# Patient Record
Sex: FEMALE | Race: WHITE | NOT HISPANIC OR LATINO | Employment: OTHER | URBAN - METROPOLITAN AREA
[De-identification: names, ages, dates, MRNs, and addresses within clinical notes are randomized per-mention and may not be internally consistent; named-entity substitution may affect disease eponyms.]

---

## 2017-01-11 ENCOUNTER — ALLIED HEALTH/NURSE VISIT (OUTPATIENT)
Dept: CARDIOLOGY | Facility: CLINIC | Age: 72
End: 2017-01-11
Payer: MEDICARE

## 2017-01-11 DIAGNOSIS — R55 SYNCOPE, UNSPECIFIED SYNCOPE TYPE: ICD-10-CM

## 2017-01-11 DIAGNOSIS — Z45.09 ENCOUNTER FOR LOOP RECORDER CHECK: Primary | ICD-10-CM

## 2017-01-11 PROCEDURE — 93298 REM INTERROG DEV EVAL SCRMS: CPT | Performed by: INTERNAL MEDICINE

## 2017-01-11 PROCEDURE — 93299 C ICM/ILR REMOTE TECH SERV: CPT | Performed by: INTERNAL MEDICINE

## 2017-01-11 NOTE — PROGRESS NOTES
Medtronic Reveal Linq Loop Recorder   Symptom: 0 Tachy: 0 Pause: 0 Enrique: 0  AT: 0 AF: 0 % of time in AT/AF: 0  Heart rate: excellent variability Battery: OK  Careplan: remote in 3 months, scheduled.   Stephen    Called patient with results and plan.

## 2017-01-27 ENCOUNTER — OFFICE VISIT (OUTPATIENT)
Dept: FAMILY MEDICINE | Facility: CLINIC | Age: 72
End: 2017-01-27
Payer: MEDICARE

## 2017-01-27 VITALS
SYSTOLIC BLOOD PRESSURE: 138 MMHG | RESPIRATION RATE: 16 BRPM | BODY MASS INDEX: 30.77 KG/M2 | HEART RATE: 84 BPM | DIASTOLIC BLOOD PRESSURE: 84 MMHG | HEIGHT: 68 IN | OXYGEN SATURATION: 98 % | WEIGHT: 203 LBS | TEMPERATURE: 98.4 F

## 2017-01-27 DIAGNOSIS — J01.90 ACUTE SINUSITIS WITH SYMPTOMS > 10 DAYS: Primary | ICD-10-CM

## 2017-01-27 DIAGNOSIS — I10 HYPERTENSION GOAL BP (BLOOD PRESSURE) < 140/90: ICD-10-CM

## 2017-01-27 DIAGNOSIS — R00.0 TACHYCARDIA: ICD-10-CM

## 2017-01-27 PROCEDURE — 99214 OFFICE O/P EST MOD 30 MIN: CPT | Performed by: FAMILY MEDICINE

## 2017-01-27 RX ORDER — AMOXICILLIN 500 MG/1
1000 CAPSULE ORAL 2 TIMES DAILY
Qty: 40 CAPSULE | Refills: 0 | Status: SHIPPED | OUTPATIENT
Start: 2017-01-27 | End: 2017-08-18

## 2017-01-27 NOTE — MR AVS SNAPSHOT
After Visit Summary   1/27/2017    Teri Beltran    MRN: [id number]           Patient Information     Date Of Birth          1945        Visit Information        Provider Department      1/27/2017 2:10 PM Laura Tipton MD [facility]        Today's Diagnoses     Acute sinusitis with symptoms > 10 days    -  1     Hypertension goal BP (blood pressure) < 140/90         Tachycardia           Care Instructions    Guaifenisen may be helpful for the sinus symptoms. This is now available over the counter in the dose of 600 mg each (Mucinex and Humibid are a couple brand names). This helps break up mucus, and can help with drainage.  You can take 1-2 tabs twice per day.    Keep up with fluids (water).  Humidity can be helpful, such as showers, warm tea.    Sometimes adding a steroid nasal spray like Flonase or Nasacort can help with congestion and with allergies.    Continue to monitor your blood pressure. If it stays high after you feel better, we may want to see you here again.              Follow-ups after your visit        Your next 10 appointments already scheduled     Apr 13, 2017  4:30 PM   Remote ICD Check with ROCHA DCR2   [facility] [place] ([facility])    [address] [place] [id number] [phone]           This appointment is for a remote check of your debrillator.  This is not an appointment at the office.              Who to contact     If you have questions or need follow up information about today's clinic visit or your schedule please contact [facility] directly at [phone].  Normal or non-critical lab and imaging results will be communicated to you by MyChart, letter or phone within 4 business days after the clinic has received the results. If you do not hear from us within 7 days, please contact the clinic through MyChart or phone. If you have a critical or abnormal lab result, we  "will notify you by phone as soon as possible.  Submit refill requests through Friendshippr or call your pharmacy and they will forward the refill request to us. Please allow 3 business days for your refill to be completed.          Additional Information About Your Visit        Goombalhart Information     Friendshippr lets you send messages to your doctor, view your test results, renew your prescriptions, schedule appointments and more. To sign up, go to [email or web address] . Click on \"Log in\" on the left side of the screen, which will take you to the Welcome page. Then click on \"Sign up Now\" on the right side of the page.     You will be asked to enter the access code listed below, as well as some personal information. Please follow the directions to create your username and password.     Your access code is: [email or web address]  Expires: 2017  3:08 PM     Your access code will  in 90 days. If you need help or a new code, please call your [place] clinic or [phone].        Care EveryWhere ID     This is your Care EveryWhere ID. This could be used by other organizations to access your [place] medical records  [id number]        Your Vitals Were     Pulse Temperature Respirations Height BMI (Body Mass Index) Pulse Oximetry    84 98.4  F (36.9  C) (Oral) 16 5' 8\" (1.727 m) 30.87 kg/m2 98%       Blood Pressure from Last 3 Encounters:   17 138/84   16 143/78   16 170/90    Weight from Last 3 Encounters:   17 203 lb (92.08 kg)   16 204 lb (92.534 kg)   16 203 lb (92.08 kg)              Today, you had the following     No orders found for display         Today's Medication Changes          These changes are accurate as of: 17  3:08 PM.  If you have any questions, ask your nurse or doctor.               Start taking these medicines.        Dose/Directions    amoxicillin 500 MG capsule   Commonly known as:  AMOXIL   Used for:  Acute sinusitis with symptoms > 10 days   Started by:  " Laura Tipton MD        Dose:  1000 mg   Take 2 capsules (1,000 mg) by mouth 2 times daily   Quantity:  40 capsule   Refills:  0         These medicines have changed or have updated prescriptions.        Dose/Directions    metoprolol 25 MG 24 hr tablet   Commonly known as:  TOPROL-XL   This may have changed:  how much to take   Used for:  Hypertension goal BP (blood pressure) < 140/90, Tachycardia        Dose:  37.5 mg   Take 1.5 tablets (37.5 mg) by mouth daily   Quantity:  135 tablet   Refills:  3            Where to get your medicines      These medications were sent to [id number] [place] [address] AT [facility] [address] & [facility]  [address] [facility] [address]     Phone:  [phone]    - amoxicillin 500 MG capsule             Primary Care Provider Office Phone # Fax #    Laura Tipton [phone]       [facility] [address]  [facility] [address]        Thank you!     Thank you for choosing [facility]  for your care. Our goal is always to provide you with excellent care. Hearing back from our patients is one way we can continue to improve our services. Please take a few minutes to complete the written survey that you may receive in the mail after your visit with us. Thank you!             Your Updated Medication List - Protect others around you: Learn how to safely use, store and throw away your medicines at www.disposemymeds.org.          This list is accurate as of: 1/27/17  3:08 PM.  Always use your most recent med list.                   Brand Name Dispense Instructions for use    albuterol 108 (90 BASE) MCG/ACT Inhaler    PROAIR HFA/PROVENTIL HFA/VENTOLIN HFA    1 Inhaler    Inhale 2 puffs into the lungs every 6 hours as needed for shortness of breath / dyspnea or wheezing       alendronate 70 MG tablet    FOSAMAX    12 tablet    Take 1 tablet (70 mg) by mouth every 7 days Take with over 8 ounces  water and stay upright until getting something on your stomach; wait at least 30 minutes to eat.       amitriptyline 100 MG tablet    ELAVIL    90 tablet    Take 1 tablet (100 mg) by mouth At Bedtime       amLODIPine 2.5 MG tablet    NORVASC    90 tablet    Take 1 tablet (2.5 mg) by mouth daily       amoxicillin 500 MG capsule    AMOXIL    40 capsule    Take 2 capsules (1,000 mg) by mouth 2 times daily       cholecalciferol 1000 UNIT tablet    vitamin D    100 tablet    Take 1 tablet (1,000 Units) by mouth daily       CLARITIN 10 MG tablet   Generic drug:  loratadine      Take 1 tablet (10 mg) by mouth daily       Co Q10 200 MG Caps     100 capsule    Take 200 mg by mouth daily       COSOPT PF 22.3-6.8 MG/ML Soln   Generic drug:  Dorzolamide HCl-Timolol Mal PF          levothyroxine 88 MCG tablet    SYNTHROID/LEVOTHROID    90 tablet    Take 1 tablet (88 mcg) by mouth daily       lisinopril-hydrochlorothiazide 20-12.5 MG per tablet    PRINZIDE/ZESTORETIC    90 tablet    Take 1 tablet by mouth daily       metoprolol 25 MG 24 hr tablet    TOPROL-XL    135 tablet    Take 1.5 tablets (37.5 mg) by mouth daily       pravastatin 40 MG tablet    PRAVACHOL    90 tablet    Take 1 tablet (40 mg) by mouth daily       ZIOPTAN 0.0015 % Soln   Generic drug:  Tafluprost

## 2017-01-27 NOTE — PATIENT INSTRUCTIONS
Guaifenisen may be helpful for the sinus symptoms. This is now available over the counter in the dose of 600 mg each (Mucinex and Humibid are a couple brand names). This helps break up mucus, and can help with drainage.  You can take 1-2 tabs twice per day.    Keep up with fluids (water).  Humidity can be helpful, such as showers, warm tea.    Sometimes adding a steroid nasal spray like Flonase or Nasacort can help with congestion and with allergies.    Continue to monitor your blood pressure. If it stays high after you feel better, we may want to see you here again.

## 2017-01-27 NOTE — NURSING NOTE
"Chief Complaint   Patient presents with     Sinus Problem       Initial /84 mmHg  Pulse 84  Temp(Src) 98.4  F (36.9  C) (Oral)  Resp 16  Ht 5' 8\" (1.727 m)  Wt 203 lb (92.08 kg)  BMI 30.87 kg/m2  SpO2 98% Estimated body mass index is 30.87 kg/(m^2) as calculated from the following:    Height as of this encounter: 5' 8\" (1.727 m).    Weight as of this encounter: 203 lb (92.08 kg).  BP completed using cuff size: andre Quintero CMA      "

## 2017-01-27 NOTE — PROGRESS NOTES
SUBJECTIVE:                                                    Teri Beltran is a 71 year old female who presents to clinic today for the following health issues:      Acute Illness   Acute illness concerns: sinus  Onset: x 2 weeks     Fever: no     Chills/Sweats: YES    Headache (location?): YES    Sinus Pressure:YES- tender, post-nasal drainage and teeth pain    Conjunctivitis:  no    Ear Pain: YES: bilateral- plugged    Rhinorrhea: no     Congestion: YES    Sore Throat: no      Cough: YES-productive of yellow green    Wheeze: no     Decreased Appetite: YES    Nausea: no     Vomiting: no     Diarrhea:  no     Dysuria/Freq.: no     Fatigue/Achiness: no     Sick/Strep Exposure: no      Therapies Tried and outcome: Claritin - not effective        Patient Active Problem List   Diagnosis     Hypothyroidism     Tachycardia     Glaucoma     Prediabetes     Hyperlipidemia LDL goal <130     Palpitations     BMI 30.0-30.9,adult     Health Care Home     Advanced directives, counseling/discussion     Hypertension goal BP (blood pressure) < 140/90     Adjustment disorder with depressed mood     Osteoporosis     Mild dilation of ascending aorta - borderline     RBBB     First degree AV block     Venous insufficiency     Varicose veins       Current Outpatient Prescriptions   Medication Sig Dispense Refill     amLODIPine (NORVASC) 2.5 MG tablet Take 1 tablet (2.5 mg) by mouth daily 90 tablet 3     alendronate (FOSAMAX) 70 MG tablet Take 1 tablet (70 mg) by mouth every 7 days Take with over 8 ounces water and stay upright until getting something on your stomach; wait at least 30 minutes to eat. 12 tablet 3     pravastatin (PRAVACHOL) 40 MG tablet Take 1 tablet (40 mg) by mouth daily 90 tablet 3     amitriptyline (ELAVIL) 100 MG tablet Take 1 tablet (100 mg) by mouth At Bedtime 90 tablet 1     levothyroxine (SYNTHROID, LEVOTHROID) 88 MCG tablet Take 1 tablet (88 mcg) by mouth daily 90 tablet 2      "lisinopril-hydrochlorothiazide (PRINZIDE,ZESTORETIC) 20-12.5 MG per tablet Take 1 tablet by mouth daily 90 tablet 3     metoprolol (TOPROL-XL) 25 MG 24 hr tablet Take 1.5 tablets (37.5 mg) by mouth daily (Patient taking differently: Take 25 mg by mouth daily ) 135 tablet 3     ZIOPTAN 0.0015 % SOLN        albuterol (PROAIR HFA, PROVENTIL HFA, VENTOLIN HFA) 108 (90 BASE) MCG/ACT inhaler Inhale 2 puffs into the lungs every 6 hours as needed for shortness of breath / dyspnea or wheezing 1 Inhaler 0     Coenzyme Q10 (CO Q10) 200 MG CAPS Take 200 mg by mouth daily 100 capsule prn     cholecalciferol (VITAMIN D) 1000 UNIT tablet Take 1 tablet (1,000 Units) by mouth daily 100 tablet 3     COSOPT PF 22.3-6.8 MG/ML SOLN   3     loratadine (CLARITIN) 10 MG tablet Take 1 tablet (10 mg) by mouth daily             ROS:  CONSTITUTIONAL:NEGATIVE for fever, chills, change in weight  ENT/MOUTH: see below  RESP:see below  CV: NEGATIVE for chest pain, palpitations or peripheral edema  PSYCHIATRIC: NEGATIVE for changes in mood or affect    Notes her bp seems a little higher due to illness.    Sick for about 2 weeks.  Frontal headaches.  Teeth hurting last week.  PND.   No fevers.   Occasional rhinorrhea.    Cough in am. Might be PND.    Waxing and waning.    Has taken some claritin.    Has a LINQ in; no problems since this has been put in.      OBJECTIVE:                                                    /84 mmHg  Pulse 84  Temp(Src) 98.4  F (36.9  C) (Oral)  Resp 16  Ht 5' 8\" (1.727 m)  Wt 203 lb (92.08 kg)  BMI 30.87 kg/m2  SpO2 98%  Body mass index is 30.87 kg/(m^2).     She got 140/84 on her cuff.    General appearance: alert and has no apparent distress  Skin color is pink  Hydration status appears adequate with normal skin turgor and moist mucous membranes.  Sinuses are tender to palpation, especially in the maxillary area bilaterally; some in the frontal area..   Left TM is normal: no effusions, no erythema, and " normal landmarks.  Right TM is normal: no effusions, no erythema, and normal landmarks.  Nasal mucosa is discharge mucoid.  Oropharyngeal exam is normal: no lesions, erythema, adenopathy or exudate.  Neck is supple without adenopathy.  RESP: Normal - CTA without rales, rhonchi, or wheezing.    Her readings 139-148/80-88 in the last several days.    Reviewed some of her Cardiology notes.         ASSESSMENT/PLAN:                                                      Acute sinusitis with symptoms > 10 days  Also discussed some options to assist with drainage.   - amoxicillin (AMOXIL) 500 MG capsule; Take 2 capsules (1,000 mg) by mouth 2 times daily    Hypertension goal BP (blood pressure) < 140/90  Borderline here. Looks like her cuff is close to ours.  She will monitor bp and be seen if elevated once she feels better.     Tachycardia  Reviewed. She has a LINQ implanted for further monitoring.     Follow up prn or as previously directed.     Patient Instructions   Guaifenisen may be helpful for the sinus symptoms. This is now available over the counter in the dose of 600 mg each (Mucinex and Humibid are a couple brand names). This helps break up mucus, and can help with drainage.  You can take 1-2 tabs twice per day.    Keep up with fluids (water).  Humidity can be helpful, such as showers, warm tea.    Sometimes adding a steroid nasal spray like Flonase or Nasacort can help with congestion and with allergies.    Continue to monitor your blood pressure. If it stays high after you feel better, we may want to see you here again.              Laura Tipton MD, MD  [facility]

## 2017-03-27 ENCOUNTER — TRANSFERRED RECORDS (OUTPATIENT)
Dept: HEALTH INFORMATION MANAGEMENT | Facility: CLINIC | Age: 72
End: 2017-03-27

## 2017-03-31 DIAGNOSIS — G47.00 INSOMNIA: Primary | ICD-10-CM

## 2017-03-31 RX ORDER — AMITRIPTYLINE HYDROCHLORIDE 100 MG/1
100 TABLET ORAL AT BEDTIME
Qty: 90 TABLET | Refills: 1 | Status: SHIPPED | OUTPATIENT
Start: 2017-03-31 | End: 2017-09-23

## 2017-03-31 NOTE — TELEPHONE ENCOUNTER
amitriptyline (ELAVIL) 100 MG     Last Written Prescription Date: 9/23/16  Last Fill Quantity: 90, # refills: 1  Last Office Visit with [facility], [facility] or [facility] prescribing provider: 1/27/17        BP Readings from Last 3 Encounters:   01/27/17 138/84   09/26/16 143/78   09/02/16 170/90     Last PHQ-9 score on record= No flowsheet data found.

## 2017-03-31 NOTE — TELEPHONE ENCOUNTER
Takes for insomnia.  Prescription approved per [facility].  Magaly Perdomo, RN  Triage Nurse

## 2017-04-09 DIAGNOSIS — E03.9 HYPOTHYROIDISM: ICD-10-CM

## 2017-04-10 NOTE — TELEPHONE ENCOUNTER
levothyroxine (SYNTHROID, LEVOTHROID) 88 MCG     Last Written Prescription Date: 7/15/16  Last Quantity: 90, # refills: 2  Last Office Visit with [facility], P or [facility] prescribing provider: 1/27/17        TSH   Date Value Ref Range Status   03/01/2016 2.35 0.40 - 4.00 mU/L Final

## 2017-04-11 ENCOUNTER — TRANSFERRED RECORDS (OUTPATIENT)
Dept: HEALTH INFORMATION MANAGEMENT | Facility: CLINIC | Age: 72
End: 2017-04-11

## 2017-04-11 RX ORDER — LEVOTHYROXINE SODIUM 88 UG/1
TABLET ORAL
Qty: 30 TABLET | Refills: 0 | Status: SHIPPED | OUTPATIENT
Start: 2017-04-11 | End: 2017-04-13

## 2017-04-11 NOTE — TELEPHONE ENCOUNTER
Medication is being filled for 1 time refill only due to:  Patient needs labs TSH, T4..   Labs are ordered, please call patient to schedule lab visit.   Magaly Perdomo RN  Triage Nurse

## 2017-04-12 DIAGNOSIS — E03.9 HYPOTHYROIDISM: ICD-10-CM

## 2017-04-12 PROCEDURE — 84443 ASSAY THYROID STIM HORMONE: CPT | Performed by: FAMILY MEDICINE

## 2017-04-12 PROCEDURE — 84439 ASSAY OF FREE THYROXINE: CPT | Performed by: FAMILY MEDICINE

## 2017-04-12 PROCEDURE — 36415 COLL VENOUS BLD VENIPUNCTURE: CPT | Performed by: FAMILY MEDICINE

## 2017-04-13 ENCOUNTER — ALLIED HEALTH/NURSE VISIT (OUTPATIENT)
Dept: CARDIOLOGY | Facility: CLINIC | Age: 72
End: 2017-04-13
Payer: MEDICARE

## 2017-04-13 DIAGNOSIS — E03.9 HYPOTHYROIDISM, UNSPECIFIED TYPE: ICD-10-CM

## 2017-04-13 DIAGNOSIS — Z45.09 ENCOUNTER FOR LOOP RECORDER CHECK: Primary | ICD-10-CM

## 2017-04-13 LAB
T4 FREE SERPL-MCNC: 1.23 NG/DL (ref 0.76–1.46)
TSH SERPL DL<=0.05 MIU/L-ACNC: 2.38 MU/L (ref 0.4–4)

## 2017-04-13 PROCEDURE — 93297 REM INTERROG DEV EVAL ICPMS: CPT | Performed by: INTERNAL MEDICINE

## 2017-04-13 PROCEDURE — 93299 C ICM/ILR REMOTE TECH SERV: CPT | Performed by: INTERNAL MEDICINE

## 2017-04-13 RX ORDER — LEVOTHYROXINE SODIUM 88 UG/1
88 TABLET ORAL DAILY
Qty: 90 TABLET | Refills: 3 | Status: SHIPPED | OUTPATIENT
Start: 2017-04-13 | End: 2018-05-17

## 2017-04-13 NOTE — LETTER
Teri Beltran    [address] [facility] [address]    April 14, 2017      Dear Teri Beltran,     Your transmission sent on 04/13/2017 has been reviewed. No events were detected.  Your next scheduled date for you to send a transmission is on 07/20/17.   Please call Luis at [phone] to change your appointment if needed. You may call and leave a message for a device RN if you have any questions at [phone] and they will return your call. Device clinic hours: Monday - Friday  8:00am-4:00pm   Sincerely,   Device Nurse

## 2017-04-13 NOTE — MR AVS SNAPSHOT
"              After Visit Summary   4/13/2017    Teri Beltran    MRN: [id number]           Patient Information     Date Of Birth          1945        Visit Information        Provider Department      4/13/2017 4:30 PM ROCHA DCR2 [facility]        Today's Diagnoses     Encounter for loop recorder check    -  1       Follow-ups after your visit        Your next 10 appointments already scheduled     Jul 20, 2017  4:30 PM CDT   Remote ICD Check with ROCHA DCR2   [facility] ([facility])    [address] [place], [place] [id number]  [facility] [id number] [phone]           This appointment is for a remote check of your debrillator.  This is not an appointment at the office.              Who to contact     If you have questions or need follow up information about today's clinic visit or your schedule please contact [facility] directly at [phone].  Normal or non-critical lab and imaging results will be communicated to you by Motor2hart, letter or phone within 4 business days after the clinic has received the results. If you do not hear from us within 7 days, please contact the clinic through Motor2hart or phone. If you have a critical or abnormal lab result, we will notify you by phone as soon as possible.  Submit refill requests through Pwinty or call your pharmacy and they will forward the refill request to us. Please allow 3 business days for your refill to be completed.          Additional Information About Your Visit        Motor2harKwelia Information     Pwinty lets you send messages to your doctor, view your test results, renew your prescriptions, schedule appointments and more. To sign up, go to [email or web address] . Click on \"Log in\" on the left side of the screen, which will take you to the Welcome page. Then click on \"Sign up Now\" on the right side of the page.     You will be asked " to enter the access code listed below, as well as some personal information. Please follow the directions to create your username and password.     Your access code is: [email or web address]  Expires: 2017  4:08 PM     Your access code will  in 90 days. If you need help or a new code, please call your [place] clinic or [phone].        Care EveryWhere ID     This is your Care EveryWhere ID. This could be used by other organizations to access your [place] medical records  [id number]         Blood Pressure from Last 3 Encounters:   17 138/84   16 143/78   16 170/90    Weight from Last 3 Encounters:   17 92.1 kg (203 lb)   16 92.5 kg (204 lb)   16 92.1 kg (203 lb)              We Performed the Following     C ICM DEVICE INTERROGAT REMOTE (57822)     ILR REMOTE TECH SERV (81863)          Today's Medication Changes          These changes are accurate as of: 17 11:59 PM.  If you have any questions, ask your nurse or doctor.               These medicines have changed or have updated prescriptions.        Dose/Directions    levothyroxine 88 MCG tablet   Commonly known as:  SYNTHROID/LEVOTHROID   This may have changed:  See the new instructions.   Used for:  Hypothyroidism, unspecified type   Changed by:  Laura Tipton MD        Dose:  88 mcg   Take 1 tablet (88 mcg) by mouth daily   Quantity:  90 tablet   Refills:  3       metoprolol 25 MG 24 hr tablet   Commonly known as:  TOPROL-XL   This may have changed:  how much to take   Used for:  Hypertension goal BP (blood pressure) < 140/90, Tachycardia        Dose:  37.5 mg   Take 1.5 tablets (37.5 mg) by mouth daily   Quantity:  135 tablet   Refills:  3            Where to get your medicines      These medications were sent to [facility]  [place] - [address] AT [facility] [address] & [facility]  [address] [place] [address] [facility] [address]     Phone:  [phone]     levothyroxine 88 MCG tablet                 Primary Care Provider Office Phone # Fax #    Laura Tipton [phone]       [facility] [address]  [facility] [address]        Thank you!     Thank you for choosing [facility] [place]  for your care. Our goal is always to provide you with excellent care. Hearing back from our patients is one way we can continue to improve our services. Please take a few minutes to complete the written survey that you may receive in the mail after your visit with us. Thank you!             Your Updated Medication List - Protect others around you: Learn how to safely use, store and throw away your medicines at www.disposemymeds.org.          This list is accurate as of: 4/13/17 11:59 PM.  Always use your most recent med list.                   Brand Name Dispense Instructions for use    albuterol 108 (90 BASE) MCG/ACT Inhaler    PROAIR HFA/PROVENTIL HFA/VENTOLIN HFA    1 Inhaler    Inhale 2 puffs into the lungs every 6 hours as needed for shortness of breath / dyspnea or wheezing       alendronate 70 MG tablet    FOSAMAX    12 tablet    Take 1 tablet (70 mg) by mouth every 7 days Take with over 8 ounces water and stay upright until getting something on your stomach; wait at least 30 minutes to eat.       amitriptyline 100 MG tablet    ELAVIL    90 tablet    Take 1 tablet (100 mg) by mouth At Bedtime       amLODIPine 2.5 MG tablet    NORVASC    90 tablet    Take 1 tablet (2.5 mg) by mouth daily       amoxicillin 500 MG capsule    AMOXIL    40 capsule    Take 2 capsules (1,000 mg) by mouth 2 times daily       cholecalciferol 1000 UNIT tablet    vitamin D    100 tablet    Take 1 tablet (1,000 Units) by mouth daily       CLARITIN 10 MG tablet   Generic drug:  loratadine      Take 1 tablet (10 mg) by mouth daily       Co Q10 200 MG Caps     100 capsule    Take 200 mg by mouth daily       COSOPT PF 22.3-6.8 MG/ML Soln   Generic drug:  Dorzolamide  HCl-Timolol Mal PF          levothyroxine 88 MCG tablet    SYNTHROID/LEVOTHROID    90 tablet    Take 1 tablet (88 mcg) by mouth daily       lisinopril-hydrochlorothiazide 20-12.5 MG per tablet    PRINZIDE/ZESTORETIC    90 tablet    Take 1 tablet by mouth daily       metoprolol 25 MG 24 hr tablet    TOPROL-XL    135 tablet    Take 1.5 tablets (37.5 mg) by mouth daily       pravastatin 40 MG tablet    PRAVACHOL    90 tablet    Take 1 tablet (40 mg) by mouth daily       ZIOPTAN 0.0015 % Soln   Generic drug:  Tafluprost

## 2017-04-14 NOTE — PROGRESS NOTES
[employer] Reveal Linq Loop Recorder   Symptom: none Tachy: none Pause: none Enrique: none  AT: none AF: none % of time in AT/AF: N/A  Heart rate: Stable with adequate rates Battery: OK  Careplan: Results letter mailed to patient. Eunice

## 2017-05-12 ENCOUNTER — TELEPHONE (OUTPATIENT)
Dept: CARDIOLOGY | Facility: CLINIC | Age: 72
End: 2017-05-12

## 2017-05-12 NOTE — TELEPHONE ENCOUNTER
Patient called to report an episode of shakiness and palpitations of 110 bpm. Patient states that she feels fine now. Remote transmission did not reveal any abnormal arrhythmias. Patient instructed to follow-up with PMD to review glucose level. Patient verbalized understanding. Eunice

## 2017-05-18 DIAGNOSIS — I10 HYPERTENSION GOAL BP (BLOOD PRESSURE) < 140/90: ICD-10-CM

## 2017-05-19 NOTE — TELEPHONE ENCOUNTER
lisinopril-hydrochlorothiazide (PRINZIDE,ZESTORETIC) 20-12.5 MG      Last Written Prescription Date: 5/17/16  Last Fill Quantity: 90, # refills: 3  Last Office Visit with G, P or [facility] prescribing provider: 1/27/17       Potassium   Date Value Ref Range Status   09/26/2016 3.6 3.4 - 5.3 mmol/L Final     Creatinine   Date Value Ref Range Status   09/26/2016 0.67 0.52 - 1.04 mg/dL Final     BP Readings from Last 3 Encounters:   01/27/17 138/84   09/26/16 143/78   09/02/16 170/90

## 2017-05-22 RX ORDER — LISINOPRIL AND HYDROCHLOROTHIAZIDE 12.5; 2 MG/1; MG/1
TABLET ORAL
Qty: 90 TABLET | Refills: 1 | Status: SHIPPED | OUTPATIENT
Start: 2017-05-22 | End: 2017-11-18

## 2017-05-22 NOTE — TELEPHONE ENCOUNTER
Prescription approved per [facility] Refill Protocol.  Magaly Perdomo, RN  Triage Nurse

## 2017-06-22 DIAGNOSIS — R00.0 TACHYCARDIA: ICD-10-CM

## 2017-06-22 DIAGNOSIS — I10 HYPERTENSION GOAL BP (BLOOD PRESSURE) < 140/90: ICD-10-CM

## 2017-06-22 RX ORDER — METOPROLOL SUCCINATE 25 MG/1
37.5 TABLET, EXTENDED RELEASE ORAL DAILY
Qty: 135 TABLET | Refills: 0 | Status: SHIPPED | OUTPATIENT
Start: 2017-06-22 | End: 2017-10-04

## 2017-07-07 ENCOUNTER — TRANSFERRED RECORDS (OUTPATIENT)
Dept: HEALTH INFORMATION MANAGEMENT | Facility: CLINIC | Age: 72
End: 2017-07-07

## 2017-07-20 ENCOUNTER — ALLIED HEALTH/NURSE VISIT (OUTPATIENT)
Dept: CARDIOLOGY | Facility: CLINIC | Age: 72
End: 2017-07-20
Payer: MEDICARE

## 2017-07-20 DIAGNOSIS — Z45.09 ENCOUNTER FOR LOOP RECORDER CHECK: Primary | ICD-10-CM

## 2017-07-20 PROCEDURE — 93299 C ICM/ILR REMOTE TECH SERV: CPT | Performed by: INTERNAL MEDICINE

## 2017-07-20 PROCEDURE — 93297 REM INTERROG DEV EVAL ICPMS: CPT | Performed by: INTERNAL MEDICINE

## 2017-07-20 NOTE — LETTER
Teri Beltran    [address] [facility] [address]    July 20, 2017      Dear Teri Beltran,     Your transmission sent on 07/20/17 has been reviewed. It was determined the results are normal. No events were detected.  Your next scheduled date for you to send a transmission is on 11/02/2017.   Please call Luis at [phone] to change your appointment if needed. You may call and leave a message for a device RN if you have any questions at [phone] and they will return your call. Device clinic hours: Monday - Friday  8:00am-4:00pm   Sincerely,   Device Nurse

## 2017-07-20 NOTE — PROGRESS NOTES
[employer] Reveal Linq Loop Recorder   Symptom: 0 Tachy: 0 Pause: 0 Enrique: 0  AT: 0 AF: 0 % of time in AT/AF: 0  Heart rate: Stable with adequate rates Battery: OK  Careplan: Results letter with next remote transmission date mailed to patient. Eunice    I have reviewed and interpreted the device interrogation. The device is functioning within normal device parameters. I agree with the current findings, assessment and plan.

## 2017-07-20 NOTE — MR AVS SNAPSHOT
After Visit Summary   7/20/2017    Teri Beltran    MRN: [id number]           Patient Information     Date Of Birth          1945        Visit Information        Provider Department      7/20/2017 4:30 PM ROCHA DCR2 [facility]        Today's Diagnoses     Encounter for loop recorder check    -  1       Follow-ups after your visit        Your next 10 appointments already scheduled     Jul 20, 2017  4:30 PM CDT   Remote ICD Check with ROCHA DCR2   [facility] ([facility])    [address]  [facility] [address] [phone]           This appointment is for a remote check of your debrillator.  This is not an appointment at the office.            Nov 02, 2017  4:30 PM CDT   Remote ICD Check with ROCHA DCR2   [facility] ([facility])    [address]  [facility] [id number] [phone]           This appointment is for a remote check of your debrillator.  This is not an appointment at the office.              Who to contact     If you have questions or need follow up information about today's clinic visit or your schedule please contact [facility] directly at [phone].  Normal or non-critical lab and imaging results will be communicated to you by MyChart, letter or phone within 4 business days after the clinic has received the results. If you do not hear from us within 7 days, please contact the clinic through BAC ON TRAChart or phone. If you have a critical or abnormal lab result, we will notify you by phone as soon as possible.  Submit refill requests through [employer] or call your pharmacy and they will forward the refill request to us. Please allow 3 business days for your refill to be completed.          Additional Information About Your Visit        [employer]AC ON TRA[employer] Information     [employer] lets  "you send messages to your doctor, view your test results, renew your prescriptions, schedule appointments and more. To sign up, go to [email or web address] . Click on \"Log in\" on the left side of the screen, which will take you to the Welcome page. Then click on \"Sign up Now\" on the right side of the page.     You will be asked to enter the access code listed below, as well as some personal information. Please follow the directions to create your username and password.     Your access code is: [email or web address]  Expires: 10/18/2017  3:21 PM     Your access code will  in 90 days. If you need help or a new code, please call your [place] clinic or [phone].        Care EveryWhere ID     This is your Care EveryWhere ID. This could be used by other organizations to access your [place] medical records  [id number]         Blood Pressure from Last 3 Encounters:   17 138/84   16 143/78   16 170/90    Weight from Last 3 Encounters:   17 92.1 kg (203 lb)   16 92.5 kg (204 lb)   16 92.1 kg (203 lb)              We Performed the Following     C ICM DEVICE INTERROGAT REMOTE (18875)     ILR REMOTE TECH SERV (74551)        Primary Care Provider Office Phone # Fax #    Laura Tipton [phone]       [facility] [address] [facility] [address]        Equal Access to Services     [facility] : Hadii aad ku hadasho Soomaali, waaxda luqadaha, qaybta kaalmada adeegyada, waxay geena verma. So [facility] [phone].    ATENCIÓN: Si habla español, tiene a tracy disposición servicios gratuitos de asistencia lingüística. Llame al [phone].    We comply with applicable federal civil rights laws and [place] laws. We do not discriminate on the basis of race, color, national origin, age, disability sex, sexual orientation or gender identity.            Thank you!     Thank you for choosing [facility]  for your care. Our goal is always to provide you with excellent care. Hearing back from our patients is one way we can continue to improve our services. Please take a few minutes to complete the written survey that you may receive in the mail after your visit with us. Thank you!             Your Updated Medication List - Protect others around you: Learn how to safely use, store and throw away your medicines at www.disposemymeds.org.          This list is accurate as of: 7/20/17  3:21 PM.  Always use your most recent med list.                   Brand Name Dispense Instructions for use Diagnosis    albuterol 108 (90 BASE) MCG/ACT Inhaler    PROAIR HFA/PROVENTIL HFA/VENTOLIN HFA    1 Inhaler    Inhale 2 puffs into the lungs every 6 hours as needed for shortness of breath / dyspnea or wheezing    Seasonal allergies, SOB (shortness of breath)       alendronate 70 MG tablet    FOSAMAX    12 tablet    Take 1 tablet (70 mg) by mouth every 7 days Take with over 8 ounces water and stay upright until getting something on your stomach; wait at least 30 minutes to eat.    Osteoporosis       amitriptyline 100 MG tablet    ELAVIL    90 tablet    Take 1 tablet (100 mg) by mouth At Bedtime    Insomnia       amLODIPine 2.5 MG tablet    NORVASC    90 tablet    Take 1 tablet (2.5 mg) by mouth daily    Essential hypertension with goal blood pressure less than 140/90       amoxicillin 500 MG capsule    AMOXIL    40 capsule    Take 2 capsules (1,000 mg) by mouth 2 times daily    Acute sinusitis with symptoms > 10 days       cholecalciferol 1000 UNIT tablet    vitamin D    100 tablet    Take 1 tablet (1,000 Units) by mouth daily        CLARITIN 10 MG tablet   Generic drug:  loratadine      Take 1 tablet (10 mg) by mouth daily    Seasonal allergic rhinitis       Co Q10 200 MG Caps     100 capsule    Take 200 mg by mouth daily        COSOPT PF 22.3-6.8 MG/ML Soln   Generic drug:  Dorzolamide HCl-Timolol Mal PF            levothyroxine 88 MCG tablet    SYNTHROID/LEVOTHROID    90 tablet    Take 1 tablet (88 mcg) by mouth daily    Hypothyroidism, unspecified type       lisinopril-hydrochlorothiazide 20-12.5 MG per tablet    PRINZIDE/ZESTORETIC    90 tablet    TAKE ONE TABLET BY MOUTH ONCE DAILY    Hypertension goal BP (blood pressure) < 140/90       metoprolol 25 MG 24 hr tablet    TOPROL-XL    135 tablet    Take 1.5 tablets (37.5 mg) by mouth daily    Hypertension goal BP (blood pressure) < 140/90, Tachycardia       pravastatin 40 MG tablet    PRAVACHOL    90 tablet    Take 1 tablet (40 mg) by mouth daily    Hyperlipidemia LDL goal <130       ZIOPTAN 0.0015 % Soln   Generic drug:  Tafluprost

## 2017-08-18 ENCOUNTER — OFFICE VISIT (OUTPATIENT)
Dept: FAMILY MEDICINE | Facility: CLINIC | Age: 72
End: 2017-08-18
Payer: MEDICARE

## 2017-08-18 VITALS
OXYGEN SATURATION: 98 % | TEMPERATURE: 98.2 F | HEART RATE: 90 BPM | RESPIRATION RATE: 16 BRPM | BODY MASS INDEX: 30.83 KG/M2 | WEIGHT: 203.4 LBS | HEIGHT: 68 IN | SYSTOLIC BLOOD PRESSURE: 132 MMHG | DIASTOLIC BLOOD PRESSURE: 84 MMHG

## 2017-08-18 DIAGNOSIS — E78.5 HYPERLIPIDEMIA LDL GOAL <130: ICD-10-CM

## 2017-08-18 DIAGNOSIS — R53.83 FATIGUE, UNSPECIFIED TYPE: ICD-10-CM

## 2017-08-18 DIAGNOSIS — R73.03 PREDIABETES: ICD-10-CM

## 2017-08-18 DIAGNOSIS — I10 HYPERTENSION GOAL BP (BLOOD PRESSURE) < 140/90: Primary | ICD-10-CM

## 2017-08-18 DIAGNOSIS — E03.9 HYPOTHYROIDISM, UNSPECIFIED TYPE: ICD-10-CM

## 2017-08-18 DIAGNOSIS — Z78.9 ALCOHOL USE: ICD-10-CM

## 2017-08-18 DIAGNOSIS — I77.810 MILD DILATION OF ASCENDING AORTA (H): ICD-10-CM

## 2017-08-18 DIAGNOSIS — Z12.31 ENCOUNTER FOR SCREENING MAMMOGRAM FOR BREAST CANCER: ICD-10-CM

## 2017-08-18 PROCEDURE — 99214 OFFICE O/P EST MOD 30 MIN: CPT | Performed by: FAMILY MEDICINE

## 2017-08-18 NOTE — PATIENT INSTRUCTIONS
I would recommend not having more than one shot of keeley or other alcohol per day.    Schedule a lab only appointment in the next 1-2 months for fasting lab.  Fasting means nothing to eat or drink for 10 hours. However, you do want to take your medications. We DO encourage you to drink water.    Schedule a nurse only appointment for a blood pressure check.  Bring your cuff in and check at the same time.

## 2017-08-18 NOTE — PROGRESS NOTES
SUBJECTIVE:   Teri Beltran is a 71 year old female who presents to clinic today for the following health issues:      Hypertension Follow-up      Outpatient blood pressures are being checked at home.  Results are /42-92.- in the am notices low numbers.    Low Salt Diet: no added salt        Amount of exercise or physical activity: [employer]- 2 times per week    Problems taking medications regularly: No    Medication side effects: none  Diet: regular (no restrictions)      Here to discuss a chip in the left breast that records the heart.  Has a mammogram coming up.    Problem list and histories reviewed & adjusted, as indicated.  Additional history:     See under ROS    Patient Active Problem List   Diagnosis     Hypothyroidism     Tachycardia     Glaucoma     Prediabetes     Hyperlipidemia LDL goal <130     Palpitations     BMI 30.0-30.9,adult     Health Care Home     Advanced directives, counseling/discussion     Hypertension goal BP (blood pressure) < 140/90     Adjustment disorder with depressed mood     Osteoporosis     Mild dilation of ascending aorta - borderline     RBBB     First degree AV block     Venous insufficiency     Varicose veins       Current Outpatient Prescriptions   Medication Sig Dispense Refill     metoprolol (TOPROL-XL) 25 MG 24 hr tablet Take 1.5 tablets (37.5 mg) by mouth daily 135 tablet 0     lisinopril-hydrochlorothiazide (PRINZIDE/ZESTORETIC) 20-12.5 MG per tablet TAKE ONE TABLET BY MOUTH ONCE DAILY 90 tablet 1     levothyroxine (SYNTHROID/LEVOTHROID) 88 MCG tablet Take 1 tablet (88 mcg) by mouth daily 90 tablet 3     amitriptyline (ELAVIL) 100 MG tablet Take 1 tablet (100 mg) by mouth At Bedtime 90 tablet 1     amoxicillin (AMOXIL) 500 MG capsule Take 2 capsules (1,000 mg) by mouth 2 times daily 40 capsule 0     amLODIPine (NORVASC) 2.5 MG tablet Take 1 tablet (2.5 mg) by mouth daily 90 tablet 3     alendronate (FOSAMAX) 70 MG tablet Take 1 tablet (70 mg) by mouth every  "7 days Take with over 8 ounces water and stay upright until getting something on your stomach; wait at least 30 minutes to eat. 12 tablet 3     albuterol (PROAIR HFA, PROVENTIL HFA, VENTOLIN HFA) 108 (90 BASE) MCG/ACT inhaler Inhale 2 puffs into the lungs every 6 hours as needed for shortness of breath / dyspnea or wheezing 1 Inhaler 0     Coenzyme Q10 (CO Q10) 200 MG CAPS Take 200 mg by mouth daily 100 capsule prn     cholecalciferol (VITAMIN D) 1000 UNIT tablet Take 1 tablet (1,000 Units) by mouth daily 100 tablet 3     COSOPT PF 22.3-6.8 MG/ML SOLN   3     loratadine (CLARITIN) 10 MG tablet Take 1 tablet (10 mg) by mouth daily       pravastatin (PRAVACHOL) 40 MG tablet Take 1 tablet (40 mg) by mouth daily 90 tablet 3     ZIOPTAN 0.0015 % SOLN            Reviewed and updated as needed this visit by clinical staff     Reviewed and updated as needed this visit by Provider         ROS:  CONSTITUTIONAL:NEGATIVE for fever, chills, change in weight  RESP: will occasionally get short of breath. Not new. Rests and things ok. She is not concerned.  CV: NEGATIVE for chest pain, palpitations. She had some swelling this am; put on teds.  NEURO: NEGATIVE for weakness, dizziness or paresthesias  PSYCHIATRIC: NEGATIVE for changes in mood or affect    Has gotten as low as 94/69. Home readings.  Feels exhausted with above.  Not dizzy or lightheaded.     Otherwise feeling fine.    She notes her blood pressures at home are lowish (/50-80's.)  HR 80's-111.    She does say she has Lillian and water every evening;  2.    Has been getting to the gym; tries for twice per week.  Does bike.    OBJECTIVE:     /84 (BP Location: Right arm, Cuff Size: Adult Regular)  Pulse 90  Temp 98.2  F (36.8  C) (Oral)  Resp 16  Ht 5' 8\" (1.727 m)  Wt 203 lb 6.4 oz (92.3 kg)  SpO2 98%  BMI 30.93 kg/m2  Body mass index is 30.93 kg/(m^2).  GENERAL APPEARANCE: alert and no distress  RESP: lungs clear to auscultation - no rales, rhonchi or " wheezes  CV: regular rates and rhythm  MS: No edema detected at this time; she does have some thin compression stockings on.  PSYCH: mentation appears normal and affect normal/bright      GFR Estimate   Date Value Ref Range Status   09/26/2016 87 >60 mL/min/1.7m2 Final     Comment:     Non  GFR Calc   08/30/2016 63 >60 mL/min/1.7m2 Final     Comment:     Non  GFR Calc   03/01/2016 >90  Non  GFR Calc   >60 mL/min/1.7m2 Final     TSH   Date Value Ref Range Status   04/12/2017 2.38 0.40 - 4.00 mU/L Final   ]  Recent Labs   Lab Test  09/30/16   1051  08/11/15   1224  08/20/14   0958   CHOL  176  176  201*   HDL  65  79  88   LDL  84  70  79   TRIG  136  133  169*   CHOLHDLRATIO   --   2.2  2.3     Lab Results   Component Value Date    A1C 6.1 11/14/2014    A1C 6.1 07/03/2013     Lab Results   Component Value Date    MICROL <5 08/11/2015     No results found for: MICROALBUMIN    Reviewed her bp recordings; see under ROS    Reviewed ECHO from 2015.    ASSESSMENT/PLAN:     Hypertension goal BP (blood pressure) < 140/90  Looks good today.  She reports low blood pressures at home, but has not brought her cuff. She denies any dizziness or lightheadedness, which I would often expect as symptoms of low blood pressure. Recommend to bring her cuff in and check it against a reliable cuff.   At this time, did not make any changes.  - Comprehensive metabolic panel; Future  - **Albumin Random Urine Quant FUTURE anytime; Future    Hyperlipidemia LDL goal <130  Will return when fasting; it has not quite been a year since she last had this checked.   - Comprehensive metabolic panel; Future  - **Lipid panel reflex to direct LDL FUTURE anytime; Future    Prediabetes  Encourage lifestyle.  - Comprehensive metabolic panel; Future  - **A1C FUTURE anytime; Future    Mild dilation of ascending aorta - borderline  Seen on previous ECHO.  She should be following up with Cardiology in the near  future.  Did mention this to her; I wonder if they would want to do a follow up ECHO for this.     Fatigue, unspecified type  Uncertain etiology. Possibly related to lowish bp or medication, but she denies lightheadedness.   - **TSH with free T4 reflex FUTURE anytime; Future  - CBC with platelets; Future    Alcohol use  Discussed recommendation to keep it to 1 per day or less. Also discussed amount per that one (one shot).   - Comprehensive metabolic panel; Future    Hypothyroidism, unspecified type  Clinically euthyroid.     Encounter for screening mammogram for breast cancer    - MA Screening Digital Bilateral; Future        Patient Instructions       I would recommend not having more than one shot of keeley or other alcohol per day.    Schedule a lab only appointment in the next 1-2 months for fasting lab.  Fasting means nothing to eat or drink for 10 hours. However, you do want to take your medications. We DO encourage you to drink water.    Schedule a nurse only appointment for a blood pressure check.  Bring your cuff in and check at the same time.            Reminded to schedule cardiology appointment and given phone number as noted on last refill for metoprolol.    Laura Tipton MD, MD  [facility]

## 2017-08-18 NOTE — MR AVS SNAPSHOT
After Visit Summary   8/18/2017    Teri Beltran    MRN: [id number]           Patient Information     Date Of Birth          1945        Visit Information        Provider Department      8/18/2017 3:10 PM Laura Tipton MD [facility]        Today's Diagnoses     Hyperlipidemia LDL goal <130    -  1    Hypertension goal BP (blood pressure) < 140/90        Encounter for screening mammogram for breast cancer        Prediabetes        Fatigue, unspecified type        Hypothyroidism, unspecified type          Care Instructions        I would recommend not having more than one shot of keeley or other alcohol per day.    Schedule a lab only appointment in the next 1-2 months for fasting lab.  Fasting means nothing to eat or drink for 10 hours. However, you do want to take your medications. We DO encourage you to drink water.    Schedule a nurse only appointment for a blood pressure check.  Bring your cuff in and check at the same time.                Follow-ups after your visit        Your next 10 appointments already scheduled     Nov 02, 2017  4:30 PM CDT   Remote ICD Check with ROCHA DCR2   [facility] [place] ([facility])    [address] [place] [address] [phone]           This appointment is for a remote check of your debrillator.  This is not an appointment at the office.              Future tests that were ordered for you today     Open Future Orders        Priority Expected Expires Ordered    MA Screening Digital Bilateral Routine  8/18/2018 8/18/2017    Comprehensive metabolic panel Routine  8/18/2018 8/18/2017    **Lipid panel reflex to direct LDL FUTURE anytime Routine 8/18/2017 8/18/2018 8/18/2017    **A1C FUTURE anytime Routine 8/18/2017 8/18/2018 8/18/2017    **Albumin Random Urine Quant FUTURE anytime Routine 8/18/2017 8/18/2018 8/18/2017    **TSH with free T4 reflex FUTURE anytime Routine 8/18/2017  "2018    CBC with platelets Routine  2018            Who to contact     If you have questions or need follow up information about today's clinic visit or your schedule please contact [facility] directly at [phone].  Normal or non-critical lab and imaging results will be communicated to you by MyChart, letter or phone within 4 business days after the clinic has received the results. If you do not hear from us within 7 days, please contact the clinic through MyChart or phone. If you have a critical or abnormal lab result, we will notify you by phone as soon as possible.  Submit refill requests through [employer] or call your pharmacy and they will forward the refill request to us. Please allow 3 business days for your refill to be completed.          Additional Information About Your Visit        MyChart Information     [employer] lets you send messages to your doctor, view your test results, renew your prescriptions, schedule appointments and more. To sign up, go to [email or web address] . Click on \"Log in\" on the left side of the screen, which will take you to the Welcome page. Then click on \"Sign up Now\" on the right side of the page.     You will be asked to enter the access code listed below, as well as some personal information. Please follow the directions to create your username and password.     Your access code is: [email or web address]  Expires: 10/18/2017  3:21 PM     Your access code will  in 90 days. If you need help or a new code, please call your [place] clinic or [phone].        Care EveryWhere ID     This is your Care EveryWhere ID. This could be used by other organizations to access your [place] medical records  [id number]        Your Vitals Were     Pulse Temperature Respirations Height Pulse Oximetry BMI (Body Mass Index)    90 98.2  F (36.8  C) (Oral) 16 5' 8\" (1.727 m) 98% 30.93 kg/m2       Blood Pressure from Last 3 Encounters:   17 " 132/84   01/27/17 138/84   09/26/16 143/78    Weight from Last 3 Encounters:   08/18/17 203 lb 6.4 oz (92.3 kg)   01/27/17 203 lb (92.1 kg)   09/26/16 204 lb (92.5 kg)               Primary Care Provider Office Phone # Fax #    Laura Tipton [phone]       [address] [facility] [address]        Equal Access to Services     [facility] : Hadii aad ku hadasho Soomaali, waaxda luqadaha, qaybta kaalmada adeegyada, waxay idiin hayaan adeeg kharash labenita . So [facility] [phone].    ATENCIÓN: Si habla español, tiene a tracy disposición servicios gratuitos de asistencia lingüística. LlSelect Medical [facility] [phone].    We comply with applicable federal civil rights laws and [place] laws. We do not discriminate on the basis of race, color, national origin, age, disability sex, sexual orientation or gender identity.            Thank you!     Thank you for choosing [facility]  for your care. Our goal is always to provide you with excellent care. Hearing back from our patients is one way we can continue to improve our services. Please take a few minutes to complete the written survey that you may receive in the mail after your visit with us. Thank you!             Your Updated Medication List - Protect others around you: Learn how to safely use, store and throw away your medicines at www.disposemymeds.org.          This list is accurate as of: 8/18/17  3:57 PM.  Always use your most recent med list.                   Brand Name Dispense Instructions for use Diagnosis    albuterol 108 (90 BASE) MCG/ACT Inhaler    PROAIR HFA/PROVENTIL HFA/VENTOLIN HFA    1 Inhaler    Inhale 2 puffs into the lungs every 6 hours as needed for shortness of breath / dyspnea or wheezing    Seasonal allergies, SOB (shortness of breath)       alendronate 70 MG tablet    FOSAMAX    12 tablet    Take 1 tablet (70 mg) by mouth every 7 days Take with over 8 ounces water and stay upright until getting something on your  stomach; wait at least 30 minutes to eat.    Osteoporosis       amitriptyline 100 MG tablet    ELAVIL    90 tablet    Take 1 tablet (100 mg) by mouth At Bedtime    Insomnia       amLODIPine 2.5 MG tablet    NORVASC    90 tablet    Take 1 tablet (2.5 mg) by mouth daily    Essential hypertension with goal blood pressure less than 140/90       cholecalciferol 1000 UNIT tablet    vitamin D    100 tablet    Take 1 tablet (1,000 Units) by mouth daily        CLARITIN 10 MG tablet   Generic drug:  loratadine      Take 1 tablet (10 mg) by mouth daily    Seasonal allergic rhinitis       Co Q10 200 MG Caps     100 capsule    Take 200 mg by mouth daily        COSOPT PF 22.3-6.8 MG/ML Soln   Generic drug:  Dorzolamide HCl-Timolol Mal PF           levothyroxine 88 MCG tablet    SYNTHROID/LEVOTHROID    90 tablet    Take 1 tablet (88 mcg) by mouth daily    Hypothyroidism, unspecified type       lisinopril-hydrochlorothiazide 20-12.5 MG per tablet    PRINZIDE/ZESTORETIC    90 tablet    TAKE ONE TABLET BY MOUTH ONCE DAILY    Hypertension goal BP (blood pressure) < 140/90       metoprolol 25 MG 24 hr tablet    TOPROL-XL    135 tablet    Take 1.5 tablets (37.5 mg) by mouth daily    Hypertension goal BP (blood pressure) < 140/90, Tachycardia       pravastatin 40 MG tablet    PRAVACHOL    90 tablet    Take 1 tablet (40 mg) by mouth daily    Hyperlipidemia LDL goal <130       ZIOPTAN 0.0015 % Soln   Generic drug:  Tafluprost

## 2017-08-18 NOTE — NURSING NOTE
"Chief Complaint   Patient presents with     Hypertension     blood pressure readings       Initial /84 (BP Location: Right arm, Cuff Size: Adult Regular)  Pulse 90  Temp 98.2  F (36.8  C) (Oral)  Resp 16  Ht 5' 8\" (1.727 m)  Wt 203 lb 6.4 oz (92.3 kg)  SpO2 98%  BMI 30.93 kg/m2 Estimated body mass index is 30.93 kg/(m^2) as calculated from the following:    Height as of this encounter: 5' 8\" (1.727 m).    Weight as of this encounter: 203 lb 6.4 oz (92.3 kg).  Medication Reconciliation: complete   Allie Quintero, DALJIT      "

## 2017-08-21 ENCOUNTER — TELEPHONE (OUTPATIENT)
Dept: FAMILY MEDICINE | Facility: CLINIC | Age: 72
End: 2017-08-21

## 2017-08-21 NOTE — TELEPHONE ENCOUNTER
Pt calls.      She is to see [facility] in [place].  They want an order from her saying she wants the pt to see them.      Will forward to Dr. Tipton.  Please send them an order.

## 2017-08-22 NOTE — TELEPHONE ENCOUNTER
She is already established with the [facility] in [place].    I did not even know that there was another group there...   Is she wanting a switch? If so, why?   Or did she really mean to be going to the folks she has already been seeing?    At her visit, I had seen this on her last refill and recommended to make an appointment (refill of 6/22/17, metoprolol):    metoprolol (TOPROL-XL) 25 MG 24 hr tablet 135 tablet 0 6/22/2017  No   Sig: Take 1.5 tablets (37.5 mg) by mouth daily   Class: E-Prescribe   Notes to Pharmacy: Please call [phone] to schedule your annual office visit. Additional refills can be requested at that appointment. Thank you.   Route: Oral

## 2017-08-22 NOTE — TELEPHONE ENCOUNTER
Called patient to ask about this order that is needed.  She was told to ask for a note from Dr. Tipton that you wanted an ultrasound of her heart, no mention of it in chart per cardiology.  Had an appointment there today, but cancelled it so you could order this.  She is not switching providers, this is the same cardiology office she has been seeing.  An echocardiogram?    Please advise. And patient would like to know when this is done so she can reschedule with them.    Magaly Perdomo, RN  Triage Nurse

## 2017-08-22 NOTE — TELEPHONE ENCOUNTER
She is to schedule an appointment with the provider there.    I believe Dr. Gibson.    They recommended it with her last refill.     If they would like an ECHO, they can order it...   I am thinking they might want one to take another look at the dilation of the ascending aorta; noted on ECHO 9/15.  (I did mention that they may want to do that, but what I told her to do was to schedule an appointment with them as per the refill request... )

## 2017-08-22 NOTE — TELEPHONE ENCOUNTER
Called patient to let her know she needs to schedule with Dr. Gibson.  They will order echo if needed.   Left message with phone number for her to call them.  Magaly Perdomo, RN  Triage Nurse

## 2017-09-14 ENCOUNTER — OFFICE VISIT (OUTPATIENT)
Dept: CARDIOLOGY | Facility: CLINIC | Age: 72
End: 2017-09-14
Payer: MEDICARE

## 2017-09-14 VITALS
SYSTOLIC BLOOD PRESSURE: 148 MMHG | HEIGHT: 68 IN | DIASTOLIC BLOOD PRESSURE: 88 MMHG | HEART RATE: 86 BPM | WEIGHT: 207 LBS | BODY MASS INDEX: 31.37 KG/M2

## 2017-09-14 DIAGNOSIS — R55 SYNCOPE, UNSPECIFIED SYNCOPE TYPE: Primary | ICD-10-CM

## 2017-09-14 DIAGNOSIS — I10 ESSENTIAL HYPERTENSION WITH GOAL BLOOD PRESSURE LESS THAN 140/90: ICD-10-CM

## 2017-09-14 PROCEDURE — 99213 OFFICE O/P EST LOW 20 MIN: CPT | Performed by: INTERNAL MEDICINE

## 2017-09-14 NOTE — PROGRESS NOTES
HPI and Plan:   See dictation    Orders Placed This Encounter   Procedures     Follow-Up with Cardiac Advanced Practice Provider       No orders of the defined types were placed in this encounter.      There are no discontinued medications.      Encounter Diagnosis   Name Primary?     Syncope, unspecified syncope type Yes       CURRENT MEDICATIONS:  Current Outpatient Prescriptions   Medication Sig Dispense Refill     metoprolol (TOPROL-XL) 25 MG 24 hr tablet Take 1.5 tablets (37.5 mg) by mouth daily 135 tablet 0     lisinopril-hydrochlorothiazide (PRINZIDE/ZESTORETIC) 20-12.5 MG per tablet TAKE ONE TABLET BY MOUTH ONCE DAILY 90 tablet 1     levothyroxine (SYNTHROID/LEVOTHROID) 88 MCG tablet Take 1 tablet (88 mcg) by mouth daily 90 tablet 3     amitriptyline (ELAVIL) 100 MG tablet Take 1 tablet (100 mg) by mouth At Bedtime 90 tablet 1     amLODIPine (NORVASC) 2.5 MG tablet Take 1 tablet (2.5 mg) by mouth daily 90 tablet 3     alendronate (FOSAMAX) 70 MG tablet Take 1 tablet (70 mg) by mouth every 7 days Take with over 8 ounces water and stay upright until getting something on your stomach; wait at least 30 minutes to eat. 12 tablet 3     pravastatin (PRAVACHOL) 40 MG tablet Take 1 tablet (40 mg) by mouth daily 90 tablet 3     ZIOPTAN 0.0015 % SOLN        Coenzyme Q10 (CO Q10) 200 MG CAPS Take 200 mg by mouth daily 100 capsule prn     cholecalciferol (VITAMIN D) 1000 UNIT tablet Take 1 tablet (1,000 Units) by mouth daily 100 tablet 3     COSOPT PF 22.3-6.8 MG/ML SOLN   3     loratadine (CLARITIN) 10 MG tablet Take 1 tablet (10 mg) by mouth daily       albuterol (PROAIR HFA, PROVENTIL HFA, VENTOLIN HFA) 108 (90 BASE) MCG/ACT inhaler Inhale 2 puffs into the lungs every 6 hours as needed for shortness of breath / dyspnea or wheezing 1 Inhaler 0       ALLERGIES   No Known Allergies    PAST MEDICAL HISTORY:  Past Medical History:   Diagnosis Date     First degree AV block 8/11/2015     Glaucoma      Heart block       Hyperlipidemia LDL goal <130 6/14/2013     Hypertension goal BP (blood pressure) < 140/90 12/3/2013     Hypothyroidism      Mild dilation of ascending aorta - borderline 8/11/2015     Palpitations      RBBB 8/11/2015     Tachycardia      Varicose veins      Venous insufficiency        PAST SURGICAL HISTORY:  Past Surgical History:   Procedure Laterality Date     BREAST SURGERY      right breast ductal surgery due to milk production     COLONOSCOPY N/A 10/24/2014    no further screening. Procedure: COLONOSCOPY;  Surgeon: Pedro Rudd MD;  Location: [facility]     HYSTERECTOMY, PAP NO LONGER INDICATED  2009    total hysterectomy and ovaries. benign     SOFT TISSUE SURGERY      ganglion removed from left wrist and ring finger     SURGICAL HISTORY OF -   age 7    tonsillectomy     SURGICAL HISTORY OF -   1/15    left eye cataract     SURGICAL HISTORY OF -   Fall 2016    LINQ placed.       FAMILY HISTORY:  Family History   Problem Relation Age of Onset     CANCER Mother      ovarian     Breast Cancer Mother      HEART DISEASE Father      rare; not sure what it was     DIABETES Father      old age     CEREBROVASCULAR DISEASE Brother 68       SOCIAL HISTORY:  Social History     Social History     Marital status:      Spouse name: N/A     Number of children: N/A     Years of education: N/A     Social History Main Topics     Smoking status: Former Smoker     Smokeless tobacco: Never Used      Comment: quit in the 70's     Alcohol use 0.0 oz/week     0 Standard drinks or equivalent per week      Comment: 1 glass of wine at night     Drug use: No     Sexual activity: Yes     Partners: Male     Other Topics Concern     Caffeine Concern No     4-5 cups of coffee daily     Special Diet No     no added salt     Exercise No     3 days per week - exercise class      Social History Narrative       Review of Systems:  Skin:  Negative       Eyes:  Negative      ENT:  Negative for      Respiratory:  Negative        Cardiovascular:    fatigue;Positive for;palpitations    Gastroenterology: Negative      Genitourinary:  Negative      Musculoskeletal:  Positive for joint pain;arthritis of both knees  Neurologic:  Negative for      Psychiatric:  Negative for      Heme/Lymph/Imm:  Positive for hay fever    Endocrine:  Positive for thyroid disorder borderline diabetic    [id number]      CC  Laura Tipton MD  [id number] [place] [address]

## 2017-09-14 NOTE — LETTER
9/14/2017    Laura Tipton MD  [address] [place] [address]    RE: Teri Beltran       Dear Colleague,    I had the pleasure of seeing Teri Beltran in the [facility].    It was my pleasure seeing Ms. Teri Beltran in followup of syncope.  She is a pleasant 71-year-old female with hypertension, lower extremity edema and previously documented asymptomatic 2:1 AV block.  Following a syncopal event during exercise last year, she underwent an electrophysiology study.  Results were inconclusive.  A Medtronic implantable loop recorder was placed.      Over the past year, Ms. Beltran's loop recorder has shown no clinically significant arrhythmias.  The patient has not had recurrent syncope.  She has white coat hypertension, but assures me that at home her BP has been normal.  In fact, she brought a log of approximately 100 blood pressure measurements.  On a few occasions, her systolic blood pressure was as low as the 90s (during such dips she feels tired), but in general it is excellent ranging between 110 and 120 mmHg, and rarely above 145.      PHYSICAL EXAMINATION:   VITAL SIGNS:  Blood pressure initially 161/96.  After 25 minutes, it was 148/88, pulse 86 and regular, weight 94 kg.  Height 172 cm.   GENERAL:  She is a pleasant woman in no apparent distress.   NECK:  Supple without carotid bruits, 2+ carotid pulses.   LUNGS:  Clear.   CARDIOVASCULAR:  Regular rhythm.  No gallop, murmur or rub.   EXTREMITIES:  No edema.     Outpatient Encounter Prescriptions as of 9/14/2017   Medication Sig Dispense Refill     [DISCONTINUED] metoprolol (TOPROL-XL) 25 MG 24 hr tablet Take 1.5 tablets (37.5 mg) by mouth daily 135 tablet 0     lisinopril-hydrochlorothiazide (PRINZIDE/ZESTORETIC) 20-12.5 MG per tablet TAKE ONE TABLET BY MOUTH ONCE DAILY 90 tablet 1     levothyroxine (SYNTHROID/LEVOTHROID) 88 MCG tablet Take 1 tablet (88 mcg) by mouth daily 90 tablet 3     [DISCONTINUED]  amitriptyline (ELAVIL) 100 MG tablet Take 1 tablet (100 mg) by mouth At Bedtime 90 tablet 1     amLODIPine (NORVASC) 2.5 MG tablet Take 1 tablet (2.5 mg) by mouth daily 90 tablet 3     [DISCONTINUED] alendronate (FOSAMAX) 70 MG tablet Take 1 tablet (70 mg) by mouth every 7 days Take with over 8 ounces water and stay upright until getting something on your stomach; wait at least 30 minutes to eat. 12 tablet 3     [DISCONTINUED] pravastatin (PRAVACHOL) 40 MG tablet Take 1 tablet (40 mg) by mouth daily 90 tablet 3     ZIOPTAN 0.0015 % SOLN        Coenzyme Q10 (CO Q10) 200 MG CAPS Take 200 mg by mouth daily 100 capsule prn     cholecalciferol (VITAMIN D) 1000 UNIT tablet Take 1 tablet (1,000 Units) by mouth daily 100 tablet 3     COSOPT PF 22.3-6.8 MG/ML SOLN   3     loratadine (CLARITIN) 10 MG tablet Take 1 tablet (10 mg) by mouth daily       albuterol (PROAIR HFA, PROVENTIL HFA, VENTOLIN HFA) 108 (90 BASE) MCG/ACT inhaler Inhale 2 puffs into the lungs every 6 hours as needed for shortness of breath / dyspnea or wheezing 1 Inhaler 0     No facility-administered encounter medications on file as of 9/14/2017.       IMPRESSION:   1.  Syncope during exercise in 2016.  There has been no clear explanation for this event.  Loop recorder tracings so far have been unremarkable.   2.  Hypertension, under good control.  She has occasional low BP readings, but overall control is excellent and I would not change her antihypertensives at this time.      RECOMMENDATIONS:    A.  Continue current medications.   B.  Follow-up in the Device Clinic.     C.  Visit with EP TRISH in 1 year.     Again, thank you for allowing me to participate in the care of your patient.      Sincerely,    Silke Villareal MD     [facility]

## 2017-09-14 NOTE — MR AVS SNAPSHOT
After Visit Summary   9/14/2017    Teri Beltran    MRN: [id number]           Patient Information     Date Of Birth          1945        Visit Information        Provider Department      9/14/2017 11:15 AM Silke Villareal MD [facility] [place]        Today's Diagnoses     Syncope, unspecified syncope type    -  1    Essential hypertension with goal blood pressure less than 140/90           Follow-ups after your visit        Additional Services     Follow-Up with Cardiac Advanced Practice Provider                 Your next 10 appointments already scheduled     Nov 02, 2017  4:30 PM CDT   Remote ICD Check with ROCHA DCR2   [facility] ([facility])    [address] [place], [place] [address]  [facility] [address] [phone]           This appointment is for a remote check of your debrillator.  This is not an appointment at the office.              Future tests that were ordered for you today     Open Future Orders        Priority Expected Expires Ordered    Follow-Up with Cardiac Advanced Practice Provider Routine 9/14/2018 9/15/2018 9/14/2017            Who to contact     If you have questions or need follow up information about today's clinic visit or your schedule please contact [facility] directly at [phone].  Normal or non-critical lab and imaging results will be communicated to you by MyChart, letter or phone within 4 business days after the clinic has received the results. If you do not hear from us within 7 days, please contact the clinic through MyChart or phone. If you have a critical or abnormal lab result, we will notify you by phone as soon as possible.  Submit refill requests through [employer] or call your pharmacy and they will forward the refill request to us. Please allow 3 business days for your refill to be completed.          Additional  "Information About Your Visit        MyChart Information     [employer] lets you send messages to your doctor, view your test results, renew your prescriptions, schedule appointments and more. To sign up, go to [email or web address] . Click on \"Log in\" on the left side of the screen, which will take you to the Welcome page. Then click on \"Sign up Now\" on the right side of the page.     You will be asked to enter the access code listed below, as well as some personal information. Please follow the directions to create your username and password.     Your access code is: [email or web address]  Expires: 10/18/2017  3:21 PM     Your access code will  in 90 days. If you need help or a new code, please call your [facility] clinic or [phone].        Care EveryWhere ID     This is your Care EveryWhere ID. This could be used by other organizations to access your [facility] medical records  [id number]        Your Vitals Were     Pulse Height BMI (Body Mass Index)             86 1.727 m (5' 8\") 31.47 kg/m2          Blood Pressure from Last 3 Encounters:   17 148/88   17 132/84   17 138/84    Weight from Last 3 Encounters:   17 93.9 kg (207 lb)   17 92.3 kg (203 lb 6.4 oz)   17 92.1 kg (203 lb)               Primary Care Provider Office Phone # Fax #    Laura Tipton [phone] [address] [facility] [address]        Equal Access to Services     [facility]: Hadii sera ku hadasho Soomaali, waaxda luqadaha, qaybta kaalmada goldy hunt. So [facility] [phone].    ATENCIÓN: Si habla español, tiene a tracy disposición servicios gratuitos de asistencia lingüística. Llame al [phone].    We comply with applicable federal civil rights laws and [place] laws. We do not discriminate on the basis of race, color, national origin, age, disability sex, sexual orientation or gender identity.            Thank you!     Thank you for " choosing [facility] [place]  for your care. Our goal is always to provide you with excellent care. Hearing back from our patients is one way we can continue to improve our services. Please take a few minutes to complete the written survey that you may receive in the mail after your visit with us. Thank you!             Your Updated Medication List - Protect others around you: Learn how to safely use, store and throw away your medicines at www.disposemymeds.org.          This list is accurate as of: 9/14/17 11:39 AM.  Always use your most recent med list.                   Brand Name Dispense Instructions for use Diagnosis    albuterol 108 (90 BASE) MCG/ACT Inhaler    PROAIR HFA/PROVENTIL HFA/VENTOLIN HFA    1 Inhaler    Inhale 2 puffs into the lungs every 6 hours as needed for shortness of breath / dyspnea or wheezing    Seasonal allergies, SOB (shortness of breath)       alendronate 70 MG tablet    FOSAMAX    12 tablet    Take 1 tablet (70 mg) by mouth every 7 days Take with over 8 ounces water and stay upright until getting something on your stomach; wait at least 30 minutes to eat.    Osteoporosis       amitriptyline 100 MG tablet    ELAVIL    90 tablet    Take 1 tablet (100 mg) by mouth At Bedtime    Insomnia       amLODIPine 2.5 MG tablet    NORVASC    90 tablet    Take 1 tablet (2.5 mg) by mouth daily    Essential hypertension with goal blood pressure less than 140/90       cholecalciferol 1000 UNIT tablet    vitamin D    100 tablet    Take 1 tablet (1,000 Units) by mouth daily        CLARITIN 10 MG tablet   Generic drug:  loratadine      Take 1 tablet (10 mg) by mouth daily    Seasonal allergic rhinitis       Co Q10 200 MG Caps     100 capsule    Take 200 mg by mouth daily        COSOPT PF 22.3-6.8 MG/ML Soln   Generic drug:  Dorzolamide HCl-Timolol Mal PF           levothyroxine 88 MCG tablet    SYNTHROID/LEVOTHROID    90 tablet    Take 1 tablet (88 mcg) by mouth daily     Hypothyroidism, unspecified type       lisinopril-hydrochlorothiazide 20-12.5 MG per tablet    PRINZIDE/ZESTORETIC    90 tablet    TAKE ONE TABLET BY MOUTH ONCE DAILY    Hypertension goal BP (blood pressure) < 140/90       metoprolol 25 MG 24 hr tablet    TOPROL-XL    135 tablet    Take 1.5 tablets (37.5 mg) by mouth daily    Hypertension goal BP (blood pressure) < 140/90, Tachycardia       pravastatin 40 MG tablet    PRAVACHOL    90 tablet    Take 1 tablet (40 mg) by mouth daily    Hyperlipidemia LDL goal <130       ZIOPTAN 0.0015 % Soln   Generic drug:  Tafluprost

## 2017-09-14 NOTE — PROGRESS NOTES
HISTORY OF PRESENT ILLNESS:    It was my pleasure seeing Ms. Teri Beltran in followup of syncope.  She is a pleasant 71-year-old female with hypertension, lower extremity edema and previously documented asymptomatic 2:1 AV block.  Following a syncopal event during exercise last year, she underwent an electrophysiology study.  Results were inconclusive.  A Medtronic implantable loop recorder was placed.      Over the past year, Ms. Beltran's loop recorder has shown no clinically significant arrhythmias.  The patient has not had recurrent syncope.  She has white coat hypertension, but assures me that at home her BP has been normal.  In fact, she brought a log of approximately 100 blood pressure measurements.  On a few occasions, her systolic blood pressure was as low as the 90s (during such dips she feels tired), but in general it is excellent ranging between 110 and 120 mmHg, and rarely above 145.      PHYSICAL EXAMINATION:   VITAL SIGNS:  Blood pressure initially 161/96.  After 25 minutes, it was 148/88, pulse 86 and regular, weight 94 kg.  Height 172 cm.   GENERAL:  She is a pleasant woman in no apparent distress.   NECK:  Supple without carotid bruits, 2+ carotid pulses.   LUNGS:  Clear.   CARDIOVASCULAR:  Regular rhythm.  No gallop, murmur or rub.   EXTREMITIES:  No edema.      IMPRESSION:   1.  Syncope during exercise in 2016.  There has been no clear explanation for this event.  Loop recorder tracings so far have been unremarkable.   2.  Hypertension, under good control.  She has occasional low BP readings, but overall control is excellent and I would not change her antihypertensives at this time.      RECOMMENDATIONS:    A.  Continue current medications.   B.  Follow-up in the Device Clinic.     C.  Visit with EP TRISH in 1 year.        ARTIS SIDDIQUI MD, [facility]         cc:   Laura Tipton MD   [facility]    [address] [place] [address]          D: 09/14/2017 11:38   T:  2017 14:36   MT: GEORGE      Name:     JAVON MARTÍNEZ   MRN:      [id number]        Account:      [id number]   :      1945           Service Date: 2017      Document: [id number]

## 2017-09-23 DIAGNOSIS — G47.00 INSOMNIA: ICD-10-CM

## 2017-09-24 NOTE — TELEPHONE ENCOUNTER
amitriptyline (ELAVIL) 100 MG tablet     Last Written Prescription Date: 3/31/2017  Last Fill Quantity: 90, # refills: 1  Last Office Visit with [facility], [facility] or [facility] prescribing provider: 8/18/2017        BP Readings from Last 3 Encounters:   09/14/17 148/88   08/18/17 132/84   01/27/17 138/84     Last PHQ-9 score on record= No flowsheet data found.

## 2017-09-25 RX ORDER — AMITRIPTYLINE HYDROCHLORIDE 100 MG/1
TABLET ORAL
Qty: 90 TABLET | Refills: 3 | Status: SHIPPED | OUTPATIENT
Start: 2017-09-25 | End: 2018-09-27

## 2017-09-25 NOTE — TELEPHONE ENCOUNTER
Takes for insomnia - Prescription approved per [facility] Refill Protocol.

## 2017-09-26 ENCOUNTER — HOSPITAL ENCOUNTER (OUTPATIENT)
Dept: MAMMOGRAPHY | Facility: CLINIC | Age: 72
Discharge: HOME | End: 2017-09-26
Attending: FAMILY MEDICINE | Admitting: FAMILY MEDICINE
Payer: MEDICARE

## 2017-09-26 DIAGNOSIS — Z12.31 ENCOUNTER FOR SCREENING MAMMOGRAM FOR BREAST CANCER: ICD-10-CM

## 2017-09-26 PROCEDURE — G0202 SCR MAMMO BI INCL CAD: HCPCS

## 2017-09-28 DIAGNOSIS — I10 HYPERTENSION GOAL BP (BLOOD PRESSURE) < 140/90: ICD-10-CM

## 2017-09-28 DIAGNOSIS — E78.5 HYPERLIPIDEMIA LDL GOAL <130: ICD-10-CM

## 2017-09-28 DIAGNOSIS — R53.83 FATIGUE, UNSPECIFIED TYPE: ICD-10-CM

## 2017-09-28 DIAGNOSIS — R73.03 PREDIABETES: ICD-10-CM

## 2017-09-28 DIAGNOSIS — Z78.9 ALCOHOL USE: ICD-10-CM

## 2017-09-28 LAB
ERYTHROCYTE [DISTWIDTH] IN BLOOD BY AUTOMATED COUNT: 13.7 % (ref 10–15)
HBA1C MFR BLD: 5.9 % (ref 4.3–6)
HCT VFR BLD AUTO: 41.6 % (ref 35–47)
HGB BLD-MCNC: 13.7 G/DL (ref 11.7–15.7)
MCH RBC QN AUTO: 30.5 PG (ref 26.5–33)
MCHC RBC AUTO-ENTMCNC: 32.9 G/DL (ref 31.5–36.5)
MCV RBC AUTO: 93 FL (ref 78–100)
PLATELET # BLD AUTO: 336 10E9/L (ref 150–450)
RBC # BLD AUTO: 4.49 10E12/L (ref 3.8–5.2)
WBC # BLD AUTO: 4.6 10E9/L (ref 4–11)

## 2017-09-28 PROCEDURE — 84443 ASSAY THYROID STIM HORMONE: CPT | Performed by: FAMILY MEDICINE

## 2017-09-28 PROCEDURE — 82043 UR ALBUMIN QUANTITATIVE: CPT | Performed by: FAMILY MEDICINE

## 2017-09-28 PROCEDURE — 85027 COMPLETE CBC AUTOMATED: CPT | Performed by: FAMILY MEDICINE

## 2017-09-28 PROCEDURE — 80061 LIPID PANEL: CPT | Performed by: FAMILY MEDICINE

## 2017-09-28 PROCEDURE — 80053 COMPREHEN METABOLIC PANEL: CPT | Performed by: FAMILY MEDICINE

## 2017-09-28 PROCEDURE — 36415 COLL VENOUS BLD VENIPUNCTURE: CPT | Performed by: FAMILY MEDICINE

## 2017-09-28 PROCEDURE — 83036 HEMOGLOBIN GLYCOSYLATED A1C: CPT | Performed by: FAMILY MEDICINE

## 2017-09-29 LAB
ALBUMIN SERPL-MCNC: 3.8 G/DL (ref 3.4–5)
ALP SERPL-CCNC: 102 U/L (ref 40–150)
ALT SERPL W P-5'-P-CCNC: 30 U/L (ref 0–50)
ANION GAP SERPL CALCULATED.3IONS-SCNC: 9 MMOL/L (ref 3–14)
AST SERPL W P-5'-P-CCNC: 25 U/L (ref 0–45)
BILIRUB SERPL-MCNC: 0.4 MG/DL (ref 0.2–1.3)
BUN SERPL-MCNC: 17 MG/DL (ref 7–30)
CALCIUM SERPL-MCNC: 9.5 MG/DL (ref 8.5–10.1)
CHLORIDE SERPL-SCNC: 103 MMOL/L (ref 94–109)
CHOLEST SERPL-MCNC: 183 MG/DL
CO2 SERPL-SCNC: 26 MMOL/L (ref 20–32)
CREAT SERPL-MCNC: 0.69 MG/DL (ref 0.52–1.04)
CREAT UR-MCNC: 144 MG/DL
GFR SERPL CREATININE-BSD FRML MDRD: 84 ML/MIN/1.7M2
GLUCOSE SERPL-MCNC: 122 MG/DL (ref 70–99)
HDLC SERPL-MCNC: 83 MG/DL
LDLC SERPL CALC-MCNC: 76 MG/DL
MICROALBUMIN UR-MCNC: 23 MG/L
MICROALBUMIN/CREAT UR: 15.76 MG/G CR (ref 0–25)
NONHDLC SERPL-MCNC: 100 MG/DL
POTASSIUM SERPL-SCNC: 4.4 MMOL/L (ref 3.4–5.3)
PROT SERPL-MCNC: 7.8 G/DL (ref 6.8–8.8)
SODIUM SERPL-SCNC: 138 MMOL/L (ref 133–144)
TRIGL SERPL-MCNC: 122 MG/DL
TSH SERPL DL<=0.005 MIU/L-ACNC: 3.14 MU/L (ref 0.4–4)

## 2017-10-04 DIAGNOSIS — R00.0 TACHYCARDIA: ICD-10-CM

## 2017-10-04 DIAGNOSIS — I10 HYPERTENSION GOAL BP (BLOOD PRESSURE) < 140/90: ICD-10-CM

## 2017-10-04 RX ORDER — METOPROLOL SUCCINATE 25 MG/1
37.5 TABLET, EXTENDED RELEASE ORAL DAILY
Qty: 135 TABLET | Refills: 3 | Status: SHIPPED | OUTPATIENT
Start: 2017-10-04 | End: 2018-12-19

## 2017-10-05 ENCOUNTER — OFFICE VISIT (OUTPATIENT)
Dept: URGENT CARE | Facility: URGENT CARE | Age: 72
End: 2017-10-05
Payer: MEDICARE

## 2017-10-05 ENCOUNTER — RADIANT APPOINTMENT (OUTPATIENT)
Dept: GENERAL RADIOLOGY | Facility: CLINIC | Age: 72
End: 2017-10-05
Attending: FAMILY MEDICINE
Payer: MEDICARE

## 2017-10-05 VITALS
OXYGEN SATURATION: 97 % | BODY MASS INDEX: 31.02 KG/M2 | TEMPERATURE: 98.5 F | SYSTOLIC BLOOD PRESSURE: 144 MMHG | HEART RATE: 91 BPM | DIASTOLIC BLOOD PRESSURE: 84 MMHG | WEIGHT: 204 LBS

## 2017-10-05 DIAGNOSIS — K21.9 GASTROESOPHAGEAL REFLUX DISEASE WITHOUT ESOPHAGITIS: Primary | ICD-10-CM

## 2017-10-05 DIAGNOSIS — R10.13 ABDOMINAL PAIN, EPIGASTRIC: ICD-10-CM

## 2017-10-05 LAB
ERYTHROCYTE [DISTWIDTH] IN BLOOD BY AUTOMATED COUNT: 13.6 % (ref 10–15)
HCT VFR BLD AUTO: 45 % (ref 35–47)
HGB BLD-MCNC: 14.9 G/DL (ref 11.7–15.7)
MCH RBC QN AUTO: 30.9 PG (ref 26.5–33)
MCHC RBC AUTO-ENTMCNC: 33.1 G/DL (ref 31.5–36.5)
MCV RBC AUTO: 93 FL (ref 78–100)
PLATELET # BLD AUTO: 342 10E9/L (ref 150–450)
RBC # BLD AUTO: 4.82 10E12/L (ref 3.8–5.2)
WBC # BLD AUTO: 9.9 10E9/L (ref 4–11)

## 2017-10-05 PROCEDURE — 74020 XR ABDOMEN 2 VW: CPT

## 2017-10-05 PROCEDURE — 85027 COMPLETE CBC AUTOMATED: CPT | Performed by: FAMILY MEDICINE

## 2017-10-05 PROCEDURE — 36415 COLL VENOUS BLD VENIPUNCTURE: CPT | Performed by: FAMILY MEDICINE

## 2017-10-05 PROCEDURE — 99214 OFFICE O/P EST MOD 30 MIN: CPT | Performed by: FAMILY MEDICINE

## 2017-10-05 NOTE — NURSING NOTE
"Chief Complaint   Patient presents with     Urgent Care     Abdominal Pain     mid abdomen, 3 days of pain, gagging, feels cold       Initial BP (!) 180/102 (BP Location: Right arm)  Pulse 91  Temp 98.5  F (36.9  C) (Tympanic)  Wt 204 lb (92.5 kg)  SpO2 97%  BMI 31.02 kg/m2 Estimated body mass index is 31.02 kg/(m^2) as calculated from the following:    Height as of 9/14/17: 5' 8\" (1.727 m).    Weight as of this encounter: 204 lb (92.5 kg).  Medication Reconciliation: complete     Crhistelle Hernández CMA.............................October 5, 2017 6:12 PM       "

## 2017-10-05 NOTE — MR AVS SNAPSHOT
After Visit Summary   10/5/2017    Teri Beltran    MRN: [id number]           Patient Information     Date Of Birth          1945        Visit Information        Provider Department      10/5/2017 5:55 PM Luan Richardson MD [facility]        Today's Diagnoses     Gastroesophageal reflux disease without esophagitis    -  1    Abdominal pain, epigastric          Care Instructions    Okay for omeprazole 20 mg daily for at least 2 weeks.  Avoid foods that makes symptoms worse.      GERD (Adult)    The esophagus is a tube that carries food from the mouth to the stomach. A valve at the lower end of the esophagus prevents stomach acid from flowing upward. When this valve doesn't work properly, stomach contents may repeatedly flow back up (reflux) into the esophagus. This is called gastroesophageal reflux disease (GERD). GERD can irritate the esophagus. It can cause problems with swallowing or breathing. In severe cases, GERD can cause recurrent pneumonia or other serious problems.  Symptoms of reflux include burning, pressure or sharp pain in the upper abdomen or mid to lower chest. The pain can spread to the neck, back, or shoulder. There may be belching, an acid taste in the back of the throat, chronic cough, or sore throat or hoarseness. GERD symptoms often occur during the day after a big meal. They can also occur at night when lying down.   Home care  Lifestyle changes can help reduce symptoms. If needed, medicines may be prescribed. Symptoms often improve with treatment, but if treatment is stopped, the symptoms often return after a few months. So most persons with GERD will need to continue treatment.  Lifestyle changes    Limit or avoid fatty, fried, and spicy foods, as well as coffee, chocolate, mint, and foods with high acid content such as tomatoes and citrus fruit and juices (orange, grapefruit, lemon).    Don t eat large meals, especially at night. Frequent, smaller meals  "are best. Do not lie down right after eating. And don t eat anything 3 hours before going to bed.    Avoid drinking alcohol and smoking. As much as possible, stay away from second hand smoke.    If you are overweight, losing weight will reduce symptoms.     Avoid wearing tight clothing around your stomach area.    If your symptoms occur during sleep, use a foam wedge to elevate your upper body (not just your head.) Or, place 4\" blocks under the head of your bed.  Medicines  If needed, medicines can help relieve the symptoms of GERD and prevent damage to the esophagus. Discuss a medicine plan with your healthcare provider. This may include one or more of the following medicines:    Antacids to help neutralize the normal acids in your stomach.    Acid blockers (H2 blockers) to decrease acid production.    Acid inhibitors (PPIs) to decrease acid production in a different way than the blockers. They may work better, but can take a little longer to take effect.  Take an antacid 30-60 minutes after eating and at bedtime, but not at the same time as an acid blocker.  Try not to take medicines such as ibuprofen and aspirin. If you are taking aspirin for your heart or other medical reasons, talk to your healthcare provider about stopping it.  Follow-up care  Follow up with your healthcare provider or as advised by our staff.  When to seek medical advice  Call your healthcare provider if any of the following occur:    Stomach pain gets worse or moves to the lower right abdomen (appendix area)    Chest pain appears or gets worse, or spreads to the back, neck, shoulder, or arm    Frequent vomiting (can t keep down liquids)    Blood in the stool or vomit (red or black in color)    Feeling weak or dizzy    Fever of 100.4 F (38 C) or higher, or as directed by your healthcare provider  Date Last Reviewed: 6/23/2015 2000-2017 [employer]. [address] [place] [address], [place] [address]. All rights reserved. This " "information is not intended as a substitute for professional medical care. Always follow your healthcare professional's instructions.                Follow-ups after your visit        Your next 10 appointments already scheduled     2017  4:30 PM CDT   Remote ICD Check with ROCHA DCR2   [facility] [place] ([facility])    [address] [place] [address]  [place] [address] [phone]           This appointment is for a remote check of your debrillator.  This is not an appointment at the office.              Who to contact     If you have questions or need follow up information about today's clinic visit or your schedule please contact [facility] directly at [phone].  Normal or non-critical lab and imaging results will be communicated to you by [employer], letter or phone within 4 business days after the clinic has received the results. If you do not hear from us within 7 days, please contact the clinic through [employer] or phone. If you have a critical or abnormal lab result, we will notify you by phone as soon as possible.  Submit refill requests through [employer] or call your pharmacy and they will forward the refill request to us. Please allow 3 business days for your refill to be completed.          Additional Information About Your Visit        Sisas[employer]art Information     [employer] lets you send messages to your doctor, view your test results, renew your prescriptions, schedule appointments and more. To sign up, go to [email or web address] . Click on \"Log in\" on the left side of the screen, which will take you to the Welcome page. Then click on \"Sign up Now\" on the right side of the page.     You will be asked to enter the access code listed below, as well as some personal information. Please follow the directions to create your username and password.     Your access code is: [email or web address]  Expires: 10/18/2017  3:21 PM     Your access code will  " in 90 days. If you need help or a new code, please call your [place] clinic or [phone].        Care EveryWhere ID     This is your Care EveryWhere ID. This could be used by other organizations to access your [place] medical records  [id number]        Your Vitals Were     Pulse Temperature Pulse Oximetry BMI (Body Mass Index)          91 98.5  F (36.9  C) (Tympanic) 97% 31.02 kg/m2         Blood Pressure from Last 3 Encounters:   10/05/17 144/84   09/14/17 148/88   08/18/17 132/84    Weight from Last 3 Encounters:   10/05/17 204 lb (92.5 kg)   09/14/17 207 lb (93.9 kg)   08/18/17 203 lb 6.4 oz (92.3 kg)              We Performed the Following     CBC with platelets          Today's Medication Changes          These changes are accurate as of: 10/5/17  8:16 PM.  If you have any questions, ask your nurse or doctor.               Start taking these medicines.        Dose/Directions    lidocaine (viscous) 15 mL, alum & mag hydroxide-simethicone 15 mL GI Cocktail   Used for:  Abdominal pain, epigastric   Started by:  Luan Richardson MD        Dose:  30 mL   Take 30 mLs by mouth once for 1 dose   Quantity:  30 mL   Refills:  0       omeprazole 20 MG CR capsule   Commonly known as:  priLOSEC   Used for:  Gastroesophageal reflux disease without esophagitis   Started by:  Luan Richardson MD        Dose:  20 mg   Take 1 capsule (20 mg) by mouth daily   Quantity:  30 capsule   Refills:  1            Where to get your medicines      These medications were sent to [facility] [address] [place], [place] - [address] [facility] [place] [address] & [facility]  [address] [facility] [address]     Phone:  [phone]     omeprazole 20 MG CR capsule         Some of these will need a paper prescription and others can be bought over the counter.  Ask your nurse if you have questions.     You don't need a prescription for these medications     lidocaine (viscous) 15 mL, alum & mag hydroxide-simethicone 15 mL GI  Fay                Primary Care Provider Office Phone # Fax #    Laura Tipton [phone]       [address] [address] [facility] [address]        Equal Access to Services     FRANKLYNGERMANIA SOSA : Hadii sera ku chio Sosemajali, waaxda luqadaha, qaybta kaalmada adeclariyada, goldy mattmalissa luci. So [facility] [phone].    ATENCIÓN: Si habla español, tiene a tracy disposición servicios gratuitos de asistencia lingüística. Llame al [phone].    We comply with applicable federal civil rights laws and [place] laws. We do not discriminate on the basis of race, color, national origin, age, disability, sex, sexual orientation, or gender identity.            Thank you!     Thank you for choosing [facility]  for your care. Our goal is always to provide you with excellent care. Hearing back from our patients is one way we can continue to improve our services. Please take a few minutes to complete the written survey that you may receive in the mail after your visit with us. Thank you!             Your Updated Medication List - Protect others around you: Learn how to safely use, store and throw away your medicines at www.disposemymeds.org.          This list is accurate as of: 10/5/17  8:16 PM.  Always use your most recent med list.                   Brand Name Dispense Instructions for use Diagnosis    albuterol 108 (90 BASE) MCG/ACT Inhaler    PROAIR HFA/PROVENTIL HFA/VENTOLIN HFA    1 Inhaler    Inhale 2 puffs into the lungs every 6 hours as needed for shortness of breath / dyspnea or wheezing    Seasonal allergies, SOB (shortness of breath)       alendronate 70 MG tablet    FOSAMAX    12 tablet    Take 1 tablet (70 mg) by mouth every 7 days Take with over 8 ounces water and stay upright until getting something on your stomach; wait at least 30 minutes to eat.    Osteoporosis       amitriptyline 100 MG tablet    ELAVIL    90 tablet    TAKE 1 TABLET(100 MG) BY MOUTH AT BEDTIME     Insomnia       amLODIPine 2.5 MG tablet    NORVASC    90 tablet    Take 1 tablet (2.5 mg) by mouth daily    Essential hypertension with goal blood pressure less than 140/90       cholecalciferol 1000 UNIT tablet    vitamin D    100 tablet    Take 1 tablet (1,000 Units) by mouth daily        CLARITIN 10 MG tablet   Generic drug:  loratadine      Take 1 tablet (10 mg) by mouth daily    Seasonal allergic rhinitis       Co Q10 200 MG Caps     100 capsule    Take 200 mg by mouth daily        COSOPT PF 22.3-6.8 MG/ML Soln   Generic drug:  Dorzolamide HCl-Timolol Mal PF           levothyroxine 88 MCG tablet    SYNTHROID/LEVOTHROID    90 tablet    Take 1 tablet (88 mcg) by mouth daily    Hypothyroidism, unspecified type       lidocaine (viscous) 15 mL, alum & mag hydroxide-simethicone 15 mL GI Cocktail     30 mL    Take 30 mLs by mouth once for 1 dose    Abdominal pain, epigastric       lisinopril-hydrochlorothiazide 20-12.5 MG per tablet    PRINZIDE/ZESTORETIC    90 tablet    TAKE ONE TABLET BY MOUTH ONCE DAILY    Hypertension goal BP (blood pressure) < 140/90       metoprolol 25 MG 24 hr tablet    TOPROL-XL    135 tablet    Take 1.5 tablets (37.5 mg) by mouth daily    Hypertension goal BP (blood pressure) < 140/90, Tachycardia       omeprazole 20 MG CR capsule    priLOSEC    30 capsule    Take 1 capsule (20 mg) by mouth daily    Gastroesophageal reflux disease without esophagitis       pravastatin 40 MG tablet    PRAVACHOL    90 tablet    Take 1 tablet (40 mg) by mouth daily    Hyperlipidemia LDL goal <130       ZIOPTAN 0.0015 % Soln   Generic drug:  Tafluprost

## 2017-10-06 NOTE — PATIENT INSTRUCTIONS
Okay for omeprazole 20 mg daily for at least 2 weeks.  Avoid foods that makes symptoms worse.      GERD (Adult)    The esophagus is a tube that carries food from the mouth to the stomach. A valve at the lower end of the esophagus prevents stomach acid from flowing upward. When this valve doesn't work properly, stomach contents may repeatedly flow back up (reflux) into the esophagus. This is called gastroesophageal reflux disease (GERD). GERD can irritate the esophagus. It can cause problems with swallowing or breathing. In severe cases, GERD can cause recurrent pneumonia or other serious problems.  Symptoms of reflux include burning, pressure or sharp pain in the upper abdomen or mid to lower chest. The pain can spread to the neck, back, or shoulder. There may be belching, an acid taste in the back of the throat, chronic cough, or sore throat or hoarseness. GERD symptoms often occur during the day after a big meal. They can also occur at night when lying down.   Home care  Lifestyle changes can help reduce symptoms. If needed, medicines may be prescribed. Symptoms often improve with treatment, but if treatment is stopped, the symptoms often return after a few months. So most persons with GERD will need to continue treatment.  Lifestyle changes    Limit or avoid fatty, fried, and spicy foods, as well as coffee, chocolate, mint, and foods with high acid content such as tomatoes and citrus fruit and juices (orange, grapefruit, lemon).    Don t eat large meals, especially at night. Frequent, smaller meals are best. Do not lie down right after eating. And don t eat anything 3 hours before going to bed.    Avoid drinking alcohol and smoking. As much as possible, stay away from second hand smoke.    If you are overweight, losing weight will reduce symptoms.     Avoid wearing tight clothing around your stomach area.    If your symptoms occur during sleep, use a foam wedge to elevate your upper body (not just your head.) Or,  "place 4\" blocks under the head of your bed.  Medicines  If needed, medicines can help relieve the symptoms of GERD and prevent damage to the esophagus. Discuss a medicine plan with your healthcare provider. This may include one or more of the following medicines:    Antacids to help neutralize the normal acids in your stomach.    Acid blockers (H2 blockers) to decrease acid production.    Acid inhibitors (PPIs) to decrease acid production in a different way than the blockers. They may work better, but can take a little longer to take effect.  Take an antacid 30-60 minutes after eating and at bedtime, but not at the same time as an acid blocker.  Try not to take medicines such as ibuprofen and aspirin. If you are taking aspirin for your heart or other medical reasons, talk to your healthcare provider about stopping it.  Follow-up care  Follow up with your healthcare provider or as advised by our staff.  When to seek medical advice  Call your healthcare provider if any of the following occur:    Stomach pain gets worse or moves to the lower right abdomen (appendix area)    Chest pain appears or gets worse, or spreads to the back, neck, shoulder, or arm    Frequent vomiting (can t keep down liquids)    Blood in the stool or vomit (red or black in color)    Feeling weak or dizzy    Fever of 100.4 F (38 C) or higher, or as directed by your healthcare provider  Date Last Reviewed: 6/23/2015 2000-2017 [employer]. [address] [place] [address], [place] [address]. All rights reserved. This information is not intended as a substitute for professional medical care. Always follow your healthcare professional's instructions.        "

## 2017-10-06 NOTE — NURSING NOTE
Viscous lidocaine given in   Lot #[id number]  Exp date 05/20    Sharonda Lanta given in   Lot # [id number]  Exp date 01/19

## 2017-10-06 NOTE — PROGRESS NOTES
"SUBJECTIVE  HPI:  Teri Beltran is a 71 year old female who presents with the CC of abdominal pain.    Pain is located in the epigastric area, with radiation to None.  The pain is characterized as aching and intermittent.  Symptoms present for the past 3 days.  Denies vomiting but endorsed nausea.  Last BM yesterday, normal per patient.  Denies any constipation.  Denies any dysuria or frequency.  Had cardiac history with AV block but denies any palpitations or SOB.  Has not noted which foods worsen symptoms, has had heartburn in the past.  Still has gallbladder and appendix.      Elevated BP initially at 180/102, patient states that has \"white coat syndrome\", has BP machine which she checks at home and usually gets 's.    Past Medical History:   Diagnosis Date     First degree AV block 8/11/2015     Glaucoma      Heart block      Hyperlipidemia LDL goal <130 6/14/2013     Hypertension goal BP (blood pressure) < 140/90 12/3/2013     Hypothyroidism      Mild dilation of ascending aorta - borderline 8/11/2015     Palpitations      RBBB 8/11/2015     Tachycardia      Varicose veins      Venous insufficiency      Current Outpatient Prescriptions   Medication Sig Dispense Refill     omeprazole (PRILOSEC) 20 MG CR capsule Take 1 capsule (20 mg) by mouth daily 30 capsule 1     metoprolol (TOPROL-XL) 25 MG 24 hr tablet Take 1.5 tablets (37.5 mg) by mouth daily 135 tablet 3     amitriptyline (ELAVIL) 100 MG tablet TAKE 1 TABLET(100 MG) BY MOUTH AT BEDTIME 90 tablet 3     lisinopril-hydrochlorothiazide (PRINZIDE/ZESTORETIC) 20-12.5 MG per tablet TAKE ONE TABLET BY MOUTH ONCE DAILY 90 tablet 1     levothyroxine (SYNTHROID/LEVOTHROID) 88 MCG tablet Take 1 tablet (88 mcg) by mouth daily 90 tablet 3     amLODIPine (NORVASC) 2.5 MG tablet Take 1 tablet (2.5 mg) by mouth daily 90 tablet 3     pravastatin (PRAVACHOL) 40 MG tablet Take 1 tablet (40 mg) by mouth daily 90 tablet 3     ZIOPTAN 0.0015 % SOLN        albuterol " (PROAIR HFA, PROVENTIL HFA, VENTOLIN HFA) 108 (90 BASE) MCG/ACT inhaler Inhale 2 puffs into the lungs every 6 hours as needed for shortness of breath / dyspnea or wheezing 1 Inhaler 0     Coenzyme Q10 (CO Q10) 200 MG CAPS Take 200 mg by mouth daily 100 capsule prn     cholecalciferol (VITAMIN D) 1000 UNIT tablet Take 1 tablet (1,000 Units) by mouth daily 100 tablet 3     COSOPT PF 22.3-6.8 MG/ML SOLN   3     loratadine (CLARITIN) 10 MG tablet Take 1 tablet (10 mg) by mouth daily       alendronate (FOSAMAX) 70 MG tablet SEE NOTES 12 tablet 0     Social History   Substance Use Topics     Smoking status: Former Smoker     Smokeless tobacco: Never Used      Comment: quit in the 70's     Alcohol use 0.0 oz/week     0 Standard drinks or equivalent per week      Comment: 1 glass of wine at night       ROS:  CONSTITUTIONAL:NEGATIVE for fever, chills, change in weight  INTEGUMENTARY/SKIN: NEGATIVE for worrisome rashes, moles or lesions  ENT/MOUTH: NEGATIVE for ear, mouth and throat problems  RESP:NEGATIVE for significant cough or SOB  CV: NEGATIVE for chest pain, palpitations or peripheral edema and POSITIVE for HX HTN  GI: POSITIVE for abdominal pain and nausea  : negative for, dysuria and frequency   MUSCULOSKELETAL: NEGATIVE for significant arthralgias or myalgia  NEURO: NEGATIVE for weakness, dizziness or paresthesias  ENDOCRINE: NEGATIVE for temperature intolerance, skin/hair changes  HEME/ALLERGY/IMMUNE: NEGATIVE for bleeding problems    OBJECTIVE:  /84  Pulse 91  Temp 98.5  F (36.9  C) (Tympanic)  Wt 204 lb (92.5 kg)  SpO2 97%  BMI 31.02 kg/m2  GENERAL APPEARANCE: healthy, alert and no distress  EYES: EOMI,  PERRL, conjunctiva clear  HENT: ear canals and TM's normal.  Nose and mouth without ulcers, erythema or lesions  NECK: supple, nontender, no lymphadenopathy  RESP: lungs clear to auscultation - no rales, rhonchi or wheezes  CV: regular rates and rhythm, normal S1 S2, no murmur noted  ABDOMEN:  soft,  mild epigastric tenderness, no rebound, no guarding, no HSM or masses and bowel sounds normal  Extremities: no peripheral edema or tenderness, peripheral pulses normal  NEURO: Normal strength and tone, sensory exam grossly normal,  normal speech and mentation  SKIN: no suspicious lesions or rashes  PSYCH: mentation appears normal and affect normal/bright    Results for orders placed or performed in visit on 10/05/17   CBC with platelets   Result Value Ref Range    WBC 9.9 4.0 - 11.0 10e9/L    RBC Count 4.82 3.8 - 5.2 10e12/L    Hemoglobin 14.9 11.7 - 15.7 g/dL    Hematocrit 45.0 35.0 - 47.0 %    MCV 93 78 - 100 fl    MCH 30.9 26.5 - 33.0 pg    MCHC 33.1 31.5 - 36.5 g/dL    RDW 13.6 10.0 - 15.0 %    Platelet Count 342 150 - 450 10e9/L       Xray - abdomen - scattered bowel gas, moderate stools, no air-fluid level, no free air    ASSESSMENT/PLAN:  (K21.9) Gastroesophageal reflux disease without esophagitis  (primary encounter diagnosis)  Plan: omeprazole (PRILOSEC) 20 MG CR capsule            (R10.13) Abdominal pain, epigastric  Comment: GERD  Plan: lidocaine (viscous) 15 mL, alum & mag         hydroxide-simethicone 15 mL GI Cocktail, XR         Abdomen 2 Views, CBC with platelets            Reassurance given, reviewed that abdominal pain is most likely due to GERD.  GI cocktail given with some improvement of symptoms.  RX omeprazole given to take for 2 weeks.  Reviewed foods to avoid that may worsen symptoms.  To ER if develops acute worsening of abdominal symptoms.    Repeat BP improved, encourage to take medications for BP daily.    Follow up with primary provider if no improvement of symptoms.    Luan Richardson MD

## 2017-10-07 DIAGNOSIS — M81.0 OSTEOPOROSIS: ICD-10-CM

## 2017-10-09 RX ORDER — ALENDRONATE SODIUM 70 MG/1
TABLET ORAL
Qty: 12 TABLET | Refills: 0 | Status: SHIPPED | OUTPATIENT
Start: 2017-10-09 | End: 2018-01-02

## 2017-10-09 NOTE — TELEPHONE ENCOUNTER
Medication is being filled for 1 time refill only due to:  Patient needs to be seen because due for every two years Dexascan requirement. Has 10/13/17 with PCP

## 2017-10-09 NOTE — TELEPHONE ENCOUNTER
alendronate (FOSAMAX) 70 MG    Last Written Prescription Date: 10/27/16  Last Fill Quantity: 12, # refills: 3  Last Office Visit with [facility], [facility] or [facility] prescribing provider: 8/18/17  Next 5 appointments (look out 90 days)     Oct 13, 2017  2:50 PM CDT   Office Visit with Laura Tipton MD   [facility] ([facility]    [address] [place] [id number] [phone]                   DEXA Scan:  Last order of DX HIP/PELVIS/SPINE was found on 7/13/2015 from [facility] Appointment on 7/13/2015     No order of DX HIP/PELVIS/SPINE W LAT FRACTION ANALYSIS is found.       Creatinine   Date Value Ref Range Status   09/28/2017 0.69 0.52 - 1.04 mg/dL Final

## 2017-10-13 ENCOUNTER — OFFICE VISIT (OUTPATIENT)
Dept: FAMILY MEDICINE | Facility: CLINIC | Age: 72
End: 2017-10-13
Payer: MEDICARE

## 2017-10-13 VITALS
DIASTOLIC BLOOD PRESSURE: 78 MMHG | HEIGHT: 68 IN | OXYGEN SATURATION: 98 % | WEIGHT: 202.9 LBS | TEMPERATURE: 98.1 F | HEART RATE: 102 BPM | RESPIRATION RATE: 16 BRPM | BODY MASS INDEX: 30.75 KG/M2 | SYSTOLIC BLOOD PRESSURE: 132 MMHG

## 2017-10-13 DIAGNOSIS — R19.4 DECREASED STOOLING: ICD-10-CM

## 2017-10-13 DIAGNOSIS — R10.13 MIDEPIGASTRIC PAIN: Primary | ICD-10-CM

## 2017-10-13 DIAGNOSIS — Z23 NEED FOR PROPHYLACTIC VACCINATION AND INOCULATION AGAINST INFLUENZA: ICD-10-CM

## 2017-10-13 PROCEDURE — G0008 ADMIN INFLUENZA VIRUS VAC: HCPCS | Performed by: FAMILY MEDICINE

## 2017-10-13 PROCEDURE — 90662 IIV NO PRSV INCREASED AG IM: CPT | Performed by: FAMILY MEDICINE

## 2017-10-13 PROCEDURE — 99214 OFFICE O/P EST MOD 30 MIN: CPT | Mod: 25 | Performed by: FAMILY MEDICINE

## 2017-10-13 NOTE — NURSING NOTE
"Chief Complaint   Patient presents with     ER F/U       Initial /78 (BP Location: Right arm, Cuff Size: Adult Regular)  Pulse 102  Temp 98.1  F (36.7  C) (Oral)  Resp 16  Ht 5' 8\" (1.727 m)  Wt 202 lb 14.4 oz (92 kg)  SpO2 98%  BMI 30.85 kg/m2 Estimated body mass index is 30.85 kg/(m^2) as calculated from the following:    Height as of this encounter: 5' 8\" (1.727 m).    Weight as of this encounter: 202 lb 14.4 oz (92 kg).  Medication Reconciliation: complete   Allie Quintero, DALJIT    "

## 2017-10-13 NOTE — MR AVS SNAPSHOT
After Visit Summary   10/13/2017    Teri Beltran    MRN: [id number]           Patient Information     Date Of Birth          1945        Visit Information        Provider Department      10/13/2017 2:50 PM Laura Tipton MD [facility]        Today's Diagnoses     Midepigastric pain    -  1    Need for prophylactic vaccination and inoculation against influenza          Care Instructions    If you are doing well after a month, you can try to wean down on the omeprazole.  I would go to every other day for 2 weeks, then every third day for a couple weeks, then stop.    If the H.pylori is present, we will treat you for that (so the above plan may change).              Follow-ups after your visit        Your next 10 appointments already scheduled     Nov 02, 2017  4:30 PM CDT   Remote ICD Check with ROCHA DCR2   [facility] [place] ([facility])    [address] [place] [id number] [phone]           This appointment is for a remote check of your debrillator.  This is not an appointment at the office.              Future tests that were ordered for you today     Open Future Orders        Priority Expected Expires Ordered    H Pylori antigen, stool Routine  11/12/2017 10/13/2017            Who to contact     If you have questions or need follow up information about today's clinic visit or your schedule please contact [facility] directly at [phone].  Normal or non-critical lab and imaging results will be communicated to you by MyChart, letter or phone within 4 business days after the clinic has received the results. If you do not hear from us within 7 days, please contact the clinic through MyChart or phone. If you have a critical or abnormal lab result, we will notify you by phone as soon as possible.  Submit refill requests through [employer] or call your pharmacy and they will forward the refill  "request to us. Please allow 3 business days for your refill to be completed.          Additional Information About Your Visit        MyChart Information     [employer] lets you send messages to your doctor, view your test results, renew your prescriptions, schedule appointments and more. To sign up, go to [email or web address] . Click on \"Log in\" on the left side of the screen, which will take you to the Welcome page. Then click on \"Sign up Now\" on the right side of the page.     You will be asked to enter the access code listed below, as well as some personal information. Please follow the directions to create your username and password.     Your access code is: [email or web address]  Expires: 10/18/2017  3:21 PM     Your access code will  in 90 days. If you need help or a new code, please call your [place] clinic or [phone].        Care EveryWhere ID     This is your Care EveryWhere ID. This could be used by other organizations to access your [place] medical records  [id number]        Your Vitals Were     Pulse Temperature Respirations Height Pulse Oximetry BMI (Body Mass Index)    102 98.1  F (36.7  C) (Oral) 16 5' 8\" (1.727 m) 98% 30.85 kg/m2       Blood Pressure from Last 3 Encounters:   10/13/17 132/78   10/05/17 144/84   17 148/88    Weight from Last 3 Encounters:   10/13/17 202 lb 14.4 oz (92 kg)   10/05/17 204 lb (92.5 kg)   17 207 lb (93.9 kg)              We Performed the Following     ADMIN INFLUENZA (For MEDICARE Patients ONLY) []     FLU VACCINE, INCREASED ANTIGEN, PRESV FREE, AGE 65+ [59261]        Primary Care Provider Office Phone # Fax #    Laura Tipton [phone] [address] [place] [address]        Equal Access to Services     MARCIAL SWAN : Douglas Garcia, waaxda luqadaha, qaybta kaaltawanna hunt, goldy verma. So [facility] [phone].    ATENCIÓN: Si habla español, tiene a tracy disposición servicios " natalie de asistencia lingüística. Anton murphy [phone].    We comply with applicable federal civil rights laws and [place] laws. We do not discriminate on the basis of race, color, national origin, age, disability, sex, sexual orientation, or gender identity.            Thank you!     Thank you for choosing [facility]  for your care. Our goal is always to provide you with excellent care. Hearing back from our patients is one way we can continue to improve our services. Please take a few minutes to complete the written survey that you may receive in the mail after your visit with us. Thank you!             Your Updated Medication List - Protect others around you: Learn how to safely use, store and throw away your medicines at www.disposemymeds.org.          This list is accurate as of: 10/13/17  4:15 PM.  Always use your most recent med list.                   Brand Name Dispense Instructions for use Diagnosis    albuterol 108 (90 BASE) MCG/ACT Inhaler    PROAIR HFA/PROVENTIL HFA/VENTOLIN HFA    1 Inhaler    Inhale 2 puffs into the lungs every 6 hours as needed for shortness of breath / dyspnea or wheezing    Seasonal allergies, SOB (shortness of breath)       alendronate 70 MG tablet    FOSAMAX    12 tablet    SEE NOTES    Osteoporosis       amitriptyline 100 MG tablet    ELAVIL    90 tablet    TAKE 1 TABLET(100 MG) BY MOUTH AT BEDTIME    Insomnia       amLODIPine 2.5 MG tablet    NORVASC    90 tablet    Take 1 tablet (2.5 mg) by mouth daily    Essential hypertension with goal blood pressure less than 140/90       cholecalciferol 1000 UNIT tablet    vitamin D    100 tablet    Take 1 tablet (1,000 Units) by mouth daily        CLARITIN 10 MG tablet   Generic drug:  loratadine      Take 1 tablet (10 mg) by mouth daily    Seasonal allergic rhinitis       Co Q10 200 MG Caps     100 capsule    Take 200 mg by mouth daily        COSOPT PF 22.3-6.8 MG/ML Soln   Generic drug:  Dorzolamide HCl-Timolol Mal  PF           levothyroxine 88 MCG tablet    SYNTHROID/LEVOTHROID    90 tablet    Take 1 tablet (88 mcg) by mouth daily    Hypothyroidism, unspecified type       lisinopril-hydrochlorothiazide 20-12.5 MG per tablet    PRINZIDE/ZESTORETIC    90 tablet    TAKE ONE TABLET BY MOUTH ONCE DAILY    Hypertension goal BP (blood pressure) < 140/90       metoprolol 25 MG 24 hr tablet    TOPROL-XL    135 tablet    Take 1.5 tablets (37.5 mg) by mouth daily    Hypertension goal BP (blood pressure) < 140/90, Tachycardia       omeprazole 20 MG CR capsule    priLOSEC    30 capsule    Take 1 capsule (20 mg) by mouth daily    Gastroesophageal reflux disease without esophagitis       pravastatin 40 MG tablet    PRAVACHOL    90 tablet    Take 1 tablet (40 mg) by mouth daily    Hyperlipidemia LDL goal <130       ZIOPTAN 0.0015 % Soln   Generic drug:  Tafluprost

## 2017-10-13 NOTE — PROGRESS NOTES
SUBJECTIVE:   Teri Beltran is a 71 year old female who presents to clinic today for the following health issues:      ED/UC Followup:    Facility:  [facility]  Date of visit: 10/05/2017  Reason for visit: abdominal pain, nausea  Current Status: still having stomach but has improved             Problem list and histories reviewed & adjusted, as indicated.  Additional history:     See under ROS    Patient Active Problem List   Diagnosis     Hypothyroidism     Tachycardia     Glaucoma     Prediabetes     Hyperlipidemia LDL goal <130     Palpitations     BMI 30.0-30.9,adult     Health Care Home     Advanced directives, counseling/discussion     Hypertension goal BP (blood pressure) < 140/90     Adjustment disorder with depressed mood     Osteoporosis     Mild dilation of ascending aorta - borderline     RBBB     First degree AV block     Venous insufficiency     Varicose veins       Current Outpatient Prescriptions   Medication Sig Dispense Refill     alendronate (FOSAMAX) 70 MG tablet SEE NOTES 12 tablet 0     omeprazole (PRILOSEC) 20 MG CR capsule Take 1 capsule (20 mg) by mouth daily 30 capsule 1     metoprolol (TOPROL-XL) 25 MG 24 hr tablet Take 1.5 tablets (37.5 mg) by mouth daily 135 tablet 3     amitriptyline (ELAVIL) 100 MG tablet TAKE 1 TABLET(100 MG) BY MOUTH AT BEDTIME 90 tablet 3     lisinopril-hydrochlorothiazide (PRINZIDE/ZESTORETIC) 20-12.5 MG per tablet TAKE ONE TABLET BY MOUTH ONCE DAILY 90 tablet 1     levothyroxine (SYNTHROID/LEVOTHROID) 88 MCG tablet Take 1 tablet (88 mcg) by mouth daily 90 tablet 3     amLODIPine (NORVASC) 2.5 MG tablet Take 1 tablet (2.5 mg) by mouth daily 90 tablet 3     pravastatin (PRAVACHOL) 40 MG tablet Take 1 tablet (40 mg) by mouth daily 90 tablet 3     ZIOPTAN 0.0015 % SOLN        albuterol (PROAIR HFA, PROVENTIL HFA, VENTOLIN HFA) 108 (90 BASE) MCG/ACT inhaler Inhale 2 puffs into the lungs every 6 hours as needed for shortness of breath / dyspnea or  "wheezing 1 Inhaler 0     Coenzyme Q10 (CO Q10) 200 MG CAPS Take 200 mg by mouth daily 100 capsule prn     cholecalciferol (VITAMIN D) 1000 UNIT tablet Take 1 tablet (1,000 Units) by mouth daily 100 tablet 3     COSOPT PF 22.3-6.8 MG/ML SOLN   3     loratadine (CLARITIN) 10 MG tablet Take 1 tablet (10 mg) by mouth daily         Reviewed and updated as needed this visit by clinical staff       Reviewed and updated as needed this visit by Provider         ROS:  CONSTITUTIONAL:NEGATIVE for fever, chills, change in weight  RESP:NEGATIVE for significant cough or SOB  CV: NEGATIVE for chest pain, palpitations or peripheral edema  GI: see below  : no urinary symptoms.  PSYCHIATRIC: NEGATIVE for changes in mood or affect    Was at  in [place]. On Tuesday.  Severe stomach ache. midepigastric area.  They thought it was acid.    Started 10/3.   Noted when first got up in the am.  Ate breakfast and was gagging on it.   Severe that day.   Never did throw up.   Some of the symptoms were coming and going.  Went to the  on 10/5 when symptoms severe.    Still has some now, but not as bad.  Now able to eat.   No more nausea.  Feeling things are resolving. Notes she has no pain today.    She was given an anti acid pill.  Usually when she wakes up in am.   Takes an AA (?gaviscon) as well.     Usually goes to the br qod.  Does not feel uncomfortable. She is not feeling constipated. Notes the stool is soft.  She was told to take something to get her going daily.  They had done an x ray.      Was treated for H pylori in the past.   ([facility])      OBJECTIVE:     /78 (BP Location: Right arm, Cuff Size: Adult Regular)  Pulse 102  Temp 98.1  F (36.7  C) (Oral)  Resp 16  Ht 5' 8\" (1.727 m)  Wt 202 lb 14.4 oz (92 kg)  SpO2 98%  BMI 30.85 kg/m2  Body mass index is 30.85 kg/(m^2).  GENERAL APPEARANCE: alert and no distress  RESP: lungs clear to auscultation - no rales, rhonchi or wheezes  CV: regular rates and rhythm  ABDOMEN: soft, " nontender, without hepatosplenomegaly or masses and bowel sounds normal  MS: no edema  PSYCH: mentation appears normal and affect normal/bright    Reviewed the UC note.    Component      Latest Ref Rng & Units 9/28/2017 10/5/2017   WBC      4.0 - 11.0 10e9/L 4.6 9.9   RBC Count      3.8 - 5.2 10e12/L 4.49 4.82   Hemoglobin      11.7 - 15.7 g/dL 13.7 14.9   Hematocrit      35.0 - 47.0 % 41.6 45.0   MCV      78 - 100 fl 93 93   MCH      26.5 - 33.0 pg 30.5 30.9   MCHC      31.5 - 36.5 g/dL 32.9 33.1   RDW      10.0 - 15.0 % 13.7 13.6   Platelet Count      150 - 450 10e9/L 336 342       IMPRESSION:  Bowel gas pattern within normal limits. Moderate amount  of fecal material throughout the colon. Lumbar scoliosis convex right  with prominent multilevel degenerative changes.         ASSESSMENT/PLAN:       Midepigastric pain  She seems to be improving; may be related to PPI. She notes she has not had this kind of symptoms in years. We did discuss some of the long term effects with PPI. If doing well, anticipate weaning. See patient instructions.  At this time, will test for H pylori. Anticipate treatment if positive.   - H Pylori antigen, stool; Future    Decreased stooling  She is not uncomfortable and does not describe hard stools.  Discussed some fluid and fiber. She notes fruit helps her if feeling a little hard.   At this time, not pushing hard to take medication as she notes no discomfort and soft stools.     Need for prophylactic vaccination and inoculation against influenza    - FLU VACCINE, INCREASED ANTIGEN, PRESV FREE, AGE 65+ [40610]  - ADMIN INFLUENZA (For MEDICARE Patients ONLY) []        Patient Instructions   If you are doing well after a month, you can try to wean down on the omeprazole.  I would go to every other day for 2 weeks, then every third day for a couple weeks, then stop.    If the H.pylori is present, we will treat you for that (so the above plan may change).          Laura Tipton MD,  MD  [facility]      Injectable Influenza Immunization Documentation    1.  Is the person to be vaccinated sick today?   No    2. Does the person to be vaccinated have an allergy to a component   of the vaccine?   No    3. Has the person to be vaccinated ever had a serious reaction   to influenza vaccine in the past?   No    4. Has the person to be vaccinated ever had Guillain-Barré syndrome?   No    Form completed by Allie Quintero CMA'         Injectable Influenza Immunization Documentation    1.  Is the person to be vaccinated sick today?   No    2. Does the person to be vaccinated have an allergy to a component   of the vaccine?   No    3. Has the person to be vaccinated ever had a serious reaction   to influenza vaccine in the past?   No    4. Has the person to be vaccinated ever had Guillain-Barré syndrome?   No    Form completed by Allie Quintero CMA

## 2017-10-13 NOTE — PATIENT INSTRUCTIONS
If you are doing well after a month, you can try to wean down on the omeprazole.  I would go to every other day for 2 weeks, then every third day for a couple weeks, then stop.    If the H.pylori is present, we will treat you for that (so the above plan may change).

## 2017-10-17 PROCEDURE — 87338 HPYLORI STOOL AG IA: CPT | Performed by: FAMILY MEDICINE

## 2017-10-18 DIAGNOSIS — R10.13 MIDEPIGASTRIC PAIN: ICD-10-CM

## 2017-10-19 LAB
H PYLORI AG STL QL IA: NORMAL
SPECIMEN SOURCE: NORMAL

## 2017-10-21 DIAGNOSIS — E78.5 HYPERLIPIDEMIA LDL GOAL <130: ICD-10-CM

## 2017-10-24 RX ORDER — PRAVASTATIN SODIUM 40 MG
TABLET ORAL
Qty: 90 TABLET | Refills: 1 | Status: SHIPPED | OUTPATIENT
Start: 2017-10-24 | End: 2018-04-21

## 2017-10-24 NOTE — TELEPHONE ENCOUNTER
Prescription approved per [facility] Refill Protocol.    Nancy GALLAGHER RN, BSN, PHN  [place] [facility] RN

## 2017-11-02 ENCOUNTER — ALLIED HEALTH/NURSE VISIT (OUTPATIENT)
Dept: CARDIOLOGY | Facility: CLINIC | Age: 72
End: 2017-11-02
Payer: MEDICARE

## 2017-11-02 DIAGNOSIS — Z45.09 ENCOUNTER FOR LOOP RECORDER CHECK: Primary | ICD-10-CM

## 2017-11-02 PROCEDURE — 93298 REM INTERROG DEV EVAL SCRMS: CPT | Performed by: INTERNAL MEDICINE

## 2017-11-02 PROCEDURE — 93299 C ICM DEVICE INTERROGAT REMOTE: CPT | Performed by: INTERNAL MEDICINE

## 2017-11-02 NOTE — MR AVS SNAPSHOT
After Visit Summary   11/2/2017    Teri Beltran    MRN: [id number]           Patient Information     Date Of Birth          1945        Visit Information        Provider Department      11/2/2017 4:30 PM ROCHA DCR2 [facility]        Today's Diagnoses     Encounter for loop recorder check    -  1       Follow-ups after your visit        Your next 10 appointments already scheduled     Nov 02, 2017  4:30 PM CDT   Remote ICD Check with ROCHA DCR2   [facility] ([facility])    [address]  [facility] [id number] [phone]           This appointment is for a remote check of your debrillator.  This is not an appointment at the office.            Feb 22, 2018  4:30 PM CST   Remote ICD Check with ROCHA DCR2   [facility] ([facility])    [address]  [place] [id number] [phone]           This appointment is for a remote check of your debrillator.  This is not an appointment at the office.              Who to contact     If you have questions or need follow up information about today's clinic visit or your schedule please contact [facility] directly at [phone].  Normal or non-critical lab and imaging results will be communicated to you by [employer], letter or phone within 4 business days after the clinic has received the results. If you do not hear from us within 7 days, please contact the clinic through [employer] or phone. If you have a critical or abnormal lab result, we will notify you by phone as soon as possible.  Submit refill requests through [employer] or call your pharmacy and they will forward the refill request to us. Please allow 3 business days for your refill to be completed.          Additional Information About Your Visit        [employer] Information     [employer] lets you send  "messages to your doctor, view your test results, renew your prescriptions, schedule appointments and more. To sign up, go to [email or web address] . Click on \"Log in\" on the left side of the screen, which will take you to the Welcome page. Then click on \"Sign up Now\" on the right side of the page.     You will be asked to enter the access code listed below, as well as some personal information. Please follow the directions to create your username and password.     Your access code is: [email or web address]  Expires: 2018  3:15 PM     Your access code will  in 90 days. If you need help or a new code, please call your [place] clinic or [phone].        Care EveryWhere ID     This is your Care EveryWhere ID. This could be used by other organizations to access your [place] medical records  [id number]         Blood Pressure from Last 3 Encounters:   10/13/17 132/78   10/05/17 144/84   17 148/88    Weight from Last 3 Encounters:   10/13/17 92 kg (202 lb 14.4 oz)   10/05/17 92.5 kg (204 lb)   17 93.9 kg (207 lb)              We Performed the Following     C ICM DEVICE INTERROGAT REMOTE (29021)     C INTERROGATION DEVICE CHELA PERAZA Remote (87932)        Primary Care Provider Office Phone # Fax #    Laura Tipton [phone] [address] [facility] [address]        Equal Access to Services     [facility] AH: Hadii aad ku hadasho Soomaali, waaxda luqadaha, qaybta kaalmada adeegyada, waxay geena verma. So [facility] [phone].    ATENCIÓN: Si habla español, tiene a tracy disposición servicios gratuitos de asistencia lingüística. Llame al [phone].    We comply with applicable federal civil rights laws and [place] laws. We do not discriminate on the basis of race, color, national origin, age, disability, sex, sexual orientation, or gender identity.            Thank you!     Thank you for choosing [facility]  for " your care. Our goal is always to provide you with excellent care. Hearing back from our patients is one way we can continue to improve our services. Please take a few minutes to complete the written survey that you may receive in the mail after your visit with us. Thank you!             Your Updated Medication List - Protect others around you: Learn how to safely use, store and throw away your medicines at www.disposemymeds.org.          This list is accurate as of: 11/2/17  3:15 PM.  Always use your most recent med list.                   Brand Name Dispense Instructions for use Diagnosis    albuterol 108 (90 BASE) MCG/ACT Inhaler    PROAIR HFA/PROVENTIL HFA/VENTOLIN HFA    1 Inhaler    Inhale 2 puffs into the lungs every 6 hours as needed for shortness of breath / dyspnea or wheezing    Seasonal allergies, SOB (shortness of breath)       alendronate 70 MG tablet    FOSAMAX    12 tablet    SEE NOTES    Osteoporosis       amitriptyline 100 MG tablet    ELAVIL    90 tablet    TAKE 1 TABLET(100 MG) BY MOUTH AT BEDTIME    Insomnia       amLODIPine 2.5 MG tablet    NORVASC    90 tablet    Take 1 tablet (2.5 mg) by mouth daily    Essential hypertension with goal blood pressure less than 140/90       cholecalciferol 1000 UNIT tablet    vitamin D3    100 tablet    Take 1 tablet (1,000 Units) by mouth daily        CLARITIN 10 MG tablet   Generic drug:  loratadine      Take 1 tablet (10 mg) by mouth daily    Seasonal allergic rhinitis       Co Q10 200 MG Caps     100 capsule    Take 200 mg by mouth daily        COSOPT PF 22.3-6.8 MG/ML Soln   Generic drug:  Dorzolamide HCl-Timolol Mal PF           levothyroxine 88 MCG tablet    SYNTHROID/LEVOTHROID    90 tablet    Take 1 tablet (88 mcg) by mouth daily    Hypothyroidism, unspecified type       lisinopril-hydrochlorothiazide 20-12.5 MG per tablet    PRINZIDE/ZESTORETIC    90 tablet    TAKE ONE TABLET BY MOUTH ONCE DAILY    Hypertension goal BP (blood pressure) < 140/90        metoprolol 25 MG 24 hr tablet    TOPROL-XL    135 tablet    Take 1.5 tablets (37.5 mg) by mouth daily    Hypertension goal BP (blood pressure) < 140/90, Tachycardia       omeprazole 20 MG CR capsule    priLOSEC    30 capsule    Take 1 capsule (20 mg) by mouth daily    Gastroesophageal reflux disease without esophagitis       pravastatin 40 MG tablet    PRAVACHOL    90 tablet    TAKE 1 TABLET(40 MG) BY MOUTH DAILY    Hyperlipidemia LDL goal <130       ZIOPTAN 0.0015 % Soln   Generic drug:  Tafluprost

## 2017-11-02 NOTE — PROGRESS NOTES
[employer] Reveal Linq Loop Recorder   Symptom: 0 Tachy: 0 Pause: 0 Enrique: 0  AT: 0 AF: 0 % of time in AT/AF: 0  Heart rate: NSR  Battery: OK  Careplan: Writer attempted to call pt with results, but no answer. VM left with results and informed of next [facility] scheduled in Feb. AILYN Montalvo RN.

## 2017-11-18 DIAGNOSIS — I10 HYPERTENSION GOAL BP (BLOOD PRESSURE) < 140/90: ICD-10-CM

## 2017-11-21 RX ORDER — LISINOPRIL AND HYDROCHLOROTHIAZIDE 12.5; 2 MG/1; MG/1
TABLET ORAL
Qty: 90 TABLET | Refills: 1 | Status: SHIPPED | OUTPATIENT
Start: 2017-11-21 | End: 2018-05-18

## 2017-12-10 DIAGNOSIS — K21.9 GASTROESOPHAGEAL REFLUX DISEASE WITHOUT ESOPHAGITIS: ICD-10-CM

## 2017-12-12 NOTE — TELEPHONE ENCOUNTER
I sent in #30.   I do recommend a visit.  Can be phone or office; she is not on [facility] for e visit.    Please call her.

## 2017-12-12 NOTE — TELEPHONE ENCOUNTER
Omeprazole    Routing refill request to provider for review/approval because:  Per 10/13/2017 OV note     Midepigastric pain  She seems to be improving; may be related to PPI. She notes she has not had this kind of symptoms in years. We did discuss some of the long term effects with PPI. If doing well, anticipate weaning. See patient instructions.  At this time, will test for H pylori. Anticipate treatment if positive.   - H Pylori antigen, stool; Future      Please advise if you would like patient to continue taking.     Nancy GALLAGHER RN, BSN, PHN  [place] [facility] RN

## 2017-12-13 NOTE — TELEPHONE ENCOUNTER
Patient is not planning to pick this medication up, she said she wants to chat with Dr. Tipton before she starts taking it again. She will call back to schedule an appointment after the Holiday's.    -Yanet Jovel

## 2018-01-02 DIAGNOSIS — M81.0 OSTEOPOROSIS: ICD-10-CM

## 2018-01-02 NOTE — TELEPHONE ENCOUNTER
Requested Prescriptions   Pending Prescriptions Disp Refills     alendronate (FOSAMAX) 70 MG tablet [Pharmacy Med Name: ALENDRONATE 70MG TABLETS]  Last Written Prescription Date:  10/9/17  Last Fill Quantity: 12,  # refills: 0   Last Office Visit with G, P or [facility] prescribing provider:  10/13/2017     Future Office Visit:      12 tablet 0     Sig: SEE NOTES    Bisphosphonates Failed    1/2/2018  3:16 AM       Failed - Dexa on file within past 2 years    Please review last Dexa result.          Passed - Recent or future visit with authorizing provider's specialty    Patient had office visit in the last year or has a visit in the next 30 days with authorizing provider.  See chart review.              Passed - Patient is age 18 or older       Passed - Normal Serum Creatinine on file within past 12 months    Recent Labs   Lab Test  09/28/17   0938   CR  0.69

## 2018-01-04 RX ORDER — ALENDRONATE SODIUM 70 MG/1
70 TABLET ORAL
Qty: 12 TABLET | Refills: 0 | Status: SHIPPED | OUTPATIENT
Start: 2018-01-04 | End: 2018-01-04

## 2018-01-04 RX ORDER — ALENDRONATE SODIUM 70 MG/1
70 TABLET ORAL
Qty: 12 TABLET | Refills: 0 | Status: SHIPPED | OUTPATIENT
Start: 2018-01-04 | End: 2018-05-09

## 2018-01-04 NOTE — TELEPHONE ENCOUNTER
Had to resend this as first prescription did not go through today.  Magaly Perdmoo, RN  Triage Nurse

## 2018-01-04 NOTE — TELEPHONE ENCOUNTER
Medication is being filled for 1 time refill only due to:  pt is due for DEXA per protocol.   This is ordered, please call patient to schedule this.   Magaly Perdomo, CHRIS  Triage Nurse

## 2018-01-16 DIAGNOSIS — I10 ESSENTIAL HYPERTENSION WITH GOAL BLOOD PRESSURE LESS THAN 140/90: ICD-10-CM

## 2018-01-16 RX ORDER — AMLODIPINE BESYLATE 2.5 MG/1
2.5 TABLET ORAL DAILY
Qty: 90 TABLET | Refills: 2 | Status: SHIPPED | OUTPATIENT
Start: 2018-01-16 | End: 2018-10-12

## 2018-01-23 ENCOUNTER — OFFICE VISIT (OUTPATIENT)
Dept: FAMILY MEDICINE | Facility: CLINIC | Age: 73
End: 2018-01-23
Payer: MEDICARE

## 2018-01-23 VITALS
OXYGEN SATURATION: 98 % | HEIGHT: 68 IN | HEART RATE: 94 BPM | DIASTOLIC BLOOD PRESSURE: 80 MMHG | BODY MASS INDEX: 30.46 KG/M2 | RESPIRATION RATE: 16 BRPM | SYSTOLIC BLOOD PRESSURE: 138 MMHG | TEMPERATURE: 98.1 F | WEIGHT: 201 LBS

## 2018-01-23 DIAGNOSIS — R09.81 CONGESTION OF PARANASAL SINUS: ICD-10-CM

## 2018-01-23 DIAGNOSIS — R10.13 MIDEPIGASTRIC PAIN: Primary | ICD-10-CM

## 2018-01-23 DIAGNOSIS — J01.90 ACUTE SINUSITIS WITH SYMPTOMS > 10 DAYS: ICD-10-CM

## 2018-01-23 PROCEDURE — 99214 OFFICE O/P EST MOD 30 MIN: CPT | Performed by: FAMILY MEDICINE

## 2018-01-23 RX ORDER — AMOXICILLIN 500 MG/1
1000 CAPSULE ORAL 2 TIMES DAILY
Qty: 40 CAPSULE | Refills: 0 | Status: SHIPPED | OUTPATIENT
Start: 2018-01-23 | End: 2018-05-08

## 2018-01-23 RX ORDER — FLUTICASONE PROPIONATE 50 MCG
1-2 SPRAY, SUSPENSION (ML) NASAL DAILY
Qty: 1 BOTTLE | Refills: 3 | Status: SHIPPED | OUTPATIENT
Start: 2018-01-23 | End: 2020-01-13

## 2018-01-23 NOTE — MR AVS SNAPSHOT
After Visit Summary   1/23/2018    Teri Beltran    MRN: [id number]           Patient Information     Date Of Birth          1945        Visit Information        Provider Department      1/23/2018 3:10 PM Laura Tipton MD [facility] [place]        Today's Diagnoses     Midepigastric pain    -  1    Congestion of paranasal sinus          Care Instructions    Let's try the ranitidine twice daily. (this is similar to tagamet).    If you are doing real well, you can decrease to once, at night.    If things are not working well, let me know.    -------------------------------------    I think it is important to get your sinuses open and drained.  Fluids and humidity will often help.    Flonase can work for allergies and other things that irritate the area.    Sometimes Mucinex will break up the mucus so it drains better.    If you don't get better with the above or take a turn for the worse, you can fill the antibiotic.    If you get better, you can rip up the prescription.          Follow-ups after your visit        Your next 10 appointments already scheduled     Feb 01, 2018  2:30 PM CST   DX HIP/PELVIS/SPINE with EADX1   [facility] [place] ([facility])    [address]  [facility] [address] [phone]           Please do not take any of the following 24 hours prior to the day of your exam: vitamins, calcium tablets, antacids.  If possible, please wear clothes without metal (snaps, zippers). A sweatsuit works well.            Feb 22, 2018  4:30 PM CST   Remote ICD Check with ROCHA DCR2   [facility]   [place] ([facility])    [address] [place] [address] [address]  [facility] [address] [phone]           This appointment is for a remote check of your debrillator.  This is not an appointment at the office.              Who to contact     If you have questions or need follow up information about today's clinic  "visit or your schedule please contact [facility] directly at [phone].  Normal or non-critical lab and imaging results will be communicated to you by MyChart, letter or phone within 4 business days after the clinic has received the results. If you do not hear from us within 7 days, please contact the clinic through MoSynchart or phone. If you have a critical or abnormal lab result, we will notify you by phone as soon as possible.  Submit refill requests through MobiliBuy or call your pharmacy and they will forward the refill request to us. Please allow 3 business days for your refill to be completed.          Additional Information About Your Visit        MyChart Information     M[employer]ili[employer] lets you send messages to your doctor, view your test results, renew your prescriptions, schedule appointments and more. To sign up, go to [email or web address] . Click on \"Log in\" on the left side of the screen, which will take you to the Welcome page. Then click on \"Sign up Now\" on the right side of the page.     You will be asked to enter the access code listed below, as well as some personal information. Please follow the directions to create your username and password.     Your access code is: [email or web address]  Expires: 2018  2:15 PM     Your access code will  in 90 days. If you need help or a new code, please call your [place] clinic or [phone].        Care EveryWhere ID     This is your Care EveryWhere ID. This could be used by other organizations to access your [place] medical records  [id number]        Your Vitals Were     Pulse Temperature Respirations Height Pulse Oximetry BMI (Body Mass Index)    94 98.1  F (36.7  C) (Oral) 16 5' 8\" (1.727 m) 98% 30.56 kg/m2       Blood Pressure from Last 3 Encounters:   18 138/80   10/13/17 132/78   10/05/17 144/84    Weight from Last 3 Encounters:   18 201 lb (91.2 kg)   10/13/17 202 lb 14.4 oz (92 kg)   10/05/17 204 lb (92.5 kg)            "   Today, you had the following     No orders found for display         Today's Medication Changes          These changes are accurate as of: 1/23/18  3:52 PM.  If you have any questions, ask your nurse or doctor.               Start taking these medicines.        Dose/Directions    amoxicillin 500 MG capsule   Commonly known as:  AMOXIL   Used for:  Congestion of paranasal sinus   Started by:  Laura Tipton MD        Dose:  1000 mg   Take 2 capsules (1,000 mg) by mouth 2 times daily   Quantity:  40 capsule   Refills:  0       fluticasone 50 MCG/ACT spray   Commonly known as:  FLONASE   Used for:  Congestion of paranasal sinus   Started by:  Laura Tipton MD        Dose:  1-2 spray   Spray 1-2 sprays into both nostrils daily   Quantity:  1 Bottle   Refills:  3       ranitidine 150 MG tablet   Commonly known as:  ZANTAC   Used for:  Midepigastric pain   Started by:  Laura Tipton MD        Dose:  150 mg   Take 1 tablet (150 mg) by mouth 2 times daily   Quantity:  60 tablet   Refills:  3         Stop taking these medicines if you haven't already. Please contact your care team if you have questions.     omeprazole 20 MG CR capsule   Commonly known as:  priLOSEC   Stopped by:  Laura Tipton MD                Where to get your medicines      These medications were sent to [facility] [address] [address] [facility] AT [place] [address] & [facility]  [address] [place] [address]     Phone:  [phone]     fluticasone 50 MCG/ACT spray    ranitidine 150 MG tablet         Some of these will need a paper prescription and others can be bought over the counter.  Ask your nurse if you have questions.     Bring a paper prescription for each of these medications     amoxicillin 500 MG capsule                Primary Care Provider Office Phone # Fax #    Laura Tipton [phone] [address]  [facility] [address]        Equal Access to Services     MARCIAL SWAN AH:  Hadii aad ku hadnikao Sosemajali, waaxda luqadaha, qaybta kaaltawanna hunt, goldy winstonin hayaan dulceclari verma. So [facility] [phone].    ATENCIÓN: Si hazel pressley, tiene a tracy disposición servicios gratuitos de asistencia lingüística. Llame al [phone].    We comply with applicable federal civil rights laws and [place] laws. We do not discriminate on the basis of race, color, national origin, age, disability, sex, sexual orientation, or gender identity.            Thank you!     Thank you for choosing [facility]  for your care. Our goal is always to provide you with excellent care. Hearing back from our patients is one way we can continue to improve our services. Please take a few minutes to complete the written survey that you may receive in the mail after your visit with us. Thank you!             Your Updated Medication List - Protect others around you: Learn how to safely use, store and throw away your medicines at www.disposemymeds.org.          This list is accurate as of: 1/23/18  3:52 PM.  Always use your most recent med list.                   Brand Name Dispense Instructions for use Diagnosis    albuterol 108 (90 BASE) MCG/ACT Inhaler    PROAIR HFA/PROVENTIL HFA/VENTOLIN HFA    1 Inhaler    Inhale 2 puffs into the lungs every 6 hours as needed for shortness of breath / dyspnea or wheezing    Seasonal allergies, SOB (shortness of breath)       alendronate 70 MG tablet    FOSAMAX    12 tablet    Take 1 tablet (70 mg) by mouth every 7 days Due for DEXA scan. Please call to schedule.    Osteoporosis       amitriptyline 100 MG tablet    ELAVIL    90 tablet    TAKE 1 TABLET(100 MG) BY MOUTH AT BEDTIME    Insomnia       amLODIPine 2.5 MG tablet    NORVASC    90 tablet    Take 1 tablet (2.5 mg) by mouth daily    Essential hypertension with goal blood pressure less than 140/90       amoxicillin 500 MG capsule    AMOXIL    40 capsule    Take 2 capsules (1,000 mg) by mouth 2 times daily     Congestion of paranasal sinus       cholecalciferol 1000 UNIT tablet    vitamin D3    100 tablet    Take 1 tablet (1,000 Units) by mouth daily        CLARITIN 10 MG tablet   Generic drug:  loratadine      Take 1 tablet (10 mg) by mouth daily    Seasonal allergic rhinitis       Co Q10 200 MG Caps     100 capsule    Take 200 mg by mouth daily        COSOPT PF 22.3-6.8 MG/ML Soln   Generic drug:  Dorzolamide HCl-Timolol Mal PF           fluticasone 50 MCG/ACT spray    FLONASE    1 Bottle    Spray 1-2 sprays into both nostrils daily    Congestion of paranasal sinus       levothyroxine 88 MCG tablet    SYNTHROID/LEVOTHROID    90 tablet    Take 1 tablet (88 mcg) by mouth daily    Hypothyroidism, unspecified type       lisinopril-hydrochlorothiazide 20-12.5 MG per tablet    PRINZIDE/ZESTORETIC    90 tablet    TAKE ONE TABLET BY MOUTH ONCE DAILY    Hypertension goal BP (blood pressure) < 140/90       metoprolol succinate 25 MG 24 hr tablet    TOPROL-XL    135 tablet    Take 1.5 tablets (37.5 mg) by mouth daily    Hypertension goal BP (blood pressure) < 140/90, Tachycardia       pravastatin 40 MG tablet    PRAVACHOL    90 tablet    TAKE 1 TABLET(40 MG) BY MOUTH DAILY    Hyperlipidemia LDL goal <130       ranitidine 150 MG tablet    ZANTAC    60 tablet    Take 1 tablet (150 mg) by mouth 2 times daily    Midepigastric pain       ZIOPTAN 0.0015 % Soln   Generic drug:  Tafluprost

## 2018-01-23 NOTE — PATIENT INSTRUCTIONS
Let's try the ranitidine twice daily. (this is similar to tagamet).    If you are doing real well, you can decrease to once, at night.    If things are not working well, let me know.    -------------------------------------    I think it is important to get your sinuses open and drained.  Fluids and humidity will often help.    Flonase can work for allergies and other things that irritate the area.    Sometimes Mucinex will break up the mucus so it drains better.    If you don't get better with the above or take a turn for the worse, you can fill the antibiotic.    If you get better, you can rip up the prescription.

## 2018-01-23 NOTE — NURSING NOTE
"Chief Complaint   Patient presents with     Abdominal Pain       Initial /80 (BP Location: Right arm, Cuff Size: Adult Large)  Pulse 94  Temp 98.1  F (36.7  C) (Oral)  Resp 16  Ht 5' 8\" (1.727 m)  Wt 201 lb (91.2 kg)  SpO2 98%  BMI 30.56 kg/m2 Estimated body mass index is 30.56 kg/(m^2) as calculated from the following:    Height as of this encounter: 5' 8\" (1.727 m).    Weight as of this encounter: 201 lb (91.2 kg).  Medication Reconciliation: complete   Allie Quintero, DALJIT    "

## 2018-01-23 NOTE — PROGRESS NOTES
SUBJECTIVE:   Teri Beltran is a 72 year old female who presents to clinic today for the following health issues:      Gastrointestinal symptoms      Duration: off and on    Description:           REFLUX SYMPTOMS - regurgitation of food, acid taste in mouth and belching      Intensity:  moderate    Accompanying signs and symptoms:  none and bloating    History  Previous {similar problem: YES  Previous evaluation:  none    Aggravating factors: none    Alleviating factors: nothing     Other Therapies tried: None            Problem list and histories reviewed & adjusted, as indicated.  Additional history:     See under ROS     Patient Active Problem List   Diagnosis     Hypothyroidism     Tachycardia     Glaucoma     Prediabetes     Hyperlipidemia LDL goal <130     Palpitations     BMI 30.0-30.9,adult     Health Care Home     Advanced directives, counseling/discussion     Hypertension goal BP (blood pressure) < 140/90     Adjustment disorder with depressed mood     Osteoporosis     Mild dilation of ascending aorta - borderline     RBBB     First degree AV block     Venous insufficiency     Varicose veins       Current Outpatient Prescriptions   Medication Sig Dispense Refill     amLODIPine (NORVASC) 2.5 MG tablet Take 1 tablet (2.5 mg) by mouth daily 90 tablet 2     alendronate (FOSAMAX) 70 MG tablet Take 1 tablet (70 mg) by mouth every 7 days Due for DEXA scan. Please call to schedule. 12 tablet 0     omeprazole (PRILOSEC) 20 MG CR capsule TAKE 1 CAPSULE(20 MG) BY MOUTH DAILY 30 capsule 0     lisinopril-hydrochlorothiazide (PRINZIDE/ZESTORETIC) 20-12.5 MG per tablet TAKE ONE TABLET BY MOUTH ONCE DAILY 90 tablet 1     pravastatin (PRAVACHOL) 40 MG tablet TAKE 1 TABLET(40 MG) BY MOUTH DAILY 90 tablet 1     metoprolol (TOPROL-XL) 25 MG 24 hr tablet Take 1.5 tablets (37.5 mg) by mouth daily 135 tablet 3     amitriptyline (ELAVIL) 100 MG tablet TAKE 1 TABLET(100 MG) BY MOUTH AT BEDTIME 90 tablet 3     levothyroxine  (SYNTHROID/LEVOTHROID) 88 MCG tablet Take 1 tablet (88 mcg) by mouth daily 90 tablet 3     ZIOPTAN 0.0015 % SOLN        albuterol (PROAIR HFA, PROVENTIL HFA, VENTOLIN HFA) 108 (90 BASE) MCG/ACT inhaler Inhale 2 puffs into the lungs every 6 hours as needed for shortness of breath / dyspnea or wheezing 1 Inhaler 0     Coenzyme Q10 (CO Q10) 200 MG CAPS Take 200 mg by mouth daily 100 capsule prn     cholecalciferol (VITAMIN D) 1000 UNIT tablet Take 1 tablet (1,000 Units) by mouth daily 100 tablet 3     COSOPT PF 22.3-6.8 MG/ML SOLN   3     loratadine (CLARITIN) 10 MG tablet Take 1 tablet (10 mg) by mouth daily           Reviewed and updated as needed this visit by clinical staff     Reviewed and updated as needed this visit by Provider         ROS:  C: NEGATIVE for fever, chills, change in weight  ENT/MOUTH: see below  R: NEGATIVE for significant cough or SOB  CV: NEGATIVE for chest pain, palpitations or peripheral edema  GI: see below  PSYCHIATRIC: NEGATIVE for changes in mood or affect    Stomach still acting up.  Not all the time.   Cannot tell what the trigger is.    Will get a stomach ache for a couple hours; then goes away.    Has not noted a relationship to food; other than one time with a pizza. Did ok recently with a pizza.  No relationship to position, medications, time of day, alendronate.    Will sometimes belch up some acid. Occasionally food if there is something in stomach.    Is currently off the Prilosec as we had instructed.  Did feel OK when on it.  Notes for years, was on tagamet. Notes had a very touchy stomach when young.  Has HH    Fighting off a sinus infection.  Symptoms come and go. She notes it has been for about 3-4 weeks now.  Will get headache. Frontal and left maxillary area.   PND.  Using some allergy medication. Believes she might be allergic to the rugs.     OBJECTIVE:     /80 (BP Location: Right arm, Cuff Size: Adult Large)  Pulse 94  Temp 98.1  F (36.7  C) (Oral)  Resp 16   "Ht 5' 8\" (1.727 m)  Wt 201 lb (91.2 kg)  SpO2 98%  BMI 30.56 kg/m2  Body mass index is 30.56 kg/(m^2).  GENERAL APPEARANCE: alert and no distress  HENT: ear canals and TM's normal and nose and mouth without ulcers or lesions  There is some crusty drainage at the end of both nares.  NECK: no adenopathy  RESP: lungs clear to auscultation - no rales, rhonchi or wheezes  CV: regular rates and rhythm  Abd: BS present. Soft. Nontender. No mass or HSM  MS: no edema.   PSYCH: mentation appears normal and affect normal/bright    TSH   Date Value Ref Range Status   09/28/2017 3.14 0.40 - 4.00 mU/L Final   ]    Recent Labs   Lab Test  09/28/17   0938  09/30/16   1051  08/11/15   1224  08/20/14   0958   CHOL  183  176  176  201*   HDL  83  65  79  88   LDL  76  84  70  79   TRIG  122  136  133  169*   CHOLHDLRATIO   --    --   2.2  2.3       GFR Estimate   Date Value Ref Range Status   09/28/2017 84 >60 mL/min/1.7m2 Final     Comment:     Non  GFR Calc   09/26/2016 87 >60 mL/min/1.7m2 Final     Comment:     Non  GFR Calc   08/30/2016 63 >60 mL/min/1.7m2 Final     Comment:     Non  GFR Calc               ASSESSMENT/PLAN:     Midepigastric pain  Will try H2 blocker. Not as strong as PPI, but felt to be safer .  Can consider PPI if not working.   Consider GI evaluation as well if not working.   - ranitidine (ZANTAC) 150 MG tablet; Take 1 tablet (150 mg) by mouth 2 times daily    Congestion of paranasal sinus  She notes some prolonged, but intermittent symptoms. At this time, will try to encourage her getting it drained from there.   Antibiotic written; see patient instructions. If not needing, can rip up.  - amoxicillin (AMOXIL) 500 MG capsule; Take 2 capsules (1,000 mg) by mouth 2 times daily  - fluticasone (FLONASE) 50 MCG/ACT spray; Spray 1-2 sprays into both nostrils daily    Acute sinusitis with symptoms > 10 days  As above.   - amoxicillin (AMOXIL) 500 MG capsule; Take 2 " capsules (1,000 mg) by mouth 2 times daily  - fluticasone (FLONASE) 50 MCG/ACT spray; Spray 1-2 sprays into both nostrils daily      To let us know how things are going over the next month or so.  See Patient Instructions    Laura Tipton MD, MD  [facility]

## 2018-02-01 ENCOUNTER — RADIANT APPOINTMENT (OUTPATIENT)
Dept: BONE DENSITY | Facility: CLINIC | Age: 73
End: 2018-02-01
Attending: FAMILY MEDICINE
Payer: MEDICARE

## 2018-02-01 DIAGNOSIS — M81.0 OSTEOPOROSIS: ICD-10-CM

## 2018-02-01 DIAGNOSIS — Z78.0 MENOPAUSE: ICD-10-CM

## 2018-02-01 PROCEDURE — 77085 DXA BONE DENSITY AXL VRT FX: CPT | Performed by: FAMILY MEDICINE

## 2018-02-01 PROCEDURE — 77081 DXA BONE DENSITY APPENDICULR: CPT | Mod: 59 | Performed by: FAMILY MEDICINE

## 2018-02-22 ENCOUNTER — ALLIED HEALTH/NURSE VISIT (OUTPATIENT)
Dept: CARDIOLOGY | Facility: CLINIC | Age: 73
End: 2018-02-22
Payer: MEDICARE

## 2018-02-22 DIAGNOSIS — R55 SYNCOPE, UNSPECIFIED SYNCOPE TYPE: ICD-10-CM

## 2018-02-22 DIAGNOSIS — Z45.09 ENCOUNTER FOR LOOP RECORDER CHECK: Primary | ICD-10-CM

## 2018-02-22 PROCEDURE — 93298 REM INTERROG DEV EVAL SCRMS: CPT | Performed by: INTERNAL MEDICINE

## 2018-02-22 NOTE — PROGRESS NOTES
Medtronic Reveal Linq Loop Recorder   Symptom: 0 Tachy: 0 Pause: 0 Enrique: 0  AT: 0 AF: 0  Time in AT/AF: 0 %  Heart rate: excellent variability Battery: ok  Careplan: remote on 6/12. Called patient with results and plan, didn't answer. Letter sent SP

## 2018-02-22 NOTE — MR AVS SNAPSHOT
After Visit Summary   2/22/2018    Teri Beltran    MRN: [id number]           Patient Information     Date Of Birth          1945        Visit Information        Provider Department      2/22/2018 4:30 PM ROCHA DCR2 [facility]        Today's Diagnoses     Encounter for loop recorder check    -  1    Syncope, unspecified syncope type           Follow-ups after your visit        Your next 10 appointments already scheduled     Feb 22, 2018  4:30 PM CST   Remote ICD Check with ROCHA DCR2   [facility] ([facility])    [address]  [facility] [address] [phone]           This appointment is for a remote check of your debrillator.  This is not an appointment at the office.            Jun 12, 2018  4:30 PM CDT   Remote ICD Check with ROCHA DCR2   [facility] ([facility])    [address]  [facility] [id number] [phone]           This appointment is for a remote check of your debrillator.  This is not an appointment at the office.              Who to contact     If you have questions or need follow up information about today's clinic visit or your schedule please contact [facility] directly at [phone].  Normal or non-critical lab and imaging results will be communicated to you by MyChart, letter or phone within 4 business days after the clinic has received the results. If you do not hear from us within 7 days, please contact the clinic through Aeternus[employer] or phone. If you have a critical or abnormal lab result, we will notify you by phone as soon as possible.  Submit refill requests through SeeWhy or call your pharmacy and they will forward the refill request to us. Please allow 3 business days for your refill to be completed.          Additional Information About Your Visit        [facility]  "Information     Temnos lets you send messages to your doctor, view your test results, renew your prescriptions, schedule appointments and more. To sign up, go to [email or web address] . Click on \"Log in\" on the left side of the screen, which will take you to the Welcome page. Then click on \"Sign up Now\" on the right side of the page.     You will be asked to enter the access code listed below, as well as some personal information. Please follow the directions to create your username and password.     Your access code is: [email or web address]  Expires: 2018  2:33 PM     Your access code will  in 90 days. If you need help or a new code, please call your [place] clinic or [phone].        Care EveryWhere ID     This is your Care EveryWhere ID. This could be used by other organizations to access your [place] medical records  [id number]         Blood Pressure from Last 3 Encounters:   18 138/80   10/13/17 132/78   10/05/17 144/84    Weight from Last 3 Encounters:   18 91.2 kg (201 lb)   10/13/17 92 kg (202 lb 14.4 oz)   10/05/17 92.5 kg (204 lb)              We Performed the Following     C INTERROGATION DEVICE CHELA PERAZA ([id number])        Primary Care Provider Office Phone # Fax #    Laura iTpton [phone] [address] [facility] [address]        Equal Access to Services     [facility]: Hadii aad ku hadasho Soomaali, waaxda luqadaha, qaybta kaalmada adeegyada, waxay geena mix . So [facility] [phone].    ATENCIÓN: Si habla español, tiene a tracy disposición servicios gratuitos de asistencia lingüística. Llame al [phone].    We comply with applicable federal civil rights laws and [place] laws. We do not discriminate on the basis of race, color, national origin, age, disability, sex, sexual orientation, or gender identity.            Thank you!     Thank you for choosing [facility]  for your " care. Our goal is always to provide you with excellent care. Hearing back from our patients is one way we can continue to improve our services. Please take a few minutes to complete the written survey that you may receive in the mail after your visit with us. Thank you!             Your Updated Medication List - Protect others around you: Learn how to safely use, store and throw away your medicines at www.disposemymeds.org.          This list is accurate as of 2/22/18  2:33 PM.  Always use your most recent med list.                   Brand Name Dispense Instructions for use Diagnosis    albuterol 108 (90 BASE) MCG/ACT Inhaler    PROAIR HFA/PROVENTIL HFA/VENTOLIN HFA    1 Inhaler    Inhale 2 puffs into the lungs every 6 hours as needed for shortness of breath / dyspnea or wheezing    Seasonal allergies, SOB (shortness of breath)       alendronate 70 MG tablet    FOSAMAX    12 tablet    Take 1 tablet (70 mg) by mouth every 7 days Due for DEXA scan. Please call to schedule.    Osteoporosis       amitriptyline 100 MG tablet    ELAVIL    90 tablet    TAKE 1 TABLET(100 MG) BY MOUTH AT BEDTIME    Insomnia       amLODIPine 2.5 MG tablet    NORVASC    90 tablet    Take 1 tablet (2.5 mg) by mouth daily    Essential hypertension with goal blood pressure less than 140/90       amoxicillin 500 MG capsule    AMOXIL    40 capsule    Take 2 capsules (1,000 mg) by mouth 2 times daily    Congestion of paranasal sinus, Acute sinusitis with symptoms > 10 days       cholecalciferol 1000 UNIT tablet    vitamin D3    100 tablet    Take 1 tablet (1,000 Units) by mouth daily        CLARITIN 10 MG tablet   Generic drug:  loratadine      Take 1 tablet (10 mg) by mouth daily    Seasonal allergic rhinitis       Co Q10 200 MG Caps     100 capsule    Take 200 mg by mouth daily        COSOPT PF 22.3-6.8 MG/ML Soln   Generic drug:  Dorzolamide HCl-Timolol Mal PF           fluticasone 50 MCG/ACT spray    FLONASE    1 Bottle    Spray 1-2 sprays into  both nostrils daily    Congestion of paranasal sinus, Acute sinusitis with symptoms > 10 days       levothyroxine 88 MCG tablet    SYNTHROID/LEVOTHROID    90 tablet    Take 1 tablet (88 mcg) by mouth daily    Hypothyroidism, unspecified type       lisinopril-hydrochlorothiazide 20-12.5 MG per tablet    PRINZIDE/ZESTORETIC    90 tablet    TAKE ONE TABLET BY MOUTH ONCE DAILY    Hypertension goal BP (blood pressure) < 140/90       metoprolol succinate 25 MG 24 hr tablet    TOPROL-XL    135 tablet    Take 1.5 tablets (37.5 mg) by mouth daily    Hypertension goal BP (blood pressure) < 140/90, Tachycardia       pravastatin 40 MG tablet    PRAVACHOL    90 tablet    TAKE 1 TABLET(40 MG) BY MOUTH DAILY    Hyperlipidemia LDL goal <130       ranitidine 150 MG tablet    ZANTAC    60 tablet    Take 1 tablet (150 mg) by mouth 2 times daily    Midepigastric pain       ZIOPTAN 0.0015 % Soln   Generic drug:  Tafluprost

## 2018-02-22 NOTE — LETTER
Teri Beltran    [address] [facility] [address]    February 22, 2018      Dear Teri Beltran,     Your transmission sent on 2/22/2018 has been reviewed. It was determined the results are normal. No events were detected.   __X___ Your next scheduled date for you to send a transmission is on 6/12/2018.   Please call Luis at [phone] to change your appointment if needed. You may call and leave a message for a device RN if you have any questions at [phone] and they will return your call. Device clinic hours: Monday - Friday  8:00am-4:00pm   Sincerely,   SARA

## 2018-03-12 ENCOUNTER — TRANSFERRED RECORDS (OUTPATIENT)
Dept: HEALTH INFORMATION MANAGEMENT | Facility: CLINIC | Age: 73
End: 2018-03-12

## 2018-03-27 ENCOUNTER — TRANSFERRED RECORDS (OUTPATIENT)
Dept: HEALTH INFORMATION MANAGEMENT | Facility: CLINIC | Age: 73
End: 2018-03-27

## 2018-03-30 LAB — EJECTION FRACTION: 60

## 2018-04-16 ENCOUNTER — TRANSFERRED RECORDS (OUTPATIENT)
Dept: HEALTH INFORMATION MANAGEMENT | Facility: CLINIC | Age: 73
End: 2018-04-16

## 2018-04-21 DIAGNOSIS — E78.5 HYPERLIPIDEMIA LDL GOAL <130: ICD-10-CM

## 2018-04-24 RX ORDER — PRAVASTATIN SODIUM 40 MG
TABLET ORAL
Qty: 90 TABLET | Refills: 0 | Status: SHIPPED | OUTPATIENT
Start: 2018-04-24 | End: 2018-07-25

## 2018-04-24 NOTE — TELEPHONE ENCOUNTER
Prescription approved per [facility] Refill Protocol.    Nancy GALLAGHER RN, BSN, PHN  [place] Flex RN

## 2018-05-08 ENCOUNTER — OFFICE VISIT (OUTPATIENT)
Dept: FAMILY MEDICINE | Facility: CLINIC | Age: 73
End: 2018-05-08
Payer: MEDICARE

## 2018-05-08 ENCOUNTER — TELEPHONE (OUTPATIENT)
Dept: FAMILY MEDICINE | Facility: CLINIC | Age: 73
End: 2018-05-08

## 2018-05-08 VITALS
OXYGEN SATURATION: 97 % | BODY MASS INDEX: 32.93 KG/M2 | HEART RATE: 90 BPM | DIASTOLIC BLOOD PRESSURE: 88 MMHG | TEMPERATURE: 97.9 F | HEIGHT: 67 IN | RESPIRATION RATE: 18 BRPM | WEIGHT: 209.8 LBS | SYSTOLIC BLOOD PRESSURE: 142 MMHG

## 2018-05-08 DIAGNOSIS — M81.0 OSTEOPOROSIS, UNSPECIFIED OSTEOPOROSIS TYPE, UNSPECIFIED PATHOLOGICAL FRACTURE PRESENCE: ICD-10-CM

## 2018-05-08 DIAGNOSIS — R73.03 PREDIABETES: ICD-10-CM

## 2018-05-08 DIAGNOSIS — I87.2 VENOUS INSUFFICIENCY: ICD-10-CM

## 2018-05-08 DIAGNOSIS — M79.601 PAIN OF RIGHT UPPER EXTREMITY: ICD-10-CM

## 2018-05-08 DIAGNOSIS — E03.9 HYPOTHYROIDISM, UNSPECIFIED TYPE: ICD-10-CM

## 2018-05-08 DIAGNOSIS — I10 HYPERTENSION GOAL BP (BLOOD PRESSURE) < 140/90: ICD-10-CM

## 2018-05-08 DIAGNOSIS — E78.5 HYPERLIPIDEMIA LDL GOAL <130: Primary | ICD-10-CM

## 2018-05-08 DIAGNOSIS — I77.810 MILD DILATION OF ASCENDING AORTA (H): ICD-10-CM

## 2018-05-08 PROCEDURE — 99214 OFFICE O/P EST MOD 30 MIN: CPT | Performed by: FAMILY MEDICINE

## 2018-05-08 NOTE — MR AVS SNAPSHOT
After Visit Summary   5/8/2018    Teri Beltran    MRN: [id number]           Patient Information     Date Of Birth          1945        Visit Information        Provider Department      5/8/2018 1:30 PM Laura Tipton MD [facility]        Today's Diagnoses     Hyperlipidemia LDL goal <130    -  1      Care Instructions        I would recommend checking your bp here in the pharmacy or nurse only in the clinic every couple weeks.   With the pharmacy, you could just stop in. For nurse only visits, you would want to make an appointment.                Follow-ups after your visit        Follow-up notes from your care team     Return in about 6 months (around 11/8/2018) for Medication recheck; fasting lab prior..      Your next 10 appointments already scheduled     Jun 12, 2018  4:30 PM CDT   Remote ICD Check with ROCHA DCR2   [facility] ([facility])    [address] [place] [id number] [phone] OPT 2           This appointment is for a remote check of your debrillator.  This is not an appointment at the office.              Who to contact     If you have questions or need follow up information about today's clinic visit or your schedule please contact [facility] directly at [phone].  Normal or non-critical lab and imaging results will be communicated to you by MyChart, letter or phone within 4 business days after the clinic has received the results. If you do not hear from us within 7 days, please contact the clinic through MyChart or phone. If you have a critical or abnormal lab result, we will notify you by phone as soon as possible.  Submit refill requests through [employer] or call your pharmacy and they will forward the refill request to us. Please allow 3 business days for your refill to be completed.          Additional Information About Your Visit        MyChart Information     [employer]  "lets you send messages to your doctor, view your test results, renew your prescriptions, schedule appointments and more. To sign up, go to [email or web address] . Click on \"Log in\" on the left side of the screen, which will take you to the Welcome page. Then click on \"Sign up Now\" on the right side of the page.     You will be asked to enter the access code listed below, as well as some personal information. Please follow the directions to create your username and password.     Your access code is: [email or web address]  Expires: 2018  3:33 PM     Your access code will  in 90 days. If you need help or a new code, please call your [place] clinic or [phone].        Care EveryWhere ID     This is your Care EveryWhere ID. This could be used by other organizations to access your [place] medical records  [id number]        Your Vitals Were     Pulse Temperature Respirations Height Pulse Oximetry BMI (Body Mass Index)    90 97.9  F (36.6  C) (Oral) 18 5' 7\" (1.702 m) 97% 32.86 kg/m2       Blood Pressure from Last 3 Encounters:   18 142/88   18 138/80   10/13/17 132/78    Weight from Last 3 Encounters:   18 209 lb 12.8 oz (95.2 kg)   18 201 lb (91.2 kg)   10/13/17 202 lb 14.4 oz (92 kg)              Today, you had the following     No orders found for display       Primary Care Provider Office Phone # Fax #    Laura Tipton [phone] [address] [facility] [address]        Equal Access to Services     [facility]: Hadii sera Garcia, waniloda lugiuseppeadaha, qavincenzota kaalmada marilee, goldy verma. So [facility] [phone].    ATENCIÓN: Si habla español, tiene a tracy disposición servicios gratuitos de asistencia lingüística. Llame al [phone].    We comply with applicable federal civil rights laws and [place] laws. We do not discriminate on the basis of race, color, national origin, age, disability, sex, sexual orientation, " or gender identity.            Thank you!     Thank you for choosing [facility]  [facility] your [facility]. Our goal is always to provide you with excellent care. Hearing back from our patients is one way we can continue to improve our services. Please take a few minutes to complete the written survey that you may receive in the mail after your visit with us. Thank you!             Your Updated Medication List - Protect others around you: Learn how to safely use, store and throw away your medicines at www.disposemymeds.org.          This list is accurate as of 5/8/18  2:28 PM.  Always use your most recent med list.                   Brand Name Dispense Instructions for use Diagnosis    albuterol 108 (90 Base) MCG/ACT Inhaler    PROAIR HFA/PROVENTIL HFA/VENTOLIN HFA    1 Inhaler    Inhale 2 puffs into the lungs every 6 hours as needed for shortness of breath / dyspnea or wheezing    Seasonal allergies, SOB (shortness of breath)       alendronate 70 MG tablet    FOSAMAX    12 tablet    Take 1 tablet (70 mg) by mouth every 7 days Due for DEXA scan. Please call to schedule.    Osteoporosis       amitriptyline 100 MG tablet    ELAVIL    90 tablet    TAKE 1 TABLET(100 MG) BY MOUTH AT BEDTIME    Insomnia       amLODIPine 2.5 MG tablet    NORVASC    90 tablet    Take 1 tablet (2.5 mg) by mouth daily    Essential hypertension with goal blood pressure less than 140/90       cholecalciferol 1000 UNIT tablet    vitamin D3    100 tablet    Take 1 tablet (1,000 Units) by mouth daily        CLARITIN 10 MG tablet   Generic drug:  loratadine      Take 1 tablet (10 mg) by mouth daily    Seasonal allergic rhinitis       Co Q10 200 MG Caps     100 capsule    Take 200 mg by mouth daily        COSOPT PF 22.3-6.8 MG/ML opthalmic solution   Generic drug:  dorzolamide-timolol PF           fluticasone 50 MCG/ACT spray    FLONASE    1 Bottle    Spray 1-2 sprays into both nostrils daily    Congestion of paranasal sinus, Acute sinusitis  with symptoms > 10 days       levothyroxine 88 MCG tablet    SYNTHROID/LEVOTHROID    90 tablet    Take 1 tablet (88 mcg) by mouth daily    Hypothyroidism, unspecified type       lisinopril-hydrochlorothiazide 20-12.5 MG per tablet    PRINZIDE/ZESTORETIC    90 tablet    TAKE ONE TABLET BY MOUTH ONCE DAILY    Hypertension goal BP (blood pressure) < 140/90       metoprolol succinate 25 MG 24 hr tablet    TOPROL-XL    135 tablet    Take 1.5 tablets (37.5 mg) by mouth daily    Hypertension goal BP (blood pressure) < 140/90, Tachycardia       pravastatin 40 MG tablet    PRAVACHOL    90 tablet    TAKE 1 TABLET(40 MG) BY MOUTH DAILY    Hyperlipidemia LDL goal <130       ranitidine 150 MG tablet    ZANTAC    60 tablet    Take 1 tablet (150 mg) by mouth 2 times daily    Midepigastric pain       ZIOPTAN 0.0015 % Soln   Generic drug:  Tafluprost

## 2018-05-08 NOTE — PATIENT INSTRUCTIONS
I would recommend checking your bp here in the pharmacy or nurse only in the clinic every couple weeks.   With the pharmacy, you could just stop in. For nurse only visits, you would want to make an appointment.

## 2018-05-08 NOTE — TELEPHONE ENCOUNTER
She reports having had an ECHO through Dr. Garcia with [facility].    I have a copy of one visit with him; it was prior to the ECHO.    Please see about getting the ECHO and a copy of his second note.. Where he discussed recommendations.    Thanks!!

## 2018-05-08 NOTE — PROGRESS NOTES
SUBJECTIVE:   Teri Beltran is a 72 year old female who presents to clinic today for the following health issues:      Hyperlipidemia Follow-Up      Rate your low fat/cholesterol diet?: fair    Taking statin?  Yes, possible muscle aches from statin    Other lipid medications/supplements?:  Multivitamins      Amount of exercise or physical activity: None    Problems taking medications regularly: No    Medication side effects: muscle aches    Diet: regular (no restrictions)      Medication Followup of pravastatin    Taking Medication as prescribed: yes    Side Effects:  Possible Muscle Aches due to Statin    Medication Helping Symptoms:  yes           Problem list and histories reviewed & adjusted, as indicated.  Additional history:     See under ROS    Patient Active Problem List   Diagnosis     Hypothyroidism     Tachycardia     Glaucoma     Prediabetes     Hyperlipidemia LDL goal <130     Palpitations     BMI 30.0-30.9,adult     Health Care Home     Advanced directives, counseling/discussion     Hypertension goal BP (blood pressure) < 140/90     Adjustment disorder with depressed mood     Osteoporosis     Mild dilation of ascending aorta - borderline     RBBB     First degree AV block     Venous insufficiency     Varicose veins       Current Outpatient Prescriptions   Medication Sig Dispense Refill     albuterol (PROAIR HFA, PROVENTIL HFA, VENTOLIN HFA) 108 (90 BASE) MCG/ACT inhaler Inhale 2 puffs into the lungs every 6 hours as needed for shortness of breath / dyspnea or wheezing 1 Inhaler 0     alendronate (FOSAMAX) 70 MG tablet Take 1 tablet (70 mg) by mouth every 7 days Due for DEXA scan. Please call to schedule. 12 tablet 0     amitriptyline (ELAVIL) 100 MG tablet TAKE 1 TABLET(100 MG) BY MOUTH AT BEDTIME 90 tablet 3     amLODIPine (NORVASC) 2.5 MG tablet Take 1 tablet (2.5 mg) by mouth daily 90 tablet 2     cholecalciferol (VITAMIN D) 1000 UNIT tablet Take 1 tablet (1,000 Units) by mouth daily 100  tablet 3     Coenzyme Q10 (CO Q10) 200 MG CAPS Take 200 mg by mouth daily 100 capsule prn     COSOPT PF 22.3-6.8 MG/ML SOLN   3     fluticasone (FLONASE) 50 MCG/ACT spray Spray 1-2 sprays into both nostrils daily 1 Bottle 3     levothyroxine (SYNTHROID/LEVOTHROID) 88 MCG tablet Take 1 tablet (88 mcg) by mouth daily 90 tablet 3     lisinopril-hydrochlorothiazide (PRINZIDE/ZESTORETIC) 20-12.5 MG per tablet TAKE ONE TABLET BY MOUTH ONCE DAILY 90 tablet 1     loratadine (CLARITIN) 10 MG tablet Take 1 tablet (10 mg) by mouth daily       metoprolol (TOPROL-XL) 25 MG 24 hr tablet Take 1.5 tablets (37.5 mg) by mouth daily 135 tablet 3     pravastatin (PRAVACHOL) 40 MG tablet TAKE 1 TABLET(40 MG) BY MOUTH DAILY 90 tablet 0     ranitidine (ZANTAC) 150 MG tablet Take 1 tablet (150 mg) by mouth 2 times daily 60 tablet 3     ZIOPTAN 0.0015 % SOLN          Reviewed and updated as needed this visit by clinical staff  Tobacco  Allergies  Meds  Med Hx  Surg Hx  Fam Hx  Soc Hx      Reviewed and updated as needed this visit by Provider         ROS:  CONSTITUTIONAL:NEGATIVE for fever, chills, change in weight  RESP:NEGATIVE for significant cough or SOB  CV: NEGATIVE for chest pain, palpitations  MUSCULOSKELETAL: some swelling.  PSYCHIATRIC: NEGATIVE for changes in mood or affect    Was having some muscle pain right arm.   Occasionally legs will ache.  Mild. Does go to the gym.  Pain is intermittent. Not present now.    Did have ECHO done.  Notes does not need another for a couple years.   There was no change; by her report.  She notes this was through Dr. Garcia; when she followed up on her venous disease.     Weight increase. Some swelling in left ankle.  Notes vein doctor noted shoveling was not good for her veins.    Notes has white coat HTN.  Notes at Dr. ANGLE's office, was at 120.    Notes decreased to 1 from 1.5 of the toprol; due to being bothered. Was light headed; willing to try again at 1.5.    OBJECTIVE:     /88  "(BP Location: Right arm, Patient Position: Chair, Cuff Size: Adult Regular)  Pulse 90  Temp 97.9  F (36.6  C) (Oral)  Resp 18  Ht 5' 7\" (1.702 m)  Wt 209 lb 12.8 oz (95.2 kg)  SpO2 97%  BMI 32.86 kg/m2  Body mass index is 32.86 kg/(m^2).     /88 upon repeat.    GENERAL APPEARANCE: alert and no distress  RESP: lungs clear to auscultation - no rales, rhonchi or wheezes  CV: regular rates and rhythm  MS: trace ankle edema on left; is wearing compression stockings.  PSYCH: mentation appears normal and affect normal/bright    Recent Labs   Lab Test  09/28/17   0938  09/30/16   1051  08/11/15   1224  08/20/14   0958   CHOL  183  176  176  201*   HDL  83  65  79  88   LDL  76  84  70  79   TRIG  122  136  133  169*   CHOLHDLRATIO   --    --   2.2  2.3       Lab Results   Component Value Date    A1C 5.9 09/28/2017    A1C 6.1 11/14/2014    A1C 6.1 07/03/2013       TSH   Date Value Ref Range Status   09/28/2017 3.14 0.40 - 4.00 mU/L Final   ]    Reviewed her DXA.    From recent Vascular note:          ASSESSMENT/PLAN:         Hyperlipidemia LDL goal <130  We did discuss.   Doubt the pain is related to this. She is OK continuing statin.  - Lipid panel reflex to direct LDL Fasting; Future  - Comprehensive metabolic panel; Future    Hypertension goal BP (blood pressure) < 140/90  This is still elevated. She reports white coat hypertension.  Discussed monitoring at the pharmacy or other.  She is willing to increase metoprolol again as above. Recommend to recheck her bp.  - Comprehensive metabolic panel; Future  - Albumin Random Urine Quantitative with Creat Ratio; Future    Mild dilation of ascending aorta - borderline (H)  She reports things have not changed. She reports recommended to recheck in a couple years.   Will request records.     Osteoporosis, unspecified osteoporosis type, unspecified pathological fracture presence  Recent DXA. Reviewed with her. Very equivocal possible fracture.   Will continue " alendronate; her worksheet had not had this on there.    Venous insufficiency  Stable. She does see specialist.    Prediabetes    - Comprehensive metabolic panel; Future  - **A1C FUTURE anytime; Future    Hypothyroidism, unspecified type  Clinically euthyroid.  - **TSH with free T4 reflex FUTURE anytime; Future    Pain of right upper extremity          Laura Tipton MD, MD  [facility]

## 2018-05-09 ENCOUNTER — TRANSFERRED RECORDS (OUTPATIENT)
Dept: HEALTH INFORMATION MANAGEMENT | Facility: CLINIC | Age: 73
End: 2018-05-09

## 2018-05-09 DIAGNOSIS — M81.0 OSTEOPOROSIS: ICD-10-CM

## 2018-05-09 NOTE — TELEPHONE ENCOUNTER
Spoke to [facility]. ECHO and any further notes to be faxed soon.  Feli ARRIOLA M.A.

## 2018-05-09 NOTE — TELEPHONE ENCOUNTER
"Requested Prescriptions   Pending Prescriptions Disp Refills     alendronate (FOSAMAX) 70 MG tablet [Pharmacy Med Name: ALENDRONATE 70MG TABLETS]    Last Written Prescription Date:  1/4/2018  Last Fill Quantity: 12,  # refills: 0   Last office visit: 5/8/2018 with prescribing provider:  Laura Tipton     Future Office Visit:     12 tablet 0     Sig: TAKE 1 TABLET(70 MG) BY MOUTH EVERY 7 DAYS    Bisphosphonates Passed    5/9/2018 12:55 PM       Passed - Recent (12 mo) or future (30 days) visit within the authorizing provider's specialty    Patient had office visit in the last 12 months or has a visit in the next 30 days with authorizing provider or within the authorizing provider's specialty.  See \"Patient Info\" tab in inbasket, or \"Choose Columns\" in Meds & Orders section of the refill encounter.           Passed - Dexa on file within past 2 years    Please review last Dexa result.          Passed - Patient is age 18 or older       Passed - Normal serum creatinine on file within past 12 months    Recent Labs   Lab Test  09/28/17   0938   CR  0.69                 "

## 2018-05-10 RX ORDER — ALENDRONATE SODIUM 70 MG/1
TABLET ORAL
Qty: 12 TABLET | Refills: 0 | Status: SHIPPED | OUTPATIENT
Start: 2018-05-10 | End: 2018-08-05

## 2018-05-10 NOTE — TELEPHONE ENCOUNTER
Prescription approved per [facility] Refill Protocol.    Vandana Tran RN -- [facility]

## 2018-05-11 ENCOUNTER — TELEPHONE (OUTPATIENT)
Dept: FAMILY MEDICINE | Facility: CLINIC | Age: 73
End: 2018-05-11

## 2018-05-11 NOTE — TELEPHONE ENCOUNTER
Returned call and discussed.    He will be doing her cardiac care.     Does recommend ECHO in 2 years; primarily to follow the hypertensive changes.

## 2018-05-11 NOTE — TELEPHONE ENCOUNTER
Please call the dr regarding the patient the best time to call him is around 12-1pm today but is free if you call ask to have him pulled from a room between 9am and 1pm

## 2018-05-17 DIAGNOSIS — E03.9 HYPOTHYROIDISM, UNSPECIFIED TYPE: ICD-10-CM

## 2018-05-17 NOTE — TELEPHONE ENCOUNTER
"Requested Prescriptions   Pending Prescriptions Disp Refills     levothyroxine (SYNTHROID/LEVOTHROID) 88 MCG tablet [Pharmacy Med Name: LEVOTHYROXINE 0.088MG (88MCG) TAB]  Last Written Prescription Date:  4/13/17  Last Fill Quantity: 90,  # refills: 3   Last office visit: 5/8/2018 with prescribing provider:  5/8/2018     Future Office Visit:     90 tablet 0     Sig: TAKE 1 TABLET(88 MCG) BY MOUTH DAILY    Thyroid Protocol Passed    5/17/2018 10:38 AM       Passed - Patient is 12 years or older       Passed - Recent (12 mo) or future (30 days) visit within the authorizing provider's specialty    Patient had office visit in the last 12 months or has a visit in the next 30 days with authorizing provider or within the authorizing provider's specialty.  See \"Patient Info\" tab in inbasket, or \"Choose Columns\" in Meds & Orders section of the refill encounter.           Passed - Normal TSH on file in past 12 months    Recent Labs   Lab Test  09/28/17   0938   TSH  3.14             Passed - No active pregnancy on record    If patient is pregnant or has had a positive pregnancy test, please check TSH.         Passed - No positive pregnancy test in past 12 months    If patient is pregnant or has had a positive pregnancy test, please check TSH.          "

## 2018-05-18 DIAGNOSIS — I10 HYPERTENSION GOAL BP (BLOOD PRESSURE) < 140/90: ICD-10-CM

## 2018-05-20 NOTE — TELEPHONE ENCOUNTER
"Requested Prescriptions   Pending Prescriptions Disp Refills     lisinopril-hydrochlorothiazide (PRINZIDE/ZESTORETIC) 20-12.5 MG per tablet [Pharmacy Med Name: LISINOPRIL-HCTZ 20/12.5MG TABLETS]  Last Written Prescription Date:  11/21/2017  Last Fill Quantity: 90 tablet,  # refills: 1   Last office visit: 5/8/2018 with prescribing provider:  Laura Tipton   Future Office Visit:     90 tablet 0     Sig: TAKE ONE TABLET BY MOUTH ONCE DAILY    Diuretics (Including Combos) Protocol Failed    5/18/2018  5:14 PM       Failed - Blood pressure under 140/90 in past 12 months    BP Readings from Last 3 Encounters:   05/08/18 142/88   01/23/18 138/80   10/13/17 132/78          Passed - Recent (12 mo) or future (30 days) visit within the authorizing provider's specialty    Patient had office visit in the last 12 months or has a visit in the next 30 days with authorizing provider or within the authorizing provider's specialty.  See \"Patient Info\" tab in inbasket, or \"Choose Columns\" in Meds & Orders section of the refill encounter.           Passed - Patient is age 18 or older       Passed - No active pregancy on record       Passed - Normal serum creatinine on file in past 12 months    Recent Labs   Lab Test  09/28/17   0938   CR  0.69          Passed - Normal serum potassium on file in past 12 months    Recent Labs   Lab Test  09/28/17   0938   POTASSIUM  4.4          Passed - Normal serum sodium on file in past 12 months    Recent Labs   Lab Test  09/28/17   0938   NA  138          Passed - No positive pregnancy test in past 12 months          "

## 2018-05-21 RX ORDER — LISINOPRIL AND HYDROCHLOROTHIAZIDE 12.5; 2 MG/1; MG/1
TABLET ORAL
Qty: 90 TABLET | Refills: 0 | Status: SHIPPED | OUTPATIENT
Start: 2018-05-21 | End: 2018-08-16

## 2018-05-21 RX ORDER — LEVOTHYROXINE SODIUM 88 UG/1
TABLET ORAL
Qty: 90 TABLET | Refills: 0 | Status: SHIPPED | OUTPATIENT
Start: 2018-05-21 | End: 2018-08-16

## 2018-05-21 NOTE — TELEPHONE ENCOUNTER
She has increased her metoprolol.  Should recheck bp in the next few weeks.   Did a 90 day refill....

## 2018-05-21 NOTE — TELEPHONE ENCOUNTER
Routing refill request to provider for review/approval because:  BP Readings from Last 3 Encounters:   05/08/18 142/88   01/23/18 138/80   10/13/17 132/78

## 2018-06-12 ENCOUNTER — ALLIED HEALTH/NURSE VISIT (OUTPATIENT)
Dept: CARDIOLOGY | Facility: CLINIC | Age: 73
End: 2018-06-12
Payer: MEDICARE

## 2018-06-12 DIAGNOSIS — Z45.09 ENCOUNTER FOR LOOP RECORDER CHECK: Primary | ICD-10-CM

## 2018-06-12 DIAGNOSIS — R55 SYNCOPE, UNSPECIFIED SYNCOPE TYPE: ICD-10-CM

## 2018-06-12 PROCEDURE — 93297 REM INTERROG DEV EVAL ICPMS: CPT | Performed by: INTERNAL MEDICINE

## 2018-06-12 PROCEDURE — 93299 C ICM/ILR REMOTE TECH SERV: CPT | Performed by: INTERNAL MEDICINE

## 2018-06-12 NOTE — PROGRESS NOTES
Medtronic Reveal Linq Loop Recorder   Symptom: 0 Tachy: 0 Pause: 0 Enrique: 0  AT: 0 AF: 0  Time in AT/AF: 0 %  Heart rate: stable with good variability. Baseline rate around  BPM Presenting strip VS at 94 BPM Battery: OK  Careplan: Next remote in 3 months. CHRIS Sanchez  .

## 2018-06-12 NOTE — LETTER
Teri Beltran    [address] [facility] [address]    June 12, 2018      Dear Teri Beltran,     Your transmission sent on 6- has been reviewed. It was determined the results are normal. No events were detected.   _____ Your next scheduled date for you to send a transmission is on 10-.   _____ You are due for an in office appointment, please call [phone] to arrange.   Please call Luis at [phone] to change your appointment if needed. You may call and leave a message for a device RN if you have any questions at [phone] and they will return your call. Device clinic hours: Monday - Friday  8:00am-4:00pm   Sincerely,   Latonya Rojas RN

## 2018-06-12 NOTE — MR AVS SNAPSHOT
After Visit Summary   6/12/2018    Teri Beltran    MRN: [id number]           Patient Information     Date Of Birth          1945        Visit Information        Provider Department      6/12/2018 4:30 PM ROCHA DCR2 [facility]        Today's Diagnoses     Encounter for loop recorder check    -  1    Syncope, unspecified syncope type           Follow-ups after your visit        Your next 10 appointments already scheduled     Jun 12, 2018  4:30 PM CDT   Remote ICD Check with ROCHA DCR2   [facility] ([facility])    [id number]  [facility] [id number] [phone] OPT 2           This appointment is for a remote check of your debrillator.  This is not an appointment at the office.            Oct 08, 2018  4:30 PM CDT   Remote ICD Check with ROCHA DCR2   [facility] ([facility])    [id number]  [facility] [address] [phone] OPT 2           This appointment is for a remote check of your debrillator.  This is not an appointment at the office.              Who to contact     If you have questions or need follow up information about today's clinic visit or your schedule please contact [facility] directly at [phone].  Normal or non-critical lab and imaging results will be communicated to you by MyChart, letter or phone within 4 business days after the clinic has received the results. If you do not hear from us within 7 days, please contact the clinic through MyChart or phone. If you have a critical or abnormal lab result, we will notify you by phone as soon as possible.  Submit refill requests through [employer] or call your pharmacy and they will forward the refill request to us. Please allow 3 business days for your refill to be completed.          Additional Information About Your Visit       "  MyChart Information     [employer] lets you send messages to your doctor, view your test results, renew your prescriptions, schedule appointments and more. To sign up, go to [email or web address] . Click on \"Log in\" on the left side of the screen, which will take you to the Welcome page. Then click on \"Sign up Now\" on the right side of the page.     You will be asked to enter the access code listed below, as well as some personal information. Please follow the directions to create your username and password.     Your access code is: [email or web address]  Expires: 9/10/2018  3:29 PM     Your access code will  in 90 days. If you need help or a new code, please call your [facility] clinic or [phone].        Care EveryWhere ID     This is your Care EveryWhere ID. This could be used by other organizations to access your [facility] medical records  [id number]         Blood Pressure from Last 3 Encounters:   18 142/88   18 138/80   10/13/17 132/78    Weight from Last 3 Encounters:   18 95.2 kg (209 lb 12.8 oz)   18 91.2 kg (201 lb)   10/13/17 92 kg (202 lb 14.4 oz)              We Performed the Following     C ICM DEVICE INTERROGAT REMOTE (84214)     ILR REMOTE TECH SERV (18128)        Primary Care Provider Office Phone # Fax #    Laura Tipton [phone]       [address] [place] [address]        Equal Access to Services     [facility]: Hadii aad ku hadasho Soomaali, waaxda luqadaha, qaybta kaalmada marilee, goldy mix . So [facility] [phone].    ATENCIÓN: Si habla español, tiene a tracy disposición servicios gratuitos de asistencia lingüística. Llame al [phone].    We comply with applicable federal civil rights laws and [place] laws. We do not discriminate on the basis of race, color, national origin, age, disability, sex, sexual orientation, or gender identity.            Thank you!     Thank you for choosing [facility]" [facility]  for your care. Our goal is always to provide you with excellent care. Hearing back from our patients is one way we can continue to improve our services. Please take a few minutes to complete the written survey that you may receive in the mail after your visit with us. Thank you!             Your Updated Medication List - Protect others around you: Learn how to safely use, store and throw away your medicines at www.disposemymeds.org.          This list is accurate as of 6/12/18  3:29 PM.  Always use your most recent med list.                   Brand Name Dispense Instructions for use Diagnosis    albuterol 108 (90 Base) MCG/ACT Inhaler    PROAIR HFA/PROVENTIL HFA/VENTOLIN HFA    1 Inhaler    Inhale 2 puffs into the lungs every 6 hours as needed for shortness of breath / dyspnea or wheezing    Seasonal allergies, SOB (shortness of breath)       alendronate 70 MG tablet    FOSAMAX    12 tablet    TAKE 1 TABLET(70 MG) BY MOUTH EVERY 7 DAYS    Osteoporosis       amitriptyline 100 MG tablet    ELAVIL    90 tablet    TAKE 1 TABLET(100 MG) BY MOUTH AT BEDTIME    Insomnia       amLODIPine 2.5 MG tablet    NORVASC    90 tablet    Take 1 tablet (2.5 mg) by mouth daily    Essential hypertension with goal blood pressure less than 140/90       cholecalciferol 1000 UNIT tablet    vitamin D3    100 tablet    Take 1 tablet (1,000 Units) by mouth daily        CLARITIN 10 MG tablet   Generic drug:  loratadine      Take 1 tablet (10 mg) by mouth daily    Seasonal allergic rhinitis       Co Q10 200 MG Caps     100 capsule    Take 200 mg by mouth daily        COSOPT PF 22.3-6.8 MG/ML opthalmic solution   Generic drug:  dorzolamide-timolol PF           fluticasone 50 MCG/ACT spray    FLONASE    1 Bottle    Spray 1-2 sprays into both nostrils daily    Congestion of paranasal sinus, Acute sinusitis with symptoms > 10 days       levothyroxine 88 MCG tablet    SYNTHROID/LEVOTHROID    90 tablet    TAKE 1  TABLET(88 MCG) BY MOUTH DAILY    Hypothyroidism, unspecified type       lisinopril-hydrochlorothiazide 20-12.5 MG per tablet    PRINZIDE/ZESTORETIC    90 tablet    TAKE ONE TABLET BY MOUTH ONCE DAILY    Hypertension goal BP (blood pressure) < 140/90       metoprolol succinate 25 MG 24 hr tablet    TOPROL-XL    135 tablet    Take 1.5 tablets (37.5 mg) by mouth daily    Hypertension goal BP (blood pressure) < 140/90, Tachycardia       pravastatin 40 MG tablet    PRAVACHOL    90 tablet    TAKE 1 TABLET(40 MG) BY MOUTH DAILY    Hyperlipidemia LDL goal <130       ranitidine 150 MG tablet    ZANTAC    60 tablet    Take 1 tablet (150 mg) by mouth 2 times daily    Midepigastric pain       ZIOPTAN 0.0015 % Soln   Generic drug:  Tafluprost

## 2018-07-25 DIAGNOSIS — E78.5 HYPERLIPIDEMIA LDL GOAL <130: ICD-10-CM

## 2018-07-27 RX ORDER — PRAVASTATIN SODIUM 40 MG
TABLET ORAL
Qty: 90 TABLET | Refills: 0 | Status: SHIPPED | OUTPATIENT
Start: 2018-07-27 | End: 2018-10-12

## 2018-07-27 NOTE — TELEPHONE ENCOUNTER
"Requested Prescriptions   Pending Prescriptions Disp Refills     pravastatin (PRAVACHOL) 40 MG tablet [Pharmacy Med Name: PRAVASTATIN 40MG TABLETS] 90 tablet 0    Last Written Prescription Date:  4/24/18  Last Fill Quantity: 90,  # refills: 0   Last Office Visit: 5/8/2018 Brandy Tipton   Future Office Visit:      Sig: TAKE 1 TABLET(40 MG) BY MOUTH DAILY    Statins Protocol Passed    7/25/2018  3:16 AM       Passed - LDL on file in past 12 months    Recent Labs   Lab Test  09/28/17   0938   LDL  76            Passed - No abnormal creatine kinase in past 12 months    No lab results found.            Passed - Recent (12 mo) or future (30 days) visit within the authorizing provider's specialty    Patient had office visit in the last 12 months or has a visit in the next 30 days with authorizing provider or within the authorizing provider's specialty.  See \"Patient Info\" tab in inbasket, or \"Choose Columns\" in Meds & Orders section of the refill encounter.           Passed - Patient is age 18 or older       Passed - No active pregnancy on record       Passed - No positive pregnancy test in past 12 months          "

## 2018-07-27 NOTE — TELEPHONE ENCOUNTER
Prescription approved per [facility] Refill Protocol.  Nya Duarte RN

## 2018-08-05 DIAGNOSIS — M81.0 OSTEOPOROSIS, UNSPECIFIED OSTEOPOROSIS TYPE, UNSPECIFIED PATHOLOGICAL FRACTURE PRESENCE: Primary | ICD-10-CM

## 2018-08-05 NOTE — TELEPHONE ENCOUNTER
"Requested Prescriptions   Pending Prescriptions Disp Refills     alendronate (FOSAMAX) 70 MG tablet [Pharmacy Med Name: ALENDRONATE 70MG TABLETS]  Last Written Prescription Date:  05/10/2018  Last Fill Quantity: 12 tablet,  # refills: 0   Last office visit: 5/8/2018 with prescribing provider:  Laura Tipton MD    Future Office Visit:     12 tablet 0     Sig: TAKE 1 TABLET(70 MG) BY MOUTH EVERY 7 DAYS    Bisphosphonates Passed    8/5/2018  2:33 PM       Passed - Recent (12 mo) or future (30 days) visit within the authorizing provider's specialty    Patient had office visit in the last 12 months or has a visit in the next 30 days with authorizing provider or within the authorizing provider's specialty.  See \"Patient Info\" tab in inbasket, or \"Choose Columns\" in Meds & Orders section of the refill encounter.           Passed - Dexa on file within past 2 years    Please review last Dexa result.          Passed - Patient is age 18 or older       Passed - Normal serum creatinine on file within past 12 months    Recent Labs   Lab Test  09/28/17   0938   CR  0.69               "

## 2018-08-08 NOTE — TELEPHONE ENCOUNTER
Routing refill request to provider for review/approval because:  Last dexa notes indicate adequate Nigel and Vitamin D.  Please advise if ok to continue to prescribe.  Crystal Tirado RN - Triage  [facility]      Last DX results: 2/01/2018  IMPRESSION  Osteopenia (low bone mass)  Degenerative changes of the spine  Recommendations include ensuring adequate daily Calcium and Vitamin D intake  Follow up scan can be considered in three years.

## 2018-08-09 RX ORDER — ALENDRONATE SODIUM 70 MG/1
TABLET ORAL
Qty: 12 TABLET | Refills: 3 | Status: SHIPPED | OUTPATIENT
Start: 2018-08-09 | End: 2019-08-02

## 2018-08-15 ENCOUNTER — ALLIED HEALTH/NURSE VISIT (OUTPATIENT)
Dept: FAMILY MEDICINE | Facility: CLINIC | Age: 73
End: 2018-08-15
Payer: MEDICARE

## 2018-08-15 VITALS — SYSTOLIC BLOOD PRESSURE: 124 MMHG | DIASTOLIC BLOOD PRESSURE: 78 MMHG

## 2018-08-15 DIAGNOSIS — Z01.30 BP CHECK: Primary | ICD-10-CM

## 2018-08-15 PROCEDURE — 99207 ZZC NO CHARGE NURSE ONLY: CPT | Performed by: FAMILY MEDICINE

## 2018-08-15 NOTE — MR AVS SNAPSHOT
After Visit Summary   8/15/2018    Teri Beltran    MRN: [id number]           Patient Information     Date Of Birth          1945        Visit Information        Provider Department      8/15/2018 9:33 AM Laura Tipton MD [facility] [place]        Today's Diagnoses     BP check    -  1       Follow-ups after your visit        Your next 10 appointments already scheduled     Sep 14, 2018  1:10 PM CDT   [facility] EP RETURN with Domenica Dave PA-C   [facility] ([facility])    [address]  [facility] [address]   [phone] OPT 2            Oct 08, 2018  4:30 PM CDT   Remote ICD Check with ROCHA DCR2   [facility] ([facility])    [address]  [facility] [address] [phone] OPT 2           This appointment is for a remote check of your debrillator.  This is not an appointment at the office.              Who to contact     If you have questions or need follow up information about today's clinic visit or your schedule please contact [facility] directly at [phone].  Normal or non-critical lab and imaging results will be communicated to you by MyChart, letter or phone within 4 business days after the clinic has received the results. If you do not hear from us within 7 days, please contact the clinic through MyChart or phone. If you have a critical or abnormal lab result, we will notify you by phone as soon as possible.  Submit refill requests through [employer] or call your pharmacy and they will forward the refill request to us. Please allow 3 business days for your refill to be completed.          Additional Information About Your Visit        MyChart Information     [employer] lets you send messages to your doctor, view your test results, renew your prescriptions, schedule appointments and more. To sign up, go to  [email or web address] . Click on \"Log in\" on the left side of the screen, which will take you to the Welcome page. Then click on \"Sign up Now\" on the right side of the page.     You will be asked to enter the access code listed below, as well as some personal information. Please follow the directions to create your username and password.     Your access code is: [email or web address]  Expires: 9/10/2018  3:29 PM     Your access code will  in 90 days. If you need help or a new code, please call your [facility] clinic or [phone].        Care EveryWhere ID     This is your Care EveryWhere ID. This could be used by other organizations to access your [facility] medical records  [id number]         Blood Pressure from Last 3 Encounters:   18 124/78   18 142/88   18 138/80    Weight from Last 3 Encounters:   18 209 lb 12.8 oz (95.2 kg)   18 201 lb (91.2 kg)   10/13/17 202 lb 14.4 oz (92 kg)              Today, you had the following     No orders found for display       Primary Care Provider Office Phone # Fax #    Laura Tipton [phone] [address] [facility] [address]        Equal Access to Services     [facility] : Hadii sera ku hadasho Soomaali, waaxda luqadaha, qaybta kaalmada darenda, goldy mix . So [facility] [phone].    ATENCIÓN: Si habla español, tiene a tracy disposición servicios gratuitos de asistencia lingüística. Anton al [phone].    We comply with applicable federal civil rights laws and [place] laws. We do not discriminate on the basis of race, color, national origin, age, disability, sex, sexual orientation, or gender identity.            Thank you!     Thank you for choosing [facility]  for your care. Our goal is always to provide you with excellent care. Hearing back from our patients is one way we can continue to improve our services. Please take a few minutes to complete the written survey " that you may receive in the mail after your visit with us. Thank you!             Your Updated Medication List - Protect others around you: Learn how to safely use, store and throw away your medicines at www.disposemymeds.org.          This list is accurate as of 8/15/18  9:35 AM.  Always use your most recent med list.                   Brand Name Dispense Instructions for use Diagnosis    albuterol 108 (90 Base) MCG/ACT inhaler    PROAIR HFA/PROVENTIL HFA/VENTOLIN HFA    1 Inhaler    Inhale 2 puffs into the lungs every 6 hours as needed for shortness of breath / dyspnea or wheezing    Seasonal allergies, SOB (shortness of breath)       alendronate 70 MG tablet    FOSAMAX    12 tablet    TAKE 1 TABLET(70 MG) BY MOUTH EVERY 7 DAYS    Osteoporosis, unspecified osteoporosis type, unspecified pathological fracture presence       amitriptyline 100 MG tablet    ELAVIL    90 tablet    TAKE 1 TABLET(100 MG) BY MOUTH AT BEDTIME    Insomnia       amLODIPine 2.5 MG tablet    NORVASC    90 tablet    Take 1 tablet (2.5 mg) by mouth daily    Essential hypertension with goal blood pressure less than 140/90       cholecalciferol 1000 UNIT tablet    vitamin D3    100 tablet    Take 1 tablet (1,000 Units) by mouth daily        CLARITIN 10 MG tablet   Generic drug:  loratadine      Take 1 tablet (10 mg) by mouth daily    Seasonal allergic rhinitis       Co Q10 200 MG Caps     100 capsule    Take 200 mg by mouth daily        COSOPT PF 22.3-6.8 MG/ML opthalmic solution   Generic drug:  dorzolamide-timolol PF           fluticasone 50 MCG/ACT spray    FLONASE    1 Bottle    Spray 1-2 sprays into both nostrils daily    Congestion of paranasal sinus, Acute sinusitis with symptoms > 10 days       levothyroxine 88 MCG tablet    SYNTHROID/LEVOTHROID    90 tablet    TAKE 1 TABLET(88 MCG) BY MOUTH DAILY    Hypothyroidism, unspecified type       lisinopril-hydrochlorothiazide 20-12.5 MG per tablet    PRINZIDE/ZESTORETIC    90 tablet    TAKE ONE  TABLET BY MOUTH ONCE DAILY    Hypertension goal BP (blood pressure) < 140/90       metoprolol succinate 25 MG 24 hr tablet    TOPROL-XL    135 tablet    Take 1.5 tablets (37.5 mg) by mouth daily    Hypertension goal BP (blood pressure) < 140/90, Tachycardia       pravastatin 40 MG tablet    PRAVACHOL    90 tablet    TAKE 1 TABLET(40 MG) BY MOUTH DAILY    Hyperlipidemia LDL goal <130       ranitidine 150 MG tablet    ZANTAC    60 tablet    TAKE 1 TABLET(150 MG) BY MOUTH TWICE DAILY    Midepigastric pain       ZIOPTAN 0.0015 % Soln   Generic drug:  Tafluprost

## 2018-08-15 NOTE — PROGRESS NOTES
Teri Beltran is enrolled/participating in the retail pharmacy Blood Pressure Goals Achievement Program (BPGAP).  Teri Beltran was evaluated at [facility] on August 15, 2018 at which time her blood pressure was:    BP Readings from Last 3 Encounters:   08/14/18 124/78   05/08/18 142/88   01/23/18 138/80     Reviewed lifestyle modifications for blood pressure control and reduction: including making healthy food choices, managing weight, getting regular exercise, smoking cessation, reducing alcohol consumption, monitoring blood pressure regularly.     Teri Beltran is not experiencing symptoms.    Follow-Up: BP is at goal of < 140/90mmHg (per provider blood pressure goal).  Recommended follow-up at pharmacy in 6 months.     Recommendation to Provider: none    Teri Beltran was evaluated for enrollment into the PGEN study today.    Patient eligible for enrollment:  No  Patient interested in enrollment:  No    Completed by: Kavin Duenas    Thank You   Sapna Montiel  [facility]

## 2018-08-16 DIAGNOSIS — E03.9 HYPOTHYROIDISM, UNSPECIFIED TYPE: ICD-10-CM

## 2018-08-16 DIAGNOSIS — I10 HYPERTENSION GOAL BP (BLOOD PRESSURE) < 140/90: ICD-10-CM

## 2018-08-16 NOTE — TELEPHONE ENCOUNTER
"Requested Prescriptions   Pending Prescriptions Disp Refills     levothyroxine (SYNTHROID/LEVOTHROID) 88 MCG tablet [Pharmacy Med Name: LEVOTHYROXINE 0.088MG (88MCG) TAB]  Last Written Prescription Date:  05/21/2018  Last Fill Quantity: 90,  # refills: 0   Last office visit: 5/8/2018 with prescribing provider:  LE   Future Office Visit:     90 tablet 0     Sig: TAKE 1 TABLET(88 MCG) BY MOUTH DAILY    Thyroid Protocol Passed    8/16/2018  5:13 PM       Passed - Patient is 12 years or older       Passed - Recent (12 mo) or future (30 days) visit within the authorizing provider's specialty    Patient had office visit in the last 12 months or has a visit in the next 30 days with authorizing provider or within the authorizing provider's specialty.  See \"Patient Info\" tab in inbasket, or \"Choose Columns\" in Meds & Orders section of the refill encounter.           Passed - Normal TSH on file in past 12 months    Recent Labs   Lab Test  09/28/17   0938   TSH  3.14             Passed - No active pregnancy on record    If patient is pregnant or has had a positive pregnancy test, please check TSH.         Passed - No positive pregnancy test in past 12 months    If patient is pregnant or has had a positive pregnancy test, please check TSH.          lisinopril-hydrochlorothiazide (PRINZIDE/ZESTORETIC) 20-12.5 MG per tablet [Pharmacy Med Name: LISINOPRIL-HCTZ 20/12.5MG TABLETS]  Last Written Prescription Date:  05/21/2018  Last Fill Quantity: 90,  # refills: 0   Last office visit: 5/8/2018 with prescribing provider:  LE   Future Office Visit:     90 tablet 0     Sig: TAKE ONE TABLET BY MOUTH ONCE DAILY    Diuretics (Including Combos) Protocol Passed    8/16/2018  5:13 PM       Passed - Blood pressure under 140/90 in past 12 months    BP Readings from Last 3 Encounters:   08/14/18 124/78   05/08/18 142/88   01/23/18 138/80                Passed - Recent (12 mo) or future (30 days) visit within the authorizing provider's " "specialty    Patient had office visit in the last 12 months or has a visit in the next 30 days with authorizing provider or within the authorizing provider's specialty.  See \"Patient Info\" tab in inbasket, or \"Choose Columns\" in Meds & Orders section of the refill encounter.           Passed - Patient is age 18 or older       Passed - No active pregancy on record       Passed - Normal serum creatinine on file in past 12 months    Recent Labs   Lab Test  09/28/17   0938   CR  0.69             Passed - Normal serum potassium on file in past 12 months    Recent Labs   Lab Test  09/28/17   0938   POTASSIUM  4.4                   Passed - Normal serum sodium on file in past 12 months    Recent Labs   Lab Test  09/28/17   0938   NA  138             Passed - No positive pregnancy test in past 12 months          "

## 2018-08-20 RX ORDER — LISINOPRIL AND HYDROCHLOROTHIAZIDE 12.5; 2 MG/1; MG/1
TABLET ORAL
Qty: 30 TABLET | Refills: 0 | Status: SHIPPED | OUTPATIENT
Start: 2018-08-20 | End: 2018-09-17

## 2018-08-20 RX ORDER — LEVOTHYROXINE SODIUM 88 UG/1
TABLET ORAL
Qty: 30 TABLET | Refills: 0 | Status: SHIPPED | OUTPATIENT
Start: 2018-08-20 | End: 2018-09-19

## 2018-08-27 ENCOUNTER — ALLIED HEALTH/NURSE VISIT (OUTPATIENT)
Dept: FAMILY MEDICINE | Facility: CLINIC | Age: 73
End: 2018-08-27
Payer: MEDICARE

## 2018-08-27 VITALS — SYSTOLIC BLOOD PRESSURE: 132 MMHG | DIASTOLIC BLOOD PRESSURE: 76 MMHG

## 2018-08-27 DIAGNOSIS — Z01.30 BP CHECK: Primary | ICD-10-CM

## 2018-08-27 PROCEDURE — 99207 ZZC NO CHARGE NURSE ONLY: CPT | Performed by: FAMILY MEDICINE

## 2018-08-27 NOTE — PROGRESS NOTES
Teri Beltran is enrolled/participating in the retail pharmacy Blood Pressure Goals Achievement Program (BPGAP).  Teri Beltran was evaluated at [facility] on August 27, 2018 at which time her blood pressure was:    BP Readings from Last 3 Encounters:   08/27/18 132/76   08/14/18 124/78   05/08/18 142/88     Reviewed lifestyle modifications for blood pressure control and reduction: including making healthy food choices, managing weight, getting regular exercise, smoking cessation, reducing alcohol consumption, monitoring blood pressure regularly.     Teri Beltran is not experiencing symptoms.    Follow-Up: BP is at goal of < 140/90mmHg (patient 18+ years of age with or without diabetes).  Recommended follow-up at pharmacy in 6 months.     Recommendation to Provider: none    Teri Beltran was evaluated for enrollment into the PGEN study today.    Patient eligible for enrollment:  No  Patient interested in enrollment:  No    Completed by: Kavin Duenas    Thank You   Kavin Duenas RPh [facility]

## 2018-08-27 NOTE — MR AVS SNAPSHOT
After Visit Summary   8/27/2018    Teri Beltran    MRN: [id number]           Patient Information     Date Of Birth          1945        Visit Information        Provider Department      8/27/2018 2:47 PM Laura Tipton MD [facility]        Today's Diagnoses     BP check    -  1       Follow-ups after your visit        Your next 10 appointments already scheduled     Sep 14, 2018  1:10 PM CDT   [facility] EP RETURN with Domenica Dave PA-C   [facility] ([facility])    [address]  [place] [address] [phone] OPT 2            Oct 08, 2018  4:30 PM CDT   Remote ICD Check with ROCHA DCR2   [facility] ([facility])    [address]  [place] [address] [phone] OPT 2           This appointment is for a remote check of your debrillator.  This is not an appointment at the office.              Who to contact     If you have questions or need follow up information about today's clinic visit or your schedule please contact [facility] directly at [phone].  Normal or non-critical lab and imaging results will be communicated to you by MyChart, letter or phone within 4 business days after the clinic has received the results. If you do not hear from us within 7 days, please contact the clinic through MyChart or phone. If you have a critical or abnormal lab result, we will notify you by phone as soon as possible.  Submit refill requests through Loogla or call your pharmacy and they will forward the refill request to us. Please allow 3 business days for your refill to be completed.          Additional Information About Your Visit        MyChart Information     Loogla lets you send messages to your doctor, view your test results, renew your prescriptions, schedule appointments and more. To sign up, go to  [email or web address] . Click on \"Log in\" on the left side of the screen, which will take you to the Welcome page. Then click on \"Sign up Now\" on the right side of the page.     You will be asked to enter the access code listed below, as well as some personal information. Please follow the directions to create your username and password.     Your access code is: [email or web address]  Expires: 9/10/2018  3:29 PM     Your access code will  in 90 days. If you need help or a new code, please call your [place] clinic or [phone].        Care EveryWhere ID     This is your Care EveryWhere ID. This could be used by other organizations to access your [place] medical records  [id number]         Blood Pressure from Last 3 Encounters:   18 132/76   18 124/78   18 142/88    Weight from Last 3 Encounters:   18 209 lb 12.8 oz (95.2 kg)   18 201 lb (91.2 kg)   10/13/17 202 lb 14.4 oz (92 kg)              Today, you had the following     No orders found for display       Primary Care Provider Office Phone # Fax #    Laura Tipton [phone] [address] [facility] [address]        Equal Access to Services     [facility] : Hadii sera ku hadasho Soomaali, waaxda luqadaha, qaybta kaalmada alessandroyada, goldy mix . So [facility] [phone].    ATENCIÓN: Si habla español, tiene a tracy disposición servicios gratuitos de asistencia lingüística. Anton al [phone].    We comply with applicable federal civil rights laws and [place] laws. We do not discriminate on the basis of race, color, national origin, age, disability, sex, sexual orientation, or gender identity.            Thank you!     Thank you for choosing [facility]  for your care. Our goal is always to provide you with excellent care. Hearing back from our patients is one way we can continue to improve our services. Please take a few minutes to complete the written survey " that you may receive in the mail after your visit with us. Thank you!             Your Updated Medication List - Protect others around you: Learn how to safely use, store and throw away your medicines at www.disposemymeds.org.          This list is accurate as of 8/27/18  2:50 PM.  Always use your most recent med list.                   Brand Name Dispense Instructions for use Diagnosis    albuterol 108 (90 Base) MCG/ACT inhaler    PROAIR HFA/PROVENTIL HFA/VENTOLIN HFA    1 Inhaler    Inhale 2 puffs into the lungs every 6 hours as needed for shortness of breath / dyspnea or wheezing    Seasonal allergies, SOB (shortness of breath)       alendronate 70 MG tablet    FOSAMAX    12 tablet    TAKE 1 TABLET(70 MG) BY MOUTH EVERY 7 DAYS    Osteoporosis, unspecified osteoporosis type, unspecified pathological fracture presence       amitriptyline 100 MG tablet    ELAVIL    90 tablet    TAKE 1 TABLET(100 MG) BY MOUTH AT BEDTIME    Insomnia       amLODIPine 2.5 MG tablet    NORVASC    90 tablet    Take 1 tablet (2.5 mg) by mouth daily    Essential hypertension with goal blood pressure less than 140/90       cholecalciferol 1000 UNIT tablet    vitamin D3    100 tablet    Take 1 tablet (1,000 Units) by mouth daily        CLARITIN 10 MG tablet   Generic drug:  loratadine      Take 1 tablet (10 mg) by mouth daily    Seasonal allergic rhinitis       Co Q10 200 MG Caps     100 capsule    Take 200 mg by mouth daily        COSOPT PF 22.3-6.8 MG/ML opthalmic solution   Generic drug:  dorzolamide-timolol PF           fluticasone 50 MCG/ACT spray    FLONASE    1 Bottle    Spray 1-2 sprays into both nostrils daily    Congestion of paranasal sinus, Acute sinusitis with symptoms > 10 days       levothyroxine 88 MCG tablet    SYNTHROID/LEVOTHROID    30 tablet    TAKE 1 TABLET(88 MCG) BY MOUTH DAILY    Hypothyroidism, unspecified type       lisinopril-hydrochlorothiazide 20-12.5 MG per tablet    PRINZIDE/ZESTORETIC    30 tablet    TAKE ONE  TABLET BY MOUTH ONCE DAILY    Hypertension goal BP (blood pressure) < 140/90       metoprolol succinate 25 MG 24 hr tablet    TOPROL-XL    135 tablet    Take 1.5 tablets (37.5 mg) by mouth daily    Hypertension goal BP (blood pressure) < 140/90, Tachycardia       pravastatin 40 MG tablet    PRAVACHOL    90 tablet    TAKE 1 TABLET(40 MG) BY MOUTH DAILY    Hyperlipidemia LDL goal <130       ranitidine 150 MG tablet    ZANTAC    60 tablet    TAKE 1 TABLET(150 MG) BY MOUTH TWICE DAILY    Midepigastric pain       ZIOPTAN 0.0015 % Soln   Generic drug:  Tafluprost

## 2018-09-06 ENCOUNTER — TRANSFERRED RECORDS (OUTPATIENT)
Dept: HEALTH INFORMATION MANAGEMENT | Facility: CLINIC | Age: 73
End: 2018-09-06

## 2018-09-07 ENCOUNTER — ALLIED HEALTH/NURSE VISIT (OUTPATIENT)
Dept: FAMILY MEDICINE | Facility: CLINIC | Age: 73
End: 2018-09-07
Payer: MEDICARE

## 2018-09-07 VITALS — SYSTOLIC BLOOD PRESSURE: 134 MMHG | DIASTOLIC BLOOD PRESSURE: 84 MMHG

## 2018-09-07 DIAGNOSIS — Z01.30 BP CHECK: Primary | ICD-10-CM

## 2018-09-07 PROCEDURE — 99207 ZZC NO CHARGE NURSE ONLY: CPT | Performed by: FAMILY MEDICINE

## 2018-09-07 NOTE — PROGRESS NOTES
Teri Beltran is enrolled/participating in the retail pharmacy Blood Pressure Goals Achievement Program (BPGAP).  Teri Beltran was evaluated at [facility] on September 7, 2018 at which time her blood pressure was:    BP Readings from Last 3 Encounters:   09/06/18 134/84   08/27/18 132/76   08/14/18 124/78     Reviewed lifestyle modifications for blood pressure control and reduction: including making healthy food choices, managing weight, getting regular exercise, smoking cessation, reducing alcohol consumption, monitoring blood pressure regularly.     Teri Beltran is not experiencing symptoms.    Follow-Up: BP is at goal of < 140/90mmHg (per provider blood pressure goal).  Recommended follow-up at pharmacy in 6 months.     Recommendation to Provider: none    Teri Beltran was evaluated for enrollment into the PGEN study today.    Patient eligible for enrollment:  No  Patient interested in enrollment:  No    Completed by: Kavin Duenas    Thank You   Sapna Montiel  [facility]

## 2018-09-07 NOTE — MR AVS SNAPSHOT
After Visit Summary   9/7/2018    Teri Beltran    MRN: [id number]           Patient Information     Date Of Birth          1945        Visit Information        Provider Department      9/7/2018 9:03 AM Laura Tipton MD [facility]        Today's Diagnoses     BP check    -  1       Follow-ups after your visit        Your next 10 appointments already scheduled     Sep 14, 2018  1:10 PM CDT   [facility] EP RETURN with Domenica Dave PA-C   [facility] ([facility])    [address]  [place] [address]   [phone] OPT 2            Oct 08, 2018  4:30 PM CDT   Remote ICD Check with ROCHA DCR2   [facility] ([facility])    [address]  [place] [address] [phone] OPT 2           This appointment is for a remote check of your debrillator.  This is not an appointment at the office.              Who to contact     If you have questions or need follow up information about today's clinic visit or your schedule please contact [facility] directly at [phone].  Normal or non-critical lab and imaging results will be communicated to you by MyChart, letter or phone within 4 business days after the clinic has received the results. If you do not hear from us within 7 days, please contact the clinic through [facility] or phone. If you have a critical or abnormal lab result, we will notify you by phone as soon as possible.  Submit refill requests through Wetpaint or call your pharmacy and they will forward the refill request to us. Please allow 3 business days for your refill to be completed.          Additional Information About Your Visit        MyChart Information     Wetpaint lets you send messages to your doctor, view your test results, renew your prescriptions, schedule appointments and more. To sign up, go to [email or web address]  ". Click on \"Log in\" on the left side of the screen, which will take you to the Welcome page. Then click on \"Sign up Now\" on the right side of the page.     You will be asked to enter the access code listed below, as well as some personal information. Please follow the directions to create your username and password.     Your access code is: [email or web address]  Expires: 9/10/2018  3:29 PM     Your access code will  in 90 days. If you need help or a new code, please call your [place] clinic or [phone].        Care EveryWhere ID     This is your Care EveryWhere ID. This could be used by other organizations to access your [place] medical records  [id number]         Blood Pressure from Last 3 Encounters:   18 134/84   18 132/76   18 124/78    Weight from Last 3 Encounters:   18 209 lb 12.8 oz (95.2 kg)   18 201 lb (91.2 kg)   10/13/17 202 lb 14.4 oz (92 kg)              Today, you had the following     No orders found for display       Primary Care Provider Office Phone # Fax #    Laura Tipton [phone] [address] [address] [address]        Equal Access to Services     [facility] : Hadii aad ku hadasho Soomaali, waaxda luqadaha, qaybta kaalmada adeegyada, goldy verma. So [facility] [phone].    ATENCIÓN: Si habla español, tiene a tracy disposición servicios gratuitos de asistencia lingüística. Llame al [phone].    We comply with applicable federal civil rights laws and [place] laws. We do not discriminate on the basis of race, color, national origin, age, disability, sex, sexual orientation, or gender identity.            Thank you!     Thank you for choosing [facility]  for your care. Our goal is always to provide you with excellent care. Hearing back from our patients is one way we can continue to improve our services. Please take a few minutes to complete the written survey that you may receive in " the mail after your visit with us. Thank you!             Your Updated Medication List - Protect others around you: Learn how to safely use, store and throw away your medicines at www.disposemymeds.org.          This list is accurate as of 9/7/18  9:12 AM.  Always use your most recent med list.                   Brand Name Dispense Instructions for use Diagnosis    albuterol 108 (90 Base) MCG/ACT inhaler    PROAIR HFA/PROVENTIL HFA/VENTOLIN HFA    1 Inhaler    Inhale 2 puffs into the lungs every 6 hours as needed for shortness of breath / dyspnea or wheezing    Seasonal allergies, SOB (shortness of breath)       alendronate 70 MG tablet    FOSAMAX    12 tablet    TAKE 1 TABLET(70 MG) BY MOUTH EVERY 7 DAYS    Osteoporosis, unspecified osteoporosis type, unspecified pathological fracture presence       amitriptyline 100 MG tablet    ELAVIL    90 tablet    TAKE 1 TABLET(100 MG) BY MOUTH AT BEDTIME    Insomnia       amLODIPine 2.5 MG tablet    NORVASC    90 tablet    Take 1 tablet (2.5 mg) by mouth daily    Essential hypertension with goal blood pressure less than 140/90       cholecalciferol 1000 UNIT tablet    vitamin D3    100 tablet    Take 1 tablet (1,000 Units) by mouth daily        CLARITIN 10 MG tablet   Generic drug:  loratadine      Take 1 tablet (10 mg) by mouth daily    Seasonal allergic rhinitis       Co Q10 200 MG Caps     100 capsule    Take 200 mg by mouth daily        COSOPT PF 22.3-6.8 MG/ML opthalmic solution   Generic drug:  dorzolamide-timolol PF           fluticasone 50 MCG/ACT spray    FLONASE    1 Bottle    Spray 1-2 sprays into both nostrils daily    Congestion of paranasal sinus, Acute sinusitis with symptoms > 10 days       levothyroxine 88 MCG tablet    SYNTHROID/LEVOTHROID    30 tablet    TAKE 1 TABLET(88 MCG) BY MOUTH DAILY    Hypothyroidism, unspecified type       lisinopril-hydrochlorothiazide 20-12.5 MG per tablet    PRINZIDE/ZESTORETIC    30 tablet    TAKE ONE TABLET BY MOUTH ONCE DAILY     Hypertension goal BP (blood pressure) < 140/90       metoprolol succinate 25 MG 24 hr tablet    TOPROL-XL    135 tablet    Take 1.5 tablets (37.5 mg) by mouth daily    Hypertension goal BP (blood pressure) < 140/90, Tachycardia       pravastatin 40 MG tablet    PRAVACHOL    90 tablet    TAKE 1 TABLET(40 MG) BY MOUTH DAILY    Hyperlipidemia LDL goal <130       ranitidine 150 MG tablet    ZANTAC    60 tablet    TAKE 1 TABLET(150 MG) BY MOUTH TWICE DAILY    Midepigastric pain       ZIOPTAN 0.0015 % Soln   Generic drug:  Tafluprost

## 2018-09-14 ENCOUNTER — OFFICE VISIT (OUTPATIENT)
Dept: CARDIOLOGY | Facility: CLINIC | Age: 73
End: 2018-09-14
Attending: INTERNAL MEDICINE
Payer: MEDICARE

## 2018-09-14 VITALS
HEART RATE: 92 BPM | DIASTOLIC BLOOD PRESSURE: 78 MMHG | BODY MASS INDEX: 31.96 KG/M2 | WEIGHT: 203.6 LBS | SYSTOLIC BLOOD PRESSURE: 132 MMHG | HEIGHT: 67 IN

## 2018-09-14 DIAGNOSIS — I77.810 MILD DILATION OF ASCENDING AORTA (H): ICD-10-CM

## 2018-09-14 DIAGNOSIS — I45.10 RBBB: ICD-10-CM

## 2018-09-14 DIAGNOSIS — I10 HYPERTENSION GOAL BP (BLOOD PRESSURE) < 140/90: ICD-10-CM

## 2018-09-14 DIAGNOSIS — R00.0 TACHYCARDIA: Primary | ICD-10-CM

## 2018-09-14 DIAGNOSIS — R55 SYNCOPE, UNSPECIFIED SYNCOPE TYPE: ICD-10-CM

## 2018-09-14 DIAGNOSIS — R00.2 PALPITATIONS: ICD-10-CM

## 2018-09-14 DIAGNOSIS — E78.5 HYPERLIPIDEMIA LDL GOAL <130: ICD-10-CM

## 2018-09-14 PROCEDURE — 93000 ELECTROCARDIOGRAM COMPLETE: CPT | Performed by: PHYSICIAN ASSISTANT

## 2018-09-14 PROCEDURE — 99214 OFFICE O/P EST MOD 30 MIN: CPT | Mod: 25 | Performed by: PHYSICIAN ASSISTANT

## 2018-09-14 NOTE — PROGRESS NOTES
HPI and Plan:   See dictation #[id number]    Orders Placed This Encounter   Procedures     Follow-Up with Electrophysiologist     EKG 12-lead complete w/read - Clinics (performed today)     EKG 12-lead complete w/read - Clinics (to be scheduled)       No orders of the defined types were placed in this encounter.      There are no discontinued medications.      Encounter Diagnoses   Name Primary?     Syncope, unspecified syncope type      Tachycardia Yes     Hyperlipidemia LDL goal <130      Palpitations      Hypertension goal BP (blood pressure) < 140/90      RBBB      Mild dilation of ascending aorta - borderline (H)        CURRENT MEDICATIONS:  Current Outpatient Prescriptions   Medication Sig Dispense Refill     albuterol (PROAIR HFA, PROVENTIL HFA, VENTOLIN HFA) 108 (90 BASE) MCG/ACT inhaler Inhale 2 puffs into the lungs every 6 hours as needed for shortness of breath / dyspnea or wheezing 1 Inhaler 0     alendronate (FOSAMAX) 70 MG tablet TAKE 1 TABLET(70 MG) BY MOUTH EVERY 7 DAYS 12 tablet 3     amitriptyline (ELAVIL) 100 MG tablet TAKE 1 TABLET(100 MG) BY MOUTH AT BEDTIME 90 tablet 3     amLODIPine (NORVASC) 2.5 MG tablet Take 1 tablet (2.5 mg) by mouth daily 90 tablet 2     cholecalciferol (VITAMIN D) 1000 UNIT tablet Take 1 tablet (1,000 Units) by mouth daily 100 tablet 3     Coenzyme Q10 (CO Q10) 200 MG CAPS Take 200 mg by mouth daily 100 capsule prn     COSOPT PF 22.3-6.8 MG/ML SOLN   3     fluticasone (FLONASE) 50 MCG/ACT spray Spray 1-2 sprays into both nostrils daily 1 Bottle 3     levothyroxine (SYNTHROID/LEVOTHROID) 88 MCG tablet TAKE 1 TABLET(88 MCG) BY MOUTH DAILY 30 tablet 0     lisinopril-hydrochlorothiazide (PRINZIDE/ZESTORETIC) 20-12.5 MG per tablet TAKE ONE TABLET BY MOUTH ONCE DAILY 30 tablet 0     loratadine (CLARITIN) 10 MG tablet Take 1 tablet (10 mg) by mouth daily       metoprolol (TOPROL-XL) 25 MG 24 hr tablet Take 1.5 tablets (37.5 mg) by mouth daily 135 tablet 3     pravastatin  (PRAVACHOL) 40 MG tablet TAKE 1 TABLET(40 MG) BY MOUTH DAILY 90 tablet 0     ranitidine (ZANTAC) 150 MG tablet TAKE 1 TABLET(150 MG) BY MOUTH TWICE DAILY 60 tablet 11     ZIOPTAN 0.0015 % SOLN          ALLERGIES   No Known Allergies    PAST MEDICAL HISTORY:  Past Medical History:   Diagnosis Date     First degree AV block 8/11/2015     Glaucoma      Heart block      Hyperlipidemia LDL goal <130 6/14/2013     Hypertension goal BP (blood pressure) < 140/90 12/3/2013     Hypothyroidism      Mild dilation of ascending aorta - borderline 8/11/2015     Palpitations      RBBB 8/11/2015     Tachycardia      Varicose veins      Venous insufficiency        PAST SURGICAL HISTORY:  Past Surgical History:   Procedure Laterality Date     BREAST SURGERY      right breast ductal surgery due to milk production     COLONOSCOPY N/A 10/24/2014    no further screening. Procedure: COLONOSCOPY;  Surgeon: Pedro Rudd MD;  Location: [facility]     HYSTERECTOMY, PAP NO LONGER INDICATED  2009    total hysterectomy and ovaries. benign     SOFT TISSUE SURGERY      ganglion removed from left wrist and ring finger     SURGICAL HISTORY OF -   age 7    tonsillectomy     SURGICAL HISTORY OF -   1/15    left eye cataract     SURGICAL HISTORY OF -   Fall 2016    LINQ placed.       FAMILY HISTORY:  Family History   Problem Relation Age of Onset     Cancer Mother      ovarian     Breast Cancer Mother      HEART DISEASE Father      rare; not sure what it was     Diabetes Father      old age     Cerebrovascular Disease Brother 68       SOCIAL HISTORY:  Social History     Social History     Marital status:      Spouse name: N/A     Number of children: N/A     Years of education: N/A     Social History Main Topics     Smoking status: Former Smoker     Packs/day: 0.50     Years: 18.00     Types: Cigarettes     Quit date: 1980     Smokeless tobacco: Never Used     Alcohol use 0.0 oz/week     0 Standard drinks or equivalent per week      Comment: 1  "glass of wine at night     Drug use: No     Sexual activity: Yes     Partners: Male     Other Topics Concern     Caffeine Concern No     4-5 cups of coffee daily     Special Diet No     no added salt     Exercise No     3 days per week - exercise class      Social History Narrative       Review of Systems:  Skin:  Negative       Eyes:  Negative      ENT:  Negative      Respiratory:  Negative for dyspnea on exertion;shortness of breath;cough     Cardiovascular:  Negative for;chest pain;fatigue;lightheadedness;dizziness Positive for;palpitations;edema;syncope or near-syncope See HPI  Gastroenterology: Negative      Genitourinary:  Negative      Musculoskeletal:  Positive for arthritis;joint pain    Neurologic:  Negative      Psychiatric:  Negative      Heme/Lymph/Imm:  Positive for allergies seasonal  Endocrine:  Positive for thyroid disorder      Physical Exam:  Vitals: /78  Pulse 92  Ht 1.702 m (5' 7\")  Wt 92.4 kg (203 lb 9.6 oz)  BMI 31.89 kg/m2    Constitutional:  cooperative, alert and oriented, well developed, well nourished, in no acute distress        Skin:  warm and dry to the touch venous stasis changes        Head:  normocephalic, no masses or lesions        Eyes:  pupils equal and round;conjunctivae and lids unremarkable;sclera white        Lymph:      ENT:  no pallor or cyanosis, dentition good        Neck:  JVP normal        Respiratory:  normal symmetry;no tenderness to palpation;normal respiratory excursion;no intercostal retraction;no use of accessory muscles    scant left basal fine rales    Cardiac: regular rhythm;normal S1 and S2;no S3 or S4                  pulses below the femoral arteries are diminished                                      GI:  abdomen soft;BS normoactive        Extremities and Muscular Skeletal:  no deformities, clubbing, cyanosis, erythema observed stasis pigmentation;erythema bilateral LE edema;1+   LLE edema;trace;pitting CALF TENDERNESS, VARICOSE VEINS OVER BOTH " CALVES, MEDIAL ASPECT; EDEMA; HYPERPIGMENTATION; ERYTHEMA    Neurological:  no gross motor deficits        Psych:  Alert and Oriented x 3

## 2018-09-14 NOTE — PATIENT INSTRUCTIONS
"1. Discussed the \"spell\" you had in June 2018 at the grocery store. As we discussed, will plan to check the device as scheduled 10/8 and make sure no arrhythmias    2. Continue to monitor for repaid heart rates, lightheaded spells, chest discomfort, etc - our nurses are Aletha Case and Bill: [phone]    3. Call if you need to be seen earlier than your next appt with Dr. Villareal 9/2019. [phone]  "

## 2018-09-14 NOTE — LETTER
9/14/2018    [place]  [address] [place] [address]    RE: Teri Beltran       Dear Colleague,    I had the pleasure of seeing Teri Beltran in the [facility].    HPI and Plan:   See dictation #[id number]    Orders Placed This Encounter   Procedures     Follow-Up with Electrophysiologist     EKG 12-lead complete w/read - Clinics (performed today)     EKG 12-lead complete w/read - Clinics (to be scheduled)       No orders of the defined types were placed in this encounter.      There are no discontinued medications.      Encounter Diagnoses   Name Primary?     Syncope, unspecified syncope type      Tachycardia Yes     Hyperlipidemia LDL goal <130      Palpitations      Hypertension goal BP (blood pressure) < 140/90      RBBB      Mild dilation of ascending aorta - borderline (H)        CURRENT MEDICATIONS:  Current Outpatient Prescriptions   Medication Sig Dispense Refill     albuterol (PROAIR HFA, PROVENTIL HFA, VENTOLIN HFA) 108 (90 BASE) MCG/ACT inhaler Inhale 2 puffs into the lungs every 6 hours as needed for shortness of breath / dyspnea or wheezing 1 Inhaler 0     alendronate (FOSAMAX) 70 MG tablet TAKE 1 TABLET(70 MG) BY MOUTH EVERY 7 DAYS 12 tablet 3     amitriptyline (ELAVIL) 100 MG tablet TAKE 1 TABLET(100 MG) BY MOUTH AT BEDTIME 90 tablet 3     amLODIPine (NORVASC) 2.5 MG tablet Take 1 tablet (2.5 mg) by mouth daily 90 tablet 2     cholecalciferol (VITAMIN D) 1000 UNIT tablet Take 1 tablet (1,000 Units) by mouth daily 100 tablet 3     Coenzyme Q10 (CO Q10) 200 MG CAPS Take 200 mg by mouth daily 100 capsule prn     COSOPT PF 22.3-6.8 MG/ML SOLN   3     fluticasone (FLONASE) 50 MCG/ACT spray Spray 1-2 sprays into both nostrils daily 1 Bottle 3     levothyroxine (SYNTHROID/LEVOTHROID) 88 MCG tablet TAKE 1 TABLET(88 MCG) BY MOUTH DAILY 30 tablet 0     lisinopril-hydrochlorothiazide (PRINZIDE/ZESTORETIC) 20-12.5 MG per tablet TAKE ONE TABLET BY MOUTH  ONCE DAILY 30 tablet 0     loratadine (CLARITIN) 10 MG tablet Take 1 tablet (10 mg) by mouth daily       metoprolol (TOPROL-XL) 25 MG 24 hr tablet Take 1.5 tablets (37.5 mg) by mouth daily 135 tablet 3     pravastatin (PRAVACHOL) 40 MG tablet TAKE 1 TABLET(40 MG) BY MOUTH DAILY 90 tablet 0     ranitidine (ZANTAC) 150 MG tablet TAKE 1 TABLET(150 MG) BY MOUTH TWICE DAILY 60 tablet 11     ZIOPTAN 0.0015 % SOLN          ALLERGIES   No Known Allergies    PAST MEDICAL HISTORY:  Past Medical History:   Diagnosis Date     First degree AV block 8/11/2015     Glaucoma      Heart block      Hyperlipidemia LDL goal <130 6/14/2013     Hypertension goal BP (blood pressure) < 140/90 12/3/2013     Hypothyroidism      Mild dilation of ascending aorta - borderline 8/11/2015     Palpitations      RBBB 8/11/2015     Tachycardia      Varicose veins      Venous insufficiency        PAST SURGICAL HISTORY:  Past Surgical History:   Procedure Laterality Date     BREAST SURGERY      right breast ductal surgery due to milk production     COLONOSCOPY N/A 10/24/2014    no further screening. Procedure: COLONOSCOPY;  Surgeon: Pedro Rudd MD;  Location: [facility]     HYSTERECTOMY, PAP NO LONGER INDICATED  2009    total hysterectomy and ovaries. benign     SOFT TISSUE SURGERY      ganglion removed from left wrist and ring finger     SURGICAL HISTORY OF -   age 7    tonsillectomy     SURGICAL HISTORY OF -   1/15    left eye cataract     SURGICAL HISTORY OF -   Fall 2016    LINQ placed.       FAMILY HISTORY:  Family History   Problem Relation Age of Onset     Cancer Mother      ovarian     Breast Cancer Mother      HEART DISEASE Father      rare; not sure what it was     Diabetes Father      old age     Cerebrovascular Disease Brother 68       SOCIAL HISTORY:  Social History     Social History     Marital status:      Spouse name: N/A     Number of children: N/A     Years of education: N/A     Social History Main Topics     Smoking status:  "Former Smoker     Packs/day: 0.50     Years: 18.00     Types: Cigarettes     Quit date: 1980     Smokeless tobacco: Never Used     Alcohol use 0.0 oz/week     0 Standard drinks or equivalent per week      Comment: 1 glass of wine at night     Drug use: No     Sexual activity: Yes     Partners: Male     Other Topics Concern     Caffeine Concern No     4-5 cups of coffee daily     Special Diet No     no added salt     Exercise No     3 days per week - exercise class      Social History Narrative       Review of Systems:  Skin:  Negative       Eyes:  Negative      ENT:  Negative      Respiratory:  Negative for dyspnea on exertion;shortness of breath;cough     Cardiovascular:  Negative for;chest pain;fatigue;lightheadedness;dizziness Positive for;palpitations;edema;syncope or near-syncope See HPI  Gastroenterology: Negative      Genitourinary:  Negative      Musculoskeletal:  Positive for arthritis;joint pain    Neurologic:  Negative      Psychiatric:  Negative      Heme/Lymph/Imm:  Positive for allergies seasonal  Endocrine:  Positive for thyroid disorder      Physical Exam:  Vitals: /78  Pulse 92  Ht 1.702 m (5' 7\")  Wt 92.4 kg (203 lb 9.6 oz)  BMI 31.89 kg/m2    Constitutional:  cooperative, alert and oriented, well developed, well nourished, in no acute distress        Skin:  warm and dry to the touch venous stasis changes        Head:  normocephalic, no masses or lesions        Eyes:  pupils equal and round;conjunctivae and lids unremarkable;sclera white        Lymph:      ENT:  no pallor or cyanosis, dentition good        Neck:  JVP normal        Respiratory:  normal symmetry;no tenderness to palpation;normal respiratory excursion;no intercostal retraction;no use of accessory muscles    scant left basal fine rales    Cardiac: regular rhythm;normal S1 and S2;no S3 or S4                  pulses below the femoral arteries are diminished                                      GI:  abdomen soft;BS normoactive   "      Extremities and Muscular Skeletal:  no deformities, clubbing, cyanosis, erythema observed stasis pigmentation;erythema bilateral LE edema;1+   LLE edema;trace;pitting CALF TENDERNESS, VARICOSE VEINS OVER BOTH CALVES, MEDIAL ASPECT; EDEMA; HYPERPIGMENTATION; ERYTHEMA    Neurological:  no gross motor deficits        Psych:  Alert and Oriented x 3          Thank you for allowing me to participate in the care of your patient.      Sincerely,     Domenica Dave PA-C     [facility]    cc:   Silke Villareal MD  [address]  [place] [address]

## 2018-09-14 NOTE — PROGRESS NOTES
"Service Date: 09/14/2018      HISTORY OF PRESENT ILLNESS:  I had the pleasure of seeing Teri today when she came for annual followup.  She is a 72-year-old woman who sees Dr. Villareal for:     1.  Hypertension (especially white coat hypertension)  2. History of asymptomatic 2:1 heart block.   3.  Syncope in 2016.  EP study at that time was inconclusive and she underwent placement of a loop recorder at that time.   4.  Lower extremity edema with chronic venous disease for which she has undergone bilateral great saphenous vein radiofrequency ablation by Dr. Garcia.      She saw Dr. Villareal 1 year ago at which time no changes were made.  She has subsequently seen Dr. Garcia at [facility], and sees him every 6 months at [facility].  He did an echocardiogram 03/2018 showing an EF of 60% with mild left ventricular hypertrophy and mild dilatation of the left atrium.       She states that overall she has done well since she saw Dr. Villareal, but towards the end of June 2018 she did have an episode where she felt really unsteady for a few minutes while grocery shopping.  She did not fall.  She states that she started to feel better, but felt \"shaky all day.\"  She thinks this was similar to what she presented with in 2016 but did not fall or faint.  She has not had a device interrogation since that time.      She denies chest pain, pressure or tightness.  She continues to note palpitations.  She denies orthopnea, PND, or other concerning symptoms.  She is tolerating her medications without problems.      EKG today which I overread confirms sinus rhythm with a first-degree AV block at 224 milliseconds.        ASSESSMENT AND PLAN:     1.  History of syncope.  This was in 2016 and resulted in an EP study with a loop recorder.  Loop recorder last checked 06/12/2018 (prior to her spell) showed no significant arrhythmias.  She has not had another check since then. I offered to get her one today and she states that she " would just like to wait until 10/08 as this has not happened again.  I asked the device nurses to pay attention to any abnormal rhythms that occurred around the end of  and let me know if they had occurred.      She is interested in getting her loop recorder taken out when it has reached JACQUELYN as she does feel that it is somewhat uncomfortable.  We will have her continue to be in touch with the device nurses.      2.  Hypertension.  Blood pressure is actually pretty good today.  She states when she is getting it checked by the pharmacist it is typically in the 120-130s.      She will see Dr. Villareal in 1 year, but contact us in the interim can we be of any assistance.         BETSY FERNANDEZ PA-C             D: 2018   T: 2018   MT: GEORGE      Name:     JAVON MARTÍNEZ   MRN:      [id number]        Account:      [id number]   :      1945           Service Date: 2018      Document: [id number]

## 2018-09-14 NOTE — MR AVS SNAPSHOT
"              After Visit Summary   9/14/2018    Teri Beltran    MRN: [id number]           Patient Information     Date Of Birth          1945        Visit Information        Provider Department      9/14/2018 1:10 PM Domenica Dave PA-C [facility]        Today's Diagnoses     Tachycardia    -  1    Syncope, unspecified syncope type        Hyperlipidemia LDL goal <130        Palpitations        Hypertension goal BP (blood pressure) < 140/90        RBBB        Mild dilation of ascending aorta - borderline (H)          Care Instructions    1. Discussed the \"spell\" you had in June 2018 at the grocery store. As we discussed, will plan to check the device as scheduled 10/8 and make sure no arrhythmias    2. Continue to monitor for repaid heart rates, lightheaded spells, chest discomfort, etc - our nurses are Aletha Case and Bill: [phone]    3. Call if you need to be seen earlier than your next appt with Dr. Villareal 9/2019. [phone]          Follow-ups after your visit        Additional Services     Follow-Up with Electrophysiologist                 Your next 10 appointments already scheduled     Oct 08, 2018  4:30 PM CDT   Remote ICD Check with ROCHA DCR2   [facility] ([facility])    [address] [place] [id number] [phone] OPT 2           This appointment is for a remote check of your debrillator.  This is not an appointment at the office.              Future tests that were ordered for you today     Open Future Orders        Priority Expected Expires Ordered    EKG 12-lead complete w/read - Clinics (to be scheduled) Routine 9/14/2019 9/15/2019 9/14/2018    Follow-Up with Electrophysiologist Routine 9/14/2019 9/15/2019 9/14/2018            Who to contact     If you have questions or need follow up information about today's clinic visit or your schedule please contact [facility]" "[facility]   [facility] directly at [phone].  Normal or non-critical lab and imaging results will be communicated to you by MyChart, letter or phone within 4 business days after the clinic has received the results. If you do not hear from us within 7 days, please contact the clinic through MyChart or phone. If you have a critical or abnormal lab result, we will notify you by phone as soon as possible.  Submit refill requests through ecobeehart or call your pharmacy and they will forward the refill request to us. Please allow 3 business days for your refill to be completed.          Additional Information About Your Visit        Care EveryWhere ID     This is your Care EveryWhere ID. This could be used by other organizations to access your [place] medical records  [id number]        Your Vitals Were     Pulse Height BMI (Body Mass Index)             92 1.702 m (5' 7\") 31.89 kg/m2          Blood Pressure from Last 3 Encounters:   09/14/18 132/78   09/06/18 134/84   08/27/18 132/76    Weight from Last 3 Encounters:   09/14/18 92.4 kg (203 lb 9.6 oz)   05/08/18 95.2 kg (209 lb 12.8 oz)   01/23/18 91.2 kg (201 lb)              We Performed the Following     EKG 12-lead complete w/read - Clinics (performed today)     Follow-Up with Cardiac Advanced Practice Provider        Primary Care Provider Office Phone # Fax #    Laura Tipton [phone] [address] [facility] [address]        Equal Access to Services     GERMANIA SWAN AH: Hadii aad ku hadasho Soomaali, waaxda luqadaha, qaybta kaalmada adeegyada, waxay geena haycarmen verma. So [facility] [phone].    ATENCIÓN: Si habla wendie, tiene a tracy disposición servicios gratuitos de asistencia lingüística. Llame al [phone].    We comply with applicable federal civil rights laws and [place] laws. We do not discriminate on the basis of race, color, national origin, age, disability, sex, sexual orientation, or gender " identity.            Thank you!     Thank you for choosing [facility]  for your care. Our goal is always to provide you with excellent care. Hearing back from our patients is one way we can continue to improve our services. Please take a few minutes to complete the written survey that you may receive in the mail after your visit with us. Thank you!             Your Updated Medication List - Protect others around you: Learn how to safely use, store and throw away your medicines at www.disposemymeds.org.          This list is accurate as of 9/14/18  1:38 PM.  Always use your most recent med list.                   Brand Name Dispense Instructions for use Diagnosis    albuterol 108 (90 Base) MCG/ACT inhaler    PROAIR HFA/PROVENTIL HFA/VENTOLIN HFA    1 Inhaler    Inhale 2 puffs into the lungs every 6 hours as needed for shortness of breath / dyspnea or wheezing    Seasonal allergies, SOB (shortness of breath)       alendronate 70 MG tablet    FOSAMAX    12 tablet    TAKE 1 TABLET(70 MG) BY MOUTH EVERY 7 DAYS    Osteoporosis, unspecified osteoporosis type, unspecified pathological fracture presence       amitriptyline 100 MG tablet    ELAVIL    90 tablet    TAKE 1 TABLET(100 MG) BY MOUTH AT BEDTIME    Insomnia       amLODIPine 2.5 MG tablet    NORVASC    90 tablet    Take 1 tablet (2.5 mg) by mouth daily    Essential hypertension with goal blood pressure less than 140/90       cholecalciferol 1000 UNIT tablet    vitamin D3    100 tablet    Take 1 tablet (1,000 Units) by mouth daily        CLARITIN 10 MG tablet   Generic drug:  loratadine      Take 1 tablet (10 mg) by mouth daily    Seasonal allergic rhinitis       Co Q10 200 MG Caps     100 capsule    Take 200 mg by mouth daily        COSOPT PF 22.3-6.8 MG/ML opthalmic solution   Generic drug:  dorzolamide-timolol PF           fluticasone 50 MCG/ACT spray    FLONASE    1 Bottle    Spray 1-2 sprays into both nostrils daily     Congestion of paranasal sinus, Acute sinusitis with symptoms > 10 days       levothyroxine 88 MCG tablet    SYNTHROID/LEVOTHROID    30 tablet    TAKE 1 TABLET(88 MCG) BY MOUTH DAILY    Hypothyroidism, unspecified type       lisinopril-hydrochlorothiazide 20-12.5 MG per tablet    PRINZIDE/ZESTORETIC    30 tablet    TAKE ONE TABLET BY MOUTH ONCE DAILY    Hypertension goal BP (blood pressure) < 140/90       metoprolol succinate 25 MG 24 hr tablet    TOPROL-XL    135 tablet    Take 1.5 tablets (37.5 mg) by mouth daily    Hypertension goal BP (blood pressure) < 140/90, Tachycardia       pravastatin 40 MG tablet    PRAVACHOL    90 tablet    TAKE 1 TABLET(40 MG) BY MOUTH DAILY    Hyperlipidemia LDL goal <130       ranitidine 150 MG tablet    ZANTAC    60 tablet    TAKE 1 TABLET(150 MG) BY MOUTH TWICE DAILY    Midepigastric pain       ZIOPTAN 0.0015 % Soln   Generic drug:  Tafluprost

## 2018-09-14 NOTE — LETTER
"9/14/2018      [place]  [address] [address] [facility] [address]      RE: Teri Beltran       Dear Colleague,    I had the pleasure of seeing Teri Beltran in the [facility].    Service Date: 09/14/2018      HISTORY OF PRESENT ILLNESS:  I had the pleasure of seeing Teri today when she came for annual followup.  She is a 72-year-old woman who sees Dr. Villareal for:     1.  Hypertension (especially white coat hypertension)  2. History of asymptomatic 2:1 heart block.   3.  Syncope in 2016.  EP study at that time was inconclusive and she underwent placement of a loop recorder at that time.   4.  Lower extremity edema with chronic venous disease for which she has undergone bilateral great saphenous vein radiofrequency ablation by Dr. Garcia.      She saw Dr. Villareal 1 year ago at which time no changes were made.  She has subsequently seen Dr. Garcia at [facility], and sees him every 6 months at [facility].  He did an echocardiogram 03/2018 showing an EF of 60% with mild left ventricular hypertrophy and mild dilatation of the left atrium.       She states that overall she has done well since she saw Dr. Villareal, but towards the end of June 2018 she did have an episode where she felt really unsteady for a few minutes while grocery shopping.  She did not fall.  She states that she started to feel better, but felt \"shaky all day.\"  She thinks this was similar to what she presented with in 2016 but did not fall or faint.  She has not had a device interrogation since that time.      She denies chest pain, pressure or tightness.  She continues to note palpitations.  She denies orthopnea, PND, or other concerning symptoms.  She is tolerating her medications without problems.      EKG today which I overread confirms sinus rhythm with a first-degree AV block at 224 milliseconds.        ASSESSMENT AND PLAN:     1.  History of syncope.  This was in 2016 and resulted in " an EP study with a loop recorder.  Loop recorder last checked 2018 (prior to her spell) showed no significant arrhythmias.  She has not had another check since then. I offered to get her one today and she states that she would just like to wait until 10/08 as this has not happened again.  I asked the device nurses to pay attention to any abnormal rhythms that occurred around the end of  and let me know if they had occurred.      She is interested in getting her loop recorder taken out when it has reached JACQUELYN as she does feel that it is somewhat uncomfortable.  We will have her continue to be in touch with the device nurses.      2.  Hypertension.  Blood pressure is actually pretty good today.  She states when she is getting it checked by the pharmacist it is typically in the 120-130s.      She will see Dr. Villareal in 1 year, but contact us in the interim can we be of any assistance.         BETSY FERNANDEZ PA-C             D: 2018   T: 2018   MT: GEORGE      Name:     JAVON MARTÍNEZ   MRN:      [id number]        Account:      [id number]   :      1945           Service Date: 2018      Document: [id number]           Outpatient Encounter Prescriptions as of 2018   Medication Sig Dispense Refill     albuterol (PROAIR HFA, PROVENTIL HFA, VENTOLIN HFA) 108 (90 BASE) MCG/ACT inhaler Inhale 2 puffs into the lungs every 6 hours as needed for shortness of breath / dyspnea or wheezing 1 Inhaler 0     alendronate (FOSAMAX) 70 MG tablet TAKE 1 TABLET(70 MG) BY MOUTH EVERY 7 DAYS 12 tablet 3     amitriptyline (ELAVIL) 100 MG tablet TAKE 1 TABLET(100 MG) BY MOUTH AT BEDTIME 90 tablet 3     amLODIPine (NORVASC) 2.5 MG tablet Take 1 tablet (2.5 mg) by mouth daily 90 tablet 2     cholecalciferol (VITAMIN D) 1000 UNIT tablet Take 1 tablet (1,000 Units) by mouth daily 100 tablet 3     Coenzyme Q10 (CO Q10) 200 MG CAPS Take 200 mg by mouth daily 100 capsule prn     COSOPT PF 22.3-6.8 MG/ML  SOLN   3     fluticasone (FLONASE) 50 MCG/ACT spray Spray 1-2 sprays into both nostrils daily 1 Bottle 3     levothyroxine (SYNTHROID/LEVOTHROID) 88 MCG tablet TAKE 1 TABLET(88 MCG) BY MOUTH DAILY 30 tablet 0     loratadine (CLARITIN) 10 MG tablet Take 1 tablet (10 mg) by mouth daily       metoprolol (TOPROL-XL) 25 MG 24 hr tablet Take 1.5 tablets (37.5 mg) by mouth daily 135 tablet 3     pravastatin (PRAVACHOL) 40 MG tablet TAKE 1 TABLET(40 MG) BY MOUTH DAILY 90 tablet 0     ranitidine (ZANTAC) 150 MG tablet TAKE 1 TABLET(150 MG) BY MOUTH TWICE DAILY 60 tablet 11     ZIOPTAN 0.0015 % SOLN        [DISCONTINUED] lisinopril-hydrochlorothiazide (PRINZIDE/ZESTORETIC) 20-12.5 MG per tablet TAKE ONE TABLET BY MOUTH ONCE DAILY 30 tablet 0     No facility-administered encounter medications on file as of 9/14/2018.        Again, thank you for allowing me to participate in the care of your patient.      Sincerely,    Domenica Dave PA-C     [facility]

## 2018-09-17 DIAGNOSIS — I10 HYPERTENSION GOAL BP (BLOOD PRESSURE) < 140/90: ICD-10-CM

## 2018-09-17 NOTE — TELEPHONE ENCOUNTER
"Requested Prescriptions   Pending Prescriptions Disp Refills     lisinopril-hydrochlorothiazide (PRINZIDE/ZESTORETIC) 20-12.5 MG per tablet [Pharmacy Med Name: LISINOPRIL-HCTZ 20/12.5MG TABLETS]  Last Written Prescription Date:  8/20/18  Last Fill Quantity: 30,  # refills: 0   Last office visit: 5/8/2018 with prescribing provider:  Laura Tipton MD    Future Office Visit:     90 tablet 0     Sig: TAKE ONE TABLET BY MOUTH DAILY    Diuretics (Including Combos) Protocol Passed    9/17/2018  1:48 PM       Passed - Blood pressure under 140/90 in past 12 months    BP Readings from Last 3 Encounters:   09/14/18 132/78   09/06/18 134/84   08/27/18 132/76                Passed - Recent (12 mo) or future (30 days) visit within the authorizing provider's specialty    Patient had office visit in the last 12 months or has a visit in the next 30 days with authorizing provider or within the authorizing provider's specialty.  See \"Patient Info\" tab in inbasket, or \"Choose Columns\" in Meds & Orders section of the refill encounter.           Passed - Patient is age 18 or older       Passed - No active pregancy on record       Passed - Normal serum creatinine on file in past 12 months    Recent Labs   Lab Test  09/28/17   0938   CR  0.69             Passed - Normal serum potassium on file in past 12 months    Recent Labs   Lab Test  09/28/17   0938   POTASSIUM  4.4                   Passed - Normal serum sodium on file in past 12 months    Recent Labs   Lab Test  09/28/17   0938   NA  138             Passed - No positive pregnancy test in past 12 months          "

## 2018-09-18 RX ORDER — LISINOPRIL AND HYDROCHLOROTHIAZIDE 12.5; 2 MG/1; MG/1
TABLET ORAL
Qty: 90 TABLET | Refills: 0 | Status: SHIPPED | OUTPATIENT
Start: 2018-09-18 | End: 2018-10-12

## 2018-09-18 NOTE — TELEPHONE ENCOUNTER
Medication is being filled for 1 time refill only due to:  Patient needs labs due this month.   Call to schedule lab only appointment-future labs previously placed    Nya Duarte RN

## 2018-09-19 DIAGNOSIS — E03.9 HYPOTHYROIDISM, UNSPECIFIED TYPE: ICD-10-CM

## 2018-09-20 DIAGNOSIS — E03.9 HYPOTHYROIDISM, UNSPECIFIED TYPE: ICD-10-CM

## 2018-09-20 RX ORDER — LEVOTHYROXINE SODIUM 88 UG/1
TABLET ORAL
Qty: 30 TABLET | Refills: 0 | Status: SHIPPED | OUTPATIENT
Start: 2018-09-20 | End: 2018-10-12

## 2018-09-20 RX ORDER — LEVOTHYROXINE SODIUM 88 UG/1
TABLET ORAL
Qty: 90 TABLET | Refills: 0 | OUTPATIENT
Start: 2018-09-20

## 2018-09-20 NOTE — TELEPHONE ENCOUNTER
Medication is being filled for 1 time refill only due to:  Lab work due prior to next refill.  ([facility])  Future orders already in chart.    Rhea Aguirre RN

## 2018-09-20 NOTE — TELEPHONE ENCOUNTER
Already addressed, due for labs, see other refill encounter.  Maggie Polk, CHRIS  Message handled by Nurse Triage.

## 2018-09-21 ENCOUNTER — ALLIED HEALTH/NURSE VISIT (OUTPATIENT)
Dept: FAMILY MEDICINE | Facility: CLINIC | Age: 73
End: 2018-09-21
Payer: MEDICARE

## 2018-09-21 VITALS — DIASTOLIC BLOOD PRESSURE: 76 MMHG | SYSTOLIC BLOOD PRESSURE: 128 MMHG

## 2018-09-21 DIAGNOSIS — Z01.30 BP CHECK: Primary | ICD-10-CM

## 2018-09-21 PROCEDURE — 99207 ZZC NO CHARGE NURSE ONLY: CPT | Performed by: FAMILY MEDICINE

## 2018-09-21 NOTE — MR AVS SNAPSHOT
After Visit Summary   9/21/2018    Teri Beltran    MRN: [id number]           Patient Information     Date Of Birth          1945        Visit Information        Provider Department      9/21/2018 4:55 PM [facility]ipton MD [facility]        Today's Diagnoses     BP check    -  1       Follow-ups after your visit        Your next 10 appointments already scheduled     Sep 27, 2018  1:15 PM CDT   MA SCREENING BILATERAL W/ ROYCE with RHBCMA2   [facility] ([facility])    [address] [facility], [address]  [facility] [address]   [phone]           How do I prepare for my exam? (Food and drink instructions) No Food and Drink Restrictions.  How do I prepare for my exam? (Other instructions) Do not use any powder, lotion or deodorant under your arms or on your breast. If you do, we will ask you to remove it before your exam.  What should I wear: Wear comfortable, two-piece clothing.  How long does the exam take: Most scans will take 15 minutes.  What should I bring: Bring any previous mammograms from other facilities or have them mailed to the breast center.  Do I need a :  No  is needed.  What do I need to tell my doctor: If you have any allergies, tell your care team.  What should I do after the exam: No restrictions, You may resume normal activities.  What is this test: This test is an x-ray of the breast to look for breast disease. The breast is pressed between two plates to flatten and spread the tissue. An X-ray is taken of the breast from different angles.  Who should I call with questions: If you have any questions, please call the Imaging Department where you will have your exam. Directions, parking instructions, and other information is available on our website, [email or web address].  Other information about my exam Three-dimensional (3D) mammograms are available at [place] locations in [place], [place]  "[facility], [place], and [place].  Health locations include [place] and the [facility] in [place].  Benefits of 3D mammograms include: * Improved rate of cancer detection * Decreases your chance of having to go back for more tests, which means fewer: * \"False-positive\" results (This means that there is an abnormal area but it isn't cancer.) * Invasive testing procedures, such as a biopsy or surgery * Can provide clearer images of the breast if you have dense breast tissue.  *3D mammography is an optional exam that anyone can have with a 2D mammogram. It doesn't replace or take the place of a 2D mammogram. 2D mammograms remain an effective screening test for all women.  Not all insurance companies cover the cost of a 3D mammogram. Check with your insurance.            Sep 28, 2018 10:10 AM CDT   LAB with  [facility]   [facility]    [address] [facility] [address] [phone]           Please do not eat 10-12 hours before your appointment if you are coming in fasting for labs on lipids, cholesterol, or glucose (sugar). This does not apply to pregnant women. Water, hot tea and black coffee (with nothing added) are okay. Do not drink other fluids, diet soda or chew gum.            Oct 08, 2018  4:30 PM CDT   Remote ICD Check with ROCHA DCR2   [facility] ([facility])    [address] [place] [address] [address]  [facility] [id number] [phone] OPT 2           This appointment is for a remote check of your debrillator.  This is not an appointment at the office.              Future tests that were ordered for you today     Open Future Orders        Priority Expected Expires Ordered    MA Screen Bilateral w/Shemar Routine  9/20/2019 9/20/2018            Who to contact     If you have questions or need follow up information about today's clinic visit or your schedule please contact [facility] directly at [phone].  Normal or non-critical lab and imaging results will be communicated to you by MyChart, letter or phone within 4 business days after the clinic has received the results. If you do not hear from us within 7 days, please contact the clinic through MyChart or phone. If you have a critical or abnormal lab result, we will notify you by phone as soon as possible.  Submit refill requests through [employer] or call your pharmacy and they will forward the refill request to us. Please allow 3 business days for your refill to be completed.          Additional Information About Your Visit        Care EveryWhere ID     This is your Care EveryWhere ID. This could be used by other organizations to access your [facility] medical records  [id number]         Blood Pressure from Last 3 Encounters:   09/21/18 128/76   09/14/18 132/78   09/06/18 134/84    Weight from Last 3 Encounters:   09/14/18 203 lb 9.6 oz (92.4 kg)   05/08/18 209 lb 12.8 oz (95.2 kg)   01/23/18 201 lb (91.2 kg)              Today, you had the following     No orders found for display       Primary Care Provider Office Phone # Fax #    Laura Tipton [phone] [address] [facility] [address]        Equal Access to Services     [facility] SOSA : Hadii aad ku hadasho Soomaali, waaxda luqadaha, qaybta kaalmada adeegyada, waxay idiin hayaan alessandro mix . So [facility] [phone].    ATENCIÓN: Si habla español, tiene a tracy disposición servicios gratuitos de asistencia lingüística. Llame al [phone].    We comply with applicable federal civil rights laws and [place] laws. We do not discriminate on the basis of race, color, national origin, age, disability, sex, sexual orientation, or gender identity.            Thank you!     Thank you for choosing [facility]  for your care. Our goal is always to provide you with excellent care. Hearing back from our patients is one way we can continue  to improve our services. Please take a few minutes to complete the written survey that you may receive in the mail after your visit with us. Thank you!             Your Updated Medication List - Protect others around you: Learn how to safely use, store and throw away your medicines at www.disposemymeds.org.          This list is accurate as of 9/21/18  5:00 PM.  Always use your most recent med list.                   Brand Name Dispense Instructions for use Diagnosis    albuterol 108 (90 Base) MCG/ACT inhaler    PROAIR HFA/PROVENTIL HFA/VENTOLIN HFA    1 Inhaler    Inhale 2 puffs into the lungs every 6 hours as needed for shortness of breath / dyspnea or wheezing    Seasonal allergies, SOB (shortness of breath)       alendronate 70 MG tablet    FOSAMAX    12 tablet    TAKE 1 TABLET(70 MG) BY MOUTH EVERY 7 DAYS    Osteoporosis, unspecified osteoporosis type, unspecified pathological fracture presence       amitriptyline 100 MG tablet    ELAVIL    90 tablet    TAKE 1 TABLET(100 MG) BY MOUTH AT BEDTIME    Insomnia       amLODIPine 2.5 MG tablet    NORVASC    90 tablet    Take 1 tablet (2.5 mg) by mouth daily    Essential hypertension with goal blood pressure less than 140/90       cholecalciferol 1000 UNIT tablet    vitamin D3    100 tablet    Take 1 tablet (1,000 Units) by mouth daily        CLARITIN 10 MG tablet   Generic drug:  loratadine      Take 1 tablet (10 mg) by mouth daily    Seasonal allergic rhinitis       Co Q10 200 MG Caps     100 capsule    Take 200 mg by mouth daily        COSOPT PF 22.3-6.8 MG/ML opthalmic solution   Generic drug:  dorzolamide-timolol PF           fluticasone 50 MCG/ACT spray    FLONASE    1 Bottle    Spray 1-2 sprays into both nostrils daily    Congestion of paranasal sinus, Acute sinusitis with symptoms > 10 days       levothyroxine 88 MCG tablet    SYNTHROID/LEVOTHROID    30 tablet    TAKE ONE TABLET BY MOUTH DAILY    Hypothyroidism, unspecified type        lisinopril-hydrochlorothiazide 20-12.5 MG per tablet    PRINZIDE/ZESTORETIC    90 tablet    TAKE ONE TABLET BY MOUTH DAILY    Hypertension goal BP (blood pressure) < 140/90       metoprolol succinate 25 MG 24 hr tablet    TOPROL-XL    135 tablet    Take 1.5 tablets (37.5 mg) by mouth daily    Hypertension goal BP (blood pressure) < 140/90, Tachycardia       pravastatin 40 MG tablet    PRAVACHOL    90 tablet    TAKE 1 TABLET(40 MG) BY MOUTH DAILY    Hyperlipidemia LDL goal <130       ranitidine 150 MG tablet    ZANTAC    60 tablet    TAKE 1 TABLET(150 MG) BY MOUTH TWICE DAILY    Midepigastric pain       ZIOPTAN 0.0015 % Soln   Generic drug:  Tafluprost

## 2018-09-21 NOTE — PROGRESS NOTES
Teri Beltran is enrolled/participating in the retail pharmacy Blood Pressure Goals Achievement Program (BPGAP).  Teri Beltran was evaluated at [facility] on September 21, 2018 at which time her blood pressure was:    BP Readings from Last 3 Encounters:   09/21/18 128/76   09/14/18 132/78   09/06/18 134/84     Reviewed lifestyle modifications for blood pressure control and reduction: including making healthy food choices, managing weight, getting regular exercise, smoking cessation, reducing alcohol consumption, monitoring blood pressure regularly.     Teri Beltran is not experiencing symptoms.    Follow-Up: BP is at goal of < 140/90mmHg (patient 18+ years of age with or without diabetes).  Recommended follow-up at pharmacy in 6 months.     Recommendation to Provider: None    Teri Beltran was evaluated for enrollment into the PGEN study today.    PLEASE INITIATE ENROLLMENT DISCUSSION WITH HTN PTS  1) Between 30-80 years old                                                                                                               2) BMI between 19-50                                                                                                        3) BP ?140/90 AND ?170/110 patients aged 30-59         BP ?150/90 AND ?170/110 non-diabetic patients aged 60-80       BP ?140/90 AND ?170/110 diabetic patients aged 60-80  4) Additional requirements for uncontrolled HTN patients:        Pt on only 1 class of medication  5) EXCLUDE patient if confirmation of:                  ? Cardiac disease                  ? Chronic Kidney Disease                  ? Pregnancy/Breastfeeding                  ? Secondary Hypertension/Pre-eclampsia                                 ? Vascular disease    Patient eligible for enrollment:  No  Patient interested in enrollment:  No    This note completed by: Kavin Duenas    Thank You   Sapna Montiel  [facility]

## 2018-09-27 ENCOUNTER — HOSPITAL ENCOUNTER (OUTPATIENT)
Dept: MAMMOGRAPHY | Facility: CLINIC | Age: 73
Discharge: HOME | End: 2018-09-27
Attending: FAMILY MEDICINE | Admitting: FAMILY MEDICINE
Payer: MEDICARE

## 2018-09-27 DIAGNOSIS — Z12.31 VISIT FOR SCREENING MAMMOGRAM: ICD-10-CM

## 2018-09-27 PROCEDURE — 77067 SCR MAMMO BI INCL CAD: CPT

## 2018-09-28 DIAGNOSIS — E03.9 HYPOTHYROIDISM, UNSPECIFIED TYPE: ICD-10-CM

## 2018-09-28 DIAGNOSIS — I10 HYPERTENSION GOAL BP (BLOOD PRESSURE) < 140/90: ICD-10-CM

## 2018-09-28 DIAGNOSIS — R73.03 PREDIABETES: ICD-10-CM

## 2018-09-28 DIAGNOSIS — E78.5 HYPERLIPIDEMIA LDL GOAL <130: ICD-10-CM

## 2018-09-28 LAB — HBA1C MFR BLD: 5.8 % (ref 0–5.6)

## 2018-09-28 PROCEDURE — 84443 ASSAY THYROID STIM HORMONE: CPT | Performed by: FAMILY MEDICINE

## 2018-09-28 PROCEDURE — 80061 LIPID PANEL: CPT | Performed by: FAMILY MEDICINE

## 2018-09-28 PROCEDURE — 83036 HEMOGLOBIN GLYCOSYLATED A1C: CPT | Performed by: FAMILY MEDICINE

## 2018-09-28 PROCEDURE — 82043 UR ALBUMIN QUANTITATIVE: CPT | Performed by: FAMILY MEDICINE

## 2018-09-28 PROCEDURE — 80053 COMPREHEN METABOLIC PANEL: CPT | Performed by: FAMILY MEDICINE

## 2018-09-28 PROCEDURE — 36415 COLL VENOUS BLD VENIPUNCTURE: CPT | Performed by: FAMILY MEDICINE

## 2018-09-29 LAB
ALBUMIN SERPL-MCNC: 4 G/DL (ref 3.4–5)
ALP SERPL-CCNC: 94 U/L (ref 40–150)
ALT SERPL W P-5'-P-CCNC: 29 U/L (ref 0–50)
ANION GAP SERPL CALCULATED.3IONS-SCNC: 7 MMOL/L (ref 3–14)
AST SERPL W P-5'-P-CCNC: 17 U/L (ref 0–45)
BILIRUB SERPL-MCNC: 0.6 MG/DL (ref 0.2–1.3)
BUN SERPL-MCNC: 24 MG/DL (ref 7–30)
CALCIUM SERPL-MCNC: 9.4 MG/DL (ref 8.5–10.1)
CHLORIDE SERPL-SCNC: 102 MMOL/L (ref 94–109)
CHOLEST SERPL-MCNC: 194 MG/DL
CO2 SERPL-SCNC: 30 MMOL/L (ref 20–32)
CREAT SERPL-MCNC: 0.73 MG/DL (ref 0.52–1.04)
CREAT UR-MCNC: 121 MG/DL
GFR SERPL CREATININE-BSD FRML MDRD: 78 ML/MIN/1.7M2
GLUCOSE SERPL-MCNC: 101 MG/DL (ref 70–99)
HDLC SERPL-MCNC: 99 MG/DL
LDLC SERPL CALC-MCNC: 77 MG/DL
MICROALBUMIN UR-MCNC: 14 MG/L
MICROALBUMIN/CREAT UR: 11.4 MG/G CR (ref 0–25)
NONHDLC SERPL-MCNC: 95 MG/DL
POTASSIUM SERPL-SCNC: 4.8 MMOL/L (ref 3.4–5.3)
PROT SERPL-MCNC: 7.9 G/DL (ref 6.8–8.8)
SODIUM SERPL-SCNC: 139 MMOL/L (ref 133–144)
TRIGL SERPL-MCNC: 91 MG/DL
TSH SERPL DL<=0.005 MIU/L-ACNC: 2.49 MU/L (ref 0.4–4)

## 2018-10-01 DIAGNOSIS — R10.13 MIDEPIGASTRIC PAIN: ICD-10-CM

## 2018-10-01 NOTE — TELEPHONE ENCOUNTER
"Requested Prescriptions   Pending Prescriptions Disp Refills     ranitidine (ZANTAC) 150 MG tablet [Pharmacy Med Name: RANITIDINE 150MG TABLETS]  Last Written Prescription Date:  7/5/18  Last Fill Quantity: 60,  # refills: 11   Last office visit: 5/8/2018 with prescribing provider:  Laura Tipton MD    Future Office Visit:     180 tablet 11     Sig: TAKE 1 TABLET(150 MG) BY MOUTH TWICE DAILY    H2 Blockers Protocol Passed    10/1/2018  3:17 AM       Passed - Patient is age 12 or older       Passed - Recent (12 mo) or future (30 days) visit within the authorizing provider's specialty    Patient had office visit in the last 12 months or has a visit in the next 30 days with authorizing provider or within the authorizing provider's specialty.  See \"Patient Info\" tab in inbasket, or \"Choose Columns\" in Meds & Orders section of the refill encounter.            "

## 2018-10-08 ENCOUNTER — ALLIED HEALTH/NURSE VISIT (OUTPATIENT)
Dept: CARDIOLOGY | Facility: CLINIC | Age: 73
End: 2018-10-08
Payer: MEDICARE

## 2018-10-08 DIAGNOSIS — R55 SYNCOPE, UNSPECIFIED SYNCOPE TYPE: Primary | ICD-10-CM

## 2018-10-08 DIAGNOSIS — Z45.09 ENCOUNTER FOR LOOP RECORDER CHECK: ICD-10-CM

## 2018-10-08 PROCEDURE — 93299 C ICM/ILR REMOTE TECH SERV: CPT | Performed by: INTERNAL MEDICINE

## 2018-10-08 PROCEDURE — 93297 REM INTERROG DEV EVAL ICPMS: CPT | Performed by: INTERNAL MEDICINE

## 2018-10-08 NOTE — PROGRESS NOTES
Medtronic Reveal Linq Loop Recorder   Symptom: 0 Tachy: 0 Pause: 0 Enrique: 0  AT: 0 AF: 0  Time in AT/AF: 0 %  Heart rate: Stable with good variability Battery: OK  Careplan: Next remote in 3 months or sooner with any syncope, CHRIS aSnchez

## 2018-10-08 NOTE — MR AVS SNAPSHOT
After Visit Summary   10/8/2018    Teri Beltran    MRN: [id number]           Patient Information     Date Of Birth          1945        Visit Information        Provider Department      10/8/2018 4:30 PM ROCHA DCR2 [facility]        Today's Diagnoses     Syncope, unspecified syncope type    -  1    Encounter for loop recorder check           Follow-ups after your visit        Your next 10 appointments already scheduled     Oct 08, 2018  4:30 PM CDT   Remote ICD Check with ROCHA DCR2   [facility] ([facility])    [address] [place], [place] [id number]  [facility] [id number] [phone] OPT 2           This appointment is for a remote check of your debrillator.  This is not an appointment at the office.            Oct 12, 2018  9:10 AM CDT   Office Visit with Laura Tipton MD   [facility]    [address] [facility] [address] [phone]           Bring a current list of meds and any records pertaining to this visit. For Physicals, please bring immunization records and any forms needing to be filled out. Please arrive 10 minutes early to complete paperwork.            Jan 31, 2019  4:30 PM CST   Remote ICD Check with ROCHA DCR2   [facility] ([facility])    [address] [place], [place] [address]  [facility] [id number] [phone] OPT 2           This appointment is for a remote check of your debrillator.  This is not an appointment at the office.              Who to contact     If you have questions or need follow up information about today's clinic visit or your schedule please contact [facility] directly at [phone].  Normal or non-critical lab and imaging results will be communicated to you by MyChart, letter or phone within 4 business days after the clinic has received the  results. If you do not hear from us within 7 days, please contact the clinic through [employer] or phone. If you have a critical or abnormal lab result, we will notify you by phone as soon as possible.  Submit refill requests through [employer] or call your pharmacy and they will forward the refill request to us. Please allow 3 business days for your refill to be completed.          Additional Information About Your Visit        Care EveryWhere ID     This is your Care EveryWhere ID. This could be used by other organizations to access your [facility] medical records  [id number]         Blood Pressure from Last 3 Encounters:   09/21/18 128/76   09/14/18 132/78   09/06/18 134/84    Weight from Last 3 Encounters:   09/14/18 92.4 kg (203 lb 9.6 oz)   05/08/18 95.2 kg (209 lb 12.8 oz)   01/23/18 91.2 kg (201 lb)              We Performed the Following     C ICM DEVICE INTERROGAT REMOTE (06550)     ILR REMOTE TECH SERV (43175)        Primary Care Provider Office Phone # Fax #    Laura Tipton [phone] [address] [facility] [address]        Equal Access to Services     [facility] : Hadii aad ku hadasho Soomaali, waaxda luqadaha, qaybta kaalmada adeegyada, waxay winstonin haynohemin alessandro verma. So [facility] [phone].    ATENCIÓN: Si habla español, tiene a tracy disposición servicios gratuitos de asistencia lingüística. [facility] [phone].    We comply with applicable federal civil rights laws and [place] laws. We do not discriminate on the basis of race, color, national origin, age, disability, sex, sexual orientation, or gender identity.            Thank you!     Thank you for choosing [facility]  for your care. Our goal is always to provide you with excellent care. Hearing back from our patients is one way we can continue to improve our services. Please take a few minutes to complete the written survey that you may receive in the mail after your  visit with us. Thank you!             Your Updated Medication List - Protect others around you: Learn how to safely use, store and throw away your medicines at www.disposemymeds.org.          This list is accurate as of 10/8/18 12:55 PM.  Always use your most recent med list.                   Brand Name Dispense Instructions for use Diagnosis    albuterol 108 (90 Base) MCG/ACT inhaler    PROAIR HFA/PROVENTIL HFA/VENTOLIN HFA    1 Inhaler    Inhale 2 puffs into the lungs every 6 hours as needed for shortness of breath / dyspnea or wheezing    Seasonal allergies, SOB (shortness of breath)       alendronate 70 MG tablet    FOSAMAX    12 tablet    TAKE 1 TABLET(70 MG) BY MOUTH EVERY 7 DAYS    Osteoporosis, unspecified osteoporosis type, unspecified pathological fracture presence       amitriptyline 100 MG tablet    ELAVIL    90 tablet    TAKE 1 TABLET(100 MG) BY MOUTH AT BEDTIME    Insomnia       amLODIPine 2.5 MG tablet    NORVASC    90 tablet    Take 1 tablet (2.5 mg) by mouth daily    Essential hypertension with goal blood pressure less than 140/90       cholecalciferol 1000 UNIT tablet    vitamin D3    100 tablet    Take 1 tablet (1,000 Units) by mouth daily        CLARITIN 10 MG tablet   Generic drug:  loratadine      Take 1 tablet (10 mg) by mouth daily    Seasonal allergic rhinitis       Co Q10 200 MG Caps     100 capsule    Take 200 mg by mouth daily        COSOPT PF 22.3-6.8 MG/ML opthalmic solution   Generic drug:  dorzolamide-timolol PF           fluticasone 50 MCG/ACT spray    FLONASE    1 Bottle    Spray 1-2 sprays into both nostrils daily    Congestion of paranasal sinus, Acute sinusitis with symptoms > 10 days       levothyroxine 88 MCG tablet    SYNTHROID/LEVOTHROID    30 tablet    TAKE ONE TABLET BY MOUTH DAILY    Hypothyroidism, unspecified type       lisinopril-hydrochlorothiazide 20-12.5 MG per tablet    PRINZIDE/ZESTORETIC    90 tablet    TAKE ONE TABLET BY MOUTH DAILY    Hypertension goal BP  (blood pressure) < 140/90       metoprolol succinate 25 MG 24 hr tablet    TOPROL-XL    135 tablet    Take 1.5 tablets (37.5 mg) by mouth daily    Hypertension goal BP (blood pressure) < 140/90, Tachycardia       pravastatin 40 MG tablet    PRAVACHOL    90 tablet    TAKE 1 TABLET(40 MG) BY MOUTH DAILY    Hyperlipidemia LDL goal <130       ranitidine 150 MG tablet    ZANTAC    180 tablet    TAKE 1 TABLET(150 MG) BY MOUTH TWICE DAILY    Midepigastric pain       ZIOPTAN 0.0015 % Soln   Generic drug:  Tafluprost

## 2018-10-08 NOTE — LETTER
Teri Beltran    [address] [facility] [address]    October 8, 2018      Dear Teri Beltran,     Your transmission sent on 10-8-2018 has been reviewed. It was determined the results are normal. No events were detected.   _____ Your next scheduled date for you to send a transmission is on 01-.   _____ You are due for an in office appointment, please call [phone] to arrange.   Please call Luis at [phone] to change your appointment if needed. You may call and leave a message for a device RN if you have any questions at [phone] and they will return your call. Device clinic hours: Monday - Friday  8:00am-4:00pm   Sincerely,   Latonya Rojas RN

## 2018-10-12 ENCOUNTER — OFFICE VISIT (OUTPATIENT)
Dept: FAMILY MEDICINE | Facility: CLINIC | Age: 73
End: 2018-10-12
Payer: MEDICARE

## 2018-10-12 VITALS
TEMPERATURE: 98.4 F | OXYGEN SATURATION: 95 % | SYSTOLIC BLOOD PRESSURE: 138 MMHG | HEIGHT: 67 IN | WEIGHT: 202 LBS | HEART RATE: 76 BPM | BODY MASS INDEX: 31.71 KG/M2 | DIASTOLIC BLOOD PRESSURE: 88 MMHG | RESPIRATION RATE: 16 BRPM

## 2018-10-12 DIAGNOSIS — Z23 NEED FOR PROPHYLACTIC VACCINATION AND INOCULATION AGAINST INFLUENZA: ICD-10-CM

## 2018-10-12 DIAGNOSIS — R73.03 PREDIABETES: ICD-10-CM

## 2018-10-12 DIAGNOSIS — I10 ESSENTIAL HYPERTENSION WITH GOAL BLOOD PRESSURE LESS THAN 140/90: Primary | ICD-10-CM

## 2018-10-12 DIAGNOSIS — E03.9 HYPOTHYROIDISM, UNSPECIFIED TYPE: ICD-10-CM

## 2018-10-12 DIAGNOSIS — E78.5 HYPERLIPIDEMIA LDL GOAL <130: ICD-10-CM

## 2018-10-12 PROCEDURE — G0008 ADMIN INFLUENZA VIRUS VAC: HCPCS | Performed by: FAMILY MEDICINE

## 2018-10-12 PROCEDURE — 99214 OFFICE O/P EST MOD 30 MIN: CPT | Mod: 25 | Performed by: FAMILY MEDICINE

## 2018-10-12 PROCEDURE — 90662 IIV NO PRSV INCREASED AG IM: CPT | Performed by: FAMILY MEDICINE

## 2018-10-12 RX ORDER — AMLODIPINE BESYLATE 2.5 MG/1
2.5 TABLET ORAL DAILY
Qty: 90 TABLET | Refills: 2 | Status: SHIPPED | OUTPATIENT
Start: 2018-10-12 | End: 2020-01-23

## 2018-10-12 RX ORDER — LEVOTHYROXINE SODIUM 88 UG/1
88 TABLET ORAL DAILY
Qty: 90 TABLET | Refills: 3 | Status: SHIPPED | OUTPATIENT
Start: 2018-10-12 | End: 2019-11-03

## 2018-10-12 RX ORDER — LISINOPRIL AND HYDROCHLOROTHIAZIDE 12.5; 2 MG/1; MG/1
1 TABLET ORAL DAILY
Qty: 90 TABLET | Refills: 1 | Status: SHIPPED | OUTPATIENT
Start: 2018-10-12 | End: 2019-05-14 | Stop reason: DRUGHIGH

## 2018-10-12 RX ORDER — PRAVASTATIN SODIUM 40 MG
TABLET ORAL
Qty: 90 TABLET | Refills: 3 | Status: SHIPPED | OUTPATIENT
Start: 2018-10-12 | End: 2019-11-11

## 2018-10-12 NOTE — PROGRESS NOTES
SUBJECTIVE:   Teri Beltran is a 72 year old female who presents to clinic today for the following health issues:      Hyperlipidemia Follow-Up      Rate your low fat/cholesterol diet?: good    Taking statin?  Yes, no muscle aches from statin    Other lipid medications/supplements?:  none    Hypertension Follow-up      Outpatient blood pressures are being checked at pharmacy.  Results are see flowsheets    Low Salt Diet: no added salt    Hypothyroidism Follow-up      Since last visit, patient describes the following symptoms: Weight stable, no hair loss, no skin changes, no constipation, no loose stools      Amount of exercise or physical activity: 2 times per week    Problems taking medications regularly: No    Medication side effects: none    Diet: regular (no restrictions)            Problem list and histories reviewed & adjusted, as indicated.  Additional history:     See under ROS     Patient Active Problem List   Diagnosis     Hypothyroidism     Tachycardia     Glaucoma     Prediabetes     Hyperlipidemia LDL goal <130     Palpitations     BMI 30.0-30.9,adult     Health Care Home     Advanced directives, counseling/discussion     Hypertension goal BP (blood pressure) < 140/90     Adjustment disorder with depressed mood     Osteoporosis     Mild dilation of ascending aorta - borderline (H)     RBBB     First degree AV block     Venous insufficiency     Varicose veins       Current Outpatient Prescriptions   Medication Sig Dispense Refill     albuterol (PROAIR HFA, PROVENTIL HFA, VENTOLIN HFA) 108 (90 BASE) MCG/ACT inhaler Inhale 2 puffs into the lungs every 6 hours as needed for shortness of breath / dyspnea or wheezing 1 Inhaler 0     alendronate (FOSAMAX) 70 MG tablet TAKE 1 TABLET(70 MG) BY MOUTH EVERY 7 DAYS 12 tablet 3     amitriptyline (ELAVIL) 100 MG tablet TAKE 1 TABLET(100 MG) BY MOUTH AT BEDTIME 90 tablet 0     amLODIPine (NORVASC) 2.5 MG tablet Take 1 tablet (2.5 mg) by mouth daily 90 tablet 2  "    cholecalciferol (VITAMIN D) 1000 UNIT tablet Take 1 tablet (1,000 Units) by mouth daily 100 tablet 3     Coenzyme Q10 (CO Q10) 200 MG CAPS Take 200 mg by mouth daily 100 capsule prn     COSOPT PF 22.3-6.8 MG/ML SOLN   3     fluticasone (FLONASE) 50 MCG/ACT spray Spray 1-2 sprays into both nostrils daily 1 Bottle 3     levothyroxine (SYNTHROID/LEVOTHROID) 88 MCG tablet TAKE ONE TABLET BY MOUTH DAILY 30 tablet 0     lisinopril-hydrochlorothiazide (PRINZIDE/ZESTORETIC) 20-12.5 MG per tablet TAKE ONE TABLET BY MOUTH DAILY 90 tablet 0     loratadine (CLARITIN) 10 MG tablet Take 1 tablet (10 mg) by mouth daily       metoprolol (TOPROL-XL) 25 MG 24 hr tablet Take 1.5 tablets (37.5 mg) by mouth daily 135 tablet 3     pravastatin (PRAVACHOL) 40 MG tablet TAKE 1 TABLET(40 MG) BY MOUTH DAILY 90 tablet 0     ranitidine (ZANTAC) 150 MG tablet TAKE 1 TABLET(150 MG) BY MOUTH TWICE DAILY 180 tablet 2     ZIOPTAN 0.0015 % SOLN            Reviewed and updated as needed this visit by clinical staff       Reviewed and updated as needed this visit by Provider         ROS:  CONSTITUTIONAL:has lost some weight.  RESP:mild cough; believes from bp pill. Not bothersome. More in the am.   CV: NEGATIVE for chest pain, palpitations or peripheral edema x some palpitations; not new.  PSYCHIATRIC: NEGATIVE for changes in mood or affect    OBJECTIVE:     /86 (BP Location: Right arm, Cuff Size: Adult Regular)  Pulse 76  Temp 98.4  F (36.9  C) (Oral)  Resp 16  Ht 5' 7\" (1.702 m)  Wt 202 lb (91.6 kg)  SpO2 95%  BMI 31.64 kg/m2  Body mass index is 31.64 kg/(m^2).     /88 upon repeat.    GENERAL APPEARANCE: alert and no distress  NECK: no adenopathy and thyroid normal to palpation  RESP: lungs clear to auscultation - no rales, rhonchi or wheezes  CV: regular rates and rhythm  MS: trace edema.   PSYCH: mentation appears normal and affect normal/bright    Recent Labs   Lab Test  09/28/18   0951  09/28/17   0938   08/11/15   1224  " 08/20/14   0958   CHOL  194  183   < >  176  201*   HDL  99  83   < >  79  88   LDL  77  76   < >  70  79   TRIG  91  122   < >  133  169*   CHOLHDLRATIO   --    --    --   2.2  2.3    < > = values in this interval not displayed.       GFR Estimate   Date Value Ref Range Status   09/28/2018 78 >60 mL/min/1.7m2 Final     Comment:     Non  GFR Calc   09/28/2017 84 >60 mL/min/1.7m2 Final     Comment:     Non  GFR Calc   09/26/2016 87 >60 mL/min/1.7m2 Final     Comment:     Non  GFR Calc     TSH   Date Value Ref Range Status   09/28/2018 2.49 0.40 - 4.00 mU/L Final     Lab Results   Component Value Date    A1C 5.8 09/28/2018    A1C 5.9 09/28/2017    A1C 6.1 11/14/2014    A1C 6.1 07/03/2013     Normal microalbumin.      ASSESSMENT/PLAN:     Essential hypertension with goal blood pressure less than 140/90  Borderline controlled. Continue at current dose; continue to monitor.  - amLODIPine (NORVASC) 2.5 MG tablet; Take 1 tablet (2.5 mg) by mouth daily  - lisinopril-hydrochlorothiazide (PRINZIDE/ZESTORETIC) 20-12.5 MG per tablet; Take 1 tablet by mouth daily    Hyperlipidemia LDL goal <130  Refilling.   - pravastatin (PRAVACHOL) 40 MG tablet; TAKE 1 TABLET(40 MG) BY MOUTH DAILY    Prediabetes  Reviewed HgbA1C. Encourage lifestyle.    Hypothyroidism, unspecified type  Euthyroid. Continue current dosing.   - levothyroxine (SYNTHROID/LEVOTHROID) 88 MCG tablet; Take 1 tablet (88 mcg) by mouth daily    Need for prophylactic vaccination and inoculation against influenza    - FLU VACCINE, INCREASED ANTIGEN, PRESV FREE, AGE 65+ [74003]  - ADMIN INFLUENZA (For MEDICARE Patients ONLY) []    Discussed shingrix.        Laura Tipton MD, MD  [facility]

## 2018-10-12 NOTE — MR AVS SNAPSHOT
After Visit Summary   10/12/2018    Teri Beltran    MRN: [id number]           Patient Information     Date Of Birth          1945        Visit Information        Provider Department      10/12/2018 9:10 AM Laura Tipton MD [facility]        Today's Diagnoses     Hyperlipidemia LDL goal <130    -  1    Prediabetes        Hypothyroidism, unspecified type        Hypertension goal BP (blood pressure) < 140/90        Essential hypertension with goal blood pressure less than 140/90           Follow-ups after your visit        Follow-up notes from your care team     Return in about 6 months (around 4/12/2019) for Medication recheck.      Your next 10 appointments already scheduled     Jan 31, 2019  4:30 PM CST   Remote ICD Check with ROCHA DCR2   [facility] ([facility])    [address]  [facility] [id number] [phone] OPT 2           This appointment is for a remote check of your debrillator.  This is not an appointment at the office.            Apr 12, 2019  1:30 PM CDT   Office Visit with Laura Tipton MD   [facility] ([facility])    [address] [address] [phone]           Bring a current list of meds and any records pertaining to this visit. For Physicals, please bring immunization records and any forms needing to be filled out. Please arrive 10 minutes early to complete paperwork.              Who to contact     If you have questions or need follow up information about today's clinic visit or your schedule please contact [facility] directly at [phone].  Normal or non-critical lab and imaging results will be communicated to you by MyChart, letter or phone within 4 business days after the clinic has received the results. If you do not hear from us within 7 days, please contact the clinic through MyChart or phone. If you have a  "critical or abnormal lab result, we will notify you by phone as soon as possible.  Submit refill requests through [employer] or call your pharmacy and they will forward the refill request to us. Please allow 3 business days for your refill to be completed.          Additional Information About Your Visit        Care EveryWhere ID     This is your Care EveryWhere ID. This could be used by other organizations to access your [place] medical records  [id number]        Your Vitals Were     Pulse Temperature Respirations Height Pulse Oximetry BMI (Body Mass Index)    76 98.4  F (36.9  C) (Oral) 16 5' 7\" (1.702 m) 95% 31.64 kg/m2       Blood Pressure from Last 3 Encounters:   10/12/18 138/88   09/21/18 128/76   09/14/18 132/78    Weight from Last 3 Encounters:   10/12/18 202 lb (91.6 kg)   09/14/18 203 lb 9.6 oz (92.4 kg)   05/08/18 209 lb 12.8 oz (95.2 kg)              Today, you had the following     No orders found for display         Today's Medication Changes          These changes are accurate as of 10/12/18 10:04 AM.  If you have any questions, ask your nurse or doctor.               These medicines have changed or have updated prescriptions.        Dose/Directions    levothyroxine 88 MCG tablet   Commonly known as:  SYNTHROID/LEVOTHROID   This may have changed:  See the new instructions.   Used for:  Hypothyroidism, unspecified type   Changed by:  Laura Tipton MD        Dose:  88 mcg   Take 1 tablet (88 mcg) by mouth daily   Quantity:  90 tablet   Refills:  3       lisinopril-hydrochlorothiazide 20-12.5 MG per tablet   Commonly known as:  PRINZIDE/ZESTORETIC   This may have changed:  See the new instructions.   Used for:  Hypertension goal BP (blood pressure) < 140/90   Changed by:  Laura Tipton MD        Dose:  1 tablet   Take 1 tablet by mouth daily   Quantity:  90 tablet   Refills:  1            Where to get your medicines      [facility] [address] [place] [address] [address] AT [place] [address] & [facility]  [address], [facility] [address]     Phone:  [phone]     amLODIPine 2.5 MG tablet    levothyroxine 88 MCG tablet    lisinopril-hydrochlorothiazide 20-12.5 MG per tablet    pravastatin 40 MG tablet                Primary Care Provider Office Phone # Fax #    Laura Tipton [phone] [address]  [facility] [address]        Equal Access to Services     GERMANIA SWAN : Hadii aad ku hadasho Soomaali, waaxda luqadaha, qaybta kaalmada adeegyada, waxay idiin hayaan adeeg kharash labenita . So [facility] [phone].    ATENCIÓN: Si habla español, tiene a tracy disposición servicios gratuitos de asistencia lingüística. [facility] [phone].    We comply with applicable federal civil rights laws and [place] laws. We do not discriminate on the basis of race, color, national origin, age, disability, sex, sexual orientation, or gender identity.            Thank you!     Thank you for choosing [facility]  for your care. Our goal is always to provide you with excellent care. Hearing back from our patients is one way we can continue to improve our services. Please take a few minutes to complete the written survey that you may receive in the mail after your visit with us. Thank you!             Your Updated Medication List - Protect others around you: Learn how to safely use, store and throw away your medicines at www.disposemymeds.org.          This list is accurate as of 10/12/18 10:04 AM.  Always use your most recent med list.                   Brand Name Dispense Instructions for use Diagnosis    albuterol 108 (90 Base) MCG/ACT inhaler    PROAIR HFA/PROVENTIL HFA/VENTOLIN HFA    1 Inhaler    Inhale 2 puffs into the lungs every 6 hours as needed for shortness of breath / dyspnea or wheezing    Seasonal allergies, SOB (shortness of breath)       alendronate 70 MG tablet    FOSAMAX    12 tablet    TAKE 1 TABLET(70 MG) BY MOUTH  EVERY 7 DAYS    Osteoporosis, unspecified osteoporosis type, unspecified pathological fracture presence       amitriptyline 100 MG tablet    ELAVIL    90 tablet    TAKE 1 TABLET(100 MG) BY MOUTH AT BEDTIME    Insomnia       amLODIPine 2.5 MG tablet    NORVASC    90 tablet    Take 1 tablet (2.5 mg) by mouth daily    Essential hypertension with goal blood pressure less than 140/90       cholecalciferol 1000 UNIT tablet    vitamin D3    100 tablet    Take 1 tablet (1,000 Units) by mouth daily        CLARITIN 10 MG tablet   Generic drug:  loratadine      Take 1 tablet (10 mg) by mouth daily    Seasonal allergic rhinitis       Co Q10 200 MG Caps     100 capsule    Take 200 mg by mouth daily        COSOPT PF 22.3-6.8 MG/ML opthalmic solution   Generic drug:  dorzolamide-timolol PF           fluticasone 50 MCG/ACT spray    FLONASE    1 Bottle    Spray 1-2 sprays into both nostrils daily    Congestion of paranasal sinus, Acute sinusitis with symptoms > 10 days       levothyroxine 88 MCG tablet    SYNTHROID/LEVOTHROID    90 tablet    Take 1 tablet (88 mcg) by mouth daily    Hypothyroidism, unspecified type       lisinopril-hydrochlorothiazide 20-12.5 MG per tablet    PRINZIDE/ZESTORETIC    90 tablet    Take 1 tablet by mouth daily    Hypertension goal BP (blood pressure) < 140/90       metoprolol succinate 25 MG 24 hr tablet    TOPROL-XL    135 tablet    Take 1.5 tablets (37.5 mg) by mouth daily    Hypertension goal BP (blood pressure) < 140/90, Tachycardia       pravastatin 40 MG tablet    PRAVACHOL    90 tablet    TAKE 1 TABLET(40 MG) BY MOUTH DAILY    Hyperlipidemia LDL goal <130       ranitidine 150 MG tablet    ZANTAC    180 tablet    TAKE 1 TABLET(150 MG) BY MOUTH TWICE DAILY    Midepigastric pain       ZIOPTAN 0.0015 % Soln   Generic drug:  Tafluprost

## 2018-10-12 NOTE — PROGRESS NOTES

## 2018-10-30 DIAGNOSIS — E78.5 HYPERLIPIDEMIA LDL GOAL <130: ICD-10-CM

## 2018-10-30 RX ORDER — PRAVASTATIN SODIUM 40 MG
TABLET ORAL
Qty: 90 TABLET | Refills: 0 | OUTPATIENT
Start: 2018-10-30

## 2018-10-30 NOTE — TELEPHONE ENCOUNTER
"Requested Prescriptions   Pending Prescriptions Disp Refills     pravastatin (PRAVACHOL) 40 MG tablet [Pharmacy Med Name: PRAVASTATIN 40MG TABLETS]  Last Written Prescription Date:  10/12/18  Last Fill Quantity: 90,  # refills: 3   Last office visit: 10/12/2018 with prescribing provider:  Laura Tipton MD    Future Office Visit:     90 tablet 0     Sig: TAKE 1 TABLET(40 MG) BY MOUTH DAILY    Statins Protocol Passed    10/30/2018  3:16 AM       Passed - LDL on file in past 12 months    Recent Labs   Lab Test  09/28/18   0951   LDL  77            Passed - No abnormal creatine kinase in past 12 months    No lab results found.            Passed - Recent (12 mo) or future (30 days) visit within the authorizing provider's specialty    Patient had office visit in the last 12 months or has a visit in the next 30 days with authorizing provider or within the authorizing provider's specialty.  See \"Patient Info\" tab in inbasket, or \"Choose Columns\" in Meds & Orders section of the refill encounter.             Passed - Patient is age 18 or older       Passed - No active pregnancy on record       Passed - No positive pregnancy test in past 12 months          "

## 2018-10-30 NOTE — TELEPHONE ENCOUNTER
Patient has refills remaining with pharmacy.  Crystal Tirado RN - Triage  [facility]

## 2018-10-31 DIAGNOSIS — E03.9 HYPOTHYROIDISM, UNSPECIFIED TYPE: ICD-10-CM

## 2018-10-31 RX ORDER — LEVOTHYROXINE SODIUM 88 UG/1
TABLET ORAL
Qty: 30 TABLET | Refills: 0
Start: 2018-10-31

## 2018-12-01 DIAGNOSIS — R55 SYNCOPE: Primary | ICD-10-CM

## 2018-12-19 DIAGNOSIS — I10 HYPERTENSION GOAL BP (BLOOD PRESSURE) < 140/90: ICD-10-CM

## 2018-12-19 DIAGNOSIS — R00.0 TACHYCARDIA: ICD-10-CM

## 2018-12-19 RX ORDER — METOPROLOL SUCCINATE 25 MG/1
37.5 TABLET, EXTENDED RELEASE ORAL DAILY
Qty: 135 TABLET | Refills: 2 | Status: SHIPPED | OUTPATIENT
Start: 2018-12-19 | End: 2019-09-17

## 2018-12-19 NOTE — TELEPHONE ENCOUNTER
Not due for a refill.     lisinopril-hydrochlorothiazide (PRINZIDE/ZESTORETIC) 20-12.5 MG per tablet was filled on 10/12/2018, qty 90 with 1 refills.

## 2018-12-20 RX ORDER — LISINOPRIL AND HYDROCHLOROTHIAZIDE 12.5; 2 MG/1; MG/1
TABLET ORAL
Qty: 90 TABLET | Refills: 0 | OUTPATIENT
Start: 2018-12-20

## 2018-12-20 NOTE — TELEPHONE ENCOUNTER
Patient has refills remaining with requesting pharmacy.  Crystal ROBERTSON RN - Triage  [facility]

## 2018-12-26 DIAGNOSIS — G47.00 INSOMNIA: ICD-10-CM

## 2018-12-27 RX ORDER — AMITRIPTYLINE HYDROCHLORIDE 100 MG/1
TABLET ORAL
Qty: 90 TABLET | Refills: 0 | Status: SHIPPED | OUTPATIENT
Start: 2018-12-27 | End: 2019-03-28

## 2018-12-27 NOTE — TELEPHONE ENCOUNTER
"Requested Prescriptions   Pending Prescriptions Disp Refills     amitriptyline (ELAVIL) 100 MG tablet [Pharmacy Med Name: AMITRIPTYLINE 100MG (HUNDRED MG)TAB] 90 tablet 0    Last Written Prescription Date:  9/28/2018  Last Fill Quantity: 90,  # refills: 0   Last office visit: 10/12/2018 with prescribing provider:  [place]   Future Office Visit:     Sig: TAKE 1 TABLET(100 MG) BY MOUTH AT BEDTIME    Tricyclic Agents ( Annual appt and no PHQ9) Passed - 12/26/2018  5:26 PM       Passed - Blood Pressure under 140/90 in past 12 mos    BP Readings from Last 3 Encounters:   10/12/18 138/88   09/21/18 128/76   09/14/18 132/78                Passed - Recent (12 mo) or future (30 days) visit within authorizing provider's specialty    Patient had office visit in the last 12 months or has a visit in the next 30 days with authorizing provider or within the authorizing provider's specialty.  See \"Patient Info\" tab in inbasket, or \"Choose Columns\" in Meds & Orders section of the refill encounter.             Passed - Patient is age 18 or older       Passed - Patient is not pregnant       Passed - No positive pregnancy test on record in past 12 mos        Prescription approved per [facility], [facility] or [facility] refill protocol.  Crystal ROBERTSON RN - [facility]        "

## 2019-01-09 ENCOUNTER — TRANSFERRED RECORDS (OUTPATIENT)
Dept: HEALTH INFORMATION MANAGEMENT | Facility: CLINIC | Age: 74
End: 2019-01-09

## 2019-01-31 ENCOUNTER — ANCILLARY PROCEDURE (OUTPATIENT)
Dept: CARDIOLOGY | Facility: CLINIC | Age: 74
End: 2019-01-31
Attending: INTERNAL MEDICINE
Payer: MEDICARE

## 2019-01-31 DIAGNOSIS — R55 SYNCOPE: ICD-10-CM

## 2019-01-31 DIAGNOSIS — Z45.09 ENCOUNTER FOR ELECTRONIC ANALYSIS OF REVEAL EVENT RECORDER: Primary | ICD-10-CM

## 2019-01-31 PROCEDURE — 93299 CARDIAC DEVICE CHECK - REMOTE: CPT | Performed by: INTERNAL MEDICINE

## 2019-01-31 PROCEDURE — 93297 REM INTERROG DEV EVAL ICPMS: CPT | Performed by: INTERNAL MEDICINE

## 2019-02-05 LAB
MDC_IDC_MSMT_BATTERY_STATUS: NORMAL
MDC_IDC_PG_IMPLANT_DTM: NORMAL
MDC_IDC_PG_MFG: NORMAL
MDC_IDC_PG_MODEL: NORMAL
MDC_IDC_PG_SERIAL: NORMAL
MDC_IDC_PG_TYPE: NORMAL
MDC_IDC_SESS_CLINIC_NAME: NORMAL
MDC_IDC_SESS_DTM: NORMAL
MDC_IDC_SESS_TYPE: NORMAL
MDC_IDC_SET_ZONE_DETECTION_INTERVAL: 2000 MS
MDC_IDC_SET_ZONE_DETECTION_INTERVAL: 3000 MS
MDC_IDC_SET_ZONE_DETECTION_INTERVAL: 380 MS
MDC_IDC_SET_ZONE_TYPE: NORMAL
MDC_IDC_STAT_AT_BURDEN_PERCENT: 0 %
MDC_IDC_STAT_AT_DTM_END: NORMAL
MDC_IDC_STAT_AT_DTM_START: NORMAL
MDC_IDC_STAT_EPISODE_RECENT_COUNT: 0
MDC_IDC_STAT_EPISODE_RECENT_COUNT_DTM_END: NORMAL
MDC_IDC_STAT_EPISODE_RECENT_COUNT_DTM_START: NORMAL
MDC_IDC_STAT_EPISODE_TOTAL_COUNT: 0
MDC_IDC_STAT_EPISODE_TOTAL_COUNT_DTM_END: NORMAL
MDC_IDC_STAT_EPISODE_TOTAL_COUNT_DTM_START: NORMAL
MDC_IDC_STAT_EPISODE_TYPE: NORMAL

## 2019-03-28 DIAGNOSIS — G47.00 INSOMNIA: ICD-10-CM

## 2019-03-28 NOTE — TELEPHONE ENCOUNTER
"Requested Prescriptions   Pending Prescriptions Disp Refills     amitriptyline (ELAVIL) 100 MG tablet [Pharmacy Med Name: AMITRIPTYLINE 100MG (HUNDRED MG)TAB]  Last Written Prescription Date:  12/27/18  Last Fill Quantity: 90 tablet,  # refills: 0   Last office visit: 10/12/2018 with prescribing provider:  10/12/18   Future Office Visit:   Next 5 appointments (look out 90 days)    Apr 12, 2019  1:30 PM CDT  Office Visit with Laura Tipton MD  [address] [address] [phone]        90 tablet 0     Sig: TAKE 1 TABLET(100 MG) BY MOUTH AT BEDTIME    Tricyclic Agents ( Annual appt and no PHQ9) Passed - 3/28/2019  1:21 PM       Passed - Blood Pressure under 140/90 in past 12 mos    BP Readings from Last 3 Encounters:   10/12/18 138/88   09/21/18 128/76   09/14/18 132/78          Passed - Recent (12 mo) or future (30 days) visit within authorizing provider's specialty    Patient had office visit in the last 12 months or has a visit in the next 30 days with authorizing provider or within the authorizing provider's specialty.  See \"Patient Info\" tab in inbasket, or \"Choose Columns\" in Meds & Orders section of the refill encounter.           Passed - Medication is active on med list       Passed - Patient is age 18 or older       Passed - Patient is not pregnant       Passed - No positive pregnancy test on record in past 12 mos          "

## 2019-03-29 RX ORDER — AMITRIPTYLINE HYDROCHLORIDE 100 MG/1
TABLET ORAL
Qty: 90 TABLET | Refills: 1 | Status: SHIPPED | OUTPATIENT
Start: 2019-03-29 | End: 2019-10-24

## 2019-05-14 ENCOUNTER — OFFICE VISIT (OUTPATIENT)
Dept: FAMILY MEDICINE | Facility: CLINIC | Age: 74
End: 2019-05-14
Payer: MEDICARE

## 2019-05-14 VITALS
WEIGHT: 203.5 LBS | RESPIRATION RATE: 18 BRPM | HEART RATE: 92 BPM | OXYGEN SATURATION: 94 % | TEMPERATURE: 98.3 F | BODY MASS INDEX: 31.94 KG/M2 | DIASTOLIC BLOOD PRESSURE: 86 MMHG | SYSTOLIC BLOOD PRESSURE: 142 MMHG | HEIGHT: 67 IN

## 2019-05-14 DIAGNOSIS — G89.29 CHRONIC PAIN OF BOTH KNEES: ICD-10-CM

## 2019-05-14 DIAGNOSIS — M79.605 BILATERAL LEG PAIN: ICD-10-CM

## 2019-05-14 DIAGNOSIS — I10 HYPERTENSION GOAL BP (BLOOD PRESSURE) < 140/90: Primary | ICD-10-CM

## 2019-05-14 DIAGNOSIS — M25.561 CHRONIC PAIN OF BOTH KNEES: ICD-10-CM

## 2019-05-14 DIAGNOSIS — M79.604 BILATERAL LEG PAIN: ICD-10-CM

## 2019-05-14 DIAGNOSIS — M25.562 CHRONIC PAIN OF BOTH KNEES: ICD-10-CM

## 2019-05-14 DIAGNOSIS — I87.2 VENOUS INSUFFICIENCY: ICD-10-CM

## 2019-05-14 PROCEDURE — 99214 OFFICE O/P EST MOD 30 MIN: CPT | Performed by: FAMILY MEDICINE

## 2019-05-14 RX ORDER — LISINOPRIL AND HYDROCHLOROTHIAZIDE 20; 25 MG/1; MG/1
1 TABLET ORAL DAILY
Qty: 90 TABLET | Refills: 0 | Status: SHIPPED | OUTPATIENT
Start: 2019-05-14 | End: 2019-08-11

## 2019-05-14 ASSESSMENT — MIFFLIN-ST. JEOR: SCORE: 1455.7

## 2019-05-14 NOTE — PROGRESS NOTES
"  SUBJECTIVE:   Teri Beltran is a 73 year old female who presents to clinic today for the following health issues:    Hypertension Follow-up      Outpatient blood pressures are not being checked.    Low Salt Diet: no added salt      Amount of exercise or physical activity: None     Problems taking medications regularly: No    Medication side effects: none    Diet: regular (no restrictions)-No Salt     Additional Concerns: Two Months ago patient was shoveling her decking and noticed both of her legs became stiff. Experienced excruciating pain in both legs the next day. States she currently was seen by a \"back doctor\" and was told to do PT exercises/stretches at home. These she states helped. Instructed if pain comes back, to get an MRI done. Also notes when seeing said provider, they noticed she has a curvature in her spine. Pain seems resolved, worth noting due to patient concern.         Additional history:     Reviewed  and updated as needed this visit by clinical staff         Reviewed and updated as needed this visit by Provider         See under ROS     Patient Active Problem List   Diagnosis     Hypothyroidism     Tachycardia     Glaucoma     Prediabetes     Hyperlipidemia LDL goal <130     Palpitations     BMI 30.0-30.9,adult     Health Care Home     Advanced directives, counseling/discussion     Hypertension goal BP (blood pressure) < 140/90     Adjustment disorder with depressed mood     Osteoporosis     Mild dilation of ascending aorta - borderline (H)     RBBB     First degree AV block     Venous insufficiency     Varicose veins       Current Outpatient Medications   Medication Sig Dispense Refill     albuterol (PROAIR HFA, PROVENTIL HFA, VENTOLIN HFA) 108 (90 BASE) MCG/ACT inhaler Inhale 2 puffs into the lungs every 6 hours as needed for shortness of breath / dyspnea or wheezing 1 Inhaler 0     alendronate (FOSAMAX) 70 MG tablet TAKE 1 TABLET(70 MG) BY MOUTH EVERY 7 DAYS 12 tablet 3     " amitriptyline (ELAVIL) 100 MG tablet TAKE 1 TABLET(100 MG) BY MOUTH AT BEDTIME 90 tablet 1     amLODIPine (NORVASC) 2.5 MG tablet Take 1 tablet (2.5 mg) by mouth daily 90 tablet 2     cholecalciferol (VITAMIN D) 1000 UNIT tablet Take 1 tablet (1,000 Units) by mouth daily 100 tablet 3     Coenzyme Q10 (CO Q10) 200 MG CAPS Take 200 mg by mouth daily 100 capsule prn     COSOPT PF 22.3-6.8 MG/ML SOLN   3     fluticasone (FLONASE) 50 MCG/ACT spray Spray 1-2 sprays into both nostrils daily 1 Bottle 3     levothyroxine (SYNTHROID/LEVOTHROID) 88 MCG tablet Take 1 tablet (88 mcg) by mouth daily 90 tablet 3     lisinopril-hydrochlorothiazide (PRINZIDE/ZESTORETIC) 20-12.5 MG per tablet Take 1 tablet by mouth daily 90 tablet 1     loratadine (CLARITIN) 10 MG tablet Take 1 tablet (10 mg) by mouth daily       metoprolol succinate ER (TOPROL-XL) 25 MG 24 hr tablet Take 1.5 tablets (37.5 mg) by mouth daily 135 tablet 2     pravastatin (PRAVACHOL) 40 MG tablet TAKE 1 TABLET(40 MG) BY MOUTH DAILY 90 tablet 3     ranitidine (ZANTAC) 150 MG tablet TAKE 1 TABLET(150 MG) BY MOUTH TWICE DAILY 180 tablet 2     ZIOPTAN 0.0015 % SOLN            ROS:  CONSTITUTIONAL:NEGATIVE for fever, chills, change in weight  RESP:NEGATIVE for significant cough or short of breath x mild cough. Not annoying. Occasionally short of breath.   CV: NEGATIVE for chest pain, palpitations or peripheral edema  MUSCULOSKELETAL: see below  PSYCHIATRIC: NEGATIVE for changes in mood or affect    Notes was shoveling.  Muscles in back of leg tightened and were sore.  Continued to shovel.  Next am was in severe pain; legs.  Went to [facility] and saw the back doctor's PA.   Did some physical therapy. Was back a week ago last Friday. Much better.     Will have a total knee in July. July 23    Usually no swelling when wearing teds.     Requests [employer] parking. Notes for her knee when bothering and pain. She cannot walk without stopping if things are in a flare.   Bilateral knee  "OA  Also with curvature of spine.    OBJECTIVE:     /86 (BP Location: Right arm, Patient Position: Chair, Cuff Size: Adult Regular)   Pulse 92   Temp 98.3  F (36.8  C) (Oral)   Resp 18   Ht 1.702 m (5' 7\")   Wt 92.3 kg (203 lb 8 oz)   SpO2 94%   BMI 31.87 kg/m    Body mass index is 31.87 kg/m .  GENERAL APPEARANCE: alert and no distress  RESP: lungs clear to auscultation - no rales, rhonchi or wheezes  CV: regular rates and rhythm  MS: wearing compression stockings.  PSYCH: mentation appears normal and affect normal/bright    Recent Labs   Lab Test 09/28/18  0951 09/28/17  0938  08/11/15  1224 08/20/14  0958   CHOL 194 183   < > 176 201*   HDL 99 83   < > 79 88   LDL 77 76   < > 70 79   TRIG 91 122   < > 133 169*   CHOLHDLRATIO  --   --   --  2.2 2.3    < > = values in this interval not displayed.             ASSESSMENT/PLAN:     1. Hypertension goal BP (blood pressure) < 140/90  Not at optimal control. Increasing the hydrochlorothiazide component.   - lisinopril-hydrochlorothiazide (PRINZIDE/ZESTORETIC) 20-25 MG tablet; Take 1 tablet by mouth daily  Dispense: 90 tablet; Refill: 0    2. Chronic pain of both knees  Did complete a disability parking sticker form for her. Copy being scanned in.  Anticipates surgery soon.    3. Bilateral leg pain  Improving. Sounds like may be from back.    4. Venous insufficiency  Some edema; but does well with stockings.  Does see vein specialists.    Laura Tipton MD, MD  [facility]        "

## 2019-05-17 ENCOUNTER — ANCILLARY PROCEDURE (OUTPATIENT)
Dept: CARDIOLOGY | Facility: CLINIC | Age: 74
End: 2019-05-17
Attending: INTERNAL MEDICINE
Payer: MEDICARE

## 2019-05-17 DIAGNOSIS — Z45.09 ENCOUNTER FOR LOOP RECORDER CHECK: Primary | ICD-10-CM

## 2019-05-17 DIAGNOSIS — Z45.09 ENCOUNTER FOR ELECTRONIC ANALYSIS OF REVEAL EVENT RECORDER: ICD-10-CM

## 2019-05-17 PROCEDURE — 93299 CARDIAC DEVICE CHECK - REMOTE: CPT | Performed by: INTERNAL MEDICINE

## 2019-05-17 PROCEDURE — 93298 REM INTERROG DEV EVAL SCRMS: CPT | Performed by: INTERNAL MEDICINE

## 2019-06-20 DIAGNOSIS — I10 ESSENTIAL HYPERTENSION WITH GOAL BLOOD PRESSURE LESS THAN 140/90: ICD-10-CM

## 2019-06-20 NOTE — TELEPHONE ENCOUNTER
"Requested Prescriptions   Pending Prescriptions Disp Refills     lisinopril-hydrochlorothiazide (PRINZIDE/ZESTORETIC) 20-12.5 MG tablet [Pharmacy Med Name: LISINOPRIL-HCTZ 20/12.5MG TABLETS]  Last Written Prescription Date:  5/14/19  Last Fill Quantity: 90,  # refills: 0   Last office visit: 5/14/2019 with prescribing provider:  Laura Tipton MD   Future Office Visit:   Next 5 appointments (look out 90 days)    Jul 16, 2019  1:30 PM CDT  [facility] [address] [facility] [address] [phone]          90 tablet 0     Sig: TAKE 1 TABLET BY MOUTH DAILY       Diuretics (Including Combos) Protocol Failed - 6/20/2019 10:12 AM        Failed - Blood pressure under 140/90 in past 12 months     BP Readings from Last 3 Encounters:   05/14/19 142/86   10/12/18 138/88   09/21/18 128/76                 Passed - Recent (12 mo) or future (30 days) visit within the authorizing provider's specialty     Patient had office visit in the last 12 months or has a visit in the next 30 days with authorizing provider or within the authorizing provider's specialty.  See \"Patient Info\" tab in inbasket, or \"Choose Columns\" in Meds & Orders section of the refill encounter.              Passed - Medication is active on med list        Passed - Patient is age 18 or older        Passed - No active pregancy on record        Passed - Normal serum creatinine on file in past 12 months     Recent Labs   Lab Test 09/28/18  0951   CR 0.73              Passed - Normal serum potassium on file in past 12 months     Recent Labs   Lab Test 09/28/18  0951   POTASSIUM 4.8                    Passed - Normal serum sodium on file in past 12 months     Recent Labs   Lab Test 09/28/18  0951                 Passed - No positive pregnancy test in past 12 months          "

## 2019-06-21 RX ORDER — LISINOPRIL AND HYDROCHLOROTHIAZIDE 12.5; 2 MG/1; MG/1
1 TABLET ORAL DAILY
Qty: 90 TABLET | Refills: 0 | OUTPATIENT
Start: 2019-06-21

## 2019-06-27 ENCOUNTER — HOSPITAL ENCOUNTER (OUTPATIENT)
Dept: LAB | Facility: CLINIC | Age: 74
Discharge: HOME | End: 2019-06-27
Attending: ORTHOPAEDIC SURGERY | Admitting: ORTHOPAEDIC SURGERY
Payer: MEDICARE

## 2019-06-27 DIAGNOSIS — Z01.812 PRE-OPERATIVE LABORATORY EXAMINATION: ICD-10-CM

## 2019-06-27 LAB
MRSA DNA SPEC QL NAA+PROBE: NEGATIVE
SPECIMEN SOURCE: NORMAL

## 2019-06-27 PROCEDURE — 87640 STAPH A DNA AMP PROBE: CPT | Performed by: ORTHOPAEDIC SURGERY

## 2019-06-27 PROCEDURE — 87641 MR-STAPH DNA AMP PROBE: CPT | Performed by: ORTHOPAEDIC SURGERY

## 2019-06-27 PROCEDURE — 40000829 ZZHCL STATISTIC STAPH AUREUS SUSCEPT SCREEN PCR: Performed by: ORTHOPAEDIC SURGERY

## 2019-06-27 PROCEDURE — 40000830 ZZHCL STATISTIC STAPH AUREUS METH RESIST SCREEN PCR: Performed by: ORTHOPAEDIC SURGERY

## 2019-06-28 DIAGNOSIS — R10.13 MIDEPIGASTRIC PAIN: ICD-10-CM

## 2019-06-28 NOTE — TELEPHONE ENCOUNTER
"Requested Prescriptions   Pending Prescriptions Disp Refills     ranitidine (ZANTAC) 150 MG tablet [Pharmacy Med Name: RANITIDINE 150MG TABLETS]  Last Written Prescription Date:  10/3/18  Last Fill Quantity: 180,  # refills: 2   Last office visit: 5/14/2019 with prescribing provider:  Laura Tipton MD    Future Office Visit:   Next 5 appointments (look out 90 days)    Jul 16, 2019  1:30 PM CDT  Pre-Op physical with Laura Tipton MD  [address] [address] [phone]          180 tablet 0     Sig: TAKE 1 TABLET(150 MG) BY MOUTH TWICE DAILY       H2 Blockers Protocol Passed - 6/28/2019  3:16 AM        Passed - Patient is age 12 or older        Passed - Recent (12 mo) or future (30 days) visit within the authorizing provider's specialty     Patient had office visit in the last 12 months or has a visit in the next 30 days with authorizing provider or within the authorizing provider's specialty.  See \"Patient Info\" tab in inbasket, or \"Choose Columns\" in Meds & Orders section of the refill encounter.              Passed - Medication is active on med list          "

## 2019-06-28 NOTE — TELEPHONE ENCOUNTER
Prescription approved per [facility] Protocol.    Liza Alavrado, RN, BSN

## 2019-07-08 RX ORDER — ASPIRIN 81 MG/1
81 TABLET ORAL DAILY
Status: ON HOLD | COMMUNITY
End: 2019-07-24

## 2019-07-08 RX ORDER — LATANOPROST 50 UG/ML
1 SOLUTION/ DROPS OPHTHALMIC AT BEDTIME
COMMUNITY

## 2019-07-16 ENCOUNTER — OFFICE VISIT (OUTPATIENT)
Dept: FAMILY MEDICINE | Facility: CLINIC | Age: 74
End: 2019-07-16
Payer: MEDICARE

## 2019-07-16 VITALS
WEIGHT: 200.1 LBS | HEART RATE: 95 BPM | RESPIRATION RATE: 16 BRPM | HEIGHT: 67 IN | DIASTOLIC BLOOD PRESSURE: 72 MMHG | OXYGEN SATURATION: 97 % | SYSTOLIC BLOOD PRESSURE: 125 MMHG | BODY MASS INDEX: 31.4 KG/M2 | TEMPERATURE: 98.2 F

## 2019-07-16 DIAGNOSIS — R26.2 INABILITY TO AMBULATE DUE TO KNEE: ICD-10-CM

## 2019-07-16 DIAGNOSIS — Z01.818 PREOP GENERAL PHYSICAL EXAM: Primary | ICD-10-CM

## 2019-07-16 LAB
ERYTHROCYTE [DISTWIDTH] IN BLOOD BY AUTOMATED COUNT: 14 % (ref 10–15)
HCT VFR BLD AUTO: 40.1 % (ref 35–47)
HGB BLD-MCNC: 13.1 G/DL (ref 11.7–15.7)
MCH RBC QN AUTO: 30.8 PG (ref 26.5–33)
MCHC RBC AUTO-ENTMCNC: 32.7 G/DL (ref 31.5–36.5)
MCV RBC AUTO: 94 FL (ref 78–100)
PLATELET # BLD AUTO: 331 10E9/L (ref 150–450)
RBC # BLD AUTO: 4.25 10E12/L (ref 3.8–5.2)
WBC # BLD AUTO: 7.8 10E9/L (ref 4–11)

## 2019-07-16 PROCEDURE — 93000 ELECTROCARDIOGRAM COMPLETE: CPT | Performed by: FAMILY MEDICINE

## 2019-07-16 PROCEDURE — 80048 BASIC METABOLIC PNL TOTAL CA: CPT | Performed by: FAMILY MEDICINE

## 2019-07-16 PROCEDURE — 99214 OFFICE O/P EST MOD 30 MIN: CPT | Performed by: FAMILY MEDICINE

## 2019-07-16 PROCEDURE — 36415 COLL VENOUS BLD VENIPUNCTURE: CPT | Performed by: FAMILY MEDICINE

## 2019-07-16 PROCEDURE — 85027 COMPLETE CBC AUTOMATED: CPT | Performed by: FAMILY MEDICINE

## 2019-07-16 ASSESSMENT — MIFFLIN-ST. JEOR: SCORE: 1440.28

## 2019-07-16 NOTE — LETTER
[facility]  [address] [facility] [address] [phone]          July 17, 2019    Teri Beltran                                                                                                                     [address] [facility] [address]            Dear Teri,    Enclosed is a copy of your recent results.    They have flagged your glucose as being a little high. Fasting sugars (glucose) of 100 or above are considered abnormal. We are already following your glucose.    GFR stands for glomerular filtration rate (this is in regards to the kidney). They are now showing an estimate of this, based on the actual creatinine, age, gender, and race. This is commonly lower as one gets older. Your level of hydration can have an impact also.  If this is low, we should be aggressive with managing high blood pressure or high cholesterol.    I hope you are having a nice summer!    Sincerely,         Laura Tipton MD

## 2019-07-16 NOTE — LETTER
[facility]  [address] [facility] [address] [phone]          July 17, 2019    Teri Beltran                                                                                                                     [address] [facility] [address]            Dear Teri,    Enclosed is a copy of your recent results.    They have flagged your glucose as being a little high. Fasting sugars (glucose) of 100 or above are considered abnormal. We are already following your glucose.    GFR stands for glomerular filtration rate (this is in regards to the kidney). They are now showing an estimate of this, based on the actual creatinine, age, gender, and race. This is commonly lower as one gets older. Your level of hydration can have an impact also.  If this is low, we should be aggressive with managing high blood pressure or high cholesterol.    I hope you are having a nice summer!    Sincerely,         Laura Tipton MD

## 2019-07-16 NOTE — PROGRESS NOTES
[facility]  [address] [facility] [address] [phone]  Dept: [phone]    PRE-OP EVALUATION:  Today's date: 2019    Teri Beltran (: 1945) presents for pre-operative evaluation assessment as requested by Dr. Muse .  She requires evaluation and anesthesia risk assessment prior to undergoing surgery/procedure for treatment of.Right total knee arthroplasty     Proposed Surgery/ Procedure: Right total knee arthroplasty   Date of Surgery/ Procedure: 2019  Time of Surgery/ Procedure: 12:30  Hospital/Surgical Facility: [facility]    Primary Physician: Laura Tipton  Type of Anesthesia Anticipated: Choice    Patient has a Health Care Directive or Living Will:  NO    1. NO - Do you have a history of heart attack, stroke, stent, bypass or surgery on an artery in the head, neck, heart or legs?  2. NO - Do you ever have any pain or discomfort in your chest?  3. NO - Do you have a history of  Heart Failure?  4. NO - Are you troubled by shortness of breath when: walking on the level, up a slight hill or at night?  5. NO - Do you currently have a cold, bronchitis or other respiratory infection?  6. NO - Do you have a cough, shortness of breath or wheezing?  7. NO - Do you sometimes get pains in the calves of your legs when you walk?  8. NO - Do you or anyone in your family have previous history of blood clots?  9. NO - Do you or does anyone in your family have a serious bleeding problem such as prolonged bleeding following surgeries or cuts?  10. NO - Have you ever had problems with anemia or been told to take iron pills?  11. NO - Have you had any abnormal blood loss such as black, tarry or bloody stools, or abnormal vaginal bleeding?  12. NO - Have you ever had a blood transfusion?  13. NO - Have you or any of your relatives ever had problems with anesthesia?  14. NO - Do you have sleep apnea, excessive snoring or daytime drowsiness?  15. NO - Do you have  any prosthetic heart valves?  16. NO - Do you have prosthetic joints?  17. NO - Is there any chance that you may be pregnant?      HPI:     HPI related to upcoming procedure: difficulty walking due to right leg being bent. Not having too much pain.      See problem list for active medical problems.  Problems all longstanding and stable, except as noted/documented.  See ROS for pertinent symptoms related to these conditions.      MEDICAL HISTORY:     Patient Active Problem List    Diagnosis Date Noted     Health Care Home 08/01/2013     Priority: High       Date Noted Care Team Member TO DO/Alerts to be completed   8/1/2013  Laura Tipton  Discuss use of amitripyline (she notes she uses for sleep)                 Venous insufficiency      Priority: Medium     Varicose veins      Priority: Medium     Osteoporosis 08/11/2015     Priority: Medium     Mild dilation of ascending aorta - borderline (H) 08/11/2015     Priority: Medium     Good in 2018. Recommend ECHO in 2 years to follow hypertensive changes       RBBB 08/11/2015     Priority: Medium     First degree AV block 08/11/2015     Priority: Medium     Adjustment disorder with depressed mood 08/05/2014     Priority: Medium     Hypertension goal BP (blood pressure) < 140/90 12/03/2013     Priority: Medium     Advanced directives, counseling/discussion 10/02/2013     Priority: Medium     Patient given info and will call if she is interested       BMI 30.0-30.9,adult 07/30/2013     Priority: Medium     Palpitations 07/03/2013     Priority: Medium     Prediabetes 06/14/2013     Priority: Medium     Hyperlipidemia LDL goal <130 06/14/2013     Priority: Medium     Hypothyroidism      Priority: Medium     Tachycardia      Priority: Medium     Glaucoma      Priority: Medium     Problem list name updated by automated process. Provider to review and confirm  [employer] Update utility        Past Medical History:   Diagnosis Date     Complication of anesthesia     confusion after  anesthesia (after hysterectomy)     First degree AV block 8/11/2015     Gastroesophageal reflux disease      Glaucoma      Heart block      Hyperlipidemia LDL goal <130 6/14/2013     Hypertension goal BP (blood pressure) < 140/90 12/3/2013     Hypothyroidism      Mild dilation of ascending aorta - borderline 8/11/2015     Palpitations      RBBB 8/11/2015     Tachycardia      Varicose veins      Venous insufficiency      Past Surgical History:   Procedure Laterality Date     BREAST SURGERY      right breast ductal surgery due to milk production     COLONOSCOPY N/A 10/24/2014    no further screening. Procedure: COLONOSCOPY;  Surgeon: Pedro Rudd MD;  Location: [facility]     HYSTERECTOMY, PAP NO LONGER INDICATED  2009    total hysterectomy and ovaries. benign     SOFT TISSUE SURGERY      ganglion removed from left wrist and ring finger     SURGICAL HISTORY OF -   age 7    tonsillectomy     SURGICAL HISTORY OF -   1/15    left eye cataract     SURGICAL HISTORY OF -   Fall 2016    LINQ placed.     Current Outpatient Medications   Medication Sig Dispense Refill     albuterol (PROAIR HFA, PROVENTIL HFA, VENTOLIN HFA) 108 (90 BASE) MCG/ACT inhaler Inhale 2 puffs into the lungs every 6 hours as needed for shortness of breath / dyspnea or wheezing 1 Inhaler 0     alendronate (FOSAMAX) 70 MG tablet TAKE 1 TABLET(70 MG) BY MOUTH EVERY 7 DAYS 12 tablet 3     amitriptyline (ELAVIL) 100 MG tablet TAKE 1 TABLET(100 MG) BY MOUTH AT BEDTIME 90 tablet 1     amLODIPine (NORVASC) 2.5 MG tablet Take 1 tablet (2.5 mg) by mouth daily 90 tablet 2     aspirin 81 MG EC tablet Take 81 mg by mouth daily       COSOPT PF 22.3-6.8 MG/ML SOLN   3     fluticasone (FLONASE) 50 MCG/ACT spray Spray 1-2 sprays into both nostrils daily 1 Bottle 3     latanoprost (XALATAN) 0.005 % ophthalmic solution Place 1 drop into the right eye daily       levothyroxine (SYNTHROID/LEVOTHROID) 88 MCG tablet Take 1 tablet (88 mcg) by mouth daily 90 tablet 3      "lisinopril-hydrochlorothiazide (PRINZIDE/ZESTORETIC) 20-25 MG tablet Take 1 tablet by mouth daily 90 tablet 0     loratadine (CLARITIN) 10 MG tablet Take 1 tablet (10 mg) by mouth daily       metoprolol succinate ER (TOPROL-XL) 25 MG 24 hr tablet Take 1.5 tablets (37.5 mg) by mouth daily 135 tablet 2     pravastatin (PRAVACHOL) 40 MG tablet TAKE 1 TABLET(40 MG) BY MOUTH DAILY 90 tablet 3     Prenatal MV-Min-Fe Fum-FA-DHA (PRENATAL 1 PO) Take by mouth daily       ranitidine (ZANTAC) 150 MG tablet TAKE 1 TABLET(150 MG) BY MOUTH TWICE DAILY 180 tablet 0     OTC products: Prenatal vitamin; she notes recommended in a class related to the upcoming surgery.     Allergies   Allergen Reactions     Seasonal Allergies       Latex Allergy: NO    Social History     Tobacco Use     Smoking status: Former Smoker     Packs/day: 0.50     Years: 18.00     Pack years: 9.00     Types: Cigarettes     Last attempt to quit: 1980     Years since quittin.5     Smokeless tobacco: Never Used   Substance Use Topics     Alcohol use: Yes     Alcohol/week: 0.0 oz     Comment: 1 glass of wine at night     History   Drug Use No       REVIEW OF SYSTEMS:   CONSTITUTIONAL: NEGATIVE for fever, chills, change in weight  ENT/MOUTH: NEGATIVE for ear, mouth and throat problems  RESP: NEGATIVE for significant cough or SOB  CV: NEGATIVE for chest pain, palpitations or peripheral edema  GI: NEGATIVE for nausea, abdominal pain, heartburn, or change in bowel habits  : no urinary symptoms.   PSYCHIATRIC: NEGATIVE for changes in mood or affect    EXAM:   /72 (BP Location: Right arm, Patient Position: Chair, Cuff Size: Adult Regular)   Pulse 95   Temp 98.2  F (36.8  C) (Oral)   Resp 16   Ht 1.702 m (5' 7\")   Wt 90.8 kg (200 lb 1.6 oz)   SpO2 97%   BMI 31.34 kg/m    GENERAL APPEARANCE: alert and no distress  HENT: ear canals and TM's normal and nose and mouth without ulcers or lesions  RESP: lungs clear to auscultation - no rales, rhonchi or " wheezes  CV: regular rates and rhythm  ABDOMEN: soft, nontender, no HSM or masses and bowel sounds normal  MS: trace ankle edema.   NEURO: Normal strength and tone, sensory exam grossly normal, mentation intact and speech normal  PSYCH: mentation appears normal and affect normal/bright    DIAGNOSTICS:     EKG: Normal Sinus Rhythm, first degree AV block, incomplete RBBB, unchanged from previous tracings  Labs Resulted Today:   Results for orders placed or performed in visit on 07/16/19   Basic metabolic panel   Result Value Ref Range    Sodium 139 133 - 144 mmol/L    Potassium 3.7 3.4 - 5.3 mmol/L    Chloride 102 94 - 109 mmol/L    Carbon Dioxide 28 20 - 32 mmol/L    Anion Gap 9 3 - 14 mmol/L    Glucose 103 (H) 70 - 99 mg/dL    Urea Nitrogen 21 7 - 30 mg/dL    Creatinine 0.68 0.52 - 1.04 mg/dL    GFR Estimate 86 >60 mL/min/[1.73_m2]    GFR Estimate If Black >90 >60 mL/min/[1.73_m2]    Calcium 9.6 8.5 - 10.1 mg/dL   CBC with platelets   Result Value Ref Range    WBC 7.8 4.0 - 11.0 10e9/L    RBC Count 4.25 3.8 - 5.2 10e12/L    Hemoglobin 13.1 11.7 - 15.7 g/dL    Hematocrit 40.1 35.0 - 47.0 %    MCV 94 78 - 100 fl    MCH 30.8 26.5 - 33.0 pg    MCHC 32.7 31.5 - 36.5 g/dL    RDW 14.0 10.0 - 15.0 %    Platelet Count 331 150 - 450 10e9/L     Bmp pending    Recent Labs   Lab Test 09/28/18  0951 10/05/17  1946 09/28/17  0938   HGB  --  14.9 13.7   PLT  --  342 336     --  138   POTASSIUM 4.8  --  4.4   CR 0.73  --  0.69   A1C 5.8*  --  5.9        IMPRESSION:   Difficulty ambulating due to knee deformity.     The proposed surgical procedure is considered INTERMEDIATE risk.    REVISED CARDIAC RISK INDEX  The patient has the following serious cardiovascular risks for perioperative complications such as (MI, PE, VFib and 3  AV Block):  No serious cardiac risks  INTERPRETATION: 1 risks: Class II (low risk - 0.9% complication rate)    The patient has the following additional risks for perioperative complications:  No  identified additional risks    1. Preop general physical exam    - Basic metabolic panel  - CBC with platelets  - EKG 12-lead complete w/read - Clinics    2. Inability to ambulate due to knee  Anticipating surgery.   - Basic metabolic panel  - CBC with platelets  - EKG 12-lead complete w/read - Clinics      RECOMMENDATIONS:         Regarding medications:     Stop asprin as of now.  Take amlodipine on the am of surgery with sips water; you can miss the other medications that day.       Pending review of diagnostic evaluation, APPROVAL GIVEN to proceed with proposed procedure, without further diagnostic evaluation.  Labs reviewed and satisfactory.       Signed Electronically by: Laura Tipton MD, MD    Copy of this evaluation report is provided to requesting physician.    [place] Preop Guidelines    Revised Cardiac Risk Index

## 2019-07-16 NOTE — PATIENT INSTRUCTIONS
Before Your Surgery      Call your surgeon if there is any change in your health. This includes signs of a cold or flu (such as a sore throat, runny nose, cough, rash or fever).    Do not smoke, drink alcohol or take over the counter medicine (unless your surgeon or primary care doctor tells you to) for the 24 hours before and after surgery.    If you take prescribed drugs: Follow your doctor s orders about which medicines to take and which to stop until after surgery.    Eating and drinking prior to surgery: follow the instructions from your surgeon    Take a shower or bath the night before surgery. Use the soap your surgeon gave you to gently clean your skin. If you do not have soap from your surgeon, use your regular soap. Do not shave or scrub the surgery site.  Wear clean pajamas and have clean sheets on your bed.       ----------------------------------    Stop asprin as of now.  Take amlodipine on the am of surgery with sips water; you can miss the other medications that day.

## 2019-07-17 LAB
ANION GAP SERPL CALCULATED.3IONS-SCNC: 9 MMOL/L (ref 3–14)
BUN SERPL-MCNC: 21 MG/DL (ref 7–30)
CALCIUM SERPL-MCNC: 9.6 MG/DL (ref 8.5–10.1)
CHLORIDE SERPL-SCNC: 102 MMOL/L (ref 94–109)
CO2 SERPL-SCNC: 28 MMOL/L (ref 20–32)
CREAT SERPL-MCNC: 0.68 MG/DL (ref 0.52–1.04)
GFR SERPL CREATININE-BSD FRML MDRD: 86 ML/MIN/{1.73_M2}
GLUCOSE SERPL-MCNC: 103 MG/DL (ref 70–99)
POTASSIUM SERPL-SCNC: 3.7 MMOL/L (ref 3.4–5.3)
SODIUM SERPL-SCNC: 139 MMOL/L (ref 133–144)

## 2019-07-20 RX ORDER — DORZOLAMIDE HYDROCHLORIDE AND TIMOLOL MALEATE 20; 5 MG/ML; MG/ML
1 SOLUTION/ DROPS OPHTHALMIC 2 TIMES DAILY
COMMUNITY
End: 2023-04-26

## 2019-07-20 NOTE — PHARMACY-ADMISSION MEDICATION HISTORY
Medication reconciliation interview completed by pre-admitting nurse, reviewed by pharmacy. Called pt to clarify directions on cosopt and xalatin. No further clarifications needed.     Prior to Admission medications    Medication Sig Last Dose Taking? Auth Provider   albuterol (PROAIR HFA, PROVENTIL HFA, VENTOLIN HFA) 108 (90 BASE) MCG/ACT inhaler Inhale 2 puffs into the lungs every 6 hours as needed for shortness of breath / dyspnea or wheezing  Yes Laura Tipton MD   alendronate (FOSAMAX) 70 MG tablet TAKE 1 TABLET(70 MG) BY MOUTH EVERY 7 DAYS  Yes Laura Tipton MD   amitriptyline (ELAVIL) 100 MG tablet TAKE 1 TABLET(100 MG) BY MOUTH AT BEDTIME  Yes Laura Tipton MD   amLODIPine (NORVASC) 2.5 MG tablet Take 1 tablet (2.5 mg) by mouth daily  Yes Laura Tipton MD   aspirin 81 MG EC tablet Take 81 mg by mouth daily  Yes Reported, Patient   dorzolamide-timolol (COSOPT) 2-0.5 % ophthalmic solution Place 1 drop into both eyes 2 times daily  Yes Unknown, Entered By History   fluticasone (FLONASE) 50 MCG/ACT spray Spray 1-2 sprays into both nostrils daily  Yes Laura Tipton MD   latanoprost (XALATAN) 0.005 % ophthalmic solution Place 1 drop into the right eye daily  Yes Reported, Patient   levothyroxine (SYNTHROID/LEVOTHROID) 88 MCG tablet Take 1 tablet (88 mcg) by mouth daily  Yes Laura Tipton MD   lisinopril-hydrochlorothiazide (PRINZIDE/ZESTORETIC) 20-25 MG tablet Take 1 tablet by mouth daily  Yes Laura Tipton MD   loratadine (CLARITIN) 10 MG tablet Take 1 tablet (10 mg) by mouth daily  Yes Laura Tipton MD   metoprolol succinate ER (TOPROL-XL) 25 MG 24 hr tablet Take 1.5 tablets (37.5 mg) by mouth daily  Yes Silke Villareal MD   pravastatin (PRAVACHOL) 40 MG tablet TAKE 1 TABLET(40 MG) BY MOUTH DAILY  Yes Laura Tipton MD   Prenatal MV-Min-Fe Fum-FA-DHA (PRENATAL 1 PO) Take 1 tablet by mouth daily   Yes Reported, Patient   ranitidine (ZANTAC) 150 MG tablet TAKE 1 TABLET(150 MG) BY MOUTH TWICE  DAILY  Yes Laura Tipton MD

## 2019-07-23 ENCOUNTER — APPOINTMENT (OUTPATIENT)
Dept: GENERAL RADIOLOGY | Facility: CLINIC | Age: 74
DRG: 470 | End: 2019-07-23
Attending: ORTHOPAEDIC SURGERY
Payer: MEDICARE

## 2019-07-23 ENCOUNTER — ANESTHESIA (OUTPATIENT)
Dept: SURGERY | Facility: CLINIC | Age: 74
DRG: 470 | End: 2019-07-23
Payer: MEDICARE

## 2019-07-23 ENCOUNTER — HOSPITAL ENCOUNTER (INPATIENT)
Facility: CLINIC | Age: 74
LOS: 3 days | Discharge: NURSING, REHAB OR LONG-TERM CARE | DRG: 470 | End: 2019-07-26
Attending: ORTHOPAEDIC SURGERY | Admitting: ORTHOPAEDIC SURGERY
Payer: MEDICARE

## 2019-07-23 ENCOUNTER — ANESTHESIA EVENT (OUTPATIENT)
Dept: SURGERY | Facility: CLINIC | Age: 74
DRG: 470 | End: 2019-07-23
Payer: MEDICARE

## 2019-07-23 DIAGNOSIS — Z96.651 STATUS POST TOTAL RIGHT KNEE REPLACEMENT: Primary | ICD-10-CM

## 2019-07-23 PROBLEM — Z96.659 S/P TOTAL KNEE ARTHROPLASTY: Status: ACTIVE | Noted: 2019-07-23

## 2019-07-23 LAB
CREAT SERPL-MCNC: 0.6 MG/DL (ref 0.52–1.04)
GFR SERPL CREATININE-BSD FRML MDRD: 89 ML/MIN/{1.73_M2}
PLATELET # BLD AUTO: 315 10E9/L (ref 150–450)

## 2019-07-23 PROCEDURE — 25000128 H RX IP 250 OP 636: Performed by: ANESTHESIOLOGY

## 2019-07-23 PROCEDURE — 25800025 ZZH RX 258: Performed by: ORTHOPAEDIC SURGERY

## 2019-07-23 PROCEDURE — 40000986 XR KNEE PORT RT 1/2 VW: Mod: RT

## 2019-07-23 PROCEDURE — 25000132 ZZH RX MED GY IP 250 OP 250 PS 637: Mod: GY | Performed by: PHYSICIAN ASSISTANT

## 2019-07-23 PROCEDURE — 25000128 H RX IP 250 OP 636: Performed by: PHYSICIAN ASSISTANT

## 2019-07-23 PROCEDURE — 36000093 ZZH SURGERY LEVEL 4 1ST 30 MIN: Performed by: ORTHOPAEDIC SURGERY

## 2019-07-23 PROCEDURE — 85049 AUTOMATED PLATELET COUNT: CPT | Performed by: ORTHOPAEDIC SURGERY

## 2019-07-23 PROCEDURE — 36415 COLL VENOUS BLD VENIPUNCTURE: CPT | Performed by: ORTHOPAEDIC SURGERY

## 2019-07-23 PROCEDURE — 25800030 ZZH RX IP 258 OP 636: Performed by: ANESTHESIOLOGY

## 2019-07-23 PROCEDURE — 40000171 ZZH STATISTIC PRE-PROCEDURE ASSESSMENT III: Performed by: ORTHOPAEDIC SURGERY

## 2019-07-23 PROCEDURE — 25000125 ZZHC RX 250: Performed by: ANESTHESIOLOGY

## 2019-07-23 PROCEDURE — 37000008 ZZH ANESTHESIA TECHNICAL FEE, 1ST 30 MIN: Performed by: ORTHOPAEDIC SURGERY

## 2019-07-23 PROCEDURE — 37000009 ZZH ANESTHESIA TECHNICAL FEE, EACH ADDTL 15 MIN: Performed by: ORTHOPAEDIC SURGERY

## 2019-07-23 PROCEDURE — 25000128 H RX IP 250 OP 636: Performed by: ORTHOPAEDIC SURGERY

## 2019-07-23 PROCEDURE — 25000125 ZZHC RX 250: Performed by: NURSE ANESTHETIST, CERTIFIED REGISTERED

## 2019-07-23 PROCEDURE — 25000132 ZZH RX MED GY IP 250 OP 250 PS 637: Mod: GY | Performed by: ORTHOPAEDIC SURGERY

## 2019-07-23 PROCEDURE — 25800030 ZZH RX IP 258 OP 636: Performed by: ORTHOPAEDIC SURGERY

## 2019-07-23 PROCEDURE — 27810169 ZZH OR IMPLANT GENERAL: Performed by: ORTHOPAEDIC SURGERY

## 2019-07-23 PROCEDURE — 82565 ASSAY OF CREATININE: CPT | Performed by: ORTHOPAEDIC SURGERY

## 2019-07-23 PROCEDURE — 25000128 H RX IP 250 OP 636: Performed by: NURSE ANESTHETIST, CERTIFIED REGISTERED

## 2019-07-23 PROCEDURE — 0SRC0J9 REPLACEMENT OF RIGHT KNEE JOINT WITH SYNTHETIC SUBSTITUTE, CEMENTED, OPEN APPROACH: ICD-10-PCS | Performed by: ORTHOPAEDIC SURGERY

## 2019-07-23 PROCEDURE — 27210794 ZZH OR GENERAL SUPPLY STERILE: Performed by: ORTHOPAEDIC SURGERY

## 2019-07-23 PROCEDURE — 25800030 ZZH RX IP 258 OP 636: Performed by: NURSE ANESTHETIST, CERTIFIED REGISTERED

## 2019-07-23 PROCEDURE — 12000000 ZZH R&B MED SURG/OB

## 2019-07-23 PROCEDURE — 25000125 ZZHC RX 250: Performed by: PHYSICIAN ASSISTANT

## 2019-07-23 PROCEDURE — 36000063 ZZH SURGERY LEVEL 4 EA 15 ADDTL MIN: Performed by: ORTHOPAEDIC SURGERY

## 2019-07-23 PROCEDURE — C1776 JOINT DEVICE (IMPLANTABLE): HCPCS | Performed by: ORTHOPAEDIC SURGERY

## 2019-07-23 PROCEDURE — 71000012 ZZH RECOVERY PHASE 1 LEVEL 1 FIRST HR: Performed by: ORTHOPAEDIC SURGERY

## 2019-07-23 DEVICE — IMPLANTABLE DEVICE: Type: IMPLANTABLE DEVICE | Site: KNEE | Status: FUNCTIONAL

## 2019-07-23 DEVICE — IMP COMP TKA IBALANCE FEM PS CEM SZ 6 RT AR-516-6R: Type: IMPLANTABLE DEVICE | Site: KNEE | Status: FUNCTIONAL

## 2019-07-23 DEVICE — BONE CEMENT SIMPLEX W/TOBRAMYCIN 6197-9-001: Type: IMPLANTABLE DEVICE | Site: KNEE | Status: FUNCTIONAL

## 2019-07-23 DEVICE — IMP COMP TKA IBALANCE MOD TIBIAL TRAY SZ 5 AR-513-T5: Type: IMPLANTABLE DEVICE | Site: KNEE | Status: FUNCTIONAL

## 2019-07-23 RX ORDER — LIDOCAINE HCL/EPINEPHRINE/PF 2%-1:200K
VIAL (ML) INJECTION PRN
Status: DISCONTINUED | OUTPATIENT
Start: 2019-07-23 | End: 2019-07-23

## 2019-07-23 RX ORDER — BUPIVACAINE HYDROCHLORIDE 7.5 MG/ML
INJECTION, SOLUTION INTRASPINAL PRN
Status: DISCONTINUED | OUTPATIENT
Start: 2019-07-23 | End: 2019-07-23

## 2019-07-23 RX ORDER — CEFAZOLIN SODIUM 2 G/100ML
2 INJECTION, SOLUTION INTRAVENOUS EVERY 8 HOURS
Status: COMPLETED | OUTPATIENT
Start: 2019-07-23 | End: 2019-07-24

## 2019-07-23 RX ORDER — EPHEDRINE SULFATE 50 MG/ML
INJECTION, SOLUTION INTRAMUSCULAR; INTRAVENOUS; SUBCUTANEOUS PRN
Status: DISCONTINUED | OUTPATIENT
Start: 2019-07-23 | End: 2019-07-23

## 2019-07-23 RX ORDER — AMLODIPINE BESYLATE 2.5 MG/1
2.5 TABLET ORAL DAILY
Status: DISCONTINUED | OUTPATIENT
Start: 2019-07-24 | End: 2019-07-23

## 2019-07-23 RX ORDER — FLUTICASONE PROPIONATE 50 MCG
1-2 SPRAY, SUSPENSION (ML) NASAL DAILY
Status: DISCONTINUED | OUTPATIENT
Start: 2019-07-23 | End: 2019-07-26 | Stop reason: HOSPADM

## 2019-07-23 RX ORDER — METOPROLOL TARTRATE 1 MG/ML
2 INJECTION, SOLUTION INTRAVENOUS ONCE
Status: COMPLETED | OUTPATIENT
Start: 2019-07-23 | End: 2019-07-23

## 2019-07-23 RX ORDER — NALOXONE HYDROCHLORIDE 0.4 MG/ML
.1-.4 INJECTION, SOLUTION INTRAMUSCULAR; INTRAVENOUS; SUBCUTANEOUS
Status: DISCONTINUED | OUTPATIENT
Start: 2019-07-23 | End: 2019-07-26 | Stop reason: HOSPADM

## 2019-07-23 RX ORDER — HYDRALAZINE HYDROCHLORIDE 20 MG/ML
10 INJECTION INTRAMUSCULAR; INTRAVENOUS EVERY 4 HOURS PRN
Status: DISCONTINUED | OUTPATIENT
Start: 2019-07-23 | End: 2019-07-26 | Stop reason: HOSPADM

## 2019-07-23 RX ORDER — ALENDRONATE SODIUM 70 MG/1
70 TABLET ORAL
Status: DISCONTINUED | OUTPATIENT
Start: 2019-07-28 | End: 2019-07-26 | Stop reason: HOSPADM

## 2019-07-23 RX ORDER — ACETAMINOPHEN 325 MG/1
650 TABLET ORAL EVERY 4 HOURS PRN
Status: DISCONTINUED | OUTPATIENT
Start: 2019-07-26 | End: 2019-07-26 | Stop reason: HOSPADM

## 2019-07-23 RX ORDER — CELECOXIB 200 MG/1
400 CAPSULE ORAL ONCE
Status: COMPLETED | OUTPATIENT
Start: 2019-07-23 | End: 2019-07-23

## 2019-07-23 RX ORDER — HYDROMORPHONE HYDROCHLORIDE 1 MG/ML
.3-.5 INJECTION, SOLUTION INTRAMUSCULAR; INTRAVENOUS; SUBCUTANEOUS
Status: DISCONTINUED | OUTPATIENT
Start: 2019-07-23 | End: 2019-07-26 | Stop reason: HOSPADM

## 2019-07-23 RX ORDER — LEVOTHYROXINE SODIUM 88 UG/1
88 TABLET ORAL DAILY
Status: DISCONTINUED | OUTPATIENT
Start: 2019-07-24 | End: 2019-07-26 | Stop reason: HOSPADM

## 2019-07-23 RX ORDER — HYDROXYZINE HYDROCHLORIDE 25 MG/1
25 TABLET, FILM COATED ORAL 3 TIMES DAILY PRN
Status: DISCONTINUED | OUTPATIENT
Start: 2019-07-23 | End: 2019-07-26 | Stop reason: HOSPADM

## 2019-07-23 RX ORDER — LIDOCAINE 40 MG/G
CREAM TOPICAL
Status: DISCONTINUED | OUTPATIENT
Start: 2019-07-23 | End: 2019-07-23

## 2019-07-23 RX ORDER — CELECOXIB 200 MG/1
200 CAPSULE ORAL 2 TIMES DAILY
Status: DISCONTINUED | OUTPATIENT
Start: 2019-07-23 | End: 2019-07-26 | Stop reason: HOSPADM

## 2019-07-23 RX ORDER — LIDOCAINE HYDROCHLORIDE 10 MG/ML
INJECTION, SOLUTION INFILTRATION; PERINEURAL PRN
Status: DISCONTINUED | OUTPATIENT
Start: 2019-07-23 | End: 2019-07-23

## 2019-07-23 RX ORDER — DOCUSATE SODIUM 100 MG/1
100 CAPSULE, LIQUID FILLED ORAL 2 TIMES DAILY
Status: DISCONTINUED | OUTPATIENT
Start: 2019-07-23 | End: 2019-07-26 | Stop reason: HOSPADM

## 2019-07-23 RX ORDER — MULTIPLE VITAMINS W/ MINERALS TAB 9MG-400MCG
1 TAB ORAL DAILY
Status: DISCONTINUED | OUTPATIENT
Start: 2019-07-24 | End: 2019-07-26 | Stop reason: HOSPADM

## 2019-07-23 RX ORDER — DORZOLAMIDE HYDROCHLORIDE AND TIMOLOL MALEATE 20; 5 MG/ML; MG/ML
1 SOLUTION/ DROPS OPHTHALMIC 2 TIMES DAILY
Status: DISCONTINUED | OUTPATIENT
Start: 2019-07-23 | End: 2019-07-26 | Stop reason: HOSPADM

## 2019-07-23 RX ORDER — LATANOPROST 50 UG/ML
1 SOLUTION/ DROPS OPHTHALMIC DAILY
Status: DISCONTINUED | OUTPATIENT
Start: 2019-07-23 | End: 2019-07-26 | Stop reason: HOSPADM

## 2019-07-23 RX ORDER — FENTANYL CITRATE 50 UG/ML
INJECTION, SOLUTION INTRAMUSCULAR; INTRAVENOUS PRN
Status: DISCONTINUED | OUTPATIENT
Start: 2019-07-23 | End: 2019-07-23

## 2019-07-23 RX ORDER — PANTOPRAZOLE SODIUM 40 MG/1
40 TABLET, DELAYED RELEASE ORAL ONCE
Status: COMPLETED | OUTPATIENT
Start: 2019-07-23 | End: 2019-07-23

## 2019-07-23 RX ORDER — SODIUM CHLORIDE, SODIUM LACTATE, POTASSIUM CHLORIDE, CALCIUM CHLORIDE 600; 310; 30; 20 MG/100ML; MG/100ML; MG/100ML; MG/100ML
INJECTION, SOLUTION INTRAVENOUS CONTINUOUS
Status: DISCONTINUED | OUTPATIENT
Start: 2019-07-23 | End: 2019-07-23

## 2019-07-23 RX ORDER — ACETAMINOPHEN 325 MG/1
975 TABLET ORAL EVERY 8 HOURS
Status: DISCONTINUED | OUTPATIENT
Start: 2019-07-23 | End: 2019-07-26 | Stop reason: HOSPADM

## 2019-07-23 RX ORDER — PROPOFOL 10 MG/ML
INJECTION, EMULSION INTRAVENOUS PRN
Status: DISCONTINUED | OUTPATIENT
Start: 2019-07-23 | End: 2019-07-23

## 2019-07-23 RX ORDER — CEFAZOLIN SODIUM 1 G/3ML
1 INJECTION, POWDER, FOR SOLUTION INTRAMUSCULAR; INTRAVENOUS SEE ADMIN INSTRUCTIONS
Status: DISCONTINUED | OUTPATIENT
Start: 2019-07-23 | End: 2019-07-23

## 2019-07-23 RX ORDER — SODIUM CHLORIDE 9 MG/ML
INJECTION, SOLUTION INTRAVENOUS CONTINUOUS
Status: DISCONTINUED | OUTPATIENT
Start: 2019-07-23 | End: 2019-07-26 | Stop reason: HOSPADM

## 2019-07-23 RX ORDER — ZOLPIDEM TARTRATE 5 MG/1
5 TABLET ORAL
Status: DISCONTINUED | OUTPATIENT
Start: 2019-07-24 | End: 2019-07-26 | Stop reason: HOSPADM

## 2019-07-23 RX ORDER — AMLODIPINE BESYLATE 2.5 MG/1
2.5 TABLET ORAL DAILY
Status: DISCONTINUED | OUTPATIENT
Start: 2019-07-23 | End: 2019-07-26 | Stop reason: HOSPADM

## 2019-07-23 RX ORDER — LORATADINE 10 MG/1
10 TABLET ORAL DAILY
Status: DISCONTINUED | OUTPATIENT
Start: 2019-07-23 | End: 2019-07-26 | Stop reason: HOSPADM

## 2019-07-23 RX ORDER — PROPOFOL 10 MG/ML
INJECTION, EMULSION INTRAVENOUS CONTINUOUS PRN
Status: DISCONTINUED | OUTPATIENT
Start: 2019-07-23 | End: 2019-07-23

## 2019-07-23 RX ORDER — OXYCODONE HYDROCHLORIDE 5 MG/1
5-10 TABLET ORAL EVERY 4 HOURS PRN
Status: DISCONTINUED | OUTPATIENT
Start: 2019-07-23 | End: 2019-07-26 | Stop reason: HOSPADM

## 2019-07-23 RX ORDER — ROPIVACAINE HYDROCHLORIDE 7.5 MG/ML
INJECTION, SOLUTION EPIDURAL; PERINEURAL PRN
Status: DISCONTINUED | OUTPATIENT
Start: 2019-07-23 | End: 2019-07-23

## 2019-07-23 RX ORDER — PRAVASTATIN SODIUM 20 MG
40 TABLET ORAL DAILY
Status: DISCONTINUED | OUTPATIENT
Start: 2019-07-23 | End: 2019-07-26 | Stop reason: HOSPADM

## 2019-07-23 RX ORDER — LISINOPRIL AND HYDROCHLOROTHIAZIDE 20; 25 MG/1; MG/1
1 TABLET ORAL DAILY
Status: DISCONTINUED | OUTPATIENT
Start: 2019-07-24 | End: 2019-07-26 | Stop reason: HOSPADM

## 2019-07-23 RX ORDER — CEFAZOLIN SODIUM 2 G/100ML
2 INJECTION, SOLUTION INTRAVENOUS
Status: COMPLETED | OUTPATIENT
Start: 2019-07-23 | End: 2019-07-23

## 2019-07-23 RX ORDER — DEXAMETHASONE SODIUM PHOSPHATE 4 MG/ML
INJECTION, SOLUTION INTRA-ARTICULAR; INTRALESIONAL; INTRAMUSCULAR; INTRAVENOUS; SOFT TISSUE PRN
Status: DISCONTINUED | OUTPATIENT
Start: 2019-07-23 | End: 2019-07-23

## 2019-07-23 RX ORDER — ONDANSETRON 2 MG/ML
4 INJECTION INTRAMUSCULAR; INTRAVENOUS EVERY 6 HOURS
Status: DISPENSED | OUTPATIENT
Start: 2019-07-23 | End: 2019-07-24

## 2019-07-23 RX ORDER — ALBUTEROL SULFATE 90 UG/1
2 AEROSOL, METERED RESPIRATORY (INHALATION) EVERY 6 HOURS PRN
Status: DISCONTINUED | OUTPATIENT
Start: 2019-07-23 | End: 2019-07-26 | Stop reason: HOSPADM

## 2019-07-23 RX ORDER — ONDANSETRON 2 MG/ML
INJECTION INTRAMUSCULAR; INTRAVENOUS PRN
Status: DISCONTINUED | OUTPATIENT
Start: 2019-07-23 | End: 2019-07-23

## 2019-07-23 RX ORDER — ONDANSETRON 4 MG/1
4 TABLET, ORALLY DISINTEGRATING ORAL EVERY 6 HOURS PRN
Status: DISCONTINUED | OUTPATIENT
Start: 2019-07-23 | End: 2019-07-26 | Stop reason: HOSPADM

## 2019-07-23 RX ORDER — ONDANSETRON 2 MG/ML
4 INJECTION INTRAMUSCULAR; INTRAVENOUS EVERY 6 HOURS PRN
Status: DISCONTINUED | OUTPATIENT
Start: 2019-07-23 | End: 2019-07-26 | Stop reason: HOSPADM

## 2019-07-23 RX ADMIN — PHENYLEPHRINE HYDROCHLORIDE 100 MCG: 10 INJECTION INTRAVENOUS at 13:17

## 2019-07-23 RX ADMIN — ONDANSETRON HYDROCHLORIDE 4 MG: 2 INJECTION, SOLUTION INTRAMUSCULAR; INTRAVENOUS at 17:55

## 2019-07-23 RX ADMIN — PROPOFOL 30 MG: 10 INJECTION, EMULSION INTRAVENOUS at 14:06

## 2019-07-23 RX ADMIN — SODIUM CHLORIDE, POTASSIUM CHLORIDE, SODIUM LACTATE AND CALCIUM CHLORIDE: 600; 310; 30; 20 INJECTION, SOLUTION INTRAVENOUS at 14:21

## 2019-07-23 RX ADMIN — Medication 10 MG: at 13:40

## 2019-07-23 RX ADMIN — AMITRIPTYLINE HYDROCHLORIDE 100 MG: 25 TABLET, FILM COATED ORAL at 21:08

## 2019-07-23 RX ADMIN — Medication 5 MG: at 13:27

## 2019-07-23 RX ADMIN — SODIUM CHLORIDE: 9 INJECTION, SOLUTION INTRAVENOUS at 19:23

## 2019-07-23 RX ADMIN — Medication 10 MG: at 13:14

## 2019-07-23 RX ADMIN — PHENYLEPHRINE HYDROCHLORIDE 200 MCG: 10 INJECTION INTRAVENOUS at 13:00

## 2019-07-23 RX ADMIN — ONDANSETRON 4 MG: 2 INJECTION INTRAMUSCULAR; INTRAVENOUS at 14:33

## 2019-07-23 RX ADMIN — AMLODIPINE BESYLATE 2.5 MG: 2.5 TABLET ORAL at 17:59

## 2019-07-23 RX ADMIN — CEFAZOLIN SODIUM 2 G: 2 INJECTION, SOLUTION INTRAVENOUS at 12:51

## 2019-07-23 RX ADMIN — CELECOXIB 200 MG: 200 CAPSULE ORAL at 20:04

## 2019-07-23 RX ADMIN — LATANOPROST 1 DROP: 50 SOLUTION OPHTHALMIC at 20:11

## 2019-07-23 RX ADMIN — MIDAZOLAM 2 MG: 1 INJECTION INTRAMUSCULAR; INTRAVENOUS at 12:31

## 2019-07-23 RX ADMIN — SODIUM CHLORIDE 1000 ML: 9 INJECTION, SOLUTION INTRAVENOUS at 17:21

## 2019-07-23 RX ADMIN — PHENYLEPHRINE HYDROCHLORIDE 100 MCG: 10 INJECTION INTRAVENOUS at 13:50

## 2019-07-23 RX ADMIN — ACETAMINOPHEN 975 MG: 325 TABLET, FILM COATED ORAL at 17:59

## 2019-07-23 RX ADMIN — LIDOCAINE HYDROCHLORIDE,EPINEPHRINE BITARTRATE 6 ML: 20; .005 INJECTION, SOLUTION EPIDURAL; INFILTRATION; INTRACAUDAL; PERINEURAL at 12:40

## 2019-07-23 RX ADMIN — Medication 37.5 MG: at 17:58

## 2019-07-23 RX ADMIN — DORZOLAMIDE HYDROCHLORIDE AND TIMOLOL MALEATE 1 DROP: 20; 5 SOLUTION/ DROPS OPHTHALMIC at 20:10

## 2019-07-23 RX ADMIN — Medication 1 G: at 14:33

## 2019-07-23 RX ADMIN — PROPOFOL 25 MG: 10 INJECTION, EMULSION INTRAVENOUS at 12:47

## 2019-07-23 RX ADMIN — BUPIVACAINE HYDROCHLORIDE IN DEXTROSE 1.6 ML: 7.5 INJECTION, SOLUTION SUBARACHNOID at 12:32

## 2019-07-23 RX ADMIN — PRAVASTATIN SODIUM 40 MG: 20 TABLET ORAL at 20:04

## 2019-07-23 RX ADMIN — PANTOPRAZOLE SODIUM 40 MG: 40 TABLET, DELAYED RELEASE ORAL at 11:10

## 2019-07-23 RX ADMIN — OXYCODONE HYDROCHLORIDE 5 MG: 5 TABLET ORAL at 23:30

## 2019-07-23 RX ADMIN — LIDOCAINE HYDROCHLORIDE 50 MG: 10 INJECTION, SOLUTION INFILTRATION; PERINEURAL at 12:45

## 2019-07-23 RX ADMIN — SODIUM CHLORIDE, POTASSIUM CHLORIDE, SODIUM LACTATE AND CALCIUM CHLORIDE: 600; 310; 30; 20 INJECTION, SOLUTION INTRAVENOUS at 12:18

## 2019-07-23 RX ADMIN — SODIUM CHLORIDE, POTASSIUM CHLORIDE, SODIUM LACTATE AND CALCIUM CHLORIDE: 600; 310; 30; 20 INJECTION, SOLUTION INTRAVENOUS at 13:42

## 2019-07-23 RX ADMIN — PROPOFOL 125 MCG/KG/MIN: 10 INJECTION, EMULSION INTRAVENOUS at 12:47

## 2019-07-23 RX ADMIN — Medication 1 G: at 12:58

## 2019-07-23 RX ADMIN — RANITIDINE 150 MG: 150 TABLET ORAL at 20:04

## 2019-07-23 RX ADMIN — PROPOFOL 30 MG: 10 INJECTION, EMULSION INTRAVENOUS at 14:26

## 2019-07-23 RX ADMIN — FENTANYL CITRATE 50 MCG: 50 INJECTION, SOLUTION INTRAMUSCULAR; INTRAVENOUS at 13:42

## 2019-07-23 RX ADMIN — DEXAMETHASONE SODIUM PHOSPHATE 8 MG: 4 INJECTION, SOLUTION INTRA-ARTICULAR; INTRALESIONAL; INTRAMUSCULAR; INTRAVENOUS; SOFT TISSUE at 12:59

## 2019-07-23 RX ADMIN — METOPROLOL TARTRATE 2 MG: 5 INJECTION INTRAVENOUS at 16:06

## 2019-07-23 RX ADMIN — PHENYLEPHRINE HYDROCHLORIDE 200 MCG: 10 INJECTION INTRAVENOUS at 13:08

## 2019-07-23 RX ADMIN — LORATADINE 10 MG: 10 TABLET ORAL at 17:59

## 2019-07-23 RX ADMIN — DOCUSATE SODIUM 100 MG: 100 CAPSULE, LIQUID FILLED ORAL at 20:04

## 2019-07-23 RX ADMIN — CEFAZOLIN SODIUM 2 G: 2 INJECTION, SOLUTION INTRAVENOUS at 20:08

## 2019-07-23 RX ADMIN — OXYCODONE HYDROCHLORIDE 5 MG: 5 TABLET ORAL at 17:59

## 2019-07-23 RX ADMIN — PHENYLEPHRINE HYDROCHLORIDE 200 MCG: 10 INJECTION INTRAVENOUS at 13:27

## 2019-07-23 RX ADMIN — CELECOXIB 400 MG: 200 CAPSULE ORAL at 11:10

## 2019-07-23 RX ADMIN — ROPIVACAINE HYDROCHLORIDE 14 ML: 7.5 INJECTION, SOLUTION EPIDURAL; PERINEURAL at 12:40

## 2019-07-23 RX ADMIN — Medication 1 G: at 14:42

## 2019-07-23 RX ADMIN — HYDROMORPHONE HYDROCHLORIDE 0.3 MG: 1 INJECTION, SOLUTION INTRAMUSCULAR; INTRAVENOUS; SUBCUTANEOUS at 17:17

## 2019-07-23 RX ADMIN — FENTANYL CITRATE 50 MCG: 50 INJECTION, SOLUTION INTRAMUSCULAR; INTRAVENOUS at 12:50

## 2019-07-23 RX ADMIN — METOPROLOL TARTRATE 2 MG: 5 INJECTION INTRAVENOUS at 15:25

## 2019-07-23 ASSESSMENT — ACTIVITIES OF DAILY LIVING (ADL)
RETIRED_EATING: 0-->INDEPENDENT
SWALLOWING: 0-->SWALLOWS FOODS/LIQUIDS WITHOUT DIFFICULTY
DRESS: 0-->INDEPENDENT
COGNITION: 0 - NO COGNITION ISSUES REPORTED
BATHING: 0-->INDEPENDENT
TOILETING: 0-->INDEPENDENT
RETIRED_COMMUNICATION: 0-->UNDERSTANDS/COMMUNICATES WITHOUT DIFFICULTY
TRANSFERRING: 0-->INDEPENDENT
AMBULATION: 0-->INDEPENDENT
ADLS_ACUITY_SCORE: 15
FALL_HISTORY_WITHIN_LAST_SIX_MONTHS: NO

## 2019-07-23 ASSESSMENT — ENCOUNTER SYMPTOMS: DYSRHYTHMIAS: 1

## 2019-07-23 ASSESSMENT — MIFFLIN-ST. JEOR: SCORE: 1439.82

## 2019-07-23 NOTE — BRIEF OP NOTE
[facility]    Brief Operative Note    Pre-operative diagnosis: DJD  Post-operative diagnosis same  Procedure: Procedure(s):  Right total knee arthroplasty (choice anesthesia)  Surgeon: Surgeon(s) and Role:     * Dragan Muse MD - Primary     * Holly Mcdowell PA-C - Assisting  Anesthesia: Other   Estimated blood loss: Less than 10 ml  Drains: Hemovac  Specimens: * No specimens in log *  Findings:   None.  Complications: None.  Implants:    Implant Name Type Inv. Item Serial No.  Lot No. LRB No. Used   BONE CEMENT SIMPLEX W/TOBRAMYCIN 6197-9-001 Cement, Bone BONE CEMENT SIMPLEX W/TOBRAMYCIN 6197-9-001  [employer] [id number] Right 1   IMP COMP TKA IBALANCE MOD TIBIAL TRAY SZ 5 [id number] Total Joint Component/Insert IMP COMP TKA IBALANCE MOD TIBIAL TRAY SZ 5 [id number]-[id number]  [employer] [id number] Right 1   IMP COMP IBALANCE PATELLA DOME 34X9MM [id number] Total Joint Component/Insert IMP COMP IBALANCE PATELLA DOME 34X9MM AR-[id number]  [employer] [id number] Right 1   IMP COMP TKA IBALANCE FEM PS YUDI SZ 6 RT AR-[id number] Total Joint Component/Insert IMP COMP TKA IBALANCE FEM PS YUDI SZ 6 RT AR-[id number]  [employer] [id number] Right 1   IMP COMP TKA IBALANCE BEARING PS SZ 5 9MM [id number] Total Joint Component/Insert IMP COMP TKA IBALANCE BEARING PS SZ 5 9MM [id number]  [employer] [id number] Right 1

## 2019-07-23 NOTE — OP NOTE
Procedure Date: 07/23/2019      PREOPERATIVE DIAGNOSIS:  Right knee primary osteoarthritis.      POSTOPERATIVE DIAGNOSIS:  Right knee primary osteoarthritis.      PROCEDURE:  Right total knee arthroplasty using an Arthrex iBalance knee system.      SURGEON:  Man Muse MD.      ASSISTANT:  Holly Mcdowell PA-C.      INDICATIONS:  Ms. Beltran is a very pleasant 73-year-old female who has had ongoing right knee pain for some time now.  She has failed conservative management with oral analgesics, activity modifications, and injections and now wishes to proceed with operative intervention.  We had a long discussion of risks, benefits and alternatives of total knee arthroplasty.  She understands these and wished to proceed with surgery.      NARRATIVE EVENTS:  After thorough preoperative evaluation and proper identification of patient's extremity to be operated on, Ms. Beltran was taken to the operating room where she underwent spinal anesthetic as well as femoral nerve block.  She was placed supine on the operating table and her right leg was prepped and draped in the usual sterile manner.  After appropriate surgical pause to confirm the patient's extremity to be operated on, the patient received 2 grams of Ancef.  Her right leg was exsanguinated and tourniquet was raised to 300 mmHg on her right upper thigh.        We approached her right knee through a midline incision centered over the patella.  Skin and soft tissue were sharply dissected down to the patella where a median parapatellar arthrotomy was performed.  We performed a mild medial release in accordance with our preoperative templating, everted the patella, removed the infrapatellar fat pad, flexed the knee, removed the osteophytes from the distal femur, drilled and placed the intramedullary guide for our distal femoral resection.  We resected the distal femur at 5 degrees physiologic valgus to the femur, resecting 9 mm of distal femoral bone.  Once this was  done, we then sized the distal femur.  It sized to fit a size 6 Arthrex iBalance femoral component.  We pinned our 4-in-1 cutting block in 3 degrees of external rotation to the posterior condyles, in line with the epicondylar axis, perpendicular to Whitesides line.  We made our anterior, posterior, and chamfer resections then proceeded to the tibia.  Here we resected this perpendicular to the long axis of the tibia using extramedullary guides.  Once this was done, we then removed the excess soft tissues from the knee, mainly both cruciate ligaments and the remnants of both menisci.  At this point, we then made our box cut for posterior stabilized femoral component and placed our trial implants into position, a size 6 femur, a size 5 tibia, with a 9 mm PS polyethylene insert.  This gave us good stability and full range of motion and the patella tracked nicely.  We then measured the patella, which measured 22 mm in thickness prior to resection, resected this down to 13 mm, placed a 9 mm thick x 34 mm diameter round tri-peg patellar button in position.  With patella and button in position, this measured 23.5 mm and tracked quite nicely.  At this point, we marked our tibial rotation, we punched for our tibial keel, thoroughly irrigated the knee, and prepared to cement in our final components.  Using one batch of Simplex cement with tobramycin, we cemented in a size 6 femur, a size 5 tibia, and a 34 round tri-peg patellar button.  Once cement had cured, we removed the excess cement from the knee, retrialed our polyethylene inserts, found that a 9 mm PS insert gave the best stability and full range of motion.  This was then impacted into position.  Once this was done, we then thoroughly irrigated the knee with both saline and IrriSept solution.  We closed the arthrotomy with #1 and 0 Vicryl sutures, placed one drain deep to the arthrotomy, closed skin and soft tissue with absorbable sutures and staples in the skin.  The  patient was placed in a well-padded postoperative dressing and taken to the recovery room in stable condition.  She tolerated the procedure without difficulty.         FRANCO BABCOCK MD             D: 2019   T: 2019   MT: JONNY      Name:     JAVON MARTÍNEZ   MRN:      [id number]        Account:        [id number]   :      1945           Procedure Date: 2019      Document: [id number]

## 2019-07-23 NOTE — ANESTHESIA POSTPROCEDURE EVALUATION
Patient: Teri Beltran    Procedure(s):  Right total knee arthroplasty (choice anesthesia)    Diagnosis:DJD  Diagnosis Additional Information: No value filed.    Anesthesia Type:  Periph. Nerve Block for postop pain, Spinal    Note:  Anesthesia Post Evaluation    Patient location during evaluation: PACU  Patient participation: Able to fully participate in evaluation  Level of consciousness: awake  Pain management: adequate  Airway patency: patent  Cardiovascular status: acceptable  Respiratory status: acceptable  Hydration status: euvolemic  PONV: controlled     Anesthetic complications: None          Last vitals:  Vitals:    07/23/19 1545 07/23/19 1550 07/23/19 1555   BP:  (!) 142/130    Pulse:  107    Resp: 14 13 11   Temp:      SpO2: 97% 97% 97%         Electronically Signed By: Man Ross MD  July 23, 2019  3:58 PM

## 2019-07-23 NOTE — ANESTHESIA PROCEDURE NOTES
Peripheral nerve/Neuraxial procedure note : Saphenous  Pre-Procedure  Performed by Man Ross MD  Referred by [facility]  Location: pre-op      Pre-Anesthestic Checklist: patient identified, IV checked, site marked, risks and benefits discussed, informed consent, monitors and equipment checked, pre-op evaluation, at physician/surgeon's request and post-op pain management    Timeout  Correct Patient: Yes   Correct Procedure: Yes   Correct Site: Yes   Correct Laterality: Yes   Correct Position: Yes   Site Marked: Yes   .   Procedure Documentation    .    Procedure:  right  Saphenous.     Ultrasound used to identify targeted nerve, plexus, or vascular marker and placed a needle adjacent to it., Ultrasound was used to visualize the spread of the anesthetic in close proximity to the above stated nerve.   Patient Prep;mask, sterile gloves, povidone-iodine 7.5% surgical scrub.  .  Needle: insulated, short bevel Needle Gauge: 22.    Needle Length (Inches) 4  Insertion Method: Single Shot.       Assessment/Narrative  Paresthesias: No.  Injection made incrementally with aspirations every 5 mL..  The placement was negative for: blood aspirated, painful injection and site bleeding.  Bolus given via needle..   Secured via.   Complications: none. Test dose of mL at. Test dose negative for signs of intravascular, subdural or intrathecal injection.

## 2019-07-23 NOTE — ANESTHESIA PROCEDURE NOTES
Peripheral nerve/Neuraxial procedure note : intrathecal  Pre-Procedure  Performed by Man Ross MD  Referred by [facility]  Location: pre-op      Pre-Anesthestic Checklist: patient identified, IV checked, site marked, risks and benefits discussed, informed consent, monitors and equipment checked, pre-op evaluation and at physician/surgeon's request    Timeout  Correct Patient: Yes   Correct Procedure: Yes   Correct Site: Yes   Correct Laterality: Yes   Correct Position: Yes   Site Marked: Yes   .   Procedure Documentation  ASA 2  .    Procedure:    Intrathecal.   (midline approach)      Patient Prep;mask, sterile gloves, povidone-iodine 7.5% surgical scrub.  .  Needle: Wallace tip Spinal Needle (gauge): 25  Spinal/LP Needle Length (inches): 3 # of attempts: 1 and # of redirects:  Introducer used Introducer: 20 G .       Assessment/Narrative  Paresthesias: No.  .  .  0.25 mL of clear CSF fluid removed .

## 2019-07-23 NOTE — PROGRESS NOTES
MOON Ross ok with patient's HR () and BP (140-150's/100's), did received a total of 4mg of IV metoprolol, no further treatment per MDA

## 2019-07-23 NOTE — ANESTHESIA PREPROCEDURE EVALUATION
Anesthesia Pre-Procedure Evaluation    Patient: Teri Beltran   MRN: [id number] : 1945          Preoperative Diagnosis: DJD    Procedure(s):  Right total knee arthroplasty (choice anesthesia)    Past Medical History:   Diagnosis Date     Complication of anesthesia     confusion after anesthesia (after hysterectomy)     First degree AV block 2015     Gastroesophageal reflux disease      Glaucoma      Heart block      Hyperlipidemia LDL goal <130 2013     Hypertension goal BP (blood pressure) < 140/90 12/3/2013     Hypothyroidism      Mild dilation of ascending aorta - borderline 2015     Palpitations      RBBB 2015     Tachycardia      Varicose veins      Venous insufficiency      Past Surgical History:   Procedure Laterality Date     BREAST SURGERY      right breast ductal surgery due to milk production     COLONOSCOPY N/A 10/24/2014    no further screening. Procedure: COLONOSCOPY;  Surgeon: Pedro Rudd MD;  Location: [facility]     HYSTERECTOMY, PAP NO LONGER INDICATED      total hysterectomy and ovaries. benign     SOFT TISSUE SURGERY      ganglion removed from left wrist and ring finger     SURGICAL HISTORY OF -   age 7    tonsillectomy     SURGICAL HISTORY OF -   1/15    left eye cataract     SURGICAL HISTORY OF -   Fall     LINQ placed.     Anesthesia Evaluation     . Pt has had prior anesthetic. Type: General    No history of anesthetic complications (Post op confusion after hysterectomy)          ROS/MED HX    ENT/Pulmonary:      (-) asthma, sleep apnea and Other pulmonary disease   Neurologic:      (-) TIA, Other neuro hx and Dementia   Cardiovascular:     (+) Dyslipidemia, hypertension----. : . . . :. dysrhythmias 1st Deg Heart Block, Irregular Heartbeat/Palpitations, .      (-) CAD, CHF and pulmonary hypertension   METS/Exercise Tolerance:     Hematologic:        (-) anemia   Musculoskeletal:   (+) arthritis,  other musculoskeletal- Scoliosis      GI/Hepatic:      (+) GERD      (-) hepatitis   Renal/Genitourinary:         Endo:     (+) thyroid problem hypothyroidism, .      Psychiatric:        (-) psychiatric history   Infectious Disease:  - neg infectious disease ROS       Malignancy:      - no malignancy   Other:    - neg other ROS                      Physical Exam      Airway   Mallampati: II  TM distance: >3 FB  Neck ROM: full    Dental     Cardiovascular   Rhythm and rate: regular and normal  (-) no murmur    Pulmonary    breath sounds clear to auscultation    Other findings: Lab Test        07/16/19     10/05/17     09/28/17                       1419          1946          0938          WBC          7.8          9.9          4.6           HGB          13.1         14.9         13.7          MCV          94           93           93            PLT          331          342          336            Lab Test        07/16/19 09/28/18 09/28/17                       1419          0951          0938          NA           139          139          138           POTASSIUM    3.7          4.8          4.4           CHLORIDE     102          102          103           CO2          28           30           26            BUN          21           24           17            CR           0.68         0.73         0.69          ANIONGAP     9            7            9             LILIA          9.6          9.4          9.5           GLC          103*         101*         122*                Lab Results   Component Value Date    WBC 7.8 07/16/2019    HGB 13.1 07/16/2019    HCT 40.1 07/16/2019     07/16/2019    CRP 2.9 06/17/2015    CRP 2.9 06/17/2015    SED 16 06/17/2015     07/16/2019    POTASSIUM 3.7 07/16/2019    CHLORIDE 102 07/16/2019    CO2 28 07/16/2019    BUN 21 07/16/2019    CR 0.68 07/16/2019     (H) 07/16/2019    LILIA 9.6 07/16/2019    ALBUMIN 4.0 09/28/2018    PROTTOTAL 7.9 09/28/2018    ALT 29 09/28/2018    AST 17 09/28/2018    ALKPHOS 94  "09/28/2018    BILITOTAL 0.6 09/28/2018    TSH 2.49 09/28/2018    T4 1.23 04/12/2017       Preop Vitals  BP Readings from Last 3 Encounters:   07/23/19 (!) 140/93   07/16/19 125/72   05/14/19 142/86    Pulse Readings from Last 3 Encounters:   07/23/19 84   07/16/19 95   05/14/19 92      Resp Readings from Last 3 Encounters:   07/23/19 18   07/16/19 16   05/14/19 18    SpO2 Readings from Last 3 Encounters:   07/23/19 98%   07/16/19 97%   05/14/19 94%      Temp Readings from Last 1 Encounters:    Ht Readings from Last 1 Encounters:   07/23/19 1.702 m (5' 7\")      Wt Readings from Last 1 Encounters:   07/23/19 90.7 kg (200 lb)    Estimated body mass index is 31.32 kg/m  as calculated from the following:    Height as of this encounter: 1.702 m (5' 7\").    Weight as of this encounter: 90.7 kg (200 lb).       Anesthesia Plan      History & Physical Review  History and physical reviewed and following examination; no interval change.    ASA Status:  3 .    NPO Status:  > 8 hours    Plan for Periph. Nerve Block for postop pain and Spinal with Propofol induction. Maintenance will be Balanced.    PONV prophylaxis:  Ondansetron (or other 5HT-3)       Postoperative Care  Postoperative pain management:  IV analgesics, Oral pain medications and Peripheral nerve block (Single Shot).      Consents  Anesthetic plan, risks, benefits and alternatives discussed with:  Patient..                 Man Ross MD                    .  "

## 2019-07-24 ENCOUNTER — APPOINTMENT (OUTPATIENT)
Dept: PHYSICAL THERAPY | Facility: CLINIC | Age: 74
DRG: 470 | End: 2019-07-24
Attending: ORTHOPAEDIC SURGERY
Payer: MEDICARE

## 2019-07-24 LAB
GLUCOSE SERPL-MCNC: 122 MG/DL (ref 70–99)
HGB BLD-MCNC: 11 G/DL (ref 11.7–15.7)

## 2019-07-24 PROCEDURE — 25000132 ZZH RX MED GY IP 250 OP 250 PS 637: Mod: GY | Performed by: PHYSICIAN ASSISTANT

## 2019-07-24 PROCEDURE — 99207 ZZC APP CREDIT; MD BILLING SHARED VISIT: CPT | Performed by: PHYSICIAN ASSISTANT

## 2019-07-24 PROCEDURE — 97110 THERAPEUTIC EXERCISES: CPT | Mod: GP

## 2019-07-24 PROCEDURE — 36415 COLL VENOUS BLD VENIPUNCTURE: CPT | Performed by: ORTHOPAEDIC SURGERY

## 2019-07-24 PROCEDURE — 97530 THERAPEUTIC ACTIVITIES: CPT | Mod: GP

## 2019-07-24 PROCEDURE — 85018 HEMOGLOBIN: CPT | Performed by: ORTHOPAEDIC SURGERY

## 2019-07-24 PROCEDURE — 97161 PT EVAL LOW COMPLEX 20 MIN: CPT | Mod: GP

## 2019-07-24 PROCEDURE — 25000128 H RX IP 250 OP 636: Performed by: ORTHOPAEDIC SURGERY

## 2019-07-24 PROCEDURE — 12000000 ZZH R&B MED SURG/OB

## 2019-07-24 PROCEDURE — 99222 1ST HOSP IP/OBS MODERATE 55: CPT | Performed by: INTERNAL MEDICINE

## 2019-07-24 PROCEDURE — 25000132 ZZH RX MED GY IP 250 OP 250 PS 637: Mod: GY | Performed by: ORTHOPAEDIC SURGERY

## 2019-07-24 PROCEDURE — 97116 GAIT TRAINING THERAPY: CPT | Mod: GP

## 2019-07-24 PROCEDURE — 82947 ASSAY GLUCOSE BLOOD QUANT: CPT | Performed by: ORTHOPAEDIC SURGERY

## 2019-07-24 PROCEDURE — 99207 ZZC CONSULT E&M CHANGED TO INITIAL LEVEL: CPT | Performed by: INTERNAL MEDICINE

## 2019-07-24 RX ORDER — OXYCODONE AND ACETAMINOPHEN 5; 325 MG/1; MG/1
1-2 TABLET ORAL EVERY 4 HOURS PRN
Qty: 60 TABLET | Refills: 0 | Status: SHIPPED | OUTPATIENT
Start: 2019-07-24 | End: 2019-07-31 | Stop reason: DRUGHIGH

## 2019-07-24 RX ORDER — AMOXICILLIN 250 MG
1 CAPSULE ORAL 2 TIMES DAILY
Qty: 30 TABLET | Refills: 0 | Status: SHIPPED | OUTPATIENT
Start: 2019-07-24 | End: 2019-07-31 | Stop reason: DRUGHIGH

## 2019-07-24 RX ADMIN — ACETAMINOPHEN 975 MG: 325 TABLET, FILM COATED ORAL at 02:20

## 2019-07-24 RX ADMIN — OXYCODONE HYDROCHLORIDE 5 MG: 5 TABLET ORAL at 12:41

## 2019-07-24 RX ADMIN — CEFAZOLIN SODIUM 2 G: 2 INJECTION, SOLUTION INTRAVENOUS at 03:49

## 2019-07-24 RX ADMIN — AMLODIPINE BESYLATE 2.5 MG: 2.5 TABLET ORAL at 08:41

## 2019-07-24 RX ADMIN — HYDROXYZINE HYDROCHLORIDE 25 MG: 25 TABLET ORAL at 04:12

## 2019-07-24 RX ADMIN — DOCUSATE SODIUM 100 MG: 100 CAPSULE, LIQUID FILLED ORAL at 20:53

## 2019-07-24 RX ADMIN — OXYCODONE HYDROCHLORIDE 5 MG: 5 TABLET ORAL at 08:41

## 2019-07-24 RX ADMIN — OXYCODONE HYDROCHLORIDE 5 MG: 5 TABLET ORAL at 21:39

## 2019-07-24 RX ADMIN — AMITRIPTYLINE HYDROCHLORIDE 100 MG: 25 TABLET, FILM COATED ORAL at 21:39

## 2019-07-24 RX ADMIN — RANITIDINE 150 MG: 150 TABLET ORAL at 20:53

## 2019-07-24 RX ADMIN — ACETAMINOPHEN 975 MG: 325 TABLET, FILM COATED ORAL at 10:30

## 2019-07-24 RX ADMIN — PRAVASTATIN SODIUM 40 MG: 20 TABLET ORAL at 20:53

## 2019-07-24 RX ADMIN — OXYCODONE HYDROCHLORIDE 5 MG: 5 TABLET ORAL at 04:12

## 2019-07-24 RX ADMIN — CELECOXIB 200 MG: 200 CAPSULE ORAL at 20:53

## 2019-07-24 RX ADMIN — MULTIPLE VITAMINS W/ MINERALS TAB 1 TABLET: TAB at 08:41

## 2019-07-24 RX ADMIN — RANITIDINE 150 MG: 150 TABLET ORAL at 08:41

## 2019-07-24 RX ADMIN — LORATADINE 10 MG: 10 TABLET ORAL at 08:42

## 2019-07-24 RX ADMIN — DORZOLAMIDE HYDROCHLORIDE AND TIMOLOL MALEATE 1 DROP: 20; 5 SOLUTION/ DROPS OPHTHALMIC at 20:54

## 2019-07-24 RX ADMIN — CELECOXIB 200 MG: 200 CAPSULE ORAL at 08:40

## 2019-07-24 RX ADMIN — DORZOLAMIDE HYDROCHLORIDE AND TIMOLOL MALEATE 1 DROP: 20; 5 SOLUTION/ DROPS OPHTHALMIC at 08:43

## 2019-07-24 RX ADMIN — ACETAMINOPHEN 975 MG: 325 TABLET, FILM COATED ORAL at 17:43

## 2019-07-24 RX ADMIN — DOCUSATE SODIUM 100 MG: 100 CAPSULE, LIQUID FILLED ORAL at 08:40

## 2019-07-24 RX ADMIN — LATANOPROST 1 DROP: 50 SOLUTION OPHTHALMIC at 20:55

## 2019-07-24 RX ADMIN — HYDROXYZINE HYDROCHLORIDE 25 MG: 25 TABLET ORAL at 12:41

## 2019-07-24 RX ADMIN — ENOXAPARIN SODIUM 40 MG: 40 INJECTION SUBCUTANEOUS at 08:42

## 2019-07-24 RX ADMIN — OXYCODONE HYDROCHLORIDE 5 MG: 5 TABLET ORAL at 17:43

## 2019-07-24 RX ADMIN — FLUTICASONE PROPIONATE 1 SPRAY: 50 SPRAY, METERED NASAL at 08:42

## 2019-07-24 RX ADMIN — Medication 37.5 MG: at 08:40

## 2019-07-24 RX ADMIN — ONDANSETRON HYDROCHLORIDE 4 MG: 2 INJECTION, SOLUTION INTRAMUSCULAR; INTRAVENOUS at 05:06

## 2019-07-24 RX ADMIN — LEVOTHYROXINE SODIUM 88 MCG: 88 TABLET ORAL at 08:42

## 2019-07-24 ASSESSMENT — ACTIVITIES OF DAILY LIVING (ADL)
ADLS_ACUITY_SCORE: 14
ADLS_ACUITY_SCORE: 13
ADLS_ACUITY_SCORE: 12
ADLS_ACUITY_SCORE: 13
ADLS_ACUITY_SCORE: 13
ADLS_ACUITY_SCORE: 14

## 2019-07-24 NOTE — PLAN OF CARE
Vss. Afebrile. Lungs-O2 2L placed for sleep-mid 90s. Is done to 1200. Hypoactive bs/no gas, no nausea. Tolerating diet. Last bm 07/23/19. Jimena eddy'd at 0616-pt dtv from this time-information given to receiving day shift Rn. Saline locked. Drsg-cdi. Cms-wnl. Hemovac dc'd & drsg applied to site. Pain managed with Oxycodone/Tylenol/Atarax. Tolerating ice & repositioning. No other significant issues noted overnight.

## 2019-07-24 NOTE — PROGRESS NOTES
07/24/19 0900   Quick Adds   Type of Visit Initial PT Evaluation   Living Environment   Lives With alone   Living Arrangements house   Home Accessibility stairs to enter home;stairs within home   Living Environment Comment Patient reports living alone in a split level home - sets of 6 and 4 stairs (one to two rails). Pt does have a mechanical stair lift.    Self-Care   Usual Activity Tolerance good   Current Activity Tolerance fair   Equipment Currently Used at Home cane, straight  (Would like a walker - will enter DME)   Functional Level Prior   Ambulation 0-->independent   Transferring 0-->independent   Fall history within last six months no   General Information   Onset of Illness/Injury or Date of Surgery - Date 07/23/19   Referring Physician Micha JETER   Patient/Family Goals Statement Home with home PT   Pertinent History of Current Problem (include personal factors and/or comorbidities that impact the POC) 73 year old s/p R TKA.    Precautions/Limitations fall precautions   Weight-Bearing Status - RLE weight-bearing as tolerated   General Info Comments KI until SLR   Cognitive Status Examination   Orientation orientation to person, place and time   Level of Consciousness alert   Follows Commands and Answers Questions able to follow single-step instructions   Pain Assessment   Patient Currently in Pain Yes, see Vital Sign flowsheet  (2-3/10)   Integumentary/Edema   Integumentary/Edema Comments Dressing to the R LE clean, dry and intact.    Range of Motion (ROM)   ROM Comment Decreased R knee AROM secondary to pain, weakness. Other extremities WFLs.    Strength   Strength Comments IND SLR on the R LE.    Bed Mobility   Bed Mobility Comments Supine to sit with increased time and SBA.    Transfer Skills   Transfer Comments Sit to/from stand with CGA.    Gait   Gait Comments Ambulates 5' with FWW and CGA.    Balance   Balance Comments Requires bilateral UE support on FWW for safe dynamic mobility.    Sensory  "Examination   Sensory Perception Comments Denies burning, numbness and tingling.    Coordination   Coordination no deficits were identified   Modality Interventions   Planned Modality Interventions Cryotherapy   Planned Modality Interventions Comments PRN   General Therapy Interventions   Planned Therapy Interventions bed mobility training;gait training;ROM;strengthening;transfer training;progressive activity/exercise;home program guidelines   Clinical Impression   Criteria for Skilled Therapeutic Intervention yes, treatment indicated   PT Diagnosis Impaired gait   Influenced by the following impairments Pain, decreased R LE AROM/strength, impaired balance, decreased activity tolerance   Functional limitations due to impairments Decreased IND with bed mobility, transfers and ambulation   Clinical Presentation Stable/Uncomplicated   Clinical Presentation Rationale Stable medically.    Clinical Decision Making (Complexity) Low complexity   Therapy Frequency 2x/day   Predicted Duration of Therapy Intervention (days/wks) 3 days   Anticipated Discharge Disposition Home with Outpatient Therapy;Home with Home Therapy   Risk & Benefits of therapy have been explained Yes   Patient, Family & other staff in agreement with plan of care Yes   [facility] TM \"6 Clicks\"   2016, Trustees of [facility], under license to [employer].  All rights reserved.   6 Clicks Short Forms Basic Mobility Inpatient Short Form   [facility]  \"6 Clicks\" V.2 Basic Mobility Inpatient Short Form   1. Turning from your back to your side while in a flat bed without using bedrails? 4 - None   2. Moving from lying on your back to sitting on the side of a flat bed without using bedrails? 3 - A Little   3. Moving to and from a bed to a chair (including a wheelchair)? 3 - A Little   4. Standing up from a chair using your arms (e.g., wheelchair, or bedside chair)? 3 - A Little   5. To walk in hospital room? 3 - A Little   6. " Climbing 3-5 steps with a railing? 2 - A Lot   Basic Mobility Raw Score (Score out of 24.Lower scores equate to lower levels of function) 18   Total Evaluation Time   Total Evaluation Time (Minutes) 5

## 2019-07-24 NOTE — PROGRESS NOTES
"Orthopedic Surgery  7/24/2019    S: Patient voices no complaints today.     O: Blood pressure 106/60, pulse 78, temperature 97.8  F (36.6  C), temperature source Oral, resp. rate 18, height 1.702 m (5' 7\"), weight 90.7 kg (200 lb), SpO2 96 %, not currently breastfeeding.  Lab Results   Component Value Date    HGB 11.0 07/24/2019     No results found for: INR     Neurovascularly intact.  Calves are negative bilaterally, both soft and nontender.  The wound is C/D/I.  The wound looks good with minimal erythema of the surrounding skin.    A: Ms. Beltran is doing well status post Procedure(s):  Right total knee arthroplasty using an Arthrex NewLeaf Symbioticslance knee system.    P: Continue physical therapy.   Anticoagulation with lovenox, home on ASA  Pain management  Discharge planning    Dragan Muse  [phone]    "

## 2019-07-24 NOTE — CONSULTS
Hospitalist Consultation      Teri Beltran MRN# [id number]   YOB: 1945 Age: 73 year old   Date of Admission: 7/23/2019     Requesting Physician:  Dr. Muse  Reason for consult: Medical management           Assessment and Plan:   This patient is a 73 year old female with a PMH significant for hypertension, hyperlipidemia, hypothyroidism, tachycardia and reflux who is POD 1 s/p right total knee arthroplasty for treatment of degenerative joint disease    1. S/p right total knee arthroplasty: doing well, cont PT/OT and pain control as per primary    2.  Hypertension  Pressures have been rather labile as high as 163/125 and as low as 90/74.  Most recent pressure of 119/66.  At home she takes amlodipine 2.5 mg, Prinzide 20-25 mg, and metoprolol ER 37.5 mg (that she primarily takes this for tachycardia).    -Resume all home medications on parameters    3.  History of asymptomatic 2:1 heart block  History of syncope in 2016 with documented 2:1 AV block seen by Dr. Villareal of [facility] who performed an electrophysiology study which was inconclusive so a [employer] implantable loop recorder was placed which has not shown any significant arrhythmias.  Last echocardiogram in March 2018 showed EF of 60% with mild LVH and mild dilation of the left atrium. Rates controlled here.  -Continue PTA Metoprolol XL    4.  Hyperlipidemia  -Continue PTA pravastatin    5.  Hypothyroidism  -Continue PTA levothyroxine    6.  GERD  -Continue PTA Zantac      DVT Prophylaxis: on Lovenox as per primary  D/C planning: Likely to home but lives alone and has not yet been assessed by PT             History of Present Illness:   This patient is a 73 year old female who is POD 1 s/p right total knee arthroplasty.  Intra-op report reviewed and showed no intra-op complications.   I/o's reviewed, Currently net positive with good UOP since OR. Hgb stable this am at 11 was 13.1  Overnight did pretty well, no complaints, VSS.  Has  some dizziness this a.m.  Currently, today mike diet, pain controlled, no CP, SOB, no n/v.   O/w other medical problems have been stable, with no recent c/o illness.               Past Medical History:     Past Medical History:   Diagnosis Date     Complication of anesthesia     confusion after anesthesia (after hysterectomy)     First degree AV block 2015     Gastroesophageal reflux disease      Glaucoma      Heart block      Hyperlipidemia LDL goal <130 2013     Hypertension goal BP (blood pressure) < 140/90 12/3/2013     Hypothyroidism      Mild dilation of ascending aorta - borderline 2015     Palpitations      RBBB 2015     Tachycardia      Varicose veins      Venous insufficiency                Past Surgical History:     Past Surgical History:   Procedure Laterality Date     BREAST SURGERY      right breast ductal surgery due to milk production     COLONOSCOPY N/A 10/24/2014    no further screening. Procedure: COLONOSCOPY;  Surgeon: Pedro Rudd MD;  Location: [facility]     HYSTERECTOMY, PAP NO LONGER INDICATED      total hysterectomy and ovaries. benign     SOFT TISSUE SURGERY      ganglion removed from left wrist and ring finger     SURGICAL HISTORY OF -   age 7    tonsillectomy     SURGICAL HISTORY OF -   1/15    left eye cataract     SURGICAL HISTORY OF -   2016    LINQ placed.                 Social History:     Social History     Tobacco Use     Smoking status: Former Smoker     Packs/day: 0.50     Years: 18.00     Pack years: 9.00     Types: Cigarettes     Last attempt to quit: 1980     Years since quittin.5     Smokeless tobacco: Never Used   Substance Use Topics     Alcohol use: Yes     Alcohol/week: 0.0 oz     Comment: 1 glass of wine at night     Drug use: No                 Family History:     family history includes Breast Cancer in her mother; Cancer in her mother; Cerebrovascular Disease (age of onset: 68) in her brother; Diabetes in her father; Heart Disease  in her father.              Allergies:     Allergies   Allergen Reactions     Seasonal Allergies              Medications:     Prior to Admission medications    Medication Sig Last Dose Taking? Auth Provider   albuterol (PROAIR HFA, PROVENTIL HFA, VENTOLIN HFA) 108 (90 BASE) MCG/ACT inhaler Inhale 2 puffs into the lungs every 6 hours as needed for shortness of breath / dyspnea or wheezing  Yes Laura Tipton MD   alendronate (FOSAMAX) 70 MG tablet TAKE 1 TABLET(70 MG) BY MOUTH EVERY 7 DAYS  Yes Laura Tipton MD   amitriptyline (ELAVIL) 100 MG tablet TAKE 1 TABLET(100 MG) BY MOUTH AT BEDTIME  Yes Laura Tipton MD   amLODIPine (NORVASC) 2.5 MG tablet Take 1 tablet (2.5 mg) by mouth daily 7/23/2019 at Unknown time Yes Laura Tipton MD   aspirin 81 MG EC tablet Take 81 mg by mouth daily Past Month at Unknown time Yes Reported, Patient   dorzolamide-timolol (COSOPT) 2-0.5 % ophthalmic solution Place 1 drop into both eyes 2 times daily  Yes Unknown, Entered By History   fluticasone (FLONASE) 50 MCG/ACT spray Spray 1-2 sprays into both nostrils daily  Yes Laura Tipton MD   latanoprost (XALATAN) 0.005 % ophthalmic solution Place 1 drop into the right eye daily  Yes Reported, Patient   levothyroxine (SYNTHROID/LEVOTHROID) 88 MCG tablet Take 1 tablet (88 mcg) by mouth daily 7/23/2019 at Unknown time Yes Laura Tipton MD   lisinopril-hydrochlorothiazide (PRINZIDE/ZESTORETIC) 20-25 MG tablet Take 1 tablet by mouth daily 7/22/2019 at Unknown time Yes Laura Tipton MD   loratadine (CLARITIN) 10 MG tablet Take 1 tablet (10 mg) by mouth daily  Yes Laura Tipton MD   metoprolol succinate ER (TOPROL-XL) 25 MG 24 hr tablet Take 1.5 tablets (37.5 mg) by mouth daily 7/22/2019 at Unknown time Yes Silke Villareal MD   pravastatin (PRAVACHOL) 40 MG tablet TAKE 1 TABLET(40 MG) BY MOUTH DAILY  Yes Laura Tipton MD   Prenatal MV-Min-Fe Fum-FA-DHA (PRENATAL 1 PO) Take 1 tablet by mouth daily   Yes Reported, Patient  "  ranitidine (ZANTAC) 150 MG tablet TAKE 1 TABLET(150 MG) BY MOUTH TWICE DAILY 7/22/2019 at Unknown time Yes Laura Tipton MD               Review of Systems:   A comprehensive greater than 10 system review of systems was carried out.  Pertinent positives and negatives are noted above.  Otherwise negative for contributory info.            Physical Exam:   Vitals were reviewed  Blood pressure 119/66, pulse 78, temperature 95.4  F (35.2  C), temperature source Oral, resp. rate 18, height 1.702 m (5' 7\"), weight 90.7 kg (200 lb), SpO2 94 %, not currently breastfeeding.  Exam:    GENERAL:  Comfortable.  PSYCH: pleasant, oriented, No acute distress.  HEENT:  PERRLA. Normal conjunctiva, normal hearing, nasal mucosa and Oropharynx are normal.  NECK:  Supple, no neck vein distention, adenopathy or bruits, normal thyroid.  HEART:  Normal S1, S2 with no murmur, no pericardial rub, S3 or S4.  LUNGS:  Clear to auscultation, normal Respiratory effort.  ABDOMEN:  Soft, no hepatosplenomegaly, normal bowel sounds.  EXTREMITIES:  No pedal edema, +2 pulses bilateral and equal.  SKIN:  Dry to touch, No rash, wound or ulcerations.  NEUROLOGIC:  Nonfocal with normal cranial nerve and motor power and sensation.            Data:   Past 24 hours labs, studies, and imaging were reviewed.  Recent Labs   Lab 07/24/19  0629 07/23/19  1832   HGB 11.0*  --    PLT  --  315     Recent Labs   Lab 07/24/19  0629 07/23/19  1832   *  --    CR  --  0.60   GFRESTIMATED  --  89   GFRESTBLACK  --  >90       Amaris Franklin PA-C    Pt discussed with Dr. Carmona who agrees with the care as discussed above.     "

## 2019-07-24 NOTE — PLAN OF CARE
OT: Order received, chart reviewed, pt currently POD#1 R TKA. At this time pt fatigued, resting in bed following PT session, BP 95/51, pt receptive to education on role of OT and plan of care, will reschedule for tomorrow morning

## 2019-07-24 NOTE — PLAN OF CARE
"/81   Pulse 105   Temp 97  F (36.1  C) (Oral)   Resp 23   Ht 1.702 m (5' 7\")   Wt 90.7 kg (200 lb)   SpO2 93%   BMI 31.32 kg/m      Pt arrived to floor ~1715. A&O x 4. Titrated to room air, capnography in place, stable. Pain 2-5/10, prn dilaudid x 1, prn oxycodone x 1, ice and lavender for pain control. Up assist x 2 with walker and gait belt; dangled/stood/walked along bed; WBAT. Denies SOB/cp. BS hypoactive x 4, abd soft/nontender, no flatus, last Bm: 7/23 am, denies nausea. Decreased appetite, tolerating clears. Hess in place, good urine output. Right knee immobilizer in place, incision covered with ace bandage, c/d/I. BLE CMS (+). Hemovac to accordion suction, output: 10 mL of dark red blood. IVF infusing.    Plan: TBD   "

## 2019-07-24 NOTE — PLAN OF CARE
Pt A&O x4. VS stable; afebrile. PO oxycodone, vistaril, and scheduled tylenol managing pain. CMS intact. Dressing CDI-compression wrap remains on. Incision iced. Up w/ A1, using gait belt, and walker. Has voided. Tolerating regular diet. Plan is home @ discharge. Will continue to monitor.

## 2019-07-24 NOTE — CONSULTS
Care Transitions Team: Following for CC, discharge planning, and disposition.       Per chart review, BPCI care plan pt lives alone with discharge plans for Home with Support of friend, Nidia and sister in law and [facility] RN/PT.    PT's anticipated discharge disposition is Home with Outpatient Therapy;Home with Home Therapy.        Will follow in collaboration with [facility]  [phone] [facility] and  Belén Partida LPN, [facility], Cell: [phone] Fax: [phone] for discharge planning. Will Fax H/p notes and orders + face to face on discharge      Sharonda Cooper RN, BSN CTS  Care Coordinator  [phone]

## 2019-07-24 NOTE — PLAN OF CARE
PT: Orders received, evaluation completed and treatment initiated.73 year old female s/p T TKA. Pt lives alone in a split level house with multiple stairs to manage. Pt reports IND with mobility at baseline. Has a FWW. Pt reports her friend and sister in law can provide intermittent assist/support throughout the day as needed.     Discharge Planner PT   Patient plan for discharge: Per [facility] plan.   Current status: Supine to sit with SBA. Sit to/from stand with CGA. Ambulates 30' with FWW, CGA. Decreased gait speed. R LE weakness reported by pt, but no buckling noted. IND SLR - no KI recommended.   Barriers to return to prior living situation: Stairs - will address in PT.   Recommendations for discharge: Per [facility] care plan. Agree with plan for home with HHPT and progression to OP PT.  Rationale for recommendations: Pt moving well this AM. Anticipate continued improvement with IP PT while admitted. Will continue to address strength/AROM deficits to maximize IND/safety with mobility prior to discharge.       Entered by: Bautista Ng 07/24/2019 11:24 AM

## 2019-07-25 ENCOUNTER — APPOINTMENT (OUTPATIENT)
Dept: OCCUPATIONAL THERAPY | Facility: CLINIC | Age: 74
DRG: 470 | End: 2019-07-25
Attending: ORTHOPAEDIC SURGERY
Payer: MEDICARE

## 2019-07-25 ENCOUNTER — APPOINTMENT (OUTPATIENT)
Dept: PHYSICAL THERAPY | Facility: CLINIC | Age: 74
DRG: 470 | End: 2019-07-25
Attending: ORTHOPAEDIC SURGERY
Payer: MEDICARE

## 2019-07-25 LAB
CREAT SERPL-MCNC: 0.67 MG/DL (ref 0.52–1.04)
GFR SERPL CREATININE-BSD FRML MDRD: 86 ML/MIN/{1.73_M2}
GLUCOSE SERPL-MCNC: 114 MG/DL (ref 70–99)
HGB BLD-MCNC: 10.1 G/DL (ref 11.7–15.7)

## 2019-07-25 PROCEDURE — 97165 OT EVAL LOW COMPLEX 30 MIN: CPT | Mod: GO | Performed by: STUDENT IN AN ORGANIZED HEALTH CARE EDUCATION/TRAINING PROGRAM

## 2019-07-25 PROCEDURE — 25000132 ZZH RX MED GY IP 250 OP 250 PS 637: Mod: GY | Performed by: PHYSICIAN ASSISTANT

## 2019-07-25 PROCEDURE — 97530 THERAPEUTIC ACTIVITIES: CPT | Mod: GP | Performed by: PHYSICAL THERAPY ASSISTANT

## 2019-07-25 PROCEDURE — 25000132 ZZH RX MED GY IP 250 OP 250 PS 637: Mod: GY | Performed by: ORTHOPAEDIC SURGERY

## 2019-07-25 PROCEDURE — 12000000 ZZH R&B MED SURG/OB

## 2019-07-25 PROCEDURE — 25000128 H RX IP 250 OP 636: Performed by: ORTHOPAEDIC SURGERY

## 2019-07-25 PROCEDURE — 97535 SELF CARE MNGMENT TRAINING: CPT | Mod: GO | Performed by: STUDENT IN AN ORGANIZED HEALTH CARE EDUCATION/TRAINING PROGRAM

## 2019-07-25 PROCEDURE — 97116 GAIT TRAINING THERAPY: CPT | Mod: GP | Performed by: PHYSICAL THERAPY ASSISTANT

## 2019-07-25 PROCEDURE — 82947 ASSAY GLUCOSE BLOOD QUANT: CPT | Performed by: ORTHOPAEDIC SURGERY

## 2019-07-25 PROCEDURE — 85018 HEMOGLOBIN: CPT | Performed by: ORTHOPAEDIC SURGERY

## 2019-07-25 PROCEDURE — 82565 ASSAY OF CREATININE: CPT | Performed by: ORTHOPAEDIC SURGERY

## 2019-07-25 PROCEDURE — 97110 THERAPEUTIC EXERCISES: CPT | Mod: GP | Performed by: PHYSICAL THERAPY ASSISTANT

## 2019-07-25 PROCEDURE — 36415 COLL VENOUS BLD VENIPUNCTURE: CPT | Performed by: ORTHOPAEDIC SURGERY

## 2019-07-25 RX ADMIN — FLUTICASONE PROPIONATE 1 SPRAY: 50 SPRAY, METERED NASAL at 08:30

## 2019-07-25 RX ADMIN — MULTIPLE VITAMINS W/ MINERALS TAB 1 TABLET: TAB at 08:29

## 2019-07-25 RX ADMIN — DORZOLAMIDE HYDROCHLORIDE AND TIMOLOL MALEATE 1 DROP: 20; 5 SOLUTION/ DROPS OPHTHALMIC at 19:48

## 2019-07-25 RX ADMIN — ENOXAPARIN SODIUM 40 MG: 40 INJECTION SUBCUTANEOUS at 08:30

## 2019-07-25 RX ADMIN — LISINOPRIL AND HYDROCHLOROTHIAZIDE 1 TABLET: 25; 20 TABLET ORAL at 08:29

## 2019-07-25 RX ADMIN — CELECOXIB 200 MG: 200 CAPSULE ORAL at 08:29

## 2019-07-25 RX ADMIN — ACETAMINOPHEN 975 MG: 325 TABLET, FILM COATED ORAL at 05:49

## 2019-07-25 RX ADMIN — RANITIDINE 150 MG: 150 TABLET ORAL at 08:29

## 2019-07-25 RX ADMIN — DOCUSATE SODIUM 100 MG: 100 CAPSULE, LIQUID FILLED ORAL at 19:47

## 2019-07-25 RX ADMIN — CELECOXIB 200 MG: 200 CAPSULE ORAL at 19:47

## 2019-07-25 RX ADMIN — LORATADINE 10 MG: 10 TABLET ORAL at 08:29

## 2019-07-25 RX ADMIN — OXYCODONE HYDROCHLORIDE 5 MG: 5 TABLET ORAL at 05:49

## 2019-07-25 RX ADMIN — RANITIDINE 150 MG: 150 TABLET ORAL at 19:48

## 2019-07-25 RX ADMIN — DORZOLAMIDE HYDROCHLORIDE AND TIMOLOL MALEATE 1 DROP: 20; 5 SOLUTION/ DROPS OPHTHALMIC at 08:30

## 2019-07-25 RX ADMIN — Medication 37.5 MG: at 08:29

## 2019-07-25 RX ADMIN — PRAVASTATIN SODIUM 40 MG: 20 TABLET ORAL at 19:47

## 2019-07-25 RX ADMIN — ACETAMINOPHEN 975 MG: 325 TABLET, FILM COATED ORAL at 21:23

## 2019-07-25 RX ADMIN — LATANOPROST 1 DROP: 50 SOLUTION OPHTHALMIC at 21:23

## 2019-07-25 RX ADMIN — AMLODIPINE BESYLATE 2.5 MG: 2.5 TABLET ORAL at 08:29

## 2019-07-25 RX ADMIN — LEVOTHYROXINE SODIUM 88 MCG: 88 TABLET ORAL at 08:29

## 2019-07-25 RX ADMIN — ACETAMINOPHEN 975 MG: 325 TABLET, FILM COATED ORAL at 13:13

## 2019-07-25 RX ADMIN — DOCUSATE SODIUM 100 MG: 100 CAPSULE, LIQUID FILLED ORAL at 08:29

## 2019-07-25 RX ADMIN — AMITRIPTYLINE HYDROCHLORIDE 100 MG: 25 TABLET, FILM COATED ORAL at 21:23

## 2019-07-25 RX ADMIN — OXYCODONE HYDROCHLORIDE 5 MG: 5 TABLET ORAL at 17:58

## 2019-07-25 RX ADMIN — OXYCODONE HYDROCHLORIDE 5 MG: 5 TABLET ORAL at 13:12

## 2019-07-25 ASSESSMENT — ACTIVITIES OF DAILY LIVING (ADL)
ADLS_ACUITY_SCORE: 15
PREVIOUS_RESPONSIBILITIES: MEAL PREP;HOUSEKEEPING;LAUNDRY;SHOPPING;MEDICATION MANAGEMENT;FINANCES;DRIVING
ADLS_ACUITY_SCORE: 15
ADLS_ACUITY_SCORE: 14
ADLS_ACUITY_SCORE: 14
ADLS_ACUITY_SCORE: 15
ADLS_ACUITY_SCORE: 14

## 2019-07-25 NOTE — PROGRESS NOTES
"Orthopedic Surgery  7/25/2019  POD#: 2    S: Patient voices no complaints today. Pain managed well. Denies calf pain, dizziness, SOB.    O: Blood pressure 139/74, pulse 78, temperature 98.4  F (36.9  C), temperature source Oral, resp. rate 16, height 1.702 m (5' 7\"), weight 90.7 kg (200 lb), SpO2 92 %, not currently breastfeeding.  Lab Results   Component Value Date    HGB 10.1 07/25/2019     No results found for: INR     I/O last 3 completed shifts:  In: 1105 [P.O.:1105]  Out: 975 [Urine:975]    Neurovascularly intact.  Calves are negative bilaterally, both soft and nontender.  The wound is C/D/I.  The wound looks good with minimal erythema of the surrounding skin.    A: Ms. Beltran is doing well status post Procedure(s):  Right total knee arthroplasty using an Arthrex iBalance knee system.    P:   1. Mobilize and continue physical therapy.   2. Anticoagulation - discharge on ASA  3. Pain management - controlled  4. Anticipate discharge to home tomorrow with home PT.      Holly Mcdowell PA-C  O: [phone]  "

## 2019-07-25 NOTE — PLAN OF CARE
OT: Evaluation completed, treatment initiated POD#2 R TKA    At baseline pt lives alone in a split level home (stair lift); pt has a variety of intermittent support at time of discharge (friend, sister-in-law and dtr) who will assist with IADLs (driving, groceries, laundry), no plan for somebody to stay with pt during day/night    Discharge Planner OT   Patient plan for discharge: home with home care PT   Current status: Pt receptive to compensatory techniques for lower body dressing tasks, able to complete tasks of don/doffing socks and pants, SBA with reacher following verbal instructions for technique and increased time; pt attempted several times to stand from comfort height toilet without grab bar support, unable to complete with Max A, eventually pt completed sit>stand with FWW, grab bar, Mod A; adjusted set-up to commode for B UE support around toilet, pt able to complete transfer with FWW, arm rests, CGA; pt completed 2 standing g/h tasks at sink, FWW, SBA; pt receptive to recommendations of home set-up to improve ease of managing ADL/IADLs; pt noted with imbalance when making turns in bathroom, FWW, Min A  Barriers to return to prior living situation: toilet set-up, will be alone most of the time, activity tolerance  Recommendations for discharge: per [facility] plan: home with home care PT and RN services. Pt would benefit from toilet safety frame vs commode as pt does not have UE support at home toilet and currently needs that level of support for safety  Rationale for recommendations: pt would benefit from continued OT services to further practice toilet transfer and safety with tasks       Entered by: Yanet Roy 07/25/2019 9:20 AM

## 2019-07-25 NOTE — PLAN OF CARE
"Discharge Planner PT   Patient plan for discharge: home with home PT  Current status: Mod A to stand gait with RW to 65 feet and bed mobility with S to EOB and Min A to supine   Barriers to return to prior living situation: Stairs - will address in PT.   Recommendations for discharge: Per [facility] care plan. Agree with plan for home with HHPT and progression to OP PT.   Rationale for recommendations: anticipate may progress to levels needed for return  home in next 1-2 sessions       Entered by: Arelis West 07/25/2019 10:26 AM     PT- PM   After conversation with Pt ,camilley will not be around \" as much as planned\" currently needs help with basic tranfers and anticipate will need assist for sit-stand from her usual chair this PM was Max A in three attempts to stand from chair.. Pt would like to consider TCU and will speak to PT about changing POC to TCU to best met her needs for safety upon discharge.     Changed to TCU after collaboration with the PT.  "

## 2019-07-25 NOTE — PLAN OF CARE
Vital signs, low grade fever at hs.  Lungs clear to auscultation, encouraged inspirometer use.  Bowel sounds hypoactive, voiding.  CMS intact, dressing dry.HGB 11.0.  Pain controlled with oxycodone, ice pack application.  Ambulated with walker and sba of 1.  Dressing intact, dry.  Post op plan of care reviewed with pt.

## 2019-07-25 NOTE — PLAN OF CARE
Pt A&O x4. VS stable; afebrile. PO oxycodone and scheduled tylenol managing pain. CMS intact. Ace wrap removed-dressing scant amount of dried drainage. Incision iced. Up w/ A1, using gait belt, and walker. Voiding in good amts. Tolerating regular diet. Plan is home vs. TCU. Will continue to monitor.

## 2019-07-25 NOTE — PROGRESS NOTES
Chart reviewed.  Patient progressing as expected per OT and PT; plan on discharge to home with family assist.  We will follow-up tomorrow for disposition planning.

## 2019-07-25 NOTE — PLAN OF CARE
A&OX4, VSS: stable. Sleeping comfortably throughout the night. CMS: intact. Drsg: CDI. Pain managed with scheduled Tylenol and PRN oxycodone.  Up with AxNaif, janusz and walker.Voiding adequately. Passing flatus. Will continue to provide cares.

## 2019-07-25 NOTE — PROGRESS NOTES
07/25/19 0834   Quick Adds   Type of Visit Initial Occupational Therapy Evaluation   Living Environment   Lives With alone   Living Arrangements house   Home Accessibility stairs to enter home   Transportation Anticipated family or friend will provide   Living Environment Comment Pt reports limited support from family and friends   Home Main Entrance   Stairs Comment, Main Entrance Has stair lift   Self-Care   Usual Activity Tolerance good   Current Activity Tolerance moderate   Equipment Currently Used at Home none   Activity/Exercise/Self-Care Comment pt has stair lift, handicap toilet, walk-in tub and reachers from    Functional Level   Ambulation 0-->independent   Transferring 0-->independent   Toileting 0-->independent  (elevated toilet)   Bathing 1-->assistive equipment  (walk-in tub/shower)   Dressing 0-->independent   Eating 0-->independent   Communication 0-->understands/communicates without difficulty   Swallowing 0-->swallows foods/liquids without difficulty   Cognition 0 - no cognition issues reported   Prior Functional Level Comment independent at baseline, pt has walk-in tub, only uses for shower   General Information   Onset of Illness/Injury or Date of Surgery - Date 07/23/19   Referring Physician Micha JETER   Patient/Family Goals Statement return home at discharge   Additional Occupational Profile Info/Pertinent History of Current Problem POD#2 R TKA   Precautions/Limitations fall precautions   Weight-Bearing Status - RLE weight-bearing as tolerated   Cognitive Status Examination   Orientation orientation to person, place and time   Level of Consciousness alert   Pain Assessment   Patient Currently in Pain Yes, see Vital Sign flowsheet  (stiff and sore with activity, comfortable at rest)   Mobility   Bed Mobility Comments SBA   Transfer Skills   Transfer Comments FWW, CGA   Transfer Skill: Bed to Chair/Chair to Bed   Level of [place]: Bed to Chair contact guard   Physical  Assist/Nonphysical Assist: Bed to Chair verbal cues   Weight-Bearing Restrictions weight-bearing as tolerated   Assistive Device - Transfer Skill Bed to Chair Chair to Bed Rehab Eval rolling walker   Transfer Skill: Sit to Stand   Level of [place]: Sit/Stand contact guard   Physical Assist/Nonphysical Assist: Sit/Stand verbal cues   Transfer Skill: Sit to Stand weight-bearing as tolerated   Assistive Device for Transfer: Sit/Stand rolling walker   Transfer Skill: Toilet Transfer   Level of [place]: Toilet maximum assist (25% patients effort)   Physical Assist/Nonphysical Assist: Toilet verbal cues   Weight-Bearing Restrictions: Toilet weight-bearing as tolerated   Assistive Device rolling walker   Balance   Balance Comments impaired balance post-op   Lower Body Dressing   Level of [place]: Dress Lower Body stand-by assist   Physical Assist/Nonphysical Assist: Dress Lower Body verbal cues   Toileting   Level of [place]: Toilet maximum assist (25% patients effort)   Grooming   Level of [place]: Grooming stand-by assist   Physical Assist/Nonphysical Assist: Grooming verbal cues   Instrumental Activities of Daily Living (IADL)   Previous Responsibilities meal prep;housekeeping;laundry;shopping;medication management;finances;driving  (hired  support at discharge)   IADL Comments pt independent at baseline, will have support from variety of sources (friend, sister-in-law, dtr)   Activities of Daily Living Analysis   Impairments Contributing to Impaired Activities of Daily Living balance impaired;pain;strength decreased   General Therapy Interventions   Planned Therapy Interventions ADL retraining;IADL retraining;transfer training   Clinical Impression   Criteria for Skilled Therapeutic Interventions Met yes, treatment indicated   OT Diagnosis impaired ability to manage ADL/IADLs   Influenced by the following impairments post-op pain, balance, strength, fatigue   Assessment of  "Occupational Performance 3-5 Performance Deficits   Identified Performance Deficits dressing, bathing, toileting, g/h, household tasks   Clinical Decision Making (Complexity) Low complexity   Therapy Frequency Daily   Predicted Duration of Therapy Intervention (days/wks) 2 days   Anticipated Discharge Disposition Home with Assist   Risks and Benefits of Treatment have been explained. Yes   Patient, Family & other staff in agreement with plan of care Yes   Clinical Impression Comments demonstrates ability to benefit from further OT services   [facility] TM \"6 Clicks\"   2016, Trustees of [facility], under license to [employer].  All rights reserved.   6 Clicks Short Forms Daily Activity Inpatient Short Form   [facility] AM-PAC  \"6 Clicks\" Daily Activity Inpatient Short Form   1. Putting on and taking off regular lower body clothing? 3 - A Little   2. Bathing (including washing, rinsing, drying)? 3 - A Little   3. Toileting, which includes using toilet, bedpan or urinal? 2 - A Lot   4. Putting on and taking off regular upper body clothing? 4 - None   5. Taking care of personal grooming such as brushing teeth? 3 - A Little   6. Eating meals? 4 - None   Daily Activity Raw Score (Score out of 24.Lower scores equate to lower levels of function) 19   Total Evaluation Time   Total Evaluation Time (Minutes) 8     "

## 2019-07-25 NOTE — PROGRESS NOTES
SWS     D: Discharge planning.. Per PT recommendation has been updated based on pt's progress to TCU on discharge. CODIE has contacted [facility] , [facility] ( [phone]) regarding update, will assist with further planning per recommendations from [facility].      Addendum:      D: Per request of [facility] it has armando requested that SW place referrals to [facility] and to [facility] for pt's transfer to TCU on discharge, semi-private room. Referrals made. [facility] has also requested that SW arrange transport to rehab facility for pt per request.       A/P: Will await assessments and determination of bed availability, continue planning accordingly in anticipation of discharge tomorrow.       Addendum:        D: Discharge planning continuing.. [facility] has assessed and will be able to accept pt tomorrow (semi-private room),  [facility] has no beds available.         I: Met with pt and discussed above, she has asked that planning continue for her transfer to [facility] and has affirmed need for medical transport. Discussed with pt that Medicare does not cover the w/c transport, discussed private pay cost of $75/base and $5/mi which she acknowledges and accepts. [facility] arranged for 7/26 @ 1400. Discussed with pt the determining factors for rehab facility length of stay.          [facility] , [facility] updated.       A/P: Anticipate no problem with arrangements made for transfer tomorrow, SW available until discharge should adjustments be needed in plan as noted.

## 2019-07-26 ENCOUNTER — APPOINTMENT (OUTPATIENT)
Dept: PHYSICAL THERAPY | Facility: CLINIC | Age: 74
DRG: 470 | End: 2019-07-26
Attending: ORTHOPAEDIC SURGERY
Payer: MEDICARE

## 2019-07-26 VITALS
OXYGEN SATURATION: 98 % | DIASTOLIC BLOOD PRESSURE: 61 MMHG | HEIGHT: 67 IN | WEIGHT: 200 LBS | RESPIRATION RATE: 16 BRPM | BODY MASS INDEX: 31.39 KG/M2 | SYSTOLIC BLOOD PRESSURE: 106 MMHG | TEMPERATURE: 95.6 F | HEART RATE: 78 BPM

## 2019-07-26 LAB — PLATELET # BLD AUTO: 278 10E9/L (ref 150–450)

## 2019-07-26 PROCEDURE — 97116 GAIT TRAINING THERAPY: CPT | Mod: GP | Performed by: PHYSICAL THERAPY ASSISTANT

## 2019-07-26 PROCEDURE — 36415 COLL VENOUS BLD VENIPUNCTURE: CPT | Performed by: ORTHOPAEDIC SURGERY

## 2019-07-26 PROCEDURE — 99232 SBSQ HOSP IP/OBS MODERATE 35: CPT | Performed by: INTERNAL MEDICINE

## 2019-07-26 PROCEDURE — 85049 AUTOMATED PLATELET COUNT: CPT | Performed by: ORTHOPAEDIC SURGERY

## 2019-07-26 PROCEDURE — 97530 THERAPEUTIC ACTIVITIES: CPT | Mod: GP | Performed by: PHYSICAL THERAPY ASSISTANT

## 2019-07-26 PROCEDURE — 25000128 H RX IP 250 OP 636: Performed by: ORTHOPAEDIC SURGERY

## 2019-07-26 PROCEDURE — 25000132 ZZH RX MED GY IP 250 OP 250 PS 637: Mod: GY | Performed by: PHYSICIAN ASSISTANT

## 2019-07-26 PROCEDURE — 25000132 ZZH RX MED GY IP 250 OP 250 PS 637: Mod: GY | Performed by: ORTHOPAEDIC SURGERY

## 2019-07-26 RX ADMIN — RANITIDINE 150 MG: 150 TABLET ORAL at 08:58

## 2019-07-26 RX ADMIN — OXYCODONE HYDROCHLORIDE 5 MG: 5 TABLET ORAL at 08:58

## 2019-07-26 RX ADMIN — DOCUSATE SODIUM 100 MG: 100 CAPSULE, LIQUID FILLED ORAL at 08:58

## 2019-07-26 RX ADMIN — CELECOXIB 200 MG: 200 CAPSULE ORAL at 08:57

## 2019-07-26 RX ADMIN — Medication 37.5 MG: at 09:00

## 2019-07-26 RX ADMIN — ENOXAPARIN SODIUM 40 MG: 40 INJECTION SUBCUTANEOUS at 08:58

## 2019-07-26 RX ADMIN — LEVOTHYROXINE SODIUM 88 MCG: 88 TABLET ORAL at 08:58

## 2019-07-26 RX ADMIN — OXYCODONE HYDROCHLORIDE 5 MG: 5 TABLET ORAL at 13:05

## 2019-07-26 RX ADMIN — MULTIPLE VITAMINS W/ MINERALS TAB 1 TABLET: TAB at 08:58

## 2019-07-26 RX ADMIN — LORATADINE 10 MG: 10 TABLET ORAL at 08:58

## 2019-07-26 RX ADMIN — ACETAMINOPHEN 975 MG: 325 TABLET, FILM COATED ORAL at 05:43

## 2019-07-26 RX ADMIN — DORZOLAMIDE HYDROCHLORIDE AND TIMOLOL MALEATE 1 DROP: 20; 5 SOLUTION/ DROPS OPHTHALMIC at 09:05

## 2019-07-26 RX ADMIN — FLUTICASONE PROPIONATE 2 SPRAY: 50 SPRAY, METERED NASAL at 09:04

## 2019-07-26 ASSESSMENT — ACTIVITIES OF DAILY LIVING (ADL)
ADLS_ACUITY_SCORE: 15

## 2019-07-26 NOTE — DISCHARGE INSTRUCTIONS
Return to clinic in 10-14 days.  Call [phone] to schedule or if you experience any problems before your scheduled appointment.    See general discharge instruction sheet for knees  1. Do exercises at home as instructed by therapist twice a day. Continue outpatient therapy as ordered by your doctor.  2. Ice knee after exercises and therapy.  3. Examine dressing daily for problems.  4. May shower, can get dressing wet, no soaking (no bathtubs, pools or hot tubs)  5. Notify your dentist or physician of your implant so you can get antibiotics before any dental work or before any invasive procedure (ie: colonoscopy)    Aquacel dressing:  DO NOT CHANGE DRESSING DAILY.  Leave this dressing on until follow-up appointment with Orthopedic Surgeon.  Dressing is waterproof, can shower with it on, pat dry when done.  No soaking such as in tub baths, pools or hot tubs    While on narcotic pain medication, to prevent constipation:  1. Drink plenty of water to keep well hydrated   2. May take over the counter Colace or Senna (follow instructions on label)      Call your physician if: ([phone])   1. Persistent fever greater than 100 degrees with body chills or excessive sweating.  2. Increased/persistent redness, localized warmth, tenderness, drainage or swelling at incision site. Greater than 50% drainage on dressing.   3. Persistent pain not controlled with oral pain medications, ice and rest.   4. No bowel movement in 3 days (may use Milk of Magnesia or other over the counter remedy).  5. Chest pain, shortness of breath, and/or calf pain with excessive swelling.  6. Generalized feeling of illness.  7. Any other questions or concerns related to your surgery/recovery.    Thank you for allowing [facility] to participate in your cares!!

## 2019-07-26 NOTE — PROGRESS NOTES
Your information has been submitted on July 26th, 2019 at 08:12:19 AM CDT. The confirmation number is [id number]

## 2019-07-26 NOTE — PROGRESS NOTES
"Orthopedic Surgery  7/26/2019    S: Patient voices no complaints today.     O: Blood pressure 129/66, pulse 78, temperature 97.2  F (36.2  C), temperature source Oral, resp. rate 16, height 1.702 m (5' 7\"), weight 90.7 kg (200 lb), SpO2 97 %, not currently breastfeeding.  Lab Results   Component Value Date    HGB 10.1 07/25/2019     No results found for: INR     Neurovascularly intact.  Calves are negative bilaterally, both soft and nontender.  The wound is C/D/I.  The wound looks good with minimal erythema of the surrounding skin.    A: Ms. Beltran is doing well status post Procedure(s):  Right total knee arthroplasty using an Arthrex via680lance knee system.    P: Continue physical therapy.   Anticoagulation discharge on ASA  Pain management  Discharge planning, to TCU    Dragan Muse  [phone]    "

## 2019-07-26 NOTE — PLAN OF CARE
A&OX4, VSS: stable. Sleeping comfortably throughout the night. CMS: intact. Drsg: CDI. Pain managed with scheduled Tylenol. Up with AxNaif, jnausz and walker.Voiding adequately. Passing flatus. Plans to discharge to [place] today.

## 2019-07-26 NOTE — PLAN OF CARE
Discharge Planner PT   Patient plan for discharge: TCU at 200 pm  Current status: ROM 5-85 gait with RW to 100 feet with 2 self corrected knee buckling that occurred with change in her  gait speed, KI not used as able to Ind SLR sit-stand with Max A from chair less assist from higher surfaces required.  Barriers to return to prior living situation: unable to stand without Max A of PT staff from chair family unable to support this level of assist and not available 24/7  Recommendations for discharge: TCU per plan established by the PT.    Rationale for recommendations: goals are partially met at this time recommend to PT for discharge to TCU per MD and [facility]CO POC.       Entered by: Arelis West 07/26/2019 11:56 AM

## 2019-07-26 NOTE — PLAN OF CARE
OT: Pt with planned discharge to TCU.     Occupational Therapy Discharge Summary    Reason for therapy discharge:    Discharged to transitional care facility.    Progress towards therapy goal(s). See goals on Care Plan in [facility] electronic health record for goal details.  Goals not met.  Barriers to achieving goals:   discharge from facility.    Therapy recommendation(s):    Continued therapy is recommended.  Rationale/Recommendations:  OT eval and treat as indicated at TCU.

## 2019-07-26 NOTE — PROGRESS NOTES
Reviewed discharge instructions and medications with patient. Questions answered. [facility], discharge instructions, and belongings at this time.

## 2019-07-26 NOTE — PLAN OF CARE
Pt A&Ox4. Up with A1,walker, GB. Aquacel dressing CDI. CMS intact. Pain managed with tylenol, oxycodone. Voiding adequately. Chante active, +flatus. Tolerating regular diet. VSS, RA. [facility]

## 2019-07-28 VITALS
OXYGEN SATURATION: 96 % | SYSTOLIC BLOOD PRESSURE: 104 MMHG | HEART RATE: 88 BPM | BODY MASS INDEX: 31.68 KG/M2 | DIASTOLIC BLOOD PRESSURE: 58 MMHG | WEIGHT: 202.3 LBS | TEMPERATURE: 97.8 F | RESPIRATION RATE: 18 BRPM

## 2019-07-29 ENCOUNTER — NURSING HOME VISIT (OUTPATIENT)
Dept: GERIATRICS | Facility: CLINIC | Age: 74
End: 2019-07-29
Payer: MEDICARE

## 2019-07-29 DIAGNOSIS — Z96.651 AFTERCARE FOLLOWING RIGHT KNEE JOINT REPLACEMENT SURGERY: ICD-10-CM

## 2019-07-29 DIAGNOSIS — I10 HYPERTENSION GOAL BP (BLOOD PRESSURE) < 140/90: ICD-10-CM

## 2019-07-29 DIAGNOSIS — D62 ANEMIA DUE TO BLOOD LOSS, ACUTE: ICD-10-CM

## 2019-07-29 DIAGNOSIS — K59.03 DRUG-INDUCED CONSTIPATION: ICD-10-CM

## 2019-07-29 DIAGNOSIS — M17.11 PRIMARY OSTEOARTHRITIS OF RIGHT KNEE: Primary | ICD-10-CM

## 2019-07-29 DIAGNOSIS — Z47.1 AFTERCARE FOLLOWING RIGHT KNEE JOINT REPLACEMENT SURGERY: ICD-10-CM

## 2019-07-29 DIAGNOSIS — I87.2 VENOUS INSUFFICIENCY: ICD-10-CM

## 2019-07-29 PROCEDURE — 99310 SBSQ NF CARE HIGH MDM 45: CPT | Performed by: NURSE PRACTITIONER

## 2019-07-29 NOTE — PROGRESS NOTES
[facility]  PRIMARY CARE PROVIDER AND CLINIC:  [place], [address] [address] / [place] [address]  Chief Complaint   Patient presents with     Hospital F/U     [place] Medical Record Number:  [id number]  Place of Service where encounter took place:  [facility] ([facility]) [[id number]]    Teri Beltran  is a 73 year old  (1945), PMH of HTN, HLD, RBBB, hypothyroidism and venous insufficiency who presents for right TKA  admitted to the above facility from  [facility]. Hospital stay 7/23/19 through 7/26/19..  Admitted to this facility for  rehab, medical management and nursing care.    HPI:    HPI information obtained from: facility chart records, facility staff, patient report and [facility] chart review.   Brief Summary of Hospital Course:   DJD s/p right TKA Dr. Muse on 7/23/19  Anemia: Hgb at discharge was 10.1  Updates on Status Since Skilled nursing Admission: On exam today patient states pain in right knee is rated 3/10, pain controlled, denies fever, chills, cough, congestion, SOB, N/v/D states she has not had a BM since surgery, denies CP, palpitations, states she slept well last night no other concerns.     CODE STATUS/ADVANCE DIRECTIVES DISCUSSION:   CPR/Full code   Patient's living condition: lives alone  ALLERGIES: Seasonal allergies  PAST MEDICAL HISTORY:  has a past medical history of Complication of anesthesia, First degree AV block (8/11/2015), Gastroesophageal reflux disease, Glaucoma, Heart block, Hyperlipidemia LDL goal <130 (6/14/2013), Hypertension goal BP (blood pressure) < 140/90 (12/3/2013), Hypothyroidism, Mild dilation of ascending aorta - borderline (8/11/2015), Palpitations, RBBB (8/11/2015), Tachycardia, Varicose veins, and Venous insufficiency. She also has no past medical history of History of blood transfusion, Malignant hyperthermia, or Sleep apnea.  PAST SURGICAL HISTORY:   has a past surgical history that includes  hysterectomy, pap no longer indicated (2009); Breast surgery; Soft tissue surgery; surgical history of -  (age 7); Colonoscopy (N/A, 10/24/2014); surgical history of -  (1/15); surgical history of -  (Fall 2016); and Arthroplasty knee (Right, 7/23/2019).  FAMILY HISTORY: family history includes Breast Cancer in her mother; Cancer in her mother; Cerebrovascular Disease (age of onset: 68) in her brother; Diabetes in her father; Heart Disease in her father.  SOCIAL HISTORY:   reports that she quit smoking about 39 years ago. Her smoking use included cigarettes. She has a 9.00 pack-year smoking history. She has never used smokeless tobacco. She reports that she drinks alcohol. She reports that she does not use drugs.    Post Discharge Medication Reconciliation Status: discharge medications reconciled, continue medications without change    Current Outpatient Medications   Medication Sig Dispense Refill     albuterol (PROAIR HFA, PROVENTIL HFA, VENTOLIN HFA) 108 (90 BASE) MCG/ACT inhaler Inhale 2 puffs into the lungs every 6 hours as needed for shortness of breath / dyspnea or wheezing 1 Inhaler 0     alendronate (FOSAMAX) 70 MG tablet TAKE 1 TABLET(70 MG) BY MOUTH EVERY 7 DAYS 12 tablet 3     amitriptyline (ELAVIL) 100 MG tablet TAKE 1 TABLET(100 MG) BY MOUTH AT BEDTIME 90 tablet 1     amLODIPine (NORVASC) 2.5 MG tablet Take 1 tablet (2.5 mg) by mouth daily 90 tablet 2     aspirin (ASA) 325 MG EC tablet Take one Aspirin tab twice daily for 5 weeks. 70 tablet 0     dorzolamide-timolol (COSOPT) 2-0.5 % ophthalmic solution Place 1 drop into both eyes 2 times daily       fluticasone (FLONASE) 50 MCG/ACT spray Spray 1-2 sprays into both nostrils daily 1 Bottle 3     latanoprost (XALATAN) 0.005 % ophthalmic solution Place 1 drop into the right eye daily       levothyroxine (SYNTHROID/LEVOTHROID) 88 MCG tablet Take 1 tablet (88 mcg) by mouth daily 90 tablet 3     lisinopril-hydrochlorothiazide (PRINZIDE/ZESTORETIC) 20-25 MG  tablet Take 1 tablet by mouth daily 90 tablet 0     loratadine (CLARITIN) 10 MG tablet Take 1 tablet (10 mg) by mouth daily       metoprolol succinate ER (TOPROL-XL) 25 MG 24 hr tablet Take 1.5 tablets (37.5 mg) by mouth daily 135 tablet 2     oxyCODONE-acetaminophen (PERCOCET) 5-325 MG tablet Take 1-2 tablets by mouth every 4 hours as needed for severe pain 60 tablet 0     pravastatin (PRAVACHOL) 40 MG tablet TAKE 1 TABLET(40 MG) BY MOUTH DAILY 90 tablet 3     Prenatal MV-Min-Fe Fum-FA-DHA (PRENATAL 1 PO) Take 1 tablet by mouth daily        ranitidine (ZANTAC) 150 MG tablet TAKE 1 TABLET(150 MG) BY MOUTH TWICE DAILY 180 tablet 0     senna-docusate (SENOKOT-S/PERICOLACE) 8.6-50 MG tablet Take 1 tablet by mouth 2 times daily 30 tablet 0       ROS:  10 point ROS of systems including Constitutional, Eyes, Respiratory, Cardiovascular, Gastroenterology, Genitourinary, Integumentary, Musculoskeletal, Psychiatric were all negative except for pertinent positives noted in my HPI.    Vitals:  /58   Pulse 88   Temp 97.8  F (36.6  C)   Resp 18   Wt 91.8 kg (202 lb 4.8 oz)   SpO2 96%   BMI 31.68 kg/m    Exam:  GENERAL APPEARANCE:  Alert, in no distress, morbidly obese  ENT:  Mouth and posterior oropharynx normal, moist mucous membranes, [place]  EYES:  EOM, conjunctivae, lids, pupils and irises normal, PERRL  RESP:  respiratory effort and palpation of chest normal, lungs clear to auscultation , no respiratory distress  CV:  Palpation and auscultation of heart done , regular rate and rhythm, no murmur, rub, or gallop, peripheral edema 1+ in LE bilaterally R>L  ABDOMEN:  normal bowel sounds, soft, nontender, no hepatosplenomegaly or other masses  M/S:   Examination of:   right upper extremity, left upper extremity and left lower extremity  Inspection, ROM, stability and muscle strength normal and decreased ROM and strength RLE  SKIN:  Inspection of skin and subcutaneous tissue baseline, did not visualize right knee  incision  NEURO:   Cranial nerves 2-12 are normal tested and grossly at patient's baseline, speech WNL  PSYCH:  affect and mood normal    Lab/Diagnostic data:  Recent labs in EPIC reviewed by me today.     ASSESSMENT/PLAN:  Primary osteoarthritis of right knee  Aftercare following right knee joint replacement surgery  Acute/ongoing: PT and OT for strengthening, DC percocet, oxycodone 5mg q 6 hours prn, schedule tylenol to 1000mg TID and q hs prn   ASA 325mg BID for 5 weeks  F/u with ortho as directed    Hypertension goal BP (blood pressure) < 140/90  Ongoing: vitals daily and prn, BMP follow, continue norvasc 2.5mg QD, toprol xl 37.5mg QD, lisinopril/HCTZ 20/25mg QD,     Venous insufficiency  Ongoing: wears joleen hose at home, continue to monitor    Anemia due to blood loss, acute  Acute/ongoing: Hgb weekly, follow    Drug-induced constipation  Acute/ongoing: increase senna s to 2 PO BID and add miralax 17gm QD prn       Orders written by provider at facility  BMP and Hgb on wed  Tylenol 1000mg TID scheduled and q hs prn  dc percocet  Oxycodone 5mg q 6 hours prn  Senna s 2 PO BID scheduled  miralax 17gm QD prn    Total time spent with patient visit at the skilled nursing facility was 45 min including patient visit and review of past records. Greater than 50% of total time spent with counseling and coordinating care due to coordinating care with nurse on admission orders, coordinating care with follow up labs and appointments, the reason for their hospitalization and what the treatment is, the plan of SNF stay and projected length of stay, options of code status and their current code status order, current medication reconciled from the hospital, recent past lab and imaging results and subsequent treatment plan and current pain control plan and controlled substances ordered and taper plan of care.  Electronically signed by:  Tonya Lynn Haase, APRN CNP

## 2019-07-29 NOTE — LETTER
7/29/2019        RE: Teri Beltran  [address]  [facility] [address]        [facility]  PRIMARY CARE PROVIDER AND CLINIC:  [place], [address] [address] / [facility] [address]  Chief Complaint   Patient presents with     Hospital F/U     [place] Medical Record Number:  [id number]  Place of Service where encounter took place:  [facility] ([facility]) [[id number]]    Teri Beltran  is a 73 year old  (1945), PMH of HTN, HLD, RBBB, hypothyroidism and venous insufficiency who presents for right TKA  admitted to the above facility from  [facility]. Hospital stay 7/23/19 through 7/26/19..  Admitted to this facility for  rehab, medical management and nursing care.    HPI:    HPI information obtained from: facility chart records, facility staff, patient report and [facility] chart review.   Brief Summary of Hospital Course:   DJD s/p right TKA Dr. Muse on 7/23/19  Anemia: Hgb at discharge was 10.1  Updates on Status Since Skilled nursing Admission: On exam today patient states pain in right knee is rated 3/10, pain controlled, denies fever, chills, cough, congestion, SOB, N/v/D states she has not had a BM since surgery, denies CP, palpitations, states she slept well last night no other concerns.     CODE STATUS/ADVANCE DIRECTIVES DISCUSSION:   CPR/Full code   Patient's living condition: lives alone  ALLERGIES: Seasonal allergies  PAST MEDICAL HISTORY:  has a past medical history of Complication of anesthesia, First degree AV block (8/11/2015), Gastroesophageal reflux disease, Glaucoma, Heart block, Hyperlipidemia LDL goal <130 (6/14/2013), Hypertension goal BP (blood pressure) < 140/90 (12/3/2013), Hypothyroidism, Mild dilation of ascending aorta - borderline (8/11/2015), Palpitations, RBBB (8/11/2015), Tachycardia, Varicose veins, and Venous insufficiency. She also has no past medical history of History of blood transfusion, Malignant hyperthermia,  or Sleep apnea.  PAST SURGICAL HISTORY:   has a past surgical history that includes hysterectomy, pap no longer indicated (2009); Breast surgery; Soft tissue surgery; surgical history of -  (age 7); Colonoscopy (N/A, 10/24/2014); surgical history of -  (1/15); surgical history of -  (Fall 2016); and Arthroplasty knee (Right, 7/23/2019).  FAMILY HISTORY: family history includes Breast Cancer in her mother; Cancer in her mother; Cerebrovascular Disease (age of onset: 68) in her brother; Diabetes in her father; Heart Disease in her father.  SOCIAL HISTORY:   reports that she quit smoking about 39 years ago. Her smoking use included cigarettes. She has a 9.00 pack-year smoking history. She has never used smokeless tobacco. She reports that she drinks alcohol. She reports that she does not use drugs.    Post Discharge Medication Reconciliation Status: discharge medications reconciled, continue medications without change    Current Outpatient Medications   Medication Sig Dispense Refill     albuterol (PROAIR HFA, PROVENTIL HFA, VENTOLIN HFA) 108 (90 BASE) MCG/ACT inhaler Inhale 2 puffs into the lungs every 6 hours as needed for shortness of breath / dyspnea or wheezing 1 Inhaler 0     alendronate (FOSAMAX) 70 MG tablet TAKE 1 TABLET(70 MG) BY MOUTH EVERY 7 DAYS 12 tablet 3     amitriptyline (ELAVIL) 100 MG tablet TAKE 1 TABLET(100 MG) BY MOUTH AT BEDTIME 90 tablet 1     amLODIPine (NORVASC) 2.5 MG tablet Take 1 tablet (2.5 mg) by mouth daily 90 tablet 2     aspirin (ASA) 325 MG EC tablet Take one Aspirin tab twice daily for 5 weeks. 70 tablet 0     dorzolamide-timolol (COSOPT) 2-0.5 % ophthalmic solution Place 1 drop into both eyes 2 times daily       fluticasone (FLONASE) 50 MCG/ACT spray Spray 1-2 sprays into both nostrils daily 1 Bottle 3     latanoprost (XALATAN) 0.005 % ophthalmic solution Place 1 drop into the right eye daily       levothyroxine (SYNTHROID/LEVOTHROID) 88 MCG tablet Take 1 tablet (88 mcg) by mouth  daily 90 tablet 3     lisinopril-hydrochlorothiazide (PRINZIDE/ZESTORETIC) 20-25 MG tablet Take 1 tablet by mouth daily 90 tablet 0     loratadine (CLARITIN) 10 MG tablet Take 1 tablet (10 mg) by mouth daily       metoprolol succinate ER (TOPROL-XL) 25 MG 24 hr tablet Take 1.5 tablets (37.5 mg) by mouth daily 135 tablet 2     oxyCODONE-acetaminophen (PERCOCET) 5-325 MG tablet Take 1-2 tablets by mouth every 4 hours as needed for severe pain 60 tablet 0     pravastatin (PRAVACHOL) 40 MG tablet TAKE 1 TABLET(40 MG) BY MOUTH DAILY 90 tablet 3     Prenatal MV-Min-Fe Fum-FA-DHA (PRENATAL 1 PO) Take 1 tablet by mouth daily        ranitidine (ZANTAC) 150 MG tablet TAKE 1 TABLET(150 MG) BY MOUTH TWICE DAILY 180 tablet 0     senna-docusate (SENOKOT-S/PERICOLACE) 8.6-50 MG tablet Take 1 tablet by mouth 2 times daily 30 tablet 0       ROS:  10 point ROS of systems including Constitutional, Eyes, Respiratory, Cardiovascular, Gastroenterology, Genitourinary, Integumentary, Musculoskeletal, Psychiatric were all negative except for pertinent positives noted in my HPI.    Vitals:  /58   Pulse 88   Temp 97.8  F (36.6  C)   Resp 18   Wt 91.8 kg (202 lb 4.8 oz)   SpO2 96%   BMI 31.68 kg/m     Exam:  GENERAL APPEARANCE:  Alert, in no distress, morbidly obese  ENT:  Mouth and posterior oropharynx normal, moist mucous membranes, [place]  EYES:  EOM, conjunctivae, lids, pupils and irises normal, PERRL  RESP:  respiratory effort and palpation of chest normal, lungs clear to auscultation , no respiratory distress  CV:  Palpation and auscultation of heart done , regular rate and rhythm, no murmur, rub, or gallop, peripheral edema 1+ in LE bilaterally R>L  ABDOMEN:  normal bowel sounds, soft, nontender, no hepatosplenomegaly or other masses  M/S:   Examination of:   right upper extremity, left upper extremity and left lower extremity  Inspection, ROM, stability and muscle strength normal and decreased ROM and strength RLE  SKIN:   Inspection of skin and subcutaneous tissue baseline, did not visualize right knee incision  NEURO:   Cranial nerves 2-12 are normal tested and grossly at patient's baseline, speech WNL  PSYCH:  affect and mood normal    Lab/Diagnostic data:  Recent labs in EPIC reviewed by me today.     ASSESSMENT/PLAN:  Primary osteoarthritis of right knee  Aftercare following right knee joint replacement surgery  Acute/ongoing: PT and OT for strengthening, DC percocet, oxycodone 5mg q 6 hours prn, schedule tylenol to 1000mg TID and q hs prn   ASA 325mg BID for 5 weeks  F/u with ortho as directed    Hypertension goal BP (blood pressure) < 140/90  Ongoing: vitals daily and prn, BMP follow, continue norvasc 2.5mg QD, toprol xl 37.5mg QD, lisinopril/HCTZ 20/25mg QD,     Venous insufficiency  Ongoing: wears joleen hose at home, continue to monitor    Anemia due to blood loss, acute  Acute/ongoing: Hgb weekly, follow    Drug-induced constipation  Acute/ongoing: increase senna s to 2 PO BID and add miralax 17gm QD prn       Orders written by provider at facility  BMP and Hgb on wed  Tylenol 1000mg TID scheduled and q hs prn  dc percocet  Oxycodone 5mg q 6 hours prn  Senna s 2 PO BID scheduled  miralax 17gm QD prn    Total time spent with patient visit at the skilled nursing facility was 45 min including patient visit and review of past records. Greater than 50% of total time spent with counseling and coordinating care due to coordinating care with nurse on admission orders, coordinating care with follow up labs and appointments, the reason for their hospitalization and what the treatment is, the plan of SNF stay and projected length of stay, options of code status and their current code status order, current medication reconciled from the hospital, recent past lab and imaging results and subsequent treatment plan and current pain control plan and controlled substances ordered and taper plan of care.  Electronically signed by:  Kelsie Ayoub  Haase, APRN CNP                         Sincerely,        Tonya Lynn Haase, APRN CNP

## 2019-07-29 NOTE — DISCHARGE SUMMARY
"Discharge Summary    Teri Beltran MRN# [id number]   YOB: 1945 Age: 73 year old     Date of Admission: 7/23/2019    Date of Discharge: 7/26/2019    Reason for Admission: Teri Beltran is an 73 year old female who was admitted to the hospital following surgery with Dr. Dragan Muse.    Preoperative Diagnosis: DJD    Postoperative Diagnosis: DJD    Procedure Completed: right total knee arthroplasty     Hospital Course:  Ms. Beltran was admitted and underwent the above procedure. The patient tolerated the procedure well. There were no complications. Following surgery she was admitted to the floor.  During her stay she did not require any blood transfusions.  Her last hemoglobin was noted to be   Lab Results   Component Value Date    HGB 10.1 07/25/2019   .  Her pain was controlled with oral pain medication.  Through her progression in physical therapy, it was determined that she would best be served with a stay at a transitional care unit.  Social work arranged for this.    Discharge Physical Exam:  /61 (BP Location: Left arm)   Pulse 78   Temp 95.6  F (35.3  C) (Oral)   Resp 16   Ht 1.702 m (5' 7\")   Wt 90.7 kg (200 lb)   SpO2 98%   BMI 31.32 kg/m    Neurovascularly intact, distal pulses present bilaterally.  Calves are negative bilaterally, both soft and nontender.  The wound looks good with minimal erythema of the surrounding skin.    Assessment: Ms. Beltran is stable and doing well status post right total knee arthroplasty on POD #3.    Plan: We will discharge her to a transitional care unit on analgesics and deep venous thrombosis prophylaxis.  She will follow-up with Holly Mcdowell PA-C or Dr. Dragan Muse approximately 2 weeks from surgery.  She may call [phone] to schedule an appointment.    Meds:   Devin, Kirit EPPERSON   Home Medication Instructions LANDON:[id number]    Printed on:07/29/19 0707   Medication Information                      albuterol (PROAIR HFA, PROVENTIL " HFA, VENTOLIN HFA) 108 (90 BASE) MCG/ACT inhaler  Inhale 2 puffs into the lungs every 6 hours as needed for shortness of breath / dyspnea or wheezing             alendronate (FOSAMAX) 70 MG tablet  TAKE 1 TABLET(70 MG) BY MOUTH EVERY 7 DAYS             amitriptyline (ELAVIL) 100 MG tablet  TAKE 1 TABLET(100 MG) BY MOUTH AT BEDTIME             amLODIPine (NORVASC) 2.5 MG tablet  Take 1 tablet (2.5 mg) by mouth daily             aspirin (ASA) 325 MG EC tablet  Take one Aspirin tab twice daily for 5 weeks.             dorzolamide-timolol (COSOPT) 2-0.5 % ophthalmic solution  Place 1 drop into both eyes 2 times daily             fluticasone (FLONASE) 50 MCG/ACT spray  Spray 1-2 sprays into both nostrils daily             latanoprost (XALATAN) 0.005 % ophthalmic solution  Place 1 drop into the right eye daily             levothyroxine (SYNTHROID/LEVOTHROID) 88 MCG tablet  Take 1 tablet (88 mcg) by mouth daily             lisinopril-hydrochlorothiazide (PRINZIDE/ZESTORETIC) 20-25 MG tablet  Take 1 tablet by mouth daily             loratadine (CLARITIN) 10 MG tablet  Take 1 tablet (10 mg) by mouth daily             metoprolol succinate ER (TOPROL-XL) 25 MG 24 hr tablet  Take 1.5 tablets (37.5 mg) by mouth daily             oxyCODONE-acetaminophen (PERCOCET) 5-325 MG tablet  Take 1-2 tablets by mouth every 4 hours as needed for severe pain             pravastatin (PRAVACHOL) 40 MG tablet  TAKE 1 TABLET(40 MG) BY MOUTH DAILY             Prenatal MV-Min-Fe Fum-FA-DHA (PRENATAL 1 PO)  Take 1 tablet by mouth daily              ranitidine (ZANTAC) 150 MG tablet  TAKE 1 TABLET(150 MG) BY MOUTH TWICE DAILY             senna-docusate (SENOKOT-S/PERICOLACE) 8.6-50 MG tablet  Take 1 tablet by mouth 2 times daily                     Holly Mcdowell PA-C  O: [phone]

## 2019-07-30 VITALS
TEMPERATURE: 97.4 F | SYSTOLIC BLOOD PRESSURE: 136 MMHG | HEART RATE: 94 BPM | RESPIRATION RATE: 18 BRPM | BODY MASS INDEX: 32.23 KG/M2 | WEIGHT: 205.8 LBS | DIASTOLIC BLOOD PRESSURE: 83 MMHG | OXYGEN SATURATION: 99 %

## 2019-07-30 RX ORDER — POLYETHYLENE GLYCOL 3350 17 G/17G
1 POWDER, FOR SOLUTION ORAL DAILY PRN
COMMUNITY
End: 2019-11-21

## 2019-07-30 RX ORDER — OXYCODONE HYDROCHLORIDE 5 MG/1
5 TABLET ORAL EVERY 6 HOURS PRN
COMMUNITY
End: 2019-10-24

## 2019-07-30 RX ORDER — ACETAMINOPHEN 500 MG
TABLET ORAL
COMMUNITY
End: 2019-11-21

## 2019-07-30 RX ORDER — SENNOSIDES 8.6 MG
2 TABLET ORAL 2 TIMES DAILY
COMMUNITY
End: 2019-11-21

## 2019-07-31 ENCOUNTER — DISCHARGE SUMMARY NURSING HOME (OUTPATIENT)
Dept: GERIATRICS | Facility: CLINIC | Age: 74
End: 2019-07-31
Payer: MEDICARE

## 2019-07-31 DIAGNOSIS — I10 HYPERTENSION GOAL BP (BLOOD PRESSURE) < 140/90: ICD-10-CM

## 2019-07-31 DIAGNOSIS — Z47.1 AFTERCARE FOLLOWING RIGHT KNEE JOINT REPLACEMENT SURGERY: ICD-10-CM

## 2019-07-31 DIAGNOSIS — Z96.651 AFTERCARE FOLLOWING RIGHT KNEE JOINT REPLACEMENT SURGERY: ICD-10-CM

## 2019-07-31 DIAGNOSIS — M17.11 PRIMARY OSTEOARTHRITIS OF RIGHT KNEE: Primary | ICD-10-CM

## 2019-07-31 DIAGNOSIS — K59.03 DRUG-INDUCED CONSTIPATION: ICD-10-CM

## 2019-07-31 DIAGNOSIS — I87.2 VENOUS INSUFFICIENCY: ICD-10-CM

## 2019-07-31 DIAGNOSIS — D62 ANEMIA DUE TO BLOOD LOSS, ACUTE: ICD-10-CM

## 2019-07-31 PROCEDURE — 99316 NF DSCHRG MGMT 30 MIN+: CPT | Performed by: NURSE PRACTITIONER

## 2019-07-31 RX ORDER — OXYCODONE HYDROCHLORIDE 5 MG/1
5 TABLET ORAL EVERY 6 HOURS PRN
Qty: 15 TABLET | Refills: 0 | Status: SHIPPED | OUTPATIENT
Start: 2019-07-31 | End: 2019-10-24

## 2019-07-31 NOTE — LETTER
7/31/2019        RE: Teri Beltran  [address]  [place] [address]        [facility] DISCHARGE SUMMARY  PATIENT'S NAME: Teri Beltran  YOB: 1945  MEDICAL RECORD NUMBER:  [id number]  Place of Service where encounter took place:  [facility] [[id number]]    PRIMARY CARE PROVIDER AND CLINIC RESPONSIBLE AFTER TRANSFER:   [place], [address] [facility] [address]    [facility] Provider     Transferring providers: Tonya Lynn Haase, RUBY PANDEY, Dr. Donaldo MD  Recent Hospitalization/ED:  [facility] stay 7/23/19 to 7/26/19.  Date of SNF Admission: July / 26 / 2019  Date of SNF (anticipated) Discharge: July / 31 / 2019  Discharged to: previous independent home  Cognitive Scores: 15/15 BIMS, 6/28 SBT  Physical Function: Ambulating > 150  ft with RW indep  DME: Walker    CODE STATUS/ADVANCE DIRECTIVES DISCUSSION:  Full Code     ALLERGIES: Seasonal allergies    DISCHARGE DIAGNOSIS/NURSING FACILITY COURSE:   Patient progressed quickly to walking > 150 feet using a RW indep, indep with ADL's will DC home with OP PT.     Past Medical History:  has a past medical history of Complication of anesthesia, First degree AV block (8/11/2015), Gastroesophageal reflux disease, Glaucoma, Heart block, Hyperlipidemia LDL goal <130 (6/14/2013), Hypertension goal BP (blood pressure) < 140/90 (12/3/2013), Hypothyroidism, Mild dilation of ascending aorta - borderline (8/11/2015), Palpitations, RBBB (8/11/2015), Tachycardia, Varicose veins, and Venous insufficiency. She also has no past medical history of History of blood transfusion, Malignant hyperthermia, or Sleep apnea.    Discharge Medications:  Current Outpatient Medications   Medication Sig Dispense Refill     acetaminophen (TYLENOL) 500 MG tablet Give 2 tablet by mouth three times a day for pain AND Give 2 tablet by mouth as needed for pain Q HS       albuterol (PROAIR HFA, PROVENTIL HFA,  VENTOLIN HFA) 108 (90 BASE) MCG/ACT inhaler Inhale 2 puffs into the lungs every 6 hours as needed for shortness of breath / dyspnea or wheezing 1 Inhaler 0     alendronate (FOSAMAX) 70 MG tablet TAKE 1 TABLET(70 MG) BY MOUTH EVERY 7 DAYS 12 tablet 3     amitriptyline (ELAVIL) 100 MG tablet TAKE 1 TABLET(100 MG) BY MOUTH AT BEDTIME 90 tablet 1     amLODIPine (NORVASC) 2.5 MG tablet Take 1 tablet (2.5 mg) by mouth daily 90 tablet 2     aspirin (ASA) 325 MG EC tablet Take one Aspirin tab twice daily for 5 weeks. 70 tablet 0     dorzolamide-timolol (COSOPT) 2-0.5 % ophthalmic solution Place 1 drop into both eyes 2 times daily       fluticasone (FLONASE) 50 MCG/ACT spray Spray 1-2 sprays into both nostrils daily 1 Bottle 3     latanoprost (XALATAN) 0.005 % ophthalmic solution Place 1 drop into the right eye daily       levothyroxine (SYNTHROID/LEVOTHROID) 88 MCG tablet Take 1 tablet (88 mcg) by mouth daily 90 tablet 3     lisinopril-hydrochlorothiazide (PRINZIDE/ZESTORETIC) 20-25 MG tablet Take 1 tablet by mouth daily 90 tablet 0     loratadine (CLARITIN) 10 MG tablet Take 1 tablet (10 mg) by mouth daily       metoprolol succinate ER (TOPROL-XL) 25 MG 24 hr tablet Take 1.5 tablets (37.5 mg) by mouth daily 135 tablet 2     oxyCODONE (ROXICODONE) 5 MG tablet Take 5 mg by mouth every 6 hours as needed for severe pain       polyethylene glycol (MIRALAX/GLYCOLAX) packet Take 1 packet by mouth daily as needed for constipation       pravastatin (PRAVACHOL) 40 MG tablet TAKE 1 TABLET(40 MG) BY MOUTH DAILY 90 tablet 3     ranitidine (ZANTAC) 150 MG tablet TAKE 1 TABLET(150 MG) BY MOUTH TWICE DAILY 180 tablet 0     sennosides (SENOKOT) 8.6 MG tablet Take 2 tablets by mouth 2 times daily       oxyCODONE-acetaminophen (PERCOCET) 5-325 MG tablet Take 1-2 tablets by mouth every 4 hours as needed for severe pain (Patient not taking: Reported on 7/30/2019) 60 tablet 0     Prenatal MV-Min-Fe Fum-FA-DHA (PRENATAL 1 PO) Take 1 tablet by  mouth daily        senna-docusate (SENOKOT-S/PERICOLACE) 8.6-50 MG tablet Take 1 tablet by mouth 2 times daily (Patient not taking: Reported on 7/30/2019) 30 tablet 0       Medication Changes/Rationale:     There were no medication changes made during TCU stay.     Controlled medications sent with patient:   Medication oxycodone 5mg q 6 hours prn electronically prescribed to [facility] pharmacy     ROS:   10 point ROS of systems including Constitutional, Eyes, Respiratory, Cardiovascular, Gastroenterology, Genitourinary, Integumentary, Musculoskeletal, Psychiatric were all negative except for pertinent positives noted in my HPI.    Physical Exam:   Vitals: /83   Pulse 94   Temp 97.4  F (36.3  C)   Resp 18   Wt 93.4 kg (205 lb 12.8 oz)   SpO2 99%   BMI 32.23 kg/m     BMI= Body mass index is 32.23 kg/m .  Exam:  GENERAL APPEARANCE:  Alert, in no distress, morbidly obese  ENT:  Mouth and posterior oropharynx normal, moist mucous membranes, [place]  EYES:  EOM, conjunctivae, lids, pupils and irises normal, PERRL  RESP:  respiratory effort and palpation of chest normal, lungs clear to auscultation , no respiratory distress  CV:  Palpation and auscultation of heart done , regular rate and rhythm, no murmur, rub, or gallop, peripheral edema  trace + in LE bilaterally R>L  ABDOMEN:  normal bowel sounds, soft, nontender, no hepatosplenomegaly or other masses  M/S:   Examination of:   right upper extremity, left upper extremity and left lower extremity  Inspection, ROM, stability and muscle strength normal and decreased ROM and strength RLE  SKIN:  Inspection of skin and subcutaneous tissue baseline, did not visualize right knee incision  NEURO:   Cranial nerves 2-12 are normal tested and grossly at patient's baseline, speech WNL  PSYCH:  affect and mood normal    SNF labs: Recent labs in EPIC reviewed by me today.     ASSESSMENT/PLAN:  Primary osteoarthritis of right knee  Aftercare following right knee  joint replacement surgery  Acute/ongoing: DC home with OP PT, continue tylenol 1000mg q 6 hours prn   Oxycodone 5mg q 6 hours prn  ASA 325mg BID for 5 weeks  F/u with ortho as directed     Hypertension goal BP (blood pressure) < 140/90  Ongoing: continue norvasc 2.5mg QD, toprol xl 37.5mg QD, lisinopril/HCTZ 20/25mg QD,      Venous insufficiency  Ongoing: wears joleen hose at home, continue to monitor     Anemia due to blood loss, acute  Acute/ongoing: Hgb stable 10.1     Drug-induced constipation  Acute/ongoing: continue senna s  2 PO BID and  miralax 17gm QD prn    DISCHARGE PLAN:    Follow up labs: No labs orders/due    Medical Follow Up:      Follow up with primary care provider in 1-2 weeks    MTM referral needed and placed by this provider: No    Current [place] scheduled appointments:  Next 5 appointments (look out 90 days)    Aug 06, 2019  1:50 PM CDT  Office Visit with Richar Sheikh MD  [facility] ([address] [address] [phone]           Discharge Services: Out Patient:  physical therapy    Discharge Instructions Verbalized to Patient at Discharge:     None      TOTAL DISCHARGE TIME:   Greater than 30 minutes  Electronically signed by:  Tonya Lynn Haase, APRN CNP                         Sincerely,        Tonya Lynn Haase, APRN CNP

## 2019-07-31 NOTE — PROGRESS NOTES
[facility] DISCHARGE SUMMARY  PATIENT'S NAME: Teri Beltran  YOB: 1945  MEDICAL RECORD NUMBER:  [id number]  Place of Service where encounter took place:  [facility] [[id number]]    PRIMARY CARE PROVIDER AND CLINIC RESPONSIBLE AFTER TRANSFER:   [place], [address] / [place] [address]    [facility] Provider     Transferring providers: Tonya Lynn Haase, APRN CNP, Dr. Donaldo MD  Recent Hospitalization/ED:  [facility] stay 7/23/19 to 7/26/19.  Date of SNF Admission: July / 26 / 2019  Date of SNF (anticipated) Discharge: July / 31 / 2019  Discharged to: previous independent home  Cognitive Scores: 15/15 BIMS, 6/28 SBT  Physical Function: Ambulating > 150  ft with RW indep  DME: Walker    CODE STATUS/ADVANCE DIRECTIVES DISCUSSION:  Full Code     ALLERGIES: Seasonal allergies    DISCHARGE DIAGNOSIS/NURSING FACILITY COURSE:   Patient progressed quickly to walking > 150 feet using a RW indep, indep with ADL's will DC home with OP PT.     Past Medical History:  has a past medical history of Complication of anesthesia, First degree AV block (8/11/2015), Gastroesophageal reflux disease, Glaucoma, Heart block, Hyperlipidemia LDL goal <130 (6/14/2013), Hypertension goal BP (blood pressure) < 140/90 (12/3/2013), Hypothyroidism, Mild dilation of ascending aorta - borderline (8/11/2015), Palpitations, RBBB (8/11/2015), Tachycardia, Varicose veins, and Venous insufficiency. She also has no past medical history of History of blood transfusion, Malignant hyperthermia, or Sleep apnea.    Discharge Medications:  Current Outpatient Medications   Medication Sig Dispense Refill     acetaminophen (TYLENOL) 500 MG tablet Give 2 tablet by mouth three times a day for pain AND Give 2 tablet by mouth as needed for pain Q HS       albuterol (PROAIR HFA, PROVENTIL HFA, VENTOLIN HFA) 108 (90 BASE) MCG/ACT inhaler Inhale 2 puffs into the lungs every 6 hours as  needed for shortness of breath / dyspnea or wheezing 1 Inhaler 0     alendronate (FOSAMAX) 70 MG tablet TAKE 1 TABLET(70 MG) BY MOUTH EVERY 7 DAYS 12 tablet 3     amitriptyline (ELAVIL) 100 MG tablet TAKE 1 TABLET(100 MG) BY MOUTH AT BEDTIME 90 tablet 1     amLODIPine (NORVASC) 2.5 MG tablet Take 1 tablet (2.5 mg) by mouth daily 90 tablet 2     aspirin (ASA) 325 MG EC tablet Take one Aspirin tab twice daily for 5 weeks. 70 tablet 0     dorzolamide-timolol (COSOPT) 2-0.5 % ophthalmic solution Place 1 drop into both eyes 2 times daily       fluticasone (FLONASE) 50 MCG/ACT spray Spray 1-2 sprays into both nostrils daily 1 Bottle 3     latanoprost (XALATAN) 0.005 % ophthalmic solution Place 1 drop into the right eye daily       levothyroxine (SYNTHROID/LEVOTHROID) 88 MCG tablet Take 1 tablet (88 mcg) by mouth daily 90 tablet 3     lisinopril-hydrochlorothiazide (PRINZIDE/ZESTORETIC) 20-25 MG tablet Take 1 tablet by mouth daily 90 tablet 0     loratadine (CLARITIN) 10 MG tablet Take 1 tablet (10 mg) by mouth daily       metoprolol succinate ER (TOPROL-XL) 25 MG 24 hr tablet Take 1.5 tablets (37.5 mg) by mouth daily 135 tablet 2     oxyCODONE (ROXICODONE) 5 MG tablet Take 5 mg by mouth every 6 hours as needed for severe pain       polyethylene glycol (MIRALAX/GLYCOLAX) packet Take 1 packet by mouth daily as needed for constipation       pravastatin (PRAVACHOL) 40 MG tablet TAKE 1 TABLET(40 MG) BY MOUTH DAILY 90 tablet 3     ranitidine (ZANTAC) 150 MG tablet TAKE 1 TABLET(150 MG) BY MOUTH TWICE DAILY 180 tablet 0     sennosides (SENOKOT) 8.6 MG tablet Take 2 tablets by mouth 2 times daily       oxyCODONE-acetaminophen (PERCOCET) 5-325 MG tablet Take 1-2 tablets by mouth every 4 hours as needed for severe pain (Patient not taking: Reported on 7/30/2019) 60 tablet 0     Prenatal MV-Min-Fe Fum-FA-DHA (PRENATAL 1 PO) Take 1 tablet by mouth daily        senna-docusate (SENOKOT-S/PERICOLACE) 8.6-50 MG tablet Take 1 tablet by  mouth 2 times daily (Patient not taking: Reported on 7/30/2019) 30 tablet 0       Medication Changes/Rationale:     There were no medication changes made during TCU stay.     Controlled medications sent with patient:   Medication oxycodone 5mg q 6 hours prn electronically prescribed to [facility] pharmacy     ROS:   10 point ROS of systems including Constitutional, Eyes, Respiratory, Cardiovascular, Gastroenterology, Genitourinary, Integumentary, Musculoskeletal, Psychiatric were all negative except for pertinent positives noted in my HPI.    Physical Exam:   Vitals: /83   Pulse 94   Temp 97.4  F (36.3  C)   Resp 18   Wt 93.4 kg (205 lb 12.8 oz)   SpO2 99%   BMI 32.23 kg/m    BMI= Body mass index is 32.23 kg/m .  Exam:  GENERAL APPEARANCE:  Alert, in no distress, morbidly obese  ENT:  Mouth and posterior oropharynx normal, moist mucous membranes, [place]  EYES:  EOM, conjunctivae, lids, pupils and irises normal, PERRL  RESP:  respiratory effort and palpation of chest normal, lungs clear to auscultation , no respiratory distress  CV:  Palpation and auscultation of heart done , regular rate and rhythm, no murmur, rub, or gallop, peripheral edema trace + in LE bilaterally R>L  ABDOMEN:  normal bowel sounds, soft, nontender, no hepatosplenomegaly or other masses  M/S:   Examination of:   right upper extremity, left upper extremity and left lower extremity  Inspection, ROM, stability and muscle strength normal and decreased ROM and strength RLE  SKIN:  Inspection of skin and subcutaneous tissue baseline, did not visualize right knee incision  NEURO:   Cranial nerves 2-12 are normal tested and grossly at patient's baseline, speech WNL  PSYCH:  affect and mood normal    SNF labs: Recent labs in EPIC reviewed by me today.     ASSESSMENT/PLAN:  Primary osteoarthritis of right knee  Aftercare following right knee joint replacement surgery  Acute/ongoing: DC home with OP PT, continue tylenol 1000mg q 6 hours  prn   Oxycodone 5mg q 6 hours prn  ASA 325mg BID for 5 weeks  F/u with ortho as directed     Hypertension goal BP (blood pressure) < 140/90  Ongoing: continue norvasc 2.5mg QD, toprol xl 37.5mg QD, lisinopril/HCTZ 20/25mg QD,      Venous insufficiency  Ongoing: wears joleen hose at home, continue to monitor     Anemia due to blood loss, acute  Acute/ongoing: Hgb stable 10.1     Drug-induced constipation  Acute/ongoing: continue senna s  2 PO BID and  miralax 17gm QD prn    DISCHARGE PLAN:    Follow up labs: No labs orders/due    Medical Follow Up:      Follow up with primary care provider in 1-2 weeks    MTM referral needed and placed by this provider: No    Current [place] scheduled appointments:  Next 5 appointments (look out 90 days)    Aug 06, 2019  1:50 PM CDT  Office Visit with [facility] MD  [facility] [address] [place] [address] [phone]           Discharge Services: Out Patient:  physical therapy    Discharge Instructions Verbalized to Patient at Discharge:     None      TOTAL DISCHARGE TIME:   Greater than 30 minutes  Electronically signed by:  Tonya Lynn Haase, APRN CNP

## 2019-08-01 ENCOUNTER — TELEPHONE (OUTPATIENT)
Dept: FAMILY MEDICINE | Facility: CLINIC | Age: 74
End: 2019-08-01

## 2019-08-01 NOTE — TELEPHONE ENCOUNTER
Please contact patient for In-patient follow up.  [phone] (home) none (work)    Visit date: 7/31/2019  Diagnosis listed:Primary Osteoarthritis of Right Knee  Number of visits in past 12 months:0/1

## 2019-08-01 NOTE — TELEPHONE ENCOUNTER
ED / Discharge Outreach Protocol    Patient Contact    Attempt # 1    Was call answered?  No.  Unable to leave message. When dialing her only phone number on record, there was just silence - no ring tone and no message of phone disconnect or dialing number in error. Tried multiple times and on different phones.  Will attempt to try again later.    Crystal ROTHMAN Triage RN

## 2019-08-02 DIAGNOSIS — M81.0 OSTEOPOROSIS, UNSPECIFIED OSTEOPOROSIS TYPE, UNSPECIFIED PATHOLOGICAL FRACTURE PRESENCE: ICD-10-CM

## 2019-08-03 ENCOUNTER — TRANSFERRED RECORDS (OUTPATIENT)
Dept: HEALTH INFORMATION MANAGEMENT | Facility: CLINIC | Age: 74
End: 2019-08-03

## 2019-08-03 NOTE — TELEPHONE ENCOUNTER
"Requested Prescriptions   Pending Prescriptions Disp Refills     alendronate (FOSAMAX) 70 MG tablet [Pharmacy Med Name: ALENDRONATE 70MG TABLETS] 12 tablet 0     Sig: TAKE 1 TABLET(70 MG) BY MOUTH EVERY 7 DAYS  Last Written Prescription Date:  8/9/18  Last Fill Quantity: 12 tab,  # refills: 3   Last office visit: 7/16/2019 with prescribing provider:  Dan[place]   Future Office Visit:   Next 5 appointments (look out 90 days)    Aug 06, 2019  1:50 PM CDT  Office Visit with [facility]ar [facility]ikh MD  [facility] [address] [facility] [address] [phone]                Bisphosphonates Passed - 8/2/2019  5:07 PM        Passed - Recent (12 mo) or future (30 days) visit within the authorizing provider's specialty     Patient had office visit in the last 12 months or has a visit in the next 30 days with authorizing provider or within the authorizing provider's specialty.  See \"Patient Info\" tab in inbasket, or \"Choose Columns\" in Meds & Orders section of the refill encounter.              Passed - Dexa on file within past 2 years     Please review last Dexa result.           Passed - Medication is active on med list        Passed - Patient is age 18 or older        Passed - Normal serum creatinine on file within past 12 months     Recent Labs   Lab Test 07/25/19  0714   CR 0.67               "

## 2019-08-05 RX ORDER — ALENDRONATE SODIUM 70 MG/1
TABLET ORAL
Qty: 12 TABLET | Refills: 0 | Status: SHIPPED | OUTPATIENT
Start: 2019-08-05 | End: 2019-11-01

## 2019-08-05 NOTE — TELEPHONE ENCOUNTER
Prescription approved per [facility] Refill Protocol.  Pt is scheduled for appointment with primary care tomorrow.  YOKO CarranzaN, RN

## 2019-08-06 NOTE — TELEPHONE ENCOUNTER
"ED / Discharge Outreach Protocol    Patient Contact    Attempt # 2    Was call answered?  Yes.  \"May I please speak with <patient name>\"  Is patient available?   Yes  Hospital/TCU/ED for chronic condition Discharge Protocol    \"Hi, my name is Lisa Urbano, a registered nurse, and I am calling from [facility].  I am calling to follow up and see how things are going for you after your recent emergency visit/hospital/TCU stay.\"    Tell me how you are doing now that you are home?\" going to rehab. Using walker.       Discharge Instructions    \"Let's review your discharge instructions.  What is/are the follow-up recommendations?  Pt. Response: PT. Suppose to see PCP, but cannot drive and will not ask the people that are taking her to therapy.     \"Has an appointment with your primary care provider been scheduled?\"   No (schedule appointment)    \"When you see the provider, I would recommend that you bring your medications with you.\"    Medications    \"Tell me what changed about your medicines when you discharged?\"    Changes to chronic meds?    0-1    \"What questions do you have about your medications?\"    None     New diagnoses of heart failure, COPD, diabetes, or MI?    No              Post Discharge Medication Reconciliation Status: discharge medications reconciled and changed, per note/orders (see AVS).    Was MTM referral placed (*Make sure to put transitions as reason for referral)?   No    Call Summary    \"What questions or concerns do you have about your recent visit and your follow-up care?\"     none    \"If you have questions or things don't continue to improve, we encourage you contact us through the main clinic number (give number).  Even if the clinic is not open, triage nurses are available 24/7 to help you.     We would like you to know that our clinic has extended hours (provide information).  We also have urgent care (provide details on closest location and hours/contact info)\"      \"Thank you " "for your time and take care!\"      Lisa HOWARD RN            "

## 2019-08-07 ENCOUNTER — TRANSFERRED RECORDS (OUTPATIENT)
Dept: HEALTH INFORMATION MANAGEMENT | Facility: CLINIC | Age: 74
End: 2019-08-07

## 2019-08-11 DIAGNOSIS — I10 HYPERTENSION GOAL BP (BLOOD PRESSURE) < 140/90: ICD-10-CM

## 2019-08-12 RX ORDER — LISINOPRIL AND HYDROCHLOROTHIAZIDE 20; 25 MG/1; MG/1
1 TABLET ORAL DAILY
Qty: 90 TABLET | Refills: 0 | Status: SHIPPED | OUTPATIENT
Start: 2019-08-12 | End: 2019-12-13

## 2019-08-12 NOTE — TELEPHONE ENCOUNTER
Prescription approved per [facility]ill [facility].  Savita Mackenzie RN

## 2019-08-12 NOTE — TELEPHONE ENCOUNTER
"Requested Prescriptions   Pending Prescriptions Disp Refills     lisinopril-hydrochlorothiazide (PRINZIDE/ZESTORETIC) 20-25 MG tablet [Pharmacy Med Name: LISINOPRIL-HCTZ 20/25MG TABLETS] 90 tablet 0     Sig: TAKE 1 TABLET BY MOUTH DAILY   Last Written Prescription Date:  5/14/19  Last Fill Quantity: 90,  # refills: 0   Last office visit: 7/16/2019 with prescribing provider:  Laura Tipton MD    Future Office Visit:        Diuretics (Including Combos) Protocol Passed - 8/11/2019  3:09 PM        Passed - Blood pressure under 140/90 in past 12 months     BP Readings from Last 3 Encounters:   07/30/19 136/83   07/28/19 104/58   07/26/19 106/61                 Passed - Recent (12 mo) or future (30 days) visit within the authorizing provider's specialty     Patient had office visit in the last 12 months or has a visit in the next 30 days with authorizing provider or within the authorizing provider's specialty.  See \"Patient Info\" tab in inbasket, or \"Choose Columns\" in Meds & Orders section of the refill encounter.              Passed - Medication is active on med list        Passed - Patient is age 18 or older        Passed - No active pregancy on record        Passed - Normal serum creatinine on file in past 12 months     Recent Labs   Lab Test 07/25/19  0714   CR 0.67              Passed - Normal serum potassium on file in past 12 months     Recent Labs   Lab Test 07/16/19  1419   POTASSIUM 3.7                    Passed - Normal serum sodium on file in past 12 months     Recent Labs   Lab Test 07/16/19  1419                 Passed - No positive pregnancy test in past 12 months          "

## 2019-09-03 ENCOUNTER — ANCILLARY PROCEDURE (OUTPATIENT)
Dept: CARDIOLOGY | Facility: CLINIC | Age: 74
End: 2019-09-03
Attending: INTERNAL MEDICINE
Payer: MEDICARE

## 2019-09-03 DIAGNOSIS — Z45.09 ENCOUNTER FOR LOOP RECORDER CHECK: ICD-10-CM

## 2019-09-03 PROCEDURE — 93298 REM INTERROG DEV EVAL SCRMS: CPT | Performed by: INTERNAL MEDICINE

## 2019-09-03 PROCEDURE — 93299 CARDIAC DEVICE CHECK - REMOTE: CPT | Performed by: INTERNAL MEDICINE

## 2019-09-04 ENCOUNTER — TELEPHONE (OUTPATIENT)
Dept: FAMILY MEDICINE | Facility: CLINIC | Age: 74
End: 2019-09-04

## 2019-09-04 DIAGNOSIS — E03.9 HYPOTHYROIDISM, UNSPECIFIED TYPE: Primary | ICD-10-CM

## 2019-09-04 DIAGNOSIS — D64.9 ANEMIA, UNSPECIFIED TYPE: ICD-10-CM

## 2019-09-04 DIAGNOSIS — I10 HYPERTENSION GOAL BP (BLOOD PRESSURE) < 140/90: ICD-10-CM

## 2019-09-04 DIAGNOSIS — Z12.31 ENCOUNTER FOR SCREENING MAMMOGRAM FOR BREAST CANCER: ICD-10-CM

## 2019-09-04 DIAGNOSIS — M81.0 OSTEOPOROSIS, UNSPECIFIED OSTEOPOROSIS TYPE, UNSPECIFIED PATHOLOGICAL FRACTURE PRESENCE: ICD-10-CM

## 2019-09-04 DIAGNOSIS — R73.03 PREDIABETES: ICD-10-CM

## 2019-09-04 DIAGNOSIS — E78.5 HYPERLIPIDEMIA LDL GOAL <130: ICD-10-CM

## 2019-09-04 NOTE — TELEPHONE ENCOUNTER
Reason for Call: Request for an order or referral:    Order or referral being requested: labs for annual wellness visit as appropriate    Screening mammogram at [facility] on/after 9/28/19    Date needed: pre-visit labs to be drawn w/in one week of annual wellness visit scheduled for 10/22/19 (to insure coverage as linked to this preventive care visit)    Has the patient been seen by the PCP for this problem? YES    Additional comments: Please call with any questions or to advise when complete    Phone number Patient can be reached at:  Home number on file [phone] (home)    Best Time:      Can we leave a detailed message on this number?  YES    Call taken on 9/4/2019 at 12:48 PM by Alissa Ferrara

## 2019-09-09 ENCOUNTER — TRANSFERRED RECORDS (OUTPATIENT)
Dept: HEALTH INFORMATION MANAGEMENT | Facility: CLINIC | Age: 74
End: 2019-09-09

## 2019-09-17 DIAGNOSIS — I10 HYPERTENSION GOAL BP (BLOOD PRESSURE) < 140/90: ICD-10-CM

## 2019-09-17 DIAGNOSIS — R00.0 TACHYCARDIA: ICD-10-CM

## 2019-09-17 RX ORDER — METOPROLOL SUCCINATE 25 MG/1
37.5 TABLET, EXTENDED RELEASE ORAL DAILY
Qty: 135 TABLET | Refills: 1 | Status: SHIPPED | OUTPATIENT
Start: 2019-09-17 | End: 2020-01-23

## 2019-09-25 DIAGNOSIS — R10.13 MIDEPIGASTRIC PAIN: ICD-10-CM

## 2019-09-25 NOTE — TELEPHONE ENCOUNTER
Prescription approved per [facility] Refill Protocol.  Barron Lujan, RN, BSN

## 2019-10-09 ENCOUNTER — HOSPITAL ENCOUNTER (OUTPATIENT)
Dept: MAMMOGRAPHY | Facility: CLINIC | Age: 74
Discharge: HOME | End: 2019-10-09
Attending: FAMILY MEDICINE | Admitting: FAMILY MEDICINE
Payer: MEDICARE

## 2019-10-09 DIAGNOSIS — Z12.31 VISIT FOR SCREENING MAMMOGRAM: ICD-10-CM

## 2019-10-09 PROCEDURE — 77063 BREAST TOMOSYNTHESIS BI: CPT

## 2019-10-21 ENCOUNTER — TRANSFERRED RECORDS (OUTPATIENT)
Dept: HEALTH INFORMATION MANAGEMENT | Facility: CLINIC | Age: 74
End: 2019-10-21

## 2019-10-22 ENCOUNTER — HOSPITAL ENCOUNTER (EMERGENCY)
Facility: CLINIC | Age: 74
Discharge: HOME | End: 2019-10-22
Attending: EMERGENCY MEDICINE | Admitting: EMERGENCY MEDICINE
Payer: MEDICARE

## 2019-10-22 ENCOUNTER — APPOINTMENT (OUTPATIENT)
Dept: GENERAL RADIOLOGY | Facility: CLINIC | Age: 74
End: 2019-10-22
Attending: EMERGENCY MEDICINE
Payer: MEDICARE

## 2019-10-22 VITALS
RESPIRATION RATE: 23 BRPM | DIASTOLIC BLOOD PRESSURE: 94 MMHG | HEART RATE: 93 BPM | BODY MASS INDEX: 28.95 KG/M2 | WEIGHT: 191 LBS | TEMPERATURE: 98.5 F | HEIGHT: 68 IN | SYSTOLIC BLOOD PRESSURE: 146 MMHG | OXYGEN SATURATION: 97 %

## 2019-10-22 DIAGNOSIS — R00.2 PALPITATIONS: ICD-10-CM

## 2019-10-22 DIAGNOSIS — R06.00 DYSPNEA, UNSPECIFIED TYPE: ICD-10-CM

## 2019-10-22 LAB
ANION GAP SERPL CALCULATED.3IONS-SCNC: 7 MMOL/L (ref 3–14)
BASOPHILS # BLD AUTO: 0.1 10E9/L (ref 0–0.2)
BASOPHILS NFR BLD AUTO: 0.8 %
BUN SERPL-MCNC: 24 MG/DL (ref 7–30)
CALCIUM SERPL-MCNC: 9.3 MG/DL (ref 8.5–10.1)
CHLORIDE SERPL-SCNC: 103 MMOL/L (ref 94–109)
CO2 SERPL-SCNC: 26 MMOL/L (ref 20–32)
CREAT SERPL-MCNC: 0.69 MG/DL (ref 0.52–1.04)
DIFFERENTIAL METHOD BLD: ABNORMAL
EOSINOPHIL # BLD AUTO: 0.1 10E9/L (ref 0–0.7)
EOSINOPHIL NFR BLD AUTO: 1 %
ERYTHROCYTE [DISTWIDTH] IN BLOOD BY AUTOMATED COUNT: 13.8 % (ref 10–15)
GFR SERPL CREATININE-BSD FRML MDRD: 85 ML/MIN/{1.73_M2}
GLUCOSE SERPL-MCNC: 120 MG/DL (ref 70–99)
HCT VFR BLD AUTO: 42 % (ref 35–47)
HGB BLD-MCNC: 13.1 G/DL (ref 11.7–15.7)
IMM GRANULOCYTES # BLD: 0 10E9/L (ref 0–0.4)
IMM GRANULOCYTES NFR BLD: 0.3 %
INTERPRETATION ECG - MUSE: NORMAL
LYMPHOCYTES # BLD AUTO: 1.8 10E9/L (ref 0.8–5.3)
LYMPHOCYTES NFR BLD AUTO: 24.6 %
MCH RBC QN AUTO: 29.5 PG (ref 26.5–33)
MCHC RBC AUTO-ENTMCNC: 31.2 G/DL (ref 31.5–36.5)
MCV RBC AUTO: 95 FL (ref 78–100)
MONOCYTES # BLD AUTO: 1 10E9/L (ref 0–1.3)
MONOCYTES NFR BLD AUTO: 13.5 %
NEUTROPHILS # BLD AUTO: 4.3 10E9/L (ref 1.6–8.3)
NEUTROPHILS NFR BLD AUTO: 59.8 %
NRBC # BLD AUTO: 0 10*3/UL
NRBC BLD AUTO-RTO: 0 /100
PLATELET # BLD AUTO: 396 10E9/L (ref 150–450)
POTASSIUM SERPL-SCNC: 3.5 MMOL/L (ref 3.4–5.3)
RBC # BLD AUTO: 4.44 10E12/L (ref 3.8–5.2)
SODIUM SERPL-SCNC: 136 MMOL/L (ref 133–144)
TROPONIN I SERPL-MCNC: <0.015 UG/L (ref 0–0.04)
WBC # BLD AUTO: 7.2 10E9/L (ref 4–11)

## 2019-10-22 PROCEDURE — 80048 BASIC METABOLIC PNL TOTAL CA: CPT | Performed by: EMERGENCY MEDICINE

## 2019-10-22 PROCEDURE — 25000132 ZZH RX MED GY IP 250 OP 250 PS 637: Mod: GY | Performed by: EMERGENCY MEDICINE

## 2019-10-22 PROCEDURE — 93005 ELECTROCARDIOGRAM TRACING: CPT

## 2019-10-22 PROCEDURE — 85025 COMPLETE CBC W/AUTO DIFF WBC: CPT | Performed by: EMERGENCY MEDICINE

## 2019-10-22 PROCEDURE — 71046 X-RAY EXAM CHEST 2 VIEWS: CPT

## 2019-10-22 PROCEDURE — 84484 ASSAY OF TROPONIN QUANT: CPT | Performed by: EMERGENCY MEDICINE

## 2019-10-22 PROCEDURE — 99285 EMERGENCY DEPT VISIT HI MDM: CPT | Mod: 25

## 2019-10-22 RX ORDER — LORAZEPAM 0.5 MG/1
0.5 TABLET ORAL ONCE
Status: COMPLETED | OUTPATIENT
Start: 2019-10-22 | End: 2019-10-22

## 2019-10-22 RX ORDER — LORAZEPAM 2 MG/ML
0.5 INJECTION INTRAMUSCULAR ONCE
Status: DISCONTINUED | OUTPATIENT
Start: 2019-10-22 | End: 2019-10-22

## 2019-10-22 RX ORDER — HYDROXYZINE HYDROCHLORIDE 10 MG/1
10 TABLET, FILM COATED ORAL EVERY 6 HOURS PRN
Qty: 20 TABLET | Refills: 0 | Status: SHIPPED | OUTPATIENT
Start: 2019-10-22 | End: 2020-03-03

## 2019-10-22 RX ADMIN — LORAZEPAM 0.5 MG: 0.5 TABLET ORAL at 22:45

## 2019-10-22 ASSESSMENT — ENCOUNTER SYMPTOMS
PALPITATIONS: 1
ABDOMINAL PAIN: 0
BACK PAIN: 0
SHORTNESS OF BREATH: 1
NAUSEA: 0
VOMITING: 0
FEVER: 0

## 2019-10-22 ASSESSMENT — MIFFLIN-ST. JEOR: SCORE: 1414.87

## 2019-10-22 NOTE — ED AVS SNAPSHOT
[facility] Department  [address]  [facility] [address]  Phone:  [phone]  Fax:  [phone]                                    Teri Beltran   MRN: [id number]    Department:  [facility] Emergency Department   Date of Visit:  10/22/2019           After Visit Summary Signature Page    I have received my discharge instructions, and my questions have been answered. I have discussed any challenges I see with this plan with the nurse or doctor.    ..........................................................................................................................................  Patient/Patient Representative Signature      ..........................................................................................................................................  Patient Representative Print Name and Relationship to Patient    ..................................................               ................................................  Date                                   Time    ..........................................................................................................................................  Reviewed by Signature/Title    ...................................................              ..............................................  Date                                               Time          22EPIC Rev 08/18

## 2019-10-23 NOTE — DISCHARGE INSTRUCTIONS
No worrisome cause for your symptoms was seen tonight  You feel somewhat better after anti-anxiety medications  Please follow up closely with your regular clinic  Return to the ED with any concerning or worsening symptoms.

## 2019-10-23 NOTE — ED NOTES
Pt ambulatory with pulse ox. Denies shortness of breath with ambulation. Oxygen Sat 94% on room air.

## 2019-10-23 NOTE — ED TRIAGE NOTES
"Pt. Arrived w/ complaints of SOB and feeling of heart fluttering for the last 2-3 days. Pt. Reports she felt that her chest was \"flipping back and forth\" when deciding to come in. A&O x 4.   "

## 2019-10-23 NOTE — ED PROVIDER NOTES
"  History     Chief Complaint:  Palpitations and Shortness of Breath    The history is provided by the patient.      Teri Beltran is a 73 year old female with a history of tachycardia s/p LINQ device placement who presents for evaluation of palpitations and shortness of breath. Over the last couple of days, the patient reports feeling increasingly short of breath while exerting herself. Tonight, she was sitting at home when she suddenly felt her heart start beating fast and irregularly. This was uncomfortable and it was not relived by walking around or changing positions. She reports it lasted until she went outside when her friend picked her up to come to the ED. Here, she reports the palpitations have subsided and she denies any shortness of breath at rest. She denies any chest pain, nausea, vomiting, or back pain associated with the event tonight. She had a small amount of chest pain yesterday, but that resolved. Years ago, when she underwent her hysterectomy, she reports having a post-op panic attack. Tonight, she reports feeling as if she was \"building up to a panic attack\" during the event. She does report a family history of a rare heart disease which her father  from, however she does not know what it is. She denies any personal history of atrial fibrillation, coronary artery disease, or cardiac stent placement.     Allergies:  No known drug allergies    Medications:    Albuterol inhaler  Alendronate  Amitriptyline  Amlodipine  Aspirin 325 mg  Cosopt eye drops  Latanoprost eye drops  Lisinopril-hydrochlorothiazide  Claritin  Metoprolol  Pravastatin  Zantac  Sennosides  Levothyroxine     Past Medical History:    GERD  Glaucoma  Hyperlipidemia  Hypertension  Hypothyroidism  Prediabetes  Varicose veins  Venous insufficiency   Left atrial dilatation  Mild dilation of ascending aorta  Tachycardia     Past Surgical History:    Right knee arthroplasty  Right breast ductal surgery  Hysterectomy, " "oophorectomy  Left wrist ganglion cyst removal  Tonsillectomy  Left eye cataract removal  LINQ placement    Family History:    Mother: Ovarian cancer, Breast cancer  Father: Heart disease, Diabetes  Brother: Cerebrovascular disease    Social History:  Marital Status:   [5]  Best friend at bedside  Former smoker  Positive for alcohol use. Comment: 1 glass of wine/night.   Negative for drug use.      Review of Systems   Constitutional: Negative for fever.   Respiratory: Positive for shortness of breath.    Cardiovascular: Positive for palpitations. Negative for chest pain.   Gastrointestinal: Negative for abdominal pain, nausea and vomiting.   Musculoskeletal: Negative for back pain.   All other systems reviewed and are negative.    Physical Exam     Patient Vitals for the past 24 hrs:   BP Temp Temp src Pulse Heart Rate Resp SpO2 Height Weight   10/22/19 2247 -- -- -- -- -- -- 99 % -- --   10/22/19 2246 (!) 143/86 -- -- 87 -- -- 97 % -- --   10/22/19 2240 (!) 150/82 -- -- 86 -- -- 95 % -- --   10/22/19 2115 139/75 -- -- 91 92 23 95 % -- --   10/22/19 1950 (!) 183/101 98.5  F (36.9  C) Oral 93 93 16 97 % 1.727 m (5' 8\") 86.6 kg (191 lb)      Physical Exam  Constitutional: Vital signs reviewed as above.   Head: No external signs of trauma noted.  Eyes: Pupils are equal, round, and reactive to light.   Neck: No JVD noted  Cardiovascular: Normal rate, regular rhythm and normal heart sounds.  No murmur heard. Equal B/L peripheral pulses.  Pulmonary/Chest: Effort normal and breath sounds normal. No respiratory distress. Patient has no wheezes. Patient has no rales.   Gastrointestinal: Soft. There is no tenderness.   Musculoskeletal/Extremities: No edema noted. Normal tone.  Neurological: Patient is alert and oriented to person, place, and time.   Skin: Skin is warm and dry. There is no diaphoresis noted.   Psychiatric: The patient appears calm.      Emergency Department Course   ECG:  Indication: shortness of " breath  Time: 1952  Vent. Rate 93 bpm. CO interval 216. QRS duration 106. QT/QTc 364/452. P-R-T axis 60 -1 24.    Sinus rhythm with 1st degree AV block  Incomplete RBBB.  Borderline ECG. No significant change compared to EKG dated 7/16/19. Read time: 1958.    Imaging:  Radiographic findings were communicated with the patient who voiced understanding of the findings.  XR Chest PA & LAT:   Scoliosis. Heart size upper limits normal. Segment elevation of the right hemidiaphragm, as per radiology.     Laboratory:  CBC: WBC: 7.2, HGB: 13.1, PLT: 396  BMP: Glucose 120 (H), o/w WNL (Creatinine: 0.69)  2008 Troponin: <0.015      Interventions:  2245 Ativan, 0.5 mg, PO    Emergency Department Course:  Nursing notes and vitals reviewed.   EKG obtained in the ED, see results above.    IV was inserted and blood was drawn for laboratory testing, results above.     2020: I performed an exam of the patient as documented above.      The patient was sent for a chest x-ray while in the emergency department, results above.      2120: I rechecked the patient and discussed the results of her workup thus far.     2208: I rechecked the patient and discussed the plan. She is still feeling short of breath and anxious.     2316: I rechecked the patient after Ativan administration. At this point, she feels improved and is amenable to discharge.     Findings and plan explained to the Patient. Patient discharged home with instructions regarding supportive care, medications, and reasons to return. The importance of close follow-up was reviewed. She was prescribed Atarax.     I personally reviewed the laboratory and imaging results with the Patient and answered all related questions prior to discharge.    Impression & Plan      Medical Decision Making:  Teri Beltran is a 73 year old female who presents to the ED due to palpitations and dyspnea. Please see the HPI and exam for specifics. The patient has improved in the ED. She did require a  dose of Ativan to improve. Otherwise, her work up does not seem remarkable. I am not concerned for PE at this point. I do believe she can be discharged, but she should return if she has worsening symptoms. Anticipatory guidance given prior to discharge.      Diagnosis:    ICD-10-CM    1. Palpitations R00.2    2. Dyspnea, unspecified type R06.00        Disposition:  discharged to home    Discharge Medications:         START taking      Dose / Directions   hydrOXYzine 10 MG tablet  Commonly known as:  ATARAX      Dose:  10 mg  Take 1 tablet (10 mg) by mouth every 6 hours as needed for anxiety  Quantity:  20 tablet  Refills:  0         Scribe Disclosure:  I, Yanet Khan, am serving as a scribe on 10/22/2019 at 8:20 PM to personally document services performed by Oniel Stevenson DO based on my observations and the provider's statements to me.      10/22/2019   [facility] EMERGENCY DEPARTMENT       Oniel Stevenson DO  10/22/19 9810

## 2019-10-24 ENCOUNTER — OFFICE VISIT (OUTPATIENT)
Dept: FAMILY MEDICINE | Facility: CLINIC | Age: 74
End: 2019-10-24
Payer: MEDICARE

## 2019-10-24 VITALS
OXYGEN SATURATION: 97 % | RESPIRATION RATE: 16 BRPM | DIASTOLIC BLOOD PRESSURE: 80 MMHG | HEART RATE: 74 BPM | TEMPERATURE: 98.2 F | BODY MASS INDEX: 29.19 KG/M2 | HEIGHT: 68 IN | WEIGHT: 192.6 LBS | SYSTOLIC BLOOD PRESSURE: 124 MMHG

## 2019-10-24 DIAGNOSIS — R06.02 SOB (SHORTNESS OF BREATH): ICD-10-CM

## 2019-10-24 DIAGNOSIS — R25.1 SHAKINESS: ICD-10-CM

## 2019-10-24 DIAGNOSIS — G47.00 INSOMNIA, UNSPECIFIED TYPE: ICD-10-CM

## 2019-10-24 DIAGNOSIS — R00.2 PALPITATIONS: Primary | ICD-10-CM

## 2019-10-24 PROCEDURE — 99214 OFFICE O/P EST MOD 30 MIN: CPT | Performed by: FAMILY MEDICINE

## 2019-10-24 ASSESSMENT — MIFFLIN-ST. JEOR: SCORE: 1422.13

## 2019-10-24 NOTE — PATIENT INSTRUCTIONS
General recommendations for sleep problems (Insomnia)   (Allow 2-4 weeks to see results)   Establish a regular sleep schedule   Go to bed at same time each night   Get up at same time each day   Avoid sleeping-in on Sunday morning   Cut down time in bed (if not asleep, get up)   Avoid trying to force yourself to sleep   Use your bed only for sleep and sex   Do not read or watch Television in bed   Make the bedroom comfortable   Keep temperature in your bedroom comfortable   Keep bedroom quiet when sleeping   Consider ear plugs (silicon)   Keep bedroom dark enough   Use dark blinds or wear an eye mask if needed   Relax at bedtime   Relax each muscle group individually   Begin with your feet   Work toward your head   Deal with your worries before bedtime   Set aside a worry time for 30 minutes earlier   Listen to relaxation tapes   Classical Music or Nature sounds   Imagine a tranquil scene (e.g. waterfall or beach)   Back Massage   Gentle 5-minute back rub prior to bedtime   Perform measures to make you tired at bedtime   Get regular Exercise each day (6 hours before bedtime)   Take medications only as directed   Eat a light bedtime snack or warm drink   Warm milk   Warm herbal tea (non-caffeinated)   Special Therapy Measures   Sleep Restriction Therapy   Limit time in bed for 15 minutes more than sleep   Do not set limit under 4 hours   Increase time by 15 minutes per effective sleep trial   Effective sleep suggests >90% of bedtime asleep   Stimulus Control   Do not lie awake for more than 30 minutes   Get out of bed and perform a quiet activity   Return to bed when sleepy   Repeat as many times per night as needed   No napping during day   Things to avoid   Do not Exercise just before bedtime   No overstimulating activities just before bed   No competitive games before bedtime   No exciting Television programs before bedtime   Avoid caffeine after lunchtime   Avoid chocolate   Avoid coffee, tea, or soda   Do  not use alcohol to induce sleep (worsens Insomnia)   Do not take someone else's sleeping pills   Do not look at the clock when awakening   Do not turn on light when getting up to use bathroom   Read the book Say Good Night To Insomnia

## 2019-10-24 NOTE — PROGRESS NOTES
Subjective     Teri Beltran is a 73 year old female who presents to clinic today for the following health issues:    HPI   ED/UC Followup:    Facility:  [facility] emergency room department  Date of visit: 10/22/2019  Reason for visit: Palpitations and shortness of breath   Current Status: will have some palpitations-every once in awhile            See under ROS     Patient Active Problem List   Diagnosis     Hypothyroidism     Tachycardia     Glaucoma     Prediabetes     Hyperlipidemia LDL goal <130     Palpitations     BMI 30.0-30.9,adult     Health Care Home     Advanced directives, counseling/discussion     Hypertension goal BP (blood pressure) < 140/90     Adjustment disorder with depressed mood     Osteoporosis     Mild dilation of ascending aorta - borderline (H)     RBBB     First degree AV block     Venous insufficiency     Varicose veins     S/P total knee arthroplasty       Current Outpatient Medications   Medication Sig Dispense Refill     acetaminophen (TYLENOL) 500 MG tablet Give 2 tablet by mouth three times a day for pain AND Give 2 tablet by mouth as needed for pain Q HS       albuterol (PROAIR HFA, PROVENTIL HFA, VENTOLIN HFA) 108 (90 BASE) MCG/ACT inhaler Inhale 2 puffs into the lungs every 6 hours as needed for shortness of breath / dyspnea or wheezing 1 Inhaler 0     alendronate (FOSAMAX) 70 MG tablet TAKE 1 TABLET(70 MG) BY MOUTH EVERY 7 DAYS 12 tablet 0     amLODIPine (NORVASC) 2.5 MG tablet Take 1 tablet (2.5 mg) by mouth daily 90 tablet 2     aspirin (ASA) 325 MG EC tablet Take one Aspirin tab twice daily for 5 weeks. 70 tablet 0     dorzolamide-timolol (COSOPT) 2-0.5 % ophthalmic solution Place 1 drop into both eyes 2 times daily       fluticasone (FLONASE) 50 MCG/ACT spray Spray 1-2 sprays into both nostrils daily 1 Bottle 3     hydrOXYzine (ATARAX) 10 MG tablet Take 1 tablet (10 mg) by mouth every 6 hours as needed for anxiety 20 tablet 0     latanoprost (XALATAN) 0.005 %  ophthalmic solution Place 1 drop into the right eye daily       levothyroxine (SYNTHROID/LEVOTHROID) 88 MCG tablet Take 1 tablet (88 mcg) by mouth daily 90 tablet 3     lisinopril-hydrochlorothiazide (PRINZIDE/ZESTORETIC) 20-25 MG tablet TAKE 1 TABLET BY MOUTH DAILY 90 tablet 0     loratadine (CLARITIN) 10 MG tablet Take 1 tablet (10 mg) by mouth daily       metoprolol succinate ER (TOPROL-XL) 25 MG 24 hr tablet Take 1.5 tablets (37.5 mg) by mouth daily 135 tablet 1     polyethylene glycol (MIRALAX/GLYCOLAX) packet Take 1 packet by mouth daily as needed for constipation       pravastatin (PRAVACHOL) 40 MG tablet TAKE 1 TABLET(40 MG) BY MOUTH DAILY 90 tablet 3     ranitidine (ZANTAC) 150 MG tablet TAKE 1 TABLET(150 MG) BY MOUTH TWICE DAILY 180 tablet 1     sennosides (SENOKOT) 8.6 MG tablet Take 2 tablets by mouth 2 times daily           Reviewed and updated as needed this visit by Provider         Review of Systems   ROS COMP: CONSTITUTIONAL:NEGATIVE for fever, chills, change in weight  ENT/MOUTH: NEGATIVE for ear, mouth and throat problems  RESP:NEGATIVE for significant cough or SOB x nose always drippy; thinking more allergies.   CV: see below.  GI: NEGATIVE for nausea, abdominal pain, heartburn, or change in bowel habits  MUSCULOSKELETAL: no edema.  PSYCHIATRIC: NEGATIVE for changes in mood or affect    Heart rate dropped (she is noting with today's VS). Feels shakey; usually HR in 80-90's.  Was short of breath.  Palpitations: Felt some flipping and flopping in chest; like butterflies.    Palpitations had improved by the time she got to ER; perhaps being out in the cold.     Notes amitriptyline was not helping her, so she quit it. Wondering if this is causing problems.   Had been on for sleep.  Still not sleeping real well.   Went off in August.     They gave her a pill for anxiety. Went home and slept.   Did not feel that she has been anxious.    3-4 days of symptoms.  Now breathing OK.  Occasional flutter.  "  Does feel weak and shakey.     Does have LINQ monitor.    This time of year is bad for her regarding allergies; with leaves molding. Has had vertigo from that in the past.     Notes was white around her mouth; but was feeling short of breath.     Notes did have a panic attack p hysterectomy.     Cardiology appointment end of November.       Objective    /80 (BP Location: Right arm, Cuff Size: Adult Regular)   Pulse 74   Temp 98.2  F (36.8  C) (Oral)   Resp 16   Ht 1.727 m (5' 8\")   Wt 87.4 kg (192 lb 9.6 oz)   SpO2 97%   BMI 29.28 kg/m    Body mass index is 29.28 kg/m .  Physical Exam   GENERAL APPEARANCE: alert and no distress  RESP: lungs clear to auscultation - no rales, rhonchi or wheezes  CV: regular rates and rhythm  MS: no edema.  PSYCH: mentation appears normal and affect normal/bright      Component      Latest Ref Rng & Units 10/22/2019   WBC      4.0 - 11.0 10e9/L 7.2   RBC Count      3.8 - 5.2 10e12/L 4.44   Hemoglobin      11.7 - 15.7 g/dL 13.1   Hematocrit      35.0 - 47.0 % 42.0   MCV      78 - 100 fl 95   MCH      26.5 - 33.0 pg 29.5   MCHC      31.5 - 36.5 g/dL 31.2 (L)   RDW      10.0 - 15.0 % 13.8   Platelet Count      150 - 450 10e9/L 396   Diff Method       Automated Method   % Neutrophils      % 59.8   % Lymphocytes      % 24.6   % Monocytes      % 13.5   % Eosinophils      % 1.0   % Basophils      % 0.8   % Immature Granulocytes      % 0.3   Nucleated RBCs      0 /100 0   Absolute Neutrophil      1.6 - 8.3 10e9/L 4.3   Absolute Lymphocytes      0.8 - 5.3 10e9/L 1.8   Absolute Monocytes      0.0 - 1.3 10e9/L 1.0   Absolute Eosinophils      0.0 - 0.7 10e9/L 0.1   Absolute Basophils      0.0 - 0.2 10e9/L 0.1   Abs Immature Granulocytes      0 - 0.4 10e9/L 0.0   Absolute Nucleated RBC       0.0   Sodium      133 - 144 mmol/L 136   Potassium      3.4 - 5.3 mmol/L 3.5   Chloride      94 - 109 mmol/L 103   Carbon Dioxide      20 - 32 mmol/L 26   Anion Gap      3 - 14 mmol/L 7 "   Glucose      70 - 99 mg/dL 120 (H)   Urea Nitrogen      7 - 30 mg/dL 24   Creatinine      0.52 - 1.04 mg/dL 0.69   GFR Estimate      >60 mL/min/1.73:m2 85   GFR Estimate If Black      >60 mL/min/1.73:m2 >90   Calcium      8.5 - 10.1 mg/dL 9.3   Troponin I ES      0.000 - 0.045 ug/L <0.015     Recent Labs   Lab Test 09/28/18  0951 09/28/17  0938  08/11/15  1224 08/20/14  0958   CHOL 194 183   < > 176 201*   HDL 99 83   < > 79 88   LDL 77 76   < > 70 79   TRIG 91 122   < > 133 169*   CHOLHDLRATIO  --   --   --  2.2 2.3    < > = values in this interval not displayed.     TSH   Date Value Ref Range Status   09/28/2018 2.49 0.40 - 4.00 mU/L Final     Reviewed ER note        Assessment & Plan     1. Palpitations  Uncertain etiology.   Work up as noted in ER.  Do not feel a HR in the 70's is low; discussed this with her  She does have a LINQ in place. Will follow with Cardiology. Recommend to contact if recurrent episodes.   She did improve p Ativan.  She also describes some whiteness around mouth, feeling a little short of breath and history of panic attack.    2. SOB (shortness of breath)  She notes this was subjective.  Might be related to allergies. Was thought to be a panic attack.  If frequent occurrence, we could consider an SSRI    3. Shakiness  As above; uncertain etiology.     4. Insomnia, unspecified type  She notes she stopped amitriptyline. Discussed will usually wean from this; but she is off now for awhile; would not go back on to then wean.  Discussed sleep hygiene. Also see patient instructions.           Return in about 1 week (around 10/31/2019) for Lab Work fasting. lab only appointment. .    Laura Tipton MD, MD  [facility]

## 2019-10-28 DIAGNOSIS — D64.9 ANEMIA, UNSPECIFIED TYPE: ICD-10-CM

## 2019-10-28 DIAGNOSIS — I10 HYPERTENSION GOAL BP (BLOOD PRESSURE) < 140/90: ICD-10-CM

## 2019-10-28 DIAGNOSIS — R73.03 PREDIABETES: ICD-10-CM

## 2019-10-28 DIAGNOSIS — E78.5 HYPERLIPIDEMIA LDL GOAL <130: ICD-10-CM

## 2019-10-28 DIAGNOSIS — E03.9 HYPOTHYROIDISM, UNSPECIFIED TYPE: ICD-10-CM

## 2019-10-28 DIAGNOSIS — M81.0 OSTEOPOROSIS, UNSPECIFIED OSTEOPOROSIS TYPE, UNSPECIFIED PATHOLOGICAL FRACTURE PRESENCE: ICD-10-CM

## 2019-10-28 LAB
ALBUMIN SERPL-MCNC: 4.2 G/DL (ref 3.4–5)
ALP SERPL-CCNC: 95 U/L (ref 40–150)
ALT SERPL W P-5'-P-CCNC: 24 U/L (ref 0–50)
ANION GAP SERPL CALCULATED.3IONS-SCNC: 9 MMOL/L (ref 3–14)
AST SERPL W P-5'-P-CCNC: 14 U/L (ref 0–45)
BILIRUB SERPL-MCNC: 0.5 MG/DL (ref 0.2–1.3)
BUN SERPL-MCNC: 25 MG/DL (ref 7–30)
CALCIUM SERPL-MCNC: 9.2 MG/DL (ref 8.5–10.1)
CHLORIDE SERPL-SCNC: 101 MMOL/L (ref 94–109)
CHOLEST SERPL-MCNC: 188 MG/DL
CO2 SERPL-SCNC: 25 MMOL/L (ref 20–32)
CREAT SERPL-MCNC: 0.66 MG/DL (ref 0.52–1.04)
ERYTHROCYTE [DISTWIDTH] IN BLOOD BY AUTOMATED COUNT: 13.8 % (ref 10–15)
GFR SERPL CREATININE-BSD FRML MDRD: 86 ML/MIN/{1.73_M2}
GLUCOSE SERPL-MCNC: 117 MG/DL (ref 70–99)
HBA1C MFR BLD: 5.7 % (ref 0–5.6)
HCT VFR BLD AUTO: 42.4 % (ref 35–47)
HDLC SERPL-MCNC: 93 MG/DL
HGB BLD-MCNC: 13.7 G/DL (ref 11.7–15.7)
LDLC SERPL CALC-MCNC: 76 MG/DL
MCH RBC QN AUTO: 29.7 PG (ref 26.5–33)
MCHC RBC AUTO-ENTMCNC: 32.3 G/DL (ref 31.5–36.5)
MCV RBC AUTO: 92 FL (ref 78–100)
NONHDLC SERPL-MCNC: 95 MG/DL
PLATELET # BLD AUTO: 381 10E9/L (ref 150–450)
POTASSIUM SERPL-SCNC: 4 MMOL/L (ref 3.4–5.3)
PROT SERPL-MCNC: 7.7 G/DL (ref 6.8–8.8)
RBC # BLD AUTO: 4.62 10E12/L (ref 3.8–5.2)
SODIUM SERPL-SCNC: 135 MMOL/L (ref 133–144)
TRIGL SERPL-MCNC: 94 MG/DL
TSH SERPL DL<=0.005 MIU/L-ACNC: 2.06 MU/L (ref 0.4–4)
WBC # BLD AUTO: 6.9 10E9/L (ref 4–11)

## 2019-10-28 PROCEDURE — 36415 COLL VENOUS BLD VENIPUNCTURE: CPT | Performed by: FAMILY MEDICINE

## 2019-10-28 PROCEDURE — 83036 HEMOGLOBIN GLYCOSYLATED A1C: CPT | Performed by: FAMILY MEDICINE

## 2019-10-28 PROCEDURE — 80053 COMPREHEN METABOLIC PANEL: CPT | Performed by: FAMILY MEDICINE

## 2019-10-28 PROCEDURE — 80061 LIPID PANEL: CPT | Performed by: FAMILY MEDICINE

## 2019-10-28 PROCEDURE — 84443 ASSAY THYROID STIM HORMONE: CPT | Performed by: FAMILY MEDICINE

## 2019-10-28 PROCEDURE — 82043 UR ALBUMIN QUANTITATIVE: CPT | Performed by: FAMILY MEDICINE

## 2019-10-28 PROCEDURE — 85027 COMPLETE CBC AUTOMATED: CPT | Performed by: FAMILY MEDICINE

## 2019-10-28 NOTE — LETTER
November 4, 2019      Teri Beltran  [address] [facility] [address]        Dear Ms. Teri ZEENAT Belrtan,    Enclosed is a copy of your recent results.    TSH stands for Thyroid Stimulating Hormone. It is the most sensitive thyroid test that we have. It goes in the opposite direction of the thyroid hormone level; if your thyroid is not producing sufficient thyroid hormone, it goes up to tell your thyroid to make more.  Continue same dose thyroid med, and recheck in a year; earlier if concerns.  I would also recommend rechecking in 4-6 weeks if you get a different brand of thyroid medication.    The urine albumin to creatinine ratio serves as a marker of higher risk of cardiovascular and kidney disease.  If this is elevated, we typically like to see you on either an ACE inhibitor, or an ARB.   We are also getting more aggressive with both blood pressure and cholesterol goals.   (The number we are looking at is the ratio; this usually appears at the bottom of the report with a normal range 0-25).    Fasting glucoses of 100 or greater are considered abnormal. Diet ,exercise and weight management are important. This is called Impaired Fasting Glucose, which is a pre-diabetes condition. We have been watching this.  The Hgb A1C is a reflection of your glucose control over the last 3 months.  In people who have diabetes, we like to see this under 7.0. We do consider someone to have diabetes if this is 6.5 or higher.   An HgbA1C greater than or equal to 5.7 is considered to be in the pre-diabetes range. It is important to eat healthy, get regular exercise and maintain a healthy weight.    Have a great rest of the weekend!    Laura Tipton MD

## 2019-10-29 LAB
CREAT UR-MCNC: 162 MG/DL
MICROALBUMIN UR-MCNC: 18 MG/L
MICROALBUMIN/CREAT UR: 11.23 MG/G CR (ref 0–25)

## 2019-11-01 DIAGNOSIS — M81.0 OSTEOPOROSIS, UNSPECIFIED OSTEOPOROSIS TYPE, UNSPECIFIED PATHOLOGICAL FRACTURE PRESENCE: ICD-10-CM

## 2019-11-01 RX ORDER — ALENDRONATE SODIUM 70 MG/1
TABLET ORAL
Qty: 12 TABLET | Refills: 3 | Status: SHIPPED | OUTPATIENT
Start: 2019-11-01 | End: 2020-01-23

## 2019-11-03 RX ORDER — LEVOTHYROXINE SODIUM 88 UG/1
88 TABLET ORAL DAILY
Qty: 90 TABLET | Refills: 3 | Status: SHIPPED | OUTPATIENT
Start: 2019-11-03 | End: 2020-01-23

## 2019-11-11 ENCOUNTER — ALLIED HEALTH/NURSE VISIT (OUTPATIENT)
Dept: FAMILY MEDICINE | Facility: CLINIC | Age: 74
End: 2019-11-11
Payer: MEDICARE

## 2019-11-11 VITALS — SYSTOLIC BLOOD PRESSURE: 152 MMHG | DIASTOLIC BLOOD PRESSURE: 84 MMHG

## 2019-11-11 DIAGNOSIS — E78.5 HYPERLIPIDEMIA LDL GOAL <130: ICD-10-CM

## 2019-11-11 DIAGNOSIS — Z01.30 BP CHECK: Primary | ICD-10-CM

## 2019-11-11 PROCEDURE — 99207 ZZC NO CHARGE NURSE ONLY: CPT | Performed by: FAMILY MEDICINE

## 2019-11-11 NOTE — TELEPHONE ENCOUNTER
"Requested Prescriptions   Pending Prescriptions Disp Refills     pravastatin (PRAVACHOL) 40 MG tablet [Pharmacy Med Name: PRAVASTATIN 40MG TABLETS] 90 tablet 0     Sig: TAKE 1 TABLET(40 MG) BY MOUTH DAILY   Last Written Prescription Date:  10/12/18  Last Fill Quantity: 90,  # refills: 3   Last office visit: 10/24/2019 with prescribing provider:  Laura Tipton MD   Future Office Visit:        Statins Protocol Passed - 11/11/2019 10:14 AM        Passed - LDL on file in past 12 months     Recent Labs   Lab Test 10/28/19  1029   LDL 76             Passed - No abnormal creatine kinase in past 12 months     No lab results found.             Passed - Recent (12 mo) or future (30 days) visit within the authorizing provider's specialty     Patient has had an office visit with the authorizing provider or a provider within the authorizing providers department within the previous 12 mos or has a future within next 30 days. See \"Patient Info\" tab in inbasket, or \"Choose Columns\" in Meds & Orders section of the refill encounter.              Passed - Medication is active on med list        Passed - Patient is age 18 or older        Passed - No active pregnancy on record        Passed - No positive pregnancy test in past 12 months         "

## 2019-11-11 NOTE — PROGRESS NOTES
Teri Beltran was evaluated at [facility] on November 11, 2019 at which time her blood pressure was:    BP Readings from Last 3 Encounters:   11/11/19 (!) 152/84   10/24/19 124/80   10/22/19 (!) 146/94     Pulse Readings from Last 3 Encounters:   10/24/19 74   10/22/19 93   07/30/19 94       Reviewed lifestyle modifications for blood pressure control and reduction: including making healthy food choices, managing weight, getting regular exercise, smoking cessation, reducing alcohol consumption, monitoring blood pressure regularly.     Symptoms: None    BP Goal:< 140/90 mmHg    BP Assessment:  BP too high    Potential Reasons for BP too high: NA - Not applicable    BP Follow-Up Plan: Recheck BP in 30 days at pharmacy    Recommendation to Provider: follow up blood pressure check within 30 days    Note completed by: Kavin Duenas    Thank You   Sapna Montiel  [facility]

## 2019-11-12 ENCOUNTER — ANCILLARY PROCEDURE (OUTPATIENT)
Dept: GENERAL RADIOLOGY | Facility: CLINIC | Age: 74
End: 2019-11-12
Attending: PHYSICIAN ASSISTANT
Payer: MEDICARE

## 2019-11-12 ENCOUNTER — OFFICE VISIT (OUTPATIENT)
Dept: URGENT CARE | Facility: URGENT CARE | Age: 74
End: 2019-11-12
Payer: MEDICARE

## 2019-11-12 VITALS
BODY MASS INDEX: 28.89 KG/M2 | TEMPERATURE: 97.9 F | WEIGHT: 190 LBS | RESPIRATION RATE: 18 BRPM | OXYGEN SATURATION: 98 % | SYSTOLIC BLOOD PRESSURE: 140 MMHG | DIASTOLIC BLOOD PRESSURE: 82 MMHG | HEART RATE: 87 BPM

## 2019-11-12 DIAGNOSIS — J01.90 ACUTE SINUSITIS WITH SYMPTOMS > 10 DAYS: Primary | ICD-10-CM

## 2019-11-12 DIAGNOSIS — R05.9 COUGH: ICD-10-CM

## 2019-11-12 PROCEDURE — 71046 X-RAY EXAM CHEST 2 VIEWS: CPT

## 2019-11-12 PROCEDURE — 99213 OFFICE O/P EST LOW 20 MIN: CPT | Performed by: PHYSICIAN ASSISTANT

## 2019-11-12 RX ORDER — PRAVASTATIN SODIUM 40 MG
TABLET ORAL
Qty: 90 TABLET | Refills: 3 | Status: ON HOLD | OUTPATIENT
Start: 2019-11-12 | End: 2020-01-06

## 2019-11-12 NOTE — PROGRESS NOTES
Please help her set up an appointment.  See her phone message on 11/5; she would like to be seen earlier than what she has scheduled.    Ok to take a JED.    Thanks!!!

## 2019-11-12 NOTE — PROGRESS NOTES
SUBJECTIVE:  Teri Beltran is a 73 year old female here with concerns about sinus infection.  She states onset of symptoms were 1 week(s) ago.  She has had frontal pressure. Course of illness is worsening. Severity moderate  Current and Associated symptoms: nasal congestion, rhinorrhea, facial pain/pressure, tooth pain and headache  Predisposing factors include HX sinusitis. Recent treatment has included: Antihistamine    Past Medical History:   Diagnosis Date     Complication of anesthesia     confusion after anesthesia (after hysterectomy)     First degree AV block 2015     Former smoker      Gastroesophageal reflux disease      GERD (gastroesophageal reflux disease)      Glaucoma      Heart block     2:1     Hyperlipidemia LDL goal <130 2013     Hypertension goal BP (blood pressure) < 140/90 12/3/2013     Hypothyroidism      Left atrial dilatation     mild     Mild dilation of ascending aorta - borderline 2015     Palpitations      Prediabetes 2013     RBBB 2015     S/P total knee arthroplasty 2019     Tachycardia      Varicose veins      Venous insufficiency     s/p bilateral great saphenous vein radiofrequency ablation by Dr. Garcia     Social History     Tobacco Use     Smoking status: Former Smoker     Packs/day: 0.50     Years: 18.00     Pack years: 9.00     Types: Cigarettes     Last attempt to quit: 1980     Years since quittin.8     Smokeless tobacco: Never Used   Substance Use Topics     Alcohol use: Yes     Alcohol/week: 0.0 standard drinks     Comment: 1 glass of wine at night       ROS:  10 point ROS negative except as listed above      OBJECTIVE:  BP (!) 140/82   Pulse 87   Temp 97.9  F (36.6  C) (Tympanic)   Resp 18   Wt 86.2 kg (190 lb)   SpO2 98%   BMI 28.89 kg/m    Exam:GENERAL APPEARANCE: healthy, alert and no distress  EYES: EOMI,  PERRL, conjunctiva clear  HENT: ear canals and TM's normal.  Nose and mouth without ulcers, erythema or  lesions  NECK: supple, nontender, no lymphadenopathy  RESP: lungs clear to auscultation - no rales, rhonchi or wheezes  CV: regular rates and rhythm, normal S1 S2, no murmur noted  ABDOMEN:  soft, nontender, no HSM or masses and bowel sounds normal  NEURO: Normal strength and tone, sensory exam grossly normal,  normal speech and mentation  SKIN: no suspicious lesions or rashes    X-ray image initially interpreted in clinic by me in order to rule out pneumonia.  No evidence appreciated.      ASSESSMENT:  (J01.90) Acute sinusitis with symptoms > 10 days  (primary encounter diagnosis)  Plan: amoxicillin-clavulanate (AUGMENTIN) 875-125 MG         tablet      (R05) Cough  Comment: No evidence of pneumonia  Plan: XR Chest 2 Views         Red flags and emergent follow up discussed, and understood by patient  Follow up with PCP if symptoms worsen or fail to improve  In 3 days    Patient Instructions         1.  Plenty of fluids, rest, warm compresses on face  2.  Mucinex twice daily for at least 4 days  3.  Odette Pot 1x in the morning 1x at night (SALINE MIST SPRAY IS AN ACCEPTABLE, THOUGH NOT AS EFFECTIVE REPLACEMENT)  4.  Benadryl (diphenhydramine) at bedtime   5.  Either Claritin (Loratadine), Allegra (Fexofenadine), or Zyrtec (Cetirizine) in the day  6.  Flonase (Fluticasone) 2x each nostril twice a day for two weeks, then once each nostril once a day    IF SYMPTOMS WORSEN OR FAIL TO IMPROVE, INITIATE ANTIBIOTIC/PROBIOTIC THERAPY    7. Antibiotic twice daily for 7 Days  8. Probiotic (ie Culturelle) 1-2 hours after each antibiotic dose     Please let us know if symptoms persist, or worsen.      Patient Education     Self-Care for Sinusitis     Drinking plenty of water can help sinuses drain.   Sinusitis can often be managed with self-care. Self-care can keep sinuses moist and make you feel more comfortable. Remember to follow your doctor's instructions closely. This can make a big difference in getting your sinus problem  under control.  Drink fluids  Drinking extra fluids helps thin your mucus. This lets it drain from your sinuses more easily. Have a glass of water every hour or two. A humidifier helps in much the same way. Fluids can also offset the drying effects of certain medicines. If you use a humidifier, follow the product maker's instructions on how to use it. Clean it on a regular schedule.  Use saltwater rinses  Rinses help keep your sinuses and nose moist. Mix a teaspoon of salt in 8 ounces of fresh, warm water. Use a bulb syringe to gently squirt the water into your nose a few times a day. You can also buy ready-made saline nasal sprays.  Apply hot or cold packs  Applying heat to the area surrounding your sinuses may make you feel more comfortable. Use a hot water bottle or a hand towel dipped in hot water. Some people also find ice packs effective for relieving pain.  Medicines  Your doctor may prescribe medications to help treat your sinusitis. If you have an infection, antibiotics can help clear it up. If you are prescribed antibiotics, take all pills on schedule until they are gone, even if you feel better. Decongestants help relieve swelling. Use decongestant sprays for short periods only under the direction of your doctor. If you have allergies, your doctor may prescribe medications to help relieve them.   Date Last Reviewed: 10/1/2016    3647-1540 [employer]. [address] [place] [address], [place] [address]. All rights reserved. This information is not intended as a substitute for professional medical care. Always follow your healthcare professional's instructions.

## 2019-11-12 NOTE — PATIENT INSTRUCTIONS
1.  Plenty of fluids, rest, warm compresses on face  2.  Mucinex twice daily for at least 4 days  3.  Odette Pot 1x in the morning 1x at night (SALINE MIST SPRAY IS AN ACCEPTABLE, THOUGH NOT AS EFFECTIVE REPLACEMENT)  4.  Benadryl (diphenhydramine) at bedtime   5.  Either Claritin (Loratadine), Allegra (Fexofenadine), or Zyrtec (Cetirizine) in the day  6.  Flonase (Fluticasone) 2x each nostril twice a day for two weeks, then once each nostril once a day    IF SYMPTOMS WORSEN OR FAIL TO IMPROVE, INITIATE ANTIBIOTIC/PROBIOTIC THERAPY    7. Antibiotic twice daily for 7 Days  8. Probiotic (ie Culturelle) 1-2 hours after each antibiotic dose     Please let us know if symptoms persist, or worsen.      Patient Education     Self-Care for Sinusitis     Drinking plenty of water can help sinuses drain.   Sinusitis can often be managed with self-care. Self-care can keep sinuses moist and make you feel more comfortable. Remember to follow your doctor's instructions closely. This can make a big difference in getting your sinus problem under control.  Drink fluids  Drinking extra fluids helps thin your mucus. This lets it drain from your sinuses more easily. Have a glass of water every hour or two. A humidifier helps in much the same way. Fluids can also offset the drying effects of certain medicines. If you use a humidifier, follow the product maker's instructions on how to use it. Clean it on a regular schedule.  Use saltwater rinses  Rinses help keep your sinuses and nose moist. Mix a teaspoon of salt in 8 ounces of fresh, warm water. Use a bulb syringe to gently squirt the water into your nose a few times a day. You can also buy ready-made saline nasal sprays.  Apply hot or cold packs  Applying heat to the area surrounding your sinuses may make you feel more comfortable. Use a hot water bottle or a hand towel dipped in hot water. Some people also find ice packs effective for relieving pain.  Medicines  Your doctor may  prescribe medications to help treat your sinusitis. If you have an infection, antibiotics can help clear it up. If you are prescribed antibiotics, take all pills on schedule until they are gone, even if you feel better. Decongestants help relieve swelling. Use decongestant sprays for short periods only under the direction of your doctor. If you have allergies, your doctor may prescribe medications to help relieve them.   Date Last Reviewed: 10/1/2016    1472-7478 [employer]. [address] [place] [address], [place] [address]. All rights reserved. This information is not intended as a substitute for professional medical care. Always follow your healthcare professional's instructions.

## 2019-11-14 NOTE — NURSING NOTE
Patient scheduled at first available JED  Next 5 appointments (look out 90 days)    Nov 21, 2019  3:50 PM CST  Office Visit with [facility]ipton MD  [facility] [address] [facility] [address] [phone]   Dec 10, 2019  2:00 PM CST  Office Visit with Rachel Miller MD  [facility] [address]  [facility] [address] [phone]        Crystal ROTHMAN, Cliff RN

## 2019-11-21 ENCOUNTER — OFFICE VISIT (OUTPATIENT)
Dept: FAMILY MEDICINE | Facility: CLINIC | Age: 74
End: 2019-11-21
Payer: MEDICARE

## 2019-11-21 VITALS
RESPIRATION RATE: 16 BRPM | OXYGEN SATURATION: 99 % | BODY MASS INDEX: 28.67 KG/M2 | HEART RATE: 89 BPM | WEIGHT: 189.2 LBS | DIASTOLIC BLOOD PRESSURE: 78 MMHG | TEMPERATURE: 97.9 F | HEIGHT: 68 IN | SYSTOLIC BLOOD PRESSURE: 126 MMHG

## 2019-11-21 DIAGNOSIS — R05.9 COUGH: ICD-10-CM

## 2019-11-21 DIAGNOSIS — R06.02 SOB (SHORTNESS OF BREATH): Primary | ICD-10-CM

## 2019-11-21 DIAGNOSIS — R00.2 PALPITATIONS: ICD-10-CM

## 2019-11-21 LAB
FEF 25/75: 1.76
FEV-1: 2.1
FEV1/FVC: NORMAL
FVC: 2.81

## 2019-11-21 PROCEDURE — 99214 OFFICE O/P EST MOD 30 MIN: CPT | Mod: 25 | Performed by: FAMILY MEDICINE

## 2019-11-21 PROCEDURE — 94010 BREATHING CAPACITY TEST: CPT | Performed by: FAMILY MEDICINE

## 2019-11-21 ASSESSMENT — MIFFLIN-ST. JEOR: SCORE: 1406.71

## 2019-11-21 NOTE — PROGRESS NOTES
"Subjective     Teri Beltran is a 73 year old female who presents to clinic today for the following health issues:    HPI   ED/UC Followup:    Facility:  [facility]   Date of visit: 11/12/2019  Reason for visit: sinus infection  Current Status: improved       Has been having SOB x 2 weeks. Pt stated \"feeling like not able to get air\".  Has some productive cough in the morning. Denies any chest tightness but does have a \"heavy feeling\".  Wondering if it get be a \"bug\".  Feels Sob without exertion.     See under ROS     Patient Active Problem List   Diagnosis     Hypothyroidism     Tachycardia     Glaucoma     Prediabetes     Hyperlipidemia LDL goal <130     Palpitations     BMI 30.0-30.9,adult     Health Care Home     Advanced directives, counseling/discussion     Hypertension goal BP (blood pressure) < 140/90     Adjustment disorder with depressed mood     Osteoporosis     Mild dilation of ascending aorta - borderline (H)     RBBB     First degree AV block     Venous insufficiency     Varicose veins     S/P total knee arthroplasty       Current Outpatient Medications   Medication Sig Dispense Refill     alendronate (FOSAMAX) 70 MG tablet TAKE 1 TABLET(70 MG) BY MOUTH EVERY 7 DAYS 12 tablet 3     amLODIPine (NORVASC) 2.5 MG tablet Take 1 tablet (2.5 mg) by mouth daily 90 tablet 2     dorzolamide-timolol (COSOPT) 2-0.5 % ophthalmic solution Place 1 drop into both eyes 2 times daily       fluticasone (FLONASE) 50 MCG/ACT spray Spray 1-2 sprays into both nostrils daily 1 Bottle 3     latanoprost (XALATAN) 0.005 % ophthalmic solution Place 1 drop into the right eye daily       levothyroxine (SYNTHROID/LEVOTHROID) 88 MCG tablet Take 1 tablet (88 mcg) by mouth daily 90 tablet 3     lisinopril-hydrochlorothiazide (PRINZIDE/ZESTORETIC) 20-25 MG tablet TAKE 1 TABLET BY MOUTH DAILY 90 tablet 0     loratadine (CLARITIN) 10 MG tablet Take 1 tablet (10 mg) by mouth daily       metoprolol succinate ER " (TOPROL-XL) 25 MG 24 hr tablet Take 1.5 tablets (37.5 mg) by mouth daily 135 tablet 1     pravastatin (PRAVACHOL) 40 MG tablet TAKE 1 TABLET(40 MG) BY MOUTH DAILY 90 tablet 3     ranitidine (ZANTAC) 150 MG tablet TAKE 1 TABLET(150 MG) BY MOUTH TWICE DAILY 180 tablet 1     albuterol (PROAIR HFA, PROVENTIL HFA, VENTOLIN HFA) 108 (90 BASE) MCG/ACT inhaler Inhale 2 puffs into the lungs every 6 hours as needed for shortness of breath / dyspnea or wheezing (Patient not taking: Reported on 2019) 1 Inhaler 0     hydrOXYzine (ATARAX) 10 MG tablet Take 1 tablet (10 mg) by mouth every 6 hours as needed for anxiety (Patient not taking: Reported on 2019) 20 tablet 0           Reviewed and updated as needed this visit by Provider       Social History     Tobacco Use     Smoking status: Former Smoker     Packs/day: 0.50     Years: 18.00     Pack years: 9.00     Types: Cigarettes     Last attempt to quit: 1980     Years since quittin.9     Smokeless tobacco: Never Used   Substance Use Topics     Alcohol use: Yes     Alcohol/week: 0.0 standard drinks     Comment: 1 glass of wine at night       Review of Systems   ROS COMP: CONSTITUTIONAL:NEGATIVE for fever, chills, change in weight  ENT/MOUTH: see below; improving.  RESP:see below  CV: NEGATIVE for chest pain, or peripheral edema; see below regarding palpitations.  MUSCULOSKELETAL: no edema.   PSYCHIATRIC: NEGATIVE for changes in mood or affect    Did not take antibiotic; was told to take only if she really had to,  Because it was so strong.     Currently still feeling short of breath; like not getting enough air.   This am, was feeling dizzy. Lightheaded. Has been going on a couple weeks.  Was going on when at urgent care as well.     Did have a headache.   Sinus symptoms are better, but breathing is still an issue.     Has not had this problem in the past.     Chest will feel heavy at times.  Believes some wheezing.   Will cough in am and intermittently during the  "day.   Small amount mucus with cough.  Thought was improving until the last couple days. Since then has not been continuing to get better.    Does not like inhalers. She has tried a few years ago and felt like things were worse.  Could not get air after using it.     Regarding palpitations; she will feel an occasional tap.  No dyspnea on exertion.           Objective    /78 (BP Location: Right arm, Cuff Size: Adult Regular)   Pulse 89   Temp 97.9  F (36.6  C) (Oral)   Resp 16   Ht 1.727 m (5' 8\")   Wt 85.8 kg (189 lb 3.2 oz)   SpO2 99%   BMI 28.77 kg/m    Body mass index is 28.77 kg/m .  Physical Exam         General appearance: alert and has no apparent distress  Skin color is pink  Hydration status appears adequate with normal skin turgor and moist mucous membranes.  Sinuses are nontender to palpation.   Left TM is normal: no effusions, no erythema, and normal landmarks.  Right TM is normal: no effusions, no erythema, and normal landmarks.  Nasal mucosa is normal.  Oropharyngeal exam is normal: no lesions, erythema, adenopathy or exudate.  Neck is supple without adenopathy.  RESP: Normal - CTA without rales, rhonchi, or wheezing.  COR: Regular rate and rhythm.  MS: no ankle edema.      Reviewed UC note and CXR report from 11/12.                Assessment & Plan     1. SOB (shortness of breath)  Uncertain etiology. Suspect viral. She had been diagnosed with a sinus infection and notes significant improvement in these symptoms.  She does have a good O2 saturation. She does not appear to be in any distress or working hard to breathe. Another possibility is it could be subjective.  Discussed possible inhaler; but in the past, she has thought it made her feel worse. Might consider in the future.   - Spirometry, Breathing Capacity: Normal Order, Clinic Performed    2. Cough  As above.     3. Palpitations  Very mild. Suspect PVC. She will be seeing Cardiology soon.        Return in about 10 months (around " 9/21/2020), or if symptoms worsen or fail to improve, for Medication recheck.    Laura Tipton MD, MD  [facility]

## 2019-11-27 ENCOUNTER — OFFICE VISIT (OUTPATIENT)
Dept: CARDIOLOGY | Facility: CLINIC | Age: 74
End: 2019-11-27
Payer: MEDICARE

## 2019-11-27 VITALS
DIASTOLIC BLOOD PRESSURE: 70 MMHG | BODY MASS INDEX: 28.95 KG/M2 | WEIGHT: 191 LBS | HEART RATE: 72 BPM | SYSTOLIC BLOOD PRESSURE: 130 MMHG | HEIGHT: 68 IN

## 2019-11-27 DIAGNOSIS — I10 ESSENTIAL HYPERTENSION WITH GOAL BLOOD PRESSURE LESS THAN 140/90: ICD-10-CM

## 2019-11-27 DIAGNOSIS — R55 SYNCOPE, UNSPECIFIED SYNCOPE TYPE: Primary | ICD-10-CM

## 2019-11-27 DIAGNOSIS — R00.2 PALPITATIONS: ICD-10-CM

## 2019-11-27 PROCEDURE — 93000 ELECTROCARDIOGRAM COMPLETE: CPT | Performed by: INTERNAL MEDICINE

## 2019-11-27 PROCEDURE — 99213 OFFICE O/P EST LOW 20 MIN: CPT | Performed by: INTERNAL MEDICINE

## 2019-11-27 ASSESSMENT — MIFFLIN-ST. JEOR: SCORE: 1414.87

## 2019-11-27 NOTE — LETTER
11/27/2019    [place]  [address] [place] [address]    RE: Teri Beltran       Dear Colleague,    I had the pleasure of seeing Teri Beltran in the [facility].    HPI and Plan:   See dictation    Orders Placed This Encounter   Procedures     EKG 12-lead complete w/read - Clinics (performed today)       No orders of the defined types were placed in this encounter.      There are no discontinued medications.      Encounter Diagnosis   Name Primary?     Palpitations Yes       CURRENT MEDICATIONS:  Current Outpatient Medications   Medication Sig Dispense Refill     alendronate (FOSAMAX) 70 MG tablet TAKE 1 TABLET(70 MG) BY MOUTH EVERY 7 DAYS 12 tablet 3     amLODIPine (NORVASC) 2.5 MG tablet Take 1 tablet (2.5 mg) by mouth daily 90 tablet 2     dorzolamide-timolol (COSOPT) 2-0.5 % ophthalmic solution Place 1 drop into both eyes 2 times daily       fluticasone (FLONASE) 50 MCG/ACT spray Spray 1-2 sprays into both nostrils daily 1 Bottle 3     latanoprost (XALATAN) 0.005 % ophthalmic solution Place 1 drop into the right eye daily       levothyroxine (SYNTHROID/LEVOTHROID) 88 MCG tablet Take 1 tablet (88 mcg) by mouth daily 90 tablet 3     lisinopril-hydrochlorothiazide (PRINZIDE/ZESTORETIC) 20-25 MG tablet TAKE 1 TABLET BY MOUTH DAILY 90 tablet 0     loratadine (CLARITIN) 10 MG tablet Take 1 tablet (10 mg) by mouth daily       metoprolol succinate ER (TOPROL-XL) 25 MG 24 hr tablet Take 1.5 tablets (37.5 mg) by mouth daily 135 tablet 1     pravastatin (PRAVACHOL) 40 MG tablet TAKE 1 TABLET(40 MG) BY MOUTH DAILY 90 tablet 3     ranitidine (ZANTAC) 150 MG tablet TAKE 1 TABLET(150 MG) BY MOUTH TWICE DAILY 180 tablet 1     albuterol (PROAIR HFA, PROVENTIL HFA, VENTOLIN HFA) 108 (90 BASE) MCG/ACT inhaler Inhale 2 puffs into the lungs every 6 hours as needed for shortness of breath / dyspnea or wheezing (Patient not taking: Reported on 11/21/2019) 1 Inhaler 0      hydrOXYzine (ATARAX) 10 MG tablet Take 1 tablet (10 mg) by mouth every 6 hours as needed for anxiety (Patient not taking: Reported on 11/21/2019) 20 tablet 0       ALLERGIES     Allergies   Allergen Reactions     Seasonal Allergies        PAST MEDICAL HISTORY:  Past Medical History:   Diagnosis Date     Complication of anesthesia     confusion after anesthesia (after hysterectomy)     First degree AV block 8/11/2015     Former smoker      Gastroesophageal reflux disease      GERD (gastroesophageal reflux disease)      Glaucoma      Heart block     2:1     Hyperlipidemia LDL goal <130 6/14/2013     Hypertension goal BP (blood pressure) < 140/90 12/3/2013     Hypothyroidism      Left atrial dilatation     mild     Mild dilation of ascending aorta - borderline 8/11/2015     Palpitations      Prediabetes 6/14/2013     RBBB 8/11/2015     S/P total knee arthroplasty 7/23/2019     Tachycardia      Varicose veins      Venous insufficiency     s/p bilateral great saphenous vein radiofrequency ablation by Dr. Garcia       PAST SURGICAL HISTORY:  Past Surgical History:   Procedure Laterality Date     ARTHROPLASTY KNEE Right 7/23/2019    Procedure: Right total knee arthroplasty using an [employer]ial[employer] knee system;  Surgeon: Dragan Muse MD;  Location: [facility] OR     BREAST SURGERY      right breast ductal surgery due to milk production     COLONOSCOPY N/A 10/24/2014    no further screening. Procedure: COLONOSCOPY;  Surgeon: Pedro Rudd MD;  Location:  GI     HYSTERECTOMY, PAP NO LONGER INDICATED  2009    total hysterectomy and ovaries. benign     SOFT TISSUE SURGERY      ganglion removed from left wrist and ring finger     SURGICAL HISTORY OF -   age 7    tonsillectomy     SURGICAL HISTORY OF -   1/15    left eye cataract     SURGICAL HISTORY OF -   Fall 2016    LINQ placed.       FAMILY HISTORY:  Family History   Problem Relation Age of Onset     Cancer Mother         ovarian     Breast Cancer Mother       Heart Disease Father         rare; not sure what it was     Diabetes Father         old age     Cerebrovascular Disease Brother 68       SOCIAL HISTORY:  Social History     Socioeconomic History     Marital status:      Spouse name: None     Number of children: None     Years of education: None     Highest education level: None   Occupational History     None   Social Needs     Financial resource strain: None     Food insecurity:     Worry: None     Inability: None     Transportation needs:     Medical: None     Non-medical: None   Tobacco Use     Smoking status: Former Smoker     Packs/day: 0.50     Years: 18.00     Pack years: 9.00     Types: Cigarettes     Last attempt to quit:      Years since quittin.9     Smokeless tobacco: Never Used   Substance and Sexual Activity     Alcohol use: Yes     Alcohol/week: 0.0 standard drinks     Comment: 1 glass of wine at night     Drug use: No     Sexual activity: Yes     Partners: Male   Lifestyle     Physical activity:     Days per week: None     Minutes per session: None     Stress: None   Relationships     Social connections:     Talks on phone: None     Gets together: None     Attends Bahai service: None     Active member of club or organization: None     Attends meetings of clubs or organizations: None     Relationship status: None     Intimate partner violence:     Fear of current or ex partner: None     Emotionally abused: None     Physically abused: None     Forced sexual activity: None   Other Topics Concern     Parent/sibling w/ CABG, MI or angioplasty before 65F 55M? Not Asked      Service Not Asked     Blood Transfusions Not Asked     Caffeine Concern No     Comment: 4-5 cups of coffee daily     Occupational Exposure Not Asked     Hobby Hazards Not Asked     Sleep Concern Not Asked     Stress Concern Not Asked     Weight Concern Not Asked     Special Diet No     Comment: no added salt     Back Care Not Asked     Exercise No      Comment: 3 days per week - exercise class      Bike Helmet Not Asked     Seat Belt Not Asked     Self-Exams Not Asked   Social History Narrative     None       Review of Systems:  Skin:  Negative       Eyes:  Negative      ENT:  Negative      Respiratory:  Positive for shortness of breath;dyspnea on exertion     Cardiovascular:  Negative      Gastroenterology: Negative      Genitourinary:  Negative      Musculoskeletal:  Positive for arthritis;joint pain    Neurologic:  Negative      Psychiatric:  Positive for sleep disturbances    Heme/Lymph/Imm:  Positive for allergies seasonal  Endocrine:  Positive for thyroid disorder      [id number]    CC  Laura Tipton MD  [id number] [id number]                Thank you for allowing me to participate in the care of your patient.      Sincerely,     Silke Villareal MD     [facility]    cc:   Laura Tipton MD  [id number] [id number]

## 2019-11-27 NOTE — PROGRESS NOTES
HPI and Plan:   See dictation    Orders Placed This Encounter   Procedures     EKG 12-lead complete w/read - Clinics (performed today)       No orders of the defined types were placed in this encounter.      There are no discontinued medications.      Encounter Diagnosis   Name Primary?     Palpitations Yes       CURRENT MEDICATIONS:  Current Outpatient Medications   Medication Sig Dispense Refill     alendronate (FOSAMAX) 70 MG tablet TAKE 1 TABLET(70 MG) BY MOUTH EVERY 7 DAYS 12 tablet 3     amLODIPine (NORVASC) 2.5 MG tablet Take 1 tablet (2.5 mg) by mouth daily 90 tablet 2     dorzolamide-timolol (COSOPT) 2-0.5 % ophthalmic solution Place 1 drop into both eyes 2 times daily       fluticasone (FLONASE) 50 MCG/ACT spray Spray 1-2 sprays into both nostrils daily 1 Bottle 3     latanoprost (XALATAN) 0.005 % ophthalmic solution Place 1 drop into the right eye daily       levothyroxine (SYNTHROID/LEVOTHROID) 88 MCG tablet Take 1 tablet (88 mcg) by mouth daily 90 tablet 3     lisinopril-hydrochlorothiazide (PRINZIDE/ZESTORETIC) 20-25 MG tablet TAKE 1 TABLET BY MOUTH DAILY 90 tablet 0     loratadine (CLARITIN) 10 MG tablet Take 1 tablet (10 mg) by mouth daily       metoprolol succinate ER (TOPROL-XL) 25 MG 24 hr tablet Take 1.5 tablets (37.5 mg) by mouth daily 135 tablet 1     pravastatin (PRAVACHOL) 40 MG tablet TAKE 1 TABLET(40 MG) BY MOUTH DAILY 90 tablet 3     ranitidine (ZANTAC) 150 MG tablet TAKE 1 TABLET(150 MG) BY MOUTH TWICE DAILY 180 tablet 1     albuterol (PROAIR HFA, PROVENTIL HFA, VENTOLIN HFA) 108 (90 BASE) MCG/ACT inhaler Inhale 2 puffs into the lungs every 6 hours as needed for shortness of breath / dyspnea or wheezing (Patient not taking: Reported on 11/21/2019) 1 Inhaler 0     hydrOXYzine (ATARAX) 10 MG tablet Take 1 tablet (10 mg) by mouth every 6 hours as needed for anxiety (Patient not taking: Reported on 11/21/2019) 20 tablet 0       ALLERGIES     Allergies   Allergen Reactions     Seasonal  Allergies        PAST MEDICAL HISTORY:  Past Medical History:   Diagnosis Date     Complication of anesthesia     confusion after anesthesia (after hysterectomy)     First degree AV block 8/11/2015     Former smoker      Gastroesophageal reflux disease      GERD (gastroesophageal reflux disease)      Glaucoma      Heart block     2:1     Hyperlipidemia LDL goal <130 6/14/2013     Hypertension goal BP (blood pressure) < 140/90 12/3/2013     Hypothyroidism      Left atrial dilatation     mild     Mild dilation of ascending aorta - borderline 8/11/2015     Palpitations      Prediabetes 6/14/2013     RBBB 8/11/2015     S/P total knee arthroplasty 7/23/2019     Tachycardia      Varicose veins      Venous insufficiency     s/p bilateral great saphenous vein radiofrequency ablation by Dr. Garcia       PAST SURGICAL HISTORY:  Past Surgical History:   Procedure Laterality Date     ARTHROPLASTY KNEE Right 7/23/2019    Procedure: Right total knee arthroplasty using an [employer] knee system;  Surgeon: Dragan Muse MD;  Location: [facility] OR     BREAST SURGERY      right breast ductal surgery due to milk production     COLONOSCOPY N/A 10/24/2014    no further screening. Procedure: COLONOSCOPY;  Surgeon: Pedro Rudd MD;  Location:  GI     HYSTERECTOMY, PAP NO LONGER INDICATED  2009    total hysterectomy and ovaries. benign     SOFT TISSUE SURGERY      ganglion removed from left wrist and ring finger     SURGICAL HISTORY OF -   age 7    tonsillectomy     SURGICAL HISTORY OF -   1/15    left eye cataract     SURGICAL HISTORY OF -   Fall 2016    LINQ placed.       FAMILY HISTORY:  Family History   Problem Relation Age of Onset     Cancer Mother         ovarian     Breast Cancer Mother      Heart Disease Father         rare; not sure what it was     Diabetes Father         old age     Cerebrovascular Disease Brother 68       SOCIAL HISTORY:  Social History     Socioeconomic History     Marital status:       Spouse name: None     Number of children: None     Years of education: None     Highest education level: None   Occupational History     None   Social Needs     Financial resource strain: None     Food insecurity:     Worry: None     Inability: None     Transportation needs:     Medical: None     Non-medical: None   Tobacco Use     Smoking status: Former Smoker     Packs/day: 0.50     Years: 18.00     Pack years: 9.00     Types: Cigarettes     Last attempt to quit: 1980     Years since quittin.9     Smokeless tobacco: Never Used   Substance and Sexual Activity     Alcohol use: Yes     Alcohol/week: 0.0 standard drinks     Comment: 1 glass of wine at night     Drug use: No     Sexual activity: Yes     Partners: Male   Lifestyle     Physical activity:     Days per week: None     Minutes per session: None     Stress: None   Relationships     Social connections:     Talks on phone: None     Gets together: None     Attends Gnosticist service: None     Active member of club or organization: None     Attends meetings of clubs or organizations: None     Relationship status: None     Intimate partner violence:     Fear of current or ex partner: None     Emotionally abused: None     Physically abused: None     Forced sexual activity: None   Other Topics Concern     Parent/sibling w/ CABG, MI or angioplasty before 65F 55M? Not Asked      Service Not Asked     Blood Transfusions Not Asked     Caffeine Concern No     Comment: 4-5 cups of coffee daily     Occupational Exposure Not Asked     Hobby Hazards Not Asked     Sleep Concern Not Asked     Stress Concern Not Asked     Weight Concern Not Asked     Special Diet No     Comment: no added salt     Back Care Not Asked     Exercise No     Comment: 3 days per week - exercise class      Bike Helmet Not Asked     Seat Belt Not Asked     Self-Exams Not Asked   Social History Narrative     None       Review of Systems:  Skin:  Negative       Eyes:  Negative       ENT:  Negative      Respiratory:  Positive for shortness of breath;dyspnea on exertion     Cardiovascular:  Negative      Gastroenterology: Negative      Genitourinary:  Negative      Musculoskeletal:  Positive for arthritis;joint pain    Neurologic:  Negative      Psychiatric:  Positive for sleep disturbances    Heme/Lymph/Imm:  Positive for allergies seasonal  Endocrine:  Positive for thyroid disorder      [id number]    CC  Laura Tipton MD  [address] [place] [address]

## 2019-11-27 NOTE — LETTER
11/27/2019      [place]  [address]  [facility] [address]      RE: Teri Beltran       Dear Colleague,    I had the pleasure of seeing Teri Beltran in the [facility].    Service Date: 11/27/2019      HISTORY OF PRESENT ILLNESS:    I had the pleasure of seeing Ms. Teri Beltran in followup today.  She has the following medical issues:   A.  Exercise-induced syncope in 2016.  EP study was inconclusive.  Of note, she developed hypotension on dobutamine infusion.  She underwent placement of a [employer] loop recorder.   B.  Transient asymptomatic 2:1 AV block in 2016.  We have not seen significant bradycardia over the past 3 years of monitoring.   C.  Hypertension with an element of white-coat hypertension.   D.  Lower extremity edema with previous saphenous vein RF ablation by Dr. Garcia.      Ms. Beltran is doing well overall.  She has not had recurrent syncope since 2016.  We have not recorded significant arrhythmias on her ILR.  On 10/22/2019 she presented to the emergency room with irregular fast heart beating and shortness of breath.  There was no associated cardiac arrhythmia on the monitor.  No significant abnormalities were found in the emergency room.      Because of dyspnea on exertion, Dr. Tipton has ordered PFTs which were unremarkable.  The patient told me today that her dyspnea has improved without specific treatment.  She does not have chest pain on exertion.      PHYSICAL EXAMINATION:   VITAL SIGNS:  Blood pressure 130/70, pulse 72 and regular, weight 86 kg, height 173 cm.   GENERAL:  She is a pleasant woman in no distress.   NECK:  Supple with normal JVP.   LUNGS:  Clear.   CARDIOVASCULAR:  Regular rhythm.  No gallop, murmur or rub.   ABDOMEN:  Soft, nontender.   EXTREMITIES:  No edema.      DIAGNOSTIC STUDIES:    Her 12-lead ECG today showed sinus rhythm with incomplete RBBB and first-degree AV block.        IMPRESSION:   1.  History of  syncope during exertion.  Her loop recorder has not shown clinically significant arrhythmias over the past 3 years.  Thus, the likelihood she had an arrhythmia causing syncope back in 2016 is low.  I am wondering whether she may have suffered an episode of neurally-mediated syncope with vasodepression/hypotension as we saw significant hypotension when the patient was placed on dobutamine during her EP study in 2016.        I discussed with Ms. Beltran that her loop recorder battery will likely become depleted within the next 6 months.  She will then have the option of removing the monitor or not.  If she wants it removed, we will arrange for the procedure accordingly.      2.  Hypertension.  Under decent control at this point.      3.  Nonspecific dyspnea.  Unclear what causes this, but it has already improved.  I reviewed her recent chest x-ray, which was normal.  The patient does not have chest pain to suggest angina.      The patient will continue followup in our Device Clinic until her loop recorder is depleted.  We will not make an appointment with a provider in the EP Clinic unless new issues arise in the future.      Thank you for the opportunity to be part of her care.        ARTIS SIDDIQUI MD, [facility]         cc:   [place] [address]             D: 2019   T: 2019   MT: PEDRITO      Name:     JAVON BELTRAN   MRN:      [id number]        Account:      [id number]   :      1945           Service Date: 2019      Document: [id number]           Outpatient Encounter Medications as of 2019   Medication Sig Dispense Refill     alendronate (FOSAMAX) 70 MG tablet TAKE 1 TABLET(70 MG) BY MOUTH EVERY 7 DAYS 12 tablet 3     amLODIPine (NORVASC) 2.5 MG tablet Take 1 tablet (2.5 mg) by mouth daily 90 tablet 2     dorzolamide-timolol (COSOPT) 2-0.5 % ophthalmic solution Place 1 drop into both eyes 2 times daily        fluticasone (FLONASE) 50 MCG/ACT spray Spray 1-2 sprays into both nostrils daily 1 Bottle 3     latanoprost (XALATAN) 0.005 % ophthalmic solution Place 1 drop into the right eye daily       levothyroxine (SYNTHROID/LEVOTHROID) 88 MCG tablet Take 1 tablet (88 mcg) by mouth daily 90 tablet 3     lisinopril-hydrochlorothiazide (PRINZIDE/ZESTORETIC) 20-25 MG tablet TAKE 1 TABLET BY MOUTH DAILY 90 tablet 0     loratadine (CLARITIN) 10 MG tablet Take 1 tablet (10 mg) by mouth daily       metoprolol succinate ER (TOPROL-XL) 25 MG 24 hr tablet Take 1.5 tablets (37.5 mg) by mouth daily 135 tablet 1     pravastatin (PRAVACHOL) 40 MG tablet TAKE 1 TABLET(40 MG) BY MOUTH DAILY 90 tablet 3     ranitidine (ZANTAC) 150 MG tablet TAKE 1 TABLET(150 MG) BY MOUTH TWICE DAILY 180 tablet 1     hydrOXYzine (ATARAX) 10 MG tablet Take 1 tablet (10 mg) by mouth every 6 hours as needed for anxiety (Patient not taking: Reported on 11/21/2019) 20 tablet 0     [DISCONTINUED] albuterol (PROAIR HFA, PROVENTIL HFA, VENTOLIN HFA) 108 (90 BASE) MCG/ACT inhaler Inhale 2 puffs into the lungs every 6 hours as needed for shortness of breath / dyspnea or wheezing (Patient not taking: Reported on 11/21/2019) 1 Inhaler 0     No facility-administered encounter medications on file as of 11/27/2019.        Again, thank you for allowing me to participate in the care of your patient.      Sincerely,    Silke Villareal MD     [facility]

## 2019-12-03 ENCOUNTER — ANCILLARY PROCEDURE (OUTPATIENT)
Dept: CARDIOLOGY | Facility: CLINIC | Age: 74
End: 2019-12-03
Attending: INTERNAL MEDICINE
Payer: MEDICARE

## 2019-12-03 DIAGNOSIS — Z45.09 ENCOUNTER FOR LOOP RECORDER CHECK: ICD-10-CM

## 2019-12-03 PROCEDURE — 93299 CARDIAC DEVICE CHECK - REMOTE: CPT | Performed by: INTERNAL MEDICINE

## 2019-12-03 PROCEDURE — 93298 REM INTERROG DEV EVAL SCRMS: CPT | Performed by: INTERNAL MEDICINE

## 2019-12-04 ENCOUNTER — TELEPHONE (OUTPATIENT)
Dept: FAMILY MEDICINE | Facility: CLINIC | Age: 74
End: 2019-12-04

## 2019-12-04 DIAGNOSIS — R06.02 SOB (SHORTNESS OF BREATH): ICD-10-CM

## 2019-12-04 DIAGNOSIS — J30.2 SEASONAL ALLERGIES: ICD-10-CM

## 2019-12-04 RX ORDER — ALBUTEROL SULFATE 90 UG/1
2 AEROSOL, METERED RESPIRATORY (INHALATION) EVERY 6 HOURS PRN
Qty: 1 INHALER | Refills: 0 | Status: SHIPPED | OUTPATIENT
Start: 2019-12-04 | End: 2019-12-19

## 2019-12-04 RX ORDER — ALBUTEROL SULFATE 90 UG/1
AEROSOL, METERED RESPIRATORY (INHALATION)
Qty: 25.5 G | Refills: 0 | OUTPATIENT
Start: 2019-12-04

## 2019-12-04 NOTE — TELEPHONE ENCOUNTER
Pt requesting inhaler that you discussed at last office visit.     Albuterol 108 mcg  Last Written Prescription Date:  8/11/15  Last Fill Quantity: 1,  # refills: 0   Last office visit: 11/21/19 w/ [facility]  Future Office Visit:   Next 5 appointments (look out 90 days)    Dec 10, 2019  2:00 PM CST  PHYSICAL with Rachel Miller MD  [facility] ([facility]) [address]  [facility] [id number] [phone]           Lisa HOWARD RN

## 2019-12-04 NOTE — TELEPHONE ENCOUNTER
Reason for call:  Medication   If this is a refill request, has the caller requested the refill from the pharmacy already? No  Will the patient be using a [facility]? No  Name of the pharmacy and phone number for the current request: [facility]    Name of the medication requested: albuterol (PROAIR HFA, PROVENTIL HFA, VENTOLIN HFA) 108 (90 BASE) MCG/ACT inhaler    Other request: She  Says that Dr. Tipton suggested one at last visit and she didn't want it, but has changed her mind.       Phone number to reach patient:  Cell number on file:    Telephone Information:   Mobile [phone]       Best Time:  any    Can we leave a detailed message on this number?  YES     Sonia Sanchez on 12/4/2019 at 8:59 AM

## 2019-12-04 NOTE — TELEPHONE ENCOUNTER
This was sent, but if she is getting worse or if this is getting real prolonged; she should be seen again.

## 2019-12-09 ENCOUNTER — DOCUMENTATION ONLY (OUTPATIENT)
Dept: CARDIOLOGY | Facility: CLINIC | Age: 74
End: 2019-12-09

## 2019-12-09 NOTE — TELEPHONE ENCOUNTER
Patient's loop recorder triggered JACQUELYN on 12/16/19. I spoke with her to inform her that we would no longer do transmissions from her device. She states that she is not sure if she would like to take her loop recorder out. I asked that she call us back in the future if she would like it removed and we can schedule procedure. She verbalized understanding.

## 2019-12-10 ENCOUNTER — OFFICE VISIT (OUTPATIENT)
Dept: INTERNAL MEDICINE | Facility: CLINIC | Age: 74
End: 2019-12-10
Payer: MEDICARE

## 2019-12-10 VITALS
TEMPERATURE: 98.4 F | SYSTOLIC BLOOD PRESSURE: 121 MMHG | HEART RATE: 79 BPM | OXYGEN SATURATION: 98 % | WEIGHT: 189 LBS | RESPIRATION RATE: 16 BRPM | DIASTOLIC BLOOD PRESSURE: 71 MMHG | HEIGHT: 68 IN | BODY MASS INDEX: 28.64 KG/M2

## 2019-12-10 DIAGNOSIS — R06.02 SOB (SHORTNESS OF BREATH) ON EXERTION: Primary | ICD-10-CM

## 2019-12-10 DIAGNOSIS — R05.9 COUGH: ICD-10-CM

## 2019-12-10 DIAGNOSIS — Z23 NEED FOR VACCINATION: ICD-10-CM

## 2019-12-10 PROCEDURE — 90471 IMMUNIZATION ADMIN: CPT | Performed by: INTERNAL MEDICINE

## 2019-12-10 PROCEDURE — 90714 TD VACC NO PRESV 7 YRS+ IM: CPT | Performed by: INTERNAL MEDICINE

## 2019-12-10 PROCEDURE — 99203 OFFICE O/P NEW LOW 30 MIN: CPT | Mod: 25 | Performed by: INTERNAL MEDICINE

## 2019-12-10 ASSESSMENT — MIFFLIN-ST. JEOR: SCORE: 1405.8

## 2019-12-10 NOTE — PATIENT INSTRUCTIONS
Plan:  1. Heart stress test - To schedule this test you may call Scheduling center at [phone]    2. Breo-Ellipta - 1 inhaler daily   3. You may still use the Albuterol 2 inhalers 4 times a day as needed. Use it before exercise   4. Follow up in January   5. Continue same meds, same doses for now

## 2019-12-10 NOTE — PROGRESS NOTES
Dr Wong's note    Patient's instructions / PLAN:                                                        Plan:  1. Heart stress test - To schedule this test you may call Scheduling center at [phone]    2. Breo-Ellipta - 1 inhaler daily   3. You may still use the Albuterol 2 inhalers 4 times a day as needed. Use it before exercise   4. Follow up in January 5. Continue same meds, same doses for now     ASSESSMENT & PLAN:                                                      (R06.02) SOB (shortness of breath) on exertion  (primary encounter diagnosis)  Comment: Not controlled   Plan: NM Lexiscan stress test, fluticasone-vilanterol        (BREO ELLIPTA) 100-25 MCG/INH inhaler          (R05) Cough  Comment: Not controlled   Plan: fluticasone-vilanterol (BREO ELLIPTA) 100-25         MCG/INH inhaler          (Z23) Need for vaccination  Comment:   Plan: TD PRSERV FREE >=7 YRS ADS IM [55408]           Chief complaint:                                                      Shortness of Breath    SUBJECTIVE:                                                    History of present illness:    SOB  -- with activity : coming up from the basement she has to stop and feel SOB  -- coughs and bring up clear mucus  -- no wheeses   -- she has seen dr Tipton and dr Villareal and she is dissapointed she didn't get an aswer for her SOB  -- she noticed the SOB in the last 2 months  -- ex smoker: 1ppd x 20 y. Quit 30 y ago.   -- CXR in [place] was normal   -- echo in normal range May 2019   -- normal CBC Oct 2019   -- always has a runny nose - she used to take Flonase   -- Ao heart murmur - she is aware of it        Hx of syncope  -- Loop recorder placed 3 y ago - it didn't show anything and she was discharged from cardio     Hyperlipidemia Follow-Up      Are you regularly taking any medication or supplement to lower your cholesterol?   Yes- pravastatin    Are you having muscle aches or other side effects that you think could be caused by your  "cholesterol lowering medication?  Yes- bilateral legs and arms have occasional muscle aches.    Hypertension Follow-up      Do you check your blood pressure regularly outside of the clinic? No     Are you following a low salt diet? Yes    Are your blood pressures ever more than 140 on the top number (systolic) OR more   than 90 on the bottom number (diastolic), for example 140/90? Yes      How many servings of fruits and vegetables do you eat daily?  2-3    On average, how many sweetened beverages do you drink each day (Examples: soda, juice, sweet tea, etc.  Do NOT count diet or artificially sweetened beverages)?   2    How many days per week do you miss taking your medication? 0        Review of Systems:                                                      ROS: negative for fever, chills, cough, wheezes, chest pain, vomiting, abdominal pain, leg swelling, pos for shortness of breath     OBJECTIVE:           Physical exam:  Blood pressure 121/71, pulse 79, temperature 98.4  F (36.9  C), resp. rate 16, height 1.727 m (5' 8\"), weight 85.7 kg (189 lb), SpO2 98 %, not currently breastfeeding.   NAD, appears comfortable  Skin: no rashes   HEENT: PERRLA, EOMI, pink conjunctiva, anicteric sclerae, bilateral tympanic membranes are clinically normal, oropharynx is normal color  Neck: supple, no JVD,  No thyroidmegaly. Lymph nodes nonpalpable cervical and supraclavicular.  Chest: clear to auscultation bilaterally, good respiratory effort,   Heart: S1 S2, RRR, no mgr appreciated  Abdomen: soft, not tender, no hepatosplenomegaly or masses appreciated, no abdominal bruit, present bowel sounds  Extremities: no edema, clubbing, cyanosis.   Neurologic: A, Ox3, no focal signs appreciated    PMHx: reviewed  Past Medical History:   Diagnosis Date     Complication of anesthesia     confusion after anesthesia (after hysterectomy)     First degree AV block 8/11/2015     Former smoker      Gastroesophageal reflux disease      GERD " (gastroesophageal reflux disease)      Glaucoma      Heart block     2:1     Hyperlipidemia LDL goal <130 6/14/2013     Hypertension goal BP (blood pressure) < 140/90 12/3/2013     Hypothyroidism      Left atrial dilatation     mild     Mild dilation of ascending aorta - borderline 8/11/2015     Palpitations      Prediabetes 6/14/2013     RBBB 8/11/2015     S/P total knee arthroplasty 7/23/2019     Tachycardia      Varicose veins      Venous insufficiency     s/p bilateral great saphenous vein radiofrequency ablation by Dr. Garcia      PSHx: reviewed  Past Surgical History:   Procedure Laterality Date     ARTHROPLASTY KNEE Right 7/23/2019    Procedure: Right total knee arthroplasty using an [employer] knee system;  Surgeon: Dragan Muse MD;  Location: [facility] OR     BREAST SURGERY      right breast ductal surgery due to milk production     COLONOSCOPY N/A 10/24/2014    no further screening. Procedure: COLONOSCOPY;  Surgeon: Pedro Rudd MD;  Location: [facility] GI     HYSTERECTOMY, PAP NO LONGER INDICATED  2009    total hysterectomy and ovaries. benign     SOFT TISSUE SURGERY      ganglion removed from left wrist and ring finger     SURGICAL HISTORY OF -   age 7    tonsillectomy     SURGICAL HISTORY OF -   1/15    left eye cataract     SURGICAL HISTORY OF -   Fall 2016    LINQ placed.        Meds: reviewed  Current Outpatient Medications   Medication Sig Dispense Refill     albuterol (PROAIR HFA/PROVENTIL HFA/VENTOLIN HFA) 108 (90 Base) MCG/ACT inhaler Inhale 2 puffs into the lungs every 6 hours as needed for shortness of breath / dyspnea or wheezing 1 Inhaler 0     alendronate (FOSAMAX) 70 MG tablet TAKE 1 TABLET(70 MG) BY MOUTH EVERY 7 DAYS 12 tablet 3     amLODIPine (NORVASC) 2.5 MG tablet Take 1 tablet (2.5 mg) by mouth daily 90 tablet 2     dorzolamide-timolol (COSOPT) 2-0.5 % ophthalmic solution Place 1 drop into both eyes 2 times daily       fluticasone (FLONASE) 50 MCG/ACT spray Spray 1-2  sprays into both nostrils daily 1 Bottle 3     hydrOXYzine (ATARAX) 10 MG tablet Take 1 tablet (10 mg) by mouth every 6 hours as needed for anxiety 20 tablet 0     latanoprost (XALATAN) 0.005 % ophthalmic solution Place 1 drop into the right eye daily       levothyroxine (SYNTHROID/LEVOTHROID) 88 MCG tablet Take 1 tablet (88 mcg) by mouth daily 90 tablet 3     lisinopril-hydrochlorothiazide (PRINZIDE/ZESTORETIC) 20-25 MG tablet TAKE 1 TABLET BY MOUTH DAILY 90 tablet 0     loratadine (CLARITIN) 10 MG tablet Take 1 tablet (10 mg) by mouth daily       metoprolol succinate ER (TOPROL-XL) 25 MG 24 hr tablet Take 1.5 tablets (37.5 mg) by mouth daily 135 tablet 1     pravastatin (PRAVACHOL) 40 MG tablet TAKE 1 TABLET(40 MG) BY MOUTH DAILY 90 tablet 3     ranitidine (ZANTAC) 150 MG tablet TAKE 1 TABLET(150 MG) BY MOUTH TWICE DAILY 180 tablet 1       Soc Hx: reviewed  Fam Hx: reviewed      Rachel Wong MD  Internal Medicine

## 2019-12-12 ENCOUNTER — TELEPHONE (OUTPATIENT)
Dept: INTERNAL MEDICINE | Facility: CLINIC | Age: 74
End: 2019-12-12

## 2019-12-12 DIAGNOSIS — R06.02 SOB (SHORTNESS OF BREATH) ON EXERTION: Primary | ICD-10-CM

## 2019-12-12 NOTE — TELEPHONE ENCOUNTER
Patient called and she can not do the heart stressed test Dr Wong ordered. She is to claustrophobic Please advise if there is a different test she should do. Ok to call and lm [phone]

## 2019-12-13 DIAGNOSIS — I10 HYPERTENSION GOAL BP (BLOOD PRESSURE) < 140/90: ICD-10-CM

## 2019-12-13 RX ORDER — LISINOPRIL AND HYDROCHLOROTHIAZIDE 20; 25 MG/1; MG/1
1 TABLET ORAL DAILY
Qty: 90 TABLET | Refills: 2 | Status: SHIPPED | OUTPATIENT
Start: 2019-12-13 | End: 2020-01-23

## 2019-12-13 NOTE — TELEPHONE ENCOUNTER
"Prescription approved per RN refill protocol.    Prinzide  Last Written Prescription Date:  8/12/2019  Last Fill Quantity: 90,  # refills: 0   Last office visit: 11/21/2019 with prescribing provider:  Danuta   Future Office Visit:      Requested Prescriptions   Pending Prescriptions Disp Refills     lisinopril-hydrochlorothiazide (PRINZIDE/ZESTORETIC) 20-25 MG tablet [Pharmacy Med Name: LISINOPRIL-HCTZ 20/25MG TABLETS] 90 tablet 0     Sig: TAKE 1 TABLET BY MOUTH DAILY       Diuretics (Including Combos) Protocol Passed - 12/13/2019  9:41 AM        Passed - Blood pressure under 140/90 in past 12 months     BP Readings from Last 3 Encounters:   12/10/19 121/71   11/27/19 130/70   11/21/19 126/78                 Passed - Recent (12 mo) or future (30 days) visit within the authorizing provider's specialty     Patient has had an office visit with the authorizing provider or a provider within the authorizing providers department within the previous 12 mos or has a future within next 30 days. See \"Patient Info\" tab in inbasket, or \"Choose Columns\" in Meds & Orders section of the refill encounter.              Passed - Medication is active on med list        Passed - Patient is age 18 or older        Passed - No active pregancy on record        Passed - Normal serum creatinine on file in past 12 months     Recent Labs   Lab Test 10/28/19  1029   CR 0.66              Passed - Normal serum potassium on file in past 12 months     Recent Labs   Lab Test 10/28/19  1029   POTASSIUM 4.0                    Passed - Normal serum sodium on file in past 12 months     Recent Labs   Lab Test 10/28/19  1029                 Passed - No positive pregnancy test in past 12 months        Amaris Odell RN on 12/13/2019 at 10:14 AM    "

## 2019-12-23 DIAGNOSIS — R06.02 SOB (SHORTNESS OF BREATH): ICD-10-CM

## 2019-12-24 NOTE — TELEPHONE ENCOUNTER
"Requested Prescriptions   Pending Prescriptions Disp Refills     albuterol (PROAIR HFA/PROVENTIL HFA/VENTOLIN HFA) 108 (90 Base) MCG/ACT inhaler [Pharmacy Med Name: ALBUTEROL HFA INH (200 PUFFS) 8.5GM] 25.5 g      Sig: INHALE 2 PUFFS INTO THE LUNGS EVERY 6 HOURS AS NEEDED FOR SHORTNESS OF BREATH OR DIFFICULT BREATHING OR WHEEZING   Last Written Prescription Date:  12/19/19  Last Fill Quantity: 8.5,  # refills: 0   Last office visit: 11/21/2019 with prescribing provider:  Laura Tipton MD   Future Office Visit:        Asthma Maintenance Inhalers - Anticholinergics Passed - 12/23/2019  7:46 AM        Passed - Patient is age 12 years or older        Passed - Recent (12 mo) or future (30 days) visit within the authorizing provider's specialty     Patient has had an office visit with the authorizing provider or a provider within the authorizing providers department within the previous 12 mos or has a future within next 30 days. See \"Patient Info\" tab in inbasket, or \"Choose Columns\" in Meds & Orders section of the refill encounter.              Passed - Medication is active on med list         "

## 2019-12-26 ENCOUNTER — TRANSFERRED RECORDS (OUTPATIENT)
Dept: HEALTH INFORMATION MANAGEMENT | Facility: CLINIC | Age: 74
End: 2019-12-26

## 2019-12-27 RX ORDER — ALBUTEROL SULFATE 90 UG/1
AEROSOL, METERED RESPIRATORY (INHALATION)
Qty: 3 INHALER | Refills: 0 | Status: SHIPPED | OUTPATIENT
Start: 2019-12-27 | End: 2020-03-03

## 2020-01-06 ENCOUNTER — HOSPITAL ENCOUNTER (OUTPATIENT)
Facility: CLINIC | Age: 75
Discharge: HOME | End: 2020-01-06
Attending: INTERNAL MEDICINE | Admitting: INTERNAL MEDICINE
Payer: MEDICARE

## 2020-01-06 ENCOUNTER — HOSPITAL ENCOUNTER (OUTPATIENT)
Dept: CARDIOLOGY | Facility: CLINIC | Age: 75
End: 2020-01-06
Attending: INTERNAL MEDICINE | Admitting: INTERNAL MEDICINE
Payer: MEDICARE

## 2020-01-06 VITALS — BODY MASS INDEX: 28.36 KG/M2 | WEIGHT: 186.51 LBS

## 2020-01-06 DIAGNOSIS — R06.02 SOB (SHORTNESS OF BREATH) ON EXERTION: ICD-10-CM

## 2020-01-06 PROCEDURE — 93016 CV STRESS TEST SUPVJ ONLY: CPT | Performed by: INTERNAL MEDICINE

## 2020-01-06 PROCEDURE — 93325 DOPPLER ECHO COLOR FLOW MAPG: CPT | Mod: 26 | Performed by: INTERNAL MEDICINE

## 2020-01-06 PROCEDURE — 93321 DOPPLER ECHO F-UP/LMTD STD: CPT | Mod: 26 | Performed by: INTERNAL MEDICINE

## 2020-01-06 PROCEDURE — 25800030 ZZH RX IP 258 OP 636: Performed by: INTERNAL MEDICINE

## 2020-01-06 PROCEDURE — 93018 CV STRESS TEST I&R ONLY: CPT | Performed by: INTERNAL MEDICINE

## 2020-01-06 PROCEDURE — 93350 STRESS TTE ONLY: CPT | Mod: 26 | Performed by: INTERNAL MEDICINE

## 2020-01-06 PROCEDURE — 25500064 ZZH RX 255 OP 636: Performed by: INTERNAL MEDICINE

## 2020-01-06 PROCEDURE — 40000855 ZZH STATISTIC ECHO STRESS OR NM NPI

## 2020-01-06 PROCEDURE — 25000128 H RX IP 250 OP 636: Performed by: INTERNAL MEDICINE

## 2020-01-06 PROCEDURE — 93325 DOPPLER ECHO COLOR FLOW MAPG: CPT | Mod: TC

## 2020-01-06 RX ORDER — SODIUM CHLORIDE 9 MG/ML
INJECTION, SOLUTION INTRAVENOUS CONTINUOUS
Status: DISCONTINUED | OUTPATIENT
Start: 2020-01-06 | End: 2020-01-06 | Stop reason: HOSPADM

## 2020-01-06 RX ORDER — ATROPINE SULFATE 0.1 MG/ML
.2-2 INJECTION INTRAVENOUS
Status: DISCONTINUED | OUTPATIENT
Start: 2020-01-06 | End: 2020-01-06 | Stop reason: HOSPADM

## 2020-01-06 RX ORDER — DOBUTAMINE HYDROCHLORIDE 200 MG/100ML
10-50 INJECTION INTRAVENOUS CONTINUOUS
Status: DISCONTINUED | OUTPATIENT
Start: 2020-01-06 | End: 2020-01-06 | Stop reason: HOSPADM

## 2020-01-06 RX ORDER — METOPROLOL TARTRATE 1 MG/ML
1-20 INJECTION, SOLUTION INTRAVENOUS
Status: DISCONTINUED | OUTPATIENT
Start: 2020-01-06 | End: 2020-01-06 | Stop reason: HOSPADM

## 2020-01-06 RX ORDER — PRAVASTATIN SODIUM 40 MG
40 TABLET ORAL AT BEDTIME
COMMUNITY
End: 2020-01-23

## 2020-01-06 RX ADMIN — HUMAN ALBUMIN MICROSPHERES AND PERFLUTREN 8 ML: 10; .22 INJECTION, SOLUTION INTRAVENOUS at 15:00

## 2020-01-06 RX ADMIN — SODIUM CHLORIDE: 9 INJECTION, SOLUTION INTRAVENOUS at 14:00

## 2020-01-06 RX ADMIN — DOBUTAMINE IN DEXTROSE 10 MCG/KG/MIN: 200 INJECTION, SOLUTION INTRAVENOUS at 14:21

## 2020-01-06 NOTE — PROGRESS NOTES
Dobutamine Stress Test: Pt tolerated well. VSS.  Standard protocol used. Pt denied chest pain, pressure or SOB prior to test. During procedure pt reported mod SOB only.    1505 Pt discharged per w/c to lobby. All personal belongings taken  with pt. Friend waiting for pt.

## 2020-01-11 DIAGNOSIS — J01.90 ACUTE SINUSITIS WITH SYMPTOMS > 10 DAYS: ICD-10-CM

## 2020-01-11 DIAGNOSIS — R09.81 CONGESTION OF PARANASAL SINUS: ICD-10-CM

## 2020-01-13 DIAGNOSIS — R09.81 CONGESTION OF PARANASAL SINUS: ICD-10-CM

## 2020-01-13 DIAGNOSIS — J01.90 ACUTE SINUSITIS WITH SYMPTOMS > 10 DAYS: ICD-10-CM

## 2020-01-13 RX ORDER — FLUTICASONE PROPIONATE 50 MCG
SPRAY, SUSPENSION (ML) NASAL
Qty: 48 G | Refills: 3 | Status: SHIPPED | OUTPATIENT
Start: 2020-01-13 | End: 2020-01-23

## 2020-01-13 RX ORDER — FLUTICASONE PROPIONATE 50 MCG
SPRAY, SUSPENSION (ML) NASAL
Qty: 16 G | Refills: 3 | Status: SHIPPED | OUTPATIENT
Start: 2020-01-13 | End: 2020-01-13

## 2020-01-13 NOTE — TELEPHONE ENCOUNTER
"Requested Prescriptions   Pending Prescriptions Disp Refills     fluticasone (FLONASE) 50 MCG/ACT nasal spray [Pharmacy Med Name: FLUTICASONE 50MCG NASAL SP (120) RX] 16 g      Sig: SHAKE LIQUID AND USE 1 TO 2 SPRAYS IN EACH NOSTRIL DAILY   Last Written Prescription Date:  1/23/18  Last Fill Quantity: 1,  # refills: 3   Last office visit: 11/21/2019 with prescribing provider:  Laura Tipton MD   Future Office Visit:   Next 5 appointments (look out 90 days)    Jan 23, 2020  1:00 PM CST  SHORT with Rachel Miller MD  [facility] ([facility]) [address]  [facility] [address]  [phone]             Inhaled Steroids Protocol Passed - 1/11/2020  1:39 PM        Passed - Patient is age 12 or older        Passed - Recent (12 mo) or future (30 days) visit within the authorizing provider's specialty     Patient has had an office visit with the authorizing provider or a provider within the authorizing providers department within the previous 12 mos or has a future within next 30 days. See \"Patient Info\" tab in inbasket, or \"Choose Columns\" in Meds & Orders section of the refill encounter.              Passed - Medication is active on med list         "

## 2020-01-13 NOTE — TELEPHONE ENCOUNTER
"Requesting a 90 day supply.      Requested Prescriptions   Pending Prescriptions Disp Refills     fluticasone (FLONASE) 50 MCG/ACT nasal spray [Pharmacy Med Name: FLUTICASONE 50MCG NASAL SP (120) RX] 48 g      Sig: SHAKE LIQUID AND USE 1 TO 2 SPRAYS IN EACH NOSTRIL DAILY   Last Written Prescription Date:  1/13/20  Last Fill Quantity: 16g,  # refills: 3   Last office visit: 11/21/2019 with prescribing provider:  Laura Tipton MD   Future Office Visit:   Next 5 appointments (look out 90 days)    Jan 23, 2020  1:00 PM CST  SHORT with Rachel Miller MD  [facility] ([facility]) [address] [facility] [address]  [phone]             Inhaled Steroids Protocol Passed - 1/13/2020  9:18 AM        Passed - Patient is age 12 or older        Passed - Recent (12 mo) or future (30 days) visit within the authorizing provider's specialty     Patient has had an office visit with the authorizing provider or a provider within the authorizing providers department within the previous 12 mos or has a future within next 30 days. See \"Patient Info\" tab in inbasket, or \"Choose Columns\" in Meds & Orders section of the refill encounter.              Passed - Medication is active on med list         "

## 2020-01-13 NOTE — TELEPHONE ENCOUNTER
Flonase  Prescription approved per [facility] Refill Protocol.    Gela Sullivan RN

## 2020-01-23 ENCOUNTER — OFFICE VISIT (OUTPATIENT)
Dept: INTERNAL MEDICINE | Facility: CLINIC | Age: 75
End: 2020-01-23
Payer: MEDICARE

## 2020-01-23 VITALS
TEMPERATURE: 98.3 F | BODY MASS INDEX: 28.43 KG/M2 | SYSTOLIC BLOOD PRESSURE: 136 MMHG | RESPIRATION RATE: 16 BRPM | HEART RATE: 80 BPM | WEIGHT: 187.6 LBS | DIASTOLIC BLOOD PRESSURE: 82 MMHG | OXYGEN SATURATION: 96 % | HEIGHT: 68 IN

## 2020-01-23 DIAGNOSIS — E78.5 HYPERLIPIDEMIA LDL GOAL <130: Primary | ICD-10-CM

## 2020-01-23 DIAGNOSIS — R06.02 SOB (SHORTNESS OF BREATH) ON EXERTION: ICD-10-CM

## 2020-01-23 DIAGNOSIS — R73.9 BLOOD SUGAR INCREASED: ICD-10-CM

## 2020-01-23 DIAGNOSIS — R05.9 COUGH: ICD-10-CM

## 2020-01-23 DIAGNOSIS — E03.9 HYPOTHYROIDISM, UNSPECIFIED TYPE: ICD-10-CM

## 2020-01-23 DIAGNOSIS — J01.90 ACUTE SINUSITIS WITH SYMPTOMS > 10 DAYS: ICD-10-CM

## 2020-01-23 DIAGNOSIS — R09.81 CONGESTION OF PARANASAL SINUS: ICD-10-CM

## 2020-01-23 DIAGNOSIS — R00.0 TACHYCARDIA: ICD-10-CM

## 2020-01-23 DIAGNOSIS — M81.0 OSTEOPOROSIS, UNSPECIFIED OSTEOPOROSIS TYPE, UNSPECIFIED PATHOLOGICAL FRACTURE PRESENCE: ICD-10-CM

## 2020-01-23 DIAGNOSIS — I10 HYPERTENSION GOAL BP (BLOOD PRESSURE) < 140/90: ICD-10-CM

## 2020-01-23 PROCEDURE — 99214 OFFICE O/P EST MOD 30 MIN: CPT | Performed by: INTERNAL MEDICINE

## 2020-01-23 RX ORDER — METOPROLOL SUCCINATE 25 MG/1
37.5 TABLET, EXTENDED RELEASE ORAL DAILY
Qty: 135 TABLET | Refills: 1 | Status: SHIPPED | OUTPATIENT
Start: 2020-01-23 | End: 2020-03-03 | Stop reason: DRUGHIGH

## 2020-01-23 RX ORDER — AMLODIPINE BESYLATE 2.5 MG/1
2.5 TABLET ORAL DAILY
Qty: 90 TABLET | Refills: 1 | Status: SHIPPED | OUTPATIENT
Start: 2020-01-23 | End: 2020-03-03

## 2020-01-23 RX ORDER — FLUTICASONE PROPIONATE 50 MCG
1-2 SPRAY, SUSPENSION (ML) NASAL DAILY
Qty: 48 G | Refills: 1 | Status: SHIPPED | OUTPATIENT
Start: 2020-01-23 | End: 2021-03-18

## 2020-01-23 RX ORDER — LEVOTHYROXINE SODIUM 88 UG/1
88 TABLET ORAL DAILY
Qty: 90 TABLET | Refills: 1 | Status: SHIPPED | OUTPATIENT
Start: 2020-01-23 | End: 2020-08-06

## 2020-01-23 RX ORDER — LISINOPRIL AND HYDROCHLOROTHIAZIDE 20; 25 MG/1; MG/1
1 TABLET ORAL DAILY
Qty: 90 TABLET | Refills: 1 | Status: SHIPPED | OUTPATIENT
Start: 2020-01-23 | End: 2020-08-06

## 2020-01-23 RX ORDER — PRAVASTATIN SODIUM 40 MG
40 TABLET ORAL AT BEDTIME
Qty: 90 TABLET | Refills: 1 | Status: SHIPPED | OUTPATIENT
Start: 2020-01-23 | End: 2020-08-06

## 2020-01-23 RX ORDER — ALENDRONATE SODIUM 70 MG/1
TABLET ORAL
Qty: 12 TABLET | Refills: 1 | Status: SHIPPED | OUTPATIENT
Start: 2020-01-23 | End: 2020-07-11

## 2020-01-23 ASSESSMENT — MIFFLIN-ST. JEOR: SCORE: 1394.45

## 2020-01-23 NOTE — NURSING NOTE
"Vital signs:  Temp: 98.3  F (36.8  C) Temp src: Oral BP: 136/82 Pulse: 80   Resp: 16 SpO2: 96 %     Height: 172.7 cm (5' 8\") Weight: 85.1 kg (187 lb 9.6 oz)  Estimated body mass index is 28.52 kg/m  as calculated from the following:    Height as of this encounter: 1.727 m (5' 8\").    Weight as of this encounter: 85.1 kg (187 lb 9.6 oz).          "

## 2020-01-23 NOTE — PROGRESS NOTES
Patient's instructions / PLAN:                                                      Plan:  1. Continue Breo-Ellipta inhaler  2. Take Albuterol only as needed  3. Flonase nasal spray daily   4. Continue the other meds, same doses for now.  5. Please make a lab appointment for fasting labs In June 6. Please make an appointment few days after the labs for  ANNUAL EXAM  And  to discuss about the results.     ASSESSMENT & PLAN:                                                      (E78.5) Hyperlipidemia LDL goal <130  (primary encounter diagnosis)  Comment: Controlled    Plan: pravastatin (PRAVACHOL) 40 MG tablet,         Comprehensive metabolic panel, Lipid panel         reflex to direct LDL Fasting          (M81.0) Osteoporosis, unspecified osteoporosis type, unspecified pathological fracture presence  Comment:   Plan: alendronate (FOSAMAX) 70 MG tablet          (I10) Essential hypertension with goal blood pressure less than 140/90  (I10) Hypertension goal BP (blood pressure) < 140/90  Comment: Controlled    Plan: amLODIPine (NORVASC) 2.5 MG tablet   lisinopril-hydrochlorothiazide         (PRINZIDE/ZESTORETIC) 20-25 MG tablet,         metoprolol succinate ER (TOPROL-XL) 25 MG 24 hr        tablet, CBC with platelets, Lipid panel reflex         to direct LDL Fasting, Albumin Random Urine         Quantitative with Creat Ratio          (R09.81) Congestion of paranasal sinus  Comment: improving   Plan: fluticasone (FLONASE) 50 MCG/ACT nasal spray          (J01.90) Acute sinusitis with symptoms > 10 days  Comment:   Plan:     (R06.02) SOB (shortness of breath) on exertion  Comment: feels better   Plan: fluticasone-vilanterol (BREO ELLIPTA) 100-25         MCG/INH inhaler          (R05) Cough  Comment:   Plan: fluticasone-vilanterol (BREO ELLIPTA) 100-25         MCG/INH inhaler          (E03.9) Hypothyroidism, unspecified type  Comment: stable   Plan: levothyroxine (SYNTHROID/LEVOTHROID) 88 MCG         tablet, TSH with free  "T4 reflex          (R00.0) Tachycardia  Comment:   Plan: metoprolol succinate ER (TOPROL-XL) 25 MG 24 hr        tablet          (R73.09) Blood sugar increased  Comment:   Plan: Hemoglobin A1c          Chief Complaint:                                                      F/u SOB  Follow up chronic medical problems      SUBJECTIVE:                                                    History of present illness     SOB  -- feels better  -- still mucus in the throat that it is hard to spit out   -- Dobutamine stress test normal. She didn't get the letter    Murmur   -- 3/6 systolic Ao Murmur - she knows about it   -- Echo March 2018 - normal including Ao    LOV: Plan:  1. Heart stress test - To schedule this test you may call Scheduling center at [phone]    2. Breo-Ellipta - 1 inhaler daily   3. You may still use the Albuterol 2 inhalers 4 times a day as needed. Use it before exercise   4. Follow up in January 5. Continue same meds, same doses for now     ROS:                                                      ROS: negative for fever, chills, cough, wheezes, chest pain, vomiting, abdominal pain, leg swelling, positive for shortness of breath,     OBJECTIVE:                                                    Physical Exam :    Blood pressure 136/82, pulse 80, temperature 98.3  F (36.8  C), temperature source Oral, resp. rate 16, height 1.727 m (5' 8\"), weight 85.1 kg (187 lb 9.6 oz), SpO2 96 %, not currently breastfeeding.   NAD, appears comfortable  Skin: no rashes   Neck: supple, no JVD, No thyroidmegaly. Lymph nodes nonpalpable cervical and supraclavicular.  Chest: clear to auscultation bilaterally, good respiratory effort  Heart: S1 S2, RRR, 3/6 systolic Ao Murmur - she knows about it   Abdomen: soft, not tender,   Extremities: no edema,   Neurologic: A, Ox3, no focal signs appreciated    PMHx: reviewed  Past Medical History:   Diagnosis Date     Complication of anesthesia     confusion after anesthesia (after " hysterectomy)     First degree AV block 8/11/2015     Former smoker      Gastroesophageal reflux disease      GERD (gastroesophageal reflux disease)      Glaucoma      Heart block     2:1     Hyperlipidemia LDL goal <130 6/14/2013     Hypertension goal BP (blood pressure) < 140/90 12/3/2013     Hypothyroidism      Left atrial dilatation     mild     Mild dilation of ascending aorta - borderline 8/11/2015     Palpitations      Prediabetes 6/14/2013     RBBB 8/11/2015     S/P total knee arthroplasty 7/23/2019     Tachycardia      Varicose veins      Venous insufficiency     s/p bilateral great saphenous vein radiofrequency ablation by Dr. Garcia      PSHx: reviewed  Past Surgical History:   Procedure Laterality Date     ARTHROPLASTY KNEE Right 7/23/2019    Procedure: Right total knee arthroplasty using an [employer] knee system;  Surgeon: Dragan Muse MD;  Location: [facility] OR     BREAST SURGERY      right breast ductal surgery due to milk production     COLONOSCOPY N/A 10/24/2014    no further screening. Procedure: COLONOSCOPY;  Surgeon: Pedro Rudd MD;  Location: [facility] GI     HYSTERECTOMY, PAP NO LONGER INDICATED  2009    total hysterectomy and ovaries. benign     SOFT TISSUE SURGERY      ganglion removed from left wrist and ring finger     SURGICAL HISTORY OF -   age 7    tonsillectomy     SURGICAL HISTORY OF -   1/15    left eye cataract     SURGICAL HISTORY OF -   Fall 2016    LINQ placed.        Meds: reviewed  Current Outpatient Medications   Medication Sig Dispense Refill     albuterol (PROAIR HFA/PROVENTIL HFA/VENTOLIN HFA) 108 (90 Base) MCG/ACT inhaler INHALE 2 PUFFS INTO THE LUNGS EVERY 6 HOURS AS NEEDED FOR SHORTNESS OF BREATH OR DIFFICULT BREATHING OR WHEEZING 3 Inhaler 0     alendronate (FOSAMAX) 70 MG tablet TAKE 1 TABLET(70 MG) BY MOUTH EVERY 7 DAYS 12 tablet 3     amLODIPine (NORVASC) 2.5 MG tablet Take 1 tablet (2.5 mg) by mouth daily 90 tablet 2     dorzolamide-timolol (COSOPT)  2-0.5 % ophthalmic solution Place 1 drop into both eyes 2 times daily       fluticasone (FLONASE) 50 MCG/ACT nasal spray SHAKE LIQUID AND USE 1 TO 2 SPRAYS IN EACH NOSTRIL DAILY 48 g 3     fluticasone-vilanterol (BREO ELLIPTA) 100-25 MCG/INH inhaler Inhale 1 puff into the lungs daily 1 Inhaler 1     hydrOXYzine (ATARAX) 10 MG tablet Take 1 tablet (10 mg) by mouth every 6 hours as needed for anxiety 20 tablet 0     latanoprost (XALATAN) 0.005 % ophthalmic solution Place 1 drop into the right eye At Bedtime        levothyroxine (SYNTHROID/LEVOTHROID) 88 MCG tablet Take 1 tablet (88 mcg) by mouth daily 90 tablet 3     lisinopril-hydrochlorothiazide (PRINZIDE/ZESTORETIC) 20-25 MG tablet TAKE 1 TABLET BY MOUTH DAILY 90 tablet 2     metoprolol succinate ER (TOPROL-XL) 25 MG 24 hr tablet Take 1.5 tablets (37.5 mg) by mouth daily 135 tablet 1     pravastatin (PRAVACHOL) 40 MG tablet Take 40 mg by mouth At Bedtime       ranitidine (ZANTAC) 150 MG tablet TAKE 1 TABLET(150 MG) BY MOUTH TWICE DAILY 180 tablet 1       Soc Hx: reviewed  Fam Hx: reviewed          Rachel Wong MD  Internal Medicine

## 2020-01-23 NOTE — PATIENT INSTRUCTIONS
Plan:  1. Continue Breo-Ellipta inhaler  2. Take Albuterol only as needed  3. Flonase nasal spray daily   4. Continue the other meds, same doses for now.  5. Please make a lab appointment for fasting labs In June 6. Please make an appointment few days after the labs for  ANNUAL EXAM  And  to discuss about the results.

## 2020-01-29 ENCOUNTER — HOSPITAL ENCOUNTER (OUTPATIENT)
Dept: GENERAL RADIOLOGY | Facility: CLINIC | Age: 75
Discharge: HOME | End: 2020-01-29
Attending: ORTHOPAEDIC SURGERY | Admitting: ORTHOPAEDIC SURGERY
Payer: MEDICARE

## 2020-01-29 VITALS — DIASTOLIC BLOOD PRESSURE: 70 MMHG | SYSTOLIC BLOOD PRESSURE: 134 MMHG

## 2020-01-29 DIAGNOSIS — Z47.89 AFTERCARE FOLLOWING SURGERY OF THE MUSCULOSKELETAL SYSTEM: ICD-10-CM

## 2020-01-29 DIAGNOSIS — M25.559 JOINT PAIN, HIP: ICD-10-CM

## 2020-01-29 PROCEDURE — 77002 NEEDLE LOCALIZATION BY XRAY: CPT

## 2020-01-29 PROCEDURE — 25000125 ZZHC RX 250

## 2020-01-29 PROCEDURE — 25500064 ZZH RX 255 OP 636: Performed by: PHYSICIAN ASSISTANT

## 2020-01-29 PROCEDURE — 25000128 H RX IP 250 OP 636

## 2020-01-29 RX ORDER — BUPIVACAINE HYDROCHLORIDE 5 MG/ML
4 INJECTION, SOLUTION EPIDURAL; INTRACAUDAL ONCE
Status: COMPLETED | OUTPATIENT
Start: 2020-01-29 | End: 2020-01-29

## 2020-01-29 RX ORDER — LIDOCAINE HYDROCHLORIDE 10 MG/ML
5 INJECTION, SOLUTION EPIDURAL; INFILTRATION; INTRACAUDAL; PERINEURAL ONCE
Status: COMPLETED | OUTPATIENT
Start: 2020-01-29 | End: 2020-01-29

## 2020-01-29 RX ORDER — TRIAMCINOLONE ACETONIDE 40 MG/ML
INJECTION, SUSPENSION INTRA-ARTICULAR; INTRAMUSCULAR
Status: COMPLETED
Start: 2020-01-29 | End: 2020-01-29

## 2020-01-29 RX ORDER — LIDOCAINE HYDROCHLORIDE 10 MG/ML
INJECTION, SOLUTION EPIDURAL; INFILTRATION; INTRACAUDAL; PERINEURAL
Status: COMPLETED
Start: 2020-01-29 | End: 2020-01-29

## 2020-01-29 RX ORDER — BUPIVACAINE HYDROCHLORIDE 5 MG/ML
INJECTION, SOLUTION EPIDURAL; INTRACAUDAL
Status: COMPLETED
Start: 2020-01-29 | End: 2020-01-29

## 2020-01-29 RX ORDER — TRIAMCINOLONE ACETONIDE 40 MG/ML
40 INJECTION, SUSPENSION INTRA-ARTICULAR; INTRAMUSCULAR ONCE
Status: COMPLETED | OUTPATIENT
Start: 2020-01-29 | End: 2020-01-29

## 2020-01-29 RX ORDER — IOPAMIDOL 408 MG/ML
10 INJECTION, SOLUTION INTRATHECAL ONCE
Status: COMPLETED | OUTPATIENT
Start: 2020-01-29 | End: 2020-01-29

## 2020-01-29 RX ADMIN — LIDOCAINE HYDROCHLORIDE 5 ML: 10 INJECTION, SOLUTION EPIDURAL; INFILTRATION; INTRACAUDAL; PERINEURAL at 11:51

## 2020-01-29 RX ADMIN — BUPIVACAINE HYDROCHLORIDE 150 MG: 5 INJECTION, SOLUTION EPIDURAL; INTRACAUDAL; PERINEURAL at 11:53

## 2020-01-29 RX ADMIN — TRIAMCINOLONE ACETONIDE 40 MG: 40 INJECTION, SUSPENSION INTRA-ARTICULAR; INTRAMUSCULAR at 11:55

## 2020-01-29 RX ADMIN — BUPIVACAINE HYDROCHLORIDE 150 MG: 5 INJECTION, SOLUTION EPIDURAL; INTRACAUDAL at 11:53

## 2020-01-29 RX ADMIN — IOPAMIDOL 10 ML: 408 INJECTION, SOLUTION INTRATHECAL at 11:51

## 2020-01-29 NOTE — PROCEDURES
[facility]    Procedure: Right ischial bursa steroid injection  Date/Time: 1/29/2020 11:54 AM  Performed by: Belén Massey PA-C  Authorized by: Belén Massey PA-C     UNIVERSAL PROTOCOL   Site Marked: Yes  Prior Images Obtained and Reviewed:  Yes  Required items: Required blood products, implants, devices and special equipment available    Patient identity confirmed:  Verbally with patient  NA - No sedation, light sedation, or local anesthesia  Confirmation Checklist:  Patient's identity using two indicators, relevant allergies, procedure was appropriate and matched the consent or emergent situation and correct equipment/implants were available  Time out: Immediately prior to the procedure a time out was called    Universal Protocol: the Joint Commission Universal Protocol was followed    Preparation: Patient was prepped and draped in usual sterile fashion           ANESTHESIA    Anesthesia: Local infiltration  Local Anesthetic:  Lidocaine 1% without epinephrine  Anesthetic Total (mL):  3      SEDATION    Patient Sedated: No    See dictated procedure note for full details.  PROCEDURE   Patient Tolerance:  Patient tolerated the procedure well with no immediate complications    Length of time physician/provider present for 1:1 monitoring during sedation: 0

## 2020-01-29 NOTE — PROGRESS NOTES
Pt was in Radiology today for a ischial bursa steroid injection. Pt tolerated ortho procedure well. Pre procedure pain was 4 post procedure pain level 0.Procedure was completed by FAVIO Massey. There were no complications during this procedure. Pt verbalized understanding of written and verbal instructions and left department in stable and satisfactory condition with friend. There is no evidence of bleeding or any other complications upon discharge.

## 2020-02-06 ENCOUNTER — TELEPHONE (OUTPATIENT)
Dept: INTERNAL MEDICINE | Facility: CLINIC | Age: 75
End: 2020-02-06

## 2020-02-06 NOTE — TELEPHONE ENCOUNTER
Patient was advised at her last office visit by Dr Wong that she should take hydrOXYzine to help her with sleep. She states that it is not helping her with sleep and is wondering if Dr Wong can send another medication. She uses [facility] in [place].  Please call her back at [phone] (home)

## 2020-02-06 NOTE — TELEPHONE ENCOUNTER
Patient advised this need to wait for 's return.   Patient states Melatonin no help. Has been taking Atarax every night  and one during the night since 1-.

## 2020-02-06 NOTE — TELEPHONE ENCOUNTER
Please advise the patient that Dr. Wong is out of the office until Monday.  If she has not tried over-the-counter melatonin or 1 of the other over-the-counter sleep aids, she can try that.  Otherwise her primary will need to decide what other medication to prescribe if any.

## 2020-02-19 ENCOUNTER — OFFICE VISIT (OUTPATIENT)
Dept: INTERNAL MEDICINE | Facility: CLINIC | Age: 75
End: 2020-02-19
Payer: MEDICARE

## 2020-02-19 ENCOUNTER — NURSE TRIAGE (OUTPATIENT)
Dept: INTERNAL MEDICINE | Facility: CLINIC | Age: 75
End: 2020-02-19

## 2020-02-19 VITALS
HEIGHT: 68 IN | WEIGHT: 183.3 LBS | TEMPERATURE: 98.5 F | SYSTOLIC BLOOD PRESSURE: 131 MMHG | BODY MASS INDEX: 27.78 KG/M2 | HEART RATE: 97 BPM | DIASTOLIC BLOOD PRESSURE: 84 MMHG | OXYGEN SATURATION: 96 %

## 2020-02-19 DIAGNOSIS — R00.2 HEART PALPITATIONS: Primary | ICD-10-CM

## 2020-02-19 DIAGNOSIS — K04.7 TOOTH INFECTION: ICD-10-CM

## 2020-02-19 DIAGNOSIS — E03.9 HYPOTHYROIDISM, UNSPECIFIED TYPE: ICD-10-CM

## 2020-02-19 PROCEDURE — 93000 ELECTROCARDIOGRAM COMPLETE: CPT | Performed by: NURSE PRACTITIONER

## 2020-02-19 PROCEDURE — 99214 OFFICE O/P EST MOD 30 MIN: CPT | Performed by: NURSE PRACTITIONER

## 2020-02-19 PROCEDURE — 84443 ASSAY THYROID STIM HORMONE: CPT | Performed by: NURSE PRACTITIONER

## 2020-02-19 PROCEDURE — 36415 COLL VENOUS BLD VENIPUNCTURE: CPT | Performed by: NURSE PRACTITIONER

## 2020-02-19 PROCEDURE — 80048 BASIC METABOLIC PNL TOTAL CA: CPT | Performed by: NURSE PRACTITIONER

## 2020-02-19 RX ORDER — AMOXICILLIN 875 MG
875 TABLET ORAL 2 TIMES DAILY
Qty: 14 TABLET | Refills: 0 | Status: SHIPPED | OUTPATIENT
Start: 2020-02-19 | End: 2020-03-03

## 2020-02-19 ASSESSMENT — MIFFLIN-ST. JEOR: SCORE: 1374.94

## 2020-02-19 NOTE — LETTER
February 20, 2020      Teri Beltran  [address] [facility] [address]        Dear ,    We are writing to inform you of your test results.       Your thyroid lab is ok.  Glucose or sugar level has improved. Otherwise, all other labs are ok.        Resulted Orders   Basic metabolic panel   Result Value Ref Range    Sodium 135 133 - 144 mmol/L    Potassium 4.3 3.4 - 5.3 mmol/L    Chloride 102 94 - 109 mmol/L    Carbon Dioxide 26 20 - 32 mmol/L    Anion Gap 7 3 - 14 mmol/L    Glucose 110 (H) 70 - 99 mg/dL    Urea Nitrogen 24 7 - 30 mg/dL    Creatinine 0.60 0.52 - 1.04 mg/dL    GFR Estimate 89 >60 mL/min/[1.73_m2]      Comment:      Non  GFR Calc  Starting 12/18/2018, serum creatinine based estimated GFR (eGFR) will be   calculated using the Chronic Kidney Disease Epidemiology Collaboration   (CKD-EPI) equation.      GFR Estimate If Black >90 >60 mL/min/[1.73_m2]      Comment:       GFR Calc  Starting 12/18/2018, serum creatinine based estimated GFR (eGFR) will be   calculated using the Chronic Kidney Disease Epidemiology Collaboration   (CKD-EPI) equation.      Calcium 9.4 8.5 - 10.1 mg/dL   TSH with free T4 reflex   Result Value Ref Range    TSH 1.75 0.40 - 4.00 mU/L       If you have any questions or concerns, please call the clinic at the number listed above.       Sincerely,        Meli Adair, CNP

## 2020-02-19 NOTE — PATIENT INSTRUCTIONS
Go to [address] for labs: BMP and TSH    Discussed Zio Holter Monitor x 2 weeks; will think about it.    Has appointment next week with PCP, Dr. Wong.    For tooth infection, given antibiotic prescription called Amoxicillin 875 mg twice daily with food x 7 days.   Keep follow-up appointment with dentist.

## 2020-02-19 NOTE — PROGRESS NOTES
Subjective     Teri Beltran is a 74 year old female who presents to clinic today for the following health issues:    She pulse was beating fast for 2-3 days.    For the past 4 days my heart feels like racing and short of breath.  The oxcemitry meter showed -106.  At noon today,  with /80.  Right now no heart racing.  Any movement or activity will start it racing.    Review of recent tests:    EKG: (11/27/2019) showed SR rate 70 with first degree AV block  Echocardiogram stress test (1/6/2020) essentially normal    On medications: Lisiniopril-hydrochlorothiazide, Metoprolol, and Amlodopine for HTN and Levothyroxine for her Thyroid.  Last labs done 10/2019 for CMP showed glucose 117 H with A1C 5.7 H and TSH ok. Not scheduled for new labs until June, 2020.    Does report tooth infection right upper molar that is draining; wonders if that has anything to do with the heart racing.  Reports she has a dental appointment next week for consultation on what to do with it.           Patient Active Problem List   Diagnosis     Hypothyroidism     Tachycardia     Glaucoma     Prediabetes     Hyperlipidemia LDL goal <130     Palpitations     BMI 30.0-30.9,adult     Advanced directives, counseling/discussion     Hypertension goal BP (blood pressure) < 140/90     Adjustment disorder with depressed mood     Osteoporosis     Mild dilation of ascending aorta - borderline (H)     RBBB     First degree AV block     Venous insufficiency     Varicose veins     S/P total knee arthroplasty     Past Surgical History:   Procedure Laterality Date     ARTHROPLASTY KNEE Right 7/23/2019    Procedure: Right total knee arthroplasty using an [employer] knee system;  Surgeon: Dragan Muse MD;  Location:  OR     BREAST SURGERY      right breast ductal surgery due to milk production     COLONOSCOPY N/A 10/24/2014    no further screening. Procedure: COLONOSCOPY;  Surgeon: Pedro Rudd MD;  Location:   GI     HYSTERECTOMY, PAP NO LONGER INDICATED  2009    total hysterectomy and ovaries. benign     SOFT TISSUE SURGERY      ganglion removed from left wrist and ring finger     SURGICAL HISTORY OF -   age 7    tonsillectomy     SURGICAL HISTORY OF -   1/15    left eye cataract     SURGICAL HISTORY OF -   Fall     LINQ placed.       Social History     Tobacco Use     Smoking status: Former Smoker     Packs/day: 0.50     Years: 18.00     Pack years: 9.00     Types: Cigarettes     Last attempt to quit: 1980     Years since quittin.1     Smokeless tobacco: Never Used   Substance Use Topics     Alcohol use: Yes     Alcohol/week: 0.0 standard drinks     Comment: 1 glass of wine at night     Family History   Problem Relation Age of Onset     Cancer Mother         ovarian     Breast Cancer Mother      Heart Disease Father         rare; not sure what it was     Diabetes Father         old age     Cerebrovascular Disease Brother 68         Current Outpatient Medications   Medication Sig Dispense Refill     albuterol (PROAIR HFA/PROVENTIL HFA/VENTOLIN HFA) 108 (90 Base) MCG/ACT inhaler INHALE 2 PUFFS INTO THE LUNGS EVERY 6 HOURS AS NEEDED FOR SHORTNESS OF BREATH OR DIFFICULT BREATHING OR WHEEZING 3 Inhaler 0     alendronate (FOSAMAX) 70 MG tablet TAKE 1 TABLET(70 MG) BY MOUTH EVERY 7 DAYS 12 tablet 1     amLODIPine (NORVASC) 2.5 MG tablet Take 1 tablet (2.5 mg) by mouth daily 90 tablet 1     amoxicillin 875 MG PO tablet Take 1 tablet (875 mg) by mouth 2 times daily for 7 days 14 tablet 0     dorzolamide-timolol (COSOPT) 2-0.5 % ophthalmic solution Place 1 drop into both eyes 2 times daily       fluticasone (FLONASE) 50 MCG/ACT nasal spray Spray 1-2 sprays into both nostrils daily 48 g 1     fluticasone-vilanterol (BREO ELLIPTA) 100-25 MCG/INH inhaler Inhale 1 puff into the lungs daily 1 Inhaler 1     hydrOXYzine (ATARAX) 10 MG tablet Take 1 tablet (10 mg) by mouth every 6 hours as needed for anxiety 20 tablet 0      "latanoprost (XALATAN) 0.005 % ophthalmic solution Place 1 drop into the right eye At Bedtime        levothyroxine (SYNTHROID/LEVOTHROID) 88 MCG tablet Take 1 tablet (88 mcg) by mouth daily 90 tablet 1     lisinopril-hydrochlorothiazide (PRINZIDE/ZESTORETIC) 20-25 MG tablet Take 1 tablet by mouth daily 90 tablet 1     metoprolol succinate ER (TOPROL-XL) 25 MG 24 hr tablet Take 1.5 tablets (37.5 mg) by mouth daily 135 tablet 1     pravastatin (PRAVACHOL) 40 MG tablet Take 1 tablet (40 mg) by mouth At Bedtime 90 tablet 1     ranitidine (ZANTAC) 150 MG tablet TAKE 1 TABLET(150 MG) BY MOUTH TWICE DAILY 180 tablet 1     Allergies   Allergen Reactions     Seasonal Allergies      Recent Labs   Lab Test 10/28/19  1029 10/22/19  2008  09/28/18  0951 09/28/17  0938   A1C 5.7*  --   --  5.8* 5.9   LDL 76  --   --  77 76   HDL 93  --   --  99 83   TRIG 94  --   --  91 122   ALT 24  --   --  29 30   CR 0.66 0.69   < > 0.73 0.69   GFRESTIMATED 86 85   < > 78 84   GFRESTBLACK >90 >90   < > >90 >90   POTASSIUM 4.0 3.5   < > 4.8 4.4   TSH 2.06  --   --  2.49 3.14    < > = values in this interval not displayed.      BP Readings from Last 3 Encounters:   02/19/20 131/84   01/29/20 134/70   01/23/20 136/82    Wt Readings from Last 3 Encounters:   02/19/20 83.1 kg (183 lb 4.8 oz)   01/23/20 85.1 kg (187 lb 9.6 oz)   01/06/20 84.6 kg (186 lb 8.2 oz)                      Reviewed and updated as needed this visit by Provider  Tobacco  Allergies  Meds  Problems  Med Hx  Surg Hx  Fam Hx  Soc Hx          Review of Systems   ROS COMP: Constitutional, HEENT, cardiovascular, pulmonary, gi and gu systems are negative, except as otherwise noted.      Objective    /84 (BP Location: Left arm, Patient Position: Sitting, Cuff Size: Adult Large)   Pulse 97   Temp 98.5  F (36.9  C) (Oral)   Ht 1.727 m (5' 8\")   Wt 83.1 kg (183 lb 4.8 oz)   LMP  (LMP Unknown)   SpO2 96%   BMI 27.87 kg/m    Body mass index is 27.87 kg/m .     Physical " "Exam   GENERAL: alert and no distress  HENT: mouth: mucosa moist; right upper molar gums surrounding appears erythematous  NECK: no adenopathy, no asymmetry, masses, or scars and thyroid normal to palpation  RESP: lungs clear to auscultation - no rales, rhonchi or wheezes  CV: regular rate and rhythm, normal S1 S2, no S3 or S4, no murmur, click or rub, no peripheral edema and peripheral pulses strong. Apical pulse 80  ABDOMEN: soft, nontender, no hepatosplenomegaly, no masses and bowel sounds normal  MS: no edema  SKIN: no suspicious lesions or rashes    Diagnostic Test Results:  Labs reviewed in Epic        Assessment & Plan     1. Heart palpitations/racing heart  - continue with Metoprolol, Lisinopril-hydrochlorothiazide, and Amlodipine for now   - EKG 12-lead complete w/read - Clinics: showed SR rate 74 with right BBB no changes from previous EKG's  - Ordered labs:    Basic metabolic panel    TSH with free T4 reflex  - offered Zio Holter Monitor x 2 weeks for further evaluation and to capture the racing heart but patient declines at this time; wants to talk with her PCP with next week's appointment.    2. Hypothyroidism, unspecified type  - on Levothyroxine so will check lab:    TSH with free T4 reflex    Tooth infection  - will treat with antibiotic until she can be seen by dentist:    amoxicillin 875 MG PO tablet; Take 1 tablet (875 mg) by mouth 2 times daily for 7 days  Dispense: 14 tablet; Refill: 0; take with food.     BMI:   Estimated body mass index is 27.87 kg/m  as calculated from the following:    Height as of this encounter: 1.727 m (5' 8\").    Weight as of this encounter: 83.1 kg (183 lb 4.8 oz).           Patient Instructions   Go to [address] for labs: BMP and TSH    Discussed Zio Holter Monitor x 2 weeks; will think about it.    Has appointment next week with PCP, Dr. Wong.    For tooth infection, given antibiotic prescription called Amoxicillin 875 mg twice daily with food x 7 days.   Keep " follow-up appointment with dentist.      Return in about 1 week (around 2/26/2020) for Routine Visit.    Meli Adair CNP  [facility]    '

## 2020-02-19 NOTE — TELEPHONE ENCOUNTER
"Patient reports she has had a fast heart rate over the last 3 days. Patient reports it has been intermittent, but today feels more consistent.     Patient reports the following vital signs taken at her home this morning:  Blood pressure: 120/70  Heart rate: 116  O2 sat: 96%    Patient states she has a history of tachycardia, reports she takes 37.5 mg of metoprolol daily for this, but feels like it is not effective recently. Patient reports she is taking medications as directed.    Patient denies chest pain, shortness of breath, sweating, or palpitations. Patient reports she is able to do everyday activities. Denies heart pounding, or feeling like she is going to pass out.    Patient agrees to appointment today. Care advice given per protocol. Patient agrees not to drive, will have a friend drive her to appointment. Patient will call back with further concerns, or if heart rate continues to increase. Patient agrees to ER if feeling faint, chest pain, shortness of breath, dizziness or sweating occurs.    Next 5 appointments (look out 90 days)    Feb 19, 2020  1:30 PM CST  SHORT with Meli Adair CNP  [facility] [address] [facility] [address]  [phone]   Feb 27, 2020 12:20 PM CST  SHORT with Rachel Miller MD  [facility] [address] [facility] [address]  [phone]            Additional Information    Age > 60 years    Negative: History of heart disease (i.e., heart attack, bypass surgery, angina, angioplasty)    Negative: Skipped or extra beat(s) and occurs 4 or more times per minute    Negative: Skipped or extra beat(s) and increases with exercise or exertion    Negative: Heart beating very rapidly (e.g., > 140 / minute) and not present now (EXCEPTION: during exercise)    Negative: Wearing a \"holter monitor\" or \"cardiac event monitor\"    Negative: Received SHOCK from " "implantable cardiac defibrillator (and now feels well)    Negative: Passed out (i.e., fainted, collapsed and was not responding)    Negative: Shock suspected (e.g., cold/pale/clammy skin, too weak to stand, low BP, rapid pulse)    Negative: Difficult to awaken or acting confused (e.g., disoriented, slurred speech)    Negative: Visible sweat on face or sweat dripping down face    Negative: Unable to walk, or can only walk with assistance (e.g., requires support)    Negative: Received SHOCK from implantable cardiac defibrillator and has persisting symptoms (i.e., palpitations, lightheadedness)    Negative: Sounds like a life-threatening emergency to the triager    Negative: Chest pain    Negative: Difficulty breathing    Negative: Dizziness, lightheadedness, or weakness    Negative: Heart beating very rapidly (e.g., > 140 / minute) and present now (EXCEPTION: during exercise)    Negative: Heart beating very slowly (e.g., < 50 / minute) (EXCEPTION: athlete)    Negative: New or worsened shortness of breath with activity (dyspnea on exertion)    Negative: Patient sounds very sick or weak to the triager    Answer Assessment - Initial Assessment Questions  1. DESCRIPTION: \"Please describe your heart rate or heart beat that you are having\" (e.g., fast/slow, regular/irregular, skipped or extra beats, \"palpitations\")      Fast  2. ONSET: \"When did it start?\" (Minutes, hours or days)       x3 days ago  3. DURATION: \"How long does it last\" (e.g., seconds, minutes, hours)      Minutes  4. PATTERN \"Does it come and go, or has it been constant since it started?\"  \"Does it get worse with exertion?\"   \"Are you feeling it now?\"      Comes and goes, but has gotten more consistent today  5. TAP: \"Using your hand, can you tap out what you are feeling on a chair or table in front of you, so that I can hear?\" (Note: not all patients can do this)        Unable to complete   6. HEART RATE: \"Can you tell me your heart rate?\" \"How many beats " "in 15 seconds?\"  (Note: not all patients can do this)        116  7. RECURRENT SYMPTOM: \"Have you ever had this before?\" If so, ask: \"When was the last time?\" and \"What happened that time?\"       Yes, years ago  8. CAUSE: \"What do you think is causing the palpitations?\"      No  9. CARDIAC HISTORY: \"Do you have any history of heart disease?\" (e.g., heart attack, angina, bypass surgery, angioplasty, arrhythmia)       Hypertension, tachycardia  10. OTHER SYMPTOMS: \"Do you have any other symptoms?\" (e.g., dizziness, chest pain, sweating, difficulty breathing)        Denies  11. PREGNANCY: \"Is there any chance you are pregnant?\" \"When was your last menstrual period?\"        No    Protocols used: HEART RATE AND HEARTBEAT [id number]      "

## 2020-02-20 LAB
ANION GAP SERPL CALCULATED.3IONS-SCNC: 7 MMOL/L (ref 3–14)
BUN SERPL-MCNC: 24 MG/DL (ref 7–30)
CALCIUM SERPL-MCNC: 9.4 MG/DL (ref 8.5–10.1)
CHLORIDE SERPL-SCNC: 102 MMOL/L (ref 94–109)
CO2 SERPL-SCNC: 26 MMOL/L (ref 20–32)
CREAT SERPL-MCNC: 0.6 MG/DL (ref 0.52–1.04)
GFR SERPL CREATININE-BSD FRML MDRD: 89 ML/MIN/{1.73_M2}
GLUCOSE SERPL-MCNC: 110 MG/DL (ref 70–99)
POTASSIUM SERPL-SCNC: 4.3 MMOL/L (ref 3.4–5.3)
SODIUM SERPL-SCNC: 135 MMOL/L (ref 133–144)
TSH SERPL DL<=0.005 MIU/L-ACNC: 1.75 MU/L (ref 0.4–4)

## 2020-02-21 ENCOUNTER — APPOINTMENT (OUTPATIENT)
Dept: CT IMAGING | Facility: CLINIC | Age: 75
End: 2020-02-21
Attending: EMERGENCY MEDICINE
Payer: MEDICARE

## 2020-02-21 ENCOUNTER — HOSPITAL ENCOUNTER (EMERGENCY)
Facility: CLINIC | Age: 75
Discharge: HOME | End: 2020-02-22
Attending: EMERGENCY MEDICINE | Admitting: EMERGENCY MEDICINE
Payer: MEDICARE

## 2020-02-21 ENCOUNTER — TELEPHONE (OUTPATIENT)
Dept: FAMILY MEDICINE | Facility: CLINIC | Age: 75
End: 2020-02-21

## 2020-02-21 DIAGNOSIS — R00.2 PALPITATIONS: ICD-10-CM

## 2020-02-21 DIAGNOSIS — R59.1 LYMPHADENOPATHY: ICD-10-CM

## 2020-02-21 DIAGNOSIS — R00.0 SINUS TACHYCARDIA: ICD-10-CM

## 2020-02-21 LAB
ANION GAP SERPL CALCULATED.3IONS-SCNC: 4 MMOL/L (ref 3–14)
BASOPHILS # BLD AUTO: 0.1 10E9/L (ref 0–0.2)
BASOPHILS NFR BLD AUTO: 0.6 %
BUN SERPL-MCNC: 33 MG/DL (ref 7–30)
CALCIUM SERPL-MCNC: 9.1 MG/DL (ref 8.5–10.1)
CHLORIDE SERPL-SCNC: 104 MMOL/L (ref 94–109)
CO2 SERPL-SCNC: 29 MMOL/L (ref 20–32)
CREAT SERPL-MCNC: 0.78 MG/DL (ref 0.52–1.04)
DIFFERENTIAL METHOD BLD: NORMAL
EOSINOPHIL # BLD AUTO: 0.1 10E9/L (ref 0–0.7)
EOSINOPHIL NFR BLD AUTO: 1.8 %
ERYTHROCYTE [DISTWIDTH] IN BLOOD BY AUTOMATED COUNT: 14.4 % (ref 10–15)
GFR SERPL CREATININE-BSD FRML MDRD: 75 ML/MIN/{1.73_M2}
GLUCOSE SERPL-MCNC: 128 MG/DL (ref 70–99)
HCT VFR BLD AUTO: 36.8 % (ref 35–47)
HGB BLD-MCNC: 11.8 G/DL (ref 11.7–15.7)
IMM GRANULOCYTES # BLD: 0 10E9/L (ref 0–0.4)
IMM GRANULOCYTES NFR BLD: 0.1 %
LYMPHOCYTES # BLD AUTO: 2 10E9/L (ref 0.8–5.3)
LYMPHOCYTES NFR BLD AUTO: 25.4 %
MCH RBC QN AUTO: 30.9 PG (ref 26.5–33)
MCHC RBC AUTO-ENTMCNC: 32.1 G/DL (ref 31.5–36.5)
MCV RBC AUTO: 96 FL (ref 78–100)
MONOCYTES # BLD AUTO: 1.1 10E9/L (ref 0–1.3)
MONOCYTES NFR BLD AUTO: 13.8 %
NEUTROPHILS # BLD AUTO: 4.6 10E9/L (ref 1.6–8.3)
NEUTROPHILS NFR BLD AUTO: 58.3 %
NRBC # BLD AUTO: 0 10*3/UL
NRBC BLD AUTO-RTO: 0 /100
PLATELET # BLD AUTO: 365 10E9/L (ref 150–450)
POTASSIUM SERPL-SCNC: 4 MMOL/L (ref 3.4–5.3)
RBC # BLD AUTO: 3.82 10E12/L (ref 3.8–5.2)
SODIUM SERPL-SCNC: 137 MMOL/L (ref 133–144)
TROPONIN I SERPL-MCNC: <0.015 UG/L (ref 0–0.04)
TSH SERPL DL<=0.005 MIU/L-ACNC: 2.41 MU/L (ref 0.4–4)
WBC # BLD AUTO: 7.8 10E9/L (ref 4–11)

## 2020-02-21 PROCEDURE — 25800030 ZZH RX IP 258 OP 636: Performed by: EMERGENCY MEDICINE

## 2020-02-21 PROCEDURE — 85025 COMPLETE CBC W/AUTO DIFF WBC: CPT | Performed by: EMERGENCY MEDICINE

## 2020-02-21 PROCEDURE — 25000125 ZZHC RX 250: Performed by: EMERGENCY MEDICINE

## 2020-02-21 PROCEDURE — 25000128 H RX IP 250 OP 636: Performed by: EMERGENCY MEDICINE

## 2020-02-21 PROCEDURE — 84443 ASSAY THYROID STIM HORMONE: CPT | Performed by: EMERGENCY MEDICINE

## 2020-02-21 PROCEDURE — 84484 ASSAY OF TROPONIN QUANT: CPT | Performed by: EMERGENCY MEDICINE

## 2020-02-21 PROCEDURE — 93005 ELECTROCARDIOGRAM TRACING: CPT

## 2020-02-21 PROCEDURE — 71275 CT ANGIOGRAPHY CHEST: CPT

## 2020-02-21 PROCEDURE — 80048 BASIC METABOLIC PNL TOTAL CA: CPT | Performed by: EMERGENCY MEDICINE

## 2020-02-21 PROCEDURE — 99285 EMERGENCY DEPT VISIT HI MDM: CPT | Mod: 25

## 2020-02-21 RX ORDER — IOPAMIDOL 755 MG/ML
500 INJECTION, SOLUTION INTRAVASCULAR ONCE
Status: COMPLETED | OUTPATIENT
Start: 2020-02-21 | End: 2020-02-21

## 2020-02-21 RX ADMIN — SODIUM CHLORIDE 500 ML: 9 INJECTION, SOLUTION INTRAVENOUS at 21:56

## 2020-02-21 RX ADMIN — IOPAMIDOL 63 ML: 755 INJECTION, SOLUTION INTRAVENOUS at 21:35

## 2020-02-21 RX ADMIN — SODIUM CHLORIDE 83 ML: 9 INJECTION, SOLUTION INTRAVENOUS at 21:40

## 2020-02-21 ASSESSMENT — ENCOUNTER SYMPTOMS
COUGH: 0
VOMITING: 0
HEADACHES: 0
PALPITATIONS: 1
NAUSEA: 0

## 2020-02-21 NOTE — TELEPHONE ENCOUNTER
Call received from patient. States she was seen 2/19 for symptoms of rapid heart beat. States she is still experiencing symptoms. Patient was also experiencing some shortness of breath. States the symptoms are not any worse but are continuing. Continues to have shortness of breath with episodes of heart racing. Denies chest pain. States she is on a pill for this but she doesn't think it is working. Asking if primary care provider would like to try a different medication to slow her heart rate down. Per office visit notes:    Heart palpitations/racing heart  - continue with Metoprolol, Lisinopril-hydrochlorothiazide, and Amlodipine for now   - EKG 12-lead complete w/read - Clinics: showed SR rate 74 with right BBB no changes from previous EKG's  - Ordered labs:    Basic metabolic panel    TSH with free T4 reflex  - offered Zio Holter Monitor x 2 weeks for further evaluation and to capture the racing heart but patient declines at this time; wants to talk with her PCP with next week's appointment.  Does report tooth infection right upper molar that is draining; wonders if that has anything to do with the heart racing.  Reports she has a dental appointment next week for consultation on what to do with it.   Tooth infection  - will treat with antibiotic until she can be seen by dentist:    amoxicillin 875 MG PO tablet; Take 1 tablet (875 mg) by mouth 2 times daily for 7 days  Dispense: 14 tablet; Refill: 0; take with food.    Patient states she has done a heart monitor so many times and it doesn't show anything. States by the time she got to the appointment last week her heart rate had slowed down. Please advise.      Next 5 appointments (look out 90 days)    Feb 27, 2020 12:20 PM CST  PHYSICAL with Rachel Miller MD  [facility] [address]  [facility] [address] [phone]

## 2020-02-21 NOTE — ED AVS SNAPSHOT
[facility] Emergency Department  [address]  [facility] [address]  Phone:  [phone]  Fax:  [phone]                                    Teri Beltran   MRN: [id number]    Department:  [facility] Emergency Department   Date of Visit:  2/21/2020           After Visit Summary Signature Page    I have received my discharge instructions, and my questions have been answered. I have discussed any challenges I see with this plan with the nurse or doctor.    ..........................................................................................................................................  Patient/Patient Representative Signature      ..........................................................................................................................................  Patient Representative Print Name and Relationship to Patient    ..................................................               ................................................  Date                                   Time    ..........................................................................................................................................  Reviewed by Signature/Title    ...................................................              ..............................................  Date                                               Time          22EPIC Rev 08/18

## 2020-02-22 VITALS
TEMPERATURE: 98.7 F | HEART RATE: 87 BPM | SYSTOLIC BLOOD PRESSURE: 161 MMHG | DIASTOLIC BLOOD PRESSURE: 83 MMHG | RESPIRATION RATE: 16 BRPM | OXYGEN SATURATION: 94 %

## 2020-02-22 LAB — INTERPRETATION ECG - MUSE: NORMAL

## 2020-02-22 NOTE — ED TRIAGE NOTES
Chest pain, SOB and palpitations intermittently for several days.  Being treated for abscessed tooth currently

## 2020-02-22 NOTE — TELEPHONE ENCOUNTER
Patient was evaluated in emergency room for palpitations and she has a follow-up appointment with Dr. Wong February 27

## 2020-02-22 NOTE — DISCHARGE INSTRUCTIONS
Increased metoprolol to 50 mg daily.  Call your primary care physician on Monday for reevaluation.  You should be contacted by the Dr. Mcintyre's office for further evaluation of your paratracheal lymph node enlargement.  Return if fever worsening shortness of breath lightheadedness dizziness fainting or other concern.

## 2020-02-22 NOTE — ED PROVIDER NOTES
History     Chief Complaint:  Palpitations      HPI   Teri Beltran is a 74 year old female with past medical history of palpitations, former smoker, heart block, hyperlipidemia, and hypertension who presents to the emergency department for evaluation of palpitations.  The patient reports she has been having intermittent palpations for the past week for which she believes her metoprolol isn't working. She says yesterday and today when she has these palpitations they are accompanied by left sided chest pain, and trouble breathing. These symptoms aren't exacerbated with exertion. Due to these symptoms she presented to the emergency department today. Per chart review, the patient was evaluated by her primary care two days prior on 2/19/20 where she had some blood work, offered but ultimately denied a Holter monitor, and started on amoxicillin for her tooth abscess with instruction to follow up with dentist. She hasn't had any recent travel, or history of blood clots. She denies cough, headache, nausea, and vomiting.       Allergies:  Seasonal Allergies     Medications:    Albuterol  Norvasc  Fosamax  Breo ellipta  Atarax  Levothyroxine    Prinzide/zestorectic  Metoprolol succinate  Pravastatin  Zantac    Past Medical History:    Complication of anesthesia  First degree AV block  Former smoker  Gastroesophageal reflux disease  GERD  Glaucoma  Heart block  Hyperlipidemia  Hypertension  Hypothyroidism  Left atrial dilatation  Mild dilation of ascending aorta-borderline  Palpitations  RBBB  Prediabetes  Tachycardia  Varicose veins  Venous insufficiency    Past Surgical History:    Arthroplasty knee  Breast surgery  Hysterectomy, pap no longer indicated  Soft tissue surgery  Tonsillectomy  Left eye cataract  LINQ placed    Family History:    Ovarian cancer  Breast cancer  Heart disease  Diabetes  Cerebrovascular disease    Social History:  The patient was accompanied to the ED by herself.  Smoking Status: Former  Smoker  Smokeless Tobacco: Never Used  Alcohol Use: Yes   Marital Status:   [5]       Review of Systems   Respiratory: Negative for cough.    Cardiovascular: Positive for chest pain (left) and palpitations.   Gastrointestinal: Negative for nausea and vomiting.   Neurological: Negative for headaches.   All other systems reviewed and are negative.        Physical Exam   First Vitals:  BP: (!) 166/103  Pulse: 113  Heart Rate: 113  Temp: 98.7  F (37.1  C)  Resp: 22  SpO2: 98 %      Physical Exam    Vital signs and nursing notes reviewed.     Constitutional: laying on gurney appears comfortable  HENT: Oropharynx is clear and moist  Eyes: Conjunctivae are normal bilaterally. Pupils equal  Neck: normal range of motion  Cardiovascular: tachycardic rate, regular rhythm, normal heart sounds.   Pulmonary/Chest: Effort normal and breath sounds normal. No respiratory distress.   Abdominal: Soft. Bowel sounds are normal. No tenderness to palpation. No rebound or guarding.   Musculoskeletal: arthritic changes of hands.  No calf tenderness or unilateral leg swelling.   Neurological: Alert and oriented. No focal weakness  Skin: Skin is warm and dry. No rash noted.   Psych: normal affect     Emergency Department Course     ECG:  Indication: palpitations  Completed at 2026.  Read at 2050.   Sinus tachycardia with 1st degree AV block. Incomplete right bundle branch block. Borderline ECG.   Rate 112 bpm. TX interval 212. QRS duration 108. QT/QTc 304/414. P-R-T axes 91 4 14.     Imaging:  Radiology findings were communicated with the patient who voiced understanding of the findings.    CT Chest Pulmonary Embolism w Contrast  IMPRESSION:  1.  No pulmonary embolism.    2.  Indeterminate paratracheal lymphadenopathy has worsened.    3.  Mild bronchiectasis in the right lung base with borderline cylindrical bronchiectasis left lower lobe. Minimal bronchiectasis right middle lobe.    4.  Degenerative changes both shoulders with  prominent bursa of the shoulders more pronounced on the left.  Report per radiology      Laboratory:  Laboratory findings were communicated with the patient who voiced understanding of the findings.    CBC: WBC 7.8, HGB 11.8,    BMP: Glucose 128 (H), BUN 33 (H) o/w WNL (Creatinine 0.78)   Troponin (Collected 2036): <0.015   TSH with free T4 reflex: 2.41     Emergency Department Course:  Nursing notes and vitals reviewed.  2105: I performed an exam of the patient as documented above.    EKG obtained in the ED, see results above.     IV was inserted and blood was drawn for laboratory testing, results above.    The patient was sent for a CT Chest while in the emergency department, results above.     2300 I spoke with radiology.     2328 I spoke with Dr. Mcintyre of the Pulmonology service regarding patient's presentation, findings, and plan of care.     0027 Patient rechecked and updated.      Findings and plan explained to the Patient. Patient discharged home with instructions regarding supportive care, medications, and reasons to return. The importance of close follow-up was reviewed.   I personally reviewed the laboratory and imaging results with the Patient and answered all related questions prior to  discharge.   Impression & Plan      Medical Decision Making:    This patient is a 74 year old female who presents with symptoms of palpitations, chest pains, and subjective dyspnea. She presented with sinus tachycardia in the low one-teens. Her EKG otherwise didn't show evidence of ST segment changes, or significant dysrhythmia. We said her symptoms that were unexplained, and persisted over the last 24 hours I felt that CT imaging was warranted to rule out pulmonary embolism. This was negative for PE. However, there were findings which appear to be slow progression of a paratracheal lymph node that has been increasing in size since her CT in 2009. I discussed the case with Dr. Mcintyre of Pulmonology from the [facility]. He took the patient's information, and that they will contact the patient and will set up an appointment for her in their nodule.  I discussed this finding with the patient an she is aware of need of follow up for the nodule. On recheck her heart rate was 93, and her blood pressure was slightly hypertensive in the 150's. Her lab tests were unremarkable, and there is no indication that she needs further tests or admission. I  recommended that in the short term increase her metoprolol to 50 mg daily, and follow up with primary care. If she has worsening symptoms she is advised to return. Patient is agreeable with plan, and is discharged home.     Diagnosis:    ICD-10-CM    1. Palpitations R00.2    2. Lymphadenopathy R59.1     paratrachial. Progressive slow enlargement since 2009   3. Sinus tachycardia R00.0        Disposition:  Discharged to home.     Scribe Disclosure:  I, Leila Mendez, am serving as a scribe at 8:35 PM on 2/21/2020 to document services personally performed by Vincent Ruiz MD based on my observations and the provider's statements to me.    Leila Mendez  2/21/2020   [facility] EMERGENCY DEPARTMENT       Vincent Ruiz MD  02/22/20 [id number]

## 2020-02-24 ENCOUNTER — NURSE TRIAGE (OUTPATIENT)
Dept: INTERNAL MEDICINE | Facility: CLINIC | Age: 75
End: 2020-02-24

## 2020-02-24 NOTE — TELEPHONE ENCOUNTER
Patient calls, for rapid heart rate. She had discussed this at 2/19/2020 appointment with Monique Amaya thought her abscessed tooth may be contributing to the higher heart rate, but patient has been on antibiotic for 4 days and still having increased heart rate. She went to the ER on 2/21/2020, but they could not find any cause of her symptoms. The ER advised her to increase Metoprolol to 50 mg and follow-up with PCP. Patient has an appointment with Dr. Wong on 2/27, but concerned about waiting this long as she is having rapid heart rate again today.     Patient state pulse was in the 90s yesterday and when she woke up this morning. However, in the last hour pulse has been higher, last time she checked it was 103. Patient does feel some shortness of breath when her heart rate is higher, but it is not getting worse. This RN offered patient sooner appointment with another provider tomorrow, but patient wants to see Dr. Wong and asks if she can work her in for an appointment tomorrow. Routed to Dr. Wong to review.        Additional Information    Negative: Passed out (i.e., fainted, collapsed and was not responding)    Negative: Shock suspected (e.g., cold/pale/clammy skin, too weak to stand, low BP, rapid pulse)    Negative: Difficult to awaken or acting confused (e.g., disoriented, slurred speech)    Negative: Visible sweat on face or sweat dripping down face    Negative: Unable to walk, or can only walk with assistance (e.g., requires support)    Negative: Received SHOCK from implantable cardiac defibrillator and has persisting symptoms (i.e., palpitations, lightheadedness)    Negative: Sounds like a life-threatening emergency to the triager    Negative: Chest pain    Negative: Difficulty breathing    Negative: Dizziness, lightheadedness, or weakness    Negative: Heart beating very rapidly (e.g., > 140 / minute) and present now (EXCEPTION: during exercise)    Negative: Heart beating very slowly (e.g., < 50  "/ minute) (EXCEPTION: athlete)    Negative: New or worsened shortness of breath with activity (dyspnea on exertion)    Negative: Patient sounds very sick or weak to the triager    Negative: Wearing a \"holter monitor\" or \"cardiac event monitor\"    Negative: Received SHOCK from implantable cardiac defibrillator (and now feels well)    Negative: Heart beating very rapidly (e.g., > 140 / minute) and not present now (EXCEPTION: during exercise)    Negative: Skipped or extra beat(s) and increases with exercise or exertion    Negative: Skipped or extra beat(s) and occurs 4 or more times per minute    Negative: History of heart disease (i.e., heart attack, bypass surgery, angina, angioplasty)    Patient wants to be seen    Answer Assessment - Initial Assessment Questions  1. DESCRIPTION: \"Please describe your heart rate or heart beat that you are having\" (e.g., fast/slow, regular/irregular, skipped or extra beats, \"palpitations\")      Fast heart beat  2. ONSET: \"When did it start?\" (Minutes, hours or days)       Unsure, thinks it was going on for a while  3. DURATION: \"How long does it last\" (e.g., seconds, minutes, hours)      Varies - can last for hours  4. PATTERN \"Does it come and go, or has it been constant since it started?\"  \"Does it get worse with exertion?\"   \"Are you feeling it now?\"      Comes and goes, yes feeling it now  5. TAP: \"Using your hand, can you tap out what you are feeling on a chair or table in front of you, so that I can hear?\" (Note: not all patients can do this)        n/a  6. HEART RATE: \"Can you tell me your heart rate?\" \"How many beats in 15 seconds?\"  (Note: not all patients can do this)        103  7. RECURRENT SYMPTOM: \"Have you ever had this before?\" If so, ask: \"When was the last time?\" and \"What happened that time?\"       Yes, saw Monique Adair last week and she thought maybe it was from abscessed tooth  8. CAUSE: \"What do you think is causing the palpitations?\"      unsure  9. CARDIAC " "HISTORY: \"Do you have any history of heart disease?\" (e.g., heart attack, angina, bypass surgery, angioplasty, arrhythmia)       Heart murmur  10. OTHER SYMPTOMS: \"Do you have any other symptoms?\" (e.g., dizziness, chest pain, sweating, difficulty breathing)        Harder to breath  11. PREGNANCY: \"Is there any chance you are pregnant?\" \"When was your last menstrual period?\"        n/a    Protocols used: HEART RATE AND HEARTBEAT [id number]    "

## 2020-02-24 NOTE — TELEPHONE ENCOUNTER
Dr. Wong has had appointment open tomorrow at 3:20 pm. Called patient and rescheduled appointment from 02/27 to 2/24/2020.   Next 5 appointments (look out 90 days)    Feb 25, 2020  3:20 PM CST  Office Visit with Rachel Miller MD  [facility] [address] [place] [id number] [phone]

## 2020-02-25 ENCOUNTER — OFFICE VISIT (OUTPATIENT)
Dept: INTERNAL MEDICINE | Facility: CLINIC | Age: 75
End: 2020-02-25
Payer: MEDICARE

## 2020-02-25 VITALS
BODY MASS INDEX: 28.28 KG/M2 | WEIGHT: 186 LBS | DIASTOLIC BLOOD PRESSURE: 82 MMHG | RESPIRATION RATE: 16 BRPM | OXYGEN SATURATION: 98 % | SYSTOLIC BLOOD PRESSURE: 160 MMHG | HEART RATE: 94 BPM

## 2020-02-25 DIAGNOSIS — R59.0 MEDIASTINAL ADENOPATHY: ICD-10-CM

## 2020-02-25 DIAGNOSIS — I10 HYPERTENSION GOAL BP (BLOOD PRESSURE) < 140/90: ICD-10-CM

## 2020-02-25 DIAGNOSIS — R00.2 PALPITATIONS: Primary | ICD-10-CM

## 2020-02-25 DIAGNOSIS — G47.00 INSOMNIA, UNSPECIFIED TYPE: ICD-10-CM

## 2020-02-25 DIAGNOSIS — J47.9 BRONCHIECTASIS WITHOUT COMPLICATION (H): ICD-10-CM

## 2020-02-25 PROCEDURE — 99214 OFFICE O/P EST MOD 30 MIN: CPT | Performed by: INTERNAL MEDICINE

## 2020-02-25 RX ORDER — TRAZODONE HYDROCHLORIDE 50 MG/1
25-100 TABLET, FILM COATED ORAL
Qty: 60 TABLET | Refills: 1 | Status: SHIPPED | OUTPATIENT
Start: 2020-02-25 | End: 2020-03-03

## 2020-02-25 RX ORDER — METOPROLOL TARTRATE 50 MG
50 TABLET ORAL 2 TIMES DAILY
Qty: 60 TABLET | Refills: 1 | Status: SHIPPED | OUTPATIENT
Start: 2020-02-25 | End: 2020-03-03

## 2020-02-25 NOTE — PATIENT INSTRUCTIONS
Plan:  1. Increase Metoprolol to 50 mg twice a day  2. Hold the Amlodipine for now  3. Check the blood pressure and write the numbers down  4. Heart monitor - To schedule this test please call [facility] at: [phone]   5. Trazodone 50 mg, take 1/2 - 1 -2 tabs at bed time as needed  6. Follow up in a week

## 2020-02-25 NOTE — PROGRESS NOTES
Patient's instructions / PLAN:                                                        Plan:  1. Increase Metoprolol to 50 mg twice a day  2. Hold the Amlodipine for now  3. Check the blood pressure and write the numbers down  4. Heart monitor - To schedule this test please call [facility] at: [phone]   5. Trazodone 50 mg, take 1/2 - 1 -2 tabs at bed time as needed  6. Follow up in a week       ASSESSMENT & PLAN:                                                        (R00.2) Palpitations  Comment: possible anxiety   Plan: metoprolol tartrate (LOPRESSOR) 50 MG tablet,         Zio Patch Holter Adult Pediatric Greater than         48 hrs    (R59.0) Mediastinal adenopathy  (primary encounter diagnosis)  (J47.9) Bronchiectasis without complication (H)  Comment:   Plan: appointment March 17 with pulmonary     (I10) Hypertension goal BP (blood pressure) < 140/90  Comment: Not controlled but home   Plan: as above     (G47.00) Insomnia, unspecified type  Comment: Not controlled  We discussed about the new meds, advantages and potential side effects. The patient will read also the info from the pharmacy and call back if questions.   Plan: traZODone (DESYREL) 50 MG tablet               Chief Complaint:                                                      Palpitations      SUBJECTIVE:                                                    History of present illness     She was evaluated for palpitation in the clinic February 19.  EKG on that day showed sinus rhythm at 74/min with RBBB.  The medications were no change.  She was recommended Holter monitor, but she wanted to wait to talk to me.  The palpitations continued and she was evaluated in emergency room on February 21.  She had a chest CT which was negative for PE.  Chest CT showed though a paratracheal lymph node and bronchiectasis.  ER physician discussed with pulmonology at [place] to arrange a follow-up. (Scheduled for March 17 ) ER physician advised her to  "increase metoprolol to 50 mg daily.    She still feels palpitations 1-3 times a day.  She feels them sometimes as irregular heart rate and strong beating her chest.  The heart rate is up to 102, checked with the POximeter at home.  Her anxiety is much higher since the palpitation, since she lives alone.  The palpitations are not associated with chest pain shortness of breath lightheadedness.  She checks the blood pressure at home, it has been in the range of 120.  We are not sure if the blood pressure machine is accurate.    > Pt appears very anxious today. She lives by herself and is very concerned about her condition.     > Pt hit her head on the couch because she missed a pillow last night     Palpitations   -- Pt feels shaky, but denies CP and \"feeling like fainting\"   -- Pt feels off balance    HTN  -- Elevated BP today   -- 160/82    Sleep   -- Pt took OTC anti-anxiety pills and Hydroxyzine 6 times at night but did not help.        Chest CT: IMPRESSION:  1.  No pulmonary embolism.    2.  Indeterminate paratracheal lymphadenopathy has worsened.     3.  Mild bronchiectasis in the right lung base with borderline cylindrical bronchiectasis left lower lobe. Minimal bronchiectasis right middle lobe.   4.  Degenerative changes both shoulders with prominent bursa of the shoulders more pronounced on the left.        ROS:                                                      ROS: negative for fever, chills, cough, wheezes, chest pain, shortness of breath, vomiting, abdominal pain, leg swelling     This document serves as a record of the services and decisions personally performed and made by Mary Kay Wong MD. It was created on her behalf by Ania Marti, a trained medical scribe. The creation of this document is based on the provider's statements to the medical scribe.  Ania Marti 4:34 PM February 25, 2020    OBJECTIVE:                                                    Physical Exam :    Blood pressure (!) " 160/82, pulse 94, resp. rate 16, weight 84.4 kg (186 lb), SpO2 98 %, not currently breastfeeding.   NAD, appears comfortable  Skin: no rashes   Neck: supple, no JVD, No thyroidmegaly. Lymph nodes nonpalpable cervical and supraclavicular.  Chest: clear to auscultation bilaterally, good respiratory effort  Heart: S1 S2, RRR, no mgr appreciated  Abdomen: soft, not tender, no hepatosplenomegaly or masses appreciated, no abdominal bruit, present bowel sounds  Extremities: no edema,   Neurologic: A, Ox3, no focal signs appreciated  Good eye contact. Speech with normal volume, pattern, tone. Thought process seems coherent, logical. Standard dressed appearance. Mood anxious      PMHx: reviewed  Past Medical History:   Diagnosis Date     Complication of anesthesia     confusion after anesthesia (after hysterectomy)     First degree AV block 8/11/2015     Former smoker      Gastroesophageal reflux disease      GERD (gastroesophageal reflux disease)      Glaucoma      Heart block     2:1     Hyperlipidemia LDL goal <130 6/14/2013     Hypertension goal BP (blood pressure) < 140/90 12/3/2013     Hypothyroidism      Left atrial dilatation     mild     Mild dilation of ascending aorta - borderline 8/11/2015     Palpitations      Prediabetes 6/14/2013     RBBB 8/11/2015     S/P total knee arthroplasty 7/23/2019     Tachycardia      Varicose veins      Venous insufficiency     s/p bilateral great saphenous vein radiofrequency ablation by Dr. Garcia      PSHx: reviewed  Past Surgical History:   Procedure Laterality Date     ARTHROPLASTY KNEE Right 7/23/2019    Procedure: Right total knee arthroplasty using an [employer] knee system;  Surgeon: Dragan Muse MD;  Location: [facility] OR     BREAST SURGERY      right breast ductal surgery due to milk production     COLONOSCOPY N/A 10/24/2014    no further screening. Procedure: COLONOSCOPY;  Surgeon: Pedro Rudd MD;  Location: [facility]     HYSTERECTOMY, PAP NO LONGER  INDICATED  2009    total hysterectomy and ovaries. benign     SOFT TISSUE SURGERY      ganglion removed from left wrist and ring finger     SURGICAL HISTORY OF -   age 7    tonsillectomy     SURGICAL HISTORY OF -   1/15    left eye cataract     SURGICAL HISTORY OF -   Fall 2016    LINQ placed.        Meds: reviewed  Current Outpatient Medications   Medication Sig Dispense Refill     albuterol (PROAIR HFA/PROVENTIL HFA/VENTOLIN HFA) 108 (90 Base) MCG/ACT inhaler INHALE 2 PUFFS INTO THE LUNGS EVERY 6 HOURS AS NEEDED FOR SHORTNESS OF BREATH OR DIFFICULT BREATHING OR WHEEZING 3 Inhaler 0     alendronate (FOSAMAX) 70 MG tablet TAKE 1 TABLET(70 MG) BY MOUTH EVERY 7 DAYS 12 tablet 1     amLODIPine (NORVASC) 2.5 MG tablet Take 1 tablet (2.5 mg) by mouth daily 90 tablet 1     amoxicillin 875 MG PO tablet Take 1 tablet (875 mg) by mouth 2 times daily for 7 days 14 tablet 0     dorzolamide-timolol (COSOPT) 2-0.5 % ophthalmic solution Place 1 drop into both eyes 2 times daily       fluticasone (FLONASE) 50 MCG/ACT nasal spray Spray 1-2 sprays into both nostrils daily 48 g 1     fluticasone-vilanterol (BREO ELLIPTA) 100-25 MCG/INH inhaler Inhale 1 puff into the lungs daily 1 Inhaler 1     hydrOXYzine (ATARAX) 10 MG tablet Take 1 tablet (10 mg) by mouth every 6 hours as needed for anxiety 20 tablet 0     latanoprost (XALATAN) 0.005 % ophthalmic solution Place 1 drop into the right eye At Bedtime        levothyroxine (SYNTHROID/LEVOTHROID) 88 MCG tablet Take 1 tablet (88 mcg) by mouth daily 90 tablet 1     lisinopril-hydrochlorothiazide (PRINZIDE/ZESTORETIC) 20-25 MG tablet Take 1 tablet by mouth daily 90 tablet 1     metoprolol succinate ER (TOPROL-XL) 25 MG 24 hr tablet Take 1.5 tablets (37.5 mg) by mouth daily 135 tablet 1     metoprolol tartrate (LOPRESSOR) 50 MG tablet Take 1 tablet (50 mg) by mouth 2 times daily 60 tablet 1     pravastatin (PRAVACHOL) 40 MG tablet Take 1 tablet (40 mg) by mouth At Bedtime 90 tablet 1      ranitidine (ZANTAC) 150 MG tablet TAKE 1 TABLET(150 MG) BY MOUTH TWICE DAILY 180 tablet 1     traZODone (DESYREL) 50 MG tablet Take 0.5-2 tablets ( mg) by mouth nightly as needed for sleep 60 tablet 1       Soc Hx: reviewed  Fam Hx: reviewed    The information in this document, created by the medical scribe for me, accurately reflects the services I personally performed and the decisions made by me. I have reviewed and approved this document for accuracy prior to leaving the patient care area.  February 25, 2020 4:34 PM    Rachel Wong MD  Internal Medicine

## 2020-02-25 NOTE — PROGRESS NOTES
"This document serves as a record of the services and decisions personally performed and made by Mary Kay Wong MD. It was created on her behalf by Ania Marti, a trained medical scribe. The creation of this document is based on the provider's statements to the medical scribe.  Ania Figueroa 3:43 PM February 25, 2020    > Pt was admitted to ED on 02/21/2020 due to intermittent palpitations and she was advised to increase Metoprolol dose to 50 mg daily.       > Pt appears very stressed today. She lives by herself and is very concerned about her condition.     > Pt hit her head on the couch because she missed a pillow last night     Labs and Scans reviewed   10/28/2019 Blood tests -  normal   01/23/2020 stress test - normal   02/21/2020 Electrocardiogram - Normal         Palpitations   -- Pt feels Intermittently irregular rapid heart beat that started recently  more than once a day  -- Pt feels shaky, but denies CP and \"feeling like fainting\"   -- Pt feels off balance  -- HR when slow walking 107  -- POx 95 in room air  -- Pt admits no new medications  --  suggested higher dose of Metoprolol and heart monitor     HTN  -- Elevated BP today   -- 160/82  -- Dr. MCGRAW suggested holding the amlodipine for now and keeping a BP log     Sleep   -- Pt took anti anxiety pills and over the counter Hydroxyzine 6 times at night but did not help.   -- Dr. MCGRAW recommended trazodone   Objective     CT Chest Pulmonary Embolism w Contrast Impression  1.  No pulmonary embolism.  2.  Indeterminate paratracheal lymphadenopathy has worsened.  3.  Mild bronchiectasis in the right lung base with borderline cylindrical bronchiectasis left lower lobe. Minimal bronchiectasis right middle lobe.  4.  Degenerative changes both shoulders with prominent bursa of the shoulders more pronounced on the left.    "

## 2020-03-02 DIAGNOSIS — R06.02 SOB (SHORTNESS OF BREATH) ON EXERTION: ICD-10-CM

## 2020-03-02 DIAGNOSIS — R05.9 COUGH: ICD-10-CM

## 2020-03-02 NOTE — TELEPHONE ENCOUNTER
"Requested Prescriptions   Pending Prescriptions Disp Refills     fluticasone-vilanterol (BREO ELLIPTA) 100-25 MCG/INH inhaler  Last Written Prescription Date:  1/23/20  Last Fill Quantity: 1,  # refills: 1   Last office visit: 1/23/20 with prescribing provider:  Judith    Future Office Visit:   Next 5 appointments (look out 90 days)    Mar 03, 2020  3:00 PM CST  SHORT with Rachel Miller MD  [facility] [address]  [facility] [address]  [phone]          1 Inhaler 1     Sig: Inhale 1 puff into the lungs daily       Inhaled Steroids Protocol Passed - 3/2/2020 11:01 AM        Passed - Patient is age 12 or older        Passed - Recent (12 mo) or future (30 days) visit within the authorizing provider's specialty     Patient has had an office visit with the authorizing provider or a provider within the authorizing providers department within the previous 12 mos or has a future within next 30 days. See \"Patient Info\" tab in inbasket, or \"Choose Columns\" in Meds & Orders section of the refill encounter.              Passed - Medication is active on med list          "

## 2020-03-03 ENCOUNTER — OFFICE VISIT (OUTPATIENT)
Dept: INTERNAL MEDICINE | Facility: CLINIC | Age: 75
End: 2020-03-03
Payer: MEDICARE

## 2020-03-03 VITALS
RESPIRATION RATE: 18 BRPM | HEART RATE: 72 BPM | OXYGEN SATURATION: 100 % | BODY MASS INDEX: 28.28 KG/M2 | WEIGHT: 186 LBS | SYSTOLIC BLOOD PRESSURE: 134 MMHG | DIASTOLIC BLOOD PRESSURE: 78 MMHG

## 2020-03-03 DIAGNOSIS — F51.04 CHRONIC INSOMNIA: ICD-10-CM

## 2020-03-03 DIAGNOSIS — I10 HYPERTENSION GOAL BP (BLOOD PRESSURE) < 140/90: Primary | ICD-10-CM

## 2020-03-03 DIAGNOSIS — R00.2 PALPITATIONS: ICD-10-CM

## 2020-03-03 PROCEDURE — 99214 OFFICE O/P EST MOD 30 MIN: CPT | Performed by: INTERNAL MEDICINE

## 2020-03-03 RX ORDER — AMOXICILLIN 500 MG/1
CAPSULE ORAL
COMMUNITY
Start: 2020-02-28 | End: 2020-03-03

## 2020-03-03 RX ORDER — METOPROLOL TARTRATE 50 MG
50 TABLET ORAL 2 TIMES DAILY
Qty: 180 TABLET | Refills: 0 | Status: SHIPPED | OUTPATIENT
Start: 2020-03-03 | End: 2020-04-03

## 2020-03-03 RX ORDER — MIRTAZAPINE 7.5 MG/1
7.5 TABLET, FILM COATED ORAL AT BEDTIME
Qty: 30 TABLET | Refills: 0 | Status: SHIPPED | OUTPATIENT
Start: 2020-03-03 | End: 2020-08-06

## 2020-03-03 NOTE — PATIENT INSTRUCTIONS
Plan:  1. Continue same meds, same doses for now   2. Follow up in a month  3. Bring the medications and the blood pressure machine to the next appointment   4. Mirtazapine 7.5 mg at bed time as needed for sleep

## 2020-03-03 NOTE — PROGRESS NOTES
Dr Wong's note      Patient's instructions / PLAN:                                                        Plan:  1. Continue same meds, same doses for now   2. Follow up in a month  3. Bring the medications and the blood pressure machine to the next appointment   4. Mirtazapine 7.5 mg at bed time as needed for sleep       ASSESSMENT & PLAN:                                                      (I10) Hypertension goal BP (blood pressure) < 140/90  (primary encounter diagnosis)  Comment: Controlled   Plan: Continue same meds, same doses for now           as above    (F51.04) Chronic insomnia  Comment: not controlled  We discussed about the new meds, advantages and potential side effects. The patient will read also the info from the pharmacy and call back if questions.   Plan: mirtazapine (REMERON) 7.5 MG tablet      (R00.2) Palpitations  Comment: controlled  Plan: metoprolol tartrate (LOPRESSOR) 50 MG tablet        Continue same meds, same doses for now    Chief complaint:                                                      Follow up chronic medical problems     SUBJECTIVE:                                                    History of present illness:    Heart   -- Pt scheduled to get heart monitor in three days    -- HR at home 74 - 97  -- POx at home 96-98%     HTN   -- General BP log from home 139/55, 166/93, 104/65, 123/94, 167/98, 127/70, 157/86, 159/89, 166/87, 140/76   -- BP today 142    Sleep  -- Trazodone 50 mg did not help    Anxiety   -- does not take hydroxyzine      HLipidemia   -- takes pravastatin       Hyperlipidemia Follow-Up      Are you regularly taking any medication or supplement to lower your cholesterol?   Yes- Pravastatin    Are you having muscle aches or other side effects that you think could be caused by your cholesterol lowering medication?  Yes- .    Hypertension Follow-up      Do you check your blood pressure regularly outside of the clinic? Yes     Are you following a low salt diet?  Yes    Are your blood pressures ever more than 140 on the top number (systolic) OR more   than 90 on the bottom number (diastolic), for example 140/90? Yes      How many servings of fruits and vegetables do you eat daily?  0-1    On average, how many sweetened beverages do you drink each day (Examples: soda, juice, sweet tea, etc.  Do NOT count diet or artificially sweetened beverages)?   1    How many days per week do you exercise enough to make your heart beat faster? 3 or less    How many minutes a day do you exercise enough to make your heart beat faster? 9 or less    How many days per week do you miss taking your medication? 0      LOV: Plan:  1. Increase Metoprolol to 50 mg twice a day  2. Hold the Amlodipine for now  3. Check the blood pressure and write the numbers down  4. Heart monitor - To schedule this test please call [facility] at: [phone]   5. Trazodone 50 mg, take 1/2 - 1 -2 tabs at bed time as needed  6. Follow up in a week     Review of Systems:                                                      ROS: negative for fever, chills, cough, wheezes, chest pain, shortness of breath, vomiting, abdominal pain, leg swelling     This document serves as a record of the services and decisions personally performed and made by Rachel Wong MD. It was created on her behalf by Ania Marti, a trained medical scribe. The creation of this document is based on the provider's statements to the medical scribe.  Ania Marti 3:10 PM March 3, 2020     OBJECTIVE:           Physical exam:  Blood pressure 134/78, pulse 72, resp. rate 18, weight 84.4 kg (186 lb), SpO2 100 %, not currently breastfeeding.   NAD, appears comfortable  Skin: no rashes   Neck: supple, no JVD,  No thyroidmegaly. Lymph nodes nonpalpable cervical and supraclavicular.  Chest: clear to auscultation bilaterally, good respiratory effort  Heart: S1 S2, RRR, no mgr appreciated  Abdomen: soft, not tender,   Extremities: no edema,  Neurologic: A,  Ox3, no focal signs appreciated    PMHx: reviewed  Past Medical History:   Diagnosis Date     Complication of anesthesia     confusion after anesthesia (after hysterectomy)     First degree AV block 8/11/2015     Former smoker      Gastroesophageal reflux disease      GERD (gastroesophageal reflux disease)      Glaucoma      Heart block     2:1     Hyperlipidemia LDL goal <130 6/14/2013     Hypertension goal BP (blood pressure) < 140/90 12/3/2013     Hypothyroidism      Left atrial dilatation     mild     Mild dilation of ascending aorta - borderline 8/11/2015     Palpitations      Prediabetes 6/14/2013     RBBB 8/11/2015     S/P total knee arthroplasty 7/23/2019     Tachycardia      Varicose veins      Venous insufficiency     s/p bilateral great saphenous vein radiofrequency ablation by Dr. Garcia      PSHx: reviewed  Past Surgical History:   Procedure Laterality Date     ARTHROPLASTY KNEE Right 7/23/2019    Procedure: Right total knee arthroplasty using an [employer] knee system;  Surgeon: Dragan Muse MD;  Location: [facility] OR     BREAST SURGERY      right breast ductal surgery due to milk production     COLONOSCOPY N/A 10/24/2014    no further screening. Procedure: COLONOSCOPY;  Surgeon: Pedro Rudd MD;  Location: [facility]     HYSTERECTOMY, PAP NO LONGER INDICATED  2009    total hysterectomy and ovaries. benign     SOFT TISSUE SURGERY      ganglion removed from left wrist and ring finger     SURGICAL HISTORY OF -   age 7    tonsillectomy     SURGICAL HISTORY OF -   1/15    left eye cataract     SURGICAL HISTORY OF -   Fall 2016    LINQ placed.        Meds: reviewed  Current Outpatient Medications   Medication Sig Dispense Refill     alendronate (FOSAMAX) 70 MG tablet TAKE 1 TABLET(70 MG) BY MOUTH EVERY 7 DAYS 12 tablet 1     dorzolamide-timolol (COSOPT) 2-0.5 % ophthalmic solution Place 1 drop into both eyes 2 times daily       fluticasone (FLONASE) 50 MCG/ACT nasal spray Spray 1-2 sprays  into both nostrils daily 48 g 1     fluticasone-vilanterol (BREO ELLIPTA) 100-25 MCG/INH inhaler Inhale 1 puff into the lungs daily 1 Inhaler 1     latanoprost (XALATAN) 0.005 % ophthalmic solution Place 1 drop into the right eye At Bedtime        levothyroxine (SYNTHROID/LEVOTHROID) 88 MCG tablet Take 1 tablet (88 mcg) by mouth daily 90 tablet 1     lisinopril-hydrochlorothiazide (PRINZIDE/ZESTORETIC) 20-25 MG tablet Take 1 tablet by mouth daily 90 tablet 1     metoprolol tartrate (LOPRESSOR) 50 MG tablet Take 1 tablet (50 mg) by mouth 2 times daily 180 tablet 0     mirtazapine (REMERON) 7.5 MG tablet Take 1 tablet (7.5 mg) by mouth At Bedtime 30 tablet 0     pravastatin (PRAVACHOL) 40 MG tablet Take 1 tablet (40 mg) by mouth At Bedtime 90 tablet 1     ranitidine (ZANTAC) 150 MG tablet TAKE 1 TABLET(150 MG) BY MOUTH TWICE DAILY 180 tablet 1       Soc Hx: reviewed  Fam Hx: reviewed    The information in this document, created by the medical scribe for me, accurately reflects the services I personally performed and the decisions made by me. I have reviewed and approved this document for accuracy prior to leaving the patient care area.  March 3, 2020 3:37 PM    Rachel Wong MD  Internal Medicine

## 2020-03-04 NOTE — TELEPHONE ENCOUNTER
Pt saw Dr Wong yesterday.     Prescription approved per [facility] [place] [place] Refill Protocol.  Pt has upcoming appt with Pulmonary, will refill for one time, in case there are changes.

## 2020-03-05 ENCOUNTER — TRANSFERRED RECORDS (OUTPATIENT)
Dept: HEALTH INFORMATION MANAGEMENT | Facility: CLINIC | Age: 75
End: 2020-03-05

## 2020-03-06 ENCOUNTER — HOSPITAL ENCOUNTER (OUTPATIENT)
Dept: CARDIOLOGY | Facility: CLINIC | Age: 75
Discharge: HOME | End: 2020-03-06
Attending: INTERNAL MEDICINE | Admitting: INTERNAL MEDICINE
Payer: MEDICARE

## 2020-03-06 DIAGNOSIS — R00.2 PALPITATIONS: ICD-10-CM

## 2020-03-06 PROCEDURE — 0298T ZIO PATCH HOLTER ADULT PEDIATRIC GREATER THAN 48 HRS: CPT | Performed by: INTERNAL MEDICINE

## 2020-03-06 PROCEDURE — 0296T ZIO PATCH HOLTER ADULT PEDIATRIC GREATER THAN 48 HRS: CPT

## 2020-03-10 ENCOUNTER — TELEPHONE (OUTPATIENT)
Dept: INTERNAL MEDICINE | Facility: CLINIC | Age: 75
End: 2020-03-10

## 2020-03-10 DIAGNOSIS — F41.9 ANXIETY: Primary | ICD-10-CM

## 2020-03-10 NOTE — TELEPHONE ENCOUNTER
Patient calling to request RX for Hydroxyzine (not on current medication list). States she only uses it during the day for anxiety and will not take with her sleeping medication. She uses [facility] in [place].

## 2020-03-11 NOTE — TELEPHONE ENCOUNTER
Rx was prescribed by ER provider 10/22/19 when she was having palpitations. 10 mg q6 hours as needed for anxiety #20.  7/23/19 when hospitalized was ordered but don't know that she went home on med.  That was 25 mg three times daily as needed.  HELEN Jim R.N.

## 2020-03-12 RX ORDER — HYDROXYZINE HYDROCHLORIDE 25 MG/1
25 TABLET, FILM COATED ORAL 3 TIMES DAILY PRN
Qty: 30 TABLET | Refills: 1 | Status: SHIPPED | OUTPATIENT
Start: 2020-03-12 | End: 2020-10-21

## 2020-03-13 ENCOUNTER — TELEPHONE (OUTPATIENT)
Dept: GENERAL RADIOLOGY | Facility: CLINIC | Age: 75
End: 2020-03-13

## 2020-03-18 ENCOUNTER — HOSPITAL ENCOUNTER (OUTPATIENT)
Dept: GENERAL RADIOLOGY | Facility: CLINIC | Age: 75
Discharge: HOME | End: 2020-03-18
Attending: ORTHOPAEDIC SURGERY | Admitting: ORTHOPAEDIC SURGERY
Payer: MEDICARE

## 2020-03-18 ENCOUNTER — TELEPHONE (OUTPATIENT)
Dept: PULMONOLOGY | Facility: CLINIC | Age: 75
End: 2020-03-18

## 2020-03-18 VITALS — DIASTOLIC BLOOD PRESSURE: 91 MMHG | SYSTOLIC BLOOD PRESSURE: 150 MMHG | HEART RATE: 69 BPM

## 2020-03-18 DIAGNOSIS — M25.559 JOINT PAIN, HIP: ICD-10-CM

## 2020-03-18 DIAGNOSIS — M16.9 HIP OSTEOARTHRITIS: ICD-10-CM

## 2020-03-18 PROCEDURE — 25000128 H RX IP 250 OP 636

## 2020-03-18 PROCEDURE — 25000128 H RX IP 250 OP 636: Performed by: RADIOLOGY

## 2020-03-18 PROCEDURE — 20610 DRAIN/INJ JOINT/BURSA W/O US: CPT | Mod: RT

## 2020-03-18 PROCEDURE — 25000125 ZZHC RX 250

## 2020-03-18 RX ORDER — LIDOCAINE HYDROCHLORIDE 10 MG/ML
INJECTION, SOLUTION EPIDURAL; INFILTRATION; INTRACAUDAL; PERINEURAL
Status: COMPLETED
Start: 2020-03-18 | End: 2020-03-18

## 2020-03-18 RX ORDER — TRIAMCINOLONE ACETONIDE 40 MG/ML
40 INJECTION, SUSPENSION INTRA-ARTICULAR; INTRAMUSCULAR ONCE
Status: COMPLETED | OUTPATIENT
Start: 2020-03-18 | End: 2020-03-18

## 2020-03-18 RX ORDER — LIDOCAINE HYDROCHLORIDE 10 MG/ML
5 INJECTION, SOLUTION EPIDURAL; INFILTRATION; INTRACAUDAL; PERINEURAL ONCE
Status: COMPLETED | OUTPATIENT
Start: 2020-03-18 | End: 2020-03-18

## 2020-03-18 RX ORDER — BUPIVACAINE HYDROCHLORIDE 5 MG/ML
4 INJECTION, SOLUTION EPIDURAL; INTRACAUDAL ONCE
Status: DISCONTINUED | OUTPATIENT
Start: 2020-03-18 | End: 2020-03-19 | Stop reason: HOSPADM

## 2020-03-18 RX ORDER — BUPIVACAINE HYDROCHLORIDE 5 MG/ML
INJECTION, SOLUTION EPIDURAL; INTRACAUDAL
Status: DISCONTINUED
Start: 2020-03-18 | End: 2020-03-19 | Stop reason: HOSPADM

## 2020-03-18 RX ORDER — TRIAMCINOLONE ACETONIDE 40 MG/ML
INJECTION, SUSPENSION INTRA-ARTICULAR; INTRAMUSCULAR
Status: COMPLETED
Start: 2020-03-18 | End: 2020-03-18

## 2020-03-18 RX ADMIN — LIDOCAINE HYDROCHLORIDE 5 ML: 10 INJECTION, SOLUTION EPIDURAL; INFILTRATION; INTRACAUDAL; PERINEURAL at 13:30

## 2020-03-18 RX ADMIN — TRIAMCINOLONE ACETONIDE 40 MG: 40 INJECTION, SUSPENSION INTRA-ARTICULAR; INTRAMUSCULAR at 13:30

## 2020-03-18 RX ADMIN — TRIAMCINOLONE ACETONIDE 40 MG: 40 INJECTION, SUSPENSION INTRA-ARTICULAR; INTRAMUSCULAR at 13:31

## 2020-03-18 NOTE — PROGRESS NOTES
Pt was in Radiology today for a right hip steroid injection . Pt tolerated ortho procedure well. Pre procedure pain was 3 post procedure pain level 3.Procedure was completed by Dr Matthew. There were no complications during this procedure. Pt verbalized understanding of written and verbal instructions and left department in stable and satisfactory condition. There is no evidence of bleeding or any other complications upon discharge.

## 2020-03-18 NOTE — TELEPHONE ENCOUNTER
Telephone call     Called Mrs Beltran today re her ct scan.   CT demonstrates bibasilar bronchiectasis and reactive LAD.   I recommended gen pulm visit in 3-6mo with repeat CT chest and PFT  She is agreeable.     Gilles Mcintyre MD   of Medicine  Interventional Pulmonary  Department of Pulmonary, Allergy, Critical Care and Sleep Medicine   [facility] [employer]  Pager: [phone]   Office: [phone]  Email: [email or web address]    Crystal RAMAN, OCN  Clinical Nurse Specialist  Department of Thoracic Surgery  Office: [phone]  Email: [email or web address]    Lynda Chirinos  Interventional Pulmonology Surgery Scheduler  Office: [phone]  Email: [email or web address]

## 2020-03-19 DIAGNOSIS — J47.9 BRONCHIECTASIS (H): Primary | ICD-10-CM

## 2020-03-30 DIAGNOSIS — R10.13 MIDEPIGASTRIC PAIN: ICD-10-CM

## 2020-03-30 NOTE — TELEPHONE ENCOUNTER
"Requested Prescriptions   Pending Prescriptions Disp Refills     ranitidine (ZANTAC) 150 MG tablet [Pharmacy Med Name: RANITIDINE 150MG TABLETS] 180 tablet 1     Sig: TAKE 1 TABLET(150 MG) BY MOUTH TWICE DAILY  Last Written Prescription Date:  9/25/19  Last Fill Quantity: 180 tab,  # refills: 1   Last office visit: 11/21/2019 with prescribing provider:  Danuta   Future Office Visit:   Next 5 appointments (look out 90 days)    Apr 03, 2020  1:40 PM CDT  [facility] [address]  [facility] [address]  [phone]             H2 Blockers Protocol Passed - 3/30/2020  3:17 AM        Passed - Patient is age 12 or older        Passed - Recent (12 mo) or future (30 days) visit within the authorizing provider's specialty     Patient has had an office visit with the authorizing provider or a provider within the authorizing providers department within the previous 12 mos or has a future within next 30 days. See \"Patient Info\" tab in inbasket, or \"Choose Columns\" in Meds & Orders section of the refill encounter.              Passed - Medication is active on med list            "

## 2020-04-03 ENCOUNTER — TELEPHONE (OUTPATIENT)
Dept: FAMILY MEDICINE | Facility: CLINIC | Age: 75
End: 2020-04-03

## 2020-04-03 ENCOUNTER — VIRTUAL VISIT (OUTPATIENT)
Dept: INTERNAL MEDICINE | Facility: CLINIC | Age: 75
End: 2020-04-03
Payer: MEDICARE

## 2020-04-03 DIAGNOSIS — I47.10 SVT (SUPRAVENTRICULAR TACHYCARDIA) (H): ICD-10-CM

## 2020-04-03 DIAGNOSIS — R00.2 PALPITATIONS: ICD-10-CM

## 2020-04-03 DIAGNOSIS — I47.20 VENTRICULAR TACHYCARDIA (H): ICD-10-CM

## 2020-04-03 DIAGNOSIS — I10 HYPERTENSION GOAL BP (BLOOD PRESSURE) < 140/90: Primary | ICD-10-CM

## 2020-04-03 PROCEDURE — 99442 ZZC PHYSICIAN TELEPHONE EVALUATION 11-20 MIN: CPT | Performed by: INTERNAL MEDICINE

## 2020-04-03 RX ORDER — METOPROLOL TARTRATE 50 MG
75 TABLET ORAL 2 TIMES DAILY
Qty: 270 TABLET | Refills: 0 | Status: SHIPPED | OUTPATIENT
Start: 2020-04-03 | End: 2020-08-06

## 2020-04-03 NOTE — PROGRESS NOTES
"Teri Beltran is a 74 year old female who is being evaluated via a billable telephone visit.      The patient has been notified of following:     \"This telephone visit will be conducted via a call between you and your physician/provider. We have found that certain health care needs can be provided without the need for a physical exam.  This service lets us provide the care you need with a short phone conversation.  If a prescription is necessary we can send it directly to your pharmacy.  If lab work is needed we can place an order for that and you can then stop by our lab to have the test done at a later time.    If during the course of the call the physician/provider feels a telephone visit is not appropriate, you will not be charged for this service.\"     Patient has given verbal consent for Telephone visit?  Yes    Arelis Carranza CMA.      This is a telephone encounter with the patient.       14:05 --- 14:20           Dr Wong's note      Patient's instructions / PLAN:                                                        Plan:  1. BP machine RX   2. Increase the Metoprolol to 1 1/2 bid  3. Cardiology referral To schedule this test please call [facility] at: [phone]         ASSESSMENT & PLAN:                                                      (I10) Hypertension goal BP (blood pressure) < 140/90  (primary encounter diagnosis)  Comment: not sure if controlled or not   Plan: Home Blood Pressure Monitor Order for DME -         ONLY FOR DME            (I47.1) SVT (supraventricular tachycardia) (H)  (I47.2) Ventricular tachycardia (H)  Comment: symptoms: palpitations and she fell yesterday during palpitations episode   Plan: CARDIO  ADULT REFERRAL        as above        Chief complaint:                                                      Palpitations  HTN  Fall     SUBJECTIVE:                                                    History of present illness:       Yesterday  Coming from the garage the " she felt the heart beating so fast. She felt off balance and she fell No syncope. No injuries. No head trauma     She still feels palpitations every week, 1-2 times       Bout of diarrhea - getting better     Hyperlipidemia Follow-Up      Are you regularly taking any medication or supplement to lower your cholesterol?   Yes- pravastatin    Are you having muscle aches or other side effects that you think could be caused by your cholesterol lowering medication?  No    Hypertension Follow-up      Do you check your blood pressure regularly outside of the clinic? No     Are you following a low salt diet? Yes    Are your blood pressures ever more than 140 on the top number (systolic) OR more   than 90 on the bottom number (diastolic), for example 140/90? No      How many servings of fruits and vegetables do you eat daily?  2-3    On average, how many sweetened beverages do you drink each day (Examples: soda, juice, sweet tea, etc.  Do NOT count diet or artificially sweetened beverages)?   1    How many days per week do you exercise enough to make your heart beat faster? 3 or less    How many minutes a day do you exercise enough to make your heart beat faster? 20 - 29    How many days per week do you miss taking your medication? 0          Review of Systems:                                                      ROS: negative for fever, chills, cough, wheezes, chest pain, shortness of breath, vomiting, abdominal pain, leg swelling     OBJECTIVE:           An actual physical exam can't be done during phone visit       PMHx: reviewed  Past Medical History:   Diagnosis Date     Complication of anesthesia     confusion after anesthesia (after hysterectomy)     First degree AV block 8/11/2015     Former smoker      Gastroesophageal reflux disease      GERD (gastroesophageal reflux disease)      Glaucoma      Heart block     2:1     Hyperlipidemia LDL goal <130 6/14/2013     Hypertension goal BP (blood pressure) < 140/90 12/3/2013      Hypothyroidism      Left atrial dilatation     mild     Mild dilation of ascending aorta - borderline 8/11/2015     Palpitations      Prediabetes 6/14/2013     RBBB 8/11/2015     S/P total knee arthroplasty 7/23/2019     Tachycardia      Varicose veins      Venous insufficiency     s/p bilateral great saphenous vein radiofrequency ablation by Dr. Garcia      PSHx: reviewed  Past Surgical History:   Procedure Laterality Date     ARTHROPLASTY KNEE Right 7/23/2019    Procedure: Right total knee arthroplasty using an [employer] / [employer] knee system;  Surgeon: Dragan Muse MD;  Location: [facility] OR     BREAST SURGERY      right breast ductal surgery due to milk production     COLONOSCOPY N/A 10/24/2014    no further screening. Procedure: COLONOSCOPY;  Surgeon: Pedro Rudd MD;  Location: [facility]     HYSTERECTOMY, PAP NO LONGER INDICATED  2009    total hysterectomy and ovaries. benign     SOFT TISSUE SURGERY      ganglion removed from left wrist and ring finger     SURGICAL HISTORY OF -   age 7    tonsillectomy     SURGICAL HISTORY OF -   1/15    left eye cataract     SURGICAL HISTORY OF -   Fall 2016    LINQ placed.        Meds: reviewed  Current Outpatient Medications   Medication Sig Dispense Refill     alendronate (FOSAMAX) 70 MG tablet TAKE 1 TABLET(70 MG) BY MOUTH EVERY 7 DAYS 12 tablet 1     dorzolamide-timolol (COSOPT) 2-0.5 % ophthalmic solution Place 1 drop into both eyes 2 times daily       fluticasone (FLONASE) 50 MCG/ACT nasal spray Spray 1-2 sprays into both nostrils daily 48 g 1     fluticasone-vilanterol (BREO ELLIPTA) 100-25 MCG/INH inhaler Inhale 1 puff into the lungs daily 1 Inhaler 0     hydrOXYzine (ATARAX) 25 MG tablet Take 1 tablet (25 mg) by mouth 3 times daily as needed for anxiety 30 tablet 1     latanoprost (XALATAN) 0.005 % ophthalmic solution Place 1 drop into the right eye At Bedtime        levothyroxine (SYNTHROID/LEVOTHROID) 88 MCG tablet Take 1 tablet (88 mcg) by mouth  daily 90 tablet 1     lisinopril-hydrochlorothiazide (PRINZIDE/ZESTORETIC) 20-25 MG tablet Take 1 tablet by mouth daily 90 tablet 1     metoprolol tartrate (LOPRESSOR) 50 MG tablet Take 1 tablet (50 mg) by mouth 2 times daily 180 tablet 0     mirtazapine (REMERON) 7.5 MG tablet Take 1 tablet (7.5 mg) by mouth At Bedtime 30 tablet 0     pravastatin (PRAVACHOL) 40 MG tablet Take 1 tablet (40 mg) by mouth At Bedtime 90 tablet 1       Soc Hx: reviewed  Fam Hx: reviewed          Rachel Wong MD  Internal Medicine

## 2020-04-15 ENCOUNTER — TELEPHONE (OUTPATIENT)
Dept: FAMILY MEDICINE | Facility: CLINIC | Age: 75
End: 2020-04-15

## 2020-04-15 DIAGNOSIS — R19.7 DIARRHEA, UNSPECIFIED TYPE: Primary | ICD-10-CM

## 2020-04-15 NOTE — TELEPHONE ENCOUNTER
Please advise pt to decrease metoprolol to 50 mg bid for now.and hold BP med if SBP is less than 110.

## 2020-04-15 NOTE — TELEPHONE ENCOUNTER
Called patient and advised of provider's recommendations below. Patient verbalized understanding and agrees to plan. Patient will call if she continues to not feel well, or with further concerns.     Patient would like this passed on to primary care provider as well. Patient aware primary care provider is not expected to return until Monday of next week.

## 2020-04-15 NOTE — TELEPHONE ENCOUNTER
"Patient states her BP usually runs 130-140/70s-80s, occasionally systolic in 150s.  She calls today with concerns about low BP readings, 106, 111 systolic and dropped into the 90s about 30 minutes ago.  Heart rate in the 80s.  When systolic BP dropped to the 90s she felt \"weird, kind of spacey\" and that lasted about 10-15 minutes.  Feeling tired for the past 7-10 days, did not sleep well last night.   Had virtual visit done 4/3/20 for palpitations and hypertension.  Her Metoprolol was increased from 50 mg twice daily to 75 mg twice daily.  No palpitations since increasing Metoprolol.  Denies SOB or chest pain.  She wants to know if she should take her afternoon dose of Metoprolol.PATO Murrieta  "

## 2020-04-22 NOTE — TELEPHONE ENCOUNTER
RECORDS RECEIVED FROM: Internal   DATE RECEIVED: 7/21/20   NOTES STATUS DETAILS   OFFICE NOTE from referring provider Internal Dr Gilles Mcintyre @ [facility]:  7/21/20   OFFICE NOTE from other specialist N/A    DISCHARGE SUMMARY from hospital N/A    DISCHARGE REPORT from the ER N/A    MEDICATION LIST Internal    IMAGING  (NEED IMAGES AND REPORTS)     CT SCAN Internal [facility]:  CT Chest 7/21/20 *scheduled*    [facility]:  CT Chest 2/21/20   CHEST XRAY (CXR) Internal [facility]:  XR Chest 11/12/19    [facility]:  XR Chest 10/22/19   TESTS     PULMONARY FUNCTION TESTING (PFT) Internal [facility]:  7/21/20 *scheduled*    [facility]:  Spirometry 11/21/19

## 2020-04-23 NOTE — TELEPHONE ENCOUNTER
Recommendation: Do stool test.  Depends on the results we will follow-up with a virtual visit.  No need for virtual visit this week

## 2020-04-23 NOTE — TELEPHONE ENCOUNTER
Contacted patient.   She states that she is feeling better this week, but still having loose stools. Loose stools were not mentioned in initial message. Patient states she has been having loose stools since she was on Ampicillin for tooth abscess, completed antibiotic about 1 month ago. She does not have have a bowel movement everyday, but states stools have been loose since she was on the Ampicillin. Denies any stomach pain or cramping. Patient asks what she can take for loose stools. Informed patient this RN will forward message to Dr. Wong for recommendations since she was on an antibiotic, but AILYN Wong may want to do a phone visit to discuss further and patient agrees with plan.     Dr. Wong - Would you recommend virtual visit for the loose stools?

## 2020-04-23 NOTE — TELEPHONE ENCOUNTER
Called patient and advised of primary care provider's recommendations. Patient verbalized understanding and agrees to plan. Patient will  testing supplies today. Patient will call back with further concerns.

## 2020-04-27 DIAGNOSIS — R19.7 DIARRHEA, UNSPECIFIED TYPE: ICD-10-CM

## 2020-04-27 LAB
C DIFF TOX B STL QL: NEGATIVE
SPECIMEN SOURCE: NORMAL

## 2020-04-27 PROCEDURE — 87493 C DIFF AMPLIFIED PROBE: CPT | Performed by: INTERNAL MEDICINE

## 2020-05-20 DIAGNOSIS — R06.02 SOB (SHORTNESS OF BREATH) ON EXERTION: ICD-10-CM

## 2020-05-20 DIAGNOSIS — R05.9 COUGH: ICD-10-CM

## 2020-06-08 ENCOUNTER — TELEPHONE (OUTPATIENT)
Dept: INTERNAL MEDICINE | Facility: CLINIC | Age: 75
End: 2020-06-08

## 2020-06-08 DIAGNOSIS — I47.10 SVT (SUPRAVENTRICULAR TACHYCARDIA) (H): Primary | ICD-10-CM

## 2020-06-08 NOTE — TELEPHONE ENCOUNTER
Patient calling  Requesting a referral to cardiology for irregular heart beat and swelling of legs.    Ok to call and lm [phone]

## 2020-06-15 ENCOUNTER — PRE VISIT (OUTPATIENT)
Dept: CARDIOLOGY | Facility: CLINIC | Age: 75
End: 2020-06-15

## 2020-06-17 ENCOUNTER — VIRTUAL VISIT (OUTPATIENT)
Dept: CARDIOLOGY | Facility: CLINIC | Age: 75
End: 2020-06-17
Payer: MEDICARE

## 2020-06-17 VITALS
DIASTOLIC BLOOD PRESSURE: 86 MMHG | WEIGHT: 175.8 LBS | HEART RATE: 83 BPM | HEIGHT: 68 IN | SYSTOLIC BLOOD PRESSURE: 144 MMHG | BODY MASS INDEX: 26.64 KG/M2

## 2020-06-17 DIAGNOSIS — R00.0 TACHYCARDIA: Primary | ICD-10-CM

## 2020-06-17 PROCEDURE — 99443 ZZC PHYSICIAN TELEPHONE EVALUATION 21-30 MIN: CPT | Performed by: INTERNAL MEDICINE

## 2020-06-17 ASSESSMENT — MIFFLIN-ST. JEOR: SCORE: 1340.92

## 2020-06-17 NOTE — PROGRESS NOTES
"Teri Beltran is a 74 year old female who is being evaluated via a billable telephone visit.      The patient has been notified of following:     \"This telephone visit will be conducted via a call between you and your physician/provider. We have found that certain health care needs can be provided without the need for a physical exam.  This service lets us provide the care you need with a short phone conversation.  If a prescription is necessary we can send it directly to your pharmacy.  If lab work is needed we can place an order for that and you can then stop by our lab to have the test done at a later time.    Telephone visits are billed at different rates depending on your insurance coverage. During this emergency period, for some insurers they may be billed the same as an in-person visit.  Please reach out to your insurance provider with any questions.    If during the course of the call the physician/provider feels a telephone visit is not appropriate, you will not be charged for this service.\"    Patient has given verbal consent for Telephone visit?  Yes    What phone number would you like to be contacted at? [phone]    How would you like to obtain your AVS? Mail a copy        Patient reported vitals:  BP:144/86  Heart rate:83  Weight:175.8    Review Of Systems  Skin: negative  Eyes: negative  Ears/Nose/Throat: negative  Respiratory: Shortness of breath  Cardiovascular: palpitations, tachycardia and lower extremity edema lightheaded  Gastrointestinal: negative  Genitourinary: negative  Musculoskeletal: hip pain  Neurologic: negative  Psychiatric: sleep disturbance  Hematologic/Lymphatic/Immunologic: allergies  Endocrine: thyroid disorder    DALJIT Edwards        PHYSICIAN NOTE:  This visit was completed via telephone due to COVID-19 precautions.  The patient provided consent for a telephone visit.  I had the pleasure speaking to Ms. Kirit Beltran as part of a telephone visit today.    The patient is a " "very pleasant 74-year-old female with the following chronic medical issues:  A.  Exercise-induced syncope in 2016.  EP study was inconclusive.  Of note, she developed hypotension on dobutamine infusion.  She underwent placement of a [employer] loop recorder.  The monitor reached EOL in December 2019.  B.  Transient asymptomatic 2:1 AV block in 2016.  We have not seen significant bradycardia over the past 3 years of monitoring.   C.  Hypertension with an element of white-coat hypertension.   D.  Lower extremity edema with previous saphenous vein RF ablation by Dr. Garcia.  E.  Recent episodes of tachycardia, see details below.    I last saw the patient in November 2019.  In December 2019 the patient saw her PCP complaining of HILL.  Dobutamine stress echocardiography in January 2020 showed no ischemia.     More recently, Kirit has been troubled by episodes of tachycardia.  These reportedly started last winter.  In February 2020 she presented to the [facility] emergency room complaining of tachycardia.  In the ER her ECG showed sinus or atrial tachycardia at 112 bpm.  While in the ER she had a chest CT angiogram that showed no pulmonary embolism.    Subsequently, the patient wore a 14-day cardiac monitor that showed nonsustained SVT but no prolonged arrhythmia.  There was a single brief episode of nonsustained VT.      Because of tachycardia symptoms her PCP increased the metoprolol.  The patient currently takes 75 mg twice daily.  However, rate is still having tachycardia episodes.  Her heart rate gets stuck in the 110s-120s and this can last for hours.  She feels wiped out and short of breath.  She says they are \"hit or miss\" and some days a week or 2 past without any symptoms.      DIAGNOSTIC STUDIES:  Labs: TSH 2.4  12-lead ECG: As per HPI  Echocardiogram: As per HPI      IMPRESSION:  1. Paroxysmal tachycardia.  This sounds like an SVT or atrial fibrillation.  Here ECG in the ER is compatible with atrial tachycardia or " sinus tachycardia.  In talking to the patient today it sounds like she had similar symptoms, though not as frequently, while her implantable loop recorder was still functional.  We never documented a clear sustained arrhythmia, however.  2. HILL.  Likely of noncardiac origin.  3. ILR has reached EOL.  I offered removal, if the patient desires, but the patient prefers not to explant the device at this time.    RECOMMENDATIONS:  1. Repeat 14-day cardiac monitor.  If inconclusive, consider EP study or a Cardiamobile device.  2. Continue metoprolol.  3. Follow-up to be arranged after reviewing her monitor results.    It was my pleasure speaking to the patient today.      Silke Villareal MD, [facility]      Telephone visit documentation  Start: 3:30 PM  End: 3:50 PM  Total time in this appointment: 30 minutes

## 2020-06-17 NOTE — LETTER
6/17/2020    [place]  [address]  [facility] [address]    RE: Teri Beltran       Dear Colleague,    I had the pleasure of seeing Teri Beltran in the [facility].    Teri Beltran is a 74 year old female who is being evaluated via a billable telephone visit.        PHYSICIAN NOTE:  This visit was completed via telephone due to COVID-19 precautions.  The patient provided consent for a telephone visit.  I had the pleasure speaking to Ms. Kirit Beltran as part of a telephone visit today.    The patient is a very pleasant 74-year-old female with the following chronic medical issues:  A.  Exercise-induced syncope in 2016.  EP study was inconclusive.  Of note, she developed hypotension on dobutamine infusion.  She underwent placement of a [employer] loop recorder.  The monitor reached EOL in December 2019.  B.  Transient asymptomatic 2:1 AV block in 2016.  We have not seen significant bradycardia over the past 3 years of monitoring.   C.  Hypertension with an element of white-coat hypertension.   D.  Lower extremity edema with previous saphenous vein RF ablation by Dr. Garcia.  E.  Recent episodes of tachycardia, see details below.    I last saw the patient in November 2019.  In December 2019 the patient saw her PCP complaining of HILL.  Dobutamine stress echocardiography in January 2020 showed no ischemia.     More recently, Kirit has been troubled by episodes of tachycardia.  These reportedly started last winter.  In February 2020 she presented to the [facility] emergency room complaining of tachycardia.  In the ER her ECG showed sinus or atrial tachycardia at 112 bpm.  While in the ER she had a chest CT angiogram that showed no pulmonary embolism.    Subsequently, the patient wore a 14-day cardiac monitor that showed nonsustained SVT but no prolonged arrhythmia.  There was a single brief episode of nonsustained VT.      Because of tachycardia symptoms her PCP  "increased the metoprolol.  The patient currently takes 75 mg twice daily.  However, rate is still having tachycardia episodes.  Her heart rate gets stuck in the 110s-120s and this can last for hours.  She feels wiped out and short of breath.  She says they are \"hit or miss\" and some days a week or 2 past without any symptoms.      DIAGNOSTIC STUDIES:  Labs: TSH 2.4  12-lead ECG: As per HPI  Echocardiogram: As per HPI      IMPRESSION:  1. Paroxysmal tachycardia.  This sounds like an SVT or atrial fibrillation.  Here ECG in the ER is compatible with atrial tachycardia or sinus tachycardia.  In talking to the patient today it sounds like she had similar symptoms, though not as frequently, while her implantable loop recorder was still functional.  We never documented a clear sustained arrhythmia, however.  2. HILL.  Likely of noncardiac origin.  3. ILR has reached EOL.  I offered removal, if the patient desires, but the patient prefers not to explant the device at this time.    RECOMMENDATIONS:  1. Repeat 14-day cardiac monitor.  If inconclusive, consider EP study or a Cardiamobile device.  2. Continue metoprolol.  3. Follow-up to be arranged after reviewing her monitor results.    It was my pleasure speaking to the patient today.      Thank you for allowing me to participate in the care of your patient.    Sincerely,     Silke Villareal MD     [facility]    "

## 2020-06-19 DIAGNOSIS — K21.9 GASTROESOPHAGEAL REFLUX DISEASE, ESOPHAGITIS PRESENCE NOT SPECIFIED: Primary | ICD-10-CM

## 2020-06-23 ENCOUNTER — TELEPHONE (OUTPATIENT)
Dept: INTERNAL MEDICINE | Facility: CLINIC | Age: 75
End: 2020-06-23

## 2020-06-23 NOTE — TELEPHONE ENCOUNTER
Pt calling for alternative medication for Ranitidine since it has been taken off the market. Prescription can be sent to pharmacy that is teed up. Please advise. Thanks.

## 2020-06-24 ENCOUNTER — HOSPITAL ENCOUNTER (OUTPATIENT)
Dept: CARDIOLOGY | Facility: CLINIC | Age: 75
Discharge: HOME | End: 2020-06-24
Attending: INTERNAL MEDICINE | Admitting: INTERNAL MEDICINE
Payer: MEDICARE

## 2020-06-24 DIAGNOSIS — R00.0 TACHYCARDIA: ICD-10-CM

## 2020-06-24 PROCEDURE — 0296T ZIO PATCH HOLTER ADULT PEDIATRIC GREATER THAN 48 HRS: CPT

## 2020-06-24 PROCEDURE — 0298T ZIO PATCH HOLTER ADULT PEDIATRIC GREATER THAN 48 HRS: CPT | Performed by: INTERNAL MEDICINE

## 2020-06-24 RX ORDER — FAMOTIDINE 10 MG
10 TABLET ORAL 2 TIMES DAILY
Qty: 60 TABLET | Refills: 1 | Status: SHIPPED | OUTPATIENT
Start: 2020-06-24 | End: 2020-09-03

## 2020-07-03 ENCOUNTER — TELEPHONE (OUTPATIENT)
Dept: INTERNAL MEDICINE | Facility: CLINIC | Age: 75
End: 2020-07-03

## 2020-07-03 NOTE — TELEPHONE ENCOUNTER
Patient is calling because she needs an updated [employer] form. Dr Tipotn from [facility] filled this out the last time. Patient states she has arthritis in her knees which makes it difficult for her to walk any far distance.  Form in your mailbox to be signed.

## 2020-07-04 NOTE — TELEPHONE ENCOUNTER
Without seeing and examining her, I approved the temporary form    For longer form, she needs to talk to her orthopedic doctor or have video appointment with Dr. Wong

## 2020-07-07 DIAGNOSIS — M81.0 OSTEOPOROSIS, UNSPECIFIED OSTEOPOROSIS TYPE, UNSPECIFIED PATHOLOGICAL FRACTURE PRESENCE: ICD-10-CM

## 2020-07-08 NOTE — TELEPHONE ENCOUNTER
Left voicemail informing pt that form is ready to be picked up at . Pt needs to fill out their portion. Copy sent to [employer].

## 2020-07-08 NOTE — TELEPHONE ENCOUNTER
Pending Prescriptions:                       Disp   Refills    alendronate (FOSAMAX) 70 MG tablet         12 tab*1        Sig: TAKE 1 TABLET(70 MG) BY MOUTH EVERY 7 DAYS    Routing refill request to provider for review/approval because:  Dexa is not up to date

## 2020-07-09 ENCOUNTER — TRANSFERRED RECORDS (OUTPATIENT)
Dept: HEALTH INFORMATION MANAGEMENT | Facility: CLINIC | Age: 75
End: 2020-07-09

## 2020-07-10 ENCOUNTER — VIRTUAL VISIT (OUTPATIENT)
Dept: CARDIOLOGY | Facility: CLINIC | Age: 75
End: 2020-07-10
Payer: MEDICARE

## 2020-07-10 VITALS — SYSTOLIC BLOOD PRESSURE: 135 MMHG | DIASTOLIC BLOOD PRESSURE: 81 MMHG | HEART RATE: 75 BPM

## 2020-07-10 DIAGNOSIS — I87.2 VENOUS INSUFFICIENCY: Primary | ICD-10-CM

## 2020-07-10 DIAGNOSIS — I47.10 SVT (SUPRAVENTRICULAR TACHYCARDIA) (H): ICD-10-CM

## 2020-07-10 DIAGNOSIS — I87.323 CHRONIC VENOUS HYPERTENSION (IDIOPATHIC) WITH INFLAMMATION OF BILATERAL LOWER EXTREMITY: ICD-10-CM

## 2020-07-10 DIAGNOSIS — I47.20 VENTRICULAR TACHYCARDIA (H): ICD-10-CM

## 2020-07-10 PROCEDURE — 99441 ZZC PHYSICIAN TELEPHONE EVALUATION 5-10 MIN: CPT | Performed by: INTERNAL MEDICINE

## 2020-07-10 NOTE — LETTER
7/10/2020    [place]  [address] [address]  [facility] [address]    RE: Teri Beltran       Dear Colleague,    I had the pleasure of seeing Teri Beltran in the [facility].      Teri Beltran is a 74 year old female who is being evaluated via a billable telephone visit.      PROVIDER NOTE:     HPI: 74 year old PMH HTN, Tachycardia, Hyperlipidemia, Palpatations, RBBB, First Degree AV Block, Mild Dilation of Ascending Aorta, mild LVH, mild left atrium dilatation, LLE, venous insufficiency s/p bilateral great saphenous vein radiofrequency ablation.    She describes some ankle swelling, some varicose veins in the calf and ankle. Wearing compression stockings in colder months. H/o total knee replacement. No SOB or CP. Has palpitations. Has ILR for which battery , plan for explantation at some point. Ziopatch was placed in  which is pending, has been having rapid heart beat. Follows with Dr. Villareal.     Closest [place] is [place].     ASSESSMENT/PLAN:    Recurrent venous insufficiency.  1. Recommend compression stockings when able  2. Venous competency study (and DVT study)  3. Follow up in 2 months with me    Joie Pavon MD [facility]    Total time spent 10 minutes.     Encounter Diagnoses   Name Primary?     SVT (supraventricular tachycardia) (H)      Ventricular tachycardia (H)        CURRENT MEDICATIONS:  Current Outpatient Medications   Medication Sig Dispense Refill     alendronate (FOSAMAX) 70 MG tablet TAKE 1 TABLET(70 MG) BY MOUTH EVERY 7 DAYS 12 tablet 1     dorzolamide-timolol (COSOPT) 2-0.5 % ophthalmic solution Place 1 drop into both eyes 2 times daily       famotidine (PEPCID) 10 MG tablet Take 1 tablet (10 mg) by mouth 2 times daily 60 tablet 1     fluticasone (FLONASE) 50 MCG/ACT nasal spray Spray 1-2 sprays into both nostrils daily 48 g 1     fluticasone-vilanterol (BREO ELLIPTA) 100-25 MCG/INH inhaler Inhale 1 puff into  the lungs daily 1 Inhaler 0     hydrOXYzine (ATARAX) 25 MG tablet Take 1 tablet (25 mg) by mouth 3 times daily as needed for anxiety 30 tablet 1     latanoprost (XALATAN) 0.005 % ophthalmic solution Place 1 drop into the right eye At Bedtime        levothyroxine (SYNTHROID/LEVOTHROID) 88 MCG tablet Take 1 tablet (88 mcg) by mouth daily 90 tablet 1     lisinopril-hydrochlorothiazide (PRINZIDE/ZESTORETIC) 20-25 MG tablet Take 1 tablet by mouth daily 90 tablet 1     metoprolol tartrate (LOPRESSOR) 50 MG tablet Take 1.5 tablets (75 mg) by mouth 2 times daily 270 tablet 0     mirtazapine (REMERON) 7.5 MG tablet Take 1 tablet (7.5 mg) by mouth At Bedtime 30 tablet 0     pravastatin (PRAVACHOL) 40 MG tablet Take 1 tablet (40 mg) by mouth At Bedtime 90 tablet 1       ALLERGIES     Allergies   Allergen Reactions     Seasonal Allergies        PAST MEDICAL HISTORY:  Past Medical History:   Diagnosis Date     Complication of anesthesia     confusion after anesthesia (after hysterectomy)     First degree AV block 8/11/2015     Former smoker      Gastroesophageal reflux disease      GERD (gastroesophageal reflux disease)      Glaucoma      Heart block     2:1     Hyperlipidemia LDL goal <130 6/14/2013     Hypertension goal BP (blood pressure) < 140/90 12/3/2013     Hypothyroidism      Left atrial dilatation     mild     Mild dilation of ascending aorta - borderline 8/11/2015     Palpitations      Prediabetes 6/14/2013     RBBB 8/11/2015     S/P total knee arthroplasty 7/23/2019     Tachycardia      Varicose veins      Venous insufficiency     s/p bilateral great saphenous vein radiofrequency ablation by Dr. Garcia       PAST SURGICAL HISTORY:  Past Surgical History:   Procedure Laterality Date     ARTHROPLASTY KNEE Right 7/23/2019    Procedure: Right total knee arthroplasty using an [employer] knee system;  Surgeon: Dragan Muse MD;  Location: [facility] OR     BREAST SURGERY      right breast ductal surgery due to  milk production     COLONOSCOPY N/A 10/24/2014    no further screening. Procedure: COLONOSCOPY;  Surgeon: Pedro Rudd MD;  Location: [facility] GI     HYSTERECTOMY, PAP NO LONGER INDICATED      total hysterectomy and ovaries. benign     SOFT TISSUE SURGERY      ganglion removed from left wrist and ring finger     SURGICAL HISTORY OF -   age 7    tonsillectomy     SURGICAL HISTORY OF -   1/15    left eye cataract     SURGICAL HISTORY OF -   2016    LINQ placed.       FAMILY HISTORY:  Family History   Problem Relation Age of Onset     Cancer Mother         ovarian     Breast Cancer Mother      Heart Disease Father         rare; not sure what it was     Diabetes Father         old age     Cerebrovascular Disease Brother 68       SOCIAL HISTORY:  Social History     Socioeconomic History     Marital status:      Spouse name: None     Number of children: None     Years of education: None     Highest education level: None   Occupational History     None   Social Needs     Financial resource strain: None     Food insecurity     Worry: None     Inability: None     Transportation needs     Medical: None     Non-medical: None   Tobacco Use     Smoking status: Former Smoker     Packs/day: 0.50     Years: 18.00     Pack years: 9.00     Types: Cigarettes     Last attempt to quit: 1980     Years since quittin.5     Smokeless tobacco: Never Used   Substance and Sexual Activity     Alcohol use: Yes     Alcohol/week: 0.0 standard drinks     Comment: 1 glass of wine at night     Drug use: No     Sexual activity: Yes     Partners: Male   Lifestyle     Physical activity     Days per week: None     Minutes per session: None     Stress: None   Relationships     Social connections     Talks on phone: None     Gets together: None     Attends Jewish service: None     Active member of club or organization: None     Attends meetings of clubs or organizations: None     Relationship status: None     Intimate partner  violence     Fear of current or ex partner: None     Emotionally abused: None     Physically abused: None     Forced sexual activity: None   Other Topics Concern     Parent/sibling w/ CABG, MI or angioplasty before 65F 55M? Not Asked      Service Not Asked     Blood Transfusions Not Asked     Caffeine Concern No     Comment: 4-5 cups of coffee daily     Occupational Exposure Not Asked     Hobby Hazards Not Asked     Sleep Concern Not Asked     Stress Concern Not Asked     Weight Concern Not Asked     Special Diet No     Comment: no added salt     Back Care Not Asked     Exercise No     Comment: 3 days per week - exercise class      Bike Helmet Not Asked     Seat Belt Not Asked     Self-Exams Not Asked   Social History Narrative     None       Review of Systems:  Skin:  Negative     Eyes:  Negative glaucoma;cataracts  ENT:  Positive for    Respiratory:  Positive for dyspnea on exertion  Cardiovascular:    Positive for;palpitations;edema;syncope or near-syncope;lightheadedness  Gastroenterology: Negative    Genitourinary:  Negative    Musculoskeletal:  Positive for arthritis;joint pain  Neurologic:  Negative    Psychiatric:  Positive for sleep disturbances  Heme/Lymph/Imm:  Positive for allergies  Endocrine:  Positive for thyroid disorder    Physical Exam:  Vitals: /81   Pulse 75   LMP  (LMP Unknown)       Recent Lab Results:  LIPID RESULTS:  Lab Results   Component Value Date    CHOL 188 10/28/2019    HDL 93 10/28/2019    LDL 76 10/28/2019    TRIG 94 10/28/2019    CHOLHDLRATIO 2.2 08/11/2015       LIVER ENZYME RESULTS:  Lab Results   Component Value Date    AST 14 10/28/2019    ALT 24 10/28/2019       CBC RESULTS:  Lab Results   Component Value Date    WBC 7.8 02/21/2020    RBC 3.82 02/21/2020    HGB 11.8 02/21/2020    HCT 36.8 02/21/2020    MCV 96 02/21/2020    MCH 30.9 02/21/2020    MCHC 32.1 02/21/2020    RDW 14.4 02/21/2020     02/21/2020       BMP RESULTS:  Lab Results   Component Value  Date     02/21/2020    POTASSIUM 4.0 02/21/2020    CHLORIDE 104 02/21/2020    CO2 29 02/21/2020    ANIONGAP 4 02/21/2020     (H) 02/21/2020    BUN 33 (H) 02/21/2020    CR 0.78 02/21/2020    GFRESTIMATED 75 02/21/2020    GFRESTBLACK 86 02/21/2020    LILIA 9.1 02/21/2020        A1C RESULTS:  Lab Results   Component Value Date    A1C 5.7 (H) 10/28/2019       INR RESULTS:  No results found for: INR      Thank you for allowing me to participate in the care of your patient.    Sincerely,     Joie Pavon MD     [facility]    cc:   Rachel Miller MD  [address] [place] [address]

## 2020-07-10 NOTE — PROGRESS NOTES
"           Vascular Cardiology Consultation      Teri Beltran is a 74 year old female who is being evaluated via a billable telephone visit.      The patient has been notified of following:     \"This telephone visit will be conducted via a call between you and your physician/provider. We have found that certain health care needs can be provided without the need for a physical exam.  This service lets us provide the care you need with a short phone conversation.  If a prescription is necessary we can send it directly to your pharmacy.  If lab work is needed we can place an order for that and you can then stop by our lab to have the test done at a later time.    Telephone visits are billed at different rates depending on your insurance coverage. During this emergency period, for some insurers they may be billed the same as an in-person visit.  Please reach out to your insurance provider with any questions.    If during the course of the call the physician/provider feels a telephone visit is not appropriate, you will not be charged for this service.\"    Patient has given verbal consent for Telephone visit?  Yes    What phone number would you like to be contacted at? [phone]    How would you like to obtain your AVS? Mail a copy    PROVIDER NOTE:     HPI: 74 year old PMH HTN, Tachycardia, Hyperlipidemia, Palpatations, RBBB, First Degree AV Block, Mild Dilation of Ascending Aorta, mild LVH, mild left atrium dilatation, LLE, venous insufficiency s/p bilateral great saphenous vein radiofrequency ablation.    She describes some ankle swelling, some varicose veins in the calf and ankle. Wearing compression stockings in colder months. H/o total knee replacement. No SOB or CP. Has palpitations. Has ILR for which battery , plan for explantation at some point. Ziopatch was placed in  which is pending, has been having rapid heart beat. Follows with Dr. Villareal.     Closest [place] is [place]. "     ASSESSMENT/PLAN:    Recurrent venous insufficiency.  1. Recommend compression stockings when able  2. Venous competency study (and DVT study)  3. Follow up in 2 months with me    Joie Pavon MD MSc  [facility]    Total time spent 10 minutes.     Encounter Diagnoses   Name Primary?     SVT (supraventricular tachycardia) (H)      Ventricular tachycardia (H)        CURRENT MEDICATIONS:  Current Outpatient Medications   Medication Sig Dispense Refill     alendronate (FOSAMAX) 70 MG tablet TAKE 1 TABLET(70 MG) BY MOUTH EVERY 7 DAYS 12 tablet 1     dorzolamide-timolol (COSOPT) 2-0.5 % ophthalmic solution Place 1 drop into both eyes 2 times daily       famotidine (PEPCID) 10 MG tablet Take 1 tablet (10 mg) by mouth 2 times daily 60 tablet 1     fluticasone (FLONASE) 50 MCG/ACT nasal spray Spray 1-2 sprays into both nostrils daily 48 g 1     fluticasone-vilanterol (BREO ELLIPTA) 100-25 MCG/INH inhaler Inhale 1 puff into the lungs daily 1 Inhaler 0     hydrOXYzine (ATARAX) 25 MG tablet Take 1 tablet (25 mg) by mouth 3 times daily as needed for anxiety 30 tablet 1     latanoprost (XALATAN) 0.005 % ophthalmic solution Place 1 drop into the right eye At Bedtime        levothyroxine (SYNTHROID/LEVOTHROID) 88 MCG tablet Take 1 tablet (88 mcg) by mouth daily 90 tablet 1     lisinopril-hydrochlorothiazide (PRINZIDE/ZESTORETIC) 20-25 MG tablet Take 1 tablet by mouth daily 90 tablet 1     metoprolol tartrate (LOPRESSOR) 50 MG tablet Take 1.5 tablets (75 mg) by mouth 2 times daily 270 tablet 0     mirtazapine (REMERON) 7.5 MG tablet Take 1 tablet (7.5 mg) by mouth At Bedtime 30 tablet 0     pravastatin (PRAVACHOL) 40 MG tablet Take 1 tablet (40 mg) by mouth At Bedtime 90 tablet 1       ALLERGIES     Allergies   Allergen Reactions     Seasonal Allergies        PAST MEDICAL HISTORY:  Past Medical History:   Diagnosis Date     Complication of anesthesia     confusion after anesthesia (after hysterectomy)     First  degree AV block 8/11/2015     Former smoker      Gastroesophageal reflux disease      GERD (gastroesophageal reflux disease)      Glaucoma      Heart block     2:1     Hyperlipidemia LDL goal <130 6/14/2013     Hypertension goal BP (blood pressure) < 140/90 12/3/2013     Hypothyroidism      Left atrial dilatation     mild     Mild dilation of ascending aorta - borderline 8/11/2015     Palpitations      Prediabetes 6/14/2013     RBBB 8/11/2015     S/P total knee arthroplasty 7/23/2019     Tachycardia      Varicose veins      Venous insufficiency     s/p bilateral great saphenous vein radiofrequency ablation by Dr. Garcia       PAST SURGICAL HISTORY:  Past Surgical History:   Procedure Laterality Date     ARTHROPLASTY KNEE Right 7/23/2019    Procedure: Right total knee arthroplasty using an [employer] knee system;  Surgeon: Dragan Muse MD;  Location: [facility] OR     BREAST SURGERY      right breast ductal surgery due to milk production     COLONOSCOPY N/A 10/24/2014    no further screening. Procedure: COLONOSCOPY;  Surgeon: Pedro Rudd MD;  Location:  GI     HYSTERECTOMY, PAP NO LONGER INDICATED  2009    total hysterectomy and ovaries. benign     SOFT TISSUE SURGERY      ganglion removed from left wrist and ring finger     SURGICAL HISTORY OF -   age 7    tonsillectomy     SURGICAL HISTORY OF -   1/15    left eye cataract     SURGICAL HISTORY OF -   Fall 2016    LINQ placed.       FAMILY HISTORY:  Family History   Problem Relation Age of Onset     Cancer Mother         ovarian     Breast Cancer Mother      Heart Disease Father         rare; not sure what it was     Diabetes Father         old age     Cerebrovascular Disease Brother 68       SOCIAL HISTORY:  Social History     Socioeconomic History     Marital status:      Spouse name: None     Number of children: None     Years of education: None     Highest education level: None   Occupational History     None   Social Needs      Financial resource strain: None     Food insecurity     Worry: None     Inability: None     Transportation needs     Medical: None     Non-medical: None   Tobacco Use     Smoking status: Former Smoker     Packs/day: 0.50     Years: 18.00     Pack years: 9.00     Types: Cigarettes     Last attempt to quit: 1980     Years since quittin.5     Smokeless tobacco: Never Used   Substance and Sexual Activity     Alcohol use: Yes     Alcohol/week: 0.0 standard drinks     Comment: 1 glass of wine at night     Drug use: No     Sexual activity: Yes     Partners: Male   Lifestyle     Physical activity     Days per week: None     Minutes per session: None     Stress: None   Relationships     Social connections     Talks on phone: None     Gets together: None     Attends Rastafari service: None     Active member of club or organization: None     Attends meetings of clubs or organizations: None     Relationship status: None     Intimate partner violence     Fear of current or ex partner: None     Emotionally abused: None     Physically abused: None     Forced sexual activity: None   Other Topics Concern     Parent/sibling w/ CABG, MI or angioplasty before 65F 55M? Not Asked      Service Not Asked     Blood Transfusions Not Asked     Caffeine Concern No     Comment: 4-5 cups of coffee daily     Occupational Exposure Not Asked     Hobby Hazards Not Asked     Sleep Concern Not Asked     Stress Concern Not Asked     Weight Concern Not Asked     Special Diet No     Comment: no added salt     Back Care Not Asked     Exercise No     Comment: 3 days per week - exercise class      Bike Helmet Not Asked     Seat Belt Not Asked     Self-Exams Not Asked   Social History Narrative     None       Review of Systems:  Skin:  Negative     Eyes:  Negative glaucoma;cataracts  ENT:  Positive for    Respiratory:  Positive for dyspnea on exertion  Cardiovascular:    Positive for;palpitations;edema;syncope or  near-syncope;lightheadedness  Gastroenterology: Negative    Genitourinary:  Negative    Musculoskeletal:  Positive for arthritis;joint pain  Neurologic:  Negative    Psychiatric:  Positive for sleep disturbances  Heme/Lymph/Imm:  Positive for allergies  Endocrine:  Positive for thyroid disorder    Physical Exam:  Vitals: /81   Pulse 75   LMP  (LMP Unknown)       Recent Lab Results:  LIPID RESULTS:  Lab Results   Component Value Date    CHOL 188 10/28/2019    HDL 93 10/28/2019    LDL 76 10/28/2019    TRIG 94 10/28/2019    CHOLHDLRATIO 2.2 08/11/2015       LIVER ENZYME RESULTS:  Lab Results   Component Value Date    AST 14 10/28/2019    ALT 24 10/28/2019       CBC RESULTS:  Lab Results   Component Value Date    WBC 7.8 02/21/2020    RBC 3.82 02/21/2020    HGB 11.8 02/21/2020    HCT 36.8 02/21/2020    MCV 96 02/21/2020    MCH 30.9 02/21/2020    MCHC 32.1 02/21/2020    RDW 14.4 02/21/2020     02/21/2020       BMP RESULTS:  Lab Results   Component Value Date     02/21/2020    POTASSIUM 4.0 02/21/2020    CHLORIDE 104 02/21/2020    CO2 29 02/21/2020    ANIONGAP 4 02/21/2020     (H) 02/21/2020    BUN 33 (H) 02/21/2020    CR 0.78 02/21/2020    GFRESTIMATED 75 02/21/2020    GFRESTBLACK 86 02/21/2020    LILIA 9.1 02/21/2020        A1C RESULTS:  Lab Results   Component Value Date    A1C 5.7 (H) 10/28/2019       INR RESULTS:  No results found for: INR      CC  Rachel Miller MD  [address] [place] [address]

## 2020-07-11 RX ORDER — ALENDRONATE SODIUM 70 MG/1
TABLET ORAL
Qty: 12 TABLET | Refills: 1 | Status: ON HOLD | OUTPATIENT
Start: 2020-07-11 | End: 2020-12-16

## 2020-07-16 DIAGNOSIS — R05.9 COUGH: ICD-10-CM

## 2020-07-16 DIAGNOSIS — R06.02 SOB (SHORTNESS OF BREATH) ON EXERTION: ICD-10-CM

## 2020-07-16 NOTE — TELEPHONE ENCOUNTER
"Requested Prescriptions   Pending Prescriptions Disp Refills     fluticasone-vilanterol (BREO ELLIPTA) 100-25 MCG/INH inhaler 1 Inhaler 0     Sig: Inhale 1 puff into the lungs daily       Inhaled Steroids Protocol Passed - 7/16/2020  1:36 PM        Passed - Patient is age 12 or older        Passed - Recent (12 mo) or future (30 days) visit within the authorizing provider's specialty     Patient has had an office visit with the authorizing provider or a provider within the authorizing providers department within the previous 12 mos or has a future within next 30 days. See \"Patient Info\" tab in inbasket, or \"Choose Columns\" in Meds & Orders section of the refill encounter.              Passed - Medication is active on med list           Last office visit 4/3/20.  Prescription approved per [facility] Refill Protocol.  Amaris De La Torre RN      "

## 2020-07-21 ENCOUNTER — PRE VISIT (OUTPATIENT)
Dept: PULMONOLOGY | Facility: CLINIC | Age: 75
End: 2020-07-21

## 2020-07-21 ENCOUNTER — VIRTUAL VISIT (OUTPATIENT)
Dept: PULMONOLOGY | Facility: CLINIC | Age: 75
End: 2020-07-21
Attending: INTERNAL MEDICINE
Payer: MEDICARE

## 2020-07-21 DIAGNOSIS — R59.0 MEDIASTINAL ADENOPATHY: ICD-10-CM

## 2020-07-21 DIAGNOSIS — J47.9 BRONCHIECTASIS WITHOUT COMPLICATION (H): Primary | ICD-10-CM

## 2020-07-21 NOTE — LETTER
"    7/21/2020         RE: Teri Beltran  [address] [facility] [address]        Dear Colleague,    Thank you for referring your patient, Trei Beltran, to the [facility]. Please see a copy of my visit note below.    Teri Beltran is a 74 year old female who is being evaluated via a billable telephone visit.          Phone call duration: 15 minutes    Ronna Garcia MD      [facility] Pulmonology Initial Visit    Reason for Visit  Teri Beltran is a 74 year old female who is referred by Dr Mcintyre for lymphadenopathy and bronchiectasis  Pulmonary HPI  She presented to the ED for rapid heart rate, had a CT chest showing paratracheal lymphadenopathy. She takes Breo, prescribed in December 2019, she is now taking it sporadically, about twice a week.   Severe bout of \"double pneumonia\" as a child. Also told she had light asthma as a child but not treated. No fever, chills, sweats, weight loss. No other complaints.  She is expecting hip replacement surgery coming up. She has had diarrhea, which is improved with metamucil.     The patient was seen and examined by Ronna Garcia MD   Current Outpatient Medications   Medication     alendronate (FOSAMAX) 70 MG tablet     dorzolamide-timolol (COSOPT) 2-0.5 % ophthalmic solution     famotidine (PEPCID) 10 MG tablet     fluticasone (FLONASE) 50 MCG/ACT nasal spray     fluticasone-vilanterol (BREO ELLIPTA) 100-25 MCG/INH inhaler     hydrOXYzine (ATARAX) 25 MG tablet     latanoprost (XALATAN) 0.005 % ophthalmic solution     levothyroxine (SYNTHROID/LEVOTHROID) 88 MCG tablet     lisinopril-hydrochlorothiazide (PRINZIDE/ZESTORETIC) 20-25 MG tablet     metoprolol tartrate (LOPRESSOR) 50 MG tablet     mirtazapine (REMERON) 7.5 MG tablet     pravastatin (PRAVACHOL) 40 MG tablet     No current facility-administered medications for this visit.      Allergies   Allergen Reactions     Seasonal Allergies "      Social History     Socioeconomic History     Marital status:      Spouse name: Not on file     Number of children: Not on file     Years of education: Not on file     Highest education level: Not on file   Occupational History     Not on file   Social Needs     Financial resource strain: Not on file     Food insecurity     Worry: Not on file     Inability: Not on file     Transportation needs     Medical: Not on file     Non-medical: Not on file   Tobacco Use     Smoking status: Former Smoker     Packs/day: 0.50     Years: 18.00     Pack years: 9.00     Types: Cigarettes     Last attempt to quit:      Years since quittin.5     Smokeless tobacco: Never Used   Substance and Sexual Activity     Alcohol use: Yes     Alcohol/week: 0.0 standard drinks     Comment: 1 glass of wine at night     Drug use: No     Sexual activity: Yes     Partners: Male   Lifestyle     Physical activity     Days per week: Not on file     Minutes per session: Not on file     Stress: Not on file   Relationships     Social connections     Talks on phone: Not on file     Gets together: Not on file     Attends Anabaptist service: Not on file     Active member of club or organization: Not on file     Attends meetings of clubs or organizations: Not on file     Relationship status: Not on file     Intimate partner violence     Fear of current or ex partner: Not on file     Emotionally abused: Not on file     Physically abused: Not on file     Forced sexual activity: Not on file   Other Topics Concern     Parent/sibling w/ CABG, MI or angioplasty before 65F 55M? Not Asked      Service Not Asked     Blood Transfusions Not Asked     Caffeine Concern No     Comment: 4-5 cups of coffee daily     Occupational Exposure Not Asked     Hobby Hazards Not Asked     Sleep Concern Not Asked     Stress Concern Not Asked     Weight Concern Not Asked     Special Diet No     Comment: no added salt     Back Care Not Asked     Exercise No      Comment: 3 days per week - exercise class      Bike Helmet Not Asked     Seat Belt Not Asked     Self-Exams Not Asked   Social History Narrative     Not on file     Past Medical History:   Diagnosis Date     Complication of anesthesia     confusion after anesthesia (after hysterectomy)     First degree AV block 8/11/2015     Former smoker      Gastroesophageal reflux disease      GERD (gastroesophageal reflux disease)      Glaucoma      Heart block     2:1     Hyperlipidemia LDL goal <130 6/14/2013     Hypertension goal BP (blood pressure) < 140/90 12/3/2013     Hypothyroidism      Left atrial dilatation     mild     Mild dilation of ascending aorta - borderline 8/11/2015     Palpitations      Prediabetes 6/14/2013     RBBB 8/11/2015     S/P total knee arthroplasty 7/23/2019     Tachycardia      Varicose veins      Venous insufficiency     s/p bilateral great saphenous vein radiofrequency ablation by Dr. Garcia     Past Surgical History:   Procedure Laterality Date     ARTHROPLASTY KNEE Right 7/23/2019    Procedure: Right total knee arthroplasty using an [employer] knee system;  Surgeon: Dragan Muse MD;  Location: [facility] OR     BREAST SURGERY      right breast ductal surgery due to milk production     COLONOSCOPY N/A 10/24/2014    no further screening. Procedure: COLONOSCOPY;  Surgeon: Pedro Rudd MD;  Location:  GI     HYSTERECTOMY, PAP NO LONGER INDICATED  2009    total hysterectomy and ovaries. benign     SOFT TISSUE SURGERY      ganglion removed from left wrist and ring finger     SURGICAL HISTORY OF -   age 7    tonsillectomy     SURGICAL HISTORY OF -   1/15    left eye cataract     SURGICAL HISTORY OF -   Fall 2016    LINQ placed.     Family History   Problem Relation Age of Onset     Cancer Mother         ovarian     Breast Cancer Mother      Heart Disease Father         rare; not sure what it was     Diabetes Father         old age     Cerebrovascular Disease Brother 68     ROS  Pulmonary  Dyspnea: No, Cough: No, Chest pain: No, Wheezing: No, Sputum Production: No, Hemoptysis: No  A complete ROS was otherwise negative except as noted in the HPI.  LMP  (LMP Unknown)   Exam:   NEURO: Mentation intact, speech normal,   PSYCH: mentation appears normal. and affect normal/bright  Results:  Normal spirometry Nov 2019    Assessment and plan: Kirit is a a 73 yo female with recent abnormal chest imaging. CT chest was done showing some abnormalities. She found the CT chest procedure difficult because of claustrophobia. She has no systemic symptoms other than tachycardia.   Incidental paratracheal lymphadenopathy - she does have one enlarged lymph node, that was previously seen on a CT done in 2016.  Given the duration of time this is been present, it is relatively innocuous appearing.  Furthermore, she is really unwilling to have repeat CT scans.  I do not think there is anything worrisome here to further evaluate.  Bronchiectasis on CT - also seen incidentally on this CT done she has a history of severe pneumonia as a child, which is likely the cause. She has no symptoms of frequent respiratory infections or cough, mucus production.   She has been using Breo sporadically, is not quite clear what this is being used to treat but I suggested she should probably stop it if she is only using it infrequently anyway.  Follow-up only if needed        Again, thank you for allowing me to participate in the care of your patient.        Sincerely,        Ronna Garcia MD

## 2020-07-21 NOTE — PROGRESS NOTES
"Teri Beltran is a 74 year old female who is being evaluated via a billable telephone visit.      The patient has been notified of following:     \"This telephone visit will be conducted via a call between you and your physician/provider. We have found that certain health care needs can be provided without the need for a physical exam.  This service lets us provide the care you need with a short phone conversation.  If a prescription is necessary we can send it directly to your pharmacy.  If lab work is needed we can place an order for that and you can then stop by our lab to have the test done at a later time.    Telephone visits are billed at different rates depending on your insurance coverage. During this emergency period, for some insurers they may be billed the same as an in-person visit.  Please reach out to your insurance provider with any questions.    If during the course of the call the physician/provider feels a telephone visit is not appropriate, you will not be charged for this service.\"    Patient has given verbal consent for Telephone visit?  Yes    What phone number would you like to be contacted at? [phone]    How would you like to obtain your AVS? Mail a copy    Phone call duration: 15 minutes    Ronna Garcia MD      [facility] Pulmonology Initial Visit    Reason for Visit  Teri Beltran is a 74 year old female who is referred by Dr Mcintyre for lymphadenopathy and bronchiectasis  Pulmonary HPI  She presented to the ED for rapid heart rate, had a CT chest showing paratracheal lymphadenopathy. She takes Breo, prescribed in December 2019, she is now taking it sporadically, about twice a week.   Severe bout of \"double pneumonia\" as a child. Also told she had light asthma as a child but not treated. No fever, chills, sweats, weight loss. No other complaints.  She is expecting hip replacement surgery coming up. She has had diarrhea, which is improved with metamucil. "     The patient was seen and examined by Ronna Garcia MD   Current Outpatient Medications   Medication     alendronate (FOSAMAX) 70 MG tablet     dorzolamide-timolol (COSOPT) 2-0.5 % ophthalmic solution     famotidine (PEPCID) 10 MG tablet     fluticasone (FLONASE) 50 MCG/ACT nasal spray     fluticasone-vilanterol (BREO ELLIPTA) 100-25 MCG/INH inhaler     hydrOXYzine (ATARAX) 25 MG tablet     latanoprost (XALATAN) 0.005 % ophthalmic solution     levothyroxine (SYNTHROID/LEVOTHROID) 88 MCG tablet     lisinopril-hydrochlorothiazide (PRINZIDE/ZESTORETIC) 20-25 MG tablet     metoprolol tartrate (LOPRESSOR) 50 MG tablet     mirtazapine (REMERON) 7.5 MG tablet     pravastatin (PRAVACHOL) 40 MG tablet     No current facility-administered medications for this visit.      Allergies   Allergen Reactions     Seasonal Allergies      Social History     Socioeconomic History     Marital status:      Spouse name: Not on file     Number of children: Not on file     Years of education: Not on file     Highest education level: Not on file   Occupational History     Not on file   Social Needs     Financial resource strain: Not on file     Food insecurity     Worry: Not on file     Inability: Not on file     Transportation needs     Medical: Not on file     Non-medical: Not on file   Tobacco Use     Smoking status: Former Smoker     Packs/day: 0.50     Years: 18.00     Pack years: 9.00     Types: Cigarettes     Last attempt to quit: 1980     Years since quittin.5     Smokeless tobacco: Never Used   Substance and Sexual Activity     Alcohol use: Yes     Alcohol/week: 0.0 standard drinks     Comment: 1 glass of wine at night     Drug use: No     Sexual activity: Yes     Partners: Male   Lifestyle     Physical activity     Days per week: Not on file     Minutes per session: Not on file     Stress: Not on file   Relationships     Social connections     Talks on phone: Not on file     Gets together: Not on file      Attends Synagogue service: Not on file     Active member of club or organization: Not on file     Attends meetings of clubs or organizations: Not on file     Relationship status: Not on file     Intimate partner violence     Fear of current or ex partner: Not on file     Emotionally abused: Not on file     Physically abused: Not on file     Forced sexual activity: Not on file   Other Topics Concern     Parent/sibling w/ CABG, MI or angioplasty before 65F 55M? Not Asked      Service Not Asked     Blood Transfusions Not Asked     Caffeine Concern No     Comment: 4-5 cups of coffee daily     Occupational Exposure Not Asked     Hobby Hazards Not Asked     Sleep Concern Not Asked     Stress Concern Not Asked     Weight Concern Not Asked     Special Diet No     Comment: no added salt     Back Care Not Asked     Exercise No     Comment: 3 days per week - exercise class      Bike Helmet Not Asked     Seat Belt Not Asked     Self-Exams Not Asked   Social History Narrative     Not on file     Past Medical History:   Diagnosis Date     Complication of anesthesia     confusion after anesthesia (after hysterectomy)     First degree AV block 8/11/2015     Former smoker      Gastroesophageal reflux disease      GERD (gastroesophageal reflux disease)      Glaucoma      Heart block     2:1     Hyperlipidemia LDL goal <130 6/14/2013     Hypertension goal BP (blood pressure) < 140/90 12/3/2013     Hypothyroidism      Left atrial dilatation     mild     Mild dilation of ascending aorta - borderline 8/11/2015     Palpitations      Prediabetes 6/14/2013     RBBB 8/11/2015     S/P total knee arthroplasty 7/23/2019     Tachycardia      Varicose veins      Venous insufficiency     s/p bilateral great saphenous vein radiofrequency ablation by Dr. Garcia     Past Surgical History:   Procedure Laterality Date     ARTHROPLASTY KNEE Right 7/23/2019    Procedure: Right total knee arthroplasty using an [employer] knee system;   Surgeon: Dragan Muse MD;  Location: [facility] OR     BREAST SURGERY      right breast ductal surgery due to milk production     COLONOSCOPY N/A 10/24/2014    no further screening. Procedure: COLONOSCOPY;  Surgeon: Pedro Rudd MD;  Location:  GI     HYSTERECTOMY, PAP NO LONGER INDICATED  2009    total hysterectomy and ovaries. benign     SOFT TISSUE SURGERY      ganglion removed from left wrist and ring finger     SURGICAL HISTORY OF -   age 7    tonsillectomy     SURGICAL HISTORY OF -   1/15    left eye cataract     SURGICAL HISTORY OF -   Fall 2016    LINQ placed.     Family History   Problem Relation Age of Onset     Cancer Mother         ovarian     Breast Cancer Mother      Heart Disease Father         rare; not sure what it was     Diabetes Father         old age     Cerebrovascular Disease Brother 68     ROS Pulmonary  Dyspnea: No, Cough: No, Chest pain: No, Wheezing: No, Sputum Production: No, Hemoptysis: No  A complete ROS was otherwise negative except as noted in the HPI.  LMP  (LMP Unknown)   Exam:   NEURO: Mentation intact, speech normal,   PSYCH: mentation appears normal. and affect normal/bright  Results:  Normal spirometry Nov 2019    Assessment and plan: Kirit is a a 75 yo female with recent abnormal chest imaging. CT chest was done showing some abnormalities. She found the CT chest procedure difficult because of claustrophobia. She has no systemic symptoms other than tachycardia.   Incidental paratracheal lymphadenopathy - she does have one enlarged lymph node, that was previously seen on a CT done in 2016.  Given the duration of time this is been present, it is relatively innocuous appearing.  Furthermore, she is really unwilling to have repeat CT scans.  I do not think there is anything worrisome here to further evaluate.  Bronchiectasis on CT - also seen incidentally on this CT done she has a history of severe pneumonia as a child, which is likely the cause. She has no symptoms of  frequent respiratory infections or cough, mucus production.   She has been using Breo sporadically, is not quite clear what this is being used to treat but I suggested she should probably stop it if she is only using it infrequently anyway.  Follow-up only if needed

## 2020-07-27 ENCOUNTER — TELEPHONE (OUTPATIENT)
Dept: CARDIOLOGY | Facility: CLINIC | Age: 75
End: 2020-07-27

## 2020-07-27 NOTE — TELEPHONE ENCOUNTER
7/27/20 Pt called inquiring about recent ZP results. Generally I informed pt to the best of my ability. Informed her there would be more details forthcoming once Dr Villareal reviews. Informed pt he has been out of the office. She voiced understanding and will await final review from Dr Villareal. Erin 4 pm

## 2020-07-29 ENCOUNTER — TELEPHONE (OUTPATIENT)
Dept: FAMILY MEDICINE | Facility: CLINIC | Age: 75
End: 2020-07-29

## 2020-07-29 NOTE — TELEPHONE ENCOUNTER
Patient takes metoprolol and she stated she has been told to check her blood pressure prior and if below 120 to hold medication.  Patient has been having intermittent lightheadedness in the afternoon and noticed it today when she was sitting. Lightheadedness improves when she gets up and starts moving around, it improved some.  Patient has also been taking prinzide once daily in am.  Patient has not taken the metoprolol in the afternoon all week as her blood pressure is down in the 90's in the afternoon.  Patient has some 25 mg metoprolol left over and can decrease her dose, if needed.  Please advise, thanks.       Next 5 appointments (look out 90 days)    Aug 06, 2020 12:00 PM CDT  SHORT with Rachel Miller MD  [facility] ([facility]) [address]  [facility] [id number] [phone]

## 2020-08-01 NOTE — TELEPHONE ENCOUNTER
Our records indicate that she takes 75 mg metoprolol twice a day  She may decrease the metoprolol to 50 mg twice a day

## 2020-08-03 NOTE — TELEPHONE ENCOUNTER
Call to patient. States she is already taking 50 mg twice daily.    Per 4/15 telephone encounter:    Andrew Monroy MD      3:19 PM   Note      Please advise pt to decrease metoprolol to 50 mg bid for now.and hold BP med if SBP is less than 110.        Patient states in the morning the top number is 148-150 but in the afternoon it is 98. Please advise.

## 2020-08-06 ENCOUNTER — OFFICE VISIT (OUTPATIENT)
Dept: INTERNAL MEDICINE | Facility: CLINIC | Age: 75
End: 2020-08-06
Payer: MEDICARE

## 2020-08-06 VITALS
SYSTOLIC BLOOD PRESSURE: 101 MMHG | HEART RATE: 89 BPM | OXYGEN SATURATION: 98 % | RESPIRATION RATE: 16 BRPM | DIASTOLIC BLOOD PRESSURE: 64 MMHG | BODY MASS INDEX: 26.46 KG/M2 | WEIGHT: 174 LBS

## 2020-08-06 DIAGNOSIS — E03.9 HYPOTHYROIDISM, UNSPECIFIED TYPE: ICD-10-CM

## 2020-08-06 DIAGNOSIS — I10 HYPERTENSION GOAL BP (BLOOD PRESSURE) < 140/90: ICD-10-CM

## 2020-08-06 DIAGNOSIS — R00.2 PALPITATIONS: ICD-10-CM

## 2020-08-06 DIAGNOSIS — E78.5 HYPERLIPIDEMIA LDL GOAL <130: ICD-10-CM

## 2020-08-06 DIAGNOSIS — M25.551 HIP PAIN, RIGHT: ICD-10-CM

## 2020-08-06 DIAGNOSIS — Z00.00 ROUTINE GENERAL MEDICAL EXAMINATION AT A HEALTH CARE FACILITY: Primary | ICD-10-CM

## 2020-08-06 PROCEDURE — G0439 PPPS, SUBSEQ VISIT: HCPCS | Performed by: INTERNAL MEDICINE

## 2020-08-06 PROCEDURE — 99214 OFFICE O/P EST MOD 30 MIN: CPT | Mod: 25 | Performed by: INTERNAL MEDICINE

## 2020-08-06 RX ORDER — METOPROLOL TARTRATE 50 MG
75 TABLET ORAL 2 TIMES DAILY
Qty: 270 TABLET | Refills: 0 | Status: SHIPPED | OUTPATIENT
Start: 2020-08-06 | End: 2020-08-31 | Stop reason: DRUGHIGH

## 2020-08-06 RX ORDER — METOPROLOL TARTRATE 25 MG/1
25 TABLET, FILM COATED ORAL 2 TIMES DAILY
Qty: 180 TABLET | Refills: 0 | Status: SHIPPED | OUTPATIENT
Start: 2020-08-06 | End: 2020-10-28

## 2020-08-06 RX ORDER — TRAMADOL HYDROCHLORIDE 50 MG/1
50 TABLET ORAL 3 TIMES DAILY PRN
Qty: 90 TABLET | Refills: 0 | Status: ON HOLD | OUTPATIENT
Start: 2020-08-06 | End: 2020-12-16

## 2020-08-06 RX ORDER — LEVOTHYROXINE SODIUM 88 UG/1
88 TABLET ORAL DAILY
Qty: 90 TABLET | Refills: 1 | Status: SHIPPED | OUTPATIENT
Start: 2020-08-06 | End: 2021-01-11

## 2020-08-06 RX ORDER — PRAVASTATIN SODIUM 40 MG
40 TABLET ORAL AT BEDTIME
Qty: 90 TABLET | Refills: 1 | Status: SHIPPED | OUTPATIENT
Start: 2020-08-06 | End: 2021-02-03

## 2020-08-06 RX ORDER — LISINOPRIL AND HYDROCHLOROTHIAZIDE 20; 25 MG/1; MG/1
1 TABLET ORAL DAILY
Qty: 90 TABLET | Refills: 1 | Status: SHIPPED | OUTPATIENT
Start: 2020-08-06 | End: 2020-08-31

## 2020-08-06 NOTE — PATIENT INSTRUCTIONS
Plan:  1. preop appointment Aug 31 - please schedule it at the    2. Please make a lab appointment for fasting labs    Second part of Aug   3. Tramadol 50 mg 3 times a day as needed for severe pain  4. When you take Tramadol take also Tylenol - they are more efficient if you take them together     Preventive Health Recommendations    See your health care provider every year to    Review health changes.     Discuss preventive care.      Review your medicines if your doctor has prescribed any.      You no longer need a yearly Pap test unless you've had an abnormal Pap test in the past 10 years. If you have vaginal symptoms, such as bleeding or discharge, be sure to talk with your provider about a Pap test.      Every 1 to 2 years, have a mammogram.  If you are over 69, talk with your health care provider about whether or not you want to continue having screening mammograms.      Every 10 years, have a colonoscopy. Or, have a yearly FIT test (stool test). These exams will check for colon cancer.       Have a cholesterol test every 5 years, or more often if your doctor advises it.       Have a diabetes test (fasting glucose) every three years. If you are at risk for diabetes, you should have this test more often.       At age 65, have a bone density scan (DEXA) to check for osteoporosis (brittle bone disease).    Shots:    Get a flu shot each year.    Get a tetanus shot every 10 years.    Talk to your doctor about your pneumonia vaccines. There are now two you should receive - Pneumovax (PPSV 23) and Prevnar (PCV 13).    Talk to your pharmacist about the shingles vaccine.    Talk to your doctor about the hepatitis B vaccine.    Nutrition:     Eat at least 5 servings of fruits and vegetables each day.      Eat whole-grain bread, whole-wheat pasta and brown rice instead of white grains and rice.      Get adequate about Calcium and Vitamin D.     Lifestyle    Exercise at least 150 minutes a week (30 minutes a  day, 5 days a week). This will help you control your weight and prevent disease.      Limit alcohol to one drink per day.      No smoking.       Wear sunscreen to prevent skin cancer.       See your dentist twice a year for an exam and cleaning.      See your eye doctor every 1 to 2 years to screen for conditions such as glaucoma, macular degeneration, cataracts, etc.    Personalized Prevention Plan  You are due for the preventive services outlined below.  Your care team is available to assist you in scheduling these services.  If you have already completed any of these items, please share that information with your care team to update in your medical record.    Health Maintenance Due   Topic Date Due     Annual Wellness Visit  08/11/2016
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
## 2020-08-11 ENCOUNTER — TELEPHONE (OUTPATIENT)
Dept: INTERNAL MEDICINE | Facility: CLINIC | Age: 75
End: 2020-08-11

## 2020-08-11 NOTE — TELEPHONE ENCOUNTER
Patient calls to report low BP in the afternoon. She at times gets light-headed. Recent readings are today in the /78, and this afternoon it was 94/57.  Yesterday her BP readings were similar. Call her back to advise at [phone] (home)

## 2020-08-17 ENCOUNTER — TELEPHONE (OUTPATIENT)
Dept: INTERNAL MEDICINE | Facility: CLINIC | Age: 75
End: 2020-08-17

## 2020-08-17 NOTE — TELEPHONE ENCOUNTER
Patient called to report an episode of irregular heart beat and low blood pressure yesterday. BP at 3 pm yesterday was 88/40 something. Later that afternoon it was 101/62 and this morning it was 136/83 with a pulse rate of 77. Please call patient to discuss at [phone] (home)

## 2020-08-17 NOTE — TELEPHONE ENCOUNTER
Per telephone encounter 8/11/20, patient had episode of low blood pressure and told to Decrease Lisinopril/HCTZ to 1/2 tab daily. Please refer to message below as well and advise.    Thank you.

## 2020-08-18 ENCOUNTER — TELEPHONE (OUTPATIENT)
Dept: CARDIOLOGY | Facility: CLINIC | Age: 75
End: 2020-08-18

## 2020-08-18 DIAGNOSIS — R00.2 PALPITATIONS: Primary | ICD-10-CM

## 2020-08-18 NOTE — TELEPHONE ENCOUNTER
"8/18/20 Pt called today reporting that her Bps at times have been running low for the past week or so. Systolic Bps have been 80's-90's with associated lightheadedness. She has been working with PCP who decreased her Metoprolol in 1/2 ( now taking 25 mg BID) and stopped her Lisinopril/HCTZ. Pt noted that since her Metoprolol dose has decreased, she is noting more episodes of \"racing heart\".   Explained results of most recent ZP ( 6/2020) and palpitations are most likely benign. She stated the palpitations are still bothersome.   Informed pt I will update Dr Villareal but he is out of the office this week. Will call her back with recommendations after reviewed by Dr Villareal. Pt will continue to work with PCP on BP management. Pt voiced understanding and agreement with plan.  Erin 911 am  "

## 2020-08-19 RX ORDER — FLECAINIDE ACETATE 100 MG/1
TABLET ORAL
Qty: 90 TABLET | Refills: 3 | Status: SHIPPED | OUTPATIENT
Start: 2020-08-19 | End: 2020-08-25

## 2020-08-19 NOTE — TELEPHONE ENCOUNTER
Not surprising that the lower dose of BB has invited more arrhythmia. As you explained to her already, it is not a dangerous arrhythmia.     However, if she is really really bothered by it, we could try low dose flecainide 50 mg bid.  If she decides to go with it, she would need an ECG 5-7 days after starting as she has an incomplete RBBB to begin with and may not tolerate the drug.    DANICA Calhoun

## 2020-08-19 NOTE — TELEPHONE ENCOUNTER
8/19/20 Spoke with pt and explained recommendations per Dr Villareal:   we could try low dose flecainide 50 mg bid.  If she decides to go with it, she would need an ECG 5-7 days after starting as she has an incomplete RBBB to begin with and may not tolerate the drug.   She voiced understanding and agreement w plan. Rx called into [facility] in [place] per pts request. EKG order entered and BV called to notify pt and schedule.  Erin 250 pm

## 2020-08-24 NOTE — TELEPHONE ENCOUNTER
8/24/20 Attempted to call  patient to see if she has started Flecainide yet as pt was to call BV to set up EKG and per [facility], nothing is scheduled yet. LM and requested callback.  Erin 943 am

## 2020-08-25 NOTE — TELEPHONE ENCOUNTER
8/25/20 Pt LM stating she decided not to start the Flecainide as she picked up the med and read about the side effects and did not want to start it. Attempted to call pt back but only reached her VM. PATRICIA and requested callback. Med list updated and follow up EKG order removed from chart.   Erin 352 pm

## 2020-08-28 DIAGNOSIS — E03.9 HYPOTHYROIDISM, UNSPECIFIED TYPE: ICD-10-CM

## 2020-08-28 DIAGNOSIS — R73.9 BLOOD SUGAR INCREASED: ICD-10-CM

## 2020-08-28 DIAGNOSIS — I10 HYPERTENSION GOAL BP (BLOOD PRESSURE) < 140/90: ICD-10-CM

## 2020-08-28 DIAGNOSIS — E78.5 HYPERLIPIDEMIA LDL GOAL <130: ICD-10-CM

## 2020-08-28 LAB
ERYTHROCYTE [DISTWIDTH] IN BLOOD BY AUTOMATED COUNT: 13.8 % (ref 10–15)
HBA1C MFR BLD: 6.1 % (ref 0–5.6)
HCT VFR BLD AUTO: 38 % (ref 35–47)
HGB BLD-MCNC: 12 G/DL (ref 11.7–15.7)
MCH RBC QN AUTO: 30.2 PG (ref 26.5–33)
MCHC RBC AUTO-ENTMCNC: 31.6 G/DL (ref 31.5–36.5)
MCV RBC AUTO: 96 FL (ref 78–100)
PLATELET # BLD AUTO: 337 10E9/L (ref 150–450)
RBC # BLD AUTO: 3.98 10E12/L (ref 3.8–5.2)
WBC # BLD AUTO: 5.5 10E9/L (ref 4–11)

## 2020-08-28 PROCEDURE — 80061 LIPID PANEL: CPT | Performed by: INTERNAL MEDICINE

## 2020-08-28 PROCEDURE — 36415 COLL VENOUS BLD VENIPUNCTURE: CPT | Performed by: INTERNAL MEDICINE

## 2020-08-28 PROCEDURE — 80053 COMPREHEN METABOLIC PANEL: CPT | Performed by: INTERNAL MEDICINE

## 2020-08-28 PROCEDURE — 82043 UR ALBUMIN QUANTITATIVE: CPT | Performed by: INTERNAL MEDICINE

## 2020-08-28 PROCEDURE — 85027 COMPLETE CBC AUTOMATED: CPT | Performed by: INTERNAL MEDICINE

## 2020-08-28 PROCEDURE — 83036 HEMOGLOBIN GLYCOSYLATED A1C: CPT | Performed by: INTERNAL MEDICINE

## 2020-08-28 PROCEDURE — 84443 ASSAY THYROID STIM HORMONE: CPT | Performed by: INTERNAL MEDICINE

## 2020-08-29 LAB
ALBUMIN SERPL-MCNC: 3.9 G/DL (ref 3.4–5)
ALP SERPL-CCNC: 79 U/L (ref 40–150)
ALT SERPL W P-5'-P-CCNC: 19 U/L (ref 0–50)
ANION GAP SERPL CALCULATED.3IONS-SCNC: 7 MMOL/L (ref 3–14)
AST SERPL W P-5'-P-CCNC: 15 U/L (ref 0–45)
BILIRUB SERPL-MCNC: 0.5 MG/DL (ref 0.2–1.3)
BUN SERPL-MCNC: 19 MG/DL (ref 7–30)
CALCIUM SERPL-MCNC: 9.9 MG/DL (ref 8.5–10.1)
CHLORIDE SERPL-SCNC: 106 MMOL/L (ref 94–109)
CHOLEST SERPL-MCNC: 178 MG/DL
CO2 SERPL-SCNC: 24 MMOL/L (ref 20–32)
CREAT SERPL-MCNC: 0.61 MG/DL (ref 0.52–1.04)
CREAT UR-MCNC: 63 MG/DL
GFR SERPL CREATININE-BSD FRML MDRD: 88 ML/MIN/{1.73_M2}
GLUCOSE SERPL-MCNC: 102 MG/DL (ref 70–99)
HDLC SERPL-MCNC: 92 MG/DL
LDLC SERPL CALC-MCNC: 65 MG/DL
MICROALBUMIN UR-MCNC: 37 MG/L
MICROALBUMIN/CREAT UR: 58.61 MG/G CR (ref 0–25)
NONHDLC SERPL-MCNC: 86 MG/DL
POTASSIUM SERPL-SCNC: 4.2 MMOL/L (ref 3.4–5.3)
PROT SERPL-MCNC: 7.3 G/DL (ref 6.8–8.8)
SODIUM SERPL-SCNC: 137 MMOL/L (ref 133–144)
TRIGL SERPL-MCNC: 106 MG/DL
TSH SERPL DL<=0.005 MIU/L-ACNC: 1.35 MU/L (ref 0.4–4)

## 2020-08-31 ENCOUNTER — OFFICE VISIT (OUTPATIENT)
Dept: INTERNAL MEDICINE | Facility: CLINIC | Age: 75
End: 2020-08-31
Payer: MEDICARE

## 2020-08-31 VITALS
DIASTOLIC BLOOD PRESSURE: 84 MMHG | HEART RATE: 82 BPM | RESPIRATION RATE: 16 BRPM | WEIGHT: 183 LBS | SYSTOLIC BLOOD PRESSURE: 154 MMHG | BODY MASS INDEX: 27.83 KG/M2 | OXYGEN SATURATION: 100 %

## 2020-08-31 DIAGNOSIS — Z01.818 PREOP GENERAL PHYSICAL EXAM: Primary | ICD-10-CM

## 2020-08-31 DIAGNOSIS — M16.10 HIP ARTHRITIS: ICD-10-CM

## 2020-08-31 DIAGNOSIS — I87.2 VENOUS INSUFFICIENCY: ICD-10-CM

## 2020-08-31 DIAGNOSIS — I10 HYPERTENSION GOAL BP (BLOOD PRESSURE) < 140/90: ICD-10-CM

## 2020-08-31 DIAGNOSIS — R60.0 BILATERAL LEG EDEMA: ICD-10-CM

## 2020-08-31 DIAGNOSIS — E78.5 HYPERLIPIDEMIA LDL GOAL <130: ICD-10-CM

## 2020-08-31 DIAGNOSIS — R73.03 PREDIABETES: ICD-10-CM

## 2020-08-31 DIAGNOSIS — I77.810 MILD DILATION OF ASCENDING AORTA (H): ICD-10-CM

## 2020-08-31 PROCEDURE — 99215 OFFICE O/P EST HI 40 MIN: CPT | Performed by: INTERNAL MEDICINE

## 2020-08-31 RX ORDER — HYDROCHLOROTHIAZIDE 12.5 MG/1
12.5 TABLET ORAL DAILY
Qty: 30 TABLET | Refills: 1 | Status: SHIPPED | OUTPATIENT
Start: 2020-08-31 | End: 2020-09-03

## 2020-08-31 NOTE — PATIENT INSTRUCTIONS
Plan:  1. In the morning of the surgery day you take with a sip of water just these meds: Metoprolol  The rest of the meds you resume after surgery.  2. Do not take Lisinopril/hctz yet  3. Start hydrochlorothiazide 12.5 mg daily  4. Follow up for the blood pressure 3-4 weeks after the surgery   
5

## 2020-08-31 NOTE — PROGRESS NOTES
[place] [address]  [facility] [address]  [phone]  Dept: [phone]    PRE-OP EVALUATION:  Today's date: 2020    Teir Beltran (: 1945) presents for pre-operative evaluation assessment as requested by Dr. wilson.  She requires evaluation and anesthesia risk assessment prior to undergoing surgery/procedure for treatment of R hip arthritis  .    Proposed Surgery/ Procedure: arthroplasty  Date of Surgery/ Procedure: 2020  Time of Surgery/ Procedure: 1000  Hospital/Surgical Facility: [facility]  Surgery Fax Number: n/a   Primary Physician: Rachel Miller  Type of Anesthesia Anticipated: to be determined    Preoperative Questionnaire:   No - Have you ever had a heart attack or stroke?  No - Have you ever had surgery on your heart or blood vessels, such as a stent, coronary (heart) bypass, or surgery on an artery in the head, neck, heart, or legs?  No - Do you have chest pain when you are physically active?  No - Do you have a history of heart failure?  No - Do you currently have a cold, bronchitis, or symptoms of other respiratory (head and chest) infections?  No - Do you have a cough, shortness of breath, or wheezing?  No - Do you or anyone in your family have a history of blood clots?  No - Do you or anyone in your family have a serious bleeding problem, such as long-lasting bleeding after surgeries or cuts?  No - Have you ever had anemia or been told to take iron pills?  No - Have you had any abnormal blood loss such as black, tarry or bloody stools, or abnormal vaginal bleeding?  No - Have you ever had a blood transfusion?  YES - ARE YOU WILLING TO HAVE A BLOOD TRANSFUSION IF IT IS MEDICALLY NEEDED BEFORE, DURING, OR AFTER YOUR SURGERY?   No - Have you or anyone in your family ever had problems with anesthesia (sedation for surgery)?  No - Do you have sleep apnea, excessive snoring, or daytime drowsiness?   No - Do you have any  artifical heart valves or other implanted medical devices, such as a pacemaker, defibrillator, or continuous glucose monitor?  YES - DO YOU HAVE ANY ARTIFICIAL JOINTS? R knee  No - Are you allergic to latex?  No - Is there any chance that you may be pregnant?    Patient has a Health Care Directive or Living Will:  NO    CC:  Preop for multiple medical problems.    HPI:    The patient is scheduled for R hip arthroplasty surgery with Dr. Muse on  Sept 16, 2020  HTN  -- 2 weeks ago she called that the BP was 101/62. I advised her to stop lisinopril/hctz.   -- since then her BP at home has been 148/98 - 172/93, HR 75-91  -- she developed leg edema  No other acute complaints.    Assessment:  1. V72.83H Preop general physical exam _ I do not see any major contraindications for the patient to go through the scheduled surgery.    The proposed surgical procedure is considered , INTERMEDIATE,  surgery risk.    For above listed surgery and anesthesia, Patient is at  MODERATE,  risk for surgery/procedure and perioperative/procedure complications.        Cardiovascular risk  Assessment -- low risk   -- No further cardiac work up is needed before this surgery.        ECG:    sinus rhythm, no changes suggestive for ischemia, incomplete RBBB    Pulmonary Risk Assessment -- low risk  -- The patient doesn't have chronic lung disease nor acute respiratory problems       Obstructive Sleep Apnea (or suspected sleep apnea)  -- low risk         Anemia Assessment :  -- no anemia - patient doesn't need further anemia work up    Blood Sugar Assessment  --  patient has controlled prediabetes,       Anticoagulation assessment  --  patient does not take anticoagulation meds  -- DVT prophylaxis - as per surgeon      (I10) Hypertension goal BP (blood pressure) < 140/90  Comment: Not controlled   Plan: see plan       (I87.2) Venous insufficiency  Comment:   Plan: see plan   F/u with cardio-vascular team     (R73.03) Prediabetes  Comment: A1c  6.1  Plan: cont low carb diet     (M16.10) Hip arthritis  Comment:   Plan: surgery, as above     (R60.0) Bilateral leg edema  Comment:   Plan: see plan        Plan:  1. In the morning of the surgery day you take with a sip of water just these meds: Metoprolol  The rest of the meds you resume after surgery.  2. Do not take Lisinopril/hctz yet  3. Start hydrochlorothiazide 12.5 mg daily  4. Follow up for the blood pressure 3-4 weeks after the surgery       Physical exam:     Blood pressure (!) 154/84, pulse 82, resp. rate 16, weight 83 kg (183 lb), SpO2 100 %, not currently breastfeeding.     NAD, appears comfortable  Skin clear, no rashes  Neck: supple, no JVD,  no thyroidmegaly  Lymph nodes non palpable in the cervical, supraclavicular   Chest: clear to auscultation with good respiratory effort  Cardiac: S1S2, RRR, no mgr appreciated  Abdomen: soft, not tender, not distended, audible bowel sound, no hepatosplenomegaly, no palpable masses, no abdominal bruits  Extremities: no cyanosis, clubbing or edema.   Neuro: A, Ox3, no focal signs.        ROS:   as above     Patient Active Problem List   Diagnosis     Hypothyroidism     Tachycardia     Glaucoma     Prediabetes     Hyperlipidemia LDL goal <130     Palpitations     BMI 30.0-30.9,adult     Advanced directives, counseling/discussion     Hypertension goal BP (blood pressure) < 140/90     Adjustment disorder with depressed mood     Osteoporosis     Mild dilation of ascending aorta - borderline (H)     RBBB     First degree AV block     Venous insufficiency     Varicose veins     S/P total knee arthroplasty        Past Medical History:   Diagnosis Date     Complication of anesthesia     confusion after anesthesia (after hysterectomy)     First degree AV block 8/11/2015     Former smoker      Gastroesophageal reflux disease      GERD (gastroesophageal reflux disease)      Glaucoma      Heart block     2:1     Hyperlipidemia LDL goal <130 6/14/2013     Hypertension goal  BP (blood pressure) < 140/90 12/3/2013     Hypothyroidism      Left atrial dilatation     mild     Mild dilation of ascending aorta - borderline 8/11/2015     Palpitations      Prediabetes 6/14/2013     RBBB 8/11/2015     S/P total knee arthroplasty 7/23/2019     Tachycardia      Varicose veins      Venous insufficiency     s/p bilateral great saphenous vein radiofrequency ablation by Dr. Garcia      Past Surgical History:   Procedure Laterality Date     ARTHROPLASTY KNEE Right 7/23/2019    Procedure: Right total knee arthroplasty using an [employer] knee system;  Surgeon: Dragan Muse MD;  Location: [facility] OR     BREAST SURGERY      right breast ductal surgery due to milk production     COLONOSCOPY N/A 10/24/2014    no further screening. Procedure: COLONOSCOPY;  Surgeon: Pedro Rudd MD;  Location:  GI     HYSTERECTOMY, PAP NO LONGER INDICATED  2009    total hysterectomy and ovaries. benign     SOFT TISSUE SURGERY      ganglion removed from left wrist and ring finger     SURGICAL HISTORY OF -   age 7    tonsillectomy     SURGICAL HISTORY OF -   1/15    left eye cataract     SURGICAL HISTORY OF -   Fall 2016    LINQ placed.        PSHx: No complications with prior surgeries or anesthesia     Soc Hx: No daily alcohol, no smoking     Family History   Problem Relation Age of Onset     Cancer Mother         ovarian     Breast Cancer Mother      Heart Disease Father         rare; not sure what it was     Diabetes Father         old age     Cerebrovascular Disease Brother 68        All: reviewed    Meds: reviewed  Current Outpatient Medications   Medication Sig Dispense Refill     alendronate (FOSAMAX) 70 MG tablet TAKE 1 TABLET(70 MG) BY MOUTH EVERY 7 DAYS 12 tablet 1     dorzolamide-timolol (COSOPT) 2-0.5 % ophthalmic solution Place 1 drop into both eyes 2 times daily       famotidine (PEPCID) 10 MG tablet Take 1 tablet (10 mg) by mouth 2 times daily 60 tablet 1     fluticasone (FLONASE) 50  MCG/ACT nasal spray Spray 1-2 sprays into both nostrils daily 48 g 1     1     hydrOXYzine (ATARAX) 25 MG tablet Take 1 tablet (25 mg) by mouth 3 times daily as needed for anxiety 30 tablet 1     latanoprost (XALATAN) 0.005 % ophthalmic solution Place 1 drop into the right eye At Bedtime        levothyroxine (SYNTHROID/LEVOTHROID) 88 MCG tablet Take 1 tablet (88 mcg) by mouth daily 90 tablet 1     1     metoprolol tartrate (LOPRESSOR) 25 MG tablet Take 1 tablet (25 mg) by mouth 2 times daily 180 tablet 0     0     pravastatin (PRAVACHOL) 40 MG tablet Take 1 tablet (40 mg) by mouth At Bedtime 90 tablet 1     traMADol (ULTRAM) 50 MG tablet Take 1 tablet (50 mg) by mouth 3 times daily as needed for severe pain 90 tablet 0            Rachel Wong MD  Internal Medicine       MEDICAL HISTORY:     Patient Active Problem List    Diagnosis Date Noted     S/P total knee arthroplasty 07/23/2019     Priority: Medium     Venous insufficiency      Priority: Medium     Varicose veins      Priority: Medium     Osteoporosis 08/11/2015     Priority: Medium     Mild dilation of ascending aorta - borderline (H) 08/11/2015     Priority: Medium     Good in 2018. Recommend ECHO in 2 years to follow hypertensive changes       RBBB 08/11/2015     Priority: Medium     First degree AV block 08/11/2015     Priority: Medium     Adjustment disorder with depressed mood 08/05/2014     Priority: Medium     Hypertension goal BP (blood pressure) < 140/90 12/03/2013     Priority: Medium     Advanced directives, counseling/discussion 10/02/2013     Priority: Medium     Patient given info and will call if she is interested       BMI 30.0-30.9,adult 07/30/2013     Priority: Medium     Palpitations 07/03/2013     Priority: Medium     Prediabetes 06/14/2013     Priority: Medium     Hyperlipidemia LDL goal <130 06/14/2013     Priority: Medium     Hypothyroidism      Priority: Medium     Tachycardia      Priority: Medium     Glaucoma      Priority: Medium      Problem list name updated by automated process. Provider to review and confirm  [employer] Update utility        Past Medical History:   Diagnosis Date     Complication of anesthesia     confusion after anesthesia (after hysterectomy)     First degree AV block 8/11/2015     Former smoker      Gastroesophageal reflux disease      GERD (gastroesophageal reflux disease)      Glaucoma      Heart block     2:1     Hyperlipidemia LDL goal <130 6/14/2013     Hypertension goal BP (blood pressure) < 140/90 12/3/2013     Hypothyroidism      Left atrial dilatation     mild     Mild dilation of ascending aorta - borderline 8/11/2015     Palpitations      Prediabetes 6/14/2013     RBBB 8/11/2015     S/P total knee arthroplasty 7/23/2019     Tachycardia      Varicose veins      Venous insufficiency     s/p bilateral great saphenous vein radiofrequency ablation by Dr. Garcia     Past Surgical History:   Procedure Laterality Date     ARTHROPLASTY KNEE Right 7/23/2019    Procedure: Right total knee arthroplasty using an [employer] knee system;  Surgeon: Dragan Muse MD;  Location: [facility] OR     BREAST SURGERY      right breast ductal surgery due to milk production     COLONOSCOPY N/A 10/24/2014    no further screening. Procedure: COLONOSCOPY;  Surgeon: Pedro Rudd MD;  Location:  GI     HYSTERECTOMY, PAP NO LONGER INDICATED  2009    total hysterectomy and ovaries. benign     SOFT TISSUE SURGERY      ganglion removed from left wrist and ring finger     SURGICAL HISTORY OF -   age 7    tonsillectomy     SURGICAL HISTORY OF -   1/15    left eye cataract     SURGICAL HISTORY OF -   Fall 2016    LINQ placed.     Current Outpatient Medications   Medication Sig Dispense Refill     alendronate (FOSAMAX) 70 MG tablet TAKE 1 TABLET(70 MG) BY MOUTH EVERY 7 DAYS 12 tablet 1     dorzolamide-timolol (COSOPT) 2-0.5 % ophthalmic solution Place 1 drop into both eyes 2 times daily       famotidine (PEPCID) 10 MG tablet Take 1  tablet (10 mg) by mouth 2 times daily 60 tablet 1     fluticasone (FLONASE) 50 MCG/ACT nasal spray Spray 1-2 sprays into both nostrils daily 48 g 1     fluticasone-vilanterol (BREO ELLIPTA) 100-25 MCG/INH inhaler Inhale 1 puff into the lungs daily 1 Inhaler 1     hydrOXYzine (ATARAX) 25 MG tablet Take 1 tablet (25 mg) by mouth 3 times daily as needed for anxiety 30 tablet 1     latanoprost (XALATAN) 0.005 % ophthalmic solution Place 1 drop into the right eye At Bedtime        levothyroxine (SYNTHROID/LEVOTHROID) 88 MCG tablet Take 1 tablet (88 mcg) by mouth daily 90 tablet 1     lisinopril-hydrochlorothiazide (ZESTORETIC) 20-25 MG tablet Take 1 tablet by mouth daily 90 tablet 1     metoprolol tartrate (LOPRESSOR) 25 MG tablet Take 1 tablet (25 mg) by mouth 2 times daily 180 tablet 0     metoprolol tartrate (LOPRESSOR) 50 MG tablet Take 1.5 tablets (75 mg) by mouth 2 times daily 270 tablet 0     pravastatin (PRAVACHOL) 40 MG tablet Take 1 tablet (40 mg) by mouth At Bedtime 90 tablet 1     traMADol (ULTRAM) 50 MG tablet Take 1 tablet (50 mg) by mouth 3 times daily as needed for severe pain 90 tablet 0     OTC products: None, except as noted above    Allergies   Allergen Reactions     Seasonal Allergies       Latex Allergy: NO    Social History     Tobacco Use     Smoking status: Former Smoker     Packs/day: 0.50     Years: 18.00     Pack years: 9.00     Types: Cigarettes     Last attempt to quit: 1980     Years since quittin.6     Smokeless tobacco: Never Used   Substance Use Topics     Alcohol use: Yes     Alcohol/week: 0.0 standard drinks     Comment: 1 glass of wine at night     History   Drug Use No         Recent Labs   Lab Test 20  1016 206  10/28/19  1029   HGB 12.0 11.8  --  13.7    365  --  381    137   < > 135   POTASSIUM 4.2 4.0   < > 4.0   CR 0.61 0.78   < > 0.66   A1C 6.1*  --   --  5.7*    < > = values in this interval not displayed.             Signed Electronically  by: Rachel Miller MD    Copy of this evaluation report is provided to requesting physician.    [place] Preop Guidelines    Revised Cardiac Risk Index

## 2020-09-01 DIAGNOSIS — R60.0 BILATERAL LEG EDEMA: ICD-10-CM

## 2020-09-01 DIAGNOSIS — I10 HYPERTENSION GOAL BP (BLOOD PRESSURE) < 140/90: ICD-10-CM

## 2020-09-01 DIAGNOSIS — K21.9 GASTROESOPHAGEAL REFLUX DISEASE, ESOPHAGITIS PRESENCE NOT SPECIFIED: ICD-10-CM

## 2020-09-01 DIAGNOSIS — Z11.59 ENCOUNTER FOR SCREENING FOR OTHER VIRAL DISEASES: Primary | ICD-10-CM

## 2020-09-02 NOTE — TELEPHONE ENCOUNTER
Pending Prescriptions:                       Disp   Refills    famotidine (PEPCID) 10 MG tablet           60 tab*1        Sig: Take 1 tablet (10 mg) by mouth 2 times daily

## 2020-09-02 NOTE — TELEPHONE ENCOUNTER
Pending Prescriptions:                       Disp   Refills    hydrochlorothiazide (HYDRODIURIL) 12.5 MG *30 tab*1        Sig: Take 1 tablet (12.5 mg) by mouth daily    Please see message below and advise. Pharmacy requesting a 90 day supply

## 2020-09-03 RX ORDER — HYDROCHLOROTHIAZIDE 12.5 MG/1
12.5 TABLET ORAL DAILY
Qty: 30 TABLET | Refills: 1 | Status: SHIPPED | OUTPATIENT
Start: 2020-09-03 | End: 2020-09-18

## 2020-09-03 RX ORDER — FAMOTIDINE 10 MG
10 TABLET ORAL 2 TIMES DAILY
Qty: 180 TABLET | Refills: 1 | Status: SHIPPED | OUTPATIENT
Start: 2020-09-03 | End: 2021-03-16

## 2020-09-13 DIAGNOSIS — Z11.59 ENCOUNTER FOR SCREENING FOR OTHER VIRAL DISEASES: ICD-10-CM

## 2020-09-13 PROCEDURE — U0003 INFECTIOUS AGENT DETECTION BY NUCLEIC ACID (DNA OR RNA); SEVERE ACUTE RESPIRATORY SYNDROME CORONAVIRUS 2 (SARS-COV-2) (CORONAVIRUS DISEASE [COVID-19]), AMPLIFIED PROBE TECHNIQUE, MAKING USE OF HIGH THROUGHPUT TECHNOLOGIES AS DESCRIBED BY CMS-2020-01-R: HCPCS | Performed by: ORTHOPAEDIC SURGERY

## 2020-09-14 LAB
SARS-COV-2 RNA SPEC QL NAA+PROBE: NOT DETECTED
SPECIMEN SOURCE: NORMAL

## 2020-09-16 ENCOUNTER — ANESTHESIA EVENT (OUTPATIENT)
Dept: SURGERY | Facility: CLINIC | Age: 75
End: 2020-09-16
Payer: MEDICARE

## 2020-09-16 ENCOUNTER — HOSPITAL ENCOUNTER (OUTPATIENT)
Facility: CLINIC | Age: 75
Discharge: HOME | End: 2020-09-16
Attending: ORTHOPAEDIC SURGERY | Admitting: ORTHOPAEDIC SURGERY
Payer: MEDICARE

## 2020-09-16 ENCOUNTER — ANESTHESIA (OUTPATIENT)
Dept: SURGERY | Facility: CLINIC | Age: 75
End: 2020-09-16
Payer: MEDICARE

## 2020-09-16 VITALS
BODY MASS INDEX: 28.13 KG/M2 | SYSTOLIC BLOOD PRESSURE: 154 MMHG | TEMPERATURE: 97.5 F | DIASTOLIC BLOOD PRESSURE: 86 MMHG | WEIGHT: 185 LBS | OXYGEN SATURATION: 98 % | RESPIRATION RATE: 14 BRPM

## 2020-09-16 LAB — INTERPRETATION ECG - MUSE: NORMAL

## 2020-09-16 PROCEDURE — 40000883 ZZH CANCELLED SURGERY UP TO 61-90 MINS: Performed by: ORTHOPAEDIC SURGERY

## 2020-09-16 PROCEDURE — 93010 ELECTROCARDIOGRAM REPORT: CPT | Performed by: INTERNAL MEDICINE

## 2020-09-16 RX ORDER — TRANEXAMIC ACID 650 MG/1
1950 TABLET ORAL ONCE
Status: DISCONTINUED | OUTPATIENT
Start: 2020-09-16 | End: 2020-09-16 | Stop reason: HOSPADM

## 2020-09-16 RX ORDER — CEFAZOLIN SODIUM 2 G/100ML
2 INJECTION, SOLUTION INTRAVENOUS
Status: DISCONTINUED | OUTPATIENT
Start: 2020-09-16 | End: 2020-09-16 | Stop reason: HOSPADM

## 2020-09-16 RX ORDER — CELECOXIB 200 MG/1
400 CAPSULE ORAL ONCE
Status: DISCONTINUED | OUTPATIENT
Start: 2020-09-16 | End: 2020-09-16 | Stop reason: HOSPADM

## 2020-09-16 RX ORDER — CEFAZOLIN SODIUM 1 G/3ML
1 INJECTION, POWDER, FOR SOLUTION INTRAMUSCULAR; INTRAVENOUS SEE ADMIN INSTRUCTIONS
Status: DISCONTINUED | OUTPATIENT
Start: 2020-09-16 | End: 2020-09-16 | Stop reason: HOSPADM

## 2020-09-16 RX ORDER — LIDOCAINE 40 MG/G
CREAM TOPICAL
Status: DISCONTINUED | OUTPATIENT
Start: 2020-09-16 | End: 2020-09-16 | Stop reason: HOSPADM

## 2020-09-16 RX ORDER — SODIUM CHLORIDE, SODIUM LACTATE, POTASSIUM CHLORIDE, CALCIUM CHLORIDE 600; 310; 30; 20 MG/100ML; MG/100ML; MG/100ML; MG/100ML
INJECTION, SOLUTION INTRAVENOUS CONTINUOUS
Status: DISCONTINUED | OUTPATIENT
Start: 2020-09-16 | End: 2020-09-16 | Stop reason: HOSPADM

## 2020-09-16 NOTE — OR NURSING
Patient being discharged to home per MDA and Surgeon. Patient will have follow-up scheduled with cardiology.

## 2020-09-16 NOTE — OR NURSING
Patient surgery has been postponed due to worsening of lower leg edema.    Upon assessment RN found +4 pitting edema from tip of toes up to knee, on the left lower extremity . Record shows in June Patient had evidence of ankle edema.    12 Lead was completed and MOON Fung was informed of the edema, after his assessment and conversation with the surgeon the Drs determined the surgery would best be postponed until after a cardiology consult. Patient was informed of the risks and possibilty  for complications during surgery with this new finding.  Patient verbalized understanding and has agreed. Patient will be admitted to hospital.

## 2020-09-17 ENCOUNTER — TELEPHONE (OUTPATIENT)
Dept: CARDIOLOGY | Facility: CLINIC | Age: 75
End: 2020-09-17

## 2020-09-17 ENCOUNTER — TELEPHONE (OUTPATIENT)
Dept: INTERNAL MEDICINE | Facility: CLINIC | Age: 75
End: 2020-09-17

## 2020-09-17 NOTE — TELEPHONE ENCOUNTER
Spoke to pt after response from Dr Villareal. Discussed the reason for pt edema, of which pt is aware that it is because of the venous disease and lymphedema. Explained that Dr Pavon had wanted pt to f/u win 2 months and thus will get pt scheduled for a f/u.  Pt did state that d/t her hip she needs to have in [place].  Explained that do not think that Dr Pavon goes to , so may need to see another MD. Will message Team 4 for help getting this pt scheduled. Janie

## 2020-09-17 NOTE — TELEPHONE ENCOUNTER
No one spoke to me regarding Ray... In the past her swollen legs were d/t chronic venous disease & lymphedema.  Regarding the murmur: she had no significant valve disease previously...    Unless, something changed dramatically with her VERY RECENTLY, they canceled her surgery for weak reasons...    DI

## 2020-09-17 NOTE — TELEPHONE ENCOUNTER
Wellness Screening Tool    Symptom Screening:    Do you have one of the following NEW symptoms:      Fever (subjective or >100.0)?  No    New cough? No    Shortness of breath? No    Chills? No    New loss of taste or smell? No    Generalized body aches? No    New persistent headache? No    New sore throat? No    Nausea, vomiting or diarrhea? No    Within the past 2 weeks, have you been exposed to someone with a known positive illness below?      COVID - 19 (known or suspected) No    Chicken pox?  No    Measles? No    Pertussis? No    Have you had a positive COVID-19 diagnostic test (nasal swab test) in the last 14 days or are you currently   on self-quarantine restrictions (i.e.travel restriction, exposure, etc?) No        Patient notified of visitor restriction: Yes  Patient informed to wear a mask: Yes    Patient's appointment status: Patient will be seen in clinic as scheduled on 9/18- with Dr. Anglin.

## 2020-09-17 NOTE — TELEPHONE ENCOUNTER
Message sent to scheduling to get follow up appointment scheduled.           CHRIS Reagan September 17, 2020 2:13 PM

## 2020-09-17 NOTE — TELEPHONE ENCOUNTER
Pt called and LM that her total hip surgery was cancelled d/t her swollen legs and was told by anesthesiologist that she was working up to a heart attack.  Pt also stated that pt was told that she has a very loud murmer. Pt also has called her Primary and states that the Anesthesiologist was going to all by PMD and Dr Villareal. Explained that this writer is unsure that Dr Villareal has been called, but will check with him. Pt states that she was told that she needs to have an OV with Cardiology.  Pt seeing Dr Villareal for syncope and ATach, but recently had visit with Dr Pavon d/t her swollen legs and also because pt states that she used to follow with Dr Garcia. Per Dr Pavon OV note pt was to have followed up in  2 months.  This appointment needs to be made.  Will make Dr Villareal aware of situation. Janie

## 2020-09-17 NOTE — TELEPHONE ENCOUNTER
Patient calling  Her hip replacement was cancelled because of her edema. The anesthesiologist cancelled her surgery and told her she is working her way to a heart attack. Patient is now fearful and wants to know what she should be doing. Please advise. Ok to call and padmini [phone]

## 2020-09-18 ENCOUNTER — OFFICE VISIT (OUTPATIENT)
Dept: CARDIOLOGY | Facility: CLINIC | Age: 75
End: 2020-09-18
Payer: MEDICARE

## 2020-09-18 VITALS
HEIGHT: 68 IN | HEART RATE: 71 BPM | SYSTOLIC BLOOD PRESSURE: 124 MMHG | BODY MASS INDEX: 27.58 KG/M2 | DIASTOLIC BLOOD PRESSURE: 82 MMHG | OXYGEN SATURATION: 100 % | WEIGHT: 182 LBS

## 2020-09-18 DIAGNOSIS — R01.1 HEART MURMUR: Primary | ICD-10-CM

## 2020-09-18 DIAGNOSIS — R60.0 BILATERAL LEG EDEMA: ICD-10-CM

## 2020-09-18 DIAGNOSIS — I10 HYPERTENSION GOAL BP (BLOOD PRESSURE) < 140/90: ICD-10-CM

## 2020-09-18 PROCEDURE — 99215 OFFICE O/P EST HI 40 MIN: CPT | Performed by: INTERNAL MEDICINE

## 2020-09-18 RX ORDER — HYDROCHLOROTHIAZIDE 25 MG/1
25 TABLET ORAL DAILY
Qty: 90 TABLET | Refills: 3 | Status: SHIPPED | OUTPATIENT
Start: 2020-09-18 | End: 2021-09-30

## 2020-09-18 ASSESSMENT — MIFFLIN-ST. JEOR: SCORE: 1369.05

## 2020-09-18 NOTE — PATIENT INSTRUCTIONS
September 18, 2020    Thank you for allowing our Cardiology team to participate in your care.     Please note the following changes to your heart treatment plan:     Medication changes:   - increase hydrochlorothiazide 12.5mg daily to 25mg daily   - check BPs at home, we would like the top number to be between 100-130 mmHg    Tests to be done:  - heart ultrasound  - non-fasting labs in 2 weeks with follow up    Follow up:  - Follow up in about 2 weeks with cardiology TR[facility], or sooner as needed.      Please contact our team at [phone] for any questions or concerns.   If you are having a medical emergency, please call 911.       Sincerely,    Quincy Anglin MD, FACC  Cardiology    [facility] - [facility] [place]

## 2020-09-18 NOTE — LETTER
9/18/2020    Rachel Miller MD  [address] [facility] [address]    RE: Teri Beltran       Dear Colleague,    I had the pleasure of seeing Teri Beltran in the [facility].        Cardiology Clinic Progress Note:    September 18, 2020   Patient Name: Teri Beltran  Patient MRN: [id number]     Consult reason: pre-operative cardiac risk assessment    HPI and Assessment and Plan/Recommendations:    Please see dictation. #[id number]    Thank you for allowing our team to participate in the care of Teri Beltran.  Please do not hesitate to call or page me with any questions or concerns.    Sincerely,     Quincy Anglin MD, [facility]  Cardiology  Pager:  [phone]  Text Page   September 18, 2020    cc  Rachel Miller MD  [address] [place] [address]    Past Medical History:   The 10-year ASCVD risk score (Keith HASEEB Jr., et al., 2013) is: 19.9%    Values used to calculate the score:      Age: 74 years      Sex: Female      Is Non- : No      Diabetic: No      Tobacco smoker: No      Systolic Blood Pressure: 124 mmHg      Is BP treated: Yes      HDL Cholesterol: 92 mg/dL      Total Cholesterol: 178 mg/dL  Past Medical History:   Diagnosis Date     Complication of anesthesia     confusion after anesthesia (after hysterectomy)     First degree AV block 8/11/2015     Former smoker      Gastroesophageal reflux disease      GERD (gastroesophageal reflux disease)      Glaucoma      Heart block     2:1     Hyperlipidemia LDL goal <130 6/14/2013     Hypertension goal BP (blood pressure) < 140/90 12/3/2013     Hypothyroidism      Left atrial dilatation     mild     Mild dilation of ascending aorta - borderline 8/11/2015     Palpitations      Prediabetes 6/14/2013     RBBB 8/11/2015     S/P total knee arthroplasty 7/23/2019     Tachycardia      Varicose veins      Venous insufficiency     s/p bilateral  great saphenous vein radiofrequency ablation by Dr. Garcia        Past Surgical History:   Past Surgical History:   Procedure Laterality Date     ARTHROPLASTY KNEE Right 7/23/2019    Procedure: Right total knee arthroplasty using an [employer] knee system;  Surgeon: Dragan Muse MD;  Location: [facility] OR     BREAST SURGERY      right breast ductal surgery due to milk production     COLONOSCOPY N/A 10/24/2014    no further screening. Procedure: COLONOSCOPY;  Surgeon: Pedro Rudd MD;  Location:  GI     HYSTERECTOMY, PAP NO LONGER INDICATED  2009    total hysterectomy and ovaries. benign     SOFT TISSUE SURGERY      ganglion removed from left wrist and ring finger     SURGICAL HISTORY OF -   age 7    tonsillectomy     SURGICAL HISTORY OF -   1/15    left eye cataract     SURGICAL HISTORY OF -   Fall 2016    LINQ placed.       Medications (outpatient):  Current Outpatient Medications   Medication Sig Dispense Refill     acetaminophen (TYLENOL) 325 MG tablet Take 325-650 mg by mouth every 6 hours as needed for mild pain       alendronate (FOSAMAX) 70 MG tablet TAKE 1 TABLET(70 MG) BY MOUTH EVERY 7 DAYS 12 tablet 1     dorzolamide-timolol (COSOPT) 2-0.5 % ophthalmic solution Place 1 drop into both eyes 2 times daily       famotidine (PEPCID) 10 MG tablet Take 1 tablet (10 mg) by mouth 2 times daily 180 tablet 1     fluticasone (FLONASE) 50 MCG/ACT nasal spray Spray 1-2 sprays into both nostrils daily 48 g 1     hydrochlorothiazide (HYDRODIURIL) 25 MG tablet Take 1 tablet (25 mg) by mouth daily 90 tablet 3     latanoprost (XALATAN) 0.005 % ophthalmic solution Place 1 drop into the right eye At Bedtime        levothyroxine (SYNTHROID/LEVOTHROID) 88 MCG tablet Take 1 tablet (88 mcg) by mouth daily 90 tablet 1     metoprolol tartrate (LOPRESSOR) 25 MG tablet Take 1 tablet (25 mg) by mouth 2 times daily 180 tablet 0     Multiple Vitamins-Minerals (MULTIVITAMIN ADULT PO) Take 1 tablet by mouth daily    "    pravastatin (PRAVACHOL) 40 MG tablet Take 1 tablet (40 mg) by mouth At Bedtime 90 tablet 1     traMADol (ULTRAM) 50 MG tablet Take 1 tablet (50 mg) by mouth 3 times daily as needed for severe pain 90 tablet 0     hydrOXYzine (ATARAX) 25 MG tablet Take 1 tablet (25 mg) by mouth 3 times daily as needed for anxiety (Patient not taking: Reported on 9/18/2020) 30 tablet 1       Allergies:  Allergies   Allergen Reactions     Seasonal Allergies        Social History:   History   Drug Use No      History   Smoking Status     Former Smoker     Packs/day: 0.50     Years: 18.00     Types: Cigarettes     Quit date: 1980   Smokeless Tobacco     Never Used     Social History    Substance and Sexual Activity      Alcohol use: Yes        Alcohol/week: 0.0 standard drinks        Comment: 1 glass of wine at night       Family History:  Family History   Problem Relation Age of Onset     Cancer Mother         ovarian     Breast Cancer Mother      Heart Disease Father         rare; not sure what it was     Diabetes Father         old age     Cerebrovascular Disease Brother 68       Review of Systems:   A complete review of systems was negative except as mentioned in the History of Present Illness.     Objective & Physical Exam:  /82 (BP Location: Right arm, Patient Position: Sitting, Cuff Size: Adult Large)   Pulse 71   Ht 1.727 m (5' 8\")   Wt 82.6 kg (182 lb)   LMP  (LMP Unknown)   SpO2 100%   BMI 27.67 kg/m    Wt Readings from Last 2 Encounters:   09/18/20 82.6 kg (182 lb)   09/16/20 83.9 kg (185 lb)     Body mass index is 27.67 kg/m .   Body surface area is 1.99 meters squared.    Constitutional: appears stated age, in no apparent distress, appears to be well nourished  Eyes: sclera anicteric, conjunctiva normal, no lesions on eyelids or lashes  ENT: normocephalic, without obvious abnormality, atraumatic, external ears without lesions   Pulmonary: clear to auscultation bilaterally, no wheezes, no " rales  Cardiovascular: JVP normal, regular rate, regular rhythm, 2/6 KATTY at the RUSB, 1+ firm edema to the mid calf bilaterally  Gastrointestinal: abdominal exam benign, non-tender, no rigidity, no guarding  Neurologic: awake, alert, face symmetrical, moves all extremities  Psychiatric: affect is normal, answers questions appropriately, oriented to self and place    Labs reviewed:  Lab Results   Component Value Date    WBC 5.5 08/28/2020    RBC 3.98 08/28/2020    HGB 12.0 08/28/2020    HCT 38.0 08/28/2020    MCV 96 08/28/2020    MCH 30.2 08/28/2020    MCHC 31.6 08/28/2020    RDW 13.8 08/28/2020     08/28/2020     Sodium   Date Value Ref Range Status   08/28/2020 137 133 - 144 mmol/L Final     Potassium   Date Value Ref Range Status   08/28/2020 4.2 3.4 - 5.3 mmol/L Final     Chloride   Date Value Ref Range Status   08/28/2020 106 94 - 109 mmol/L Final     Carbon Dioxide   Date Value Ref Range Status   08/28/2020 24 20 - 32 mmol/L Final     Anion Gap   Date Value Ref Range Status   08/28/2020 7 3 - 14 mmol/L Final     Glucose   Date Value Ref Range Status   08/28/2020 102 (H) 70 - 99 mg/dL Final     Comment:     Fasting specimen     Urea Nitrogen   Date Value Ref Range Status   08/28/2020 19 7 - 30 mg/dL Final     Creatinine   Date Value Ref Range Status   08/28/2020 0.61 0.52 - 1.04 mg/dL Final     GFR Estimate   Date Value Ref Range Status   08/28/2020 88 >60 mL/min/[1.73_m2] Final     Comment:     Non  GFR Calc  Starting 12/18/2018, serum creatinine based estimated GFR (eGFR) will be   calculated using the Chronic Kidney Disease Epidemiology Collaboration   (CKD-EPI) equation.       Calcium   Date Value Ref Range Status   08/28/2020 9.9 8.5 - 10.1 mg/dL Final     Bilirubin Total   Date Value Ref Range Status   08/28/2020 0.5 0.2 - 1.3 mg/dL Final     Alkaline Phosphatase   Date Value Ref Range Status   08/28/2020 79 40 - 150 U/L Final     ALT   Date Value Ref Range Status   08/28/2020 19 0 -  50 U/L Final     AST   Date Value Ref Range Status   08/28/2020 15 0 - 45 U/L Final     Recent Labs   Lab Test 08/28/20  1016 10/28/19  1029  08/11/15  1224 08/20/14  0958   CHOL 178 188   < > 176 201*   HDL 92 93   < > 79 88   LDL 65 76   < > 70 79   TRIG 106 94   < > 133 169*   CHOLHDLRATIO  --   --   --  2.2 2.3    < > = values in this interval not displayed.      Lab Results   Component Value Date    A1C 6.1 08/28/2020    A1C 5.7 10/28/2019    A1C 5.8 09/28/2018    A1C 5.9 09/28/2017    A1C 6.1 11/14/2014          Thank you for allowing me to participate in the care of your patient.      Sincerely,     Quincy Anglin MD     [facility]    cc:   Rachel Miller MD  [address] [place] [address]

## 2020-09-18 NOTE — PROGRESS NOTES
Cardiology Clinic Progress Note:    September 18, 2020   Patient Name: Teri Beltran  Patient MRN: [id number]     Consult reason: pre-operative cardiac risk assessment    HPI and Assessment and Plan/Recommendations:    Please see dictation. #[id number]    Thank you for allowing our team to participate in the care of Teri Beltran.  Please do not hesitate to call or page me with any questions or concerns.    Sincerely,     Quincy Anglin MD, [facility]  Cardiology  Pager:  [phone]  Text Page   September 18, 2020    cc  Rachel Miller MD  [address] [place] [address]    Past Medical History:   The 10-year ASCVD risk score (Keith DC Jr., et al., 2013) is: 19.9%    Values used to calculate the score:      Age: 74 years      Sex: Female      Is Non- : No      Diabetic: No      Tobacco smoker: No      Systolic Blood Pressure: 124 mmHg      Is BP treated: Yes      HDL Cholesterol: 92 mg/dL      Total Cholesterol: 178 mg/dL  Past Medical History:   Diagnosis Date     Complication of anesthesia     confusion after anesthesia (after hysterectomy)     First degree AV block 8/11/2015     Former smoker      Gastroesophageal reflux disease      GERD (gastroesophageal reflux disease)      Glaucoma      Heart block     2:1     Hyperlipidemia LDL goal <130 6/14/2013     Hypertension goal BP (blood pressure) < 140/90 12/3/2013     Hypothyroidism      Left atrial dilatation     mild     Mild dilation of ascending aorta - borderline 8/11/2015     Palpitations      Prediabetes 6/14/2013     RBBB 8/11/2015     S/P total knee arthroplasty 7/23/2019     Tachycardia      Varicose veins      Venous insufficiency     s/p bilateral great saphenous vein radiofrequency ablation by Dr. Garcia        Past Surgical History:   Past Surgical History:   Procedure Laterality Date     ARTHROPLASTY KNEE Right 7/23/2019    Procedure: Right total knee arthroplasty using an  [employer]ance knee system;  Surgeon: Dragan Muse MD;  Location: [facility] OR     BREAST SURGERY      right breast ductal surgery due to milk production     COLONOSCOPY N/A 10/24/2014    no further screening. Procedure: COLONOSCOPY;  Surgeon: Pedro Rudd MD;  Location: [facility] GI     HYSTERECTOMY, PAP NO LONGER INDICATED  2009    total hysterectomy and ovaries. benign     SOFT TISSUE SURGERY      ganglion removed from left wrist and ring finger     SURGICAL HISTORY OF -   age 7    tonsillectomy     SURGICAL HISTORY OF -   1/15    left eye cataract     SURGICAL HISTORY OF -   Fall 2016    LINQ placed.       Medications (outpatient):  Current Outpatient Medications   Medication Sig Dispense Refill     acetaminophen (TYLENOL) 325 MG tablet Take 325-650 mg by mouth every 6 hours as needed for mild pain       alendronate (FOSAMAX) 70 MG tablet TAKE 1 TABLET(70 MG) BY MOUTH EVERY 7 DAYS 12 tablet 1     dorzolamide-timolol (COSOPT) 2-0.5 % ophthalmic solution Place 1 drop into both eyes 2 times daily       famotidine (PEPCID) 10 MG tablet Take 1 tablet (10 mg) by mouth 2 times daily 180 tablet 1     fluticasone (FLONASE) 50 MCG/ACT nasal spray Spray 1-2 sprays into both nostrils daily 48 g 1     hydrochlorothiazide (HYDRODIURIL) 25 MG tablet Take 1 tablet (25 mg) by mouth daily 90 tablet 3     latanoprost (XALATAN) 0.005 % ophthalmic solution Place 1 drop into the right eye At Bedtime        levothyroxine (SYNTHROID/LEVOTHROID) 88 MCG tablet Take 1 tablet (88 mcg) by mouth daily 90 tablet 1     metoprolol tartrate (LOPRESSOR) 25 MG tablet Take 1 tablet (25 mg) by mouth 2 times daily 180 tablet 0     Multiple Vitamins-Minerals (MULTIVITAMIN ADULT PO) Take 1 tablet by mouth daily       pravastatin (PRAVACHOL) 40 MG tablet Take 1 tablet (40 mg) by mouth At Bedtime 90 tablet 1     traMADol (ULTRAM) 50 MG tablet Take 1 tablet (50 mg) by mouth 3 times daily as needed for severe pain 90 tablet 0     hydrOXYzine  "(ATARAX) 25 MG tablet Take 1 tablet (25 mg) by mouth 3 times daily as needed for anxiety (Patient not taking: Reported on 9/18/2020) 30 tablet 1       Allergies:  Allergies   Allergen Reactions     Seasonal Allergies        Social History:   History   Drug Use No      History   Smoking Status     Former Smoker     Packs/day: 0.50     Years: 18.00     Types: Cigarettes     Quit date: 1980   Smokeless Tobacco     Never Used     Social History    Substance and Sexual Activity      Alcohol use: Yes        Alcohol/week: 0.0 standard drinks        Comment: 1 glass of wine at night       Family History:  Family History   Problem Relation Age of Onset     Cancer Mother         ovarian     Breast Cancer Mother      Heart Disease Father         rare; not sure what it was     Diabetes Father         old age     Cerebrovascular Disease Brother 68       Review of Systems:   A complete review of systems was negative except as mentioned in the History of Present Illness.     Objective & Physical Exam:  /82 (BP Location: Right arm, Patient Position: Sitting, Cuff Size: Adult Large)   Pulse 71   Ht 1.727 m (5' 8\")   Wt 82.6 kg (182 lb)   LMP  (LMP Unknown)   SpO2 100%   BMI 27.67 kg/m    Wt Readings from Last 2 Encounters:   09/18/20 82.6 kg (182 lb)   09/16/20 83.9 kg (185 lb)     Body mass index is 27.67 kg/m .   Body surface area is 1.99 meters squared.    Constitutional: appears stated age, in no apparent distress, appears to be well nourished  Eyes: sclera anicteric, conjunctiva normal, no lesions on eyelids or lashes  ENT: normocephalic, without obvious abnormality, atraumatic, external ears without lesions   Pulmonary: clear to auscultation bilaterally, no wheezes, no rales  Cardiovascular: JVP normal, regular rate, regular rhythm, 2/6 KATTY at the RUSB, 1+ firm edema to the mid calf bilaterally  Gastrointestinal: abdominal exam benign, non-tender, no rigidity, no guarding  Neurologic: awake, alert, face " symmetrical, moves all extremities  Psychiatric: affect is normal, answers questions appropriately, oriented to self and place    Labs reviewed:  Lab Results   Component Value Date    WBC 5.5 08/28/2020    RBC 3.98 08/28/2020    HGB 12.0 08/28/2020    HCT 38.0 08/28/2020    MCV 96 08/28/2020    MCH 30.2 08/28/2020    MCHC 31.6 08/28/2020    RDW 13.8 08/28/2020     08/28/2020     Sodium   Date Value Ref Range Status   08/28/2020 137 133 - 144 mmol/L Final     Potassium   Date Value Ref Range Status   08/28/2020 4.2 3.4 - 5.3 mmol/L Final     Chloride   Date Value Ref Range Status   08/28/2020 106 94 - 109 mmol/L Final     Carbon Dioxide   Date Value Ref Range Status   08/28/2020 24 20 - 32 mmol/L Final     Anion Gap   Date Value Ref Range Status   08/28/2020 7 3 - 14 mmol/L Final     Glucose   Date Value Ref Range Status   08/28/2020 102 (H) 70 - 99 mg/dL Final     Comment:     Fasting specimen     Urea Nitrogen   Date Value Ref Range Status   08/28/2020 19 7 - 30 mg/dL Final     Creatinine   Date Value Ref Range Status   08/28/2020 0.61 0.52 - 1.04 mg/dL Final     GFR Estimate   Date Value Ref Range Status   08/28/2020 88 >60 mL/min/[1.73_m2] Final     Comment:     Non  GFR Calc  Starting 12/18/2018, serum creatinine based estimated GFR (eGFR) will be   calculated using the Chronic Kidney Disease Epidemiology Collaboration   (CKD-EPI) equation.       Calcium   Date Value Ref Range Status   08/28/2020 9.9 8.5 - 10.1 mg/dL Final     Bilirubin Total   Date Value Ref Range Status   08/28/2020 0.5 0.2 - 1.3 mg/dL Final     Alkaline Phosphatase   Date Value Ref Range Status   08/28/2020 79 40 - 150 U/L Final     ALT   Date Value Ref Range Status   08/28/2020 19 0 - 50 U/L Final     AST   Date Value Ref Range Status   08/28/2020 15 0 - 45 U/L Final     Recent Labs   Lab Test 08/28/20  1016 10/28/19  1029  08/11/15  1224 08/20/14  0958   CHOL 178 188   < > 176 201*   HDL 92 93   < > 79 88   LDL 65 76    < > 70 79   TRIG 106 94   < > 133 169*   CHOLHDLRATIO  --   --   --  2.2 2.3    < > = values in this interval not displayed.      Lab Results   Component Value Date    A1C 6.1 08/28/2020    A1C 5.7 10/28/2019    A1C 5.8 09/28/2018    A1C 5.9 09/28/2017    A1C 6.1 11/14/2014

## 2020-09-18 NOTE — PROGRESS NOTES
Cardiology Clinic Progress Note:    September 18, 2020   Patient Name: Teri Beltran  Patient MRN: [id number]     Consult reason: ***    HPI and Assessment and Plan/Recommendations:    Please see dictation. #***    Thank you for allowing our team to participate in the care of Teri Beltran.  Please do not hesitate to call or page me with any questions or concerns.    Sincerely,     Quincy Anglin MD, [facility]  Cardiology  Pager:  [phone]  Text Page   September 18, 2020    cc  Rachel Miller MD  [address] [place] [address]    Past Medical History:   The 10-year ASCVD risk score (Keith DC Jr., et al., 2013) is: 19.9%    Values used to calculate the score:      Age: 74 years      Sex: Female      Is Non- : No      Diabetic: No      Tobacco smoker: No      Systolic Blood Pressure: 124 mmHg      Is BP treated: Yes      HDL Cholesterol: 92 mg/dL      Total Cholesterol: 178 mg/dL  Past Medical History:   Diagnosis Date     Complication of anesthesia     confusion after anesthesia (after hysterectomy)     First degree AV block 8/11/2015     Former smoker      Gastroesophageal reflux disease      GERD (gastroesophageal reflux disease)      Glaucoma      Heart block     2:1     Hyperlipidemia LDL goal <130 6/14/2013     Hypertension goal BP (blood pressure) < 140/90 12/3/2013     Hypothyroidism      Left atrial dilatation     mild     Mild dilation of ascending aorta - borderline 8/11/2015     Palpitations      Prediabetes 6/14/2013     RBBB 8/11/2015     S/P total knee arthroplasty 7/23/2019     Tachycardia      Varicose veins      Venous insufficiency     s/p bilateral great saphenous vein radiofrequency ablation by Dr. Garcia        Past Surgical History:   Past Surgical History:   Procedure Laterality Date     ARTHROPLASTY KNEE Right 7/23/2019    Procedure: Right total knee arthroplasty using an [employer] knee system;  Surgeon:  Dragan Muse MD;  Location: [facility] OR     BREAST SURGERY      right breast ductal surgery due to milk production     COLONOSCOPY N/A 10/24/2014    no further screening. Procedure: COLONOSCOPY;  Surgeon: Pedro Rudd MD;  Location: [facility] GI     HYSTERECTOMY, PAP NO LONGER INDICATED  2009    total hysterectomy and ovaries. benign     SOFT TISSUE SURGERY      ganglion removed from left wrist and ring finger     SURGICAL HISTORY OF -   age 7    tonsillectomy     SURGICAL HISTORY OF -   1/15    left eye cataract     SURGICAL HISTORY OF -   Fall 2016    LINQ placed.       Medications (outpatient):  Current Outpatient Medications   Medication Sig Dispense Refill     acetaminophen (TYLENOL) 325 MG tablet Take 325-650 mg by mouth every 6 hours as needed for mild pain       alendronate (FOSAMAX) 70 MG tablet TAKE 1 TABLET(70 MG) BY MOUTH EVERY 7 DAYS 12 tablet 1     dorzolamide-timolol (COSOPT) 2-0.5 % ophthalmic solution Place 1 drop into both eyes 2 times daily       famotidine (PEPCID) 10 MG tablet Take 1 tablet (10 mg) by mouth 2 times daily 180 tablet 1     fluticasone (FLONASE) 50 MCG/ACT nasal spray Spray 1-2 sprays into both nostrils daily 48 g 1     hydrochlorothiazide (HYDRODIURIL) 12.5 MG tablet Take 1 tablet (12.5 mg) by mouth daily 30 tablet 1     latanoprost (XALATAN) 0.005 % ophthalmic solution Place 1 drop into the right eye At Bedtime        levothyroxine (SYNTHROID/LEVOTHROID) 88 MCG tablet Take 1 tablet (88 mcg) by mouth daily 90 tablet 1     metoprolol tartrate (LOPRESSOR) 25 MG tablet Take 1 tablet (25 mg) by mouth 2 times daily 180 tablet 0     Multiple Vitamins-Minerals (MULTIVITAMIN ADULT PO) Take 1 tablet by mouth daily       pravastatin (PRAVACHOL) 40 MG tablet Take 1 tablet (40 mg) by mouth At Bedtime 90 tablet 1     traMADol (ULTRAM) 50 MG tablet Take 1 tablet (50 mg) by mouth 3 times daily as needed for severe pain 90 tablet 0     hydrOXYzine (ATARAX) 25 MG tablet Take 1 tablet (25 mg)  "by mouth 3 times daily as needed for anxiety (Patient not taking: Reported on 9/18/2020) 30 tablet 1       Allergies:  Allergies   Allergen Reactions     Seasonal Allergies        Social History:   History   Drug Use No      History   Smoking Status     Former Smoker     Packs/day: 0.50     Years: 18.00     Types: Cigarettes     Quit date: 1980   Smokeless Tobacco     Never Used     Social History    Substance and Sexual Activity      Alcohol use: Yes        Alcohol/week: 0.0 standard drinks        Comment: 1 glass of wine at night       Family History:  Family History   Problem Relation Age of Onset     Cancer Mother         ovarian     Breast Cancer Mother      Heart Disease Father         rare; not sure what it was     Diabetes Father         old age     Cerebrovascular Disease Brother 68       Review of Systems:   A complete review of systems was negative except as mentioned in the History of Present Illness.     Objective & Physical Exam:  /82 (BP Location: Right arm, Patient Position: Sitting, Cuff Size: Adult Large)   Pulse 71   Ht 1.727 m (5' 8\")   Wt 82.6 kg (182 lb)   LMP  (LMP Unknown)   SpO2 100%   BMI 27.67 kg/m    Wt Readings from Last 2 Encounters:   09/18/20 82.6 kg (182 lb)   09/16/20 83.9 kg (185 lb)     Body mass index is 27.67 kg/m .   Body surface area is 1.99 meters squared.  ***  Constitutional: appears stated age, in no apparent distress, appears to be well nourished  Eyes: sclera anicteric, conjunctiva normal, no lesions on eyelids or lashes  ENT: normocephalic, without obvious abnormality, atraumatic, external ears without lesions   Pulmonary: clear to auscultation bilaterally, no wheezes, no rales, no increased work of breathing  Cardiovascular: JVP normal, regular rate, regular rhythm, normal S1 and S2, no S3, S4, no murmur appreciated, no lower extremity edema***  Gastrointestinal: abdominal exam benign, non-tender, no rigidity, no guarding  Neurologic: awake, alert, face " symmetrical, moves all extremities  Skin: no abnormal rashes or lesions on limited exam, nails normal without discoloration or clubbing, no jaundice  Psychiatric: affect is normal, answers questions appropriately, oriented to self and place    Labs reviewed:  Lab Results   Component Value Date    WBC 5.5 08/28/2020    RBC 3.98 08/28/2020    HGB 12.0 08/28/2020    HCT 38.0 08/28/2020    MCV 96 08/28/2020    MCH 30.2 08/28/2020    MCHC 31.6 08/28/2020    RDW 13.8 08/28/2020     08/28/2020     Sodium   Date Value Ref Range Status   08/28/2020 137 133 - 144 mmol/L Final     Potassium   Date Value Ref Range Status   08/28/2020 4.2 3.4 - 5.3 mmol/L Final     Chloride   Date Value Ref Range Status   08/28/2020 106 94 - 109 mmol/L Final     Carbon Dioxide   Date Value Ref Range Status   08/28/2020 24 20 - 32 mmol/L Final     Anion Gap   Date Value Ref Range Status   08/28/2020 7 3 - 14 mmol/L Final     Glucose   Date Value Ref Range Status   08/28/2020 102 (H) 70 - 99 mg/dL Final     Comment:     Fasting specimen     Urea Nitrogen   Date Value Ref Range Status   08/28/2020 19 7 - 30 mg/dL Final     Creatinine   Date Value Ref Range Status   08/28/2020 0.61 0.52 - 1.04 mg/dL Final     GFR Estimate   Date Value Ref Range Status   08/28/2020 88 >60 mL/min/[1.73_m2] Final     Comment:     Non  GFR Calc  Starting 12/18/2018, serum creatinine based estimated GFR (eGFR) will be   calculated using the Chronic Kidney Disease Epidemiology Collaboration   (CKD-EPI) equation.       Calcium   Date Value Ref Range Status   08/28/2020 9.9 8.5 - 10.1 mg/dL Final     Bilirubin Total   Date Value Ref Range Status   08/28/2020 0.5 0.2 - 1.3 mg/dL Final     Alkaline Phosphatase   Date Value Ref Range Status   08/28/2020 79 40 - 150 U/L Final     ALT   Date Value Ref Range Status   08/28/2020 19 0 - 50 U/L Final     AST   Date Value Ref Range Status   08/28/2020 15 0 - 45 U/L Final     Recent Labs   Lab Test  08/28/20  1016 10/28/19  1029  08/11/15  1224 08/20/14  0958   CHOL 178 188   < > 176 201*   HDL 92 93   < > 79 88   LDL 65 76   < > 70 79   TRIG 106 94   < > 133 169*   CHOLHDLRATIO  --   --   --  2.2 2.3    < > = values in this interval not displayed.      Lab Results   Component Value Date    A1C 6.1 08/28/2020    A1C 5.7 10/28/2019    A1C 5.8 09/28/2018    A1C 5.9 09/28/2017    A1C 6.1 11/14/2014

## 2020-09-18 NOTE — PROGRESS NOTES
"Service Date: 09/18/2020      CARDIOLOGY CLINIC PROGRESS NOTE      CONSULT INDICATION:  Preoperative cardiac risk assessment.      HISTORY OF PRESENT ILLNESS:      I had the opportunity to see patient Teri Beltran today in Cardiology Clinic for followup.  As you know, she is a 74-year-old female with a past medical history significant for a first-degree AV block, RBBB, paroxysmal SVT, GERD, hypertension, hyperlipidemia, chronic venous insufficiency, and known aortic sclerosis, who presents for further evaluation prior to a planned hip surgery.       The patient is followed by my colleague, Dr. Pavon as well as Dr. Villareal with Cardiology EP.  She was last seen in General Cardiology Clinic with Dr. Pavon on 07/10/2020.  At that time she was complaining of some ankle swelling, varicose veins, and it was recommended that she use compression stockings as well as undergo a venous competency study.        Since then, she has been scheduled for hip surgery and was seen by Dr. Miller on 08/31/2020 for a preoperative evaluation.  Apparently, the patient was unable to have the surgery due to concerns from Anesthesiology regarding potential cardiovascular complications related to the procedure as well as a cardiac murmur on exam.  The patient reports that she was told that, \"if we were to move forward with surgery, you may need CPR.\"      The patient was extremely concerned by this, and has been somewhat anxious since then.  She does note that she has had worsening bilateral leg swelling over the past month, worse since she was seen by Dr. Pavon as well as Dr. Miller for a preoperative evaluation.        She denies any change in her baseline shortness of breath.  She does note occasional chest pain; however, this has been relatively stable over the past several months, no recent increase in severity or frequency.  She denies symptoms of orthopnea/PND.      Prior cardiac evaluation is notable for a stress " echocardiogram done on 01/06/2020 that was negative for evidence of ischemia; however, did show a trileaflet aortic valve sclerosis with trace aortic insufficiency.        Zio Patch 06/24/2020 to 07/08/2020 showed normal sinus rhythm with first-degree AV block, bundle branch block, 471 runs of paroxysmal SVT, the longest lasting 26 seconds at a rate of 99 beats per minute.      ASSESSMENT, PLAN AND RECOMMENDATIONS:       1.  Concern for perioperative cardiovascular complications, cardiac murmur auscultated on physical exam, resulting in cancellation of a planned hip surgery.   2.  Conduction abnormalities, known first-degree AV block, right bundle branch block, paroxysmal SVT, followed by my colleague, Dr. Villareal.   3.  Hypertension.   4.  Chronic lower extremity swelling due to venous insufficiency, history of bilateral great saphenous vein radiofrequency ablation.   5.  Mild aortic dilatation noted on chart review; however size was normal on TTE 03/27/2018.      -- Overall, clinical presentation does not seem to be consistent with an acute coronary syndrome, decompensated heart failure, or suggestive of myocardial ischemia.   -- Dobutamine stress echocardiogram 01/2020 showed aortic sclerosis with trace aortic insufficiency.  This likely accounts for the cardiac murmur appreciated on physical exam.   -- Lower extremity swelling seems more likely related to chronic lower extremity venous insufficiency for which she has received bilateral great saphenous vein ablation procedures.  Does not have symptoms otherwise concerning for decompensated heart failure.   -- Will obtain a TTE for further assessment of valves, ascending aorta, overall cardiac function.   -- Increase hydrochlorothiazide from 12.5 mg daily to 25 mg daily.  Reviewed prior labs.  Potassium has always been within goal.  Will hold off on empiric supplementation at this time.   -- Follow up with Card[facility] in about 2 weeks with a BMP at that time or  sooner as needed.      Thank you for allowing our team to participate in the care of patient, Teri Beltran.  Please do not hesitate to page or call with any questions or concerns.      Quincy Anglin MD, [facility]  Cardiology  Pager:  [phone]  Text Page   2020        D: 2020   T: 2020   MT: ELISABETH      Name:     TERI BELTRAN   MRN:      [id number]        Account:      [id number]   :      1945           Service Date: 2020      Document: [id number]

## 2020-09-23 ENCOUNTER — HOSPITAL ENCOUNTER (OUTPATIENT)
Dept: CARDIOLOGY | Facility: CLINIC | Age: 75
Discharge: HOME | End: 2020-09-23
Attending: INTERNAL MEDICINE | Admitting: INTERNAL MEDICINE
Payer: MEDICARE

## 2020-09-23 DIAGNOSIS — R01.1 HEART MURMUR: ICD-10-CM

## 2020-09-23 DIAGNOSIS — R60.0 BILATERAL LEG EDEMA: ICD-10-CM

## 2020-09-23 PROCEDURE — 93306 TTE W/DOPPLER COMPLETE: CPT

## 2020-09-23 PROCEDURE — 93306 TTE W/DOPPLER COMPLETE: CPT | Mod: 26 | Performed by: INTERNAL MEDICINE

## 2020-09-25 ENCOUNTER — TELEPHONE (OUTPATIENT)
Dept: CARDIOLOGY | Facility: CLINIC | Age: 75
End: 2020-09-25

## 2020-09-25 DIAGNOSIS — I10 ESSENTIAL HYPERTENSION WITH GOAL BLOOD PRESSURE LESS THAN 140/90: Primary | ICD-10-CM

## 2020-09-25 DIAGNOSIS — R60.9 FLUID RETENTION: ICD-10-CM

## 2020-09-25 RX ORDER — FUROSEMIDE 20 MG
20 TABLET ORAL DAILY
Qty: 30 TABLET | Refills: 3 | Status: SHIPPED | OUTPATIENT
Start: 2020-09-25 | End: 2020-09-28

## 2020-09-25 RX ORDER — POTASSIUM CHLORIDE 750 MG/1
10 TABLET, EXTENDED RELEASE ORAL DAILY
Qty: 30 TABLET | Refills: 3 | Status: SHIPPED | OUTPATIENT
Start: 2020-09-25 | End: 2021-02-01

## 2020-09-25 NOTE — TELEPHONE ENCOUNTER
Pt had echo 9/23/20. Per Dr. Anglin:    ----- Message from Quincy Anglin MD sent at 9/25/2020  9:08 AM CDT -----  Results reviewed, please let her know that overall findings are favorable. Heart structure and function are overall normal which is excellent. Mild calcification of the aortic valve which is the reason for the murmur heard on exam, however there is no evidence of this affecting valve function. Trivial pericardial effusion which is not clinically significant, same as in 2015. There is evidence of a small amount of extra fluid in the body, which we can see sometimes in patients who are more sensitive to fluid retention due to factors such as obesity, and so we should start furosemide 20mg daily and KCL 10meq daily. Follow up with [facility] with BMP at that time. Thanks!    BMP and follow-up appt w/ [facility] scheduled 10/8/20.    Called pt, reviewed Dr. Anglin's recommendations. Pt understood, agreed w/ plan.   Sent Rx for furosemide 20 mg daily and KCL 10 meq daily to [facility]. Advised to monitor for LH, dizziness, call me if any questions/concerns.   Wero RN, BSN  09/25/20 10:08 AM

## 2020-09-28 DIAGNOSIS — R60.9 FLUID RETENTION: ICD-10-CM

## 2020-09-28 DIAGNOSIS — I10 ESSENTIAL HYPERTENSION WITH GOAL BLOOD PRESSURE LESS THAN 140/90: ICD-10-CM

## 2020-09-28 RX ORDER — FUROSEMIDE 20 MG
20 TABLET ORAL DAILY
Qty: 90 TABLET | Refills: 0 | Status: SHIPPED | OUTPATIENT
Start: 2020-09-28 | End: 2020-12-28

## 2020-10-05 NOTE — PROGRESS NOTES
"  Cardiology Clinic Progress Note    Service Date: October 8, 2020    PRIMARY CARDIOLOGIST: Dr. Villareal (EP); Dr. Pavon (general Cardiology)      REASON FOR VISIT: Follow up of medication changes    HPI:   I had the pleasure of seeing Ms. Williamson \"Ray\" Devin in the clinic today. She is a very pleasant 74 year old female with a past medical history notable for first-degree AV block, RBBB, paroxysmal SVT, GERD, hypertension, hyperlipidemia, chronic venous insufficiency, and known aortic sclerosis. The patient has followed with Dr. Pavon as well as Dr. Villareal with electrophysiology. She was last seen in General Cardiology Clinic with Dr. Pavon on 07/10/2020.  At that time she was complaining of some ankle swelling, varicose veins, and it was recommended that she use compression stockings as well as undergo a venous competency study.        Since then, she has been scheduled for hip surgery and was seen by Dr. Miller on 08/31/2020 for a preoperative evaluation. Apparently, the patient was unable to have the surgery due to concerns from Anesthesiology regarding potential cardiovascular complications related to the procedure as well as a cardiac murmur heard on exam. She was recently see by Dr. Anglin on 9/18/20 for cardiac clearance prior to proceeding.    Dr. Anglin recommended completing an echocardiogram for further assessment of valves, ascending aorta, overall cardiac function. The dose of hydrochlorothiazide was increased from 12.5 mg daily to 25 mg daily. The echocardiogram was completed on 9/23/20 showed normal LV systolic function with an EF 55-60%. No resting regional wall motion abnormalities were noted. Mild calcification of the aortic valve which is the reason for the murmur heard on exam, however there is no evidence of aortic stenosis or regurgitation. She was started on furosemide 20mg daily and KCL 10 mEq daily.     Today, she presents to the clinic in follow up of her recent testing and medication " changes. She tells me that she has been feeling well since she last spoke with Dr. Anglin. She did have a fairly long episode of palpitations last night lasting a couple of hours before ending spontaneously. She checked her pulse and it was around 100-130 bpm during that time.  Her pulse did not feel irregular at the time. She did not have significant associated dizziness, lightheadedness, or syncope. She has had theses kinds of episodes with a history of PSVT in the past around once every couple of weeks but she felt this particular episode lasted quite a bit longer than usual. She otherwise notes that her swelling in her legs has improved quite a bit since starting on the lasix and hydrochlorothiazide. She denies chest pain, orthopnea, PND, or lower extremity edema. A basic metabolic panel was drawn prior to the visit today showing stable electrolytes and renal function with a potassium of 3.9, creatinine of 0.80, and GFR of 72.    ASSESSMENT AND PLAN:  1. Known first-degree AV block, right bundle branch block, paroxysmal SVT  - Followed by Dr. Villareal with electrophysiology. On metoprolol tartrate 25 mg BID.  We discussed the option of increasing the dose of her metoprolol to 50 mg BID both for further blood pressure control and to help suppress her control episodes of PSVT. She was opposed to this idea as she was previously on a higher dose of metoprolol in the past and felt that this lowered her blood pressure too significantly.  - Reviewed that if she begins to have issues with more frequent or prolonged episodes of palpitations to call the clinic and we could consider repeat event monitoring if needed.     2. Hypertension  - Reasonably well-controlled on metoprolol tartrate 25 mg twice daily, revisited by milligrams daily, and Lasix 20 mg daily with potassium 10 mEq twice daily.    3. Venous insufficiency, chronic lower extremity edema  - History of bilateral great saphenous vein radiofrequency ablation.     4.  Aortic sclerosis without significant aortic insufficiency or stenosis  - This is the cause of her cardiac murmur on physical exam.     Thank you for the opportunity to participate in this pleasant patient's care. She seems to be optimized from a cardiac standpoint at this time to proceed with her planned hip surgery. She would like to continue to follow-up with Dr. Anglin going forward she would like to see a cardiologist in [place]. We will plan to have her see Dr. Anglin for routine annual visit in about 1 year. We would be happy to see her sooner if needed for any concerns in the meantime.    RUBY Loera, CNP   Text Page  (8am - 5pm, M-F)    Orders this Visit:  Orders Placed This Encounter   Procedures     Follow-Up with Cardiologist     No orders of the defined types were placed in this encounter.    There are no discontinued medications.  Encounter Diagnoses   Name Primary?     Essential hypertension Yes     Fluid retention      Bilateral leg edema      Palpitations      SVT (supraventricular tachycardia) (H)      Ventricular tachycardia (H)      Syncope, unspecified syncope type      Hypertension goal BP (blood pressure) < 140/90      CURRENT MEDICATIONS:  Current Outpatient Medications   Medication Sig Dispense Refill     acetaminophen (TYLENOL) 325 MG tablet Take 325-650 mg by mouth every 6 hours as needed for mild pain       alendronate (FOSAMAX) 70 MG tablet TAKE 1 TABLET(70 MG) BY MOUTH EVERY 7 DAYS 12 tablet 1     dorzolamide-timolol (COSOPT) 2-0.5 % ophthalmic solution Place 1 drop into both eyes 2 times daily       famotidine (PEPCID) 10 MG tablet Take 1 tablet (10 mg) by mouth 2 times daily 180 tablet 1     fluticasone (FLONASE) 50 MCG/ACT nasal spray Spray 1-2 sprays into both nostrils daily 48 g 1     furosemide (LASIX) 20 MG tablet Take 1 tablet (20 mg) by mouth daily 90 tablet 0     hydrochlorothiazide (HYDRODIURIL) 25 MG tablet Take 1 tablet (25 mg) by mouth daily 90 tablet 3     latanoprost  (XALATAN) 0.005 % ophthalmic solution Place 1 drop into the right eye At Bedtime        levothyroxine (SYNTHROID/LEVOTHROID) 88 MCG tablet Take 1 tablet (88 mcg) by mouth daily 90 tablet 1     metoprolol tartrate (LOPRESSOR) 25 MG tablet Take 1 tablet (25 mg) by mouth 2 times daily 180 tablet 0     Multiple Vitamins-Minerals (MULTIVITAMIN ADULT PO) Take 1 tablet by mouth daily       potassium chloride ER (KLOR-CON M) 10 MEQ CR tablet Take 1 tablet (10 mEq) by mouth daily 30 tablet 3     pravastatin (PRAVACHOL) 40 MG tablet Take 1 tablet (40 mg) by mouth At Bedtime 90 tablet 1     traMADol (ULTRAM) 50 MG tablet Take 1 tablet (50 mg) by mouth 3 times daily as needed for severe pain 90 tablet 0     hydrOXYzine (ATARAX) 25 MG tablet Take 1 tablet (25 mg) by mouth 3 times daily as needed for anxiety (Patient not taking: Reported on 2020) 30 tablet 1     ALLERGIES  Allergies   Allergen Reactions     Seasonal Allergies      PAST MEDICAL, SURGICAL, FAMILY HISTORY:  History was reviewed and updated as needed, see medical record.    SOCIAL HISTORY:  Social History     Socioeconomic History     Marital status:      Spouse name: Not on file     Number of children: Not on file     Years of education: Not on file     Highest education level: Not on file   Occupational History     Not on file   Social Needs     Financial resource strain: Not on file     Food insecurity     Worry: Not on file     Inability: Not on file     Transportation needs     Medical: Not on file     Non-medical: Not on file   Tobacco Use     Smoking status: Former Smoker     Packs/day: 0.50     Years: 18.00     Pack years: 9.00     Types: Cigarettes     Quit date:      Years since quittin.7     Smokeless tobacco: Never Used   Substance and Sexual Activity     Alcohol use: Yes     Alcohol/week: 0.0 standard drinks     Comment: 1 glass of wine at night     Drug use: No     Sexual activity: Yes     Partners: Male   Lifestyle     Physical  "activity     Days per week: Not on file     Minutes per session: Not on file     Stress: Not on file   Relationships     Social connections     Talks on phone: Not on file     Gets together: Not on file     Attends Voodoo service: Not on file     Active member of club or organization: Not on file     Attends meetings of clubs or organizations: Not on file     Relationship status: Not on file     Intimate partner violence     Fear of current or ex partner: Not on file     Emotionally abused: Not on file     Physically abused: Not on file     Forced sexual activity: Not on file   Other Topics Concern     Parent/sibling w/ CABG, MI or angioplasty before 65F 55M? Not Asked      Service Not Asked     Blood Transfusions Not Asked     Caffeine Concern No     Comment: 4-5 cups of coffee daily     Occupational Exposure Not Asked     Hobby Hazards Not Asked     Sleep Concern Not Asked     Stress Concern Not Asked     Weight Concern Not Asked     Special Diet No     Comment: no added salt     Back Care Not Asked     Exercise No     Comment: 3 days per week - exercise class      Bike Helmet Not Asked     Seat Belt Not Asked     Self-Exams Not Asked   Social History Narrative     Not on file     Review of Systems:  Skin:  Negative     Eyes:  Negative    ENT:  Positive for sinus trouble  Respiratory:  Positive for dyspnea on exertion  Cardiovascular:    palpitations;Positive for;edema  Gastroenterology: Negative    Genitourinary:  Negative    Musculoskeletal:  Positive for arthritis;joint pain  Neurologic:  Negative    Psychiatric:  Positive for sleep disturbances  Heme/Lymph/Imm:  Positive for allergies  Endocrine:  Positive for thyroid disorder     Physical Exam:  Vitals: /86 (BP Location: Right arm, Patient Position: Sitting, Cuff Size: Adult Regular)   Pulse 76   Ht 1.727 m (5' 8\")   Wt 80.3 kg (177 lb 1.6 oz)   LMP  (LMP Unknown)   Breastfeeding No   BMI 26.93 kg/m     Wt Readings from Last 4 " Encounters:   10/08/20 80.3 kg (177 lb 1.6 oz)   09/18/20 82.6 kg (182 lb)   09/16/20 83.9 kg (185 lb)   08/31/20 83 kg (183 lb)     CONSTITUTIONAL: Appears her stated age, well nourished, and in no acute distress.  HEENT: Pupils equal, round. Sclerae nonicteric.    NECK: Supple, no masses appreciated or JVD appreciated.  C/V:  Regular rate and rhythm, normal S1 and S2, no S3 or S4, no murmur, rub or gallop.   RESP: Respirations are unlabored. Lungs are clear to auscultation bilaterally without wheezing, rales, or rhonchi.  EXTREM: 1+ left lower extremity edema, trace right lower extremity edema. No clubbing or cyanosis.  NEURO: Alert and oriented, cooperative. Gait steady. No gross focal deficits.   PSYCH: Affect appropriate, mildly anxious. Mentation normal. Responds to questions appropriately.  SKIN: Warm and dry.     Recent Lab Results:  LIPID RESULTS:  Lab Results   Component Value Date    CHOL 178 08/28/2020    HDL 92 08/28/2020    LDL 65 08/28/2020    TRIG 106 08/28/2020    CHOLHDLRATIO 2.2 08/11/2015     LIVER ENZYME RESULTS:  Lab Results   Component Value Date    AST 15 08/28/2020    ALT 19 08/28/2020     CBC RESULTS:  Lab Results   Component Value Date    WBC 5.5 08/28/2020    RBC 3.98 08/28/2020    HGB 12.0 08/28/2020    HCT 38.0 08/28/2020    MCV 96 08/28/2020    MCH 30.2 08/28/2020    MCHC 31.6 08/28/2020    RDW 13.8 08/28/2020     08/28/2020     BMP RESULTS:  Lab Results   Component Value Date     10/08/2020    POTASSIUM 3.9 10/08/2020    CHLORIDE 101 10/08/2020    CO2 31 10/08/2020    ANIONGAP 3 10/08/2020     (H) 10/08/2020    BUN 28 10/08/2020    CR 0.80 10/08/2020    GFRESTIMATED 72 10/08/2020    GFRESTBLACK 84 10/08/2020    LILIA 9.5 10/08/2020      A1C RESULTS:  Lab Results   Component Value Date    A1C 6.1 (H) 08/28/2020     CC  Quincy Anglin MD  [address], [id number]  [place] [id number]    This note was completed in part using Dragon voice recognition software. Although  reviewed after completion, some word and grammatical errors may occur.

## 2020-10-05 NOTE — PATIENT INSTRUCTIONS
Thank you for coming into the [facility] today.    Today's plan:   1. Continue with your current medications.   2. You are okay from a cardiac standpoint to go ahead with your planned hip surgery.  3. Follow up with Dr. Anglin for an annual visit around July 2021.    Results:   Your lab results today look good with stable electrolytes and kidney function.  Component      Latest Ref Rng & Units 10/8/2020   Sodium      133 - 144 mmol/L 135   Potassium      3.4 - 5.3 mmol/L 3.9   Chloride      94 - 109 mmol/L 101   Carbon Dioxide      20 - 32 mmol/L 31   Anion Gap      3 - 14 mmol/L 3   Glucose      70 - 99 mg/dL 175 (H)   Urea Nitrogen      7 - 30 mg/dL 28   Creatinine      0.52 - 1.04 mg/dL 0.80   GFR Estimate      >60 mL/min/1.73:m2 72   GFR Estimate If Black      >60 mL/min/1.73:m2 84   Calcium      8.5 - 10.1 mg/dL 9.5     If you have questions or concerns, please call my nurse team at [phone].    Scheduling phone number: [phone]    It was a pleasure seeing you today!     Emerson Sunshine, Nurse Practitioner  [facility]  October 8, 2020  ________________________________________________________

## 2020-10-08 ENCOUNTER — OFFICE VISIT (OUTPATIENT)
Dept: CARDIOLOGY | Facility: CLINIC | Age: 75
End: 2020-10-08
Attending: INTERNAL MEDICINE
Payer: MEDICARE

## 2020-10-08 VITALS
WEIGHT: 177.1 LBS | BODY MASS INDEX: 26.84 KG/M2 | HEIGHT: 68 IN | SYSTOLIC BLOOD PRESSURE: 138 MMHG | HEART RATE: 76 BPM | DIASTOLIC BLOOD PRESSURE: 86 MMHG

## 2020-10-08 DIAGNOSIS — I47.10 SVT (SUPRAVENTRICULAR TACHYCARDIA) (H): ICD-10-CM

## 2020-10-08 DIAGNOSIS — I10 ESSENTIAL HYPERTENSION: Primary | ICD-10-CM

## 2020-10-08 DIAGNOSIS — R55 SYNCOPE, UNSPECIFIED SYNCOPE TYPE: ICD-10-CM

## 2020-10-08 DIAGNOSIS — I10 HYPERTENSION GOAL BP (BLOOD PRESSURE) < 140/90: ICD-10-CM

## 2020-10-08 DIAGNOSIS — R60.9 FLUID RETENTION: ICD-10-CM

## 2020-10-08 DIAGNOSIS — R00.2 PALPITATIONS: ICD-10-CM

## 2020-10-08 DIAGNOSIS — I47.20 VENTRICULAR TACHYCARDIA (H): ICD-10-CM

## 2020-10-08 DIAGNOSIS — R60.0 BILATERAL LEG EDEMA: ICD-10-CM

## 2020-10-08 LAB
ANION GAP SERPL CALCULATED.3IONS-SCNC: 3 MMOL/L (ref 3–14)
BUN SERPL-MCNC: 28 MG/DL (ref 7–30)
CALCIUM SERPL-MCNC: 9.5 MG/DL (ref 8.5–10.1)
CHLORIDE SERPL-SCNC: 101 MMOL/L (ref 94–109)
CO2 SERPL-SCNC: 31 MMOL/L (ref 20–32)
CREAT SERPL-MCNC: 0.8 MG/DL (ref 0.52–1.04)
GFR SERPL CREATININE-BSD FRML MDRD: 72 ML/MIN/{1.73_M2}
GLUCOSE SERPL-MCNC: 175 MG/DL (ref 70–99)
POTASSIUM SERPL-SCNC: 3.9 MMOL/L (ref 3.4–5.3)
SODIUM SERPL-SCNC: 135 MMOL/L (ref 133–144)

## 2020-10-08 PROCEDURE — 99214 OFFICE O/P EST MOD 30 MIN: CPT | Performed by: NURSE PRACTITIONER

## 2020-10-08 PROCEDURE — 80048 BASIC METABOLIC PNL TOTAL CA: CPT | Performed by: INTERNAL MEDICINE

## 2020-10-08 ASSESSMENT — MIFFLIN-ST. JEOR: SCORE: 1346.82

## 2020-10-08 NOTE — LETTER
"10/8/2020    Rachel Miller MD  [address] [facility] [address]    RE: Teri Beltran       Dear Colleague,    I had the pleasure of seeing Teri Beltran in the [facility].    Cardiology Clinic Progress Note    Service Date: October 8, 2020    PRIMARY CARDIOLOGIST: Dr. Villareal (EP); Dr. Pavon (general Cardiology)      REASON FOR VISIT: Follow up of medication changes    HPI:   I had the pleasure of seeing Ms. Williamson \"Ray\" Devin in the clinic today. She is a very pleasant 74 year old female with a past medical history notable for first-degree AV block, RBBB, paroxysmal SVT, GERD, hypertension, hyperlipidemia, chronic venous insufficiency, and known aortic sclerosis. The patient has followed with Dr. Pavon as well as Dr. Villareal with electrophysiology. She was last seen in General Cardiology Clinic with Dr. Pavon on 07/10/2020.  At that time she was complaining of some ankle swelling, varicose veins, and it was recommended that she use compression stockings as well as undergo a venous competency study.        Since then, she has been scheduled for hip surgery and was seen by Dr. Miller on 08/31/2020 for a preoperative evaluation. Apparently, the patient was unable to have the surgery due to concerns from Anesthesiology regarding potential cardiovascular complications related to the procedure as well as a cardiac murmur heard on exam. She was recently see by Dr. Anglin on 9/18/20 for cardiac clearance prior to proceeding.    Dr. Anglin recommended completing an echocardiogram for further assessment of valves, ascending aorta, overall cardiac function. The dose of hydrochlorothiazide was increased from 12.5 mg daily to 25 mg daily. The echocardiogram was completed on 9/23/20 showed normal LV systolic function with an EF 55-60%. No resting regional wall motion abnormalities were noted. Mild calcification of the aortic valve which is the reason for the " murmur heard on exam, however there is no evidence of aortic stenosis or regurgitation. She was started on furosemide 20mg daily and KCL 10 mEq daily.     Today, she presents to the clinic in follow up of her recent testing and medication changes. She tells me that she has been feeling well since she last spoke with Dr. Anglin. She did have a fairly long episode of palpitations last night lasting a couple of hours before ending spontaneously. She checked her pulse and it was around 100-130 bpm during that time.  Her pulse did not feel irregular at the time. She did not have significant associated dizziness, lightheadedness, or syncope. She has had theses kinds of episodes with a history of PSVT in the past around once every couple of weeks but she felt this particular episode lasted quite a bit longer than usual. She otherwise notes that her swelling in her legs has improved quite a bit since starting on the lasix and hydrochlorothiazide. She denies chest pain, orthopnea, PND, or lower extremity edema. A basic metabolic panel was drawn prior to the visit today showing stable electrolytes and renal function with a potassium of 3.9, creatinine of 0.80, and GFR of 72.    ASSESSMENT AND PLAN:  1. Known first-degree AV block, right bundle branch block, paroxysmal SVT  - Followed by Dr. Villareal with electrophysiology. On metoprolol tartrate 25 mg BID.  We discussed the option of increasing the dose of her metoprolol to 50 mg BID both for further blood pressure control and to help suppress her control episodes of PSVT. She was opposed to this idea as she was previously on a higher dose of metoprolol in the past and felt that this lowered her blood pressure too significantly.  - Reviewed that if she begins to have issues with more frequent or prolonged episodes of palpitations to call the clinic and we could consider repeat event monitoring if needed.     2. Hypertension  - Reasonably well-controlled on metoprolol tartrate 25 mg  twice daily, revisited by milligrams daily, and Lasix 20 mg daily with potassium 10 mEq twice daily.    3. Venous insufficiency, chronic lower extremity edema  - History of bilateral great saphenous vein radiofrequency ablation.     4. Aortic sclerosis without significant aortic insufficiency or stenosis  - This is the cause of her cardiac murmur on physical exam.     Thank you for the opportunity to participate in this pleasant patient's care. She seems to be optimized from a cardiac standpoint at this time to proceed with her planned hip surgery. She would like to continue to follow-up with Dr. Anglin going forward she would like to see a cardiologist in [place]. We will plan to have her see Dr. Anglin for routine annual visit in about 1 year. We would be happy to see her sooner if needed for any concerns in the meantime.    RUBY Loera, CNP   Text Page  (8am - 5pm, M-F)    Orders this Visit:  Orders Placed This Encounter   Procedures     Follow-Up with Cardiologist     No orders of the defined types were placed in this encounter.    There are no discontinued medications.  Encounter Diagnoses   Name Primary?     Essential hypertension Yes     Fluid retention      Bilateral leg edema      Palpitations      SVT (supraventricular tachycardia) (H)      Ventricular tachycardia (H)      Syncope, unspecified syncope type      Hypertension goal BP (blood pressure) < 140/90      CURRENT MEDICATIONS:  Current Outpatient Medications   Medication Sig Dispense Refill     acetaminophen (TYLENOL) 325 MG tablet Take 325-650 mg by mouth every 6 hours as needed for mild pain       alendronate (FOSAMAX) 70 MG tablet TAKE 1 TABLET(70 MG) BY MOUTH EVERY 7 DAYS 12 tablet 1     dorzolamide-timolol (COSOPT) 2-0.5 % ophthalmic solution Place 1 drop into both eyes 2 times daily       famotidine (PEPCID) 10 MG tablet Take 1 tablet (10 mg) by mouth 2 times daily 180 tablet 1     fluticasone (FLONASE) 50 MCG/ACT nasal spray Spray 1-2 sprays  into both nostrils daily 48 g 1     furosemide (LASIX) 20 MG tablet Take 1 tablet (20 mg) by mouth daily 90 tablet 0     hydrochlorothiazide (HYDRODIURIL) 25 MG tablet Take 1 tablet (25 mg) by mouth daily 90 tablet 3     latanoprost (XALATAN) 0.005 % ophthalmic solution Place 1 drop into the right eye At Bedtime        levothyroxine (SYNTHROID/LEVOTHROID) 88 MCG tablet Take 1 tablet (88 mcg) by mouth daily 90 tablet 1     metoprolol tartrate (LOPRESSOR) 25 MG tablet Take 1 tablet (25 mg) by mouth 2 times daily 180 tablet 0     Multiple Vitamins-Minerals (MULTIVITAMIN ADULT PO) Take 1 tablet by mouth daily       potassium chloride ER (KLOR-CON M) 10 MEQ CR tablet Take 1 tablet (10 mEq) by mouth daily 30 tablet 3     pravastatin (PRAVACHOL) 40 MG tablet Take 1 tablet (40 mg) by mouth At Bedtime 90 tablet 1     traMADol (ULTRAM) 50 MG tablet Take 1 tablet (50 mg) by mouth 3 times daily as needed for severe pain 90 tablet 0     hydrOXYzine (ATARAX) 25 MG tablet Take 1 tablet (25 mg) by mouth 3 times daily as needed for anxiety (Patient not taking: Reported on 2020) 30 tablet 1     ALLERGIES  Allergies   Allergen Reactions     Seasonal Allergies      PAST MEDICAL, SURGICAL, FAMILY HISTORY:  History was reviewed and updated as needed, see medical record.    SOCIAL HISTORY:  Social History     Socioeconomic History     Marital status:      Spouse name: Not on file     Number of children: Not on file     Years of education: Not on file     Highest education level: Not on file   Occupational History     Not on file   Social Needs     Financial resource strain: Not on file     Food insecurity     Worry: Not on file     Inability: Not on file     Transportation needs     Medical: Not on file     Non-medical: Not on file   Tobacco Use     Smoking status: Former Smoker     Packs/day: 0.50     Years: 18.00     Pack years: 9.00     Types: Cigarettes     Quit date:      Years since quittin.7     Smokeless  tobacco: Never Used   Substance and Sexual Activity     Alcohol use: Yes     Alcohol/week: 0.0 standard drinks     Comment: 1 glass of wine at night     Drug use: No     Sexual activity: Yes     Partners: Male   Lifestyle     Physical activity     Days per week: Not on file     Minutes per session: Not on file     Stress: Not on file   Relationships     Social connections     Talks on phone: Not on file     Gets together: Not on file     Attends Taoist service: Not on file     Active member of club or organization: Not on file     Attends meetings of clubs or organizations: Not on file     Relationship status: Not on file     Intimate partner violence     Fear of current or ex partner: Not on file     Emotionally abused: Not on file     Physically abused: Not on file     Forced sexual activity: Not on file   Other Topics Concern     Parent/sibling w/ CABG, MI or angioplasty before 65F 55M? Not Asked      Service Not Asked     Blood Transfusions Not Asked     Caffeine Concern No     Comment: 4-5 cups of coffee daily     Occupational Exposure Not Asked     Hobby Hazards Not Asked     Sleep Concern Not Asked     Stress Concern Not Asked     Weight Concern Not Asked     Special Diet No     Comment: no added salt     Back Care Not Asked     Exercise No     Comment: 3 days per week - exercise class      Bike Helmet Not Asked     Seat Belt Not Asked     Self-Exams Not Asked   Social History Narrative     Not on file     Review of Systems:  Skin:  Negative     Eyes:  Negative    ENT:  Positive for sinus trouble  Respiratory:  Positive for dyspnea on exertion  Cardiovascular:    palpitations;Positive for;edema  Gastroenterology: Negative    Genitourinary:  Negative    Musculoskeletal:  Positive for arthritis;joint pain  Neurologic:  Negative    Psychiatric:  Positive for sleep disturbances  Heme/Lymph/Imm:  Positive for allergies  Endocrine:  Positive for thyroid disorder     Physical Exam:  Vitals: /86 (BP  "Location: Right arm, Patient Position: Sitting, Cuff Size: Adult Regular)   Pulse 76   Ht 1.727 m (5' 8\")   Wt 80.3 kg (177 lb 1.6 oz)   LMP  (LMP Unknown)   Breastfeeding No   BMI 26.93 kg/m     Wt Readings from Last 4 Encounters:   10/08/20 80.3 kg (177 lb 1.6 oz)   09/18/20 82.6 kg (182 lb)   09/16/20 83.9 kg (185 lb)   08/31/20 83 kg (183 lb)     CONSTITUTIONAL: Appears her stated age, well nourished, and in no acute distress.  HEENT: Pupils equal, round. Sclerae nonicteric.    NECK: Supple, no masses appreciated or JVD appreciated.  C/V:  Regular rate and rhythm, normal S1 and S2, no S3 or S4, no murmur, rub or gallop.   RESP: Respirations are unlabored. Lungs are clear to auscultation bilaterally without wheezing, rales, or rhonchi.  EXTREM: 1+ left lower extremity edema, trace right lower extremity edema. No clubbing or cyanosis.  NEURO: Alert and oriented, cooperative. Gait steady. No gross focal deficits.   PSYCH: Affect appropriate, mildly anxious. Mentation normal. Responds to questions appropriately.  SKIN: Warm and dry.     Recent Lab Results:  LIPID RESULTS:  Lab Results   Component Value Date    CHOL 178 08/28/2020    HDL 92 08/28/2020    LDL 65 08/28/2020    TRIG 106 08/28/2020    CHOLHDLRATIO 2.2 08/11/2015     LIVER ENZYME RESULTS:  Lab Results   Component Value Date    AST 15 08/28/2020    ALT 19 08/28/2020     CBC RESULTS:  Lab Results   Component Value Date    WBC 5.5 08/28/2020    RBC 3.98 08/28/2020    HGB 12.0 08/28/2020    HCT 38.0 08/28/2020    MCV 96 08/28/2020    MCH 30.2 08/28/2020    MCHC 31.6 08/28/2020    RDW 13.8 08/28/2020     08/28/2020     BMP RESULTS:  Lab Results   Component Value Date     10/08/2020    POTASSIUM 3.9 10/08/2020    CHLORIDE 101 10/08/2020    CO2 31 10/08/2020    ANIONGAP 3 10/08/2020     (H) 10/08/2020    BUN 28 10/08/2020    CR 0.80 10/08/2020    GFRESTIMATED 72 10/08/2020    GFRESTBLACK 84 10/08/2020    LILIA 9.5 10/08/2020      A1C " RESULTS:  Lab Results   Component Value Date    A1C 6.1 (H) 08/28/2020       This note was completed in part using Dragon voice recognition software. Although reviewed after completion, some word and grammatical errors may occur.      Thank you for allowing me to participate in the care of your patient.    Sincerely,     Joshua Sunshine NP     [facility]

## 2020-10-21 ENCOUNTER — OFFICE VISIT (OUTPATIENT)
Dept: INTERNAL MEDICINE | Facility: CLINIC | Age: 75
End: 2020-10-21
Payer: MEDICARE

## 2020-10-21 VITALS
HEIGHT: 68 IN | DIASTOLIC BLOOD PRESSURE: 70 MMHG | WEIGHT: 177 LBS | SYSTOLIC BLOOD PRESSURE: 118 MMHG | OXYGEN SATURATION: 96 % | BODY MASS INDEX: 26.83 KG/M2 | TEMPERATURE: 98 F | RESPIRATION RATE: 16 BRPM | HEART RATE: 93 BPM

## 2020-10-21 DIAGNOSIS — Z01.818 PREOP GENERAL PHYSICAL EXAM: Primary | ICD-10-CM

## 2020-10-21 DIAGNOSIS — E78.5 HYPERLIPIDEMIA LDL GOAL <130: ICD-10-CM

## 2020-10-21 DIAGNOSIS — E03.9 HYPOTHYROIDISM, UNSPECIFIED TYPE: ICD-10-CM

## 2020-10-21 DIAGNOSIS — R00.0 TACHYCARDIA: ICD-10-CM

## 2020-10-21 DIAGNOSIS — M16.10 HIP ARTHRITIS: ICD-10-CM

## 2020-10-21 LAB
ALBUMIN UR-MCNC: NEGATIVE MG/DL
APPEARANCE UR: CLEAR
BACTERIA #/AREA URNS HPF: ABNORMAL /HPF
BILIRUB UR QL STRIP: NEGATIVE
COLOR UR AUTO: YELLOW
GLUCOSE UR STRIP-MCNC: NEGATIVE MG/DL
HGB BLD-MCNC: 12.6 G/DL (ref 11.7–15.7)
HGB UR QL STRIP: NEGATIVE
KETONES UR STRIP-MCNC: NEGATIVE MG/DL
LEUKOCYTE ESTERASE UR QL STRIP: NEGATIVE
NITRATE UR QL: NEGATIVE
PH UR STRIP: 5 PH (ref 5–7)
RBC #/AREA URNS AUTO: ABNORMAL /HPF
SOURCE: ABNORMAL
SP GR UR STRIP: 1.01 (ref 1–1.03)
UROBILINOGEN UR STRIP-ACNC: 0.2 EU/DL (ref 0.2–1)
WBC #/AREA URNS AUTO: ABNORMAL /HPF

## 2020-10-21 PROCEDURE — 84132 ASSAY OF SERUM POTASSIUM: CPT | Performed by: INTERNAL MEDICINE

## 2020-10-21 PROCEDURE — 36415 COLL VENOUS BLD VENIPUNCTURE: CPT | Performed by: INTERNAL MEDICINE

## 2020-10-21 PROCEDURE — 99215 OFFICE O/P EST HI 40 MIN: CPT | Performed by: INTERNAL MEDICINE

## 2020-10-21 PROCEDURE — 85018 HEMOGLOBIN: CPT | Performed by: INTERNAL MEDICINE

## 2020-10-21 PROCEDURE — 81001 URINALYSIS AUTO W/SCOPE: CPT | Performed by: INTERNAL MEDICINE

## 2020-10-21 ASSESSMENT — MIFFLIN-ST. JEOR: SCORE: 1346.37

## 2020-10-21 NOTE — PROGRESS NOTES
[place] [address]  [facility] [address]  Phone: [phone]  Primary Provider: Rachel Miller  {[facility] Performing Provider (Optional):005017}    PREOPERATIVE EVALUATION:  Today's date: 10/21/2020    Teri Beltran is a 74 year old female who presents for a preoperative evaluation.    Surgical Information:  Surgery/Procedure: ***  Surgery Location: ***  Surgeon: ***  Surgery Date: ***  Time of Surgery: ***  Where patient plans to recover: {Preop post recovery plans :093216}  Fax number for surgical facility: {SURGERY FAX NUMBER:[id number]}    Type of Anesthesia Anticipated: {ANESTHESIA:679346}    Subjective     HPI related to upcoming procedure: ***      No flowsheet data found.    Health Care Directive:  Patient does not have a Health Care Directive or Living Will: {ADVANCE_DIRECTIVE_STATUS:010793}    Preoperative Review of :  {Mnpmpreport:713931}  {Review MNPMP for all patients per [employer]: [email or web address]}    {Chronic problem details (Optional) :243804}    Review of Systems  {ROS Preop Choices:980671}    Patient Active Problem List    Diagnosis Date Noted     S/P total knee arthroplasty 07/23/2019     Priority: Medium     Venous insufficiency      Priority: Medium     Varicose veins      Priority: Medium     Osteoporosis 08/11/2015     Priority: Medium     Mild dilation of ascending aorta - borderline (H) 08/11/2015     Priority: Medium     Good in 2018. Recommend ECHO in 2 years to follow hypertensive changes       RBBB 08/11/2015     Priority: Medium     First degree AV block 08/11/2015     Priority: Medium     Adjustment disorder with depressed mood 08/05/2014     Priority: Medium     Hypertension goal BP (blood pressure) < 140/90 12/03/2013     Priority: Medium     Advanced directives, counseling/discussion 10/02/2013     Priority: Medium     Patient given info and will call if she is interested       BMI  30.0-30.9,adult 07/30/2013     Priority: Medium     Palpitations 07/03/2013     Priority: Medium     Prediabetes 06/14/2013     Priority: Medium     Hyperlipidemia LDL goal <130 06/14/2013     Priority: Medium     Hypothyroidism      Priority: Medium     Tachycardia      Priority: Medium     Glaucoma      Priority: Medium     Problem list name updated by automated process. Provider to review and confirm  [employer] Update utility        Past Medical History:   Diagnosis Date     Complication of anesthesia     confusion after anesthesia (after hysterectomy)     First degree AV block 8/11/2015     Former smoker      Gastroesophageal reflux disease      GERD (gastroesophageal reflux disease)      Glaucoma      Heart block     2:1     Hyperlipidemia LDL goal <130 6/14/2013     Hypertension goal BP (blood pressure) < 140/90 12/3/2013     Hypothyroidism      Left atrial dilatation     mild     Mild dilation of ascending aorta - borderline 8/11/2015     Palpitations      Prediabetes 6/14/2013     RBBB 8/11/2015     S/P total knee arthroplasty 7/23/2019     Tachycardia      Varicose veins      Venous insufficiency     s/p bilateral great saphenous vein radiofrequency ablation by Dr. Garcia     Past Surgical History:   Procedure Laterality Date     ARTHROPLASTY KNEE Right 7/23/2019    Procedure: Right total knee arthroplasty using an [employer] knee system;  Surgeon: Dragan Muse MD;  Location: [facility] OR     BREAST SURGERY      right breast ductal surgery due to milk production     COLONOSCOPY N/A 10/24/2014    no further screening. Procedure: COLONOSCOPY;  Surgeon: Pedro Rudd MD;  Location: [facility] GI     HYSTERECTOMY, PAP NO LONGER INDICATED  2009    total hysterectomy and ovaries. benign     SOFT TISSUE SURGERY      ganglion removed from left wrist and ring finger     SURGICAL HISTORY OF -   age 7    tonsillectomy     SURGICAL HISTORY OF -   1/15    left eye cataract     SURGICAL HISTORY OF -   Fall 2016     LINQ placed.     Current Outpatient Medications   Medication Sig Dispense Refill     alendronate (FOSAMAX) 70 MG tablet TAKE 1 TABLET(70 MG) BY MOUTH EVERY 7 DAYS 12 tablet 1     dorzolamide-timolol (COSOPT) 2-0.5 % ophthalmic solution Place 1 drop into both eyes 2 times daily       famotidine (PEPCID) 10 MG tablet Take 1 tablet (10 mg) by mouth 2 times daily 180 tablet 1     fluticasone (FLONASE) 50 MCG/ACT nasal spray Spray 1-2 sprays into both nostrils daily 48 g 1     furosemide (LASIX) 20 MG tablet Take 1 tablet (20 mg) by mouth daily 90 tablet 0     hydrochlorothiazide (HYDRODIURIL) 25 MG tablet Take 1 tablet (25 mg) by mouth daily 90 tablet 3     latanoprost (XALATAN) 0.005 % ophthalmic solution Place 1 drop into the right eye At Bedtime        levothyroxine (SYNTHROID/LEVOTHROID) 88 MCG tablet Take 1 tablet (88 mcg) by mouth daily 90 tablet 1     metoprolol tartrate (LOPRESSOR) 25 MG tablet Take 1 tablet (25 mg) by mouth 2 times daily 180 tablet 0     potassium chloride ER (KLOR-CON M) 10 MEQ CR tablet Take 1 tablet (10 mEq) by mouth daily 30 tablet 3     pravastatin (PRAVACHOL) 40 MG tablet Take 1 tablet (40 mg) by mouth At Bedtime 90 tablet 1     traMADol (ULTRAM) 50 MG tablet Take 1 tablet (50 mg) by mouth 3 times daily as needed for severe pain 90 tablet 0     acetaminophen (TYLENOL) 325 MG tablet Take 325-650 mg by mouth every 6 hours as needed for mild pain       Multiple Vitamins-Minerals (MULTIVITAMIN ADULT PO) Take 1 tablet by mouth daily         Allergies   Allergen Reactions     Seasonal Allergies         Social History     Tobacco Use     Smoking status: Former Smoker     Packs/day: 0.50     Years: 18.00     Pack years: 9.00     Types: Cigarettes     Quit date:      Years since quittin.8     Smokeless tobacco: Never Used   Substance Use Topics     Alcohol use: Yes     Alcohol/week: 0.0 standard drinks     Comment: 1 glass of wine at night     {FAMILY HISTORY  "(Optional):369683713}  History   Drug Use No         Objective     LMP  (LMP Unknown)     Physical Exam  {EXAM Preop Choices:402332}    Recent Labs   Lab Test 10/08/20  1257 20  1016 20  2036 10/28/19  1029 10/28/19  1029   HGB  --  12.0 11.8  --  13.7   PLT  --  337 365  --  381    137 137   < > 135   POTASSIUM 3.9 4.2 4.0   < > 4.0   CR 0.80 0.61 0.78   < > 0.66   A1C  --  6.1*  --   --  5.7*    < > = values in this interval not displayed.        Diagnostics:  {LABS:277809}   {EK}    Revised Cardiac Risk Index (RCRI):  The patient has the following serious cardiovascular risks for perioperative complications:  {PREOP REVISED CARDIAC RISK INDEX (RCRI) :215154::\" - No serious cardiac risks = 0 points\"}     RCRI Interpretation: {REVISED CARDIAC RISK INTERPRETATION :001279}    {Provider  Link to PREOP SmartSet  Includes common orders; guidelines for anemia, warfarin, additional testing; patient instructions  :501007}     Assessment & Plan   The proposed surgical procedure is considered {HIGH=major cardiovascular or procedures requiring prolonged anesthesia >4 hours or large fluid shifts;    INTERMEDIATE=abdominal, most orthopedic and intrathoracic surgery; LOW= endoscopy, cataract and breast surgery:883280} risk.    {PROVIDER CHARTING PREFERENCE :424196}  {Click here to pull in possible risk data after PreOp Qnr has been answered for this visit :748552}    {Risks and Recommendations (Optional):012066}    {Medication HOLD Times for PREOP (Optional):961838}    RECOMMENDATION:  {IMPORTANT - Approval:331970::\"APPROVAL GIVEN to proceed with proposed procedure, without further diagnostic evaluation.\"}    Signed Electronically by: Andrew Monroy MD    Copy of this evaluation report is provided to requesting physician.    [facility] Guidelines    Revised Cardiac Risk Index  "

## 2020-10-21 NOTE — PROGRESS NOTES
[place] [address]  [facility] [address]  [phone]  Dept: [phone]    PRE-OP EVALUATION:  Today's date: 10/21/2020    Teri Beltran (: 1945) presents for pre-operative evaluation assessment as requested by Dr. Muse, ,  She requires evaluation and anesthesia risk assessment prior to undergoing surgery/procedure for treatment of  Rt hip arthritis-   Right total hip arthroplasty    Proposed Surgery/ Procedure: R Hip Replacement   Date of Surgery/ Procedure: 20  Time of Surgery/ Procedure: 8:00 am   Hospital/Surgical Facility: [facility]   Surgery Fax Number: Note does not need to be faxed, will be available electronically in Epic.  Primary Physician: Rachel Miller  Type of Anesthesia Anticipated: to be determined    Preoperative Questionnaire:   No - Have you ever had a heart attack or stroke?  No - Have you ever had surgery on your heart or blood vessels, such as a stent, coronary (heart) bypass, or surgery on an artery in the head, neck, heart, or legs?  No - Do you have chest pain when you are physically active?  No - Do you have a history of heart failure?  No - Do you currently have a cold, bronchitis, or symptoms of other respiratory (head and chest) infections?  No - Do you have a cough, shortness of breath, or wheezing?  No - Do you or anyone in your family have a history of blood clots?  No - Do you or anyone in your family have a serious bleeding problem, such as long-lasting bleeding after surgeries or cuts?  No - Have you ever had anemia or been told to take iron pills?  No - Have you had any abnormal blood loss such as black, tarry or bloody stools, or abnormal vaginal bleeding?  No - Have you ever had a blood transfusion?  Yes - Are you willing to have a blood transfusion if it is medically needed before, during, or after your surgery?  No - Have you or anyone in your family ever had problems with anesthesia (sedation  for surgery)?  No - Do you have sleep apnea, excessive snoring, or daytime drowsiness?   No - Do you have any artifical heart valves or other implanted medical devices, such as a pacemaker, defibrillator, or continuous glucose monitor?  YES - Do you have any artifical joints?  No - Are you allergic to latex?  No - Is there any chance that you may be pregnant?    Patient has a Health Care Directive or Living Will:  NO    HPI:     HYPERLIPIDEMIA - Patient has a long history of  Hyperlipidemia requiring medication for treatment with recent good control. Patient reports no problems or side effects with the medication.     HYPOTHYROIDISM - Patient has a longstanding history of chronic Hypothyroidism. Patient has been doing well, noting no tremor, insomnia, hair loss or changes in skin texture. Continues to take medications as directed, without adverse reactions or side effects. Last TSH   Lab Results   Component Value Date    TSH 1.35 08/28/2020   .    HTN;  on metoprolol and hydrochlorothiazide     MEDICAL HISTORY:     Patient Active Problem List    Diagnosis Date Noted     S/P total knee arthroplasty 07/23/2019     Priority: Medium     Venous insufficiency      Priority: Medium     Varicose veins      Priority: Medium     Osteoporosis 08/11/2015     Priority: Medium     Mild dilation of ascending aorta - borderline (H) 08/11/2015     Priority: Medium     Good in 2018. Recommend ECHO in 2 years to follow hypertensive changes       RBBB 08/11/2015     Priority: Medium     First degree AV block 08/11/2015     Priority: Medium     Adjustment disorder with depressed mood 08/05/2014     Priority: Medium     Hypertension goal BP (blood pressure) < 140/90 12/03/2013     Priority: Medium     Advanced directives, counseling/discussion 10/02/2013     Priority: Medium     Patient given info and will call if she is interested       BMI 30.0-30.9,adult 07/30/2013     Priority: Medium     Palpitations 07/03/2013     Priority: Medium      Prediabetes 06/14/2013     Priority: Medium     Hyperlipidemia LDL goal <130 06/14/2013     Priority: Medium     Hypothyroidism      Priority: Medium     Tachycardia      Priority: Medium     Glaucoma      Priority: Medium     Problem list name updated by automated process. Provider to review and confirm  Imo Update utility        Past Medical History:   Diagnosis Date     Complication of anesthesia     confusion after anesthesia (after hysterectomy)     First degree AV block 8/11/2015     Former smoker      Gastroesophageal reflux disease      GERD (gastroesophageal reflux disease)      Glaucoma      Heart block     2:1     Hyperlipidemia LDL goal <130 6/14/2013     Hypertension goal BP (blood pressure) < 140/90 12/3/2013     Hypothyroidism      Left atrial dilatation     mild     Mild dilation of ascending aorta - borderline 8/11/2015     Palpitations      Prediabetes 6/14/2013     RBBB 8/11/2015     S/P total knee arthroplasty 7/23/2019     Tachycardia      Varicose veins      Venous insufficiency     s/p bilateral great saphenous vein radiofrequency ablation by Dr. Garcia     Past Surgical History:   Procedure Laterality Date     ARTHROPLASTY KNEE Right 7/23/2019    Procedure: Right total knee arthroplasty using an ArthJumpOffCamp[employer]lance knee system;  Surgeon: Dragan Muse MD;  Location: [facility] OR     BREAST SURGERY      right breast ductal surgery due to milk production     COLONOSCOPY N/A 10/24/2014    no further screening. Procedure: COLONOSCOPY;  Surgeon: Pedro Rudd MD;  Location: [facility] GI     HYSTERECTOMY, PAP NO LONGER INDICATED  2009    total hysterectomy and ovaries. benign     SOFT TISSUE SURGERY      ganglion removed from left wrist and ring finger     SURGICAL HISTORY OF -   age 7    tonsillectomy     SURGICAL HISTORY OF -   1/15    left eye cataract     SURGICAL HISTORY OF -   Fall 2016    LINQ placed.     Current Outpatient Medications   Medication Sig Dispense Refill     alendronate  (FOSAMAX) 70 MG tablet TAKE 1 TABLET(70 MG) BY MOUTH EVERY 7 DAYS 12 tablet 1     dorzolamide-timolol (COSOPT) 2-0.5 % ophthalmic solution Place 1 drop into both eyes 2 times daily       famotidine (PEPCID) 10 MG tablet Take 1 tablet (10 mg) by mouth 2 times daily 180 tablet 1     fluticasone (FLONASE) 50 MCG/ACT nasal spray Spray 1-2 sprays into both nostrils daily 48 g 1     furosemide (LASIX) 20 MG tablet Take 1 tablet (20 mg) by mouth daily 90 tablet 0     hydrochlorothiazide (HYDRODIURIL) 25 MG tablet Take 1 tablet (25 mg) by mouth daily 90 tablet 3     latanoprost (XALATAN) 0.005 % ophthalmic solution Place 1 drop into the right eye At Bedtime        levothyroxine (SYNTHROID/LEVOTHROID) 88 MCG tablet Take 1 tablet (88 mcg) by mouth daily 90 tablet 1     metoprolol tartrate (LOPRESSOR) 25 MG tablet Take 1 tablet (25 mg) by mouth 2 times daily 180 tablet 0     potassium chloride ER (KLOR-CON M) 10 MEQ CR tablet Take 1 tablet (10 mEq) by mouth daily 30 tablet 3     pravastatin (PRAVACHOL) 40 MG tablet Take 1 tablet (40 mg) by mouth At Bedtime 90 tablet 1     traMADol (ULTRAM) 50 MG tablet Take 1 tablet (50 mg) by mouth 3 times daily as needed for severe pain 90 tablet 0     acetaminophen (TYLENOL) 325 MG tablet Take 325-650 mg by mouth every 6 hours as needed for mild pain       Multiple Vitamins-Minerals (MULTIVITAMIN ADULT PO) Take 1 tablet by mouth daily       OTC products: None, except as noted above    Allergies   Allergen Reactions     Seasonal Allergies       Latex Allergy: NO    Social History     Tobacco Use     Smoking status: Former Smoker     Packs/day: 0.50     Years: 18.00     Pack years: 9.00     Types: Cigarettes     Quit date:      Years since quittin.8     Smokeless tobacco: Never Used   Substance Use Topics     Alcohol use: Yes     Alcohol/week: 0.0 standard drinks     Comment: 1 glass of wine at night     History   Drug Use No       REVIEW OF SYSTEMS:   CONSTITUTIONAL: NEGATIVE for  "fever, chills, change in weight  INTEGUMENTARY/SKIN: NEGATIVE for worrisome rashes, moles or lesions  EYES: NEGATIVE for vision changes or irritation  ENT/MOUTH: NEGATIVE for ear, mouth and throat problems  RESP: NEGATIVE for significant cough or SOB  BREAST: NEGATIVE for masses, tenderness or discharge  CV: NEGATIVE for chest pain, palpitations or peripheral edema  GI: NEGATIVE for nausea, abdominal pain, heartburn, or change in bowel habits  : NEGATIVE for frequency, dysuria, or hematuria  MUSCULOSKELETAL: NEGATIVE for significant arthralgias or myalgia  NEURO: NEGATIVE for weakness, dizziness or paresthesias  ENDOCRINE: NEGATIVE for temperature intolerance, skin/hair changes  HEME: NEGATIVE for bleeding problems  PSYCHIATRIC: NEGATIVE for changes in mood or affect    EXAM:   /70   Pulse 93   Temp 98  F (36.7  C) (Oral)   Resp 16   Ht 1.727 m (5' 8\")   Wt 80.3 kg (177 lb)   LMP  (LMP Unknown)   SpO2 96%   BMI 26.91 kg/m      GENERAL APPEARANCE: healthy, alert and no distress     EYES: EOMI, PERRL     HENT: ear canals and TM's normal and nose and mouth without ulcers or lesions     NECK: no adenopathy, no asymmetry, masses, or scars and thyroid normal to palpation     RESP: lungs clear to auscultation - no rales, rhonchi or wheezes     CV: regular rates and rhythm, normal S1 S2, no S3 or S4 and no murmur, click or rub     ABDOMEN:  soft, nontender, no HSM or masses and bowel sounds normal     MS: no edema, no calf tenderness     NEURO: Normal strength and tone, sensory exam grossly normal, mentation intact and speech normal     PSYCH: mentation appears normal. and affect normal/bright     LYMPHATICS: No cervical adenopathy    DIAGNOSTICS:   EKG: sinus rhythm, incomplete RBBB, no acute ST/T changes c/w ischemia, no LVH by voltage criteria,    Hgb; 12.6  Potassium 3.9    Recent Labs   Lab Test 10/08/20  1257 08/28/20  1016 02/21/20  2036 10/28/19  1029 10/28/19  1029   HGB  --  12.0 11.8  --  13.7 "   PLT  --  337 365  --  381    137 137   < > 135   POTASSIUM 3.9 4.2 4.0   < > 4.0   CR 0.80 0.61 0.78   < > 0.66   A1C  --  6.1*  --   --  5.7*    < > = values in this interval not displayed.      IMPRESSION:       (Z01.818) Preop general physical exam  (primary encounter diagnosis)  Comment: Right total hip arthroplasty  Plan: UA with Microscopic reflex to Culture,         Hemoglobin, Potassium            (M16.10) Hip arthritis  Plan: Right total hip arthroplasty    (E03.9) Hypothyroidism, unspecified type  Plan: stable on levoxyl 88 mcg daily     (E78.5) Hyperlipidemia LDL goal <130  Plan: lipids stable, on Pravachol     (R00.0) Tachycardia  Plan: on metoprolol       The proposed surgical procedure is considered INTERMEDIATE risk.    REVISED CARDIAC RISK INDEX  The patient has the following serious cardiovascular risks for perioperative complications such as (MI, PE, VFib and 3  AV Block):  No serious cardiac risks      The patient has the following additional risks for perioperative complications:  No identified additional risks      ICD-10-CM    1. Preop general physical exam  Z01.818    2. Hypothyroidism, unspecified type  E03.9    3. Hyperlipidemia LDL goal <130  E78.5        RECOMMENDATIONS:       Anticoagulant or Antiplatelet Medication Use  ASPIRIN: Discontinue ASA 7- days prior to procedure to reduce bleeding risk.    NSAIDS:   Stop 3 days prior to surgery     Pt was advised to take metoprolol on the am of surgery .    Instructions given on all meds.      APPROVAL GIVEN to proceed with proposed procedure, without further diagnostic evaluation       Signed Electronically by: Andrew Monroy MD    Copy of this evaluation report is provided to requesting physician.    [facility] Preop Guidelines    Revised Cardiac Risk Index

## 2020-10-22 DIAGNOSIS — Z11.59 ENCOUNTER FOR SCREENING FOR OTHER VIRAL DISEASES: Primary | ICD-10-CM

## 2020-10-22 LAB — POTASSIUM SERPL-SCNC: 3.9 MMOL/L (ref 3.4–5.3)

## 2020-10-27 DIAGNOSIS — I10 HYPERTENSION GOAL BP (BLOOD PRESSURE) < 140/90: ICD-10-CM

## 2020-10-27 DIAGNOSIS — R00.2 PALPITATIONS: ICD-10-CM

## 2020-10-28 RX ORDER — METOPROLOL TARTRATE 25 MG/1
25 TABLET, FILM COATED ORAL 2 TIMES DAILY
Qty: 180 TABLET | Refills: 3 | Status: SHIPPED | OUTPATIENT
Start: 2020-10-28 | End: 2021-10-08

## 2020-11-09 DIAGNOSIS — Z11.59 ENCOUNTER FOR SCREENING FOR OTHER VIRAL DISEASES: ICD-10-CM

## 2020-11-09 LAB
SARS-COV-2 RNA SPEC QL NAA+PROBE: NORMAL
SPECIMEN SOURCE: NORMAL

## 2020-11-09 PROCEDURE — U0003 INFECTIOUS AGENT DETECTION BY NUCLEIC ACID (DNA OR RNA); SEVERE ACUTE RESPIRATORY SYNDROME CORONAVIRUS 2 (SARS-COV-2) (CORONAVIRUS DISEASE [COVID-19]), AMPLIFIED PROBE TECHNIQUE, MAKING USE OF HIGH THROUGHPUT TECHNOLOGIES AS DESCRIBED BY CMS-2020-01-R: HCPCS | Performed by: ORTHOPAEDIC SURGERY

## 2020-11-10 LAB
LABORATORY COMMENT REPORT: NORMAL
SARS-COV-2 RNA SPEC QL NAA+PROBE: NEGATIVE
SPECIMEN SOURCE: NORMAL

## 2020-11-13 DIAGNOSIS — Z11.59 ENCOUNTER FOR SCREENING FOR OTHER VIRAL DISEASES: Primary | ICD-10-CM

## 2020-11-18 DIAGNOSIS — Z11.59 ENCOUNTER FOR SCREENING FOR OTHER VIRAL DISEASES: ICD-10-CM

## 2020-11-18 PROCEDURE — U0003 INFECTIOUS AGENT DETECTION BY NUCLEIC ACID (DNA OR RNA); SEVERE ACUTE RESPIRATORY SYNDROME CORONAVIRUS 2 (SARS-COV-2) (CORONAVIRUS DISEASE [COVID-19]), AMPLIFIED PROBE TECHNIQUE, MAKING USE OF HIGH THROUGHPUT TECHNOLOGIES AS DESCRIBED BY CMS-2020-01-R: HCPCS | Performed by: ORTHOPAEDIC SURGERY

## 2020-11-18 NOTE — PHARMACY-ADMISSION MEDICATION HISTORY
Admission medication history interview status for this patient is complete. See [facility] admission navigator for allergy information, prior to admission medications and immunization status.     PTA meds completed by pre-admitting nurse Aicha Shepard and reviewed by pharmacy       Prior to Admission medications    Medication Sig Last Dose Taking? Auth Provider   Acetaminophen (TYLENOL PO) Take 500-1,000 mg by mouth every 6 hours as needed for mild pain   Yes Reported, Patient   alendronate (FOSAMAX) 70 MG tablet TAKE 1 TABLET(70 MG) BY MOUTH EVERY 7 DAYS  Yes Rachel Miller MD   dorzolamide-timolol (COSOPT) 2-0.5 % ophthalmic solution Place 1 drop into both eyes 2 times daily  Yes Unknown, Entered By History   famotidine (PEPCID) 10 MG tablet Take 1 tablet (10 mg) by mouth 2 times daily  Yes Rachel Miller MD   fluticasone (FLONASE) 50 MCG/ACT nasal spray Spray 1-2 sprays into both nostrils daily  Yes Rachel Miller MD   furosemide (LASIX) 20 MG tablet Take 1 tablet (20 mg) by mouth daily  Yes Quincy Anglin MD   hydrochlorothiazide (HYDRODIURIL) 25 MG tablet Take 1 tablet (25 mg) by mouth daily  Yes Quincy Anglin MD   latanoprost (XALATAN) 0.005 % ophthalmic solution Place 1 drop into the right eye At Bedtime   Yes Reported, Patient   levothyroxine (SYNTHROID/LEVOTHROID) 88 MCG tablet Take 1 tablet (88 mcg) by mouth daily  Yes Rachel Miller MD   Multiple Vitamins-Minerals (MULTIVITAMIN ADULT PO) Take 1 tablet by mouth daily  Yes Reported, Patient   potassium chloride ER (KLOR-CON M) 10 MEQ CR tablet Take 1 tablet (10 mEq) by mouth daily  Yes Quincy Anglin MD   pravastatin (PRAVACHOL) 40 MG tablet Take 1 tablet (40 mg) by mouth At Bedtime  Yes Rachel Miller MD   traMADol (ULTRAM) 50 MG tablet Take 1 tablet (50 mg) by mouth 3 times daily as needed for severe pain  Yes Rachel Miller MD   metoprolol tartrate (LOPRESSOR) 25  MG tablet Take 1 tablet (25 mg) by mouth 2 times daily   Rachel Miller MD

## 2020-11-19 LAB
SARS-COV-2 RNA SPEC QL NAA+PROBE: NOT DETECTED
SPECIMEN SOURCE: NORMAL

## 2020-11-20 ENCOUNTER — APPOINTMENT (OUTPATIENT)
Dept: PHYSICAL THERAPY | Facility: CLINIC | Age: 75
End: 2020-11-20
Attending: ORTHOPAEDIC SURGERY
Payer: MEDICARE

## 2020-11-20 ENCOUNTER — ANESTHESIA (OUTPATIENT)
Dept: SURGERY | Facility: CLINIC | Age: 75
End: 2020-11-20
Payer: MEDICARE

## 2020-11-20 ENCOUNTER — APPOINTMENT (OUTPATIENT)
Dept: GENERAL RADIOLOGY | Facility: CLINIC | Age: 75
End: 2020-11-20
Attending: ORTHOPAEDIC SURGERY
Payer: MEDICARE

## 2020-11-20 ENCOUNTER — ANESTHESIA EVENT (OUTPATIENT)
Dept: SURGERY | Facility: CLINIC | Age: 75
End: 2020-11-20
Payer: MEDICARE

## 2020-11-20 ENCOUNTER — HOSPITAL ENCOUNTER (OUTPATIENT)
Facility: CLINIC | Age: 75
Discharge: HOME | End: 2020-11-22
Attending: ORTHOPAEDIC SURGERY | Admitting: ORTHOPAEDIC SURGERY
Payer: MEDICARE

## 2020-11-20 DIAGNOSIS — Z96.641 STATUS POST TOTAL REPLACEMENT OF RIGHT HIP: Primary | ICD-10-CM

## 2020-11-20 PROBLEM — Z96.649 S/P TOTAL HIP ARTHROPLASTY: Status: ACTIVE | Noted: 2020-11-20

## 2020-11-20 LAB
CREAT SERPL-MCNC: 0.62 MG/DL (ref 0.52–1.04)
GFR SERPL CREATININE-BSD FRML MDRD: 88 ML/MIN/{1.73_M2}

## 2020-11-20 PROCEDURE — 370N000001 HC ANESTHESIA TECHNICAL FEE, 1ST 30 MIN: Performed by: ORTHOPAEDIC SURGERY

## 2020-11-20 PROCEDURE — 250N000013 HC RX MED GY IP 250 OP 250 PS 637: Mod: GY | Performed by: PHYSICIAN ASSISTANT

## 2020-11-20 PROCEDURE — 360N000026 HC SURGERY LEVEL 4 1ST 30 MIN: Performed by: ORTHOPAEDIC SURGERY

## 2020-11-20 PROCEDURE — 97116 GAIT TRAINING THERAPY: CPT | Mod: GP

## 2020-11-20 PROCEDURE — 258N000003 HC RX IP 258 OP 636: Performed by: ANESTHESIOLOGY

## 2020-11-20 PROCEDURE — 258N000003 HC RX IP 258 OP 636: Performed by: NURSE ANESTHETIST, CERTIFIED REGISTERED

## 2020-11-20 PROCEDURE — 272N000002 HC OR SUPPLY OTHER OPNP: Performed by: ORTHOPAEDIC SURGERY

## 2020-11-20 PROCEDURE — 272N000001 HC OR GENERAL SUPPLY STERILE: Performed by: ORTHOPAEDIC SURGERY

## 2020-11-20 PROCEDURE — C1776 JOINT DEVICE (IMPLANTABLE): HCPCS | Performed by: ORTHOPAEDIC SURGERY

## 2020-11-20 PROCEDURE — 250N000011 HC RX IP 250 OP 636: Performed by: ORTHOPAEDIC SURGERY

## 2020-11-20 PROCEDURE — C1713 ANCHOR/SCREW BN/BN,TIS/BN: HCPCS | Performed by: ORTHOPAEDIC SURGERY

## 2020-11-20 PROCEDURE — 36415 COLL VENOUS BLD VENIPUNCTURE: CPT | Performed by: ORTHOPAEDIC SURGERY

## 2020-11-20 PROCEDURE — 250N000009 HC RX 250: Performed by: NURSE ANESTHETIST, CERTIFIED REGISTERED

## 2020-11-20 PROCEDURE — 761N000001 HC RECOVERY PHASE 1 LEVEL 1 FIRST HR: Performed by: ORTHOPAEDIC SURGERY

## 2020-11-20 PROCEDURE — 761N000002 HC RECOVERY PHASE 1 LEVEL 1 EA ADDTL HR: Performed by: ORTHOPAEDIC SURGERY

## 2020-11-20 PROCEDURE — 360N000027 HC SURGERY LEVEL 4 EA 15 ADDTL MIN: Performed by: ORTHOPAEDIC SURGERY

## 2020-11-20 PROCEDURE — 99203 OFFICE O/P NEW LOW 30 MIN: CPT | Performed by: INTERNAL MEDICINE

## 2020-11-20 PROCEDURE — 258N000001 HC RX 258: Performed by: ORTHOPAEDIC SURGERY

## 2020-11-20 PROCEDURE — 97161 PT EVAL LOW COMPLEX 20 MIN: CPT | Mod: GP

## 2020-11-20 PROCEDURE — 82565 ASSAY OF CREATININE: CPT | Performed by: ORTHOPAEDIC SURGERY

## 2020-11-20 PROCEDURE — 258N000003 HC RX IP 258 OP 636: Performed by: ORTHOPAEDIC SURGERY

## 2020-11-20 PROCEDURE — 99207 PR CDG-CODE CATEGORY CHANGED: CPT | Performed by: INTERNAL MEDICINE

## 2020-11-20 PROCEDURE — 250N000011 HC RX IP 250 OP 636: Performed by: NURSE ANESTHETIST, CERTIFIED REGISTERED

## 2020-11-20 PROCEDURE — 250N000013 HC RX MED GY IP 250 OP 250 PS 637: Performed by: ORTHOPAEDIC SURGERY

## 2020-11-20 PROCEDURE — 370N000002 HC ANESTHESIA TECHNICAL FEE, EACH ADDTL 15 MIN: Performed by: ORTHOPAEDIC SURGERY

## 2020-11-20 PROCEDURE — 250N000013 HC RX MED GY IP 250 OP 250 PS 637: Performed by: INTERNAL MEDICINE

## 2020-11-20 PROCEDURE — 250N000011 HC RX IP 250 OP 636: Performed by: PHYSICIAN ASSISTANT

## 2020-11-20 PROCEDURE — 97110 THERAPEUTIC EXERCISES: CPT | Mod: GP

## 2020-11-20 PROCEDURE — 999N000136 HC STATISTIC PRE PROC ASSESS II: Performed by: ORTHOPAEDIC SURGERY

## 2020-11-20 PROCEDURE — 250N000011 HC RX IP 250 OP 636: Performed by: ANESTHESIOLOGY

## 2020-11-20 PROCEDURE — 999N000063 XR HIP PORT RT 1 VW

## 2020-11-20 PROCEDURE — 999N000065 XR PELVIS AD HIP PORTABLE RIGHT 1 VIEW

## 2020-11-20 DEVICE — IMPLANTABLE DEVICE
Type: IMPLANTABLE DEVICE | Site: HIP | Status: FUNCTIONAL
Brand: G7® ACETABULAR SYSTEM

## 2020-11-20 DEVICE — IMPLANTABLE DEVICE
Type: IMPLANTABLE DEVICE | Site: HIP | Status: FUNCTIONAL
Brand: G7 ACETABULAR LINER

## 2020-11-20 DEVICE — IMPLANTABLE DEVICE
Type: IMPLANTABLE DEVICE | Site: HIP | Status: FUNCTIONAL
Brand: TAPERLOC COMPLETE PRIMARY FEMORAL

## 2020-11-20 DEVICE — IMP SCR ZIM 6.5X20MM ACET CUP SELF TAP 00-6250-065-20: Type: IMPLANTABLE DEVICE | Site: HIP | Status: FUNCTIONAL

## 2020-11-20 RX ORDER — FLUTICASONE PROPIONATE 50 MCG
1-2 SPRAY, SUSPENSION (ML) NASAL DAILY
Status: DISCONTINUED | OUTPATIENT
Start: 2020-11-20 | End: 2020-11-22 | Stop reason: HOSPADM

## 2020-11-20 RX ORDER — NALOXONE HYDROCHLORIDE 0.4 MG/ML
0.2 INJECTION, SOLUTION INTRAMUSCULAR; INTRAVENOUS; SUBCUTANEOUS
Status: DISCONTINUED | OUTPATIENT
Start: 2020-11-20 | End: 2020-11-22 | Stop reason: HOSPADM

## 2020-11-20 RX ORDER — NALOXONE HYDROCHLORIDE 0.4 MG/ML
0.4 INJECTION, SOLUTION INTRAMUSCULAR; INTRAVENOUS; SUBCUTANEOUS
Status: DISCONTINUED | OUTPATIENT
Start: 2020-11-20 | End: 2020-11-22 | Stop reason: HOSPADM

## 2020-11-20 RX ORDER — POLYETHYLENE GLYCOL 3350 17 G/17G
17 POWDER, FOR SOLUTION ORAL DAILY
Status: DISCONTINUED | OUTPATIENT
Start: 2020-11-21 | End: 2020-11-22 | Stop reason: HOSPADM

## 2020-11-20 RX ORDER — CELECOXIB 200 MG/1
400 CAPSULE ORAL ONCE
Status: COMPLETED | OUTPATIENT
Start: 2020-11-20 | End: 2020-11-20

## 2020-11-20 RX ORDER — FAMOTIDINE 10 MG
10 TABLET ORAL 2 TIMES DAILY
Status: DISCONTINUED | OUTPATIENT
Start: 2020-11-20 | End: 2020-11-20

## 2020-11-20 RX ORDER — NALOXONE HYDROCHLORIDE 0.4 MG/ML
0.2 INJECTION, SOLUTION INTRAMUSCULAR; INTRAVENOUS; SUBCUTANEOUS
Status: DISCONTINUED | OUTPATIENT
Start: 2020-11-20 | End: 2020-11-20 | Stop reason: HOSPADM

## 2020-11-20 RX ORDER — MEPERIDINE HYDROCHLORIDE 25 MG/ML
12.5 INJECTION INTRAMUSCULAR; INTRAVENOUS; SUBCUTANEOUS
Status: DISCONTINUED | OUTPATIENT
Start: 2020-11-20 | End: 2020-11-20 | Stop reason: HOSPADM

## 2020-11-20 RX ORDER — DEXAMETHASONE SODIUM PHOSPHATE 4 MG/ML
INJECTION, SOLUTION INTRA-ARTICULAR; INTRALESIONAL; INTRAMUSCULAR; INTRAVENOUS; SOFT TISSUE PRN
Status: DISCONTINUED | OUTPATIENT
Start: 2020-11-20 | End: 2020-11-20

## 2020-11-20 RX ORDER — SODIUM CHLORIDE, SODIUM LACTATE, POTASSIUM CHLORIDE, CALCIUM CHLORIDE 600; 310; 30; 20 MG/100ML; MG/100ML; MG/100ML; MG/100ML
INJECTION, SOLUTION INTRAVENOUS CONTINUOUS
Status: DISCONTINUED | OUTPATIENT
Start: 2020-11-20 | End: 2020-11-22 | Stop reason: HOSPADM

## 2020-11-20 RX ORDER — HYDROCHLOROTHIAZIDE 25 MG/1
25 TABLET ORAL DAILY
Status: DISCONTINUED | OUTPATIENT
Start: 2020-11-21 | End: 2020-11-22 | Stop reason: HOSPADM

## 2020-11-20 RX ORDER — ONDANSETRON 2 MG/ML
4 INJECTION INTRAMUSCULAR; INTRAVENOUS EVERY 6 HOURS PRN
Status: DISCONTINUED | OUTPATIENT
Start: 2020-11-20 | End: 2020-11-22 | Stop reason: HOSPADM

## 2020-11-20 RX ORDER — CEFAZOLIN SODIUM 2 G/100ML
2 INJECTION, SOLUTION INTRAVENOUS
Status: COMPLETED | OUTPATIENT
Start: 2020-11-20 | End: 2020-11-20

## 2020-11-20 RX ORDER — NALOXONE HYDROCHLORIDE 0.4 MG/ML
0.4 INJECTION, SOLUTION INTRAMUSCULAR; INTRAVENOUS; SUBCUTANEOUS
Status: DISCONTINUED | OUTPATIENT
Start: 2020-11-20 | End: 2020-11-20 | Stop reason: HOSPADM

## 2020-11-20 RX ORDER — OXYCODONE HYDROCHLORIDE 5 MG/1
10 TABLET ORAL EVERY 4 HOURS PRN
Status: DISCONTINUED | OUTPATIENT
Start: 2020-11-20 | End: 2020-11-22 | Stop reason: HOSPADM

## 2020-11-20 RX ORDER — ONDANSETRON 2 MG/ML
4 INJECTION INTRAMUSCULAR; INTRAVENOUS EVERY 30 MIN PRN
Status: DISCONTINUED | OUTPATIENT
Start: 2020-11-20 | End: 2020-11-20 | Stop reason: HOSPADM

## 2020-11-20 RX ORDER — ACETAMINOPHEN 325 MG/1
650 TABLET ORAL EVERY 4 HOURS PRN
Qty: 50 TABLET | Refills: 0 | Status: ON HOLD | OUTPATIENT
Start: 2020-11-20 | End: 2020-12-16

## 2020-11-20 RX ORDER — HYDROXYZINE HYDROCHLORIDE 10 MG/1
10 TABLET, FILM COATED ORAL EVERY 6 HOURS PRN
Status: DISCONTINUED | OUTPATIENT
Start: 2020-11-20 | End: 2020-11-22 | Stop reason: HOSPADM

## 2020-11-20 RX ORDER — OXYCODONE HYDROCHLORIDE 5 MG/1
5 TABLET ORAL
Status: DISCONTINUED | OUTPATIENT
Start: 2020-11-20 | End: 2020-11-22 | Stop reason: HOSPADM

## 2020-11-20 RX ORDER — GLYCOPYRROLATE 0.2 MG/ML
INJECTION, SOLUTION INTRAMUSCULAR; INTRAVENOUS PRN
Status: DISCONTINUED | OUTPATIENT
Start: 2020-11-20 | End: 2020-11-20

## 2020-11-20 RX ORDER — SODIUM CHLORIDE, SODIUM LACTATE, POTASSIUM CHLORIDE, CALCIUM CHLORIDE 600; 310; 30; 20 MG/100ML; MG/100ML; MG/100ML; MG/100ML
INJECTION, SOLUTION INTRAVENOUS CONTINUOUS
Status: DISCONTINUED | OUTPATIENT
Start: 2020-11-20 | End: 2020-11-20 | Stop reason: HOSPADM

## 2020-11-20 RX ORDER — ONDANSETRON 4 MG/1
4 TABLET, ORALLY DISINTEGRATING ORAL EVERY 6 HOURS PRN
Status: DISCONTINUED | OUTPATIENT
Start: 2020-11-20 | End: 2020-11-22 | Stop reason: HOSPADM

## 2020-11-20 RX ORDER — ACETAMINOPHEN 325 MG/1
975 TABLET ORAL EVERY 8 HOURS
Status: DISCONTINUED | OUTPATIENT
Start: 2020-11-20 | End: 2020-11-22 | Stop reason: HOSPADM

## 2020-11-20 RX ORDER — HYDROMORPHONE HYDROCHLORIDE 1 MG/ML
0.2 INJECTION, SOLUTION INTRAMUSCULAR; INTRAVENOUS; SUBCUTANEOUS
Status: DISCONTINUED | OUTPATIENT
Start: 2020-11-20 | End: 2020-11-22 | Stop reason: HOSPADM

## 2020-11-20 RX ORDER — PROCHLORPERAZINE MALEATE 5 MG
5 TABLET ORAL EVERY 6 HOURS PRN
Status: DISCONTINUED | OUTPATIENT
Start: 2020-11-20 | End: 2020-11-22 | Stop reason: HOSPADM

## 2020-11-20 RX ORDER — LIDOCAINE HYDROCHLORIDE 40 MG/ML
SOLUTION TOPICAL PRN
Status: DISCONTINUED | OUTPATIENT
Start: 2020-11-20 | End: 2020-11-20

## 2020-11-20 RX ORDER — POTASSIUM CHLORIDE 750 MG/1
10 TABLET, EXTENDED RELEASE ORAL DAILY
Status: DISCONTINUED | OUTPATIENT
Start: 2020-11-21 | End: 2020-11-22 | Stop reason: HOSPADM

## 2020-11-20 RX ORDER — ONDANSETRON 4 MG/1
4 TABLET, ORALLY DISINTEGRATING ORAL EVERY 30 MIN PRN
Status: DISCONTINUED | OUTPATIENT
Start: 2020-11-20 | End: 2020-11-20 | Stop reason: HOSPADM

## 2020-11-20 RX ORDER — EPHEDRINE SULFATE 50 MG/ML
INJECTION, SOLUTION INTRAMUSCULAR; INTRAVENOUS; SUBCUTANEOUS PRN
Status: DISCONTINUED | OUTPATIENT
Start: 2020-11-20 | End: 2020-11-20

## 2020-11-20 RX ORDER — MULTIPLE VITAMINS W/ MINERALS TAB 9MG-400MCG
1 TAB ORAL DAILY
Status: DISCONTINUED | OUTPATIENT
Start: 2020-11-21 | End: 2020-11-22 | Stop reason: HOSPADM

## 2020-11-20 RX ORDER — ONDANSETRON 2 MG/ML
INJECTION INTRAMUSCULAR; INTRAVENOUS PRN
Status: DISCONTINUED | OUTPATIENT
Start: 2020-11-20 | End: 2020-11-20

## 2020-11-20 RX ORDER — FENTANYL CITRATE 50 UG/ML
INJECTION, SOLUTION INTRAMUSCULAR; INTRAVENOUS PRN
Status: DISCONTINUED | OUTPATIENT
Start: 2020-11-20 | End: 2020-11-20

## 2020-11-20 RX ORDER — CEFAZOLIN SODIUM 2 G/100ML
2 INJECTION, SOLUTION INTRAVENOUS EVERY 8 HOURS
Status: COMPLETED | OUTPATIENT
Start: 2020-11-20 | End: 2020-11-21

## 2020-11-20 RX ORDER — AMOXICILLIN 250 MG
1-2 CAPSULE ORAL 2 TIMES DAILY
Qty: 30 TABLET | Refills: 0 | Status: SHIPPED | OUTPATIENT
Start: 2020-11-20 | End: 2021-10-08

## 2020-11-20 RX ORDER — PRAVASTATIN SODIUM 10 MG
40 TABLET ORAL AT BEDTIME
Status: DISCONTINUED | OUTPATIENT
Start: 2020-11-20 | End: 2020-11-22 | Stop reason: HOSPADM

## 2020-11-20 RX ORDER — OXYCODONE HYDROCHLORIDE 5 MG/1
5-10 TABLET ORAL
Qty: 60 TABLET | Refills: 0 | Status: ON HOLD | OUTPATIENT
Start: 2020-11-20 | End: 2020-12-16

## 2020-11-20 RX ORDER — GLYCINE 1.5 G/100ML
SOLUTION IRRIGATION PRN
Status: DISCONTINUED | OUTPATIENT
Start: 2020-11-20 | End: 2020-11-20 | Stop reason: HOSPADM

## 2020-11-20 RX ORDER — LATANOPROST 50 UG/ML
1 SOLUTION/ DROPS OPHTHALMIC AT BEDTIME
Status: DISCONTINUED | OUTPATIENT
Start: 2020-11-20 | End: 2020-11-22 | Stop reason: HOSPADM

## 2020-11-20 RX ORDER — PROPOFOL 10 MG/ML
INJECTION, EMULSION INTRAVENOUS PRN
Status: DISCONTINUED | OUTPATIENT
Start: 2020-11-20 | End: 2020-11-20

## 2020-11-20 RX ORDER — HYDROMORPHONE HYDROCHLORIDE 1 MG/ML
.3-.5 INJECTION, SOLUTION INTRAMUSCULAR; INTRAVENOUS; SUBCUTANEOUS EVERY 10 MIN PRN
Status: DISCONTINUED | OUTPATIENT
Start: 2020-11-20 | End: 2020-11-20 | Stop reason: HOSPADM

## 2020-11-20 RX ORDER — ACETAMINOPHEN 325 MG/1
975 TABLET ORAL ONCE
Status: DISCONTINUED | OUTPATIENT
Start: 2020-11-20 | End: 2020-11-20 | Stop reason: HOSPADM

## 2020-11-20 RX ORDER — NEOSTIGMINE METHYLSULFATE 1 MG/ML
VIAL (ML) INJECTION PRN
Status: DISCONTINUED | OUTPATIENT
Start: 2020-11-20 | End: 2020-11-20

## 2020-11-20 RX ORDER — HYDROMORPHONE HYDROCHLORIDE 1 MG/ML
0.4 INJECTION, SOLUTION INTRAMUSCULAR; INTRAVENOUS; SUBCUTANEOUS
Status: DISCONTINUED | OUTPATIENT
Start: 2020-11-20 | End: 2020-11-22 | Stop reason: HOSPADM

## 2020-11-20 RX ORDER — FAMOTIDINE 20 MG/1
20 TABLET, FILM COATED ORAL 2 TIMES DAILY
Status: DISCONTINUED | OUTPATIENT
Start: 2020-11-20 | End: 2020-11-22 | Stop reason: HOSPADM

## 2020-11-20 RX ORDER — FENTANYL CITRATE 50 UG/ML
25-50 INJECTION, SOLUTION INTRAMUSCULAR; INTRAVENOUS
Status: DISCONTINUED | OUTPATIENT
Start: 2020-11-20 | End: 2020-11-20 | Stop reason: HOSPADM

## 2020-11-20 RX ORDER — BISACODYL 10 MG
10 SUPPOSITORY, RECTAL RECTAL DAILY PRN
Status: DISCONTINUED | OUTPATIENT
Start: 2020-11-20 | End: 2020-11-22 | Stop reason: HOSPADM

## 2020-11-20 RX ORDER — TRANEXAMIC ACID 650 MG/1
1950 TABLET ORAL ONCE
Status: COMPLETED | OUTPATIENT
Start: 2020-11-20 | End: 2020-11-20

## 2020-11-20 RX ORDER — DOCUSATE SODIUM 100 MG/1
100 CAPSULE, LIQUID FILLED ORAL 2 TIMES DAILY
Status: DISCONTINUED | OUTPATIENT
Start: 2020-11-20 | End: 2020-11-22 | Stop reason: HOSPADM

## 2020-11-20 RX ORDER — AMOXICILLIN 250 MG
1 CAPSULE ORAL 2 TIMES DAILY
Status: DISCONTINUED | OUTPATIENT
Start: 2020-11-20 | End: 2020-11-22 | Stop reason: HOSPADM

## 2020-11-20 RX ORDER — CELECOXIB 100 MG/1
100 CAPSULE ORAL 2 TIMES DAILY
Status: COMPLETED | OUTPATIENT
Start: 2020-11-20 | End: 2020-11-22

## 2020-11-20 RX ORDER — DORZOLAMIDE HYDROCHLORIDE AND TIMOLOL MALEATE 20; 5 MG/ML; MG/ML
1 SOLUTION/ DROPS OPHTHALMIC 2 TIMES DAILY
Status: DISCONTINUED | OUTPATIENT
Start: 2020-11-20 | End: 2020-11-22 | Stop reason: HOSPADM

## 2020-11-20 RX ORDER — ACETAMINOPHEN 325 MG/1
650 TABLET ORAL EVERY 4 HOURS PRN
Status: DISCONTINUED | OUTPATIENT
Start: 2020-11-23 | End: 2020-11-22 | Stop reason: HOSPADM

## 2020-11-20 RX ORDER — ALBUTEROL SULFATE 0.83 MG/ML
2.5 SOLUTION RESPIRATORY (INHALATION) EVERY 4 HOURS PRN
Status: DISCONTINUED | OUTPATIENT
Start: 2020-11-20 | End: 2020-11-20 | Stop reason: HOSPADM

## 2020-11-20 RX ORDER — LIDOCAINE HYDROCHLORIDE 10 MG/ML
INJECTION, SOLUTION INFILTRATION; PERINEURAL PRN
Status: DISCONTINUED | OUTPATIENT
Start: 2020-11-20 | End: 2020-11-20

## 2020-11-20 RX ORDER — LEVOTHYROXINE SODIUM 88 UG/1
88 TABLET ORAL DAILY
Status: DISCONTINUED | OUTPATIENT
Start: 2020-11-21 | End: 2020-11-22 | Stop reason: HOSPADM

## 2020-11-20 RX ORDER — FUROSEMIDE 20 MG
20 TABLET ORAL DAILY
Status: DISCONTINUED | OUTPATIENT
Start: 2020-11-21 | End: 2020-11-22 | Stop reason: HOSPADM

## 2020-11-20 RX ORDER — LIDOCAINE 40 MG/G
CREAM TOPICAL
Status: DISCONTINUED | OUTPATIENT
Start: 2020-11-20 | End: 2020-11-22 | Stop reason: HOSPADM

## 2020-11-20 RX ORDER — CEFAZOLIN SODIUM 1 G/3ML
1 INJECTION, POWDER, FOR SOLUTION INTRAMUSCULAR; INTRAVENOUS SEE ADMIN INSTRUCTIONS
Status: DISCONTINUED | OUTPATIENT
Start: 2020-11-20 | End: 2020-11-20 | Stop reason: HOSPADM

## 2020-11-20 RX ORDER — FENTANYL CITRATE 50 UG/ML
25-50 INJECTION, SOLUTION INTRAMUSCULAR; INTRAVENOUS EVERY 5 MIN PRN
Status: DISCONTINUED | OUTPATIENT
Start: 2020-11-20 | End: 2020-11-20 | Stop reason: HOSPADM

## 2020-11-20 RX ORDER — LIDOCAINE 40 MG/G
CREAM TOPICAL
Status: DISCONTINUED | OUTPATIENT
Start: 2020-11-20 | End: 2020-11-20 | Stop reason: HOSPADM

## 2020-11-20 RX ORDER — METOPROLOL TARTRATE 25 MG/1
25 TABLET, FILM COATED ORAL 2 TIMES DAILY
Status: DISCONTINUED | OUTPATIENT
Start: 2020-11-20 | End: 2020-11-22 | Stop reason: HOSPADM

## 2020-11-20 RX ADMIN — TRANEXAMIC ACID 1950 MG: 650 TABLET ORAL at 06:00

## 2020-11-20 RX ADMIN — DOCUSATE SODIUM 100 MG: 100 CAPSULE, LIQUID FILLED ORAL at 20:41

## 2020-11-20 RX ADMIN — Medication 5 MG: at 08:13

## 2020-11-20 RX ADMIN — SODIUM CHLORIDE, POTASSIUM CHLORIDE, SODIUM LACTATE AND CALCIUM CHLORIDE: 600; 310; 30; 20 INJECTION, SOLUTION INTRAVENOUS at 20:43

## 2020-11-20 RX ADMIN — HYDROMORPHONE HYDROCHLORIDE 0.5 MG: 1 INJECTION, SOLUTION INTRAMUSCULAR; INTRAVENOUS; SUBCUTANEOUS at 07:47

## 2020-11-20 RX ADMIN — LIDOCAINE HYDROCHLORIDE 4 ML: 40 SOLUTION TOPICAL at 07:27

## 2020-11-20 RX ADMIN — OXYCODONE HYDROCHLORIDE 5 MG: 5 TABLET ORAL at 15:16

## 2020-11-20 RX ADMIN — SODIUM CHLORIDE, POTASSIUM CHLORIDE, SODIUM LACTATE AND CALCIUM CHLORIDE: 600; 310; 30; 20 INJECTION, SOLUTION INTRAVENOUS at 06:45

## 2020-11-20 RX ADMIN — ACETAMINOPHEN 975 MG: 325 TABLET, FILM COATED ORAL at 12:46

## 2020-11-20 RX ADMIN — FENTANYL CITRATE 25 MCG: 50 INJECTION, SOLUTION INTRAMUSCULAR; INTRAVENOUS at 08:29

## 2020-11-20 RX ADMIN — CELECOXIB 400 MG: 200 CAPSULE ORAL at 06:00

## 2020-11-20 RX ADMIN — FENTANYL CITRATE 50 MCG: 50 INJECTION, SOLUTION INTRAMUSCULAR; INTRAVENOUS at 10:17

## 2020-11-20 RX ADMIN — DEXMEDETOMIDINE HYDROCHLORIDE 0.7 MCG/KG/HR: 100 INJECTION, SOLUTION INTRAVENOUS at 07:30

## 2020-11-20 RX ADMIN — DOCUSATE SODIUM 50 MG AND SENNOSIDES 8.6 MG 1 TABLET: 8.6; 5 TABLET, FILM COATED ORAL at 20:40

## 2020-11-20 RX ADMIN — ROCURONIUM BROMIDE 10 MG: 10 INJECTION INTRAVENOUS at 08:06

## 2020-11-20 RX ADMIN — SODIUM CHLORIDE, POTASSIUM CHLORIDE, SODIUM LACTATE AND CALCIUM CHLORIDE: 600; 310; 30; 20 INJECTION, SOLUTION INTRAVENOUS at 12:49

## 2020-11-20 RX ADMIN — PROPOFOL 160 MG: 10 INJECTION, EMULSION INTRAVENOUS at 07:25

## 2020-11-20 RX ADMIN — OXYCODONE HYDROCHLORIDE 10 MG: 5 TABLET ORAL at 18:48

## 2020-11-20 RX ADMIN — ACETAMINOPHEN 975 MG: 325 TABLET, FILM COATED ORAL at 20:40

## 2020-11-20 RX ADMIN — FENTANYL CITRATE 50 MCG: 50 INJECTION, SOLUTION INTRAMUSCULAR; INTRAVENOUS at 08:01

## 2020-11-20 RX ADMIN — MIDAZOLAM 1 MG: 1 INJECTION INTRAMUSCULAR; INTRAVENOUS at 07:17

## 2020-11-20 RX ADMIN — CEFAZOLIN SODIUM 2 G: 2 INJECTION, SOLUTION INTRAVENOUS at 16:53

## 2020-11-20 RX ADMIN — FENTANYL CITRATE 50 MCG: 50 INJECTION, SOLUTION INTRAMUSCULAR; INTRAVENOUS at 09:22

## 2020-11-20 RX ADMIN — LIDOCAINE HYDROCHLORIDE 50 MG: 10 INJECTION, SOLUTION INFILTRATION; PERINEURAL at 07:25

## 2020-11-20 RX ADMIN — ROCURONIUM BROMIDE 10 MG: 10 INJECTION INTRAVENOUS at 08:16

## 2020-11-20 RX ADMIN — Medication 5 MG: at 08:11

## 2020-11-20 RX ADMIN — FENTANYL CITRATE 100 MCG: 50 INJECTION, SOLUTION INTRAMUSCULAR; INTRAVENOUS at 07:25

## 2020-11-20 RX ADMIN — FAMOTIDINE 20 MG: 20 TABLET, FILM COATED ORAL at 20:40

## 2020-11-20 RX ADMIN — HYDROMORPHONE HYDROCHLORIDE 0.5 MG: 1 INJECTION, SOLUTION INTRAMUSCULAR; INTRAVENOUS; SUBCUTANEOUS at 07:58

## 2020-11-20 RX ADMIN — DEXAMETHASONE SODIUM PHOSPHATE 4 MG: 4 INJECTION, SOLUTION INTRA-ARTICULAR; INTRALESIONAL; INTRAMUSCULAR; INTRAVENOUS; SOFT TISSUE at 07:25

## 2020-11-20 RX ADMIN — CELECOXIB 100 MG: 100 CAPSULE ORAL at 20:40

## 2020-11-20 RX ADMIN — HYDROMORPHONE HYDROCHLORIDE 0.5 MG: 1 INJECTION, SOLUTION INTRAMUSCULAR; INTRAVENOUS; SUBCUTANEOUS at 09:59

## 2020-11-20 RX ADMIN — Medication 5 MG: at 08:47

## 2020-11-20 RX ADMIN — OXYCODONE HYDROCHLORIDE 5 MG: 5 TABLET ORAL at 10:52

## 2020-11-20 RX ADMIN — FENTANYL CITRATE 50 MCG: 50 INJECTION, SOLUTION INTRAMUSCULAR; INTRAVENOUS at 09:37

## 2020-11-20 RX ADMIN — Medication 5 MG: at 08:38

## 2020-11-20 RX ADMIN — ROCURONIUM BROMIDE 50 MG: 10 INJECTION INTRAVENOUS at 07:26

## 2020-11-20 RX ADMIN — HYDROMORPHONE HYDROCHLORIDE 0.5 MG: 1 INJECTION, SOLUTION INTRAMUSCULAR; INTRAVENOUS; SUBCUTANEOUS at 10:09

## 2020-11-20 RX ADMIN — PRAVASTATIN SODIUM 40 MG: 10 TABLET ORAL at 20:41

## 2020-11-20 RX ADMIN — HYDROXYZINE HYDROCHLORIDE 10 MG: 10 TABLET, FILM COATED ORAL at 17:00

## 2020-11-20 RX ADMIN — ROCURONIUM BROMIDE 10 MG: 10 INJECTION INTRAVENOUS at 08:36

## 2020-11-20 RX ADMIN — CEFAZOLIN SODIUM 2 G: 2 INJECTION, SOLUTION INTRAVENOUS at 07:30

## 2020-11-20 RX ADMIN — Medication 5 MG: at 07:44

## 2020-11-20 RX ADMIN — Medication 3 MG: at 09:05

## 2020-11-20 RX ADMIN — SODIUM CHLORIDE, POTASSIUM CHLORIDE, SODIUM LACTATE AND CALCIUM CHLORIDE: 600; 310; 30; 20 INJECTION, SOLUTION INTRAVENOUS at 08:18

## 2020-11-20 RX ADMIN — SODIUM CHLORIDE, POTASSIUM CHLORIDE, SODIUM LACTATE AND CALCIUM CHLORIDE: 600; 310; 30; 20 INJECTION, SOLUTION INTRAVENOUS at 11:23

## 2020-11-20 RX ADMIN — GLYCOPYRROLATE 0.6 MG: 0.2 INJECTION, SOLUTION INTRAMUSCULAR; INTRAVENOUS at 09:04

## 2020-11-20 RX ADMIN — ONDANSETRON HYDROCHLORIDE 4 MG: 2 INJECTION, SOLUTION INTRAVENOUS at 08:59

## 2020-11-20 RX ADMIN — FENTANYL CITRATE 25 MCG: 50 INJECTION, SOLUTION INTRAMUSCULAR; INTRAVENOUS at 08:31

## 2020-11-20 SDOH — HEALTH STABILITY: MENTAL HEALTH: CURRENT SMOKER: 0

## 2020-11-20 ASSESSMENT — MIFFLIN-ST. JEOR: SCORE: 1350.9

## 2020-11-20 ASSESSMENT — ENCOUNTER SYMPTOMS: DYSRHYTHMIAS: 1

## 2020-11-20 ASSESSMENT — LIFESTYLE VARIABLES: TOBACCO_USE: 1

## 2020-11-20 NOTE — ANESTHESIA CARE TRANSFER NOTE
Patient: Teri Beltran    Procedure(s):  Right total hip arthroplasty    Diagnosis: DJD (degenerative joint disease) [M19.90]  Diagnosis Additional Information: No value filed.    Anesthesia Type:   General     Note:  Airway :Face Mask  Patient transferred to:PACU  Handoff Report: Identifed the Patient, Identified the Reponsible Provider, Reviewed the pertinent medical history, Discussed the surgical course, Reviewed Intra-OP anesthesia mangement and issues during anesthesia, Set expectations for post-procedure period and Allowed opportunity for questions and acknowledgement of understanding      Vitals: (Last set prior to Anesthesia Care Transfer)    CRNA VITALS  11/20/2020 0842 - 11/20/2020 0920      11/20/2020             Pulse:  104    SpO2:  99 %    Resp Rate (observed):  12    EKG:  Sinus rhythm;1st degree AV block;Bundle branch block                Electronically Signed By: RUBY Carr CRNA  November 20, 2020  9:20 AM

## 2020-11-20 NOTE — ANESTHESIA PREPROCEDURE EVALUATION
Anesthesia Pre-Procedure Evaluation    Patient: Teri Beltran   MRN: [id number] : 1945          Preoperative Diagnosis: DJD (degenerative joint disease) [M19.90]    Procedure(s):  Right total hip arthroplasty    Past Medical History:   Diagnosis Date     Complication of anesthesia     confusion after anesthesia (after hysterectomy)     First degree AV block 2015     Former smoker      Gastroesophageal reflux disease      GERD (gastroesophageal reflux disease)      Glaucoma      Heart block     2:1     Hyperlipidemia LDL goal <130 2013     Hypertension goal BP (blood pressure) < 140/90 12/3/2013     Hypothyroidism      Left atrial dilatation     mild     Mild dilation of ascending aorta - borderline 2015     Palpitations      Prediabetes 2013     RBBB 2015     S/P total knee arthroplasty 2019     Tachycardia      Varicose veins      Venous insufficiency     s/p bilateral great saphenous vein radiofrequency ablation by Dr. Garcia     Past Surgical History:   Procedure Laterality Date     ARTHROPLASTY KNEE Right 2019    Procedure: Right total knee arthroplasty using an ArthTrueViewlance knee system;  Surgeon: Dragan Muse MD;  Location: [facility] OR     BREAST SURGERY      right breast ductal surgery due to milk production     COLONOSCOPY N/A 10/24/2014    no further screening. Procedure: COLONOSCOPY;  Surgeon: Pedro Rudd MD;  Location: [facility] GI     HYSTERECTOMY, PAP NO LONGER INDICATED  2009    total hysterectomy and ovaries. benign     SOFT TISSUE SURGERY      ganglion removed from left wrist and ring finger     SURGICAL HISTORY OF -   age 7    tonsillectomy     SURGICAL HISTORY OF -   1/15    left eye cataract     SURGICAL HISTORY OF -   Fall     LINQ placed.     Anesthesia Evaluation     . Pt has had prior anesthetic. Type: General and MAC    No history of anesthetic complications          ROS/MED HX    ENT/Pulmonary:     (+)tobacco use, Past use , .  .   (-) asthma   Neurologic:  - neg neurologic ROS     Cardiovascular:     (+) Dyslipidemia, hypertension----. : . . . :. dysrhythmias 1st Deg Heart Block, . Previous cardiac testing Echodate:9/23/20results:The left ventricle is normal in size. There is normal left ventricular wall  thickness. Left ventricular systolic function is normal. The visual ejection  fraction is estimated at 55-60%. No regional wall motion abnormalities noted.  The right ventricle is normal size. The right ventricular systolic function is  normal.  Trace mitral and tricuspid regurgitation.  There is moderate trileaflet aortic sclerosis. Transaortic gradients are  mildly elevated consistent with aortic sclerosis.  Trivial pericardial effusion.date: results: date: results: date: results:         (-) CAD, taking anticoagulants/antiplatelets and syncope   METS/Exercise Tolerance:     Hematologic:  - neg hematologic  ROS       Musculoskeletal:   (+) arthritis,  -       GI/Hepatic:     (+) GERD Asymptomatic on medication,       Renal/Genitourinary:  - ROS Renal section negative       Endo:     (+) type II DM Not using insulin thyroid problem hypothyroidism, .      Psychiatric:  - neg psychiatric ROS       Infectious Disease:  - neg infectious disease ROS       Malignancy:         Other:                          Physical Exam  Normal systems: cardiovascular, pulmonary and dental    Airway   Mallampati: II  TM distance: >3 FB  Neck ROM: full    Dental     Cardiovascular       Pulmonary             Lab Results   Component Value Date    WBC 5.5 08/28/2020    HGB 12.6 10/21/2020    HCT 38.0 08/28/2020     08/28/2020    CRP 2.9 06/17/2015    CRP 2.9 06/17/2015    SED 16 06/17/2015     10/08/2020    POTASSIUM 3.9 10/21/2020    CHLORIDE 101 10/08/2020    CO2 31 10/08/2020    BUN 28 10/08/2020    CR 0.80 10/08/2020     (H) 10/08/2020    LILIA 9.5 10/08/2020    ALBUMIN 3.9 08/28/2020    PROTTOTAL 7.3 08/28/2020    ALT 19 08/28/2020    AST  "15 08/28/2020    ALKPHOS 79 08/28/2020    BILITOTAL 0.5 08/28/2020    TSH 1.35 08/28/2020    T4 1.23 04/12/2017       Preop Vitals  BP Readings from Last 3 Encounters:   11/20/20 (!) 121/94   10/21/20 118/70   10/08/20 138/86    Pulse Readings from Last 3 Encounters:   11/20/20 77   10/21/20 93   10/08/20 76      Resp Readings from Last 3 Encounters:   11/20/20 18   10/21/20 16   09/16/20 14    SpO2 Readings from Last 3 Encounters:   11/20/20 98%   10/21/20 96%   09/18/20 100%      Temp Readings from Last 1 Encounters:   11/20/20 97.7  F (36.5  C) (Temporal)    Ht Readings from Last 1 Encounters:   11/20/20 1.727 m (5' 8\")      Wt Readings from Last 1 Encounters:   11/20/20 80.7 kg (178 lb)    Estimated body mass index is 27.06 kg/m  as calculated from the following:    Height as of this encounter: 1.727 m (5' 8\").    Weight as of this encounter: 80.7 kg (178 lb).       Anesthesia Plan      History & Physical Review  History and physical reviewed and following examination; no interval change.    ASA Status:  3 .    NPO Status:  > 8 hours    Plan for General with Intravenous induction. Maintenance will be Balanced.    PONV prophylaxis:  Ondansetron (or other 5HT-3) and Dexamethasone or Solumedrol    The patient is not a current smoker      Postoperative Care  Postoperative pain management:  IV analgesics, Oral pain medications and Multi-modal analgesia.      Consents  Anesthetic plan, risks, benefits and alternatives discussed with:  Patient..                 Angel Franklin MD                    .  "

## 2020-11-20 NOTE — BRIEF OP NOTE
[facility]    Brief Operative Note    Pre-operative diagnosis: DJD (degenerative joint disease) [M19.90]  Post-operative diagnosis Same as pre-operative diagnosis    Procedure: Procedure(s):  Right total hip arthroplasty  Surgeon: Surgeon(s) and Role:     * Dragan Muse MD - Primary     * Holly Mcdowell PA-C - Assisting  Anesthesia: Choice   Estimated blood loss: Less than 100 ml  Drains: None  Specimens: * No specimens in log *  Findings:   None.  Complications: None.  Implants:   Implant Name Type Inv. Item Serial No.  Lot No. LRB No. Used Action   IMP SHELL BIOM G7 ACETAB PPS LAKHANI HOLE 50MM SZ D [id number] Total Joint Component/Insert IMP SHELL BIOM G7 ACETAB PPS LAKHANI HOLE 50MM SZ D [id number]  [employer] [id number] Right 1 Implanted   IMP SCR ZIM 6.5X20MM ACET CUP SELF TAP -953-20 Metallic Hardware/[place] IMP SCR ZIM 6.5X20MM ACET CUP SELF TAP -346-20  [employer] [id number] Right 1 Implanted   IMP SCR ZIM 6.5X20MM ACET CUP SELF TAP -647-20 Metallic Hardware/[place] IMP SCR ZIM 6.5X20MM ACET CUP SELF TAP -662-20  [employer] [id number] Right 1 Implanted   IMP LINER BIOM G7 ACET ALBINA ARCOMXL CRSLNK 36MM SZ D [id number] Total Joint Component/Insert IMP LINER BIOM G7 ACET ALBINA ARCOMXL CRSLNK 36MM SZ D [id number]  [employer] [id number] Right 1 Implanted   Taperloc Femoral Stem Reduced Distal 5f752se Standard Offset Type 1 Taper for Cementless Use Total Joint Component/Insert   [employer] [id number] Right 1 Implanted   Modular Ceramic Head Total Joint Component/Insert   [employer] [id number] Right 1 Implanted

## 2020-11-20 NOTE — CONSULTS
[facility]  Hospitalist Consult Note  Name: Teri Beltran    MRN: [id number]  YOB: 1945    Age: 74 year old  Date of admission: 11/20/2020  Primary care provider: Rachel Miller     Requesting Physician: Orthopedic service, Dr. Muse  Reason for consult:  Post-operative medical management         Assessment and Plan:   Teri Beltran is a 74 year old female with known history of prior SVT, right bundle branch block, GERD, hypertension, chronic venous insufficiency, dyslipidemia, aortic valve sclerosis, hypothyroidism, GERD who was hospitalized for right TRACIE for DJD    1. DJD s/p right TRACIE on 11/20/2020 on postoperative day #0: The patient is doing well, currently has well controlled pain and is hemodynamically stable. Will defer diet, activity, DVT prophylaxis, and pain control to the primary team. Currently the patient is on Lovenox and receiving Celebrex, APAP, as needed hydromorphone. Continue physical and occupational therapy.. Continue incentive spirometry and check hemoglobin to evaluate for surgical blood loss and potential need for transfusion.     2.  Hypertension-resume beta-blockers of metoprolol  -Resume regimen of HCTZ and Lasix in the morning  3.  Dyslipidemia  4.  Hypothyroidism  5.  GERD  6.  History of glaucoma    Can resume chronic home regimen for multiple chronic medical condition  We will continue to follow with you.  PT/OT to evaluate  Discontinue IV fluids once tolerating oral diet  Resume her home eyedrops    Code status: Full  Prophylaxis: As per orthopedic service  Disposition: Defer to primary orthopedic service    Thank you for the consultation, we will continue to follow along during the hospitalization. Please page with any questions or concerns.         History of Present Illness:   Teri Beltran is a 74 year old female with known history of prior SVT, right bundle branch block, GERD, hypertension, chronic venous  insufficiency, dyslipidemia, aortic valve sclerosis, hypothyroidism, GERD who was hospitalized for right TRACIE for DJD. Pre-operative note was fully reviewed and recommendations acknowledged. Op note and anesthesia notes and flow sheets reviewed.     The patient had no complications related to the procedure and has had an unremarkable post-operative course to this point. Currently pain is adequately controlled. No nausea, vomiting, diarrhea or constipation. No fevers, chills, diaphoresis. No chest pain, palpitations, dyspnea. . Tolerating oral intake. No excessive somnolence and patient is fully alert and oriented. The patient has no other complaints at this time.                Past Medical History:     Past Medical History:   Diagnosis Date     Complication of anesthesia     confusion after anesthesia (after hysterectomy)     First degree AV block 8/11/2015     Former smoker      Gastroesophageal reflux disease      GERD (gastroesophageal reflux disease)      Glaucoma      Heart block     2:1     Hyperlipidemia LDL goal <130 6/14/2013     Hypertension goal BP (blood pressure) < 140/90 12/3/2013     Hypothyroidism      Left atrial dilatation     mild     Mild dilation of ascending aorta - borderline 8/11/2015     Palpitations      Prediabetes 6/14/2013     RBBB 8/11/2015     S/P total knee arthroplasty 7/23/2019     Tachycardia      Varicose veins      Venous insufficiency     s/p bilateral great saphenous vein radiofrequency ablation by Dr. Garcia             Past Surgical History:     Past Surgical History:   Procedure Laterality Date     ARTHROPLASTY KNEE Right 7/23/2019    Procedure: Right total knee arthroplasty using an [email or web address] knee system;  Surgeon: Dragan Muse MD;  Location:  OR     BREAST SURGERY      right breast ductal surgery due to milk production     COLONOSCOPY N/A 10/24/2014    no further screening. Procedure: COLONOSCOPY;  Surgeon: Pedro Rudd MD;  Location:  GI      HYSTERECTOMY, PAP NO LONGER INDICATED  2009    total hysterectomy and ovaries. benign     SOFT TISSUE SURGERY      ganglion removed from left wrist and ring finger     SURGICAL HISTORY OF -   age 7    tonsillectomy     SURGICAL HISTORY OF -   1/15    left eye cataract     SURGICAL HISTORY OF -   2016    LINQ placed.               Social History:     Social History     Tobacco Use     Smoking status: Former Smoker     Packs/day: 0.50     Years: 18.00     Pack years: 9.00     Types: Cigarettes     Quit date:      Years since quittin.9     Smokeless tobacco: Never Used   Substance Use Topics     Alcohol use: Yes     Alcohol/week: 0.0 standard drinks     Comment: 1 glass of wine at night             Family History:   Family history was fully reviewed and non-contributory in this case.          Allergies:     Allergies   Allergen Reactions     Seasonal Allergies              Medications:     Prior to Admission medications    Medication Sig Last Dose Taking? Auth Provider   Acetaminophen (TYLENOL PO) Take 500-1,000 mg by mouth every 6 hours as needed for mild pain   Yes Reported, Patient   acetaminophen (TYLENOL) 325 MG tablet Take 2 tablets (650 mg) by mouth every 4 hours as needed for other (mild pain)  Yes Dragan Muse MD   alendronate (FOSAMAX) 70 MG tablet TAKE 1 TABLET(70 MG) BY MOUTH EVERY 7 DAYS  Yes Rachel Miller MD   aspirin (ASA) 325 MG EC tablet Take 1 tablet (325 mg) by mouth 2 times daily  Yes Dragan Muse MD   dorzolamide-timolol (COSOPT) 2-0.5 % ophthalmic solution Place 1 drop into both eyes 2 times daily  Yes Unknown, Entered By History   famotidine (PEPCID) 10 MG tablet Take 1 tablet (10 mg) by mouth 2 times daily  Yes Rachel Miller MD   fluticasone (FLONASE) 50 MCG/ACT nasal spray Spray 1-2 sprays into both nostrils daily  Yes Rachel Miller MD   furosemide (LASIX) 20 MG tablet Take 1 tablet (20 mg) by  "mouth daily  Yes Quincy Anglin MD   hydrochlorothiazide (HYDRODIURIL) 25 MG tablet Take 1 tablet (25 mg) by mouth daily  Yes Quincy Anglin MD   latanoprost (XALATAN) 0.005 % ophthalmic solution Place 1 drop into the right eye At Bedtime   Yes Reported, Patient   levothyroxine (SYNTHROID/LEVOTHROID) 88 MCG tablet Take 1 tablet (88 mcg) by mouth daily  Yes Rachel Miller MD   Multiple Vitamins-Minerals (MULTIVITAMIN ADULT PO) Take 1 tablet by mouth daily  Yes Reported, Patient   oxyCODONE (ROXICODONE) 5 MG tablet Take 1-2 tablets (5-10 mg) by mouth every 3 hours as needed for pain (Moderate to Severe)  Yes Dragan Muse MD   potassium chloride ER (KLOR-CON M) 10 MEQ CR tablet Take 1 tablet (10 mEq) by mouth daily  Yes Quincy Anglin MD   pravastatin (PRAVACHOL) 40 MG tablet Take 1 tablet (40 mg) by mouth At Bedtime  Yes Rachel Miller MD   senna-docusate (SENOKOT-S/PERICOLACE) 8.6-50 MG tablet Take 1-2 tablets by mouth 2 times daily Take while on oral narcotics to prevent or treat constipation.  Yes Dragan Muse MD   traMADol (ULTRAM) 50 MG tablet Take 1 tablet (50 mg) by mouth 3 times daily as needed for severe pain  Yes Rachel Miller MD   metoprolol tartrate (LOPRESSOR) 25 MG tablet Take 1 tablet (25 mg) by mouth 2 times daily   Rachel Miller MD       Current hospital administered medication list (MAR) also reviewed.          Review of Systems:   A comprehensive greater than 10 system review of systems was carried out.  Pertinent positives and negatives are noted above.  Otherwise negative for contributory info.            Physical Exam:   Blood pressure 132/87, pulse 100, temperature 98.2  F (36.8  C), temperature source Temporal, resp. rate 13, height 1.727 m (5' 8\"), weight 80.7 kg (178 lb), SpO2 95 %, not currently breastfeeding.  Exam:  GENERAL: No apparent distress. Awake, alert, and fully oriented.  HEENT: " Normocephalic, atraumatic. Extraocular movements intact.  CARDIOVASCULAR: Regular rate and rhythm, S1 and S2, systolic murmur  PULMONARY: Clear bilaterally.  Fair air entry  ABDOMINAL: Soft, non-tender, non-distended. Bowel sounds normoactive. No hepatosplenomegaly.  EXTREMITIES: No cyanosis or clubbing.  +1 pitting edema both lower extremities   NEUROLOGICAL: CN 2-12 grossly intact, no focal neurological deficits.  DERMATOLOGICAL: No rash, ulcer, ecchymoses, jaundice.         Data:   Imaging:  Reviewed.    EKG/Telemetry: No new EKG seen in [facility]    Labs: Reviewed.   No results for input(s): WBC, HGB, HCT, MCV, PLT in the last 168 hours.  Recent Labs   Lab 11/20/20  1129   CR 0.62   GFRESTIMATED 88   GFRESTBLACK >90     No results for input(s): GLC, BGM in the last 168 hours.  No results for input(s): HGB in the last 168 hours.  No results for input(s): LIPASE in the last 168 hours.  No results for input(s): TROPONIN, TROPI, TROPR in the last 168 hours.    Invalid input(s): TROP, TROPONINIES

## 2020-11-20 NOTE — PLAN OF CARE
[facility]      OUTPATIENT PHYSICAL THERAPY EVALUATION  PLAN OF TREATMENT FOR OUTPATIENT REHABILITATION  (COMPLETE FOR INITIAL CLAIMS ONLY)  Patient's Last Name, First Name, M.I.  YOB: 1945  Teri Beltran                        Provider's Name  [facility] Medical Record No.  [id number]                               Onset Date:  11/20/20   Start of Care Date:  11/20/20      Type:     _X_PT   ___OT   ___SLP Medical Diagnosis:                           PT Diagnosis:  impaired functional mobility   Visits from SOC:  1   _________________________________________________________________________________  Plan of Treatment/Functional Goals    Planned Interventions:       Goals: See Physical Therapy Goals on Care Plan in [employer] electronic health record.    Therapy Frequency: Daily  Predicted Duration of Therapy Intervention: 2 days  _________________________________________________________________________________    I CERTIFY THE NEED FOR THESE SERVICES FURNISHED UNDER        THIS PLAN OF TREATMENT AND WHILE UNDER MY CARE     (Physician co-signature of this document indicates review and certification of the therapy plan).                Certification date from: 11/20/20, Certification date to: 11/21/20    Referring Physician: Dragan Muse MD            Initial Assessment        See Physical Therapy evaluation dated 11/20/20 in Epic electronic health record.

## 2020-11-20 NOTE — ANESTHESIA POSTPROCEDURE EVALUATION
Patient: Teri Beltran    Procedure(s):  Right total hip arthroplasty    Diagnosis:DJD (degenerative joint disease) [M19.90]  Diagnosis Additional Information: No value filed.    Anesthesia Type:  General    Note:  Anesthesia Post Evaluation    Patient location during evaluation: PACU  Patient participation: Able to fully participate in evaluation  Level of consciousness: awake  Pain management: adequate  Airway patency: patent  Cardiovascular status: acceptable  Respiratory status: acceptable  Hydration status: acceptable  PONV: none             Last vitals:  Vitals:    11/20/20 1040 11/20/20 1115 11/20/20 1145   BP:  126/76 131/71   Pulse: 73 62 82   Resp: 13 16 16   Temp:  98.1  F (36.7  C)    SpO2: 98% 99% 99%         Electronically Signed By: Kumar Roberts MD  November 20, 2020  12:16 PM

## 2020-11-20 NOTE — PROGRESS NOTES
11/20/20 1538   Quick Adds   Type of Visit Initial PT Evaluation   Living Environment   People in home alone   Current Living Arrangements house  (split entery)   Home Accessibility stairs to enter home   Number of Stairs, Main Entrance 2   Stair Railings, Main Entrance railing on right side (ascending)   Living Environment Comments Chair lift available to main level; walk in tub; all needs met on main level. Laundry on lower level.    Self-Care   Usual Activity Tolerance good   Current Activity Tolerance moderate   Equipment Currently Used at Home   (owns a cane & FWW)   Activity/Exercise/Self-Care Comment At baseline patient is IND with functional mobility & ADLS. Recently prior to surgery began using FWW due to feeling unsteady.   Disability/Function   Fall history within last six months yes   Number of times patient has fallen within last six months 1  (R foot got caught)   General Information   Onset of Illness/Injury or Date of Surgery 11/20/20   Referring Physician Dragan Muse MD   Patient/Family Therapy Goals Statement (PT) To return to home   Pertinent History of Current Problem (include personal factors and/or comorbidities that impact the POC) 74 year old female s/p R TRACIE on 11/20/20   Existing Precautions/Restrictions fall   Weight-Bearing Status - LLE full weight-bearing   Weight-Bearing Status - RLE weight-bearing as tolerated  (no hip precautions per surgical proceedure)   Cognition   Orientation Status (Cognition) oriented x 4   Affect/Mental Status (Cognition) WFL   Follows Commands (Cognition) WFL   Pain Assessment   Patient Currently in Pain   (6/10 R hip)   Integumentary/Edema   Integumentary/Edema Comments R hip incision covered with dressing   Posture    Posture Forward head position;Protracted shoulders   Range of Motion (ROM)   ROM Comment limited R hip ROM; otherwise B LE WFL   Strength   Strength Comments limited R hip strength & weak R DF; otherwise B LE WFL   Bed Mobility    Comment (Bed Mobility) supine <> EOB at [facility]   Transfers   Transfer Safety Comments sit <> stand with FWW at [facility]   Gait/Stairs (Locomotion)   Distance in Feet (Required for LE Total Joints) 125ft total   Comment (Gait/Stairs) 10ft during eval with FWW at [facility], decreased shade, stride, poor ability to maintain R foot DF for advancing foot   Balance   Balance Comments requiring use of FWW for ambulation   Sensory Examination   Sensory Perception Comments Baseline reports occasional skin numbness down R shin   Clinical Impression   Criteria for Skilled Therapeutic Intervention yes, treatment indicated   PT Diagnosis (PT) impaired functional mobility   Influenced by the following impairments pain, R hip weakness/decreased ROM; R drop foot; decreased activity tolerance   Functional limitations due to impairments bed mobility, transfers, ambulation,stairs   Clinical Presentation Stable/Uncomplicated   Clinical Presentation Rationale PMH, current presentation, clinical judgement   Clinical Decision Making (Complexity) low complexity   Therapy Frequency (PT) Daily   Predicted Duration of Therapy Intervention (days/wks) 2 days   PT Discharge Planning    PT Discharge Recommendation (DC Rec)   (defer to ortho team)   Therapy Certification   Start of care date 11/20/20   Certification date from 11/20/20   Certification date to 11/21/20   Total Evaluation Time   Total Evaluation Time (Minutes) 10

## 2020-11-20 NOTE — ANESTHESIA PROCEDURE NOTES
Airway   Date/Time: 11/20/2020 7:28 AM   Patient location during procedure: OR    Staff -   CRNA: Thor Franklin APRN CRNA  Performed By: CRNA    Consent for Airway   Urgency: elective    Indications and Patient Condition  Indications for airway management: hiral-procedural and airway protection  Induction type:intravenous    Final Airway Details  Final airway type: endotracheal airway  Successful airway:ETT - single and Oral  Endotracheal Airway Details   ETT size (mm): 7.0  Cuffed: yes  Successful intubation technique: direct laryngoscopy  Grade View of Cords: 1  Adjucts: stylet  Measured from: lips  Secured at (cm): 22  Secured with: plastic tape  Bite block used: Soft    Post intubation assessment   Placement verified by: capnometry, equal breath sounds and chest rise   Number of attempts at approach: 1  Number of other approaches attempted: 0  Secured with:plastic tape  Ease of procedure: easy  Dentition: Intact and Unchanged

## 2020-11-20 NOTE — PLAN OF CARE
VSS.  On 2L O2.  Aquacel CDI.  CMS intact.  Ice applied to right hip.  Tolerating regular diet.  Pain goal: 4.  Rating pain 3 at this time.  Tylenol given.  Plans on discharging to home tomorrow.

## 2020-11-21 ENCOUNTER — APPOINTMENT (OUTPATIENT)
Dept: OCCUPATIONAL THERAPY | Facility: CLINIC | Age: 75
End: 2020-11-21
Attending: ORTHOPAEDIC SURGERY
Payer: MEDICARE

## 2020-11-21 ENCOUNTER — APPOINTMENT (OUTPATIENT)
Dept: PHYSICAL THERAPY | Facility: CLINIC | Age: 75
End: 2020-11-21
Attending: ORTHOPAEDIC SURGERY
Payer: MEDICARE

## 2020-11-21 LAB
HGB BLD-MCNC: 10.2 G/DL (ref 11.7–15.7)
PLATELET # BLD AUTO: 245 10E9/L (ref 150–450)

## 2020-11-21 PROCEDURE — 97110 THERAPEUTIC EXERCISES: CPT | Mod: GP | Performed by: PHYSICAL THERAPIST

## 2020-11-21 PROCEDURE — 99213 OFFICE O/P EST LOW 20 MIN: CPT | Performed by: INTERNAL MEDICINE

## 2020-11-21 PROCEDURE — 96372 THER/PROPH/DIAG INJ SC/IM: CPT | Performed by: ORTHOPAEDIC SURGERY

## 2020-11-21 PROCEDURE — 250N000011 HC RX IP 250 OP 636: Performed by: ORTHOPAEDIC SURGERY

## 2020-11-21 PROCEDURE — 250N000013 HC RX MED GY IP 250 OP 250 PS 637: Mod: GY | Performed by: INTERNAL MEDICINE

## 2020-11-21 PROCEDURE — 97165 OT EVAL LOW COMPLEX 30 MIN: CPT | Mod: GO | Performed by: OCCUPATIONAL THERAPIST

## 2020-11-21 PROCEDURE — 97535 SELF CARE MNGMENT TRAINING: CPT | Mod: GO | Performed by: OCCUPATIONAL THERAPIST

## 2020-11-21 PROCEDURE — 99207 PR CDG-CODE CATEGORY CHANGED: CPT | Performed by: INTERNAL MEDICINE

## 2020-11-21 PROCEDURE — 97530 THERAPEUTIC ACTIVITIES: CPT | Mod: GP | Performed by: PHYSICAL THERAPIST

## 2020-11-21 PROCEDURE — 85049 AUTOMATED PLATELET COUNT: CPT | Performed by: ORTHOPAEDIC SURGERY

## 2020-11-21 PROCEDURE — 85018 HEMOGLOBIN: CPT | Performed by: ORTHOPAEDIC SURGERY

## 2020-11-21 PROCEDURE — 36415 COLL VENOUS BLD VENIPUNCTURE: CPT | Performed by: ORTHOPAEDIC SURGERY

## 2020-11-21 PROCEDURE — 97116 GAIT TRAINING THERAPY: CPT | Mod: GP | Performed by: PHYSICAL THERAPIST

## 2020-11-21 PROCEDURE — 250N000013 HC RX MED GY IP 250 OP 250 PS 637: Mod: GY | Performed by: ORTHOPAEDIC SURGERY

## 2020-11-21 RX ORDER — IBUPROFEN 600 MG/1
600 TABLET, FILM COATED ORAL EVERY 6 HOURS PRN
Qty: 60 TABLET | Refills: 0 | Status: SHIPPED | OUTPATIENT
Start: 2020-11-21 | End: 2021-10-08

## 2020-11-21 RX ADMIN — FAMOTIDINE 20 MG: 20 TABLET, FILM COATED ORAL at 21:14

## 2020-11-21 RX ADMIN — FAMOTIDINE 20 MG: 20 TABLET, FILM COATED ORAL at 09:32

## 2020-11-21 RX ADMIN — CELECOXIB 100 MG: 100 CAPSULE ORAL at 09:33

## 2020-11-21 RX ADMIN — DORZOLAMIDE HYDROCHLORIDE AND TIMOLOL MALEATE 1 DROP: 20; 5 SOLUTION/ DROPS OPHTHALMIC at 21:16

## 2020-11-21 RX ADMIN — ACETAMINOPHEN 975 MG: 325 TABLET, FILM COATED ORAL at 12:15

## 2020-11-21 RX ADMIN — CEFAZOLIN SODIUM 2 G: 2 INJECTION, SOLUTION INTRAVENOUS at 00:28

## 2020-11-21 RX ADMIN — OXYCODONE HYDROCHLORIDE 10 MG: 5 TABLET ORAL at 09:04

## 2020-11-21 RX ADMIN — METOPROLOL TARTRATE 25 MG: 25 TABLET, FILM COATED ORAL at 21:21

## 2020-11-21 RX ADMIN — OXYCODONE HYDROCHLORIDE 10 MG: 5 TABLET ORAL at 04:55

## 2020-11-21 RX ADMIN — OXYCODONE HYDROCHLORIDE 10 MG: 5 TABLET ORAL at 18:00

## 2020-11-21 RX ADMIN — CELECOXIB 100 MG: 100 CAPSULE ORAL at 21:15

## 2020-11-21 RX ADMIN — METOPROLOL TARTRATE 25 MG: 25 TABLET, FILM COATED ORAL at 09:36

## 2020-11-21 RX ADMIN — HYDROXYZINE HYDROCHLORIDE 10 MG: 10 TABLET, FILM COATED ORAL at 18:07

## 2020-11-21 RX ADMIN — OXYCODONE HYDROCHLORIDE 10 MG: 5 TABLET ORAL at 13:10

## 2020-11-21 RX ADMIN — ENOXAPARIN SODIUM 40 MG: 40 INJECTION SUBCUTANEOUS at 09:32

## 2020-11-21 RX ADMIN — DOCUSATE SODIUM 50 MG AND SENNOSIDES 8.6 MG 1 TABLET: 8.6; 5 TABLET, FILM COATED ORAL at 09:33

## 2020-11-21 RX ADMIN — PRAVASTATIN SODIUM 40 MG: 10 TABLET ORAL at 21:13

## 2020-11-21 RX ADMIN — OXYCODONE HYDROCHLORIDE 10 MG: 5 TABLET ORAL at 00:28

## 2020-11-21 RX ADMIN — DOCUSATE SODIUM 100 MG: 100 CAPSULE, LIQUID FILLED ORAL at 09:33

## 2020-11-21 RX ADMIN — MULTIPLE VITAMINS W/ MINERALS TAB 1 TABLET: TAB at 09:32

## 2020-11-21 RX ADMIN — OXYCODONE HYDROCHLORIDE 10 MG: 5 TABLET ORAL at 22:25

## 2020-11-21 RX ADMIN — DOCUSATE SODIUM 50 MG AND SENNOSIDES 8.6 MG 1 TABLET: 8.6; 5 TABLET, FILM COATED ORAL at 21:11

## 2020-11-21 RX ADMIN — POTASSIUM CHLORIDE 10 MEQ: 750 TABLET, FILM COATED, EXTENDED RELEASE ORAL at 09:32

## 2020-11-21 RX ADMIN — LEVOTHYROXINE SODIUM 88 MCG: 0.09 TABLET ORAL at 09:32

## 2020-11-21 RX ADMIN — DOCUSATE SODIUM 100 MG: 100 CAPSULE, LIQUID FILLED ORAL at 21:15

## 2020-11-21 RX ADMIN — ACETAMINOPHEN 975 MG: 325 TABLET, FILM COATED ORAL at 04:55

## 2020-11-21 RX ADMIN — ACETAMINOPHEN 975 MG: 325 TABLET, FILM COATED ORAL at 21:14

## 2020-11-21 RX ADMIN — LATANOPROST 1 DROP: 50 SOLUTION OPHTHALMIC at 21:16

## 2020-11-21 ASSESSMENT — ACTIVITIES OF DAILY LIVING (ADL): PREVIOUS_RESPONSIBILITIES: MEAL PREP;MEDICATION MANAGEMENT

## 2020-11-21 NOTE — PLAN OF CARE
Occupational Therapy Discharge Summary    Reason for therapy discharge:    Discharged to home.  All goals and outcomes met, no further needs identified.    Progress towards therapy goal(s). See goals on Care Plan in [facility] electronic health record for goal details.  Goals met    Therapy recommendation(s):    No further therapy is recommended.

## 2020-11-21 NOTE — PROGRESS NOTES
11/21/20 1111   Quick Adds   Type of Visit Initial Occupational Therapy Evaluation   Living Environment   People in home alone   Current Living Arrangements house   Living Environment Comments Pt. lives alone in split home with chair lift, walk in tub shower, grab bars, raised toilet seat, high bed, reacher, cleaning services, and her sister in law deliveries groceries.   Self-Care   Usual Activity Tolerance good   Current Activity Tolerance moderate   Equipment Currently Used at Home raised toilet seat;shower chair;grab bar, toilet;grab bar, tub/shower  (reacher, )   Activity/Exercise/Self-Care Comment Pt. reports independents with ADLs at baseline and limited tolernace for ambulation and IADLs due to hip pain.   Disability/Function   Hearing Difficulty or Deaf yes   Patient's preferred means of communication verbal   Describe hearing loss bilateral hearing loss   Use of hearing assistive devices bilateral hearing aids   Were auxiliary aids offered? no   Wear Glasses or Blind no   Concentrating, Remembering or Making Decisions Difficulty no   Difficulty Communicating no   Difficulty Eating/Swallowing no   Fall history within last six months yes   Number of times patient has fallen within last six months 1   General Information   Onset of Illness/Injury or Date of Surgery 11/20/20   Referring Physician Dragan Muse   Patient/Family Therapy Goal Statement (OT) return home and have family assist   Additional Occupational Profile Info/Pertinent History of Current Problem 74 year old female s/p R TRACIE on 11/20/20   Performance Patterns (Routines, Roles, Habits) pt. lives alone and has assist for IADLs   Right Lower Extremity (Weight-bearing Status) weight-bearing as tolerated (WBAT)   Cognitive Status Examination   Orientation Status orientation to person, place and time   Affect/Mental Status (Cognitive) WFL   Visual Perception   Visual Impairment/Limitations WFL   Range of Motion Comprehensive   Comment, General  Range of Motion B UE WFL   Strength Comprehensive (MMT)   Comment, General Manual Muscle Testing (MMT) Assessment B UE WFL   Coordination   Upper Extremity Coordination No deficits were identified   Bed Mobility   Comment (Bed Mobility) SBA   Transfers   Transfer Comments CGA-SBA with commode   Instrumental Activities of Daily Living (IADL)   Previous Responsibilities meal prep;medication management   Clinical Impression   Criteria for Skilled Therapeutic Interventions Met (OT) yes   OT Diagnosis impaired independence with ADLs   OT Problem List-Impairments impacting ADL post-surgical precautions   Assessment of Occupational Performance 1-3 Performance Deficits   Identified Performance Deficits decreased LE dressing, decreased functional transfers   Planned Therapy Interventions (OT) ADL retraining;progressive activity/exercise   Clinical Decision Making Complexity (OT) low complexity   Therapy Frequency (OT) Other (see comments)   Predicted Duration of Therapy one time eval and treat   Anticipated Equipment Needs Upon Discharge (OT) shower chair;reacher;raised toilet seat  (grab, has DME and AE at home)   Risks and Benefits of Treatment have been explained. Yes   Patient, Family & other staff in agreement with plan of care Yes   OT Discharge Planning    OT Discharge Recommendation (DC Rec) Home with assist   OT Rationale for DC Rec Pt. demonstrates SBA with functional transfers and modified independence with ADLs with AE and will have assist for IADLs.  Pt. has home setup for toilet, shower, stairs, and sister in law assists with deliveries and pt. has cleaning services.   OT Brief overview of current status  Pt. SBA with functional transfers and modified independent with LE dressing.  Pt. utilizing DME for toilet transfer.   Therapy Certification   Start of Care Date 11/21/20   Certification date from 11/21/20   Certification date to 11/21/20   Medical Diagnosis R TRACIE   Total Evaluation Time (Minutes)   Total  Evaluation Time (Minutes) 8

## 2020-11-21 NOTE — CONSULTS
Care Management Initial Consult    General Information  Assessment completed with: Patient,    Type of CM/SW Visit: Initial Assessment  Readmission within the last 30 days:        Reason for Consult: discharge planning     Communication Assessment  Patient's communication style: spoken language (English or Bilingual)    Hearing Difficulty or Deaf: yes   Wear Glasses or Blind: no    Cognitive  Cognitive/Neuro/Behavioral: WDL                      Living Environment:   People in home: alone     Current living Arrangements: house      Able to return to prior arrangements: yes       Family/Social Support:  Care provided by: self  Provides care for: no one     Sibling(s)          Description of Support System: Supportive;Involved    Support Assessment: Adequate family and caregiver support;Adequate social supports    Current Resources:   Skilled Home Care Services:    Community Resources: None  Equipment currently used at home: raised toilet seat;shower chair;grab bar, toilet;grab bar, tub/shower(reacher, )    Lifestyle & Psychosocial Needs:        Socioeconomic History     Marital status:      Spouse name: Not on file     Number of children: Not on file     Years of education: Not on file     Highest education level: Not on file     Tobacco Use     Smoking status: Former Smoker     Packs/day: 0.50     Years: 18.00     Pack years: 9.00     Types: Cigarettes     Quit date:      Years since quittin.9     Smokeless tobacco: Never Used   Substance and Sexual Activity     Alcohol use: Yes     Alcohol/week: 0.0 standard drinks     Comment: 1 glass of wine at night     Drug use: No     Sexual activity: Yes     Partners: Male             Values/Beliefs:  Spiritual, Cultural Beliefs, Temple Practices, Values that affect care: no               Additional Information:  SW met in pt room with pt. RN present at beginning of SW visit. RN explaining to pt that PT recommending home PT at this time. RN reports she will  address with ortho rounder on 11/22. Pt confirms for SW that she lives alone and is normally independent. She reports her sister in law is planning to stay with her for one night when she returns home. She reports a previous total knee replacement in August of 2019 after which she went to [facility]. Pt in agreement with plan to return home at discharge at this time. Pt agreeable to home care referral. SW offered home care agency choice and pt agreeable to [facility]. Pt denies further questions at this time.    SW sent referral to [facility].    PO Rich

## 2020-11-21 NOTE — PROGRESS NOTES
S:  Teri was seen this morning in room 451 at the [facility] in [place].  Dr Muse called an requested a AFO for her right leg/foots drop foot before she discharges today.  O:  She was sitting up in the chair eating breakfast when I arrived.  She was talkative.    A:  A medium Horace Dowling AFO was trimmed for her slip on shoe that she had at the hospital.  The shoe was not a lace up and her foot did not fit in her current shoe.  I had a long discussion on how to transfer this to a lace up style shoe with a heel counter in it.  The brace strap was trimmed for her leg size.   P:  She intends to go home today and will transfer it to a better shoe and will begin to use it.  She understands that she will need to ease into it around the house.  She will be in contact with Dr. Muse for follow up and she understands that she will need to ease into the use of the brace.   Any issues with the brace should be referred to the [facility] department.      Sander Ba  CO, LO, CPed, ATC

## 2020-11-21 NOTE — PROGRESS NOTES
"[facility]  Hospitalist Progress Note  Courtney Ng MD 11/21/2020    Reason for Stay (Diagnosis): DJD s/p TRACIE         Assessment and Plan:      Summary of Stay: Teri Beltran is a 74 year old female with a history of  Htn/hlp/hypothyroidism/gerd and refractory DJD admitted on 11/20/2020 for right TRACIE.     She tolerated the procedure without difficulty.  Still with some discomfort and plan is to discharge home with home PT tomorrow       Problem List:   DJD s/p right TRACIE 11/20/20  -PT/PT, pain control and discharge per primary service    ABL anemia preop hgb 12.6, post op 10.2  -no indication for transfusion at this juncture    htn  pta regimen is furosemide 20 mg every day, hydrochlorothiazide 25 mg every day, and metop 25 mg bid  -resumed with staggered parameters and only able to get BB  -inability to tolerate typical BP regimen due to ABL and pain medications  -patient reports she has a cuff at home and has instructions from her PCP to take all BP meds if sbp > 120 or take no BP meds if < 120 and that has worked well for her in the past.     hlp   Cont pt pravastatin     GERD  Cont famotidine      DVT Prophylaxis: Enoxaparin (Lovenox) SQ  Code Status: Full Code          Interval History (Subjective):      Feeling pretty good, does have some discomfort with walking and hasn't been able to tackle the stairs  Denies cp/sob/n/v                  Physical Exam:      Last Vital Signs:  /57   Pulse 76   Temp 98.4  F (36.9  C) (Oral)   Resp 16   Ht 1.727 m (5' 8\")   Wt 80.7 kg (178 lb)   LMP  (LMP Unknown)   SpO2 93%   BMI 27.06 kg/m      Pleasant nad looks stated age head nc/at sclera clear lungs ctab nl effort rrr no mrg no le edema skin w/d no c/c abd s/nt/nd alert and oriented affect appropriate ramirez            Medications:      All current medications were reviewed with changes reflected in problem list.         Data:      All new lab and imaging data was reviewed.   Labs:  Recent Labs "   Lab 11/20/20  1129   CR 0.62   GFRESTIMATED 88   GFRESTBLACK >90     Recent Labs   Lab 11/21/20  0702   HGB 10.2*         Imaging:

## 2020-11-21 NOTE — OP NOTE
Procedure Date: 11/20/2020      PREOPERATIVE DIAGNOSIS:  Right hip primary osteoarthritis.      POSTOPERATIVE DIAGNOSIS:  Right hip primary osteoarthritis.      PROCEDURE:  Right total hip arthroplasty using a Biomet Taperloc femoral stem and G7 acetabulum.      SURGEON:  Dragan Muse MD      FIRST ASSISTANT:  Holly Mcdowell PA-C      INDICATIONS:  Ms. Beltran is a very pleasant 74-year-old female who has had ongoing right hip pain for some time now, failed conservative management with oral analgesics, activity modifications, and injections and now wishes to proceed with operative intervention.  We had a long discussion of risks, benefits and alternatives of total hip arthroplasty.  She understands these and wished to proceed with surgery.      NARRATIVE EVENTS:  After thorough evaluation and properly identification of the patient to be operated on.  Ms. Beltran was taken to the operating room where she underwent a general anesthetic.  She was placed in the right lateral decubitus position, right hip up and the right hip was prepped and draped in the usual sterile manner.  After appropriate surgical pause to confirm the patient's extremity to be operated on, the patient received 2 grams of Ancef.  Her right hip was approached through a lateral incision centered over the greater trochanter.  Skin and soft tissue were sharply dissected down to the tensor fascia.  Fascia was split in line with the fibers in line with the femur and taken down to the trochanteric bursa.  Bursal tissue was removed.  The anterior one-third of the gluteus medius was removed off the greater trochanter in modified Hardinge fashion.  This took us down around the joint capsule, which was T'd and the femoral head and neck were surgically dislocated from the acetabulum.  We then made a femoral neck osteotomy 12 mm proximal to the lesser trochanter, according to our preoperative templating.  We exposed the acetabulum, removed ligament of teres,  transverse acetabular ligament and labrum, sequentially reamed the acetabulum up to fit a size 50 Biomet G7 acetabulum, impacted this into approximately 40 degrees of abduction, 20 degrees of version.  We had good primary stability of the cup.  We augmented this with 2 screws, 1 in the inner table of the pelvis, 1 in the posterior column.  Once this was done, we then removed the osteophytes from around the acetabulum and placed our flat liner to fit a size 36 mm femoral head.  We then paid our attention to the femur.  Here we removed the bone from the piriformis fossa using entry reamer, lateralized and sequentially broached up to fit a size 9 Biomet Taperloc femoral stem with standard neck offset and a +6 neck length and a 36 mm femoral head.  This gave us good stability, appropriate leg length, soft tissue tension and stability, so at this point, we then placed the size 9 Taperloc standard neck offset femoral stem.  Once this was solidly into position, we retrialed the femoral head, found that  +6 neck length, 36 mm femoral gave us best stability, appropriate leg length, and soft tissue tension.  So this a Biolox ceramic 36 mm femoral head was impacted on the trunnion.  The hip was reduced.  The wound was thoroughly irrigated with both IrriSept solution followed by saline.  Joint capsule was closed with interrupted 0 Vicryl sutures, closed the abdomen #5 Ethibond sutures through bone tunnels, closed the tensor fascia with interrupted 0 Vicryl sutures and a running #1 Stratafix suture and then we closed the skin and soft tissues with absorbable sutures and staples in the skin.  The patient was placed in a well-padded postoperative dressing and taken to the recovery room in stable condition.  She tolerated the procedure without difficulty.         FRANCO BABCOCK MD             D: 11/20/2020   T: 11/20/2020   MT: GEORGE      Name:     JAVON MARTÍNEZ   MRN:      [id number]        Account:         [id number]   :      1945           Procedure Date: 2020      Document: [id number]

## 2020-11-21 NOTE — PLAN OF CARE
PM SHIFT    Patient vital signs are at baseline: Yes  Patient able to ambulate as they were prior to admission or with assist devices provided by therapies during their stay:  Yes  Patient MUST void prior to discharge:  Yes  Patient able to tolerate oral intake:  Yes  Pain has adequate pain control using Oral analgesics:  Yes    Pt ambulated with PT in the taylor. Pt did well assist of 1, walker and the gait belt. Pt up to the [facility] with min assist of 1. Pt taking prn oxycodone with relief. Pt right foot is weaker than her left foot with dorsi and plantar flexion. Pt states that has been going on for the last 2 weeks at home and she had knee surgery this summer on the right knee. Pt states she occs has some tingling down her right knee but reports the has also had that before surgery.pts left foot has some trace ankle edema and a red spot on the top of her foot for a long time. Pt reports she hurt her left foot as a kid. Pt refused her hs dose of BP med because her SBP was less than 120 and that is what she does at home.    Pt plans to possible discharge home tomorrow.

## 2020-11-21 NOTE — PROGRESS NOTES
SPIRITUAL HEALTH SERVICES Progress Note   Saw Kirit in her chair in her room. Offered reflective conversation to facilitate processing her post surgical plan to restorative health. She shared with me that her  passed away five years ago, she lives alone but is supported by two daughters and a sister-in-law. Kirit grew up [employer], but no longer practices since her divorce. She declined prayer.   offered reflective listening and emotional support.   Available if needed.      Feli Cm  Chaplain Resident

## 2020-11-21 NOTE — PLAN OF CARE
[facility]      OUTPATIENT OCCUPATIONAL THERAPY  EVALUATION  PLAN OF TREATMENT FOR OUTPATIENT REHABILITATION  (COMPLETE FOR INITIAL CLAIMS ONLY)  Patient's Last Name, First Name, M.I.  YOB: 1945  Teri Beltran                          Provider's Name  [facility] Medical Record No.  [id number]                               Onset Date:  11/20/20   Start of Care Date:  11/21/20     Type:     ___PT   _X_OT   ___SLP Medical Diagnosis:  R TRACIE                        OT Diagnosis:  impaired independence with ADLs   Visits from SOC:  1   _________________________________________________________________________________  Plan of Treatment/Functional Goals    Planned Interventions: ADL retraining, progressive activity/exercise   Goals: See Occupational Therapy Goals on Care Plan in [facility] electronic health record.    Therapy Frequency: Other (see comments)  Predicted Duration of Therapy Intervention: one time eval and treat  _________________________________________________________________________________    I CERTIFY THE NEED FOR THESE SERVICES FURNISHED UNDER        THIS PLAN OF TREATMENT AND WHILE UNDER MY CARE     (Physician co-signature of this document indicates review and certification of the therapy plan).              Certification date from: 11/21/20, Certification date to: 11/21/20    Referring Physician: Dragan Muse            Initial Assessment        See Occupational Therapy evaluation dated 11/21/20 in Epic electronic health record.

## 2020-11-21 NOTE — PLAN OF CARE
Patient vital signs are at baseline: Yes  Patient able to ambulate as they were prior to admission or with assist devices provided by therapies during their stay:  No,  Reason:  up w/A1 and ww but needs to do afternoon PT to pass stairs prior to discharge.  Patient MUST void prior to discharge:  Yes  Patient able to tolerate oral intake:  Yes  Pain has adequate pain control using Oral analgesics:  Yes

## 2020-11-21 NOTE — PLAN OF CARE
Patient vital signs are at baseline: Yes  Patient able to ambulate as they were prior to admission or with assist devices provided by therapies during their stay:  No,  Reason: pt did not pass PT doing stairs. Pt recommending another night and home PT with intention to discharge home tomorrow with assistance from sister in-law.  Patient MUST void prior to discharge:  Yes  Patient able to tolerate oral intake:  Yes  Pain has adequate pain control using Oral analgesics:  Yes

## 2020-11-21 NOTE — PROGRESS NOTES
"Orthopedic Surgery  11/21/2020    S: Patient voices no complaints today.     O: Blood pressure (!) 140/77, pulse 88, temperature 98.4  F (36.9  C), temperature source Oral, resp. rate 12, height 1.727 m (5' 8\"), weight 80.7 kg (178 lb), SpO2 96 %, not currently breastfeeding.  Lab Results   Component Value Date    HGB 10.2 11/21/2020     No results found for: INR     Neurovascularly intact.  Calves are negative bilaterally, both soft and nontender.  The wound is C/D/I.  The wound looks good with minimal erythema of the surrounding skin.    A: Ms. Beltran is doing well status post Procedure(s):  Right total hip arthroplasty.    P: Continue physical therapy.   Anticoagulation, on lovenox, home on ASA   Pain management  Discharge planning, home today    Dragan Muse MD  [phone]    "

## 2020-11-21 NOTE — PLAN OF CARE
NOC SHIFT  Pt alert and orientated. Pt slept well and woke up once around 00 with right hip pain. Pt given prn oxycodone with relief. Pt has capno and pulse oximeter on. Pt assist of 1 when ambulating. Pt may discharge home today. Pts right foot is weaker than her left foot. Right foot dorsi flexion is weak but pt reports that is how it has been the last 2 weeks and feels it is a little stronger postop. Pts right foot has red area on top and has some ankle edema. Pt states this is also her baseline since she injured her right foot when she was a kid and jumped off the front porch. Pt slept until 0445 and than woke to void. BS hypo and pt yet to pass any flatus. Pt may discharge home today.      Patient vital signs are at baseline: Yes  Patient able to ambulate as they were prior to admission or with assist devices provided by therapies during their stay:  Yes  Patient MUST void prior to discharge:  Yes  Patient able to tolerate oral intake:  Yes  Pain has adequate pain control using Oral analgesics:  Yes

## 2020-11-22 ENCOUNTER — APPOINTMENT (OUTPATIENT)
Dept: PHYSICAL THERAPY | Facility: CLINIC | Age: 75
End: 2020-11-22
Attending: ORTHOPAEDIC SURGERY
Payer: MEDICARE

## 2020-11-22 VITALS
HEART RATE: 83 BPM | DIASTOLIC BLOOD PRESSURE: 62 MMHG | WEIGHT: 178 LBS | SYSTOLIC BLOOD PRESSURE: 115 MMHG | BODY MASS INDEX: 26.98 KG/M2 | TEMPERATURE: 98.6 F | HEIGHT: 68 IN | RESPIRATION RATE: 16 BRPM | OXYGEN SATURATION: 95 %

## 2020-11-22 LAB
GLUCOSE SERPL-MCNC: 111 MG/DL (ref 70–99)
HGB BLD-MCNC: 9.6 G/DL (ref 11.7–15.7)

## 2020-11-22 PROCEDURE — 250N000013 HC RX MED GY IP 250 OP 250 PS 637: Performed by: INTERNAL MEDICINE

## 2020-11-22 PROCEDURE — 99212 OFFICE O/P EST SF 10 MIN: CPT | Performed by: INTERNAL MEDICINE

## 2020-11-22 PROCEDURE — 250N000013 HC RX MED GY IP 250 OP 250 PS 637: Mod: GY | Performed by: ORTHOPAEDIC SURGERY

## 2020-11-22 PROCEDURE — 97116 GAIT TRAINING THERAPY: CPT | Mod: GP | Performed by: PHYSICAL THERAPIST

## 2020-11-22 PROCEDURE — 85018 HEMOGLOBIN: CPT | Performed by: ORTHOPAEDIC SURGERY

## 2020-11-22 PROCEDURE — 82947 ASSAY GLUCOSE BLOOD QUANT: CPT | Performed by: ORTHOPAEDIC SURGERY

## 2020-11-22 PROCEDURE — 97530 THERAPEUTIC ACTIVITIES: CPT | Mod: GP | Performed by: PHYSICAL THERAPIST

## 2020-11-22 PROCEDURE — 96372 THER/PROPH/DIAG INJ SC/IM: CPT | Performed by: ORTHOPAEDIC SURGERY

## 2020-11-22 PROCEDURE — 99207 PR CDG-CODE CATEGORY CHANGED: CPT | Performed by: INTERNAL MEDICINE

## 2020-11-22 PROCEDURE — 36415 COLL VENOUS BLD VENIPUNCTURE: CPT | Performed by: ORTHOPAEDIC SURGERY

## 2020-11-22 PROCEDURE — 250N000013 HC RX MED GY IP 250 OP 250 PS 637: Mod: GY | Performed by: INTERNAL MEDICINE

## 2020-11-22 PROCEDURE — 250N000011 HC RX IP 250 OP 636: Performed by: ORTHOPAEDIC SURGERY

## 2020-11-22 RX ADMIN — DORZOLAMIDE HYDROCHLORIDE AND TIMOLOL MALEATE 1 DROP: 20; 5 SOLUTION/ DROPS OPHTHALMIC at 08:14

## 2020-11-22 RX ADMIN — METOPROLOL TARTRATE 25 MG: 25 TABLET, FILM COATED ORAL at 08:00

## 2020-11-22 RX ADMIN — OXYCODONE HYDROCHLORIDE 5 MG: 5 TABLET ORAL at 07:58

## 2020-11-22 RX ADMIN — HYDROXYZINE HYDROCHLORIDE 10 MG: 10 TABLET, FILM COATED ORAL at 00:40

## 2020-11-22 RX ADMIN — MULTIPLE VITAMINS W/ MINERALS TAB 1 TABLET: TAB at 08:00

## 2020-11-22 RX ADMIN — ACETAMINOPHEN 975 MG: 325 TABLET, FILM COATED ORAL at 04:23

## 2020-11-22 RX ADMIN — LEVOTHYROXINE SODIUM 88 MCG: 0.09 TABLET ORAL at 08:00

## 2020-11-22 RX ADMIN — OXYCODONE HYDROCHLORIDE 5 MG: 5 TABLET ORAL at 09:37

## 2020-11-22 RX ADMIN — POTASSIUM CHLORIDE 10 MEQ: 750 TABLET, FILM COATED, EXTENDED RELEASE ORAL at 08:00

## 2020-11-22 RX ADMIN — ACETAMINOPHEN 975 MG: 325 TABLET, FILM COATED ORAL at 11:58

## 2020-11-22 RX ADMIN — DOCUSATE SODIUM 50 MG AND SENNOSIDES 8.6 MG 1 TABLET: 8.6; 5 TABLET, FILM COATED ORAL at 07:58

## 2020-11-22 RX ADMIN — CELECOXIB 100 MG: 100 CAPSULE ORAL at 07:58

## 2020-11-22 RX ADMIN — OXYCODONE HYDROCHLORIDE 10 MG: 5 TABLET ORAL at 13:30

## 2020-11-22 RX ADMIN — FAMOTIDINE 20 MG: 20 TABLET, FILM COATED ORAL at 07:58

## 2020-11-22 RX ADMIN — ENOXAPARIN SODIUM 40 MG: 40 INJECTION SUBCUTANEOUS at 07:57

## 2020-11-22 RX ADMIN — DOCUSATE SODIUM 100 MG: 100 CAPSULE, LIQUID FILLED ORAL at 07:58

## 2020-11-22 NOTE — PLAN OF CARE
Reviewed discharge instructions and medications with patient. Questions answered. Patient discharged to home with sister in-law driving, discharge instructions, medications (Oxycodone/Tylenol/Aspirin/Ibuprofen/Senna), and belongings at this time.

## 2020-11-22 NOTE — PROGRESS NOTES
Physical Therapy Discharge Summary    Reason for therapy discharge:    Discharged to home with home therapy.    Progress towards therapy goal(s). See goals on Care Plan in [facility] electronic health record for goal details.  Goals partially met.  Barriers to achieving goals:   difficulty getting up from lower chair surfaces.  She does have a recliner /lift chair which she will be using.  Had difficulty from lower surfaces prior to surgery  as well and knows her limits.    Therapy recommendation(s):    Continued therapy is recommended.  Rationale/Recommendations:  Pt would benifit from further rehab to address her strength, gait and function, assessment and modification recommendations to home environment including stiars to enter hte home.  .

## 2020-11-22 NOTE — DISCHARGE INSTRUCTIONS
Your home care referral was sent to [facility]  If you haven't heard from them within the next 24-48 hours,  Please call them at [phone]

## 2020-11-22 NOTE — PROGRESS NOTES
Ortho Progress Note    Patient did not pass PT today. Plan for discharge home tomorrow (11/22/2020)    Thuan Murdock PA-C

## 2020-11-22 NOTE — DISCHARGE SUMMARY
Discharge Summary  Hospitalist Service    Teri Beltran MRN# [id number]   YOB: 1945 Age: 74 year old     Date of Admission:  11/20/2020  Date of Discharge:  11/22/2020  Admitting Physician:  Dragan Muse MD  Discharge Physician: Courtney Ng MD  Discharging Service: Hospitalist Service     Primary Provider: Rachel Miller  Primary Care Physician Phone Number: [phone]         Discharge Diagnoses/Problem Oriented Hospital Course (Providers):    Teri Beltran was admitted on 11/20/2020 by Dragan Muse MD and I would refer you to their history and physical.  The following problems were addressed during her hospitalization:      Refractory DJD s/p right TRACIE  htn  hlp  GERD         Code Status:      Full Code        Brief Hospital Stay Summary Sent Home With Patient in AVS:        Reason for your hospital stay      Orthopedic surgery             Teri Beltran is a 74 year old female with a history of  Htn/hlp/hypothyroidism/gerd and refractory DJD admitted on 11/20/2020 for right TRACIE.      She tolerated the procedure without difficulty.  Still with some discomfort and plan is to discharge home with home PT today        Problem List:   DJD s/p right TRACIE 11/20/20  -PT/PT, pain control and discharge per primary service     ABL anemia preop hgb 12.6, post op 10.2  -no indication for transfusion at this juncture     htn  pta regimen is furosemide 20 mg every day, hydrochlorothiazide 25 mg every day, and metop 25 mg bid  -resumed with staggered parameters and only able to get BB  -inability to tolerate typical BP regimen due to ABL and pain medications  -patient reports she has a cuff at home and has instructions from her PCP to take all BP meds if sbp > 120 or take no BP meds if < 120 and that has worked well for her in the past. We discussed at discharge that she should continue this strategy at home     hlp   Cont pt pravastatin      GERD  Cont  famotidine        DVT Prophylaxis: Enoxaparin (Lovenox) SQ  Code Status: Full Code         Important Results:      As noted below          Pending Results:        Unresulted Labs Ordered in the Past 30 Days of this Admission     No orders found from 10/21/2020 to 11/21/2020.            Discharge Instructions and Follow-Up:      Follow-up Appointments     Follow Up Care      Follow-up with your Surgeon Team in  10-16 days for wound check.               Discharge Disposition:      Discharged to home         Discharge Medications:        Current Discharge Medication List      START taking these medications    Details   !! acetaminophen (TYLENOL) 325 MG tablet Take 2 tablets (650 mg) by mouth every 4 hours as needed for other (mild pain)  Qty: 50 tablet, Refills: 0    Associated Diagnoses: Status post total replacement of right hip      aspirin (ASA) 325 MG EC tablet Take 1 tablet (325 mg) by mouth 2 times daily  Qty: 70 tablet, Refills: 0    Associated Diagnoses: Status post total replacement of right hip      ibuprofen (ADVIL/MOTRIN) 600 MG tablet Take 1 tablet (600 mg) by mouth every 6 hours as needed for moderate pain  Qty: 60 tablet, Refills: 0    Associated Diagnoses: Status post total replacement of right hip      oxyCODONE (ROXICODONE) 5 MG tablet Take 1-2 tablets (5-10 mg) by mouth every 3 hours as needed for pain (Moderate to Severe)  Qty: 60 tablet, Refills: 0    Associated Diagnoses: Status post total replacement of right hip      senna-docusate (SENOKOT-S/PERICOLACE) 8.6-50 MG tablet Take 1-2 tablets by mouth 2 times daily Take while on oral narcotics to prevent or treat constipation.  Qty: 30 tablet, Refills: 0    Comments: While taking narcotics  Associated Diagnoses: Status post total replacement of right hip       !! - Potential duplicate medications found. Please discuss with provider.      CONTINUE these medications which have NOT CHANGED    Details   !! Acetaminophen (TYLENOL PO) Take 500-1,000 mg by  mouth every 6 hours as needed for mild pain       alendronate (FOSAMAX) 70 MG tablet TAKE 1 TABLET(70 MG) BY MOUTH EVERY 7 DAYS  Qty: 12 tablet, Refills: 1    Associated Diagnoses: Osteoporosis, unspecified osteoporosis type, unspecified pathological fracture presence      dorzolamide-timolol (COSOPT) 2-0.5 % ophthalmic solution Place 1 drop into both eyes 2 times daily      famotidine (PEPCID) 10 MG tablet Take 1 tablet (10 mg) by mouth 2 times daily  Qty: 180 tablet, Refills: 1    Associated Diagnoses: Gastroesophageal reflux disease, esophagitis presence not specified      fluticasone (FLONASE) 50 MCG/ACT nasal spray Spray 1-2 sprays into both nostrils daily  Qty: 48 g, Refills: 1    Associated Diagnoses: Congestion of paranasal sinus      furosemide (LASIX) 20 MG tablet Take 1 tablet (20 mg) by mouth daily  Qty: 90 tablet, Refills: 0    Associated Diagnoses: Essential hypertension with goal blood pressure less than 140/90; Fluid retention      hydrochlorothiazide (HYDRODIURIL) 25 MG tablet Take 1 tablet (25 mg) by mouth daily  Qty: 90 tablet, Refills: 3    Associated Diagnoses: Hypertension goal BP (blood pressure) < 140/90; Bilateral leg edema      latanoprost (XALATAN) 0.005 % ophthalmic solution Place 1 drop into the right eye At Bedtime       levothyroxine (SYNTHROID/LEVOTHROID) 88 MCG tablet Take 1 tablet (88 mcg) by mouth daily  Qty: 90 tablet, Refills: 1    Associated Diagnoses: Hypothyroidism, unspecified type      Multiple Vitamins-Minerals (MULTIVITAMIN ADULT PO) Take 1 tablet by mouth daily      potassium chloride ER (KLOR-CON M) 10 MEQ CR tablet Take 1 tablet (10 mEq) by mouth daily  Qty: 30 tablet, Refills: 3    Associated Diagnoses: Essential hypertension with goal blood pressure less than 140/90; Fluid retention      pravastatin (PRAVACHOL) 40 MG tablet Take 1 tablet (40 mg) by mouth At Bedtime  Qty: 90 tablet, Refills: 1    Associated Diagnoses: Hyperlipidemia LDL goal <130      traMADol (ULTRAM)  "50 MG tablet Take 1 tablet (50 mg) by mouth 3 times daily as needed for severe pain  Qty: 90 tablet, Refills: 0    Comments: For chronic pain  Associated Diagnoses: Hip pain, right      metoprolol tartrate (LOPRESSOR) 25 MG tablet Take 1 tablet (25 mg) by mouth 2 times daily  Qty: 180 tablet, Refills: 3    Associated Diagnoses: Hypertension goal BP (blood pressure) < 140/90; Palpitations       !! - Potential duplicate medications found. Please discuss with provider.            Allergies:         Allergies   Allergen Reactions     Seasonal Allergies            Consultations This Hospital Stay:      hospitalist         Condition and Physical on Discharge:      Discharge condition: Stable   Vitals: Blood pressure 115/62, pulse 83, temperature 98.6  F (37  C), temperature source Oral, resp. rate 16, height 1.727 m (5' 8\"), weight 80.7 kg (178 lb), SpO2 95 %, not currently breastfeeding.     Constitutional: Pleasant nad looks stated age head nc/at sclera clear   Lungs: ctab nl effort   Cardiovascular: rrr no mrg no le edema   Abdomen: S/nt/nd   Skin: Warm and dry no cc   Other: Alert and oriented affect appropriate ramirez          Discharge Time:      Less than 30 minutes.        Image Results From This Hospital Stay (For Non-EPIC Providers):        Results for orders placed or performed during the hospital encounter of 11/20/20   XR Hip Port Right 1 View    Narrative    This exam was marked as non-reportable because it will not be read by a   radiologist or a [facility] non-radiologist provider.         XR Pelvis w Hip Port Right 1 View    Narrative    PELVIS AND HIP PORTABLE RIGHT ONE VIEW   11/20/2020 10:00 AM     HISTORY: Status post hip surgery.    COMPARISON: Intraoperative x-ray from today.      Impression    IMPRESSION: Right total hip arthroplasty in place. No evidence of  complication.    LEONARDO PEREZ MD           Most Recent Lab Results In EPIC (For Non-EPIC Providers):    Most Recent 3 CBC's:  Recent Labs   Lab " Test 11/22/20  0621 11/21/20  0702 10/21/20  1442 08/28/20  1016 02/21/20  2036 10/28/19  1029   WBC  --   --   --  5.5 7.8 6.9   HGB 9.6* 10.2* 12.6 12.0 11.8 13.7   MCV  --   --   --  96 96 92   PLT  --  245  --  337 365 381      Most Recent 3 BMP's:  Recent Labs   Lab Test 11/22/20  0621 11/20/20  1129 10/21/20  1442 10/08/20  1257 08/28/20  1016 02/21/20 2036   NA  --   --   --  135 137 137   POTASSIUM  --   --  3.9 3.9 4.2 4.0   CHLORIDE  --   --   --  101 106 104   CO2  --   --   --  31 24 29   BUN  --   --   --  28 19 33*   CR  --  0.62  --  0.80 0.61 0.78   ANIONGAP  --   --   --  3 7 4   LILIA  --   --   --  9.5 9.9 9.1   *  --   --  175* 102* 128*

## 2020-11-22 NOTE — PROGRESS NOTES
"Orthopedic Surgery  11/21/2020     S: Patient voices no complaints today.       O:  Vital signs:   Blood pressure 115/62, pulse 83, temperature 98.6  F (37  C), temperature source Oral, resp. rate 16, height 1.727 m (5' 8\"), weight 80.7 kg (178 lb), SpO2 95 %, not currently breastfeeding.  Estimated body mass index is 27.06 kg/m  as calculated from the following:    Height as of this encounter: 1.727 m (5' 8\").    Weight as of this encounter: 80.7 kg (178 lb).      Intake/Output Summary (Last 24 hours) at 11/22/2020 0824  Last data filed at 11/21/2020 2230  Gross per 24 hour   Intake 550 ml   Output --   Net 550 ml       Neurovascularly intact.  Calves are negative bilaterally, both soft and nontender.  The wound is C/D/I.     A: Ms. Beltran is doing well status post Procedure(s):  Right total hip arthroplasty.     P: Continue physical therapy.   Anticoagulation, on lovenox, home on ASA   Pain management  Discharge planning, home today. Home PT order placed.      Tuhan Murdock PA-C  [phone]            "

## 2020-11-22 NOTE — PLAN OF CARE
Okay to refill.   Patient vital signs are at baseline: Yes  Patient able to ambulate as they were prior to admission or with assist devices provided by therapies during their stay:  Yes  Patient MUST void prior to discharge:  Yes  Patient able to tolerate oral intake:  Yes  Pain has adequate pain control using Oral analgesics:  Yes    Plan to discharge home today at 1330 w/sister in-law

## 2020-11-22 NOTE — PLAN OF CARE
Vitals stable and afebrile. Pain in good control using po meds. Up in chair for supper using walker and assist of one. Back to bed with walker and assist of two. Patient failed PT stairs and will need to stay tonight. [facility] answering service and charge nurse updated. Anticipate discharge tomorrow to home and sister in law will stay with pt to assist in her care.

## 2020-11-25 ENCOUNTER — TRANSFERRED RECORDS (OUTPATIENT)
Dept: HEALTH INFORMATION MANAGEMENT | Facility: CLINIC | Age: 75
End: 2020-11-25

## 2020-11-30 ENCOUNTER — TELEPHONE (OUTPATIENT)
Dept: INTERNAL MEDICINE | Facility: CLINIC | Age: 75
End: 2020-11-30

## 2020-11-30 NOTE — TELEPHONE ENCOUNTER
Patient accidentally has been taking 2 levothyroxine per day for the past 6 days. Call her back to advise at [phone] (home)

## 2020-12-03 ENCOUNTER — TRANSFERRED RECORDS (OUTPATIENT)
Dept: HEALTH INFORMATION MANAGEMENT | Facility: CLINIC | Age: 75
End: 2020-12-03

## 2020-12-16 ENCOUNTER — APPOINTMENT (OUTPATIENT)
Dept: GENERAL RADIOLOGY | Facility: CLINIC | Age: 75
End: 2020-12-16
Attending: EMERGENCY MEDICINE
Payer: MEDICARE

## 2020-12-16 ENCOUNTER — APPOINTMENT (OUTPATIENT)
Dept: CT IMAGING | Facility: CLINIC | Age: 75
End: 2020-12-16
Attending: EMERGENCY MEDICINE
Payer: MEDICARE

## 2020-12-16 ENCOUNTER — HOSPITAL ENCOUNTER (OUTPATIENT)
Facility: CLINIC | Age: 75
Setting detail: OBSERVATION
Discharge: HOME | End: 2020-12-18
Attending: EMERGENCY MEDICINE | Admitting: HOSPITALIST
Payer: MEDICARE
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
## 2020-12-16 DIAGNOSIS — S73.034A ANTERIOR DISLOCATION OF RIGHT HIP, INITIAL ENCOUNTER (H): ICD-10-CM

## 2020-12-16 LAB
ANION GAP SERPL CALCULATED.3IONS-SCNC: 6 MMOL/L (ref 3–14)
BASOPHILS # BLD AUTO: 0 10E9/L (ref 0–0.2)
BASOPHILS NFR BLD AUTO: 0.5 %
BUN SERPL-MCNC: 22 MG/DL (ref 7–30)
CALCIUM SERPL-MCNC: 9.9 MG/DL (ref 8.5–10.1)
CHLORIDE SERPL-SCNC: 103 MMOL/L (ref 94–109)
CO2 SERPL-SCNC: 29 MMOL/L (ref 20–32)
CREAT SERPL-MCNC: 0.68 MG/DL (ref 0.52–1.04)
DIFFERENTIAL METHOD BLD: ABNORMAL
EOSINOPHIL # BLD AUTO: 0.3 10E9/L (ref 0–0.7)
EOSINOPHIL NFR BLD AUTO: 4.6 %
ERYTHROCYTE [DISTWIDTH] IN BLOOD BY AUTOMATED COUNT: 13.1 % (ref 10–15)
FLUAV+FLUBV RNA SPEC QL NAA+PROBE: NEGATIVE
FLUAV+FLUBV RNA SPEC QL NAA+PROBE: NEGATIVE
GFR SERPL CREATININE-BSD FRML MDRD: 85 ML/MIN/{1.73_M2}
GLUCOSE SERPL-MCNC: 117 MG/DL (ref 70–99)
HCT VFR BLD AUTO: 32.4 % (ref 35–47)
HGB BLD-MCNC: 10 G/DL (ref 11.7–15.7)
IMM GRANULOCYTES # BLD: 0 10E9/L (ref 0–0.4)
IMM GRANULOCYTES NFR BLD: 0.3 %
LABORATORY COMMENT REPORT: NORMAL
LYMPHOCYTES # BLD AUTO: 1.4 10E9/L (ref 0.8–5.3)
LYMPHOCYTES NFR BLD AUTO: 19.4 %
MCH RBC QN AUTO: 29.5 PG (ref 26.5–33)
MCHC RBC AUTO-ENTMCNC: 30.9 G/DL (ref 31.5–36.5)
MCV RBC AUTO: 96 FL (ref 78–100)
MONOCYTES # BLD AUTO: 1 10E9/L (ref 0–1.3)
MONOCYTES NFR BLD AUTO: 13.4 %
NEUTROPHILS # BLD AUTO: 4.6 10E9/L (ref 1.6–8.3)
NEUTROPHILS NFR BLD AUTO: 61.8 %
NRBC # BLD AUTO: 0 10*3/UL
NRBC BLD AUTO-RTO: 0 /100
PLATELET # BLD AUTO: 553 10E9/L (ref 150–450)
POTASSIUM SERPL-SCNC: 3.6 MMOL/L (ref 3.4–5.3)
RBC # BLD AUTO: 3.39 10E12/L (ref 3.8–5.2)
RSV RNA SPEC QL NAA+PROBE: NEGATIVE
SARS-COV-2 RNA SPEC QL NAA+PROBE: NEGATIVE
SODIUM SERPL-SCNC: 138 MMOL/L (ref 133–144)
SPECIMEN SOURCE: NORMAL
WBC # BLD AUTO: 7.4 10E9/L (ref 4–11)

## 2020-12-16 PROCEDURE — 87636 SARSCOV2 & INF A&B AMP PRB: CPT | Performed by: EMERGENCY MEDICINE

## 2020-12-16 PROCEDURE — 99219 PR INITIAL OBSERVATION CARE,LEVEL II: CPT | Performed by: HOSPITALIST

## 2020-12-16 PROCEDURE — 96376 TX/PRO/DX INJ SAME DRUG ADON: CPT | Mod: 59

## 2020-12-16 PROCEDURE — 999N000065 XR PELVIS PORT 1/2 VW

## 2020-12-16 PROCEDURE — 99152 MOD SED SAME PHYS/QHP 5/>YRS: CPT

## 2020-12-16 PROCEDURE — 250N000013 HC RX MED GY IP 250 OP 250 PS 637: Mod: GY | Performed by: HOSPITALIST

## 2020-12-16 PROCEDURE — 73502 X-RAY EXAM HIP UNI 2-3 VIEWS: CPT

## 2020-12-16 PROCEDURE — G0378 HOSPITAL OBSERVATION PER HR: HCPCS

## 2020-12-16 PROCEDURE — 36415 COLL VENOUS BLD VENIPUNCTURE: CPT | Performed by: HOSPITALIST

## 2020-12-16 PROCEDURE — 99153 MOD SED SAME PHYS/QHP EA: CPT

## 2020-12-16 PROCEDURE — 96374 THER/PROPH/DIAG INJ IV PUSH: CPT

## 2020-12-16 PROCEDURE — 29505 APPLICATION LONG LEG SPLINT: CPT | Mod: RT

## 2020-12-16 PROCEDURE — 80048 BASIC METABOLIC PNL TOTAL CA: CPT | Performed by: EMERGENCY MEDICINE

## 2020-12-16 PROCEDURE — 999N000157 HC STATISTIC RCP TIME EA 10 MIN

## 2020-12-16 PROCEDURE — 250N000011 HC RX IP 250 OP 636: Performed by: EMERGENCY MEDICINE

## 2020-12-16 PROCEDURE — C9803 HOPD COVID-19 SPEC COLLECT: HCPCS

## 2020-12-16 PROCEDURE — 70450 CT HEAD/BRAIN W/O DYE: CPT

## 2020-12-16 PROCEDURE — 99285 EMERGENCY DEPT VISIT HI MDM: CPT | Mod: 25

## 2020-12-16 PROCEDURE — 85025 COMPLETE CBC W/AUTO DIFF WBC: CPT | Performed by: EMERGENCY MEDICINE

## 2020-12-16 RX ORDER — POLYETHYLENE GLYCOL 3350 17 G/17G
17 POWDER, FOR SOLUTION ORAL DAILY
Status: DISCONTINUED | OUTPATIENT
Start: 2020-12-17 | End: 2020-12-18 | Stop reason: HOSPADM

## 2020-12-16 RX ORDER — METOPROLOL TARTRATE 25 MG/1
25 TABLET, FILM COATED ORAL 2 TIMES DAILY
Status: DISCONTINUED | OUTPATIENT
Start: 2020-12-16 | End: 2020-12-18 | Stop reason: HOSPADM

## 2020-12-16 RX ORDER — ONDANSETRON 2 MG/ML
4 INJECTION INTRAMUSCULAR; INTRAVENOUS EVERY 6 HOURS PRN
Status: DISCONTINUED | OUTPATIENT
Start: 2020-12-16 | End: 2020-12-18 | Stop reason: HOSPADM

## 2020-12-16 RX ORDER — NALOXONE HYDROCHLORIDE 0.4 MG/ML
0.4 INJECTION, SOLUTION INTRAMUSCULAR; INTRAVENOUS; SUBCUTANEOUS
Status: DISCONTINUED | OUTPATIENT
Start: 2020-12-16 | End: 2020-12-18 | Stop reason: HOSPADM

## 2020-12-16 RX ORDER — HYDROMORPHONE HYDROCHLORIDE 1 MG/ML
0.2 INJECTION, SOLUTION INTRAMUSCULAR; INTRAVENOUS; SUBCUTANEOUS
Status: DISCONTINUED | OUTPATIENT
Start: 2020-12-16 | End: 2020-12-16

## 2020-12-16 RX ORDER — NALOXONE HYDROCHLORIDE 0.4 MG/ML
0.2 INJECTION, SOLUTION INTRAMUSCULAR; INTRAVENOUS; SUBCUTANEOUS
Status: DISCONTINUED | OUTPATIENT
Start: 2020-12-16 | End: 2020-12-18 | Stop reason: HOSPADM

## 2020-12-16 RX ORDER — POLYETHYLENE GLYCOL 3350 17 G/17G
17 POWDER, FOR SOLUTION ORAL DAILY PRN
Status: DISCONTINUED | OUTPATIENT
Start: 2020-12-16 | End: 2020-12-18 | Stop reason: HOSPADM

## 2020-12-16 RX ORDER — FAMOTIDINE 10 MG
10 TABLET ORAL 2 TIMES DAILY
Status: DISCONTINUED | OUTPATIENT
Start: 2020-12-16 | End: 2020-12-18 | Stop reason: HOSPADM

## 2020-12-16 RX ORDER — FUROSEMIDE 20 MG
20 TABLET ORAL DAILY
Status: DISCONTINUED | OUTPATIENT
Start: 2020-12-17 | End: 2020-12-18 | Stop reason: HOSPADM

## 2020-12-16 RX ORDER — ALENDRONATE SODIUM 70 MG/1
70 TABLET ORAL
COMMUNITY
End: 2021-01-25

## 2020-12-16 RX ORDER — AMOXICILLIN 250 MG
1-2 CAPSULE ORAL 2 TIMES DAILY
Status: DISCONTINUED | OUTPATIENT
Start: 2020-12-16 | End: 2020-12-16

## 2020-12-16 RX ORDER — PRAVASTATIN SODIUM 20 MG
40 TABLET ORAL AT BEDTIME
Status: DISCONTINUED | OUTPATIENT
Start: 2020-12-16 | End: 2020-12-18 | Stop reason: HOSPADM

## 2020-12-16 RX ORDER — ONDANSETRON 4 MG/1
4 TABLET, ORALLY DISINTEGRATING ORAL EVERY 6 HOURS PRN
Status: DISCONTINUED | OUTPATIENT
Start: 2020-12-16 | End: 2020-12-18 | Stop reason: HOSPADM

## 2020-12-16 RX ORDER — PROPOFOL 10 MG/ML
0.25 INJECTION, EMULSION INTRAVENOUS
Status: DISCONTINUED | OUTPATIENT
Start: 2020-12-16 | End: 2020-12-17

## 2020-12-16 RX ORDER — OXYCODONE HYDROCHLORIDE 5 MG/1
5-10 TABLET ORAL
Status: DISCONTINUED | OUTPATIENT
Start: 2020-12-16 | End: 2020-12-18 | Stop reason: HOSPADM

## 2020-12-16 RX ORDER — OXYCODONE HYDROCHLORIDE 5 MG/1
5 TABLET ORAL EVERY 6 HOURS PRN
COMMUNITY
End: 2021-10-08

## 2020-12-16 RX ORDER — HYDROMORPHONE HYDROCHLORIDE 1 MG/ML
0.5 INJECTION, SOLUTION INTRAMUSCULAR; INTRAVENOUS; SUBCUTANEOUS ONCE
Status: COMPLETED | OUTPATIENT
Start: 2020-12-16 | End: 2020-12-16

## 2020-12-16 RX ORDER — LEVOTHYROXINE SODIUM 88 UG/1
88 TABLET ORAL DAILY
Status: DISCONTINUED | OUTPATIENT
Start: 2020-12-17 | End: 2020-12-18 | Stop reason: HOSPADM

## 2020-12-16 RX ORDER — BISACODYL 10 MG
10 SUPPOSITORY, RECTAL RECTAL DAILY PRN
Status: DISCONTINUED | OUTPATIENT
Start: 2020-12-16 | End: 2020-12-18 | Stop reason: HOSPADM

## 2020-12-16 RX ORDER — HYDROMORPHONE HYDROCHLORIDE 1 MG/ML
0.2 INJECTION, SOLUTION INTRAMUSCULAR; INTRAVENOUS; SUBCUTANEOUS
Status: DISCONTINUED | OUTPATIENT
Start: 2020-12-16 | End: 2020-12-18 | Stop reason: HOSPADM

## 2020-12-16 RX ORDER — ACETAMINOPHEN 325 MG/1
650 TABLET ORAL EVERY 4 HOURS PRN
Status: DISCONTINUED | OUTPATIENT
Start: 2020-12-16 | End: 2020-12-18 | Stop reason: HOSPADM

## 2020-12-16 RX ORDER — HYDROCHLOROTHIAZIDE 25 MG/1
25 TABLET ORAL DAILY
Status: DISCONTINUED | OUTPATIENT
Start: 2020-12-17 | End: 2020-12-18 | Stop reason: HOSPADM

## 2020-12-16 RX ORDER — ACETAMINOPHEN 650 MG/1
650 SUPPOSITORY RECTAL EVERY 4 HOURS PRN
Status: DISCONTINUED | OUTPATIENT
Start: 2020-12-16 | End: 2020-12-18 | Stop reason: HOSPADM

## 2020-12-16 RX ORDER — AMOXICILLIN 250 MG
2 CAPSULE ORAL 2 TIMES DAILY
Status: DISCONTINUED | OUTPATIENT
Start: 2020-12-16 | End: 2020-12-18 | Stop reason: HOSPADM

## 2020-12-16 RX ORDER — AMOXICILLIN 250 MG
1 CAPSULE ORAL 2 TIMES DAILY
Status: DISCONTINUED | OUTPATIENT
Start: 2020-12-16 | End: 2020-12-18 | Stop reason: HOSPADM

## 2020-12-16 RX ORDER — PROPOFOL 10 MG/ML
0.5 INJECTION, EMULSION INTRAVENOUS
Status: COMPLETED | OUTPATIENT
Start: 2020-12-16 | End: 2020-12-16

## 2020-12-16 RX ADMIN — PROPOFOL 20 MG: 10 INJECTION, EMULSION INTRAVENOUS at 17:40

## 2020-12-16 RX ADMIN — DOCUSATE SODIUM AND SENNOSIDES 1 TABLET: 8.6; 5 TABLET ORAL at 21:34

## 2020-12-16 RX ADMIN — PROPOFOL 30 MG: 10 INJECTION, EMULSION INTRAVENOUS at 17:43

## 2020-12-16 RX ADMIN — HYDROMORPHONE HYDROCHLORIDE 0.5 MG: 1 INJECTION, SOLUTION INTRAMUSCULAR; INTRAVENOUS; SUBCUTANEOUS at 16:02

## 2020-12-16 RX ADMIN — PRAVASTATIN SODIUM 40 MG: 20 TABLET ORAL at 21:33

## 2020-12-16 RX ADMIN — PROPOFOL 60 MG: 10 INJECTION, EMULSION INTRAVENOUS at 17:34

## 2020-12-16 RX ADMIN — PROPOFOL 60 MG: 10 INJECTION, EMULSION INTRAVENOUS at 17:38

## 2020-12-16 RX ADMIN — ASPIRIN 325 MG: 325 TABLET, COATED ORAL at 21:34

## 2020-12-16 RX ADMIN — METOPROLOL TARTRATE 25 MG: 25 TABLET, FILM COATED ORAL at 21:34

## 2020-12-16 RX ADMIN — HYDROMORPHONE HYDROCHLORIDE 0.2 MG: 1 INJECTION, SOLUTION INTRAMUSCULAR; INTRAVENOUS; SUBCUTANEOUS at 19:44

## 2020-12-16 RX ADMIN — PROPOFOL 40 MG: 10 INJECTION, EMULSION INTRAVENOUS at 17:31

## 2020-12-16 RX ADMIN — FAMOTIDINE 10 MG: 10 TABLET, FILM COATED ORAL at 21:34

## 2020-12-16 ASSESSMENT — ENCOUNTER SYMPTOMS
JOINT SWELLING: 1
NUMBNESS: 0

## 2020-12-16 NOTE — ED TRIAGE NOTES
Presents via EMS after possible hip dislocation while bending over. Status post total hip replacement on 11/20/2020. Pt did fall after pain started and hit head. No LOC, pt not on blood thinners. Reports 10/10 pain. Received 200 mg fentanyl in route from EMS in 4 separate doses. Pt continues to report 10/10 pain.

## 2020-12-16 NOTE — ED NOTES
Bed: ED32  Expected date: 12/16/20  Expected time: 3:28 PM  Means of arrival: Ambulance  Comments:  [facility]- 74y, F, dislocated hip

## 2020-12-16 NOTE — ED PROVIDER NOTES
History   Chief Complaint:  Hip Dislocation     HPI   Teri Beltran is a 74 year old female with history of hypertension, hyperlipidemia, and osteoprosis who presents with hip dislocatio and fall. Per EMS, the patient was bending over the pick something off of the ground and both of her hips popped out. She fell immediately after and hit the back of her head. No blood thinners or loss of consciousness. She is clearly in pain. Had a total hip replacement on 11/20/2020. No numbness or tingling in the legs.     Review of Systems   Musculoskeletal: Positive for joint swelling.        Hip dislocation   Neurological: Negative for numbness.   All other systems reviewed and are negative.        Medications:  Fosamax  Aspirin 325 mg  Lasix  Hydrodiuril  Xalatan  Synthroid  Lopressor  Pravastatin  Ultram  Tenormin  Elavil    Past Medical History:    Hyperlipidemia  Hypertension  Glaucoma  Depression  GERD  Osteoporosis  Adjustment disorder with depressed mood  Hypothyroidism     Past Surgical History:    Arthroplasty hip  Arthroplasty knee  Breast surgery  Hysterectomy  Tonsillectomy  Wrist ganglion excision  Finger ganglion excision     Family History:    Ovarian cancer (Mother)  Breast cancer (Mother)  Diabetes (Father)  Cerebrovascular disease (Brother)    Social History:  Arrives to ED by herself    Physical Exam     Patient Vitals for the past 24 hrs:   BP Temp src Pulse Resp SpO2   12/16/20 1734 -- -- -- -- 100 %   12/16/20 1732 -- -- -- 16 --   12/16/20 1712 (!) 140/84 -- 84 21 100 %   12/16/20 1700 (!) 140/84 -- -- -- --   12/16/20 1600 (!) 152/90 -- 80 -- 99 %   12/16/20 1555 (!) 174/98 Oral 83 16 99 %       Physical Exam  Gen: uncomfortable appearing due to pain  HENT: no significant scalp STS, no palable bony deformity, midface stable  mmm, no rhinorrhea  Eyes: periorbital tissues and sclera normal   Neck: supple, no abnormal swelling, no midline ttp, painless ROM  Lungs:  CTAB,  no resp distress  CV: rrr,  no m/r/g, ppi  Abd: soft, nontender, nondistended, no rebound/masses/guarding/hsm  Ext: skeletal survey unremarkable except RLE, obvious deformity at hip joint with leg short and ext rotated.  Distal pt/dp intact, sensation intact, able to wiggle toes.    Skin: warm, dry, well perfused, no rashes/bruising/lesions on exposed skin  Neuro: alert, MAEE, no gross motor or sensory deficits  Psych: Normal mood, normal affect      Emergency Department Course   Imaging:  XR Hip Right 2-3 Views  Dislocated right total hip arthroplasty. The dislocation is probably anterior inferior given the location of the prosthetic  femoral head. The osseous pelvis appears intact.  Reading per radiology     Head CT w/o contrast  1. Mild cerebral atrophy with minimal nonspecific white matter changes most likely due to chronic small vessel ischemic disease.  2. No evidence for intracranial hemorrhage or any acute process.    XR Pelvis Port 1/2 Views  IMPRESSION: Status post right total hip arthroplasty. Interval reduction with normal alignment of the femoral component relative to the acetabular component. No acute fracture is identified. There is normal joint alignment  As read by Radiology.    Laboratory:  CBC: WBC 7.4, HGB 10.0 (L),  (H)  BMP: Glucose 117 (H) o/w WNL (Creatinine 0.68)  Asymptomatic Influenza A/B & SARS-CoV2 Virus PCR Multiplex: pending     [facility]    -Dislocation - Lower Extremity    Date/Time: 12/16/2020 5:08 PM  Performed by: Maco Campos MD  Authorized by: Maco Campos MD     ED EVALUATION:      Assessment Time: 12/16/2020 5:08 PM      I have performed an Emergency Department Evaluation including taking a history and physical examination, this evaluation will be documented in the electronic medical record for this ED encounter.     Indication: Dislocation     ASA Class: Class 2- mild systemic disease, no acute problems, no functional limitations    Mallampati: Grade  2- soft palate, base of uvula, tonsillar pillars, and portion of posterior pharyngeal wall visible    NPO Status: not NPO, emergent situation  UNIVERSAL PROTOCOL   Site Marked: NA  Prior Images Obtained and Reviewed:  Yes  Required items: Required blood products, implants, devices and special equipment available    Patient identity confirmed:  Verbally with patient, provided demographic data, arm band and hospital-assigned identification number  Patient was reevaluated immediately before administering moderate or deep sedation or anesthesia  Confirmation Checklist:  Patient's identity using two indicators  Time out: Immediately prior to the procedure a time out was called    Universal Protocol: the Joint Commission Universal Protocol was followed          LOCATION     Location:  Hip    Hip injury location:  R hip    Hip dislocation type: anterior      Etiology: spontaneous      Prosthesis: yes      PRE PROCEDURE DETAILS:     Distal perfusion: normal      Range of motion: normal      SEDATION    Patient Sedated: Yes    Sedation Type:  Moderate (conscious) sedation  Sedation:  Propofol  Vital signs: Vital signs monitored during sedation      PROCEDURE DETAILS      Manipulation performed: yes      Hip reduction method:  Direct traction and external rotation    Reduction successful: yes      Reduction confirmed with imaging: yes      Immobilization:  Brace    Supplies used:  Prefabricated splint    POST PROCEDURE DETAILS      Neurological function: normal      Distal perfusion: normal      PROCEDURE   Patient Tolerance:  Patient tolerated the procedure well with no immediate complications    Length of time physician/provider present for 1:1 monitoring during sedation: 20        Emergency Department Course:    Reviewed:  1600 I reviewed the patient's nursing notes, vitals, past medical records, Care Everywhere.     Assessments:  1600 I performed an exam of the patient as documented above.     Consults:   1801 I spoke  with Dr. Glez of orthopedics at [facility] to discuss the patient's symptoms and treatment.     : I discussed the patient with Dr. Moreno, of hospitalist.     Interventions:  1602 Dilaudid 0.5 mg IV  1731 Diprivan 40 mg IV  1734 Diprivan 60 mg IV  1738 Diprivan 60 mg IV  1740 Diprivan 20 mg IV  1743 Diprivan 30 mg IV    Disposition:  The patient was admitted to the hospital under the care of Dr. Bowers.     Impression & Plan   Covid-19  Teri Beltran was evaluated during a global COVID-19 pandemic, which necessitated consideration that the patient might be at risk for infection with the SARS-CoV-2 virus that causes COVID-19.   Applicable protocols for evaluation were followed during the patient's care.   COVID-19 was considered as part of the patient's evaluation. The plan for testing is:  a test was obtained during this visit.     Medical Decision Makin y F 3 weeks s/p R TRACIE here with dislocation without assoc fracture.  States she hit here head, CT head negative and skeletal survey otherwise unremarkable.  No concern for underlying medical etiology provoking fall.     R hip reduced under procedural sedation.  Distal cms intact after procedure.  Placed in knee immobilizer, but unfortunately unable to safely mobilize (currently living at home alone in [facility] level home).  Will admit to OBSV until sx control and safe mobilization.      Diagnosis:    ICD-10-CM    1. Anterior dislocation of right hip, initial encounter (H)  S73.034A          Scribe Disclosure:  I, Lonnie Strange, am serving as a scribe at 3:57 PM on 2020 to document services personally performed by Maco Campos MD based on my observations and the provider's statements to me.      Maco Campos MD  [phone]

## 2020-12-17 ENCOUNTER — APPOINTMENT (OUTPATIENT)
Dept: PHYSICAL THERAPY | Facility: CLINIC | Age: 75
End: 2020-12-17
Attending: HOSPITALIST
Payer: MEDICARE

## 2020-12-17 LAB
ANION GAP SERPL CALCULATED.3IONS-SCNC: 3 MMOL/L (ref 3–14)
BUN SERPL-MCNC: 16 MG/DL (ref 7–30)
CALCIUM SERPL-MCNC: 8.2 MG/DL (ref 8.5–10.1)
CHLORIDE SERPL-SCNC: 105 MMOL/L (ref 94–109)
CO2 SERPL-SCNC: 30 MMOL/L (ref 20–32)
CREAT SERPL-MCNC: 0.57 MG/DL (ref 0.52–1.04)
ERYTHROCYTE [DISTWIDTH] IN BLOOD BY AUTOMATED COUNT: 13.1 % (ref 10–15)
GFR SERPL CREATININE-BSD FRML MDRD: 90 ML/MIN/{1.73_M2}
GLUCOSE SERPL-MCNC: 99 MG/DL (ref 70–99)
HCT VFR BLD AUTO: 27.5 % (ref 35–47)
HGB BLD-MCNC: 8.6 G/DL (ref 11.7–15.7)
MCH RBC QN AUTO: 29.6 PG (ref 26.5–33)
MCHC RBC AUTO-ENTMCNC: 31.3 G/DL (ref 31.5–36.5)
MCV RBC AUTO: 95 FL (ref 78–100)
PLATELET # BLD AUTO: 409 10E9/L (ref 150–450)
POTASSIUM SERPL-SCNC: 3.2 MMOL/L (ref 3.4–5.3)
POTASSIUM SERPL-SCNC: 3.2 MMOL/L (ref 3.4–5.3)
POTASSIUM SERPL-SCNC: 3.6 MMOL/L (ref 3.4–5.3)
RBC # BLD AUTO: 2.91 10E12/L (ref 3.8–5.2)
SODIUM SERPL-SCNC: 138 MMOL/L (ref 133–144)
WBC # BLD AUTO: 6.4 10E9/L (ref 4–11)

## 2020-12-17 PROCEDURE — 97161 PT EVAL LOW COMPLEX 20 MIN: CPT | Mod: GP | Performed by: PHYSICAL THERAPIST

## 2020-12-17 PROCEDURE — 85027 COMPLETE CBC AUTOMATED: CPT | Performed by: HOSPITALIST

## 2020-12-17 PROCEDURE — 80048 BASIC METABOLIC PNL TOTAL CA: CPT | Performed by: HOSPITALIST

## 2020-12-17 PROCEDURE — 97116 GAIT TRAINING THERAPY: CPT | Mod: GP | Performed by: PHYSICAL THERAPIST

## 2020-12-17 PROCEDURE — 99225 PR SUBSEQUENT OBSERVATION CARE,LEVEL II: CPT | Performed by: INTERNAL MEDICINE

## 2020-12-17 PROCEDURE — 258N000003 HC RX IP 258 OP 636: Performed by: INTERNAL MEDICINE

## 2020-12-17 PROCEDURE — 250N000013 HC RX MED GY IP 250 OP 250 PS 637: Mod: GY | Performed by: INTERNAL MEDICINE

## 2020-12-17 PROCEDURE — G0378 HOSPITAL OBSERVATION PER HR: HCPCS

## 2020-12-17 PROCEDURE — 250N000013 HC RX MED GY IP 250 OP 250 PS 637: Mod: GY | Performed by: HOSPITALIST

## 2020-12-17 PROCEDURE — 84132 ASSAY OF SERUM POTASSIUM: CPT | Mod: 91 | Performed by: INTERNAL MEDICINE

## 2020-12-17 PROCEDURE — 250N000013 HC RX MED GY IP 250 OP 250 PS 637: Performed by: INTERNAL MEDICINE

## 2020-12-17 PROCEDURE — 99207 PR CDG-CODE CATEGORY CHANGED: CPT | Performed by: INTERNAL MEDICINE

## 2020-12-17 PROCEDURE — 36415 COLL VENOUS BLD VENIPUNCTURE: CPT | Performed by: INTERNAL MEDICINE

## 2020-12-17 PROCEDURE — 97110 THERAPEUTIC EXERCISES: CPT | Mod: GP | Performed by: PHYSICAL THERAPIST

## 2020-12-17 PROCEDURE — 36415 COLL VENOUS BLD VENIPUNCTURE: CPT | Performed by: HOSPITALIST

## 2020-12-17 RX ORDER — POTASSIUM CHLORIDE 1500 MG/1
40 TABLET, EXTENDED RELEASE ORAL ONCE
Status: COMPLETED | OUTPATIENT
Start: 2020-12-17 | End: 2020-12-17

## 2020-12-17 RX ADMIN — OXYCODONE HYDROCHLORIDE 5 MG: 5 TABLET ORAL at 02:53

## 2020-12-17 RX ADMIN — ASPIRIN 325 MG: 325 TABLET, COATED ORAL at 20:44

## 2020-12-17 RX ADMIN — HYDROCHLOROTHIAZIDE 25 MG: 25 TABLET ORAL at 08:21

## 2020-12-17 RX ADMIN — FAMOTIDINE 10 MG: 10 TABLET, FILM COATED ORAL at 20:45

## 2020-12-17 RX ADMIN — OXYCODONE HYDROCHLORIDE 5 MG: 5 TABLET ORAL at 22:26

## 2020-12-17 RX ADMIN — ASPIRIN 325 MG: 325 TABLET, COATED ORAL at 08:22

## 2020-12-17 RX ADMIN — METOPROLOL TARTRATE 25 MG: 25 TABLET, FILM COATED ORAL at 20:44

## 2020-12-17 RX ADMIN — POTASSIUM CHLORIDE 40 MEQ: 1500 TABLET, EXTENDED RELEASE ORAL at 08:21

## 2020-12-17 RX ADMIN — DOCUSATE SODIUM AND SENNOSIDES 1 TABLET: 8.6; 5 TABLET ORAL at 08:21

## 2020-12-17 RX ADMIN — PRAVASTATIN SODIUM 40 MG: 20 TABLET ORAL at 20:45

## 2020-12-17 RX ADMIN — FAMOTIDINE 10 MG: 10 TABLET, FILM COATED ORAL at 08:21

## 2020-12-17 RX ADMIN — SODIUM CHLORIDE 500 ML: 9 INJECTION, SOLUTION INTRAVENOUS at 09:17

## 2020-12-17 RX ADMIN — LEVOTHYROXINE SODIUM 88 MCG: 0.09 TABLET ORAL at 08:21

## 2020-12-17 RX ADMIN — OXYCODONE HYDROCHLORIDE 5 MG: 5 TABLET ORAL at 08:20

## 2020-12-17 RX ADMIN — OXYCODONE HYDROCHLORIDE 5 MG: 5 TABLET ORAL at 11:53

## 2020-12-17 RX ADMIN — FUROSEMIDE 20 MG: 20 TABLET ORAL at 08:21

## 2020-12-17 RX ADMIN — ACETAMINOPHEN 650 MG: 325 TABLET, FILM COATED ORAL at 02:53

## 2020-12-17 RX ADMIN — OXYCODONE HYDROCHLORIDE 5 MG: 5 TABLET ORAL at 16:48

## 2020-12-17 RX ADMIN — METOPROLOL TARTRATE 25 MG: 25 TABLET, FILM COATED ORAL at 08:21

## 2020-12-17 NOTE — PROVIDER NOTIFICATION
"Web-based page: Pt stated she felt \"foggy\" BP now 83/46 after receiving BP meds this AM. It was 120/65 beforehand. Please advise. thanks!   "

## 2020-12-17 NOTE — PLAN OF CARE
OT: Orders received. Chart reviewed and discussed with care team.  OT not indicated as pt mobilizing well, has needed DME/AE, aware of precautions.  Defer discharge recommendations to PT.  Will complete OT orders.

## 2020-12-17 NOTE — H&P
[facility]    History and Physical  Hospitalist       Date of Admission:  12/16/2020    Assessment and Plan:      Teri Beltran is a 74 year old female with history of hypertension, hyperlipidemia, and osteoprosis who presents with hip dislocatio and fall.   Her dislocation was fixed in the ED, patient unable to go home because of severe pain and has been admitted under observation.     Hip dislocation with reduction to normal alignment the ED.  -Continue pain management.  Physical therapy consulted, consider orthopedic consult in the a.m. if needed  - has knee imobilizer in place    HTN:  She is on furosemide 20 mg every day, hydrochlorothiazide 25 mg every day, and metop 25 mg bid      HLD: continue statin    GERD: continue famotidine  ---------     # Code status:  Full   # Anticipated discharge date and Disposition: in 1 day  # DVT: SCDs  # IVF: none                        Jane Moreno MD  Text Page (7am - 6pm, M-F)          Primary Care Physician   Rachel Miller    Chief Complaint       History is obtained from the patient    History of Present Illness   Teri Beltran is a 74 year old female with history of hypertension, hyperlipidemia, and osteoprosis who presents with hip dislocatio and fall.   Was at home she bent over to grab something however she had severe pain and fell backwards did not hit her head or lose conscious.  She presented to the ED in the ED x-ray showed anterior hip dislocation right which was fixed in the ED. Patient unable to walk a significant pain in her right hip area thus she has been admitted.    Past Medical History    I have reviewed this patient's medical history and updated it with pertinent information if needed.   Past Medical History:   Diagnosis Date     Complication of anesthesia     confusion after anesthesia (after hysterectomy)     First degree AV block 8/11/2015     Former smoker      Gastroesophageal reflux disease       GERD (gastroesophageal reflux disease)      Glaucoma      Heart block     2:1     Hyperlipidemia LDL goal <130 6/14/2013     Hypertension goal BP (blood pressure) < 140/90 12/3/2013     Hypothyroidism      Left atrial dilatation     mild     Mild dilation of ascending aorta - borderline 8/11/2015     Palpitations      Prediabetes 6/14/2013     RBBB 8/11/2015     S/P total knee arthroplasty 7/23/2019     Tachycardia      Varicose veins      Venous insufficiency     s/p bilateral great saphenous vein radiofrequency ablation by Dr. Garcia       Past Surgical History   I have reviewed this patient's surgical history and updated it with pertinent information if needed.  Past Surgical History:   Procedure Laterality Date     ARTHROPLASTY HIP Right 11/20/2020    Procedure: Right total hip arthroplasty using a Biomet Taperloc femoral stem and G7 acetabulum.;  Surgeon: Dragan Muse MD;  Location: [facility] OR     ARTHROPLASTY KNEE Right 7/23/2019    Procedure: Right total knee arthroplasty using an Arthrex iBalance knee system;  Surgeon: Dragan Muse MD;  Location: [facility] OR     BREAST SURGERY      right breast ductal surgery due to milk production     COLONOSCOPY N/A 10/24/2014    no further screening. Procedure: COLONOSCOPY;  Surgeon: Pedro Rudd MD;  Location:  GI     HYSTERECTOMY, PAP NO LONGER INDICATED  2009    total hysterectomy and ovaries. benign     SOFT TISSUE SURGERY      ganglion removed from left wrist and ring finger     SURGICAL HISTORY OF -   age 7    tonsillectomy     SURGICAL HISTORY OF -   1/15    left eye cataract     SURGICAL HISTORY OF -   Fall 2016    LINQ placed.       Prior to Admission Medications   Prior to Admission Medications   Prescriptions Last Dose Informant Patient Reported? Taking?   Acetaminophen (TYLENOL PO)   Yes No   Sig: Take 500-1,000 mg by mouth every 6 hours as needed for mild pain    Multiple Vitamins-Minerals (MULTIVITAMIN ADULT PO)   Yes No   Sig: Take  1 tablet by mouth daily   acetaminophen (TYLENOL) 325 MG tablet   No No   Sig: Take 2 tablets (650 mg) by mouth every 4 hours as needed for other (mild pain)   alendronate (FOSAMAX) 70 MG tablet   No No   Sig: TAKE 1 TABLET(70 MG) BY MOUTH EVERY 7 DAYS   aspirin (ASA) 325 MG EC tablet   No No   Sig: Take 1 tablet (325 mg) by mouth 2 times daily   dorzolamide-timolol (COSOPT) 2-0.5 % ophthalmic solution   Yes No   Sig: Place 1 drop into both eyes 2 times daily   famotidine (PEPCID) 10 MG tablet   No No   Sig: Take 1 tablet (10 mg) by mouth 2 times daily   fluticasone (FLONASE) 50 MCG/ACT nasal spray   No No   Sig: Spray 1-2 sprays into both nostrils daily   furosemide (LASIX) 20 MG tablet   No No   Sig: Take 1 tablet (20 mg) by mouth daily   hydrochlorothiazide (HYDRODIURIL) 25 MG tablet   No No   Sig: Take 1 tablet (25 mg) by mouth daily   ibuprofen (ADVIL/MOTRIN) 600 MG tablet   No No   Sig: Take 1 tablet (600 mg) by mouth every 6 hours as needed for moderate pain   latanoprost (XALATAN) 0.005 % ophthalmic solution   Yes No   Sig: Place 1 drop into the right eye At Bedtime    levothyroxine (SYNTHROID/LEVOTHROID) 88 MCG tablet   No No   Sig: Take 1 tablet (88 mcg) by mouth daily   metoprolol tartrate (LOPRESSOR) 25 MG tablet   No No   Sig: Take 1 tablet (25 mg) by mouth 2 times daily   oxyCODONE (ROXICODONE) 5 MG tablet   No No   Sig: Take 1-2 tablets (5-10 mg) by mouth every 3 hours as needed for pain (Moderate to Severe)   potassium chloride ER (KLOR-CON M) 10 MEQ CR tablet   No No   Sig: Take 1 tablet (10 mEq) by mouth daily   pravastatin (PRAVACHOL) 40 MG tablet   No No   Sig: Take 1 tablet (40 mg) by mouth At Bedtime   senna-docusate (SENOKOT-S/PERICOLACE) 8.6-50 MG tablet   No No   Sig: Take 1-2 tablets by mouth 2 times daily Take while on oral narcotics to prevent or treat constipation.   traMADol (ULTRAM) 50 MG tablet   No No   Sig: Take 1 tablet (50 mg) by mouth 3 times daily as needed for severe pain       Facility-Administered Medications: None     Allergies   Allergies   Allergen Reactions     Seasonal Allergies        Social History   I have reviewed this patient's social history and updated it with pertinent information if needed. Teri Beltran  reports that she quit smoking about 40 years ago. Her smoking use included cigarettes. She has a 9.00 pack-year smoking history. She has never used smokeless tobacco. She reports current alcohol use. She reports that she does not use drugs.    Family History   Family history reviewed with patient and is noncontributory.    ROS  A 12 point review of system discussed with patient negative as per HPI       Physical Exam     Temp src: Oral BP: 122/67 Pulse: 94   Resp: 17 SpO2: 100 % O2 Device: Nasal cannula Oxygen Delivery: 3 LPM  Vital Signs with Ranges  Pulse:  [80-97] 94  Resp:  [14-21] 17  BP: (107-174)/() 122/67  SpO2:  [92 %-100 %] 100 %  0 lbs 0 oz    General: Pt in NAD, normal appearance  HEENT: PERRLA, EOMI, normocephalic / atraumatic no cervical LAD, no bruit, no pallor, WNL oropharynx, neck supple  Cardiac: +S1, S2, RRR, no MRG, no edema  Lungs: Clear to Auscultation Bilateral, normal breathing without accessory muscle usage, no wheezing, rhonchi or crackles  GI: soft NT/ND +bowel sounds all quadrants, no hepatosplenomegaly  Psych: normal mood and affect, A&Ox3  Neurological: A&O x3, non focal  Skin: warm, dry, normal turgor, no rash    Data   Data reviewed today:  I personally reviewed no images or EKG's today.  Recent Labs   Lab 12/16/20  1605   WBC 7.4   HGB 10.0*   MCV 96   *      POTASSIUM 3.6   CHLORIDE 103   CO2 29   BUN 22   CR 0.68   ANIONGAP 6   LILIA 9.9   *       Recent Results (from the past 24 hour(s))   Head CT w/o contrast    Narrative    CT SCAN OF THE HEAD WITHOUT CONTRAST   12/16/2020 4:30 PM     HISTORY: Fall    TECHNIQUE: Axial images of the head and coronal reformations without  IV contrast material. Radiation  dose for this scan was reduced using  automated exposure control, adjustment of the mA and/or kV according  to patient size, or iterative reconstruction technique.    COMPARISON: None.    FINDINGS: There is some mild cerebral atrophy. Minimal nonspecific  white matter changes are present without mass effect. There is no  evidence for intracranial hemorrhage, mass effect, acute infarct, or  skull fracture. Visualized paranasal sinuses and mastoid air cells are  clear.      Impression    IMPRESSION:  1. Mild cerebral atrophy with minimal nonspecific white matter changes  most likely due to chronic small vessel ischemic disease.  2. No evidence for intracranial hemorrhage or any acute process.    CHRISTINA OLSON MD   XR Hip Right 2-3 Views    Narrative    XR HIP RIGHT 2-3 VIEWS 12/16/2020 4:40 PM     HISTORY: fall, pain, 3 weeks post op R TRACIE      Impression    IMPRESSION: Dislocated right total hip arthroplasty. The dislocation  is probably anterior inferior given the location of the prosthetic  femoral head. The osseous pelvis appears intact.    FRANCO SUNG MD   XR Pelvis Port 1/2 Views    Narrative    EXAM: XR PELVIS PORT 1/2 VW  LOCATION: [facility]  DATE/TIME: 12/16/2020 5:48 PM    INDICATION: Post reduction  COMPARISON: Earlier today      Impression    IMPRESSION: Status post right total hip arthroplasty. Interval reduction with normal alignment of the femoral component relative to the acetabular component. No acute fracture is identified. There is normal joint alignment.

## 2020-12-17 NOTE — PLAN OF CARE
Physical Therapy Discharge Summary    Reason for therapy discharge:    All goals and outcomes met, no further needs identified.    Progress towards therapy goal(s). See goals on Care Plan in [facility] electronic health record for goal details.  Goals met    Therapy recommendation(s):    No further therapy is recommended.  Continue home exercise program.

## 2020-12-17 NOTE — PROGRESS NOTES
12/17/20 1600   Quick Adds   Type of Visit Initial PT Evaluation   Living Environment   People in home alone   Current Living Arrangements house  (split entery)   Home Accessibility stairs to enter home   Number of Stairs, Main Entrance 4   Stair Railings, Main Entrance railings on both sides of stairs   Living Environment Comments Chair lift available to main level; walk in tub; all needs met on main level. Laundry on lower level.    Self-Care   Usual Activity Tolerance good   Current Activity Tolerance moderate   Equipment Currently Used at Home raised toilet seat;shower chair;grab bar, toilet;grab bar, tub/shower  (owns a cane & FWW)   Activity/Exercise/Self-Care Comment At baseline patient is IND with functional mobility & ADLS. Recently prior to surgery began using FWW due to feeling unsteady.   Disability/Function   Fall history within last six months yes   Number of times patient has fallen within last six months 1  (R foot got caught)   General Information   Onset of Illness/Injury or Date of Surgery 12/16/20   Referring Physician Dragan Muse MD   Patient/Family Therapy Goals Statement (PT) Return home, cont with HHPT   Pertinent History of Current Problem (include personal factors and/or comorbidities that impact the POC) Pt is a 73yo F with a history of htn/hlp, and osteoporosis admitted on 12/16/2020 with hip dislocations after a fall.  Of note she had a right TRACIE 11/20/20   Existing Precautions/Restrictions fall   Weight-Bearing Status - LLE full weight-bearing   Weight-Bearing Status - RLE weight-bearing as tolerated  (no hip precautions per surgical proceedure)   Cognition   Orientation Status (Cognition) oriented x 4   Affect/Mental Status (Cognition) WFL   Follows Commands (Cognition) WFL   Pain Assessment   Patient Currently in Pain   (6/10 R hip)   Integumentary/Edema   Integumentary/Edema Comments R hip incision covered with dressing   Posture    Posture Forward head  position;Protracted shoulders   Range of Motion (ROM)   ROM Comment limited R hip ROM; otherwise B LE WFL   Strength   Strength Comments limited R hip strength & weak R DF; otherwise B LE WFL   Bed Mobility   Bed Mobility supine-sit   Supine-Sit Bollinger (Bed Mobility) supervision   Comment (Bed Mobility) supine <> EOB with SBA   Transfers   Transfers sit-stand transfer   Transfer Safety Comments sit <> stand with FWW with SBA   Sit-Stand Transfer   Sit-Stand [place]linger (Transfers) supervision   Assistive Device (Sit-Stand Transfers) walker, front-wheeled   Gait/Stairs (Locomotion)   Bollinger Level (Gait) supervision   Assistive Device (Gait) walker, front-wheeled   Negotiation (Stairs) other (see comments)  (See Tx flowsheet)   Comment (Gait/Stairs) See Tx flowsheet   Clinical Impression   Criteria for Skilled Therapeutic Intervention yes, treatment indicated   PT Diagnosis (PT) impaired functional mobility   Influenced by the following impairments pain, R hip weakness/decreased ROM; R drop foot; decreased activity tolerance   Functional limitations due to impairments bed mobility, transfers, ambulation,stairs   Clinical Presentation Stable/Uncomplicated   Clinical Presentation Rationale PMH, current presentation, clinical judgement   Clinical Decision Making (Complexity) low complexity   Therapy Frequency (PT) Evaluation only   Predicted Duration of Therapy Intervention (days/wks) 3 days   Anticipated Equipment Needs at Discharge (PT)   (None - pt owns DME from prev sx)   Risk & Benefits of therapy have been explained participants voiced agreement with care plan;participants included;patient;evaluation/treatment results reviewed   PT Discharge Planning    PT Discharge Recommendation (DC Rec) home with home care physical therapy   PT Rationale for DC Rec Pt mobilizing well, performing supine<>sit, sit<>stand, and ambulation with 2WW with no physical assistance, no LOB, steady throughout. Pt reporting she  looks forward to [place] home and does not have concerns, looking forward to continuing with previously set up HHPT. Recommend Home PT to progress fn mobility, improve quality of life, reduce risk of falls and re-hospitalization.    PT Brief overview of current status  SBA with fn mobility including stair climbing 4 steps with B railings.    Therapy Certification   Start of care date 11/20/20   Total Evaluation Time   Total Evaluation Time (Minutes) 6

## 2020-12-17 NOTE — ED NOTES
Extensive assist to a sitting position at side of bed and then again to come to a standing position with knee immobilizer. Pt tearful and reporting 10/10 pain with any position change, able to bare weight once standing but unable to steadily ambulate independently with walker.

## 2020-12-17 NOTE — PROGRESS NOTES
"PRIMARY DIAGNOSIS: \"GENERIC\" NURSING  OUTPATIENT/OBSERVATION GOALS TO BE MET BEFORE DISCHARGE:  1. ADLs back to baseline: No    2. Activity and level of assistance: Up with maximum assistance. Consider SW and/or PT evaluation.     3. Pain status: Improved-controlled with oral pain medications.    4. Return to near baseline physical activity: No     Discharge Planner Nurse   Safe discharge environment identified: No  Barriers to discharge: Yes, Used assist of 2 to bedside commode, PT to assess.       Entered by: Margie Poole 12/17/2020 3:38 AM     Please review provider order for any additional goals.   Nurse to notify provider when observation goals have been met and patient is ready for discharge.    Alert and oriented. LS clear. Assist of 2 bedside commode with a gait belt and walker. Voiding adequately. Unable to void with pure wick in place. Pain is managed with oxycodone and Tylenol.     "

## 2020-12-17 NOTE — PROGRESS NOTES
[facility]  Hospitalist Progress Note  Courtney Ng MD 12/17/2020    Reason for Stay (Diagnosis): hip dislocation          Assessment and Plan:      Summary of Stay: Teir Beltran is a 74 year old female with a history of htn/hlp, and osteoporosis admitted on 12/16/2020 with hip dislocations after a fall.  Of note she had a right TRACIE 11/20/20     She was bending over to pick something up when BOTH of her hips popped out.  She fell immediately to the ground an hit the back of her head.  Evaluation in the ER significant for Right prosthetic hip dislocation.  This was reduced in the ER but she was in too much pain and needing too much assist to return home where she lives alone.     She continues to require significant assistance and still with a fair amount of pain.     This am was feeling a bit foggy with low BPs in the setting of narcotics and typical BP medications      Problem List:   Right prosthetic hip dislocation continue with PT/pain control.  -ortho consult    Transient hypotension   2/2 BP medications and narcotics  -given 500 ml bolus with resolution  -staggered parameters set for BP meds    htn  pta regimen is furosemide 20 mg every day, metop 25 mg bid, and hydrochlorothiazide 25 mg every day  All resumed with staggered parameters in palce    hlp  Resumed statin       DVT Prophylaxis: Heparin SQ  Code Status: Full Code  Functional Status: lives alone I and uses a walker to get around  Diet: reg texture  Hess:not needed  Access    Disposition Plan   Expected discharge in 1-2 days to prior living arrangement once able to walk independently with a walker.     Entered: Courtney Ng 12/17/2020, 3:05 PM               Interval History (Subjective):      Still with fair amount of pain. No longer foggy/dizzy this afternoon.  No cp or sob.  No n/v and tolerating po without difficulty                   Physical Exam:      Last Vital Signs:  /47 (BP Location: Right arm)   Pulse 78   Temp  97.9  F (36.6  C) (Temporal)   Resp 18   Wt 79.4 kg (175 lb)   LMP  (LMP Unknown)   SpO2 100%   BMI 26.61 kg/m      Pleasant nad looks stated age head nc/at sclera clear lungs ctab nl effort rrr no mrg no le edema skin w/d no cyanosis or clubbing alert and oriented affect appropriate ramirez belly s/nt/nd           Medications:      All current medications were reviewed with changes reflected in problem list.         Data:      All new lab and imaging data was reviewed.   Labs:  Recent Labs   Lab 12/17/20  1154 12/17/20  0556 12/17/20  0556   NA  --   --  138   POTASSIUM 3.6   < > 3.2*   CHLORIDE  --   --  105   CO2  --   --  30   ANIONGAP  --   --  3   GLC  --   --  99   BUN  --   --  16   CR  --   --  0.57   GFRESTIMATED  --   --  90   GFRESTBLACK  --   --  >90   LILIA  --   --  8.2*    < > = values in this interval not displayed.     Recent Labs   Lab 12/17/20  0556   WBC 6.4   HGB 8.6*   HCT 27.5*   MCV 95         Imaging:

## 2020-12-17 NOTE — PLAN OF CARE
2042 Spoke with Dr. Faulkner, patient can remain bedrest tonight and then be weight bearing as tolerated with a knee immobilizer and a walker, PT to evaluate in the morning.        Diabetic diet copy was left with pt this afternoon. Pt was sleeping and did
not wake upon my arrival. If she has futher questions or concerns regarding
her diet please alert myself or Nancy Bradford RDN,LD.
 
[place]
[facility] Dietetic Student

## 2020-12-17 NOTE — PROGRESS NOTES
[facility]  Patient is currently open to home care services with [facility]. The patient is currently receiving PT and OT services.  [facility]  and team have been notified of patient admission.  [facility] liaison will continue to follow patient during stay.

## 2020-12-17 NOTE — PROGRESS NOTES
Respiratory Therapy  Attended conscious sedation. Ambu bag, suction, and airways set up and ready if needed. Patient was placed on ETCO2 with oxygen. Pt was able to maintain airway throughout the procedure with no intervention needed. ETCO2 levels were maintained between 35-45. Stayed until patient was awake and alert.     Alphonse Walker, RT  December 16, 2020 6:13 PM

## 2020-12-17 NOTE — ED NOTES
RECEIVING UNIT ED HANDOFF REVIEW    Above ED Nurse Handoff Report was reviewed: Yes  Reviewed by: Margie Poole RN on December 16, 2020 at 8:04 PM   [facility]  ED Nurse Handoff Report    Teri Beltran is a 74 year old female   ED Chief complaint: HIP DISLOCATION  . ED Diagnosis:   Final diagnoses:   None     Allergies:   Allergies   Allergen Reactions     Seasonal Allergies        Code Status: Full Code  Activity level - Baseline/Home:  Stand by Assist. Activity Level - Current:   Total Care. Lift room needed: No. Bariatric: No   Needed: No   Isolation: No. Infection: Not Applicable.     Vital Signs:   Vitals:    12/16/20 1830 12/16/20 1845 12/16/20 1900 12/16/20 1915   BP: 126/77 133/68     Pulse: 86 92     Resp: 14 17     TempSrc:       SpO2: 99% 100% 100% 100%       Cardiac Rhythm:  ,   Cardiac  Cardiac Rhythm: Normal sinus rhythm  Pain level: 0-10 Pain Scale: 8  Patient confused: No. Patient Falls Risk: Yes.   Elimination Status: Has voided   Patient Report - Initial Complaint: hip dislocation. Focused Assessment:   Presents via EMS after possible hip dislocation while bending over. Status post total hip replacement on 11/20/2020. Pt did fall after pain started and hit head. No LOC, pt not on blood thinners. Reports 10/10 pain. Received 200 mg fentanyl in route from EMS in 4 separate doses. Pt continues to report 10/10 pain.    19:01 ED Notes Filed Extensive assist to a sitting position at side of bed and then again to come to a standing position with knee immobilizer. Pt tearful and reporting 10/10 pain with any position change, able to bare weight once standing but unable to steadily ambulate independently with walker.        Tests Performed: xray, blood work . Abnormal Results:   Labs Ordered and Resulted from Time of ED Arrival Up to the Time of Departure from the ED   CBC WITH PLATELETS DIFFERENTIAL - Abnormal; Notable for the following components:       Result Value    RBC  Count 3.39 (*)     Hemoglobin 10.0 (*)     Hematocrit 32.4 (*)     MCHC 30.9 (*)     Platelet Count 553 (*)     All other components within normal limits   BASIC METABOLIC PANEL - Abnormal; Notable for the following components:    Glucose 117 (*)     All other components within normal limits     XR Pelvis Port 1/2 Views   Final Result   IMPRESSION: Status post right total hip arthroplasty. Interval reduction with normal alignment of the femoral component relative to the acetabular component. No acute fracture is identified. There is normal joint alignment.      XR Hip Right 2-3 Views   Final Result   IMPRESSION: Dislocated right total hip arthroplasty. The dislocation   is probably anterior inferior given the location of the prosthetic   femoral head. The osseous pelvis appears intact.      FRANCO SUNG MD      Head CT w/o contrast   Final Result   IMPRESSION:   1. Mild cerebral atrophy with minimal nonspecific white matter changes   most likely due to chronic small vessel ischemic disease.   2. No evidence for intracranial hemorrhage or any acute process.      CHRISTINA OLSON MD        .   Treatments provided: pain medications  Family Comments: no family present. Pt lives alone  OBS brochure/video discussed/provided to patient:  Yes  ED Medications:   Medications   HYDROmorphone (PF) (DILAUDID) injection 0.2 mg (has no administration in time range)   propofol (DIPRIVAN) injection 10 mg/mL vial (30 mg Intravenous Given 12/16/20 1743)   HYDROmorphone (PF) (DILAUDID) injection 0.5 mg (0.5 mg Intravenous Given 12/16/20 1602)   propofol (DIPRIVAN) injection 10 mg/mL vial (40 mg Intravenous Given 12/16/20 1731)     Drips infusing:  No  For the majority of the shift, the patient's behavior Green. Interventions performed were frequent rounding.    Sepsis treatment initiated: No     Patient tested for COVID 19 prior to admission: YES    ED Nurse Name/Phone Number: Ninfa Kuo RN,   7:26 PM

## 2020-12-17 NOTE — CONSULTS
[facility]    Orthopedic Consultation    Teri Beltran MRN# [id number]   Age: 74 year old YOB: 1945     Date of Admission:  12/16/2020    Reason for consult: Right total hip arthroplasty dislocation       Requesting physician:  Courtney Ng       Level of consult: One-time consult to assist in determining a diagnosis, recommend an appropriate treatment plan and place orders           Assessment and Plan:   Assessment:   Right total hip arthroplasty dislocation, reduced in ED   S/P right TRACIE 11/20/2020 (Dr ZEENAT Muse)      Plan:   Non-operative management   Discussed findings with Dr Muse.  He is okay with patient discontinuing the knee immobilizer and progressing with physical therapy.    WBAT with walker  Refresh anterior hip precautions  Follow-up with Dr Muse in 1 week.  Call [phone] for appointment.    Likely to discharge to home tomorrow.             Chief Complaint:   Right hip pain          History of Present Illness:   Teri Beltran is a 74 year old female who presented to the ED with a right total hip dislocation and fall.  Up until last night, she had been doing well s/p a right TRACIE on 11/20/2020.    Reports that last night, she bent over to pick something off of the floor in her home.  She had sudden, severe pain and fell backwards.  Patient was unable to stand following the fall.  She presented to the ED in the ED x-ray showed anterior hip dislocation right which was reduced in the ED and was placed into a knee immobilizer.  Patient was admitted due to uncontrolled pain.             Past Medical History:     Past Medical History:   Diagnosis Date     Complication of anesthesia     confusion after anesthesia (after hysterectomy)     First degree AV block 8/11/2015     Former smoker      Gastroesophageal reflux disease      GERD (gastroesophageal reflux disease)      Glaucoma      Heart block     2:1     Hyperlipidemia LDL goal <130 6/14/2013      Hypertension goal BP (blood pressure) < 140/90 12/3/2013     Hypothyroidism      Left atrial dilatation     mild     Mild dilation of ascending aorta - borderline 2015     Palpitations      Prediabetes 2013     RBBB 2015     S/P total knee arthroplasty 2019     Tachycardia      Varicose veins      Venous insufficiency     s/p bilateral great saphenous vein radiofrequency ablation by Dr. Garcia             Past Surgical History:     Past Surgical History:   Procedure Laterality Date     ARTHROPLASTY HIP Right 2020    Procedure: Right total hip arthroplasty using a Biomet Taperloc femoral stem and G7 acetabulum.;  Surgeon: Dragan Muse MD;  Location: [facility] OR     ARTHROPLASTY KNEE Right 2019    Procedure: Right total knee arthroplasty using an Arthrex DanceJamlance knee system;  Surgeon: Dragan Muse MD;  Location: [facility] OR     BREAST SURGERY      right breast ductal surgery due to milk production     COLONOSCOPY N/A 10/24/2014    no further screening. Procedure: COLONOSCOPY;  Surgeon: Pedro Rudd MD;  Location:  GI     HYSTERECTOMY, PAP NO LONGER INDICATED      total hysterectomy and ovaries. benign     SOFT TISSUE SURGERY      ganglion removed from left wrist and ring finger     SURGICAL HISTORY OF -   age 7    tonsillectomy     SURGICAL HISTORY OF -   1/15    left eye cataract     SURGICAL HISTORY OF -   Fall     LINQ placed.             Social History:     Social History     Tobacco Use     Smoking status: Former Smoker     Packs/day: 0.50     Years: 18.00     Pack years: 9.00     Types: Cigarettes     Quit date:      Years since quittin.9     Smokeless tobacco: Never Used   Substance Use Topics     Alcohol use: Yes     Alcohol/week: 0.0 standard drinks     Comment: 1 glass of wine at night             Family History:     Family History   Problem Relation Age of Onset     Cancer Mother         ovarian     Breast Cancer Mother      Heart  Disease Father         rare; not sure what it was     Diabetes Father         old age     Cerebrovascular Disease Brother 68             Immunizations:     VACCINE/DOSE   Diptheria   DPT   DTAP   HBIG   Hepatitis A   Hepatitis B   HIB   Influenza   Measles   Meningococcal   MMR   Mumps   Pneumococcal   Polio   Rubella   Small Pox   TDAP   Varicella   Zoster             Allergies:     Allergies   Allergen Reactions     Seasonal Allergies              Medications:     Current Facility-Administered Medications   Medication     acetaminophen (TYLENOL) Suppository 650 mg     acetaminophen (TYLENOL) tablet 650 mg     aspirin (ASA) EC tablet 325 mg     bisacodyl (DULCOLAX) Suppository 10 mg     famotidine (PEPCID) tablet 10 mg     furosemide (LASIX) tablet 20 mg     hydrochlorothiazide (HYDRODIURIL) tablet 25 mg     HYDROmorphone (PF) (DILAUDID) injection 0.2 mg     levothyroxine (SYNTHROID/LEVOTHROID) tablet 88 mcg     melatonin tablet 1 mg     metoprolol tartrate (LOPRESSOR) tablet 25 mg     naloxone (NARCAN) injection 0.2 mg    Or     naloxone (NARCAN) injection 0.4 mg    Or     naloxone (NARCAN) injection 0.2 mg    Or     naloxone (NARCAN) injection 0.4 mg     ondansetron (ZOFRAN-ODT) ODT tab 4 mg    Or     ondansetron (ZOFRAN) injection 4 mg     oxyCODONE (ROXICODONE) tablet 5-10 mg     polyethylene glycol (MIRALAX) Packet 17 g     polyethylene glycol (MIRALAX) Packet 17 g     pravastatin (PRAVACHOL) tablet 40 mg     senna-docusate (SENOKOT-S/PERICOLACE) 8.6-50 MG per tablet 1 tablet    Or     senna-docusate (SENOKOT-S/PERICOLACE) 8.6-50 MG per tablet 2 tablet             Review of Systems:   ROS:  10 point ROS neg other than the symptoms noted above in the HPI.          Physical Exam:   All vitals have been reviewed  Patient Vitals for the past 24 hrs:   BP Temp Temp src Pulse Resp SpO2 Weight   12/17/20 1153 -- -- -- -- 18 -- --   12/17/20 1144 110/47 97.9  F (36.6  C) Temporal 78 18 100 % --   12/17/20 0905 (!)  83/46 -- -- 84 -- -- --   12/17/20 0902 (!) 87/45 -- -- 84 -- -- --   12/17/20 0820 -- -- -- -- 18 -- --   12/17/20 0801 120/65 97  F (36.1  C) Temporal 82 18 98 % --   12/17/20 0557 119/49 96.8  F (36  C) Temporal 72 18 97 % --   12/17/20 0338 -- -- -- -- 18 -- --   12/17/20 0253 -- -- -- -- 18 -- --   12/17/20 0225 (!) 178/88 97.5  F (36.4  C) Temporal 81 18 97 % --   12/16/20 2048 (!) 140/65 98.5  F (36.9  C) Temporal 97 18 99 % 79.4 kg (175 lb)   12/16/20 2010 -- -- -- -- -- 100 % --   12/16/20 2005 110/59 -- -- 86 -- 99 % --   12/16/20 2000 109/56 -- -- 87 -- 98 % --   12/16/20 1955 119/63 -- -- 88 -- 98 % --   12/16/20 1950 111/56 -- -- 86 -- 97 % --   12/16/20 1945 122/67 -- -- 94 -- 100 % --   12/16/20 1940 132/69 -- -- 90 -- 100 % --   12/16/20 1935 -- -- -- 88 -- 100 % --   12/16/20 1930 (!) 141/47 -- -- -- -- (!) 75 % --   12/16/20 1925 -- -- -- -- -- 100 % --   12/16/20 1920 -- -- -- -- -- 99 % --   12/16/20 1915 -- -- -- -- -- 100 % --   12/16/20 1910 -- -- -- -- -- 100 % --   12/16/20 1905 -- -- -- -- -- 100 % --   12/16/20 1900 -- -- -- -- -- 100 % --   12/16/20 1845 133/68 -- -- 92 17 100 % --   12/16/20 1830 126/77 -- -- 86 14 99 % --   12/16/20 1815 135/68 -- -- 81 14 99 % --   12/16/20 1800 135/81 -- -- 86 15 98 % --   12/16/20 1745 (!) 107/101 -- -- 97 14 94 % --   12/16/20 1734 -- -- -- -- -- 100 % --   12/16/20 1732 -- -- -- -- 16 -- --   12/16/20 1730 (!) 169/87 -- -- 88 16 100 % --   12/16/20 1715 (!) 150/80 -- -- 84 14 92 % --   12/16/20 1712 (!) 140/84 -- -- 84 21 100 % --   12/16/20 1700 (!) 140/84 -- -- -- -- -- --   12/16/20 1600 (!) 152/90 -- -- 80 -- 99 % --   12/16/20 1555 (!) 174/98 -- Oral 83 16 99 % --       Intake/Output Summary (Last 24 hours) at 12/17/2020 1217  Last data filed at 12/17/2020 1122  Gross per 24 hour   Intake 520 ml   Output 1275 ml   Net -755 ml         Physical Exam   Temp: 97.9  F (36.6  C) Temp src: Temporal BP: 110/47 Pulse: 78   Resp: 18 SpO2: 100 % O2  Device: None (Room air) Oxygen Delivery: 3 LPM  Vital Signs with Ranges  Temp:  [96.8  F (36  C)-98.5  F (36.9  C)] 97.9  F (36.6  C)  Pulse:  [72-97] 78  Resp:  [14-21] 18  BP: ()/() 110/47  SpO2:  [75 %-100 %] 100 %  175 lbs 0 oz    Constitutional: Pleasant, alert, appropriate, following commands.  HEENT: Head atraumatic normocephalic. Pupils equal round and reactive to light.  Respiratory: Unlabored breathing no audible wheeze  Cardiovascular: Regular rate and rhythm  GI: Abdomen soft nontender nondistended.  Lymph/Hematologic: No lymphadenopathy in areas examined  Genitourinary:  No roman  Skin: No rashes, no cyanosis, no edema.  Musculoskeletal:  See below   Neurologic: normal without focal findings, mental status, speech normal, alert and oriented x iii, MICHAEL  Neuropsychiatric: stable     On physical exam of the right hip    Skin: incision is healing well.  Mild surrounding erythema  Swelling: moderate along right lateral hip  Alignment: no deformity  Tenderness to palpation along right lateral hip  ROM: pain with passive internal/external hip rotation.   Dorsi/Plantar flexion:  Weakness with attempted dorsi flexion on the right (states a brace has been ordered)  Calves:  Soft and non-tender  Distal pulses are intact and equal bilaterally  Sensation to light touch intact and equal bilaterally.             Data:   All laboratory data reviewed  Results for orders placed or performed during the hospital encounter of 12/16/20   -Dislocation - Lower Extremity     Status: None    Narrative    Maco Campos MD     12/16/2020 10:09 PM  [facility]    -Dislocation - Lower Extremity    Date/Time: 12/16/2020 5:08 PM  Performed by: Maco Campos MD  Authorized by: Maco Campos MD     ED EVALUATION:      Assessment Time: 12/16/2020 5:08 PM      I have performed an Emergency Department Evaluation including taking a   history and physical examination, this  evaluation will be documented in   the electronic medical record for this ED encounter.     Indication: Dislocation     ASA Class: Class 2- mild systemic disease, no acute problems, no   functional limitations    Mallampati: Grade 2- soft palate, base of uvula, tonsillar pillars, and   portion of posterior pharyngeal wall visible    NPO Status: not NPO, emergent situation  UNIVERSAL PROTOCOL   Site Marked: NA  Prior Images Obtained and Reviewed:  Yes  Required items: Required blood products, implants, devices and special   equipment available    Patient identity confirmed:  Verbally with patient, provided demographic   data, arm band and hospital-assigned identification number  Patient was reevaluated immediately before administering moderate or deep   sedation or anesthesia  Confirmation Checklist:  Patient's identity using two indicators  Time out: Immediately prior to the procedure a time out was called    Universal Protocol: the Joint Commission Universal Protocol was followed          LOCATION     Location:  Hip    Hip injury location:  R hip    Hip dislocation type: anterior      Etiology: spontaneous      Prosthesis: yes      PRE PROCEDURE DETAILS:     Distal perfusion: normal      Range of motion: normal      SEDATION    Patient Sedated: Yes    Sedation Type:  Moderate (conscious) sedation  Sedation:  Propofol  Vital signs: Vital signs monitored during sedation      PROCEDURE DETAILS      Manipulation performed: yes      Hip reduction method:  Direct traction and external rotation    Reduction successful: yes      Reduction confirmed with imaging: yes      Immobilization:  Brace    Supplies used:  Prefabricated splint    POST PROCEDURE DETAILS      Neurological function: normal      Distal perfusion: normal      PROCEDURE   Patient Tolerance:  Patient tolerated the procedure well with no immediate   complications    Length of time physician/provider present for 1:1 monitoring during   sedation: 20   Head CT  w/o contrast     Status: None    Narrative    CT SCAN OF THE HEAD WITHOUT CONTRAST   12/16/2020 4:30 PM     HISTORY: Fall    TECHNIQUE: Axial images of the head and coronal reformations without  IV contrast material. Radiation dose for this scan was reduced using  automated exposure control, adjustment of the mA and/or kV according  to patient size, or iterative reconstruction technique.    COMPARISON: None.    FINDINGS: There is some mild cerebral atrophy. Minimal nonspecific  white matter changes are present without mass effect. There is no  evidence for intracranial hemorrhage, mass effect, acute infarct, or  skull fracture. Visualized paranasal sinuses and mastoid air cells are  clear.      Impression    IMPRESSION:  1. Mild cerebral atrophy with minimal nonspecific white matter changes  most likely due to chronic small vessel ischemic disease.  2. No evidence for intracranial hemorrhage or any acute process.    CHRISTINA OLSON MD   XR Hip Right 2-3 Views     Status: None    Narrative    XR HIP RIGHT 2-3 VIEWS 12/16/2020 4:40 PM     HISTORY: fall, pain, 3 weeks post op R TRACIE      Impression    IMPRESSION: Dislocated right total hip arthroplasty. The dislocation  is probably anterior inferior given the location of the prosthetic  femoral head. The osseous pelvis appears intact.    FRANCO SUNG MD   XR Pelvis Port 1/2 Views     Status: None    Narrative    EXAM: XR PELVIS PORT 1/2 VW  LOCATION: [facility]  DATE/TIME: 12/16/2020 5:48 PM    INDICATION: Post reduction  COMPARISON: Earlier today      Impression    IMPRESSION: Status post right total hip arthroplasty. Interval reduction with normal alignment of the femoral component relative to the acetabular component. No acute fracture is identified. There is normal joint alignment.   CBC with platelets differential     Status: Abnormal   Result Value Ref Range    WBC 7.4 4.0 - 11.0 10e9/L    RBC Count 3.39 (L) 3.8 - 5.2 10e12/L    Hemoglobin 10.0 (L)  11.7 - 15.7 g/dL    Hematocrit 32.4 (L) 35.0 - 47.0 %    MCV 96 78 - 100 fl    MCH 29.5 26.5 - 33.0 pg    MCHC 30.9 (L) 31.5 - 36.5 g/dL    RDW 13.1 10.0 - 15.0 %    Platelet Count 553 (H) 150 - 450 10e9/L    Diff Method Automated Method     % Neutrophils 61.8 %    % Lymphocytes 19.4 %    % Monocytes 13.4 %    % Eosinophils 4.6 %    % Basophils 0.5 %    % Immature Granulocytes 0.3 %    Nucleated RBCs 0 0 /100    Absolute Neutrophil 4.6 1.6 - 8.3 10e9/L    Absolute Lymphocytes 1.4 0.8 - 5.3 10e9/L    Absolute Monocytes 1.0 0.0 - 1.3 10e9/L    Absolute Eosinophils 0.3 0.0 - 0.7 10e9/L    Absolute Basophils 0.0 0.0 - 0.2 10e9/L    Abs Immature Granulocytes 0.0 0 - 0.4 10e9/L    Absolute Nucleated RBC 0.0    Basic metabolic panel     Status: Abnormal   Result Value Ref Range    Sodium 138 133 - 144 mmol/L    Potassium 3.6 3.4 - 5.3 mmol/L    Chloride 103 94 - 109 mmol/L    Carbon Dioxide 29 20 - 32 mmol/L    Anion Gap 6 3 - 14 mmol/L    Glucose 117 (H) 70 - 99 mg/dL    Urea Nitrogen 22 7 - 30 mg/dL    Creatinine 0.68 0.52 - 1.04 mg/dL    GFR Estimate 85 >60 mL/min/[1.73_m2]    GFR Estimate If Black >90 >60 mL/min/[1.73_m2]    Calcium 9.9 8.5 - 10.1 mg/dL   Asymptomatic Influenza A/B & SARS-CoV2 (COVID-19) Virus PCR Multiplex     Status: None    Specimen: Nasopharyngeal   Result Value Ref Range    Flu A/B & SARS-COV-2 PCR Source Nasopharyngeal     SARS-CoV-2 PCR Result NEGATIVE     Influenza A PCR Negative NEG^Negative    Influenza B PCR Negative NEG^Negative    Respiratory Syncytial Virus PCR Negative NEG^Negative    Flu A/B & SARS-CoV-2 PCR Comment (Note)    Basic metabolic panel     Status: Abnormal   Result Value Ref Range    Sodium 138 133 - 144 mmol/L    Potassium 3.2 (L) 3.4 - 5.3 mmol/L    Chloride 105 94 - 109 mmol/L    Carbon Dioxide 30 20 - 32 mmol/L    Anion Gap 3 3 - 14 mmol/L    Glucose 99 70 - 99 mg/dL    Urea Nitrogen 16 7 - 30 mg/dL    Creatinine 0.57 0.52 - 1.04 mg/dL    GFR Estimate 90 >60  mL/min/[1.73_m2]    GFR Estimate If Black >90 >60 mL/min/[1.73_m2]    Calcium 8.2 (L) 8.5 - 10.1 mg/dL   CBC with platelets     Status: Abnormal   Result Value Ref Range    WBC 6.4 4.0 - 11.0 10e9/L    RBC Count 2.91 (L) 3.8 - 5.2 10e12/L    Hemoglobin 8.6 (L) 11.7 - 15.7 g/dL    Hematocrit 27.5 (L) 35.0 - 47.0 %    MCV 95 78 - 100 fl    MCH 29.6 26.5 - 33.0 pg    MCHC 31.3 (L) 31.5 - 36.5 g/dL    RDW 13.1 10.0 - 15.0 %    Platelet Count 409 150 - 450 10e9/L   Potassium     Status: Abnormal   Result Value Ref Range    Potassium 3.2 (L) 3.4 - 5.3 mmol/L          Attestation:  I have reviewed today's vital signs, notes, medications, labs and imaging with Dr. Muse.  Amount of time performed on this consult: 30 minutes.    Ximena Curry PA-C

## 2020-12-17 NOTE — PROGRESS NOTES
PRIMARY DIAGNOSIS: ACUTE PAIN  OUTPATIENT/OBSERVATION GOALS TO BE MET BEFORE DISCHARGE:  1. Pain Status: Improved-controlled with oral pain medications.-PO oxycodone.     2. Return to near baseline physical activity: Yes-Ax1 w/ walker.    3. Cleared for discharge by consultants (if involved): No    Discharge Planner Nurse   Safe discharge environment identified: Yes  Barriers to discharge: No       Entered by: Iwona Franklin 12/17/2020 1:23 PM     Please review provider order for any additional goals.   Nurse to notify provider when observation goals have been met and patient is ready for discharge.

## 2020-12-17 NOTE — PROGRESS NOTES
PRIMARY DIAGNOSIS: ACUTE PAIN  OUTPATIENT/OBSERVATION GOALS TO BE MET BEFORE DISCHARGE:  1. Pain Status: Improved-controlled with oral pain medications.    2. Return to near baseline physical activity: Yes    3. Cleared for discharge by consultants (if involved): Yes    Discharge Planner Nurse   Safe discharge environment identified: Yes  Barriers to discharge: No       Entered by: Iwona Franklin 12/17/2020 3:24 PM     Please review provider order for any additional goals.   Nurse to notify provider when observation goals have been met and patient is ready for discharge.

## 2020-12-18 VITALS
WEIGHT: 175 LBS | RESPIRATION RATE: 16 BRPM | OXYGEN SATURATION: 96 % | SYSTOLIC BLOOD PRESSURE: 123 MMHG | BODY MASS INDEX: 26.61 KG/M2 | TEMPERATURE: 97.9 F | HEART RATE: 75 BPM | DIASTOLIC BLOOD PRESSURE: 47 MMHG

## 2020-12-18 LAB
HGB BLD-MCNC: 9.2 G/DL (ref 11.7–15.7)
POTASSIUM SERPL-SCNC: 3.5 MMOL/L (ref 3.4–5.3)

## 2020-12-18 PROCEDURE — 84132 ASSAY OF SERUM POTASSIUM: CPT | Performed by: INTERNAL MEDICINE

## 2020-12-18 PROCEDURE — 250N000013 HC RX MED GY IP 250 OP 250 PS 637: Mod: GY | Performed by: INTERNAL MEDICINE

## 2020-12-18 PROCEDURE — 85018 HEMOGLOBIN: CPT | Performed by: INTERNAL MEDICINE

## 2020-12-18 PROCEDURE — 250N000013 HC RX MED GY IP 250 OP 250 PS 637: Mod: GY | Performed by: HOSPITALIST

## 2020-12-18 PROCEDURE — 99217 PR OBSERVATION CARE DISCHARGE: CPT | Performed by: INTERNAL MEDICINE

## 2020-12-18 PROCEDURE — G0378 HOSPITAL OBSERVATION PER HR: HCPCS

## 2020-12-18 PROCEDURE — 36415 COLL VENOUS BLD VENIPUNCTURE: CPT | Performed by: INTERNAL MEDICINE

## 2020-12-18 RX ORDER — ECHINACEA PURPUREA EXTRACT 125 MG
2 TABLET ORAL DAILY PRN
Qty: 30 ML | Refills: 0 | Status: SHIPPED | OUTPATIENT
Start: 2020-12-18 | End: 2021-10-08

## 2020-12-18 RX ADMIN — FUROSEMIDE 20 MG: 20 TABLET ORAL at 08:32

## 2020-12-18 RX ADMIN — FAMOTIDINE 10 MG: 10 TABLET, FILM COATED ORAL at 08:32

## 2020-12-18 RX ADMIN — OXYCODONE HYDROCHLORIDE 5 MG: 5 TABLET ORAL at 03:44

## 2020-12-18 RX ADMIN — OXYCODONE HYDROCHLORIDE 5 MG: 5 TABLET ORAL at 11:43

## 2020-12-18 RX ADMIN — METOPROLOL TARTRATE 25 MG: 25 TABLET, FILM COATED ORAL at 08:31

## 2020-12-18 RX ADMIN — OXYCODONE HYDROCHLORIDE 5 MG: 5 TABLET ORAL at 08:32

## 2020-12-18 RX ADMIN — LEVOTHYROXINE SODIUM 88 MCG: 0.09 TABLET ORAL at 08:32

## 2020-12-18 RX ADMIN — ASPIRIN 325 MG: 325 TABLET, COATED ORAL at 08:31

## 2020-12-18 RX ADMIN — HYDROCHLOROTHIAZIDE 25 MG: 25 TABLET ORAL at 08:32

## 2020-12-18 NOTE — PLAN OF CARE
Reviewed discharge instructions and medications with patient. Questions answered. Emotional support provided to pt by spiritual health and RN d/t pt finding out unexpected death of relative. Patient discharged to home with sister-in-law as transport, discharge instructions, medications (nasal spray), and belongings at this time.

## 2020-12-18 NOTE — PROGRESS NOTES
"SPIRITUAL HEALTH SERVICES Progress Note  [facility]    Saw pt \"Kirit\" per staff referral requesting emotional support for pt upon the death of her son-in-law.  Provided emotional and grief support through empathetic listening and reflective conversation.  Kirit processed her thoughts and feelings about the sudden and unexpected death of her son-in-law, the spouse of her daughter Lay.  She reported that before Lay  her , she lost a SO through death: \"she woke up and found him dead in bed.\"  Kirit shared that she has lost three husbands through death, the second one was Lay's father.  Kirit described her relationship with Lay as \"distant\" and shared that Lay grew emotionally distant to her after the death of her SO.  Kirit talked to Lay earlier today: \"she knows I love her, and I told her I love her.\"  Kirit gave voice to her lament and reflected, \"God is angry with me\" and \"I keep asking God, 'Why?'\"  Validated her feelings and existential questions.  She named a sister-in-law and a friend as supportive people in her life.  She plans to call her friend later today.  Kirit was raised Christian but stopped attending services after the death of her third spouse.  However, she welcomed prayer at the end of our conversation; offered a prayer of lament.    No further plans as pt anticipated discharging within the hour.      Mitchell Smith M.Div., [facility]  Staff   Phone [phone]    "

## 2020-12-18 NOTE — PLAN OF CARE
PRIMARY DIAGNOSIS: ACUTE PAIN  OUTPATIENT/OBSERVATION GOALS TO BE MET BEFORE DISCHARGE:  1. Pain Status: Improved-controlled with oral pain medications.    2. Return to near baseline physical activity: Yes    3. Cleared for discharge by consultants (if involved): Yes    Discharge Planner Nurse   Safe discharge environment identified: Yes  Barriers to discharge: No       Entered by: Leidy Mock 12/18/2020 5:57 AM     Please review provider order for any additional goals.   Nurse to notify provider when observation goals have been met and patient is ready for discharge.

## 2020-12-18 NOTE — PROGRESS NOTES
PRIMARY DIAGNOSIS: ACUTE PAIN  OUTPATIENT/OBSERVATION GOALS TO BE MET BEFORE DISCHARGE:  1. Pain Status: Improved-controlled with oral pain medications.    2. Return to near baseline physical activity: Yes    3. Cleared for discharge by consultants (if involved): Yes    Discharge Planner Nurse   Safe discharge environment identified: Yes  Barriers to discharge: No       Entered by: Rylee Chicas 12/18/2020 11:31 AM     Please review provider order for any additional goals.   Nurse to notify provider when observation goals have been met and patient is ready for discharge.

## 2020-12-18 NOTE — DISCHARGE SUMMARY
Discharge Summary  Hospitalist Service    Teri Beltran MRN# [id number]   YOB: 1945 Age: 74 year old     Date of Admission:  12/16/2020  Date of Discharge:  12/18/2020  Admitting Physician:  Jane Moreno MD  Discharge Physician: Courtney Ng MD  Discharging Service: Hospitalist Service     Primary Provider: Rachel Miller  Primary Care Physician Phone Number: [phone]         Discharge Diagnoses/Problem Oriented Hospital Course (Providers):    Teri Beltran was admitted on 12/16/2020 by Jane Moreno MD and I would refer you to their history and physical.  The following problems were addressed during her hospitalization:      Dislocation of prosthetic hip  htn with transient hypotension   hlp  Epistaxis and anemia  Paroxysmal SVT  Deconditioning           Code Status:      Full Code        Brief Hospital Stay Summary Sent Home With Patient in AVS:       Summary of Stay: Teri Beltran is a 74 year old female with a history of htn/hlp, paroxysmal SVT (who follows with Dr Anglin) and osteoporosis admitted on 12/16/2020 with hip dislocations after a fall.  Of note she had a right TRACIE 11/20/20      She was bending over to pick something up when BOTH of her hips popped out.  She fell immediately to the ground an hit the back of her head.  Evaluation in the ER significant for Right prosthetic hip dislocation.  This was reduced in the ER but she was in too much pain and needing too much assist to return home where she lives alone.      She continues to require significant assistance and still with a fair amount of pain.      Am prior to discharge was feeling a bit foggy with low BPs in the setting of narcotics and typical BP medications.  This has completely resolved        Problem List:   Right prosthetic hip dislocation continue with PT/pain control.  -will discharge with home PT, and to f/u with Dr Muse in one week     S/p right TRACIE 11/20/20  She is on ppx  asa at 325 mg bid but is having daily nose bleeds (blows clots about once a day), no persistent or unstoppable bleeding.  Has not had a problem with this in the past only since discharge. I have stopped her ppx asa as she is almost 30 days in.  I've texted both Dr Muse and their PA to notify them of this change and to ensure ok from their standpoint.      Transient hypotension   2/2 BP medications and narcotics  -given 500 ml bolus with resolution  -staggered parameters set for BP meds     htn  pta regimen is furosemide 20 mg every day, metop 25 mg bid, and hydrochlorothiazide 25 mg every day  All resumed with staggered parameters in palce     hlp  Resumed statin     Paroxysmal SVT continues with periodic palpitations which are unsustained and not captured during this admission     Epistaxis with anemia  VSS and no indication for transfusion.  I think due to ppx asa in post op setting, please see above    Deconditioning  Have requested RN home safety evaluation         DVT Prophylaxis: Heparin SQ  Code Status: Full Code  Functional Status: lives alone I and uses a walker to get around  Diet: reg texture  Hess:not needed         Important Results:      As noted below         Pending Results:        Unresulted Labs Ordered in the Past 30 Days of this Admission     No orders found from 11/16/2020 to 12/17/2020.            Discharge Instructions and Follow-Up:            Discharge Disposition:      Discharged to home         Discharge Medications:        Current Discharge Medication List      START taking these medications    Details   sodium chloride (OCEAN) 0.65 % nasal spray Spray 2 sprays in nostril daily as needed for congestion  Qty: 30 mL, Refills: 0    Associated Diagnoses: Anterior dislocation of right hip, initial encounter (H)         CONTINUE these medications which have NOT CHANGED    Details   Acetaminophen (TYLENOL PO) Take 500-1,000 mg by mouth every 6 hours as needed for mild pain       alendronate  (FOSAMAX) 70 MG tablet Take 70 mg by mouth every 7 days On Sundays      dorzolamide-timolol (COSOPT) 2-0.5 % ophthalmic solution Place 1 drop into both eyes 2 times daily      famotidine (PEPCID) 10 MG tablet Take 1 tablet (10 mg) by mouth 2 times daily  Qty: 180 tablet, Refills: 1    Associated Diagnoses: Gastroesophageal reflux disease, esophagitis presence not specified      fluticasone (FLONASE) 50 MCG/ACT nasal spray Spray 1-2 sprays into both nostrils daily  Qty: 48 g, Refills: 1    Associated Diagnoses: Congestion of paranasal sinus      furosemide (LASIX) 20 MG tablet Take 1 tablet (20 mg) by mouth daily  Qty: 90 tablet, Refills: 0    Associated Diagnoses: Essential hypertension with goal blood pressure less than 140/90; Fluid retention      hydrochlorothiazide (HYDRODIURIL) 25 MG tablet Take 1 tablet (25 mg) by mouth daily  Qty: 90 tablet, Refills: 3    Associated Diagnoses: Hypertension goal BP (blood pressure) < 140/90; Bilateral leg edema      ibuprofen (ADVIL/MOTRIN) 600 MG tablet Take 1 tablet (600 mg) by mouth every 6 hours as needed for moderate pain  Qty: 60 tablet, Refills: 0    Associated Diagnoses: Status post total replacement of right hip      latanoprost (XALATAN) 0.005 % ophthalmic solution Place 1 drop into the right eye At Bedtime       levothyroxine (SYNTHROID/LEVOTHROID) 88 MCG tablet Take 1 tablet (88 mcg) by mouth daily  Qty: 90 tablet, Refills: 1    Associated Diagnoses: Hypothyroidism, unspecified type      metoprolol tartrate (LOPRESSOR) 25 MG tablet Take 1 tablet (25 mg) by mouth 2 times daily  Qty: 180 tablet, Refills: 3    Associated Diagnoses: Hypertension goal BP (blood pressure) < 140/90; Palpitations      Multiple Vitamins-Minerals (MULTIVITAMIN ADULT PO) Take 1 tablet by mouth daily      oxyCODONE (ROXICODONE) 5 MG tablet Take 5 mg by mouth every 6 hours as needed for severe pain      potassium chloride ER (KLOR-CON M) 10 MEQ CR tablet Take 1 tablet (10 mEq) by mouth  daily  Qty: 30 tablet, Refills: 3    Associated Diagnoses: Essential hypertension with goal blood pressure less than 140/90; Fluid retention      pravastatin (PRAVACHOL) 40 MG tablet Take 1 tablet (40 mg) by mouth At Bedtime  Qty: 90 tablet, Refills: 1    Associated Diagnoses: Hyperlipidemia LDL goal <130      senna-docusate (SENOKOT-S/PERICOLACE) 8.6-50 MG tablet Take 1-2 tablets by mouth 2 times daily Take while on oral narcotics to prevent or treat constipation.  Qty: 30 tablet, Refills: 0    Comments: While taking narcotics  Associated Diagnoses: Status post total replacement of right hip         STOP taking these medications       aspirin (ASA) 325 MG EC tablet Comments:   Reason for Stopping:                 Allergies:         Allergies   Allergen Reactions     Seasonal Allergies            Consultations This Hospital Stay:      Consultation during this admission received from orthopedics         Condition and Physical on Discharge:      Discharge condition: Stable   Vitals: Blood pressure (!) 143/68, pulse 84, temperature 97.9  F (36.6  C), temperature source Temporal, resp. rate 16, weight 79.4 kg (175 lb), SpO2 98 %, not currently breastfeeding.     Constitutional: Pleasant nad looks stated age head ncat sclera clear   Lungs: ctab nl effort   Cardiovascular: rrr no mrg no le edema    Abdomen: S/nt/nd tolerating po without difficulty    Skin: Warm and dry no cc   Other: Alert and oriented affect appropriate ramirez         Discharge Time:      Greater than 30 minutes.        Image Results From This Hospital Stay (For Non-EPIC Providers):        Results for orders placed or performed during the hospital encounter of 12/16/20   Head CT w/o contrast    Narrative    CT SCAN OF THE HEAD WITHOUT CONTRAST   12/16/2020 4:30 PM     HISTORY: Fall    TECHNIQUE: Axial images of the head and coronal reformations without  IV contrast material. Radiation dose for this scan was reduced using  automated exposure control,  adjustment of the mA and/or kV according  to patient size, or iterative reconstruction technique.    COMPARISON: None.    FINDINGS: There is some mild cerebral atrophy. Minimal nonspecific  white matter changes are present without mass effect. There is no  evidence for intracranial hemorrhage, mass effect, acute infarct, or  skull fracture. Visualized paranasal sinuses and mastoid air cells are  clear.      Impression    IMPRESSION:  1. Mild cerebral atrophy with minimal nonspecific white matter changes  most likely due to chronic small vessel ischemic disease.  2. No evidence for intracranial hemorrhage or any acute process.    CHRISTINA OLSON MD   XR Hip Right 2-3 Views    Narrative    XR HIP RIGHT 2-3 VIEWS 12/16/2020 4:40 PM     HISTORY: fall, pain, 3 weeks post op R TRACIE      Impression    IMPRESSION: Dislocated right total hip arthroplasty. The dislocation  is probably anterior inferior given the location of the prosthetic  femoral head. The osseous pelvis appears intact.    FRANCO SUNG MD   XR Pelvis Port 1/2 Views    Narrative    EXAM: XR PELVIS PORT 1/2 VW  LOCATION: [facility]  DATE/TIME: 12/16/2020 5:48 PM    INDICATION: Post reduction  COMPARISON: Earlier today      Impression    IMPRESSION: Status post right total hip arthroplasty. Interval reduction with normal alignment of the femoral component relative to the acetabular component. No acute fracture is identified. There is normal joint alignment.           Most Recent Lab Results In EPIC (For Non-EPIC Providers):    Most Recent 3 CBC's:  Recent Labs   Lab Test 12/18/20  0931 12/17/20  0556 12/16/20  1605 11/21/20  0702 11/21/20  0702 08/28/20  1016 08/28/20  1016   WBC  --  6.4 7.4  --   --   --  5.5   HGB 9.2* 8.6* 10.0*   < > 10.2*   < > 12.0   MCV  --  95 96  --   --   --  96   PLT  --  409 553*  --  245  --  337    < > = values in this interval not displayed.      Most Recent 3 BMP's:  Recent Labs   Lab Test 12/18/20  0546  12/17/20  1154 12/17/20  0746 12/17/20  0556 12/16/20  1605 11/22/20  0621 11/20/20  1129 10/08/20  1257 10/08/20  1257   NA  --   --   --  138 138  --   --   --  135   POTASSIUM 3.5 3.6 3.2* 3.2* 3.6  --   --    < > 3.9   CHLORIDE  --   --   --  105 103  --   --   --  101   CO2  --   --   --  30 29  --   --   --  31   BUN  --   --   --  16 22  --   --   --  28   CR  --   --   --  0.57 0.68  --  0.62  --  0.80   ANIONGAP  --   --   --  3 6  --   --   --  3   LILIA  --   --   --  8.2* 9.9  --   --   --  9.5   GLC  --   --   --  99 117* 111*  --   --  175*    < > = values in this interval not displayed.

## 2020-12-18 NOTE — PROGRESS NOTES
PRIMARY DIAGNOSIS: ACUTE PAIN  OUTPATIENT/OBSERVATION GOALS TO BE MET BEFORE DISCHARGE:  1. Pain Status: Improved-controlled with oral pain medications. PO oxycodone managing pain.    2. Return to near baseline physical activity: Yes; Ax1 w/ walker.    3. Cleared for discharge by consultants (if involved): Yes    Discharge Planner Nurse   Safe discharge environment identified: Yes  Barriers to discharge: No       Entered by: Iwona Franklin 12/17/2020 6:10 PM     Pt A&O x4. VS: Pt had low BP this AM of 83/46. 500 ml bolus given and BP now stable. Afebrile. CMS intact. Voiding in good amts; had bm. Tolerating regular diet. Plan is for discharge to home tomorrow w/ home care. Will continue to monitor.

## 2020-12-21 ENCOUNTER — PATIENT OUTREACH (OUTPATIENT)
Dept: CARE COORDINATION | Facility: CLINIC | Age: 75
End: 2020-12-21

## 2020-12-21 ENCOUNTER — TELEPHONE (OUTPATIENT)
Dept: INTERNAL MEDICINE | Facility: CLINIC | Age: 75
End: 2020-12-21

## 2020-12-21 ENCOUNTER — TRANSFERRED RECORDS (OUTPATIENT)
Dept: HEALTH INFORMATION MANAGEMENT | Facility: CLINIC | Age: 75
End: 2020-12-21

## 2020-12-21 DIAGNOSIS — Z71.89 OTHER SPECIFIED COUNSELING: ICD-10-CM

## 2020-12-21 NOTE — PROGRESS NOTES
Clinic Care Coordination Contact  [facility]       Clinical Data: Care Coordinator Outreach  Outreach attempted x 1.  Left message on patient's voicemail with call back information and requested return call.  Plan: Care Coordinator will try to reach patient again in 1-2 business days.

## 2020-12-21 NOTE — TELEPHONE ENCOUNTER
IP F/U    Date: 12-18-20  Diagnosis: anterior dislocation of Right hip  Is patient active in care coordination? Yes - Route to Care Coordination (P [id number])  Was patient in TCU? No    Next 5 appointments (look out 90 days)    Dec 31, 2020 10:00 AM  SHORT with Rachel Miller MD  [facility] ([facility]) [address] [facility] [id number] [phone]

## 2020-12-22 NOTE — PROGRESS NOTES
Clinic Care Coordination Contact  Community Health Worker Initial Outreach         Patient accepts CC:No, I'm doing good. No needs or barriers.  I'm feeling better since my hospital visit. Kirit has an appointment with her primary on 12/31/2020.

## 2020-12-28 DIAGNOSIS — I10 ESSENTIAL HYPERTENSION WITH GOAL BLOOD PRESSURE LESS THAN 140/90: ICD-10-CM

## 2020-12-28 DIAGNOSIS — R60.9 FLUID RETENTION: ICD-10-CM

## 2020-12-28 RX ORDER — FUROSEMIDE 20 MG
20 TABLET ORAL DAILY
Qty: 90 TABLET | Refills: 3 | Status: SHIPPED | OUTPATIENT
Start: 2020-12-28 | End: 2021-10-08 | Stop reason: DRUGHIGH

## 2021-01-04 DIAGNOSIS — E03.9 HYPOTHYROIDISM, UNSPECIFIED TYPE: ICD-10-CM

## 2021-01-06 NOTE — TELEPHONE ENCOUNTER
Pending Prescriptions:                       Disp   Refills    levothyroxine (SYNTHROID/LEVOTHROID) 88 MC*90 tab*1        Sig: TAKE 1 TABLET(88 MCG) BY MOUTH DAILY

## 2021-01-11 RX ORDER — LEVOTHYROXINE SODIUM 88 UG/1
TABLET ORAL
Qty: 90 TABLET | Refills: 1 | Status: SHIPPED | OUTPATIENT
Start: 2021-01-11 | End: 2021-07-09

## 2021-01-12 ENCOUNTER — OFFICE VISIT (OUTPATIENT)
Dept: INTERNAL MEDICINE | Facility: CLINIC | Age: 76
End: 2021-01-12
Payer: MEDICARE

## 2021-01-12 VITALS
WEIGHT: 162 LBS | OXYGEN SATURATION: 98 % | RESPIRATION RATE: 16 BRPM | HEART RATE: 82 BPM | DIASTOLIC BLOOD PRESSURE: 72 MMHG | SYSTOLIC BLOOD PRESSURE: 117 MMHG | BODY MASS INDEX: 24.63 KG/M2

## 2021-01-12 DIAGNOSIS — F51.02 ADJUSTMENT INSOMNIA: ICD-10-CM

## 2021-01-12 DIAGNOSIS — F41.9 ANXIETY: Primary | ICD-10-CM

## 2021-01-12 PROCEDURE — 99214 OFFICE O/P EST MOD 30 MIN: CPT | Performed by: INTERNAL MEDICINE

## 2021-01-12 RX ORDER — TRAZODONE HYDROCHLORIDE 50 MG/1
50-100 TABLET, FILM COATED ORAL
Qty: 60 TABLET | Refills: 3 | Status: SHIPPED | OUTPATIENT
Start: 2021-01-12 | End: 2021-10-08

## 2021-01-12 NOTE — PATIENT INSTRUCTIONS
Plan:  1. Trazodone 50 mg, take 1-2 tablets at bed time as needed for sleep   2. Continue the other meds, same doses for now.

## 2021-01-12 NOTE — PROGRESS NOTES
Patient's instructions / PLAN:                                                        Plan:  1. Trazodone 50 mg, take 1-2 tablets at bed time as needed for sleep   2. Continue the other meds, same doses for now.        ASSESSMENT & PLAN:                                                      Mrs Beth had a very traumatic fall and winter: She was scheduled for right hip surgery, I did the preop, but the anesthesia didn't not clear her  preop because leg edema.  She had follow-up with cardiology for preop.  She had the surgery November 20.  On December 16 she bent it to  something from the floor and she dislocated the hip.   She lives alone, she has a life alert.  She cannot sleep. She feels very stressed about these events.    (F41.9) Anxiety  (primary encounter diagnosis)  We discussed about the new meds, advantages and potential side effects. The patient will read also the info from the pharmacy and call back if questions. (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  Comment:   Plan: traZODone (DESYREL) 50 MG tablet             Chief Complaint:                                                        Follow up chronic medical problems   Sleep     SUBJECTIVE:                                                    History of present illness     She feels stressed and she can't fall asleep. She would like something to relax her. She hasn't slept at all last night She feels very tired     HTN  -- BP from home: 126/77, 146/91,  Most of the numbers 120s,  Pulse 79 - 100    Discontinue from the hospital Dec!8  Dislocation of prosthetic hip  htn with transient hypotension   hlp  Epistaxis and anemia  Paroxysmal SVT  Deconditioning    ROS:                                                      ROS: negative for fever, chills, cough, wheezes, chest pain, shortness of breath, vomiting, abdominal pain, pos for chr leg swelling       OBJECTIVE:                                                    Physical Exam :    Blood pressure 117/72, pulse  82, resp. rate 16, weight 73.5 kg (162 lb), SpO2 98 %, not currently breastfeeding.   NAD, appears comfortable  Chest: clear to auscultation bilaterally, good respiratory effort  Heart: S1 S2, RRR, no mgr appreciated  Abdomen: soft, not tender,   Extremities: no edema,   Neurologic: A, Ox3, no focal signs appreciated   Mood anxious      PMHx: reviewed  Past Medical History:   Diagnosis Date     Complication of anesthesia     confusion after anesthesia (after hysterectomy)     First degree AV block 8/11/2015     Former smoker      Gastroesophageal reflux disease      GERD (gastroesophageal reflux disease)      Glaucoma      Heart block     2:1     Hyperlipidemia LDL goal <130 6/14/2013     Hypertension goal BP (blood pressure) < 140/90 12/3/2013     Hypothyroidism      Left atrial dilatation     mild     Mild dilation of ascending aorta - borderline 8/11/2015     Palpitations      Prediabetes 6/14/2013     RBBB 8/11/2015     S/P total knee arthroplasty 7/23/2019     Tachycardia      Varicose veins      Venous insufficiency     s/p bilateral great saphenous vein radiofrequency ablation by Dr. Garcia      PSHx: reviewed  Past Surgical History:   Procedure Laterality Date     ARTHROPLASTY HIP Right 11/20/2020    Procedure: Right total hip arthroplasty using a Biomet Taperloc femoral stem and G7 acetabulum.;  Surgeon: Dragan Muse MD;  Location: [facility] OR     ARTHROPLASTY KNEE Right 7/23/2019    Procedure: Right total knee arthroplasty using an Arthrex EqsQuestlance knee system;  Surgeon: Dragan Muse MD;  Location: [facility] OR     BREAST SURGERY      right breast ductal surgery due to milk production     COLONOSCOPY N/A 10/24/2014    no further screening. Procedure: COLONOSCOPY;  Surgeon: Pedro Rudd MD;  Location:  GI     HYSTERECTOMY, PAP NO LONGER INDICATED  2009    total hysterectomy and ovaries. benign     SOFT TISSUE SURGERY      ganglion removed from left wrist and ring finger     SURGICAL  HISTORY OF -   age 7    tonsillectomy     SURGICAL HISTORY OF -   1/15    left eye cataract     SURGICAL HISTORY OF -   Fall 2016    LINQ placed.        Meds: reviewed  Current Outpatient Medications   Medication Sig Dispense Refill     Acetaminophen (TYLENOL PO) Take 500-1,000 mg by mouth every 6 hours as needed for mild pain        alendronate (FOSAMAX) 70 MG tablet Take 70 mg by mouth every 7 days On Sundays       dorzolamide-timolol (COSOPT) 2-0.5 % ophthalmic solution Place 1 drop into both eyes 2 times daily       famotidine (PEPCID) 10 MG tablet Take 1 tablet (10 mg) by mouth 2 times daily 180 tablet 1     fluticasone (FLONASE) 50 MCG/ACT nasal spray Spray 1-2 sprays into both nostrils daily 48 g 1     furosemide (LASIX) 20 MG tablet Take 1 tablet (20 mg) by mouth daily 90 tablet 3     hydrochlorothiazide (HYDRODIURIL) 25 MG tablet Take 1 tablet (25 mg) by mouth daily 90 tablet 3     ibuprofen (ADVIL/MOTRIN) 600 MG tablet Take 1 tablet (600 mg) by mouth every 6 hours as needed for moderate pain 60 tablet 0     latanoprost (XALATAN) 0.005 % ophthalmic solution Place 1 drop into the right eye At Bedtime        levothyroxine (SYNTHROID/LEVOTHROID) 88 MCG tablet TAKE 1 TABLET(88 MCG) BY MOUTH DAILY 90 tablet 1     metoprolol tartrate (LOPRESSOR) 25 MG tablet Take 1 tablet (25 mg) by mouth 2 times daily 180 tablet 3     Multiple Vitamins-Minerals (MULTIVITAMIN ADULT PO) Take 1 tablet by mouth daily       oxyCODONE (ROXICODONE) 5 MG tablet Take 5 mg by mouth every 6 hours as needed for severe pain       potassium chloride ER (KLOR-CON M) 10 MEQ CR tablet Take 1 tablet (10 mEq) by mouth daily 30 tablet 3     pravastatin (PRAVACHOL) 40 MG tablet Take 1 tablet (40 mg) by mouth At Bedtime 90 tablet 1     senna-docusate (SENOKOT-S/PERICOLACE) 8.6-50 MG tablet Take 1-2 tablets by mouth 2 times daily Take while on oral narcotics to prevent or treat constipation. 30 tablet 0     sodium chloride (OCEAN) 0.65 % nasal spray  Spray 2 sprays in nostril daily as needed for congestion 30 mL 0       Soc Hx: reviewed  Fam Hx: reviewed          Rachel Wong MD  Internal Medicine

## 2021-01-14 ENCOUNTER — TRANSFERRED RECORDS (OUTPATIENT)
Dept: HEALTH INFORMATION MANAGEMENT | Facility: CLINIC | Age: 76
End: 2021-01-14

## 2021-01-23 DIAGNOSIS — M81.0 OSTEOPOROSIS, UNSPECIFIED OSTEOPOROSIS TYPE, UNSPECIFIED PATHOLOGICAL FRACTURE PRESENCE: Primary | ICD-10-CM

## 2021-01-25 NOTE — TELEPHONE ENCOUNTER
Pending Prescriptions:                       Disp   Refills    alendronate (FOSAMAX) 70 MG tablet [Pharma*4 tabl*0        Sig: TAKE 1 TABLET(70 MG) BY MOUTH EVERY 7 DAYS    Routing refill request to provider for review/approval because:  Patient fails protocol

## 2021-01-28 RX ORDER — ALENDRONATE SODIUM 70 MG/1
TABLET ORAL
Qty: 13 TABLET | Refills: 3 | Status: SHIPPED | OUTPATIENT
Start: 2021-01-28 | End: 2021-10-08

## 2021-02-01 DIAGNOSIS — E78.5 HYPERLIPIDEMIA LDL GOAL <130: ICD-10-CM

## 2021-02-01 DIAGNOSIS — R60.9 FLUID RETENTION: ICD-10-CM

## 2021-02-01 DIAGNOSIS — I10 ESSENTIAL HYPERTENSION WITH GOAL BLOOD PRESSURE LESS THAN 140/90: ICD-10-CM

## 2021-02-01 RX ORDER — POTASSIUM CHLORIDE 750 MG/1
10 TABLET, EXTENDED RELEASE ORAL DAILY
Qty: 90 TABLET | Refills: 1 | Status: SHIPPED | OUTPATIENT
Start: 2021-02-01 | End: 2021-08-05

## 2021-02-03 RX ORDER — PRAVASTATIN SODIUM 40 MG
40 TABLET ORAL AT BEDTIME
Qty: 90 TABLET | Refills: 1 | Status: SHIPPED | OUTPATIENT
Start: 2021-02-03 | End: 2021-08-04

## 2021-02-03 NOTE — TELEPHONE ENCOUNTER
Pending Prescriptions:                       Disp   Refills    pravastatin (PRAVACHOL) 40 MG tablet      90 tab*1            Sig: Take 1 tablet (40 mg) by mouth At Bedtime    Prescription approved per [facility] Refill Protocol.

## 2021-02-18 ENCOUNTER — TRANSFERRED RECORDS (OUTPATIENT)
Dept: HEALTH INFORMATION MANAGEMENT | Facility: CLINIC | Age: 76
End: 2021-02-18

## 2021-03-05 ENCOUNTER — OFFICE VISIT (OUTPATIENT)
Dept: INTERNAL MEDICINE | Facility: CLINIC | Age: 76
End: 2021-03-05
Payer: MEDICARE

## 2021-03-05 VITALS
SYSTOLIC BLOOD PRESSURE: 137 MMHG | DIASTOLIC BLOOD PRESSURE: 85 MMHG | RESPIRATION RATE: 18 BRPM | HEIGHT: 68 IN | TEMPERATURE: 98.9 F | WEIGHT: 173.5 LBS | OXYGEN SATURATION: 96 % | BODY MASS INDEX: 26.3 KG/M2 | HEART RATE: 92 BPM

## 2021-03-05 DIAGNOSIS — M81.0 OSTEOPOROSIS, UNSPECIFIED OSTEOPOROSIS TYPE, UNSPECIFIED PATHOLOGICAL FRACTURE PRESENCE: ICD-10-CM

## 2021-03-05 DIAGNOSIS — R73.03 PREDIABETES: ICD-10-CM

## 2021-03-05 DIAGNOSIS — E78.5 HYPERLIPIDEMIA LDL GOAL <130: ICD-10-CM

## 2021-03-05 DIAGNOSIS — D64.9 ANEMIA, UNSPECIFIED TYPE: ICD-10-CM

## 2021-03-05 DIAGNOSIS — I10 HYPERTENSION GOAL BP (BLOOD PRESSURE) < 140/90: Primary | ICD-10-CM

## 2021-03-05 DIAGNOSIS — I77.810 MILD DILATION OF ASCENDING AORTA (H): ICD-10-CM

## 2021-03-05 DIAGNOSIS — E03.9 HYPOTHYROIDISM, UNSPECIFIED TYPE: ICD-10-CM

## 2021-03-05 PROCEDURE — 99214 OFFICE O/P EST MOD 30 MIN: CPT | Performed by: INTERNAL MEDICINE

## 2021-03-05 ASSESSMENT — MIFFLIN-ST. JEOR: SCORE: 1325.49

## 2021-03-05 NOTE — PATIENT INSTRUCTIONS
Plan:  1. Covid Scheduling line: [phone]   2. Continue same meds, same doses for now   3. Please make a lab appointment for fasting labs  After Aug 6  4. Please make an appointment few days after the labs to discuss about the results.- for annual exam

## 2021-03-05 NOTE — NURSING NOTE
"BP (!) 147/87 (BP Location: Right arm, Patient Position: Sitting, Cuff Size: Adult Regular)   Pulse 92   Temp 98.9  F (37.2  C) (Oral)   Resp 18   Ht 1.727 m (5' 8\")   Wt 78.7 kg (173 lb 8 oz)   LMP  (LMP Unknown)   SpO2 96%   BMI 26.38 kg/m      "

## 2021-03-05 NOTE — PROGRESS NOTES
Dr Wong's note      Patient's instructions / PLAN:                                                        Plan:  1. Covid Scheduling line: [phone]   2. Continue same meds, same doses for now   3. Please make a lab appointment for fasting labs  After Aug 6  4. Please make an appointment few days after the labs to discuss about the results.- for annual exam         ASSESSMENT & PLAN:                                                      Mrs Beth had a very traumatic fall and winter: She was scheduled for right hip surgery, I did the preop, but the anesthesia didn't not clear her  preop because leg edema.  She had follow-up with cardiology for preop.  She had the surgery November 20.  On December 16 she bent it to  something from the floor and she dislocated the hip.   She lives alone, she has a life alert.      She cannot sleep. She felt very stressed about these events, she could not sleep.  Last office visit I prescribed trazodone and it helped her      (I10) Hypertension goal BP (blood pressure) < 140/90  (primary encounter diagnosis)  Comment: Controlled  Plan: CBC with platelets, Comprehensive metabolic         panel, Lipid panel reflex to direct LDL         Fasting, Albumin Random Urine Quantitative with        Creat Ratio, TSH with free T4 reflex, Ferritin,        Hepatitis C Screen Reflex to HCV RNA Quant and         Genotype, Vitamin D Deficiency, Parathyroid         Hormone Intact            (E78.5) Hyperlipidemia LDL goal <130  Comment: Controlled  Plan: CBC with platelets, Comprehensive metabolic         panel, Lipid panel reflex to direct LDL         Fasting, Albumin Random Urine Quantitative with        Creat Ratio, TSH with free T4 reflex, Ferritin,        Hepatitis C Screen Reflex to HCV RNA Quant and         Genotype, Vitamin D Deficiency, Parathyroid         Hormone Intact            (E03.9) Hypothyroidism, unspecified type  Comment: Controlled  Plan: CBC with platelets, Comprehensive  metabolic         panel, Lipid panel reflex to direct LDL         Fasting, Albumin Random Urine Quantitative with        Creat Ratio, TSH with free T4 reflex, Ferritin,        Hepatitis C Screen Reflex to HCV RNA Quant and         Genotype, Vitamin D Deficiency, Parathyroid         Hormone Intact            (I77.810) Mild dilation of ascending aorta - borderline (H)  Comment: Stable  Plan: CBC with platelets, Comprehensive metabolic         panel, Lipid panel reflex to direct LDL         Fasting, Albumin Random Urine Quantitative with        Creat Ratio, TSH with free T4 reflex, Ferritin,        Hepatitis C Screen Reflex to HCV RNA Quant and         Genotype, Vitamin D Deficiency, Parathyroid         Hormone Intact            (M81.0) Osteoporosis, unspecified osteoporosis type, unspecified pathological fracture presence  Comment:   Plan: CBC with platelets, Comprehensive metabolic         panel, Lipid panel reflex to direct LDL         Fasting, Albumin Random Urine Quantitative with        Creat Ratio, TSH with free T4 reflex, Ferritin,        Hepatitis C Screen Reflex to HCV RNA Quant and         Genotype, Vitamin D Deficiency, Parathyroid         Hormone Intact            (R73.03) Prediabetes  Comment:   Plan: CBC with platelets, Comprehensive metabolic         panel, Lipid panel reflex to direct LDL         Fasting, Albumin Random Urine Quantitative with        Creat Ratio, TSH with free T4 reflex, Ferritin,        Hepatitis C Screen Reflex to HCV RNA Quant and         Genotype, Vitamin D Deficiency, Parathyroid         Hormone Intact            (D64.9) Anemia, unspecified type  Comment:   Plan: Ferritin               Chief complaint:                                                      Follow up chronic medical problems      SUBJECTIVE:                                                    History of present illness:    Anxiety:  -- doing better   -- sleep for 4 h then she wakes up walk a little bit then she sleeps  "for 2 more h.        HTN  BP home: 120/77  136/78  HR 83 -90       Hyperlipidemia Follow-Up      Are you regularly taking any medication or supplement to lower your cholesterol?   Yes- pravastatin    Are you having muscle aches or other side effects that you think could be caused by your cholesterol lowering medication?  Yes- arm and shoulders    Hypertension Follow-up      Do you check your blood pressure regularly outside of the clinic? Yes     Are you following a low salt diet? Yes    Are your blood pressures ever more than 140 on the top number (systolic) OR more   than 90 on the bottom number (diastolic), for example 140/90? No      How many servings of fruits and vegetables do you eat daily?  0-1    On average, how many sweetened beverages do you drink each day (Examples: soda, juice, sweet tea, etc.  Do NOT count diet or artificially sweetened beverages)?   0    How many days per week do you exercise enough to make your heart beat faster? 3 or less    How many minutes a day do you exercise enough to make your heart beat faster? 9 or less    How many days per week do you miss taking your medication? 0      Review of Systems:                                                      ROS: negative for fever, chills, cough, wheezes, chest pain, shortness of breath, vomiting, abdominal pain, leg swelling         OBJECTIVE:             Physical exam:  Blood pressure 137/85, pulse 92, temperature 98.9  F (37.2  C), temperature source Oral, resp. rate 18, height 1.727 m (5' 8\"), weight 78.7 kg (173 lb 8 oz), SpO2 96 %, not currently breastfeeding.     NAD, appears comfortable  Skin: no rashes   Neck: supple, no JVD,  No thyroidmegaly. Lymph nodes nonpalpable cervical and supraclavicular.  Chest: clear to auscultation bilaterally, good respiratory effort  Heart: S1 S2, RRR, no mgr appreciated  Abdomen: soft, not tender, no hepatosplenomegaly or masses appreciated, no abdominal bruit, present bowel sounds  Neurologic: A, " Ox3, no focal signs appreciated    PMHx: reviewed  Past Medical History:   Diagnosis Date     Complication of anesthesia     confusion after anesthesia (after hysterectomy)     First degree AV block 8/11/2015     Former smoker      Gastroesophageal reflux disease      GERD (gastroesophageal reflux disease)      Glaucoma      Heart block     2:1     Hyperlipidemia LDL goal <130 6/14/2013     Hypertension goal BP (blood pressure) < 140/90 12/3/2013     Hypothyroidism      Left atrial dilatation     mild     Mild dilation of ascending aorta - borderline 8/11/2015     Palpitations      Prediabetes 6/14/2013     RBBB 8/11/2015     S/P total knee arthroplasty 7/23/2019     Tachycardia      Varicose veins      Venous insufficiency     s/p bilateral great saphenous vein radiofrequency ablation by Dr. Garcia      PSHx: reviewed  Past Surgical History:   Procedure Laterality Date     ARTHROPLASTY HIP Right 11/20/2020    Procedure: Right total hip arthroplasty using a Biomet Taperloc femoral stem and G7 acetabulum.;  Surgeon: Dragan Muse MD;  Location: [facility] OR     ARTHROPLASTY KNEE Right 7/23/2019    Procedure: Right total knee arthroplasty using an [employer] Uniregistrylance knee system;  Surgeon: Dragan Muse MD;  Location: [facility] OR     BREAST SURGERY      right breast ductal surgery due to milk production     COLONOSCOPY N/A 10/24/2014    no further screening. Procedure: COLONOSCOPY;  Surgeon: Pedro Rudd MD;  Location:  GI     HYSTERECTOMY, PAP NO LONGER INDICATED  2009    total hysterectomy and ovaries. benign     SOFT TISSUE SURGERY      ganglion removed from left wrist and ring finger     SURGICAL HISTORY OF -   age 7    tonsillectomy     SURGICAL HISTORY OF -   1/15    left eye cataract     SURGICAL HISTORY OF -   Fall 2016    LINQ placed.        Meds: reviewed  Current Outpatient Medications   Medication Sig Dispense Refill     Acetaminophen (TYLENOL PO) Take 500-1,000 mg by mouth every 6 hours  as needed for mild pain        alendronate (FOSAMAX) 70 MG tablet TAKE 1 TABLET(70 MG) BY MOUTH EVERY 7 DAYS 13 tablet 3     dorzolamide-timolol (COSOPT) 2-0.5 % ophthalmic solution Place 1 drop into both eyes 2 times daily       famotidine (PEPCID) 10 MG tablet Take 1 tablet (10 mg) by mouth 2 times daily 180 tablet 1     fluticasone (FLONASE) 50 MCG/ACT nasal spray Spray 1-2 sprays into both nostrils daily 48 g 1     furosemide (LASIX) 20 MG tablet Take 1 tablet (20 mg) by mouth daily 90 tablet 3     hydrochlorothiazide (HYDRODIURIL) 25 MG tablet Take 1 tablet (25 mg) by mouth daily 90 tablet 3     latanoprost (XALATAN) 0.005 % ophthalmic solution Place 1 drop into the right eye At Bedtime        levothyroxine (SYNTHROID/LEVOTHROID) 88 MCG tablet TAKE 1 TABLET(88 MCG) BY MOUTH DAILY 90 tablet 1     metoprolol tartrate (LOPRESSOR) 25 MG tablet Take 1 tablet (25 mg) by mouth 2 times daily 180 tablet 3     Multiple Vitamins-Minerals (MULTIVITAMIN ADULT PO) Take 1 tablet by mouth daily       potassium chloride ER (KLOR-CON M) 10 MEQ CR tablet Take 1 tablet (10 mEq) by mouth daily 90 tablet 1     pravastatin (PRAVACHOL) 40 MG tablet Take 1 tablet (40 mg) by mouth At Bedtime 90 tablet 1     sodium chloride (OCEAN) 0.65 % nasal spray Spray 2 sprays in nostril daily as needed for congestion 30 mL 0     traZODone (DESYREL) 50 MG tablet Take 1-2 tablets ( mg) by mouth nightly as needed for sleep 60 tablet 3     ibuprofen (ADVIL/MOTRIN) 600 MG tablet Take 1 tablet (600 mg) by mouth every 6 hours as needed for moderate pain (Patient not taking: Reported on 3/5/2021) 60 tablet 0     oxyCODONE (ROXICODONE) 5 MG tablet Take 5 mg by mouth every 6 hours as needed for severe pain       senna-docusate (SENOKOT-S/PERICOLACE) 8.6-50 MG tablet Take 1-2 tablets by mouth 2 times daily Take while on oral narcotics to prevent or treat constipation. (Patient not taking: Reported on 3/5/2021) 30 tablet 0       Soc Hx: reviewed  Fam Hx:  reviewed          Rachel Wong MD  Internal Medicine

## 2021-03-16 ENCOUNTER — IMMUNIZATION (OUTPATIENT)
Dept: NURSING | Facility: CLINIC | Age: 76
End: 2021-03-16
Payer: MEDICARE

## 2021-03-16 DIAGNOSIS — R09.81 CONGESTION OF PARANASAL SINUS: ICD-10-CM

## 2021-03-16 PROCEDURE — 91300 PR COVID VAC PFIZER DIL RECON 30 MCG/0.3 ML IM: CPT

## 2021-03-16 PROCEDURE — 0001A PR COVID VAC PFIZER DIL RECON 30 MCG/0.3 ML IM: CPT

## 2021-03-18 RX ORDER — FLUTICASONE PROPIONATE 50 MCG
SPRAY, SUSPENSION (ML) NASAL
Qty: 48 G | Refills: 1 | Status: SHIPPED | OUTPATIENT
Start: 2021-03-18 | End: 2021-10-08

## 2021-03-18 NOTE — TELEPHONE ENCOUNTER
Pending Prescriptions:                       Disp   Refills    fluticasone (FLONASE) 50 MCG/ACT nasal sp*48 g   1            Sig: SHAKE LIQUID AND USE 1 TO 2 SPRAYS IN EACH           NOSTRIL DAILY    Prescription approved per [employer] Refill Protocol.    Patient had an appointment with primary care provider on 3/5/21

## 2021-04-06 ENCOUNTER — IMMUNIZATION (OUTPATIENT)
Dept: NURSING | Facility: CLINIC | Age: 76
End: 2021-04-06
Attending: INTERNAL MEDICINE
Payer: MEDICARE

## 2021-04-06 PROCEDURE — 0002A PR COVID VAC PFIZER DIL RECON 30 MCG/0.3 ML IM: CPT

## 2021-04-06 PROCEDURE — 91300 PR COVID VAC PFIZER DIL RECON 30 MCG/0.3 ML IM: CPT

## 2021-04-08 ENCOUNTER — TRANSFERRED RECORDS (OUTPATIENT)
Dept: HEALTH INFORMATION MANAGEMENT | Facility: CLINIC | Age: 76
End: 2021-04-08

## 2021-04-21 ENCOUNTER — HOSPITAL ENCOUNTER (OUTPATIENT)
Dept: MAMMOGRAPHY | Facility: CLINIC | Age: 76
Discharge: HOME | End: 2021-04-21
Attending: INTERNAL MEDICINE | Admitting: INTERNAL MEDICINE
Payer: MEDICARE

## 2021-04-21 DIAGNOSIS — Z12.31 VISIT FOR SCREENING MAMMOGRAM: ICD-10-CM

## 2021-04-21 PROCEDURE — 77063 BREAST TOMOSYNTHESIS BI: CPT

## 2021-07-08 DIAGNOSIS — E03.9 HYPOTHYROIDISM, UNSPECIFIED TYPE: ICD-10-CM

## 2021-07-09 RX ORDER — LEVOTHYROXINE SODIUM 88 UG/1
88 TABLET ORAL DAILY
Qty: 90 TABLET | Refills: 0 | Status: SHIPPED | OUTPATIENT
Start: 2021-07-09 | End: 2021-10-08

## 2021-07-09 NOTE — TELEPHONE ENCOUNTER
Pending Prescriptions:                       Disp   Refills    levothyroxine (SYNTHROID/LEVOTHROID) 88 M*90 tab*0            Sig: Take 1 tablet (88 mcg) by mouth daily    Prescription approved per [facility] Refill Protocol.

## 2021-08-05 DIAGNOSIS — R60.9 FLUID RETENTION: ICD-10-CM

## 2021-08-05 DIAGNOSIS — I10 ESSENTIAL HYPERTENSION WITH GOAL BLOOD PRESSURE LESS THAN 140/90: ICD-10-CM

## 2021-08-05 RX ORDER — POTASSIUM CHLORIDE 750 MG/1
10 TABLET, EXTENDED RELEASE ORAL DAILY
Qty: 90 TABLET | Refills: 0 | Status: SHIPPED | OUTPATIENT
Start: 2021-08-05 | End: 2021-10-08

## 2021-08-05 NOTE — TELEPHONE ENCOUNTER
Received refill request for:  Potassium Chloride  Last OV was: 10/8/2020 with APatnoe, CNP  Labs/EKG: last K+ - 12/18/2020  F/U scheduled: overdue orders in Epic.  Note to pharmacy and refill letter sent  New script sent to: [facility]

## 2021-08-05 NOTE — LETTER
August 5, 2021       TO: Teri Beltran  [address] [facility] [address]       Dear Teri Beltran,    We recently received a call from your pharmacy requesting a refill of your medication(s).    Our records indicate that you are due for follow-up with your Heart Care Provider. We will refill your medications for 3 months which will allow you enough time to be seen.    Please call [phone] to schedule your appointment.    Thank you for allowing [facility] to be a part of your health care team and we look forward to seeing you soon.    Thank you,    [facility]

## 2021-08-10 NOTE — TELEPHONE ENCOUNTER
Faxed   

## 2021-08-23 ENCOUNTER — LAB (OUTPATIENT)
Dept: LAB | Facility: CLINIC | Age: 76
End: 2021-08-23
Payer: MEDICARE

## 2021-08-23 DIAGNOSIS — E03.9 HYPOTHYROIDISM, UNSPECIFIED TYPE: ICD-10-CM

## 2021-08-23 DIAGNOSIS — I10 HYPERTENSION GOAL BP (BLOOD PRESSURE) < 140/90: ICD-10-CM

## 2021-08-23 DIAGNOSIS — R73.03 PREDIABETES: ICD-10-CM

## 2021-08-23 DIAGNOSIS — E78.5 HYPERLIPIDEMIA LDL GOAL <130: ICD-10-CM

## 2021-08-23 DIAGNOSIS — M81.0 OSTEOPOROSIS, UNSPECIFIED OSTEOPOROSIS TYPE, UNSPECIFIED PATHOLOGICAL FRACTURE PRESENCE: ICD-10-CM

## 2021-08-23 DIAGNOSIS — I77.810 MILD DILATION OF ASCENDING AORTA (H): ICD-10-CM

## 2021-08-23 DIAGNOSIS — D64.9 ANEMIA, UNSPECIFIED TYPE: ICD-10-CM

## 2021-08-23 LAB
ERYTHROCYTE [DISTWIDTH] IN BLOOD BY AUTOMATED COUNT: 13.3 % (ref 10–15)
HCT VFR BLD AUTO: 41.2 % (ref 35–47)
HGB BLD-MCNC: 13 G/DL (ref 11.7–15.7)
MCH RBC QN AUTO: 30.7 PG (ref 26.5–33)
MCHC RBC AUTO-ENTMCNC: 31.6 G/DL (ref 31.5–36.5)
MCV RBC AUTO: 97 FL (ref 78–100)
PLATELET # BLD AUTO: 378 10E3/UL (ref 150–450)
PTH-INTACT SERPL-MCNC: 33 PG/ML (ref 18–80)
RBC # BLD AUTO: 4.24 10E6/UL (ref 3.8–5.2)
WBC # BLD AUTO: 6.3 10E3/UL (ref 4–11)

## 2021-08-23 PROCEDURE — 80053 COMPREHEN METABOLIC PANEL: CPT

## 2021-08-23 PROCEDURE — 84443 ASSAY THYROID STIM HORMONE: CPT

## 2021-08-23 PROCEDURE — 83970 ASSAY OF PARATHORMONE: CPT

## 2021-08-23 PROCEDURE — 82306 VITAMIN D 25 HYDROXY: CPT

## 2021-08-23 PROCEDURE — 82728 ASSAY OF FERRITIN: CPT

## 2021-08-23 PROCEDURE — 80061 LIPID PANEL: CPT

## 2021-08-23 PROCEDURE — 36415 COLL VENOUS BLD VENIPUNCTURE: CPT

## 2021-08-23 PROCEDURE — 86803 HEPATITIS C AB TEST: CPT

## 2021-08-23 PROCEDURE — 85027 COMPLETE CBC AUTOMATED: CPT

## 2021-08-23 PROCEDURE — 82043 UR ALBUMIN QUANTITATIVE: CPT

## 2021-08-24 LAB
ALBUMIN SERPL-MCNC: 4.2 G/DL (ref 3.4–5)
ALP SERPL-CCNC: 86 U/L (ref 40–150)
ALT SERPL W P-5'-P-CCNC: 26 U/L (ref 0–50)
ANION GAP SERPL CALCULATED.3IONS-SCNC: 8 MMOL/L (ref 3–14)
AST SERPL W P-5'-P-CCNC: 19 U/L (ref 0–45)
BILIRUB SERPL-MCNC: 0.5 MG/DL (ref 0.2–1.3)
BUN SERPL-MCNC: 25 MG/DL (ref 7–30)
CALCIUM SERPL-MCNC: 9.8 MG/DL (ref 8.5–10.1)
CHLORIDE BLD-SCNC: 103 MMOL/L (ref 94–109)
CHOLEST SERPL-MCNC: 162 MG/DL
CO2 SERPL-SCNC: 27 MMOL/L (ref 20–32)
CREAT SERPL-MCNC: 0.68 MG/DL (ref 0.52–1.04)
CREAT UR-MCNC: 90 MG/DL
DEPRECATED CALCIDIOL+CALCIFEROL SERPL-MC: 66 UG/L (ref 20–75)
FASTING STATUS PATIENT QL REPORTED: YES
FERRITIN SERPL-MCNC: 43 NG/ML (ref 8–252)
GFR SERPL CREATININE-BSD FRML MDRD: 85 ML/MIN/1.73M2
GLUCOSE BLD-MCNC: 103 MG/DL (ref 70–99)
HCV AB SERPL QL IA: NONREACTIVE
HDLC SERPL-MCNC: 82 MG/DL
LDLC SERPL CALC-MCNC: 49 MG/DL
MICROALBUMIN UR-MCNC: 8 MG/L
MICROALBUMIN/CREAT UR: 8.89 MG/G CR (ref 0–25)
NONHDLC SERPL-MCNC: 80 MG/DL
POTASSIUM BLD-SCNC: 3.7 MMOL/L (ref 3.4–5.3)
PROT SERPL-MCNC: 7.7 G/DL (ref 6.8–8.8)
SODIUM SERPL-SCNC: 138 MMOL/L (ref 133–144)
TRIGL SERPL-MCNC: 156 MG/DL
TSH SERPL DL<=0.005 MIU/L-ACNC: 2.68 MU/L (ref 0.4–4)

## 2021-09-30 DIAGNOSIS — I10 HYPERTENSION GOAL BP (BLOOD PRESSURE) < 140/90: ICD-10-CM

## 2021-09-30 DIAGNOSIS — R60.0 BILATERAL LEG EDEMA: ICD-10-CM

## 2021-09-30 RX ORDER — HYDROCHLOROTHIAZIDE 25 MG/1
25 TABLET ORAL DAILY
Qty: 90 TABLET | Refills: 0 | Status: SHIPPED | OUTPATIENT
Start: 2021-09-30 | End: 2021-10-08

## 2021-10-08 ENCOUNTER — OFFICE VISIT (OUTPATIENT)
Dept: INTERNAL MEDICINE | Facility: CLINIC | Age: 76
End: 2021-10-08
Payer: MEDICARE

## 2021-10-08 VITALS
TEMPERATURE: 98 F | OXYGEN SATURATION: 98 % | RESPIRATION RATE: 16 BRPM | BODY MASS INDEX: 26.54 KG/M2 | DIASTOLIC BLOOD PRESSURE: 62 MMHG | SYSTOLIC BLOOD PRESSURE: 111 MMHG | WEIGHT: 175.1 LBS | HEIGHT: 68 IN | HEART RATE: 88 BPM

## 2021-10-08 DIAGNOSIS — M81.0 OSTEOPOROSIS, UNSPECIFIED OSTEOPOROSIS TYPE, UNSPECIFIED PATHOLOGICAL FRACTURE PRESENCE: ICD-10-CM

## 2021-10-08 DIAGNOSIS — R09.81 CONGESTION OF PARANASAL SINUS: ICD-10-CM

## 2021-10-08 DIAGNOSIS — R00.2 PALPITATIONS: ICD-10-CM

## 2021-10-08 DIAGNOSIS — K21.00 GASTROESOPHAGEAL REFLUX DISEASE WITH ESOPHAGITIS WITHOUT HEMORRHAGE: ICD-10-CM

## 2021-10-08 DIAGNOSIS — E78.5 HYPERLIPIDEMIA LDL GOAL <130: ICD-10-CM

## 2021-10-08 DIAGNOSIS — Z00.00 ROUTINE GENERAL MEDICAL EXAMINATION AT A HEALTH CARE FACILITY: Primary | ICD-10-CM

## 2021-10-08 DIAGNOSIS — F41.9 ANXIETY: ICD-10-CM

## 2021-10-08 DIAGNOSIS — F51.02 ADJUSTMENT INSOMNIA: ICD-10-CM

## 2021-10-08 DIAGNOSIS — I10 ESSENTIAL HYPERTENSION WITH GOAL BLOOD PRESSURE LESS THAN 140/90: ICD-10-CM

## 2021-10-08 DIAGNOSIS — R60.9 FLUID RETENTION: ICD-10-CM

## 2021-10-08 DIAGNOSIS — I10 HYPERTENSION GOAL BP (BLOOD PRESSURE) < 140/90: ICD-10-CM

## 2021-10-08 DIAGNOSIS — E03.9 HYPOTHYROIDISM, UNSPECIFIED TYPE: ICD-10-CM

## 2021-10-08 DIAGNOSIS — R60.0 BILATERAL LEG EDEMA: ICD-10-CM

## 2021-10-08 PROCEDURE — 99214 OFFICE O/P EST MOD 30 MIN: CPT | Mod: 25 | Performed by: INTERNAL MEDICINE

## 2021-10-08 PROCEDURE — G0008 ADMIN INFLUENZA VIRUS VAC: HCPCS | Performed by: INTERNAL MEDICINE

## 2021-10-08 PROCEDURE — 90662 IIV NO PRSV INCREASED AG IM: CPT | Performed by: INTERNAL MEDICINE

## 2021-10-08 PROCEDURE — G0439 PPPS, SUBSEQ VISIT: HCPCS | Performed by: INTERNAL MEDICINE

## 2021-10-08 RX ORDER — FLUTICASONE PROPIONATE 50 MCG
1-2 SPRAY, SUSPENSION (ML) NASAL DAILY
Qty: 48 G | Refills: 1 | Status: SHIPPED | OUTPATIENT
Start: 2021-10-08 | End: 2022-05-26

## 2021-10-08 RX ORDER — METOPROLOL TARTRATE 25 MG/1
25 TABLET, FILM COATED ORAL 2 TIMES DAILY
Qty: 180 TABLET | Refills: 1 | Status: SHIPPED | OUTPATIENT
Start: 2021-10-08 | End: 2021-11-29

## 2021-10-08 RX ORDER — LEVOTHYROXINE SODIUM 88 UG/1
88 TABLET ORAL DAILY
Qty: 90 TABLET | Refills: 1 | Status: SHIPPED | OUTPATIENT
Start: 2021-10-08 | End: 2022-07-08

## 2021-10-08 RX ORDER — FUROSEMIDE 40 MG
40 TABLET ORAL DAILY
Qty: 90 TABLET | Refills: 1 | Status: SHIPPED | OUTPATIENT
Start: 2021-10-08 | End: 2021-11-29

## 2021-10-08 RX ORDER — PRAVASTATIN SODIUM 40 MG
40 TABLET ORAL AT BEDTIME
Qty: 90 TABLET | Refills: 1 | Status: SHIPPED | OUTPATIENT
Start: 2021-10-08 | End: 2021-11-29

## 2021-10-08 RX ORDER — FUROSEMIDE 20 MG
20 TABLET ORAL DAILY
Qty: 90 TABLET | Refills: 1 | Status: SHIPPED | OUTPATIENT
Start: 2021-10-08 | End: 2021-11-29

## 2021-10-08 RX ORDER — TRAZODONE HYDROCHLORIDE 50 MG/1
50-100 TABLET, FILM COATED ORAL
Qty: 180 TABLET | Refills: 1 | Status: SHIPPED | OUTPATIENT
Start: 2021-10-08 | End: 2022-09-22

## 2021-10-08 RX ORDER — POTASSIUM CHLORIDE 750 MG/1
10 TABLET, EXTENDED RELEASE ORAL DAILY
Qty: 90 TABLET | Refills: 1 | Status: SHIPPED | OUTPATIENT
Start: 2021-10-08 | End: 2021-11-29

## 2021-10-08 RX ORDER — FAMOTIDINE 10 MG
10 TABLET ORAL 2 TIMES DAILY
Qty: 180 TABLET | Refills: 1 | Status: SHIPPED | OUTPATIENT
Start: 2021-10-08 | End: 2023-04-26

## 2021-10-08 ASSESSMENT — MIFFLIN-ST. JEOR: SCORE: 1332.75

## 2021-10-08 ASSESSMENT — ACTIVITIES OF DAILY LIVING (ADL): CURRENT_FUNCTION: NO ASSISTANCE NEEDED

## 2021-10-08 NOTE — PATIENT INSTRUCTIONS
Plan:  1. stop the Fosamax after you finish the tablets you have at home   2. Take Furosemide  20 daily  3. Take the hydrochlorathiazide only if you notice right leg swelling   4. Continue the other meds, same doses for now.  5. Please make a lab appointment for non fasting labs  In January - Feb   6. Please make an appointment few days after the labs to discuss about the results.   7. Keep the appointment with cardiology, dr Anglin. Talk to him about the lightheadedness jeb

## 2021-10-08 NOTE — PROGRESS NOTES
Dr Wong's note    Patient's instructions / PLAN:                                                        Plan:  1. stop the Fosamax after you finish the tablets you have at home   2. Take Furosemide  20 daily  3. Take the hydrochlorathiazide only if you notice right leg swelling   4. Continue the other meds, same doses for now.  5. Please make a lab appointment for non fasting labs  In January - Feb   6. Please make an appointment few days after the labs to discuss about the results.   7. Keep the appointment with cardiology, dr Anglin. Talk to him about the lightheadedness spell        ASSESSMENT & PLAN:                                                      (Z00.00) Routine general medical examination at a health care facility  (primary encounter diagnosis)  Comment:   Plan:     (M81.0) Osteoporosis, Fosamax 2015 - 2021  Comment:   Plan: as above     (K21.00) Gastroesophageal reflux disease with esophagitis without hemorrhage  Comment: Controlled    Plan: famotidine (PEPCID) 10 MG tablet            (R09.81) Congestion of paranasal sinus  Comment:   Plan: fluticasone (FLONASE) 50 MCG/ACT nasal spray            (R60.9) Fluid retention  Comment:   Plan: potassium chloride ER (KLOR-CON M) 10 MEQ CR         tablet            (I10) HTN, goal  < 140/90  Comment: Controlled    Plan: metoprolol tartrate (LOPRESSOR) 25 MG tablet            (R60.0) Bilateral leg edema  Comment: stable   Plan: furosemide (LASIX) 40 MG tablet            (E03.9) Hypothyroidism, unspecified type  Comment:   Plan: levothyroxine (SYNTHROID/LEVOTHROID) 88 MCG         tablet            (R00.2) Palpitations  Comment: Controlled    Plan: metoprolol tartrate (LOPRESSOR) 25 MG tablet            (E78.5) Hyperlipidemia LDL goal <130  Comment:   Plan: pravastatin (PRAVACHOL) 40 MG tablet            (F41.9) Anxiety  Comment:   Plan: traZODone (DESYREL) 50 MG tablet            (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  Comment:   Plan: traZODone (DESYREL) 50 MG tablet                Chief Complaint:                                                        Annual exam  Follow up chronic medical problems      SUBJECTIVE:                                                    History of present illness     We reviewed the chronic medical problems as above.   I reviewed the recent tests results in Epic     Labs Aug -- Discussed      Home BP < 140. HR 70-80     Lightheadedness spell.she was at the kitchen sink. She felt lightheaded and she fell backwards   She has an implatable card monitor that bothers her     ROS:     See below    General: Negative for fever, chills, major weight changes, fatigue  Skin: Negative for rashes, abnormal spots  Eyes: Negative for blurred or double vision  ENT/mouth: Negative for sinuses discomfort, earache, sore throat  Respiratory: Negative for cough, wheezes, chronic lung disease  Cardiovascular: Negative for rest or exertional chest pain, shortness of breath, palpitations, leg edema,   Gastrointestinal: Negative for vomiting, abdominal pain, heartburn, blood in stool, diarrhea, constipation  Genitourinary: Negative for urinary frequency, blood in urine, history of kidney stones  Female: Negative for abnormal vaginal bleeding, vaginal discharge  Neuro: Negative for headaches, numbness, tingling, weakness in arms or legs, history of seizure, recent syncope  Psychiatry: Negative for depression, anxiety, suicidal thoughts  Endo: Negative for known thyroid disease, diabetes.  Hemato/Lymph: Negative for nodes, easy bleeding, history of DVT, blood transfusion  Musculoskeletal: Negative for joint swelling, back pain. Pos for chr hip pain after surgery       PMHx: - reviewed  Past Medical History:   Diagnosis Date     Complication of anesthesia     confusion after anesthesia (after hysterectomy)     First degree AV block 8/11/2015     Former smoker      Gastroesophageal reflux disease      GERD (gastroesophageal reflux disease)      Glaucoma      Heart block     2:1      Hyperlipidemia LDL goal <130 6/14/2013     Hypertension goal BP (blood pressure) < 140/90 12/3/2013     Hypothyroidism      Left atrial dilatation     mild     Mild dilation of ascending aorta - borderline 8/11/2015     Palpitations      Prediabetes 6/14/2013     RBBB 8/11/2015     S/P total knee arthroplasty 7/23/2019     Tachycardia      Varicose veins      Venous insufficiency     s/p bilateral great saphenous vein radiofrequency ablation by Dr. Garcia       PSHx: reviewed  Past Surgical History:   Procedure Laterality Date     ARTHROPLASTY HIP Right 11/20/2020    Procedure: Right total hip arthroplasty using a Biomet Taperloc femoral stem and G7 acetabulum.;  Surgeon: Dragan Muse MD;  Location: [facility] OR     ARTHROPLASTY KNEE Right 7/23/2019    Procedure: Right total knee arthroplasty using an [employer] knee system;  Surgeon: Dragan Muse MD;  Location: [facility] OR     BREAST SURGERY      right breast ductal surgery due to milk production     BREAST SURGERY Right     ductal surgery due to milk production     COLONOSCOPY N/A 10/24/2014    no further screening. Procedure: COLONOSCOPY;  Surgeon: Pedro Rudd MD;  Location: [facility]     FINGER GANGLION CYST EXCISION Left     ring finger     HYSTERECTOMY       HYSTERECTOMY, PAP NO LONGER INDICATED  2009    total hysterectomy and ovaries. benign     SOFT TISSUE SURGERY      ganglion removed from left wrist and ring finger     SURGICAL HISTORY OF -   age 7    tonsillectomy     SURGICAL HISTORY OF -   1/15    left eye cataract     SURGICAL HISTORY OF -   Fall 2016    LINQ placed.     TONSILLECTOMY       WRIST GANGLION EXCISION Left         Soc Hx: No daily alcohol, no smoking  Social History     Socioeconomic History     Marital status:      Spouse name: Not on file     Number of children: Not on file     Years of education: Not on file     Highest education level: Not on file   Occupational History     Not on file   Tobacco Use      Smoking status: Former Smoker     Packs/day: 0.50     Years: 18.00     Pack years: 9.00     Types: Cigarettes     Quit date:      Years since quittin.7     Smokeless tobacco: Never Used   Substance and Sexual Activity     Alcohol use: Yes     Alcohol/week: 0.0 standard drinks     Comment: 1 glass of wine at night     Drug use: No     Sexual activity: Yes     Partners: Male   Other Topics Concern     Parent/sibling w/ CABG, MI or angioplasty before 65F 55M? Not Asked      Service Not Asked     Blood Transfusions Not Asked     Caffeine Concern No     Comment: 4-5 cups of coffee daily     Occupational Exposure Not Asked     Hobby Hazards Not Asked     Sleep Concern Not Asked     Stress Concern Not Asked     Weight Concern Not Asked     Special Diet No     Comment: no added salt     Back Care Not Asked     Exercise No     Comment: 3 days per week - exercise class      Bike Helmet Not Asked     Seat Belt Not Asked     Self-Exams Not Asked   Social History Narrative     Not on file     Social Determinants of Health     Financial Resource Strain:      Difficulty of Paying Living Expenses:    Food Insecurity:      Worried About Running Out of Food in the Last Year:      Ran Out of Food in the Last Year:    Transportation Needs:      Lack of Transportation (Medical):      Lack of Transportation (Non-Medical):    Physical Activity:      Days of Exercise per Week:      Minutes of Exercise per Session:    Stress:      Feeling of Stress :    Social Connections:      Frequency of Communication with Friends and Family:      Frequency of Social Gatherings with Friends and Family:      Attends Faith Services:      Active Member of Clubs or Organizations:      Attends Club or Organization Meetings:      Marital Status:    Intimate Partner Violence:      Fear of Current or Ex-Partner:      Emotionally Abused:      Physically Abused:      Sexually Abused:         Fam Hx: reviewed  Family History   Problem Relation  Age of Onset     Cancer Mother         ovarian     Breast Cancer Mother      Heart Disease Father         rare; not sure what it was     Diabetes Father         old age     Cerebrovascular Disease Brother 68         Screening: reviewed      All: reviewed    Meds: reviewed  Current Outpatient Medications   Medication Sig Dispense Refill     Acetaminophen (TYLENOL PO) Take 500-1,000 mg by mouth every 6 hours as needed for mild pain        alendronate (FOSAMAX) 70 MG tablet TAKE 1 TABLET(70 MG) BY MOUTH EVERY 7 DAYS 13 tablet 3     dorzolamide-timolol (COSOPT) 2-0.5 % ophthalmic solution Place 1 drop into both eyes 2 times daily       famotidine (PEPCID) 10 MG tablet Take 1 tablet (10 mg) by mouth 2 times daily 180 tablet 2     fluticasone (FLONASE) 50 MCG/ACT nasal spray SHAKE LIQUID AND USE 1 TO 2 SPRAYS IN EACH NOSTRIL DAILY 48 g 1     furosemide (LASIX) 20 MG tablet Take 1 tablet (20 mg) by mouth daily 90 tablet 3     hydrochlorothiazide (HYDRODIURIL) 25 MG tablet Take 1 tablet (25 mg) by mouth daily 90 tablet 0     latanoprost (XALATAN) 0.005 % ophthalmic solution Place 1 drop into the right eye At Bedtime        levothyroxine (SYNTHROID/LEVOTHROID) 88 MCG tablet Take 1 tablet (88 mcg) by mouth daily 90 tablet 0     metoprolol tartrate (LOPRESSOR) 25 MG tablet Take 1 tablet (25 mg) by mouth 2 times daily 180 tablet 3     Multiple Vitamins-Minerals (MULTIVITAMIN ADULT PO) Take 1 tablet by mouth daily       potassium chloride ER (KLOR-CON M) 10 MEQ CR tablet Take 1 tablet (10 mEq) by mouth daily 90 tablet 0     pravastatin (PRAVACHOL) 40 MG tablet Take 1 tablet (40 mg) by mouth At Bedtime 90 tablet 1     sodium chloride (OCEAN) 0.65 % nasal spray Spray 2 sprays in nostril daily as needed for congestion 30 mL 0     traZODone (DESYREL) 50 MG tablet Take 1-2 tablets ( mg) by mouth nightly as needed for sleep 60 tablet 3     ibuprofen (ADVIL/MOTRIN) 600 MG tablet Take 1 tablet (600 mg) by mouth every 6 hours as  "needed for moderate pain (Patient not taking: Reported on 3/5/2021) 60 tablet 0     oxyCODONE (ROXICODONE) 5 MG tablet Take 5 mg by mouth every 6 hours as needed for severe pain (Patient not taking: Reported on 10/8/2021)       senna-docusate (SENOKOT-S/PERICOLACE) 8.6-50 MG tablet Take 1-2 tablets by mouth 2 times daily Take while on oral narcotics to prevent or treat constipation. (Patient not taking: Reported on 3/5/2021) 30 tablet 0       OBJECTIVE:                                                    Physical Exam :  Blood pressure 111/62, pulse 88, temperature 98  F (36.7  C), temperature source Oral, resp. rate 16, height 1.727 m (5' 8\"), weight 79.4 kg (175 lb 1.6 oz), SpO2 98 %, not currently breastfeeding.     NAD, appears comfortable  Skin clear, no rashes  Neck: supple, no JVD,  no thyroidmegaly  Lymph nodes non palpable in the cervical, supraclavicular axillaries,   Chest: clear to auscultation with good respiratory effort  Cardiac: S1S2, RRR, no mgr appreciated  Abdomen: soft, not tender, not distended, audible bowel sound, no hepatosplenomegaly, no palpable masses, no abdominal bruits  Extremities: no cyanosis, clubbing. No edema R leg. + 1 edema L leg  Neuro: A, Ox3, no focal signs.  Breast exam in supine and erect position: they are symmetrical, no skin changes, no tenderness or nodes on palpation. Nipples are erect, no skin lesions, no discharge on pressure.    Pelvic exam: deferred, s/p menopause, no symptoms, no hx of abnormal pap         Rachel Wong MD  Internal Medicine       SUBJECTIVE:   Teri Beltran is a 75 year old female who presents for Preventive Visit.      Patient has been advised of split billing requirements and indicates understanding: Yes   Are you in the first 12 months of your Medicare coverage?  No    Healthy Habits:    In general, how would you rate your overall health?  Good    Frequency of exercise:  None    Duration of exercise:  Less than 15 minutes    Taking " medications regularly:  Yes    Barriers to taking medications:  None    Medication side effects:  Not applicable    Ability to successfully perform activities of daily living:  No assistance needed    Home Safety:  No safety concerns identified    Hearing Impairment:  Feel that people are mumbling or not speaking clearly    In the past 6 months, have you been bothered by leaking of urine?  No    In general, how would you rate your overall mental or emotional health?  Good      PHQ-2 Total Score:    Additional concerns today:  Yes (Balance issue )    Do you feel safe in your environment? Yes    Have you ever done Advance Care Planning? (For example, a Health Directive, POLST, or a discussion with a medical provider or your loved ones about your wishes): No, advance care planning information given to patient to review.  Patient plans to discuss their wishes with loved ones or provider.         Fall risk  Fallen 2 or more times in the past year?: Yes  Any fall with injury in the past year?: Yes (Hurt head)  click delete button to remove this line now  Cognitive Screening   1) Repeat 3 items (Leader, Season, Table)    2) Clock draw: NORMAL  3) 3 item recall: Recalls 3 objects  Results: 3 items recalled: COGNITIVE IMPAIRMENT LESS LIKELY    Mini-CogTM Copyright S Shannen. Licensed by the author for use in [facility]; reprinted with permission ([email or web address]@.[email or web address]). All rights reserved.      Do you have sleep apnea, excessive snoring or daytime drowsiness?: no    Reviewed and updated as needed this visit by clinical staff                 Reviewed and updated as needed this visit by Provider                Social History     Tobacco Use     Smoking status: Former Smoker     Packs/day: 0.50     Years: 18.00     Pack years: 9.00     Types: Cigarettes     Quit date:      Years since quittin.7     Smokeless tobacco: Never Used   Substance Use Topics     Alcohol use: Yes     Alcohol/week: 0.0 standard drinks      "Comment: 1 glass of wine at night     If you drink alcohol do you typically have >3 drinks per day or >7 drinks per week? No    Alcohol Use 8/11/2015   Prescreen: >3 drinks/day or >7 drinks/week? The patient does not drink >3 drinks per day nor >7 drinks per week.   No flowsheet data found.            Current providers sharing in care for this patient include:   Patient Care Team:  Rachel Miller MD as PCP - General (Internal Medicine)  Rachel Miller MD as Assigned PCP  Ronna Garcia MD as Assigned Pulmonology Provider  Joshua Sunshine NP as Assigned Heart and Vascular Provider    The following health maintenance items are reviewed in Epic and correct as of today:  Health Maintenance Due   Topic Date Due     ANNUAL REVIEW OF HM ORDERS  Never done     ADVANCE CARE PLANNING  10/02/2018     PHQ-2  01/01/2021     FALL RISK ASSESSMENT  02/19/2021     MEDICARE ANNUAL WELLNESS VISIT  08/06/2021     INFLUENZA VACCINE (1) 09/01/2021         Patient has been advised of split billing requirements and indicates understanding: Yes At the check in, at the    COUNSELING:  Reviewed preventive health counseling, as reflected in patient instructions       Regular exercise       Healthy diet/nutrition    Estimated body mass index is 26.38 kg/m  as calculated from the following:    Height as of 3/5/21: 1.727 m (5' 8\").    Weight as of 3/5/21: 78.7 kg (173 lb 8 oz).        She reports that she quit smoking about 41 years ago. Her smoking use included cigarettes. She has a 9.00 pack-year smoking history. She has never used smokeless tobacco.      Appropriate preventive services were discussed with this patient, including applicable screening as appropriate for cardiovascular disease, diabetes, osteopenia/osteoporosis, and glaucoma.  As appropriate for age/gender, discussed screening for colorectal cancer, prostate cancer, breast cancer, and cervical cancer. Checklist reviewing " preventive services available has been given to the patient.    Reviewed patients plan of care and provided an AVS. The Basic Care Plan (routine screening as documented in Health Maintenance) for Teri meets the Care Plan requirement. This Care Plan has been established and reviewed with the Patient.    Counseling Resources:  ATP IV Guidelines  Pooled Cohorts Equation Calculator  Breast Cancer Risk Calculator  Breast Cancer: Medication to Reduce Risk  FRAX Risk Assessment  ICSI Preventive Guidelines  Dietary Guidelines for Americans, 2010  USDA's MyPlate  ASA Prophylaxis  Lung CA Screening    Rachel Miller MD  [facility]    Identified Health Risks:

## 2021-11-08 ENCOUNTER — ANCILLARY PROCEDURE (OUTPATIENT)
Dept: BONE DENSITY | Facility: CLINIC | Age: 76
End: 2021-11-08
Attending: INTERNAL MEDICINE
Payer: MEDICARE

## 2021-11-08 DIAGNOSIS — M81.0 OSTEOPOROSIS, UNSPECIFIED OSTEOPOROSIS TYPE, UNSPECIFIED PATHOLOGICAL FRACTURE PRESENCE: ICD-10-CM

## 2021-11-08 PROCEDURE — 77081 DXA BONE DENSITY APPENDICULR: CPT | Mod: 59 | Performed by: INTERNAL MEDICINE

## 2021-11-08 PROCEDURE — 77085 DXA BONE DENSITY AXL VRT FX: CPT | Performed by: INTERNAL MEDICINE

## 2021-11-29 ENCOUNTER — OFFICE VISIT (OUTPATIENT)
Dept: CARDIOLOGY | Facility: CLINIC | Age: 76
End: 2021-11-29
Payer: MEDICARE

## 2021-11-29 VITALS
HEIGHT: 66 IN | WEIGHT: 178.9 LBS | HEART RATE: 69 BPM | SYSTOLIC BLOOD PRESSURE: 124 MMHG | DIASTOLIC BLOOD PRESSURE: 70 MMHG | OXYGEN SATURATION: 97 % | BODY MASS INDEX: 28.75 KG/M2

## 2021-11-29 DIAGNOSIS — I47.20 VENTRICULAR TACHYCARDIA (H): ICD-10-CM

## 2021-11-29 DIAGNOSIS — R60.0 BILATERAL LEG EDEMA: ICD-10-CM

## 2021-11-29 DIAGNOSIS — R00.2 PALPITATIONS: ICD-10-CM

## 2021-11-29 DIAGNOSIS — E78.5 HYPERLIPIDEMIA LDL GOAL <130: ICD-10-CM

## 2021-11-29 DIAGNOSIS — I47.10 SVT (SUPRAVENTRICULAR TACHYCARDIA) (H): ICD-10-CM

## 2021-11-29 DIAGNOSIS — R60.9 FLUID RETENTION: ICD-10-CM

## 2021-11-29 DIAGNOSIS — I10 ESSENTIAL HYPERTENSION: ICD-10-CM

## 2021-11-29 DIAGNOSIS — I10 HYPERTENSION GOAL BP (BLOOD PRESSURE) < 140/90: ICD-10-CM

## 2021-11-29 DIAGNOSIS — I10 ESSENTIAL HYPERTENSION WITH GOAL BLOOD PRESSURE LESS THAN 140/90: ICD-10-CM

## 2021-11-29 DIAGNOSIS — R55 SYNCOPE, UNSPECIFIED SYNCOPE TYPE: ICD-10-CM

## 2021-11-29 PROCEDURE — 99214 OFFICE O/P EST MOD 30 MIN: CPT | Performed by: INTERNAL MEDICINE

## 2021-11-29 PROCEDURE — 93000 ELECTROCARDIOGRAM COMPLETE: CPT | Performed by: INTERNAL MEDICINE

## 2021-11-29 RX ORDER — POTASSIUM CHLORIDE 750 MG/1
10 TABLET, EXTENDED RELEASE ORAL DAILY
Qty: 90 TABLET | Refills: 3 | Status: SHIPPED | OUTPATIENT
Start: 2021-11-29 | End: 2022-05-05

## 2021-11-29 RX ORDER — FUROSEMIDE 20 MG
20 TABLET ORAL DAILY
Qty: 90 TABLET | Refills: 3 | Status: SHIPPED | OUTPATIENT
Start: 2021-11-29 | End: 2022-04-29 | Stop reason: DRUGHIGH

## 2021-11-29 RX ORDER — METOPROLOL TARTRATE 25 MG/1
25 TABLET, FILM COATED ORAL 2 TIMES DAILY
Qty: 180 TABLET | Refills: 3 | Status: SHIPPED | OUTPATIENT
Start: 2021-11-29 | End: 2022-06-07

## 2021-11-29 RX ORDER — PRAVASTATIN SODIUM 40 MG
40 TABLET ORAL AT BEDTIME
Qty: 90 TABLET | Refills: 3 | Status: SHIPPED | OUTPATIENT
Start: 2021-11-29 | End: 2022-08-10

## 2021-11-29 ASSESSMENT — MIFFLIN-ST. JEOR: SCORE: 1318.24

## 2021-11-29 NOTE — PATIENT INSTRUCTIONS
November 29, 2021    Thank you for allowing our Cardiology team to participate in your care.     Please note the following changes to your heart treatment plan:     Medication changes:   - continue furosemide 20mg daily     Tests to be done:  - venous competency testing (ultrasound)  - ZioPatch monitor  - FASTING cholesterol labs in 1 year    Follow up:  - Follow up in 1 year, or sooner as needed.      Please contact our team at [phone] or [phone] for any questions or concerns.   For scheduling, please call [phone].  If you are having a medical emergency, please call [phone].     Sincerely,    Quincy Anglin MD, FACC  Cardiology    [facility] [place]

## 2021-11-29 NOTE — LETTER
11/29/2021    Rachel Miller MD  [address] [facility] [address]    RE: Teri Beltran       Dear Colleague,    I had the pleasure of seeing Teri Beltran in the [facility].      Cardiology Clinic Progress Note:    November 29, 2021   Patient Name: Teri Beltran  Patient MRN: [id number]     Consult indication: HTN, atrial tachycardia     HPI:    I had the opportunity to see patient Teri Beltran in cardiology clinic for a follow up visit. Patient is followed by our colleague Rachel Miller MD with Primary Care.     As you know, patient is a pleasant 75-year-old female with a past medical history significant for first-degree AV block, right bundle branch block, paroxysmal SVT, GERD, hypertension, hyperlipidemia, chronic venous insufficiency (status post venous ablation), aortic sclerosis, who presents for follow-up.    Patient was previously seen by my colleagues Dr. Pavon, as well as Dr. Villareal with cardiology EP.  She had been seen by Dr. Villareal for tachycardia, Zio patch demonstrated frequent but brief episodes of atrial tachycardia at approximately 100 to 110 bpm, no atrial fibrillation was seen.  Flecainide was considered at that time, however has not been started.    I had then seen her for a preoperative cardiac risk assessment on 9/18/2020, prior to hip surgery.  At that time, she had been doing quite well, though was mildly hypertensive.  Hydrochlorothiazide was increased from 12.5 mg daily to 25 mg daily.  A dobutamine stress echocardiogram 1/2020 demonstrated aortic sclerosis with trace aortic insufficiency, no ischemic changes.    Since then, unfortunately, patient suffered a dislocation of the newly replaced hip, and was admitted 12/2020.  She had bent over to pick something up, and felt her hip pop out of the socket, resulting in excruciating pain.  Fortunately this is able to be reduced  in the ED.  More recently, she was seen by her primary care physician Dr. Miller and had been complaining of some lightheadedness, and so hydrochlorothiazide was discontinued.    Since this change, she reports that she feels quite well.  She has not had recurrence of the dizziness/lightheadedness.  She denies any chest pain, chest pressure.  Blood pressure today in clinic was 124/70 mmHg.  She does still have residual lower extremity swelling, worse on the right side.  She has a history of lower extremity venous incompetence status post bilateral GSV ablation with Dr. Garcia.    ECG in clinic demonstrates normal sinus rhythm with first-degree block and right bundle branch block, stable from prior.    Cholesterol labs from 8/23/2021 demonstrate total cholesterol 162, HDL 82, LDL 49, triglycerides 156    Assessment and Plan/Recommendations:    # Dizziness, largely resolved after hydrochlorothiazide was discontinued by Dr. Miller   # Conduction abnormalities, known first-degree AV block, right bundle branch block, paroxysmal SVT, followed by my colleague, Dr. Villareal.   # Hypertension.  Well controlled.   # Chronic lower extremity swelling due to venous insufficiency, history of bilateral great saphenous vein radiofrequency ablation.   # Mild aortic dilatation noted on chart review; however size was normal on TTE 03/27/2018.     -Zio patch monitor to reassess conduction abnormalities in the setting of transient dizziness, though the dizziness seems to have improved after discontinuation of hydrochlorothiazide  -Venous competency testing  -Continue current cardiac regimen of furosemide 20 mg daily, potassium supplementation 10 mEq daily, metoprolol tartrate 25 mg twice daily, pravastatin 40 mg nightly  -Follow-up in 1 year with fasting lipids at that time, or sooner as needed    Thank you for allowing our team to participate in the care of Teri Beltran.  Please do not hesitate to call or page me  with any questions or concerns.    Sincerely,     Quincy Anglin MD, [facility]  Cardiology  Text Page   November 29, 2021    Voice recognition software utilized. Although reviewed after completion, some word and grammatical errors may be present.    Total time spent on this encounter: 35 minutes, providing care in this encounter including, but not limited to, reviewing prior medical records, laboratory data, imaging studies, diagnostic studies, procedure notes, formulating an assessment and plan, recommendations.    Past Medical History:     Past Medical History:   Diagnosis Date     Complication of anesthesia     confusion after anesthesia (after hysterectomy)     First degree AV block 8/11/2015     Former smoker      Gastroesophageal reflux disease      GERD (gastroesophageal reflux disease)      Glaucoma      Heart block     2:1     Hyperlipidemia LDL goal <130 6/14/2013     Hypertension goal BP (blood pressure) < 140/90 12/3/2013     Hypothyroidism      Left atrial dilatation     mild     Mild dilation of ascending aorta - borderline 8/11/2015     Palpitations      Prediabetes 6/14/2013     RBBB 8/11/2015     S/P total knee arthroplasty 7/23/2019     Tachycardia      Varicose veins      Venous insufficiency     s/p bilateral great saphenous vein radiofrequency ablation by Dr. Garcia        Past Surgical History:   Past Surgical History:   Procedure Laterality Date     ARTHROPLASTY HIP Right 11/20/2020    Procedure: Right total hip arthroplasty using a Biomet Taperloc femoral stem and G7 acetabulum.;  Surgeon: Dragan Muse MD;  Location: [facility] OR     ARTHROPLASTY KNEE Right 7/23/2019    Procedure: Right total knee arthroplasty using an Arthrex iBalance knee system;  Surgeon: Dragan Muse MD;  Location: [facility] OR     BREAST SURGERY      right breast ductal surgery due to milk production     BREAST SURGERY Right     ductal surgery due to milk production     COLONOSCOPY N/A 10/24/2014     no further screening. Procedure: COLONOSCOPY;  Surgeon: Pedro Rudd MD;  Location: [facility] GI     FINGER GANGLION CYST EXCISION Left     ring finger     HYSTERECTOMY       HYSTERECTOMY, PAP NO LONGER INDICATED  2009    total hysterectomy and ovaries. benign     SOFT TISSUE SURGERY      ganglion removed from left wrist and ring finger     SURGICAL HISTORY OF -   age 7    tonsillectomy     SURGICAL HISTORY OF -   1/15    left eye cataract     SURGICAL HISTORY OF -   Fall 2016    LINQ placed.     TONSILLECTOMY       WRIST GANGLION EXCISION Left        Medications (outpatient):  Current Outpatient Medications   Medication Sig Dispense Refill     Acetaminophen (TYLENOL PO) Take 500-1,000 mg by mouth every 6 hours as needed for mild pain        dorzolamide-timolol (COSOPT) 2-0.5 % ophthalmic solution Place 1 drop into both eyes 2 times daily       famotidine (PEPCID) 10 MG tablet Take 1 tablet (10 mg) by mouth 2 times daily 180 tablet 1     fluticasone (FLONASE) 50 MCG/ACT nasal spray Spray 1-2 sprays into both nostrils daily 48 g 1     furosemide (LASIX) 20 MG tablet Take 1 tablet (20 mg) by mouth daily 90 tablet 1     latanoprost (XALATAN) 0.005 % ophthalmic solution Place 1 drop into the right eye At Bedtime        levothyroxine (SYNTHROID/LEVOTHROID) 88 MCG tablet Take 1 tablet (88 mcg) by mouth daily 90 tablet 1     metoprolol tartrate (LOPRESSOR) 25 MG tablet Take 1 tablet (25 mg) by mouth 2 times daily 180 tablet 1     Multiple Vitamins-Minerals (MULTIVITAMIN ADULT PO) Take 1 tablet by mouth daily       potassium chloride ER (KLOR-CON M) 10 MEQ CR tablet Take 1 tablet (10 mEq) by mouth daily 90 tablet 1     pravastatin (PRAVACHOL) 40 MG tablet Take 1 tablet (40 mg) by mouth At Bedtime 90 tablet 1     furosemide (LASIX) 40 MG tablet Take 1 tablet (40 mg) by mouth daily (Patient not taking: Reported on 11/29/2021) 90 tablet 1     traZODone (DESYREL) 50 MG tablet Take 1-2 tablets ( mg) by mouth nightly as  "needed for sleep (Patient not taking: Reported on 11/29/2021) 180 tablet 1       Allergies:  Allergies   Allergen Reactions     Seasonal Allergies        Social History:   History   Drug Use No      History   Smoking Status     Former Smoker     Packs/day: 0.50     Years: 18.00     Types: Cigarettes     Quit date: 1980   Smokeless Tobacco     Never Used     Social History    Substance and Sexual Activity      Alcohol use: Yes        Alcohol/week: 0.0 standard drinks        Comment: 1 glass of wine at night       Family History:  Family History   Problem Relation Age of Onset     Cancer Mother         ovarian     Breast Cancer Mother      Heart Disease Father         rare; not sure what it was     Diabetes Father         old age     Cerebrovascular Disease Brother 68       Review of Systems:   A complete review of systems was negative except as mentioned in the History of Present Illness.     Objective & Physical Exam:  /70 (BP Location: Right arm, Patient Position: Sitting, Cuff Size: Adult Regular)   Pulse 69   Ht 1.676 m (5' 6\")   Wt 81.1 kg (178 lb 14.4 oz)   LMP  (LMP Unknown)   SpO2 97%   BMI 28.88 kg/m    Wt Readings from Last 2 Encounters:   11/29/21 81.1 kg (178 lb 14.4 oz)   10/08/21 79.4 kg (175 lb 1.6 oz)     Body mass index is 28.88 kg/m .   Body surface area is 1.94 meters squared.    Constitutional: appears stated age, in no apparent distress, appears to be well nourished  Eyes: sclera anicteric, conjunctiva normal  ENT: normocephalic, without obvious abnormality, atraumatic  Pulmonary: clear to auscultation bilaterally, no wheezes, no rales, no increased work of breathing  Cardiovascular: JVP normal, regular rate, regular rhythm, 1/6 KATTY at the RUSB, trace LLE swelling, trace to 1+ RLE swelling   Gastrointestinal: abdominal exam benign  Neurologic: awake, alert, face symmetrical, moves all extremities  Skin: no jaundice  Psychiatric: affect is normal, answers questions appropriately, " oriented to self and place    Data reviewed:  Lab Results   Component Value Date    WBC 6.3 08/23/2021    WBC 6.4 12/17/2020    RBC 4.24 08/23/2021    RBC 2.91 (L) 12/17/2020    HGB 13.0 08/23/2021    HGB 9.2 (L) 12/18/2020    HCT 41.2 08/23/2021    HCT 27.5 (L) 12/17/2020    MCV 97 08/23/2021    MCV 95 12/17/2020    MCH 30.7 08/23/2021    MCH 29.6 12/17/2020    MCHC 31.6 08/23/2021    MCHC 31.3 (L) 12/17/2020    RDW 13.3 08/23/2021    RDW 13.1 12/17/2020     08/23/2021     12/17/2020     Sodium   Date Value Ref Range Status   08/23/2021 138 133 - 144 mmol/L Final   12/17/2020 138 133 - 144 mmol/L Final     Potassium   Date Value Ref Range Status   08/23/2021 3.7 3.4 - 5.3 mmol/L Final   12/18/2020 3.5 3.4 - 5.3 mmol/L Final     Chloride   Date Value Ref Range Status   08/23/2021 103 94 - 109 mmol/L Final   12/17/2020 105 94 - 109 mmol/L Final     Carbon Dioxide   Date Value Ref Range Status   12/17/2020 30 20 - 32 mmol/L Final     Carbon Dioxide (CO2)   Date Value Ref Range Status   08/23/2021 27 20 - 32 mmol/L Final     Anion Gap   Date Value Ref Range Status   08/23/2021 8 3 - 14 mmol/L Final   12/17/2020 3 3 - 14 mmol/L Final     Glucose   Date Value Ref Range Status   08/23/2021 103 (H) 70 - 99 mg/dL Final   12/17/2020 99 70 - 99 mg/dL Final     Urea Nitrogen   Date Value Ref Range Status   08/23/2021 25 7 - 30 mg/dL Final   12/17/2020 16 7 - 30 mg/dL Final     Creatinine   Date Value Ref Range Status   08/23/2021 0.68 0.52 - 1.04 mg/dL Final   12/17/2020 0.57 0.52 - 1.04 mg/dL Final     GFR Estimate   Date Value Ref Range Status   08/23/2021 85 >60 mL/min/1.73m2 Final     Comment:     As of July 11, 2021, eGFR is calculated by the CKD-EPI creatinine equation, without race adjustment. eGFR can be influenced by muscle mass, exercise, and diet. The reported eGFR is an estimation only and is only applicable if the renal function is stable.   12/17/2020 90 >60 mL/min/[1.73_m2] Final     Comment:      Non  GFR Calc  Starting 12/18/2018, serum creatinine based estimated GFR (eGFR) will be   calculated using the Chronic Kidney Disease Epidemiology Collaboration   (CKD-EPI) equation.       Calcium   Date Value Ref Range Status   08/23/2021 9.8 8.5 - 10.1 mg/dL Final   12/17/2020 8.2 (L) 8.5 - 10.1 mg/dL Final     Bilirubin Total   Date Value Ref Range Status   08/23/2021 0.5 0.2 - 1.3 mg/dL Final   08/28/2020 0.5 0.2 - 1.3 mg/dL Final     Alkaline Phosphatase   Date Value Ref Range Status   08/23/2021 86 40 - 150 U/L Final   08/28/2020 79 40 - 150 U/L Final     ALT   Date Value Ref Range Status   08/23/2021 26 0 - 50 U/L Final   08/28/2020 19 0 - 50 U/L Final     AST   Date Value Ref Range Status   08/23/2021 19 0 - 45 U/L Final   08/28/2020 15 0 - 45 U/L Final     Recent Labs   Lab Test 08/23/21  1005 08/28/20  1016 09/30/16  1051 08/11/15  1224 08/20/14  0958   CHOL 162 178   < > 176 201*   HDL 82 92   < > 79 88   LDL 49 65   < > 70 79   TRIG 156* 106   < > 133 169*   CHOLHDLRATIO  --   --   --  2.2 2.3    < > = values in this interval not displayed.      Lab Results   Component Value Date    A1C 6.1 08/28/2020    A1C 5.7 10/28/2019    A1C 5.8 09/28/2018    A1C 5.9 09/28/2017    A1C 6.1 11/14/2014        Quincy Anglin MD   [facility]      cc:   Joshua Sunshine NP  [id number] [id number]

## 2021-11-29 NOTE — PROGRESS NOTES
Cardiology Clinic Progress Note:    November 29, 2021   Patient Name: Teri Beltran  Patient MRN: [id number]     Consult indication: HTN, atrial tachycardia     HPI:    I had the opportunity to see patient Teri Beltran in cardiology clinic for a follow up visit. Patient is followed by our colleague Rachel Miller MD with Primary Care.     As you know, patient is a pleasant 75-year-old female with a past medical history significant for first-degree AV block, right bundle branch block, paroxysmal SVT, GERD, hypertension, hyperlipidemia, chronic venous insufficiency (status post venous ablation), aortic sclerosis, who presents for follow-up.    Patient was previously seen by my colleagues Dr. Pavon, as well as Dr. Villareal with cardiology EP.  She had been seen by Dr. Vlilareal for tachycardia, Zio patch demonstrated frequent but brief episodes of atrial tachycardia at approximately 100 to 110 bpm, no atrial fibrillation was seen.  Flecainide was considered at that time, however has not been started.    I had then seen her for a preoperative cardiac risk assessment on 9/18/2020, prior to hip surgery.  At that time, she had been doing quite well, though was mildly hypertensive.  Hydrochlorothiazide was increased from 12.5 mg daily to 25 mg daily.  A dobutamine stress echocardiogram 1/2020 demonstrated aortic sclerosis with trace aortic insufficiency, no ischemic changes.    Since then, unfortunately, patient suffered a dislocation of the newly replaced hip, and was admitted 12/2020.  She had bent over to pick something up, and felt her hip pop out of the socket, resulting in excruciating pain.  Fortunately this is able to be reduced in the ED.  More recently, she was seen by her primary care physician Dr. Miller and had been complaining of some lightheadedness, and so hydrochlorothiazide was discontinued.    Since this change, she reports that she feels quite well.  She has not had  recurrence of the dizziness/lightheadedness.  She denies any chest pain, chest pressure.  Blood pressure today in clinic was 124/70 mmHg.  She does still have residual lower extremity swelling, worse on the right side.  She has a history of lower extremity venous incompetence status post bilateral GSV ablation with Dr. Garcia.    ECG in clinic demonstrates normal sinus rhythm with first-degree block and right bundle branch block, stable from prior.    Cholesterol labs from 8/23/2021 demonstrate total cholesterol 162, HDL 82, LDL 49, triglycerides 156    Assessment and Plan/Recommendations:    # Dizziness, largely resolved after hydrochlorothiazide was discontinued by Dr. Miller   # Conduction abnormalities, known first-degree AV block, right bundle branch block, paroxysmal SVT, followed by my colleague, Dr. Villareal.   # Hypertension.  Well controlled.   # Chronic lower extremity swelling due to venous insufficiency, history of bilateral great saphenous vein radiofrequency ablation.   # Mild aortic dilatation noted on chart review; however size was normal on TTE 03/27/2018.     -Zio patch monitor to reassess conduction abnormalities in the setting of transient dizziness, though the dizziness seems to have improved after discontinuation of hydrochlorothiazide  -Venous competency testing  -Continue current cardiac regimen of furosemide 20 mg daily, potassium supplementation 10 mEq daily, metoprolol tartrate 25 mg twice daily, pravastatin 40 mg nightly  -Follow-up in 1 year with fasting lipids at that time, or sooner as needed    Thank you for allowing our team to participate in the care of Teri Beltran.  Please do not hesitate to call or page me with any questions or concerns.    Sincerely,     Quincy Anglin MD, [facility]  Cardiology  Text Page   November 29, 2021    Voice recognition software utilized. Although reviewed after completion, some word and grammatical errors may be  present.    Total time spent on this encounter: 35 minutes, providing care in this encounter including, but not limited to, reviewing prior medical records, laboratory data, imaging studies, diagnostic studies, procedure notes, formulating an assessment and plan, recommendations.    Past Medical History:     Past Medical History:   Diagnosis Date     Complication of anesthesia     confusion after anesthesia (after hysterectomy)     First degree AV block 8/11/2015     Former smoker      Gastroesophageal reflux disease      GERD (gastroesophageal reflux disease)      Glaucoma      Heart block     2:1     Hyperlipidemia LDL goal <130 6/14/2013     Hypertension goal BP (blood pressure) < 140/90 12/3/2013     Hypothyroidism      Left atrial dilatation     mild     Mild dilation of ascending aorta - borderline 8/11/2015     Palpitations      Prediabetes 6/14/2013     RBBB 8/11/2015     S/P total knee arthroplasty 7/23/2019     Tachycardia      Varicose veins      Venous insufficiency     s/p bilateral great saphenous vein radiofrequency ablation by Dr. Garcia        Past Surgical History:   Past Surgical History:   Procedure Laterality Date     ARTHROPLASTY HIP Right 11/20/2020    Procedure: Right total hip arthroplasty using a Biomet Taperloc femoral stem and G7 acetabulum.;  Surgeon: Dragan Muse MD;  Location: [facility] OR     ARTHROPLASTY KNEE Right 7/23/2019    Procedure: Right total knee arthroplasty using an Ar[employer]rex iBalance knee system;  Surgeon: Dragan Muse MD;  Location: [facility] OR     BREAST SURGERY      right breast ductal surgery due to milk production     BREAST SURGERY Right     ductal surgery due to milk production     COLONOSCOPY N/A 10/24/2014    no further screening. Procedure: COLONOSCOPY;  Surgeon: Pedro Rudd MD;  Location:  GI     FINGER GANGLION CYST EXCISION Left     ring finger     HYSTERECTOMY       HYSTERECTOMY, PAP NO LONGER INDICATED  2009    total hysterectomy and  ovaries. benign     SOFT TISSUE SURGERY      ganglion removed from left wrist and ring finger     SURGICAL HISTORY OF -   age 7    tonsillectomy     SURGICAL HISTORY OF -   1/15    left eye cataract     SURGICAL HISTORY OF -   Fall 2016    LINQ placed.     TONSILLECTOMY       WRIST GANGLION EXCISION Left        Medications (outpatient):  Current Outpatient Medications   Medication Sig Dispense Refill     Acetaminophen (TYLENOL PO) Take 500-1,000 mg by mouth every 6 hours as needed for mild pain        dorzolamide-timolol (COSOPT) 2-0.5 % ophthalmic solution Place 1 drop into both eyes 2 times daily       famotidine (PEPCID) 10 MG tablet Take 1 tablet (10 mg) by mouth 2 times daily 180 tablet 1     fluticasone (FLONASE) 50 MCG/ACT nasal spray Spray 1-2 sprays into both nostrils daily 48 g 1     furosemide (LASIX) 20 MG tablet Take 1 tablet (20 mg) by mouth daily 90 tablet 1     latanoprost (XALATAN) 0.005 % ophthalmic solution Place 1 drop into the right eye At Bedtime        levothyroxine (SYNTHROID/LEVOTHROID) 88 MCG tablet Take 1 tablet (88 mcg) by mouth daily 90 tablet 1     metoprolol tartrate (LOPRESSOR) 25 MG tablet Take 1 tablet (25 mg) by mouth 2 times daily 180 tablet 1     Multiple Vitamins-Minerals (MULTIVITAMIN ADULT PO) Take 1 tablet by mouth daily       potassium chloride ER (KLOR-CON M) 10 MEQ CR tablet Take 1 tablet (10 mEq) by mouth daily 90 tablet 1     pravastatin (PRAVACHOL) 40 MG tablet Take 1 tablet (40 mg) by mouth At Bedtime 90 tablet 1     furosemide (LASIX) 40 MG tablet Take 1 tablet (40 mg) by mouth daily (Patient not taking: Reported on 11/29/2021) 90 tablet 1     traZODone (DESYREL) 50 MG tablet Take 1-2 tablets ( mg) by mouth nightly as needed for sleep (Patient not taking: Reported on 11/29/2021) 180 tablet 1       Allergies:  Allergies   Allergen Reactions     Seasonal Allergies        Social History:   History   Drug Use No      History   Smoking Status     Former Smoker      "Packs/day: 0.50     Years: 18.00     Types: Cigarettes     Quit date: 1980   Smokeless Tobacco     Never Used     Social History    Substance and Sexual Activity      Alcohol use: Yes        Alcohol/week: 0.0 standard drinks        Comment: 1 glass of wine at night       Family History:  Family History   Problem Relation Age of Onset     Cancer Mother         ovarian     Breast Cancer Mother      Heart Disease Father         rare; not sure what it was     Diabetes Father         old age     Cerebrovascular Disease Brother 68       Review of Systems:   A complete review of systems was negative except as mentioned in the History of Present Illness.     Objective & Physical Exam:  /70 (BP Location: Right arm, Patient Position: Sitting, Cuff Size: Adult Regular)   Pulse 69   Ht 1.676 m (5' 6\")   Wt 81.1 kg (178 lb 14.4 oz)   LMP  (LMP Unknown)   SpO2 97%   BMI 28.88 kg/m    Wt Readings from Last 2 Encounters:   11/29/21 81.1 kg (178 lb 14.4 oz)   10/08/21 79.4 kg (175 lb 1.6 oz)     Body mass index is 28.88 kg/m .   Body surface area is 1.94 meters squared.    Constitutional: appears stated age, in no apparent distress, appears to be well nourished  Eyes: sclera anicteric, conjunctiva normal  ENT: normocephalic, without obvious abnormality, atraumatic  Pulmonary: clear to auscultation bilaterally, no wheezes, no rales, no increased work of breathing  Cardiovascular: JVP normal, regular rate, regular rhythm, 1/6 KATTY at the RUSB, trace LLE swelling, trace to 1+ RLE swelling   Gastrointestinal: abdominal exam benign  Neurologic: awake, alert, face symmetrical, moves all extremities  Skin: no jaundice  Psychiatric: affect is normal, answers questions appropriately, oriented to self and place    Data reviewed:  Lab Results   Component Value Date    WBC 6.3 08/23/2021    WBC 6.4 12/17/2020    RBC 4.24 08/23/2021    RBC 2.91 (L) 12/17/2020    HGB 13.0 08/23/2021    HGB 9.2 (L) 12/18/2020    HCT 41.2 08/23/2021    " HCT 27.5 (L) 12/17/2020    MCV 97 08/23/2021    MCV 95 12/17/2020    MCH 30.7 08/23/2021    MCH 29.6 12/17/2020    MCHC 31.6 08/23/2021    MCHC 31.3 (L) 12/17/2020    RDW 13.3 08/23/2021    RDW 13.1 12/17/2020     08/23/2021     12/17/2020     Sodium   Date Value Ref Range Status   08/23/2021 138 133 - 144 mmol/L Final   12/17/2020 138 133 - 144 mmol/L Final     Potassium   Date Value Ref Range Status   08/23/2021 3.7 3.4 - 5.3 mmol/L Final   12/18/2020 3.5 3.4 - 5.3 mmol/L Final     Chloride   Date Value Ref Range Status   08/23/2021 103 94 - 109 mmol/L Final   12/17/2020 105 94 - 109 mmol/L Final     Carbon Dioxide   Date Value Ref Range Status   12/17/2020 30 20 - 32 mmol/L Final     Carbon Dioxide (CO2)   Date Value Ref Range Status   08/23/2021 27 20 - 32 mmol/L Final     Anion Gap   Date Value Ref Range Status   08/23/2021 8 3 - 14 mmol/L Final   12/17/2020 3 3 - 14 mmol/L Final     Glucose   Date Value Ref Range Status   08/23/2021 103 (H) 70 - 99 mg/dL Final   12/17/2020 99 70 - 99 mg/dL Final     Urea Nitrogen   Date Value Ref Range Status   08/23/2021 25 7 - 30 mg/dL Final   12/17/2020 16 7 - 30 mg/dL Final     Creatinine   Date Value Ref Range Status   08/23/2021 0.68 0.52 - 1.04 mg/dL Final   12/17/2020 0.57 0.52 - 1.04 mg/dL Final     GFR Estimate   Date Value Ref Range Status   08/23/2021 85 >60 mL/min/1.73m2 Final     Comment:     As of July 11, 2021, eGFR is calculated by the CKD-EPI creatinine equation, without race adjustment. eGFR can be influenced by muscle mass, exercise, and diet. The reported eGFR is an estimation only and is only applicable if the renal function is stable.   12/17/2020 90 >60 mL/min/[1.73_m2] Final     Comment:     Non  GFR Calc  Starting 12/18/2018, serum creatinine based estimated GFR (eGFR) will be   calculated using the Chronic Kidney Disease Epidemiology Collaboration   (CKD-EPI) equation.       Calcium   Date Value Ref Range Status    08/23/2021 9.8 8.5 - 10.1 mg/dL Final   12/17/2020 8.2 (L) 8.5 - 10.1 mg/dL Final     Bilirubin Total   Date Value Ref Range Status   08/23/2021 0.5 0.2 - 1.3 mg/dL Final   08/28/2020 0.5 0.2 - 1.3 mg/dL Final     Alkaline Phosphatase   Date Value Ref Range Status   08/23/2021 86 40 - 150 U/L Final   08/28/2020 79 40 - 150 U/L Final     ALT   Date Value Ref Range Status   08/23/2021 26 0 - 50 U/L Final   08/28/2020 19 0 - 50 U/L Final     AST   Date Value Ref Range Status   08/23/2021 19 0 - 45 U/L Final   08/28/2020 15 0 - 45 U/L Final     Recent Labs   Lab Test 08/23/21  1005 08/28/20  1016 09/30/16  1051 08/11/15  1224 08/20/14  0958   CHOL 162 178   < > 176 201*   HDL 82 92   < > 79 88   LDL 49 65   < > 70 79   TRIG 156* 106   < > 133 169*   CHOLHDLRATIO  --   --   --  2.2 2.3    < > = values in this interval not displayed.      Lab Results   Component Value Date    A1C 6.1 08/28/2020    A1C 5.7 10/28/2019    A1C 5.8 09/28/2018    A1C 5.9 09/28/2017    A1C 6.1 11/14/2014

## 2022-01-07 ENCOUNTER — HOSPITAL ENCOUNTER (OUTPATIENT)
Dept: CARDIOLOGY | Facility: CLINIC | Age: 77
Discharge: HOME | End: 2022-01-07
Attending: INTERNAL MEDICINE | Admitting: INTERNAL MEDICINE
Payer: MEDICARE

## 2022-01-07 DIAGNOSIS — R60.0 BILATERAL LEG EDEMA: ICD-10-CM

## 2022-01-07 DIAGNOSIS — R55 SYNCOPE, UNSPECIFIED SYNCOPE TYPE: ICD-10-CM

## 2022-01-07 PROCEDURE — 93970 EXTREMITY STUDY: CPT

## 2022-01-07 PROCEDURE — 93246 EXT ECG>7D<15D RECORDING: CPT

## 2022-01-07 PROCEDURE — 93970 EXTREMITY STUDY: CPT | Mod: 26 | Performed by: INTERNAL MEDICINE

## 2022-01-07 PROCEDURE — 93248 EXT ECG>7D<15D REV&INTERPJ: CPT | Performed by: INTERNAL MEDICINE

## 2022-01-13 ENCOUNTER — TELEPHONE (OUTPATIENT)
Dept: CARDIOLOGY | Facility: CLINIC | Age: 77
End: 2022-01-13
Payer: MEDICARE

## 2022-01-13 DIAGNOSIS — I87.2 VENOUS INSUFFICIENCY: Primary | ICD-10-CM

## 2022-01-13 NOTE — TELEPHONE ENCOUNTER
Call placed to pt to review results and recommendations:     US venous Comp BLE:   Impression:  1. Right leg: No DVT. Mild common femoral vein deep reflux (2.0s).  Competent superficial vein remnants.     2. Left leg: No DVT. Severe incompetence in left GSV calf remnant with  6.9s, 2.1mm reflux.     If clinically indicated patient would be a candidate for left GSV  VenaSeal closure.    ----- Message from Quincy Anglin MD sent at 1/10/2022 12:34 PM CST -----  Results reviewed, please let patient know that she does have severe incompetence in the Left GSV remnant, and if she is interested, we should refer her to Dr. Pina for evaluation of possible closure. Thanks!      Pt agreeable to follow uo with Vein clinic orders for scheduling placed per MD recommendation. Pt scheduled   COSMO Lawton RN, BSN. 01/13/22 5:01 PM

## 2022-01-19 ENCOUNTER — OFFICE VISIT (OUTPATIENT)
Dept: CARDIOLOGY | Facility: CLINIC | Age: 77
End: 2022-01-19
Payer: MEDICARE

## 2022-01-19 VITALS
SYSTOLIC BLOOD PRESSURE: 150 MMHG | DIASTOLIC BLOOD PRESSURE: 80 MMHG | HEART RATE: 88 BPM | HEIGHT: 68 IN | WEIGHT: 182 LBS | BODY MASS INDEX: 27.58 KG/M2

## 2022-01-19 DIAGNOSIS — I87.2 VENOUS INSUFFICIENCY: ICD-10-CM

## 2022-01-19 DIAGNOSIS — I87.2 EDEMA OF LEFT LOWER EXTREMITY DUE TO PERIPHERAL VENOUS INSUFFICIENCY: Primary | ICD-10-CM

## 2022-01-19 DIAGNOSIS — I87.322 CHRONIC VENOUS HYPERTENSION (IDIOPATHIC) WITH INFLAMMATION OF LEFT LOWER EXTREMITY: ICD-10-CM

## 2022-01-19 PROCEDURE — 99214 OFFICE O/P EST MOD 30 MIN: CPT | Performed by: INTERNAL MEDICINE

## 2022-01-19 ASSESSMENT — MIFFLIN-ST. JEOR: SCORE: 1359.05

## 2022-01-19 NOTE — LETTER
1/19/2022    Rachel Miller MD  [address] [facility] [address]    RE: Teri Beltran       Dear Colleague,     I had the pleasure of seeing Teri Beltran in the [facility].  Cardiology Progress Note          Assessment and Plan:       1. 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  Recommend VenaSeal ablation of this left greater saphenous vein.    30 minutes was spent with the patient, precharting and reviewing tests as well as post visit charting all done today..    This note was transcribed using electronic voice recognition software and there may be typographical errors present.                    Interval History:     The patient is a very pleasant 76 year old who has had previous bilateral greater saphenous vein ablation by Dr. Garcia.  The right-sided symptoms have resolved.  She had initial improvement in her left-sided symptoms but currently it is as bad as it was previously.  She has left-sided edema and heaviness.  The blue discoloration of venous congestion has recurred.  It is refractory to conservative treatment including exercise and compression therapy greater than 6 weeks.    I reviewed the venous competency testing and patient has severe 6.9 seconds of reflux in her calf portion of the greater saphenous vein remnant.  There is probably a contributing  to this reflux.                     Review of Systems:     Review of Systems:  Skin:  Negative     Eyes:  Negative    ENT:  Positive for    Respiratory:  Negative    Cardiovascular:    palpitations;Positive for;edema  Gastroenterology: Negative    Genitourinary:  not assessed    Musculoskeletal:   "Positive for    Neurologic:  Negative    Psychiatric:  Positive for sleep disturbances  Heme/Lymph/Imm:  Positive for hay fever  Endocrine:  Positive for thyroid disorder              Physical Exam:     Vitals: BP (!) 150/80   Pulse 88   Ht 1.727 m (5' 8\")   Wt 82.6 kg (182 lb)   LMP  (LMP Unknown)   BMI 27.67 kg/m    Constitutional:  cooperative, alert and oriented, well developed, well nourished, in no acute distress        Skin:  warm and dry to the touch venous stasis changes      Head:  normocephalic, no masses or lesions        Eyes:  pupils equal and round, conjunctivae and lids unremarkable, sclera white, no xanthalasma, EOMS intact, no nystagmus        Chest:  normal symmetry;no tenderness to palpation;normal respiratory excursion;no intercostal retraction;no use of accessory muscles   scant left basal fine rales    Cardiac: regular rhythm       early systolic murmur;RUSB;grade 1          Extremities and Back:  no deformities, clubbing, cyanosis, erythema observed stasis pigmentation;erythema     Neurological:  no gross motor deficits                 Medications:     Current Outpatient Medications   Medication Sig Dispense Refill     Acetaminophen (TYLENOL PO) Take 500-1,000 mg by mouth every 6 hours as needed for mild pain        dorzolamide-timolol (COSOPT) 2-0.5 % ophthalmic solution Place 1 drop into both eyes 2 times daily       famotidine (PEPCID) 10 MG tablet Take 1 tablet (10 mg) by mouth 2 times daily 180 tablet 1     furosemide (LASIX) 20 MG tablet Take 1 tablet (20 mg) by mouth daily 90 tablet 3     latanoprost (XALATAN) 0.005 % ophthalmic solution Place 1 drop into the right eye At Bedtime        levothyroxine (SYNTHROID/LEVOTHROID) 88 MCG tablet Take 1 tablet (88 mcg) by mouth daily 90 tablet 1     metoprolol tartrate (LOPRESSOR) 25 MG tablet Take 1 tablet (25 mg) by mouth 2 times daily 180 tablet 3     Multiple Vitamins-Minerals (MULTIVITAMIN ADULT PO) Take 1 tablet by mouth daily       " potassium chloride ER (KLOR-CON M) 10 MEQ CR tablet Take 1 tablet (10 mEq) by mouth daily 90 tablet 3     pravastatin (PRAVACHOL) 40 MG tablet Take 1 tablet (40 mg) by mouth At Bedtime 90 tablet 3     fluticasone (FLONASE) 50 MCG/ACT nasal spray Spray 1-2 sprays into both nostrils daily (Patient not taking: Reported on 1/19/2022) 48 g 1     traZODone (DESYREL) 50 MG tablet Take 1-2 tablets ( mg) by mouth nightly as needed for sleep (Patient not taking: Reported on 1/19/2022) 180 tablet 1                Data:   All laboratory data reviewed  No results found for this or any previous visit (from the past 24 hour(s)).    All laboratory data reviewed  Lab Results   Component Value Date    CHOL 162 08/23/2021    CHOL 178 08/28/2020     Lab Results   Component Value Date    HDL 82 08/23/2021    HDL 92 08/28/2020     Lab Results   Component Value Date    LDL 49 08/23/2021    LDL 65 08/28/2020     Lab Results   Component Value Date    TRIG 156 08/23/2021    TRIG 106 08/28/2020     Lab Results   Component Value Date    CHOLHDLRATIO 2.2 08/11/2015     TSH   Date Value Ref Range Status   08/23/2021 2.68 0.40 - 4.00 mU/L Final   08/28/2020 1.35 0.40 - 4.00 mU/L Final     Last Basic Metabolic Panel:  Lab Results   Component Value Date     08/23/2021     12/17/2020      Lab Results   Component Value Date    POTASSIUM 3.7 08/23/2021    POTASSIUM 3.5 12/18/2020     Lab Results   Component Value Date    CHLORIDE 103 08/23/2021    CHLORIDE 105 12/17/2020     Lab Results   Component Value Date    LILIA 9.8 08/23/2021    LILIA 8.2 12/17/2020     Lab Results   Component Value Date    CO2 27 08/23/2021    CO2 30 12/17/2020     Lab Results   Component Value Date    BUN 25 08/23/2021    BUN 16 12/17/2020     Lab Results   Component Value Date    CR 0.68 08/23/2021    CR 0.57 12/17/2020     Lab Results   Component Value Date     08/23/2021    GLC 99 12/17/2020     Lab Results   Component Value Date    WBC 6.3 08/23/2021     WBC 6.4 12/17/2020     Lab Results   Component Value Date    RBC 4.24 08/23/2021    RBC 2.91 12/17/2020     Lab Results   Component Value Date    HGB 13.0 08/23/2021    HGB 9.2 12/18/2020     Lab Results   Component Value Date    HCT 41.2 08/23/2021    HCT 27.5 12/17/2020     Lab Results   Component Value Date    MCV 97 08/23/2021    MCV 95 12/17/2020     Lab Results   Component Value Date    MCH 30.7 08/23/2021    MCH 29.6 12/17/2020     Lab Results   Component Value Date    MCHC 31.6 08/23/2021    MCHC 31.3 12/17/2020     Lab Results   Component Value Date    RDW 13.3 08/23/2021    RDW 13.1 12/17/2020     Lab Results   Component Value Date     08/23/2021     12/17/2020                   Thank you for allowing me to participate in the care of your patient.      Sincerely,     Scott Pina MD     [facility]  cc:   Quincy Anglin MD  [address], [address]  [place] [address]

## 2022-01-19 NOTE — PROGRESS NOTES
Cardiology Progress Note          Assessment and Plan:       1. 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  Recommend VenaSeal ablation of this left greater saphenous vein.    30 minutes was spent with the patient, precharting and reviewing tests as well as post visit charting all done today..    This note was transcribed using electronic voice recognition software and there may be typographical errors present.                    Interval History:     The patient is a very pleasant 76 year old who has had previous bilateral greater saphenous vein ablation by Dr. Garcia.  The right-sided symptoms have resolved.  She had initial improvement in her left-sided symptoms but currently it is as bad as it was previously.  She has left-sided edema and heaviness.  The blue discoloration of venous congestion has recurred.  It is refractory to conservative treatment including exercise and compression therapy greater than 6 weeks.    I reviewed the venous competency testing and patient has severe 6.9 seconds of reflux in her calf portion of the greater saphenous vein remnant.  There is probably a contributing  to this reflux.                     Review of Systems:     Review of Systems:  Skin:  Negative     Eyes:  Negative    ENT:  Positive for    Respiratory:  Negative    Cardiovascular:    palpitations;Positive for;edema  Gastroenterology: Negative    Genitourinary:  not assessed    Musculoskeletal:  Positive for    Neurologic:  Negative    Psychiatric:  Positive for sleep disturbances  Heme/Lymph/Imm:  Positive for hay fever  Endocrine:  Positive for thyroid disorder              Physical Exam:     Vitals: BP (!) 150/80   Pulse  "88   Ht 1.727 m (5' 8\")   Wt 82.6 kg (182 lb)   LMP  (LMP Unknown)   BMI 27.67 kg/m    Constitutional:  cooperative, alert and oriented, well developed, well nourished, in no acute distress        Skin:  warm and dry to the touch venous stasis changes      Head:  normocephalic, no masses or lesions        Eyes:  pupils equal and round, conjunctivae and lids unremarkable, sclera white, no xanthalasma, EOMS intact, no nystagmus        Chest:  normal symmetry;no tenderness to palpation;normal respiratory excursion;no intercostal retraction;no use of accessory muscles   scant left basal fine rales    Cardiac: regular rhythm       early systolic murmur;RUSB;grade 1          Extremities and Back:  no deformities, clubbing, cyanosis, erythema observed stasis pigmentation;erythema     Neurological:  no gross motor deficits                 Medications:     Current Outpatient Medications   Medication Sig Dispense Refill     Acetaminophen (TYLENOL PO) Take 500-1,000 mg by mouth every 6 hours as needed for mild pain        dorzolamide-timolol (COSOPT) 2-0.5 % ophthalmic solution Place 1 drop into both eyes 2 times daily       famotidine (PEPCID) 10 MG tablet Take 1 tablet (10 mg) by mouth 2 times daily 180 tablet 1     furosemide (LASIX) 20 MG tablet Take 1 tablet (20 mg) by mouth daily 90 tablet 3     latanoprost (XALATAN) 0.005 % ophthalmic solution Place 1 drop into the right eye At Bedtime        levothyroxine (SYNTHROID/LEVOTHROID) 88 MCG tablet Take 1 tablet (88 mcg) by mouth daily 90 tablet 1     metoprolol tartrate (LOPRESSOR) 25 MG tablet Take 1 tablet (25 mg) by mouth 2 times daily 180 tablet 3     Multiple Vitamins-Minerals (MULTIVITAMIN ADULT PO) Take 1 tablet by mouth daily       potassium chloride ER (KLOR-CON M) 10 MEQ CR tablet Take 1 tablet (10 mEq) by mouth daily 90 tablet 3     pravastatin (PRAVACHOL) 40 MG tablet Take 1 tablet (40 mg) by mouth At Bedtime 90 tablet 3     fluticasone (FLONASE) 50 MCG/ACT " nasal spray Spray 1-2 sprays into both nostrils daily (Patient not taking: Reported on 1/19/2022) 48 g 1     traZODone (DESYREL) 50 MG tablet Take 1-2 tablets ( mg) by mouth nightly as needed for sleep (Patient not taking: Reported on 1/19/2022) 180 tablet 1                Data:   All laboratory data reviewed  No results found for this or any previous visit (from the past 24 hour(s)).    All laboratory data reviewed  Lab Results   Component Value Date    CHOL 162 08/23/2021    CHOL 178 08/28/2020     Lab Results   Component Value Date    HDL 82 08/23/2021    HDL 92 08/28/2020     Lab Results   Component Value Date    LDL 49 08/23/2021    LDL 65 08/28/2020     Lab Results   Component Value Date    TRIG 156 08/23/2021    TRIG 106 08/28/2020     Lab Results   Component Value Date    CHOLHDLRATIO 2.2 08/11/2015     TSH   Date Value Ref Range Status   08/23/2021 2.68 0.40 - 4.00 mU/L Final   08/28/2020 1.35 0.40 - 4.00 mU/L Final     Last Basic Metabolic Panel:  Lab Results   Component Value Date     08/23/2021     12/17/2020      Lab Results   Component Value Date    POTASSIUM 3.7 08/23/2021    POTASSIUM 3.5 12/18/2020     Lab Results   Component Value Date    CHLORIDE 103 08/23/2021    CHLORIDE 105 12/17/2020     Lab Results   Component Value Date    LILIA 9.8 08/23/2021    LILIA 8.2 12/17/2020     Lab Results   Component Value Date    CO2 27 08/23/2021    CO2 30 12/17/2020     Lab Results   Component Value Date    BUN 25 08/23/2021    BUN 16 12/17/2020     Lab Results   Component Value Date    CR 0.68 08/23/2021    CR 0.57 12/17/2020     Lab Results   Component Value Date     08/23/2021    GLC 99 12/17/2020     Lab Results   Component Value Date    WBC 6.3 08/23/2021    WBC 6.4 12/17/2020     Lab Results   Component Value Date    RBC 4.24 08/23/2021    RBC 2.91 12/17/2020     Lab Results   Component Value Date    HGB 13.0 08/23/2021    HGB 9.2 12/18/2020     Lab Results   Component Value Date     HCT 41.2 08/23/2021    HCT 27.5 12/17/2020     Lab Results   Component Value Date    MCV 97 08/23/2021    MCV 95 12/17/2020     Lab Results   Component Value Date    MCH 30.7 08/23/2021    MCH 29.6 12/17/2020     Lab Results   Component Value Date    MCHC 31.6 08/23/2021    MCHC 31.3 12/17/2020     Lab Results   Component Value Date    RDW 13.3 08/23/2021    RDW 13.1 12/17/2020     Lab Results   Component Value Date     08/23/2021     12/17/2020

## 2022-01-20 ENCOUNTER — TELEPHONE (OUTPATIENT)
Dept: INTERNAL MEDICINE | Facility: CLINIC | Age: 77
End: 2022-01-20

## 2022-01-20 ENCOUNTER — OFFICE VISIT (OUTPATIENT)
Dept: INTERNAL MEDICINE | Facility: CLINIC | Age: 77
End: 2022-01-20
Payer: MEDICARE

## 2022-01-20 VITALS
DIASTOLIC BLOOD PRESSURE: 74 MMHG | WEIGHT: 181.5 LBS | HEIGHT: 68 IN | OXYGEN SATURATION: 95 % | BODY MASS INDEX: 27.51 KG/M2 | HEART RATE: 90 BPM | SYSTOLIC BLOOD PRESSURE: 127 MMHG | RESPIRATION RATE: 18 BRPM | TEMPERATURE: 97.6 F

## 2022-01-20 DIAGNOSIS — E03.9 HYPOTHYROIDISM, UNSPECIFIED TYPE: ICD-10-CM

## 2022-01-20 DIAGNOSIS — G89.29 CHRONIC HIP PAIN AFTER TOTAL REPLACEMENT OF RIGHT HIP JOINT: Primary | ICD-10-CM

## 2022-01-20 DIAGNOSIS — Z96.641 CHRONIC HIP PAIN AFTER TOTAL REPLACEMENT OF RIGHT HIP JOINT: Primary | ICD-10-CM

## 2022-01-20 DIAGNOSIS — M25.551 CHRONIC HIP PAIN AFTER TOTAL REPLACEMENT OF RIGHT HIP JOINT: Primary | ICD-10-CM

## 2022-01-20 DIAGNOSIS — I10 HYPERTENSION GOAL BP (BLOOD PRESSURE) < 140/90: ICD-10-CM

## 2022-01-20 PROCEDURE — 99214 OFFICE O/P EST MOD 30 MIN: CPT | Performed by: INTERNAL MEDICINE

## 2022-01-20 PROCEDURE — 36415 COLL VENOUS BLD VENIPUNCTURE: CPT | Performed by: INTERNAL MEDICINE

## 2022-01-20 PROCEDURE — 80048 BASIC METABOLIC PNL TOTAL CA: CPT | Performed by: INTERNAL MEDICINE

## 2022-01-20 RX ORDER — GABAPENTIN 100 MG/1
CAPSULE ORAL
Qty: 270 CAPSULE | Refills: 1 | Status: ON HOLD | OUTPATIENT
Start: 2022-01-20 | End: 2022-06-06

## 2022-01-20 ASSESSMENT — MIFFLIN-ST. JEOR: SCORE: 1356.78

## 2022-01-20 NOTE — PROGRESS NOTES
Dr Wong's note      Patient's instructions / PLAN:                                                        Plan:  1. Gabapentin 100 mg as per below schedule   2. Do not take more then 1 aspirin a day   3. Continue the other meds, same doses for now.  4.  Labs today - [address]   5. Schedule follow up appointment in about 2 months to discuss the pain         ASSESSMENT & PLAN:                                                      (M25.551,  G89.29,  Z96.641) Chronic hip pain after total replacement of right hip joint  (primary encounter diagnosis)  Comment: We discussed about the new meds, advantages and potential side effects. The patient will read also the info from the pharmacy and call back if questions.   Plan: gabapentin (NEURONTIN) 100 MG capsule            (I10) Hypertension goal BP (blood pressure) < 140/90  Comment: Controlled    Plan: Basic metabolic panel        Controlled      (E03.9) Hypothyroidism, unspecified type  Comment: Continue same meds, same doses for now   Plan: Continue same meds, same doses for now        Chief complaint:                                                      Follow up chronic medical problems   Chr hip pain    SUBJECTIVE:                                                    History of present illness:    R hip pain  -- still pain   -- takes 2 ASA and 2 extra Tylenol     Leg edema  -- seldom trace edema      LOV: Plan:  1. stop the Fosamax after you finish the tablets you have at home   2. Take Furosemide  20 daily  3. Take the hydrochlorathiazide only if you notice right leg swelling   4. Continue the other meds, same doses for now.  5. Please make a lab appointment for non fasting labs  In January - Feb   6. Please make an appointment few days after the labs to discuss about the results.   7. Keep the appointment with cardiology, dr Anglin. Talk to him about the lightheadedness spell          Hyperlipidemia Follow-Up      Are you regularly taking any medication or supplement to  "lower your cholesterol?   Yes- Pravastatin    Are you having muscle aches or other side effects that you think could be caused by your cholesterol lowering medication?  Yes- muscle pain in both arms.    Hypertension Follow-up      Do you check your blood pressure regularly outside of the clinic? Yes     Are you following a low salt diet? Yes    Are your blood pressures ever more than 140 on the top number (systolic) OR more   than 90 on the bottom number (diastolic), for example 140/90? Yes 152/90 this morning when she checked with out her medication.      How many servings of fruits and vegetables do you eat daily?  0-1    On average, how many sweetened beverages do you drink each day (Examples: soda, juice, sweet tea, etc.  Do NOT count diet or artificially sweetened beverages)?   0    How many days per week do you exercise enough to make your heart beat faster? 3 or less    How many minutes a day do you exercise enough to make your heart beat faster? 9 or less    How many days per week do you miss taking your medication? 0        Review of Systems:                                                      ROS: negative for fever, chills, cough, wheezes, chest pain, shortness of breath, vomiting, abdominal pain, leg swelling      OBJECTIVE:             Physical exam:  Blood pressure 127/74, pulse 90, temperature 97.6  F (36.4  C), temperature source Tympanic, resp. rate 18, height 1.727 m (5' 8\"), weight 82.3 kg (181 lb 8 oz), SpO2 95 %, not currently breastfeeding.     NAD, appears comfortable  Skin: no rashes   Neck: supple, no JVD,  No thyroidmegaly. Lymph nodes nonpalpable cervical and supraclavicular.  Chest: clear to auscultation bilaterally, good respiratory effort  Heart: S1 S2, RRR, 3/6 systolic murmur  Abdomen: soft, not tender,  Extremities: no edema,  Neurologic: A, Ox3, no focal signs appreciated    PMHx: reviewed  Past Medical History:   Diagnosis Date     Complication of anesthesia     confusion after " anesthesia (after hysterectomy)     First degree AV block 8/11/2015     Former smoker      Gastroesophageal reflux disease      GERD (gastroesophageal reflux disease)      Glaucoma      Heart block     2:1     Hyperlipidemia LDL goal <130 6/14/2013     Hypertension goal BP (blood pressure) < 140/90 12/3/2013     Hypothyroidism      Left atrial dilatation     mild     Mild dilation of ascending aorta - borderline 8/11/2015     Palpitations      Prediabetes 6/14/2013     RBBB 8/11/2015     S/P total knee arthroplasty 7/23/2019     Tachycardia      Varicose veins      Venous insufficiency     s/p bilateral great saphenous vein radiofrequency ablation by Dr. Garcia      PSHx: reviewed  Past Surgical History:   Procedure Laterality Date     ARTHROPLASTY HIP Right 11/20/2020    Procedure: Right total hip arthroplasty using a [employer]omet Taperloc femoral stem and G7 acetabulum.;  Surgeon: Dragan Muse MD;  Location: [facility] OR     ARTHROPLASTY KNEE Right 7/23/2019    Procedure: Right total knee arthroplasty using an [employer] Medialance knee system;  Surgeon: Dragan Muse MD;  Location: [facility] OR     BREAST SURGERY      right breast ductal surgery due to milk production     BREAST SURGERY Right     ductal surgery due to milk production     COLONOSCOPY N/A 10/24/2014    no further screening. Procedure: COLONOSCOPY;  Surgeon: Pedro Rudd MD;  Location: [facility]     FINGER GANGLION CYST EXCISION Left     ring finger     HYSTERECTOMY       HYSTERECTOMY, PAP NO LONGER INDICATED  2009    total hysterectomy and ovaries. benign     SOFT TISSUE SURGERY      ganglion removed from left wrist and ring finger     SURGICAL HISTORY OF -   age 7    tonsillectomy     SURGICAL HISTORY OF -   1/15    left eye cataract     SURGICAL HISTORY OF -   Fall 2016    LINQ placed.     TONSILLECTOMY       WRIST GANGLION EXCISION Left         Meds: reviewed  Current Outpatient Medications   Medication Sig Dispense Refill     Acetaminophen  (TYLENOL PO) Take 500-1,000 mg by mouth every 6 hours as needed for mild pain        dorzolamide-timolol (COSOPT) 2-0.5 % ophthalmic solution Place 1 drop into both eyes 2 times daily       famotidine (PEPCID) 10 MG tablet Take 1 tablet (10 mg) by mouth 2 times daily 180 tablet 1     fluticasone (FLONASE) 50 MCG/ACT nasal spray Spray 1-2 sprays into both nostrils daily 48 g 1     furosemide (LASIX) 20 MG tablet Take 1 tablet (20 mg) by mouth daily 90 tablet 3     latanoprost (XALATAN) 0.005 % ophthalmic solution Place 1 drop into the right eye At Bedtime        levothyroxine (SYNTHROID/LEVOTHROID) 88 MCG tablet Take 1 tablet (88 mcg) by mouth daily 90 tablet 1     metoprolol tartrate (LOPRESSOR) 25 MG tablet Take 1 tablet (25 mg) by mouth 2 times daily 180 tablet 3     Multiple Vitamins-Minerals (MULTIVITAMIN ADULT PO) Take 1 tablet by mouth daily       potassium chloride ER (KLOR-CON M) 10 MEQ CR tablet Take 1 tablet (10 mEq) by mouth daily 90 tablet 3     pravastatin (PRAVACHOL) 40 MG tablet Take 1 tablet (40 mg) by mouth At Bedtime 90 tablet 3     traZODone (DESYREL) 50 MG tablet Take 1-2 tablets ( mg) by mouth nightly as needed for sleep 180 tablet 1       Soc Hx: reviewed  Fam Hx: reviewed          Rachel oWng MD  Internal Medicine

## 2022-01-20 NOTE — TELEPHONE ENCOUNTER
Reason for Call:  Other appointment    Detailed comments: Patient called to confirm procedure date, If someone could giver her a call regarding this matter    Phone Number Patient can be reached at: Cell number on file:    Telephone Information:   Mobile [phone]       Best Time: anytime    Can we leave a detailed message on this number? YES    Call taken on 1/20/2022 at 2:02 PM by Maeve Garcia MA

## 2022-01-20 NOTE — PATIENT INSTRUCTIONS
Plan:  1. Gabapentin 100 mg as per below schedule   2. Do not take more then 1 aspirin a day   3. Continue the other meds, same doses for now.  4.  Labs today - [address]   5. Schedule follow up appointment in about 2 months to discuss the pain         Gabapentin 100 mg am noon Bed time   3 days    1 caps   3 days    2 caps   3 days    3 caps   3 days  1 caps  3 caps   3 days  1 caps 1 caps 3 caps   3 days  2 caps 1 caps 3 caps   3 days  2 caps 2 caps 3 caps   3 days  3 caps 2 caps 3 caps   3 days  3 caps 3 caps 3 caps

## 2022-01-21 LAB
ANION GAP SERPL CALCULATED.3IONS-SCNC: 7 MMOL/L (ref 3–14)
BUN SERPL-MCNC: 27 MG/DL (ref 7–30)
CALCIUM SERPL-MCNC: 9.6 MG/DL (ref 8.5–10.1)
CHLORIDE BLD-SCNC: 104 MMOL/L (ref 94–109)
CO2 SERPL-SCNC: 27 MMOL/L (ref 20–32)
CREAT SERPL-MCNC: 0.71 MG/DL (ref 0.52–1.04)
GFR SERPL CREATININE-BSD FRML MDRD: 87 ML/MIN/1.73M2
GLUCOSE BLD-MCNC: 130 MG/DL (ref 70–99)
POTASSIUM BLD-SCNC: 4.3 MMOL/L (ref 3.4–5.3)
SODIUM SERPL-SCNC: 138 MMOL/L (ref 133–144)

## 2022-01-28 ENCOUNTER — TELEPHONE (OUTPATIENT)
Dept: CARDIOLOGY | Facility: CLINIC | Age: 77
End: 2022-01-28
Payer: MEDICARE

## 2022-01-28 NOTE — TELEPHONE ENCOUNTER
Per cardiology office visit note:    1. 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  Recommend VenaSeal ablation of this left greater saphenous vein    Call to cardiology as they are the ones that should be reaching out to patient. Message will be given to Dr. Pina's nurse. Call to patient. Advised.     Will leave open to watch for follow up.

## 2022-01-28 NOTE — TELEPHONE ENCOUNTER
[facility] Call Center    Phone Message    May a detailed message be left on voicemail: yes     Reason for Call: Other: pcp is stating that pat has been calling about her procedure date, if anyone can give her a call to inform her of the correct information      Action Taken: Other: routed to [facility]    Travel Screening: Not Applicable

## 2022-01-28 NOTE — TELEPHONE ENCOUNTER
Reason for Call:  Other appointment    Detailed comments: Patient is checking status of previous message. She is waiting for someone to call her back regarding scheduling a vein procedure in her leg. Please call    Phone Number Patient can be reached at: Home number on file [phone] (home)    Best Time: anytime    Can we leave a detailed message on this number? YES    Call taken on 1/28/2022 at 11:50 AM by Zoya De Santiago

## 2022-02-02 DIAGNOSIS — I87.2 VENOUS INSUFFICIENCY: Primary | ICD-10-CM

## 2022-02-03 ENCOUNTER — OFFICE VISIT (OUTPATIENT)
Dept: INTERNAL MEDICINE | Facility: CLINIC | Age: 77
End: 2022-02-03
Payer: MEDICARE

## 2022-02-03 VITALS
WEIGHT: 182.6 LBS | BODY MASS INDEX: 27.68 KG/M2 | SYSTOLIC BLOOD PRESSURE: 138 MMHG | HEIGHT: 68 IN | OXYGEN SATURATION: 96 % | DIASTOLIC BLOOD PRESSURE: 80 MMHG | TEMPERATURE: 97.6 F | RESPIRATION RATE: 14 BRPM | HEART RATE: 93 BPM

## 2022-02-03 DIAGNOSIS — H25.9 SENILE CATARACT OF RIGHT EYE, UNSPECIFIED AGE-RELATED CATARACT TYPE: ICD-10-CM

## 2022-02-03 DIAGNOSIS — R00.2 PALPITATIONS: ICD-10-CM

## 2022-02-03 DIAGNOSIS — Z01.818 PREOP GENERAL PHYSICAL EXAM: Primary | ICD-10-CM

## 2022-02-03 PROCEDURE — 99213 OFFICE O/P EST LOW 20 MIN: CPT | Performed by: FAMILY MEDICINE

## 2022-02-03 ASSESSMENT — MIFFLIN-ST. JEOR: SCORE: 1361.77

## 2022-02-03 NOTE — PROGRESS NOTES
[place] [address]  [facility] [address]  Phone: [phone]  Primary Provider: Rachel Miller  Pre-op Performing Provider: ZANDER FERNANDEZ        PREOPERATIVE EVALUATION:  Today's date: 2/3/2022    Teri Beltran is a 76 year old female who presents for a preoperative evaluation.    Surgical Information:  Surgery/Procedure: Right eye cataract  Surgery Location:  [facility].  Surgeon: Dr. Mascorro  Surgery Date: 3/1/22  Time of Surgery: 8:30 AM  Where patient plans to recover: At home with family, She is home alone  Fax number for surgical facility:     Type of Anesthesia Anticipated: to be determined    Assessment & Plan     The proposed surgical procedure is considered LOW risk.    Problem List Items Addressed This Visit     Palpitations      Other Visit Diagnoses     Preop general physical exam    -  Primary    Senile cataract of right eye, unspecified age-related cataract type                   Risks and Recommendations:  The patient has the following additional risks and recommendations for perioperative complications:   - No identified additional risk factors other than previously addressed      Medications -   Advised to take her Metoprolol on AM of procedure.      RECOMMENDATION:    APPROVAL GIVEN to proceed with proposed procedure, without further diagnostic evaluation.    [id number]}    Subjective     HPI related to upcoming procedure:     Patient has an enlarging cataract in R eye. Has had a previous cataract procedure L eye.    No serious cardiac, respiratory problems or DM.    Has tolerated Anesthesia well.        Preop Questions 2/3/2022   1. Have you ever had a heart attack or stroke? No   2. Have you ever had surgery on your heart or blood vessels, such as a stent placement, a coronary artery bypass, or surgery on an artery in your head, neck, heart, or legs? No   3. Do you have chest pain with activity? No   4. Do  you have a history of  heart failure? No   5. Do you currently have a cold, bronchitis or symptoms of other infection? No   6. Do you have a cough, shortness of breath, or wheezing? No   7. Do you or anyone in your family have previous history of blood clots? No   8. Do you or does anyone in your family have a serious bleeding problem such as prolonged bleeding following surgeries or cuts? No   9. Have you ever had problems with anemia or been told to take iron pills? No   10. Have you had any abnormal blood loss such as black, tarry or bloody stools, or abnormal vaginal bleeding? No   11. Have you ever had a blood transfusion? No   12. Are you willing to have a blood transfusion if it is medically needed before, during, or after your surgery? Yes   13. Have you or any of your relatives ever had problems with anesthesia? No   14. Do you have sleep apnea, excessive snoring or daytime drowsiness? No   15. Do you have any artifical heart valves or other implanted medical devices like a pacemaker, defibrillator, or continuous glucose monitor? No   16. Do you have artificial joints? YES - hip, knee - R   17. Are you allergic to latex? No       Health Care Directive:  Patient does not have a Health Care Directive or Living Will:       Preoperative Review of :   reviewed - no record of controlled substances prescribed.          Review of Systems    No fever.  No sore throat or congestion.  No cough or shortness of breath.  No chest pain.  No syncope.  No edema.  No abdominal pain.  No unusual weakness.        Patient Active Problem List    Diagnosis Date Noted     Anterior dislocation of right hip (H) 12/16/2020     Priority: Medium     Fall 12/16/2020     Priority: Medium     S/P total hip arthroplasty 11/20/2020     Priority: Medium     S/P total knee arthroplasty 07/23/2019     Priority: Medium     Venous insufficiency      Priority: Medium     Varicose veins      Priority: Medium     Osteoporosis 08/11/2015      Priority: Medium     Mild dilation of ascending aorta - borderline (H) 08/11/2015     Priority: Medium     Good in 2018. Recommend ECHO in 2 years to follow hypertensive changes       RBBB 08/11/2015     Priority: Medium     First degree AV block 08/11/2015     Priority: Medium     Adjustment disorder with depressed mood 08/05/2014     Priority: Medium     Hypertension goal BP (blood pressure) < 140/90 12/03/2013     Priority: Medium     Advanced directives, counseling/discussion 10/02/2013     Priority: Medium     Patient given info and will call if she is interested       BMI 30.0-30.9,adult 07/30/2013     Priority: Medium     Palpitations 07/03/2013     Priority: Medium     Prediabetes 06/14/2013     Priority: Medium     Hyperlipidemia LDL goal <130 06/14/2013     Priority: Medium     Hypothyroidism      Priority: Medium     Tachycardia      Priority: Medium     Glaucoma      Priority: Medium     Problem list name updated by automated process. Provider to review and confirm  [employer] Update utility        Past Medical History:   Diagnosis Date     Complication of anesthesia     confusion after anesthesia (after hysterectomy)     First degree AV block 8/11/2015     Former smoker      Gastroesophageal reflux disease      GERD (gastroesophageal reflux disease)      Glaucoma      Heart block     2:1     Hyperlipidemia LDL goal <130 6/14/2013     Hypertension goal BP (blood pressure) < 140/90 12/3/2013     Hypothyroidism      Left atrial dilatation     mild     Mild dilation of ascending aorta - borderline 8/11/2015     Palpitations      Prediabetes 6/14/2013     RBBB 8/11/2015     S/P total knee arthroplasty 7/23/2019     Tachycardia      Varicose veins      Venous insufficiency     s/p bilateral great saphenous vein radiofrequency ablation by Dr. Garcia     Past Surgical History:   Procedure Laterality Date     ARTHROPLASTY HIP Right 11/20/2020    Procedure: Right total hip arthroplasty using a Biomet Taperloc femoral  stem and G7 acetabulum.;  Surgeon: Dragan Muse MD;  Location: [facility] OR     ARTHROPLASTY KNEE Right 7/23/2019    Procedure: Right total knee arthroplasty using an Arthrex Aircarelance knee system;  Surgeon: Dragan Muse MD;  Location: [facility] OR     BREAST SURGERY      right breast ductal surgery due to milk production     BREAST SURGERY Right     ductal surgery due to milk production     COLONOSCOPY N/A 10/24/2014    no further screening. Procedure: COLONOSCOPY;  Surgeon: Pedro Rudd MD;  Location: [facility] GI     FINGER GANGLION CYST EXCISION Left     ring finger     HYSTERECTOMY       HYSTERECTOMY, PAP NO LONGER INDICATED  2009    total hysterectomy and ovaries. benign     SOFT TISSUE SURGERY      ganglion removed from left wrist and ring finger     SURGICAL HISTORY OF -   age 7    tonsillectomy     SURGICAL HISTORY OF -   1/15    left eye cataract     SURGICAL HISTORY OF -   Fall 2016    LINQ placed.     TONSILLECTOMY       WRIST GANGLION EXCISION Left      Current Outpatient Medications   Medication Sig Dispense Refill     Acetaminophen (TYLENOL PO) Take 500-1,000 mg by mouth every 6 hours as needed for mild pain        dorzolamide-timolol (COSOPT) 2-0.5 % ophthalmic solution Place 1 drop into both eyes 2 times daily       famotidine (PEPCID) 10 MG tablet Take 1 tablet (10 mg) by mouth 2 times daily 180 tablet 1     fluticasone (FLONASE) 50 MCG/ACT nasal spray Spray 1-2 sprays into both nostrils daily 48 g 1     furosemide (LASIX) 20 MG tablet Take 1 tablet (20 mg) by mouth daily 90 tablet 3     gabapentin (NEURONTIN) 100 MG capsule Start with 100 mg daily at bed time. Every 3 days increase the dose by 100 mg until 300 mg 3 times a day. The schedule was provided to the patient 270 capsule 1     latanoprost (XALATAN) 0.005 % ophthalmic solution Place 1 drop into the right eye At Bedtime        levothyroxine (SYNTHROID/LEVOTHROID) 88 MCG tablet Take 1 tablet (88 mcg) by mouth daily 90 tablet 1      "metoprolol tartrate (LOPRESSOR) 25 MG tablet Take 1 tablet (25 mg) by mouth 2 times daily 180 tablet 3     Multiple Vitamins-Minerals (MULTIVITAMIN ADULT PO) Take 1 tablet by mouth daily       potassium chloride ER (KLOR-CON M) 10 MEQ CR tablet Take 1 tablet (10 mEq) by mouth daily 90 tablet 3     pravastatin (PRAVACHOL) 40 MG tablet Take 1 tablet (40 mg) by mouth At Bedtime 90 tablet 3     traZODone (DESYREL) 50 MG tablet Take 1-2 tablets ( mg) by mouth nightly as needed for sleep 180 tablet 1       Allergies   Allergen Reactions     Seasonal Allergies         Social History     Tobacco Use     Smoking status: Former Smoker     Packs/day: 0.50     Years: 18.00     Pack years: 9.00     Types: Cigarettes     Quit date:      Years since quittin.1     Smokeless tobacco: Never Used   Substance Use Topics     Alcohol use: Not Currently         History   Drug Use No         Objective     /80 (BP Location: Left arm, Patient Position: Sitting, Cuff Size: Adult Regular)   Pulse 93   Temp 97.6  F (36.4  C) (Tympanic)   Resp 14   Ht 1.727 m (5' 8\")   Wt 82.8 kg (182 lb 9.6 oz)   LMP  (LMP Unknown)   SpO2 96%   BMI 27.76 kg/m      Physical Exam    Patient appears well in general.  Eyes -PERRL L, conjunctiva without inflammation.  Pharynx WNL.  Neck no thyromegaly.  Chest clear.  Cardiac regular, rate normal, no murmur.  Abdomen nontender.  Extremities no edema.  Motor and gait normal.        Recent Labs   Lab Test 22  1217 21  1005 20  0931 20  0746 20  0556 10/08/20  1257 20  1016   HGB  --  13.0 9.2*  --  8.6*   < > 12.0   PLT  --  378  --   --  409   < > 337    138  --   --  138   < > 137   POTASSIUM 4.3 3.7  --    < > 3.2*   < > 4.2   CR 0.71 0.68  --   --  0.57   < > 0.61   A1C  --   --   --   --   --   --  6.1*    < > = values in this interval not displayed.        Diagnostics:    No labs were ordered during this visit.     EKG:  Reviewed recent EKG " from 12-1--21.  RSR.  No ectopy.  Rate normal.  First-degree AV block and RBBB.  Otherwise WNL.      Revised Cardiac Risk Index (RCRI):  The patient has the following serious cardiovascular risks for perioperative complications:   - No serious cardiac risks = 0 points     RCRI Interpretation: 0 points: Class I (very low risk - 0.4% complication rate)           Signed Electronically by: Troy Dave MD  Copy of this evaluation report is provided to requesting physician.

## 2022-02-03 NOTE — PATIENT INSTRUCTIONS
Preparing for Your Surgery  Getting started  A nurse will call you to review your health history and instructions. They will give you an arrival time based on your scheduled surgery time. Please be ready to share:    Your doctor's clinic name and phone number    Your medical, surgical and anesthesia history    A list of allergies and sensitivities    A list of medicines, including herbal treatments and over-the-counter drugs    Whether the patient has a legal guardian (ask how to send us the papers in advance)  Please tell us if you're pregnant--or if there's any chance you might be pregnant. Some surgeries may injure a fetus (unborn baby), so they require a pregnancy test. Surgeries that are safe for a fetus don't always need a test, and you can choose whether to have one.   If you have a child who's having surgery, please ask for a copy of Preparing for Your Child's Surgery.    Preparing for surgery    Within 30 days of surgery: Have a pre-op exam (sometimes called an H&P, or History and Physical). This can be done at a clinic or pre-operative center.  ? If you're having a , you may not need this exam. Talk to your care team.    At your pre-op exam, talk to your care team about all medicines you take. If you need to stop any medicines before surgery, ask when to start taking them again.  ? We do this for your safety. Many medicines can make you bleed too much during surgery. Some change how well surgery (anesthesia) drugs work.    Call your insurance company to let them know you're having surgery. (If you don't have insurance, call [phone].)    Call your clinic if there's any change in your health. This includes signs of a cold or flu (sore throat, runny nose, cough, rash, fever). It also includes a scrape or scratch near the surgery site.    If you have questions on the day of surgery, call your hospital or surgery center.  COVID testing  You may need to be tested for COVID-19 before having  surgery. If so, your surgical team will give you instructions for scheduling this test, separate from your preoperative history and physical.  Eating and drinking guidelines  For your safety: Unless your surgeon tells you otherwise, follow the guidelines below.    Eat and drink as usual until 8 hours before surgery. After that, no food or milk.    Drink clear liquids until 2 hours before surgery. These are liquids you can see through, like water, Gatorade and Propel Water. You may also have black coffee and tea (no cream or milk).    Nothing by mouth within 2 hours of surgery. This includes gum, candy and breath mints.    If you drink alcohol: Stop drinking it the night before surgery.    If your care team tells you to take medicine on the morning of surgery, it's okay to take it with a sip of water.  Preventing infection    Shower or bathe the night before and morning of your surgery. Follow the instructions your clinic gave you. (If no instructions, use regular soap.)    Don't shave or clip hair near your surgery site. We'll remove the hair if needed.    Don't smoke or vape the morning of surgery. You may chew nicotine gum up to 2 hours before surgery. A nicotine patch is okay.  ? Note: Some surgeries require you to completely quit smoking and nicotine. Check with your surgeon.    Your care team will make every effort to keep you safe from infection. We will:  ? Clean our hands often with soap and water (or an alcohol-based hand rub).  ? Clean the skin at your surgery site with a special soap that kills germs.  ? Give you a special gown to keep you warm. (Cold raises the risk of infection.)  ? Wear special hair covers, masks, gowns and gloves during surgery.  ? Give antibiotic medicine, if prescribed. Not all surgeries need antibiotics.  What to bring on the day of surgery    Photo ID and insurance card    Copy of your health care directive, if you have one    Glasses and hearing aides (bring cases)  ? You can't  wear contacts during surgery    Inhaler and eye drops, if you use them (tell us about these when you arrive)    CPAP machine or breathing device, if you use them    A few personal items, if spending the night    If you have . . .  ? A pacemaker, ICD (cardiac defibrillator) or other implant: Bring the ID card.  ? An implanted stimulator: Bring the remote control.  ? A legal guardian: Bring a copy of the certified (court-stamped) guardianship papers.  Please remove any jewelry, including body piercings. Leave jewelry and other valuables at home.  If you're going home the day of surgery    You must have a responsible adult drive you home. They should stay with you overnight as well.    If you don't have someone to stay with you, and you aren't safe to go home alone, we may keep you overnight. Insurance often won't pay for this.  After surgery  If it's hard to control your pain or you need more pain medicine, please call your surgeon's office.  Questions?   If you have any questions for your care team, list them here: _________________________________________________________________________________________________________________________________________________________________________ ____________________________________ ____________________________________ ____________________________________  For informational purposes only. Not to replace the advice of your health care provider. Copyright   2003, 2019 [facility]. All rights reserved. Clinically reviewed by Sandi Stahl MD. Sincerely [id number] - REV 07/21.

## 2022-02-04 ENCOUNTER — TELEPHONE (OUTPATIENT)
Dept: CARDIOLOGY | Facility: CLINIC | Age: 77
End: 2022-02-04
Payer: MEDICARE

## 2022-02-04 NOTE — TELEPHONE ENCOUNTER
" LOv W/Dr. Anglin 11/29/21 W/Dr. Anglin for follow up in pt with Hx of  first-degree AV block, right bundle branch block, paroxysmal SVT, GERD, hypertension, hyperlipidemia, chronic venous insufficiency (status post venous ablation), aortic sclerosis     # Dizziness, largely resolved after hydrochlorothiazide was discontinued by Dr. Wong-Alvaro   -Zio patch monitor to reassess conduction abnormalities in the setting of transient dizziness, though the dizziness seems to have improved after discontinuation of hydrochlorothiazide     Ziopatch with results as follows:   248 runs PSVT with longest = 34.9sec.         ----- Message from Quincy Anglin MD sent at 2/1/2022 10:21 AM CST -----  Results reviewed, demonstrates stable findings of intermittent paroxysmal SVT which we know about from prior monitors. Fortunately the longest episode of 35s was only at 100 bpm. She does have 1st degree AVB and RBBB. She seemed to have a poor experience with higher dosage of beta-blocker, but if these episodes of SVT are bothersome, recommend she increase metoprolol tartrate 25mg BID to 37.5mg qAM and 25mg qPM, we need to be cautious due to underlying conduction abnormalities. If she is not particularly symptomatic, we can just continue her current regimen and continue monitoring, since it seemed that her dizziness resolved after stopping the hydrochlorothiazide.     Call placed to pt to review results and recommendations.   Pt reports that she does not have any sxs that are intolerable. Pt reports she monitors her BP daily. Has had a couple instances freddy which her DBP was in the 90's but otherwise she stays WNL. No other concerns reports. Pt would prefer to leave her meds \"as is\" for the time being as she is feeling well. Pt advised to call if she should have any concerning sxs or if BP escalates. direct call number given to pt for use.   COSMO Lawton RN, BSN. 02/04/22 10:13 AM   "

## 2022-02-14 ENCOUNTER — NURSE TRIAGE (OUTPATIENT)
Dept: INTERNAL MEDICINE | Facility: CLINIC | Age: 77
End: 2022-02-14
Payer: MEDICARE

## 2022-02-14 NOTE — TELEPHONE ENCOUNTER
Patient is calling because her legs and feet have become swollen over the weekend. She does take furosemide and used to take hydrochlorothiazide but was told to stop this. She is wondering what she should do about the swelling.

## 2022-02-15 NOTE — TELEPHONE ENCOUNTER
S-(situation): right lower extremity edema    B-(background): Patient states she always has swelling in left lower extremity but over the past 2-3 days she developed pitting edema in right foot and up to knee.      A-(assessment): Patient reports fingerprint stays after pressing on edematous area right lower extremity.  She denies redness, pain, weeping or fever.  She also denies chest pain, breathing difficulty, history of clots in lungs or legs and denies recent air or long distance car travel.  hydrochlorothiazide was discontinued about 2 months ago per patient and this is the first time since med was discontinued that she has developed RLE edema.   She has continued on the Lasix 20 mg daily.   She denies eating out or eating more salty foods.      R-(recommendations): Patient was on her way out the door.  She would like a call back after 1 p.m. today.

## 2022-02-15 NOTE — TELEPHONE ENCOUNTER
Per 10/8/21 office visit:  Plan:  1. stop the Fosamax after you finish the tablets you have at home   2. Take Furosemide  20 daily  3. Take the hydrochlorathiazide only if you notice right leg swelling   4. Continue the other meds, same doses for now.  5. Please make a lab appointment for non fasting labs  In January - Feb   6. Please make an appointment few days after the labs to discuss about the results.   7. Keep the appointment with cardiology, dr Anglin. Talk to him about the lightheadedness spell      Did not ask patient if she had taken any hydrochlorothiazide since onset of swelling.  HELEN Jim R.N.

## 2022-02-16 NOTE — TELEPHONE ENCOUNTER
Advised patient of instructions to take hydrochlorothiazide as needed for right leg swelling so she will try that.  Do you want her to take it daily for 3 days or how would you like her to take it?  HELEN Jim R.N.

## 2022-02-21 ENCOUNTER — TELEPHONE (OUTPATIENT)
Dept: INTERNAL MEDICINE | Facility: CLINIC | Age: 77
End: 2022-02-21
Payer: MEDICARE

## 2022-02-21 NOTE — TELEPHONE ENCOUNTER
Left voicemail message for patient asking her to return call to clinic.  She is on Lasix daily with instructions to take hydrochlorothiazide daily x3 days as needed for right lower extremity swelling.  Did she take the hydrochlorothiazide?  If not she should take that for 3 days per MD.  HELEN Jim R.N.

## 2022-02-21 NOTE — TELEPHONE ENCOUNTER
Patient calling  She is still having a lot of swelling in her legs. She is on furosemide (LASIX) 20 MG tablet. She is wondering if she should continue with current medication. Ok to call and lm [phone]

## 2022-02-22 NOTE — TELEPHONE ENCOUNTER
Jackie, the station coordinator called several times to offer appointment today   The life was busy all the time.  The spot was given to somebody else

## 2022-02-23 NOTE — TELEPHONE ENCOUNTER
Patient states she has taken the hydrochlorothiazide the past 3 days and it has helped.  She does have an upcoming appt in March with primary care provider and advised her to call to be seen sooner if she continues to have problems with edema.  HELEN Jim R.N.

## 2022-02-23 NOTE — TELEPHONE ENCOUNTER
Patient called and said her swelling is going down and she is wondering if she still needs to take the water pill.   What Type Of Note Output Would You Prefer (Optional)?: Standard Output How Severe Is Your Rash?: mild Is This A New Presentation, Or A Follow-Up?: Rash Additional History: Pt states she’s tried several otc and prescription strength topicals which all has been ineffective. She’s seen her pcp and was prescribed prescription strength hydrocortisone cream which did not help.

## 2022-03-12 ENCOUNTER — NURSE TRIAGE (OUTPATIENT)
Dept: NURSING | Facility: CLINIC | Age: 77
End: 2022-03-12
Payer: MEDICARE

## 2022-03-12 NOTE — TELEPHONE ENCOUNTER
Patient calling requesting refill of Neurontin.    Reporting she had spoken with pharmacy and was advised it was too early to refill. Patient has 1 refill remaining.    Patient is taking Gabapentin 300 mg TID.    gabapentin (NEURONTIN) 100 MG capsule 270 capsule 1 1/20/2022  --   Sig: Start with 100 mg daily at bed time. Every 3 days increase the dose by 100 mg until 300 mg 3 times a day. The schedule was provided to the patient     Placed call to [facility]   And spoke with pharmacist. Pharmacist stating original prescription was not entered as tapered dose.  Stating she has fixed it to tapered dose in system and patient will be able to  refill today.    Patient notified. Caller verbalized understanding. Denies further questions.    Yanet Reyes RN  [facility]    COVID 19 Nurse Triage Plan/Patient Instructions    Please be aware that novel coronavirus (COVID-19) may be circulating in the community. If you develop symptoms such as fever, cough, or SOB or if you have concerns about the presence of another infection including coronavirus (COVID-19), please contact your health care provider or visit [email or web address].     Disposition/Instructions    Home care recommended. Follow home care protocol based instructions.    Thank you for taking steps to prevent the spread of this virus.  o Limit your contact with others.  o Wear a simple mask to cover your cough.  o Wash your hands well and often.    Resources    [facility]: About COVID-19: [email or web address]    CDC: What to Do If You're Sick: www.cdc.gov/coronavirus/2019-ncov/about/steps-when-sick.html    CDC: Ending Home Isolation: www.cdc.gov/coronavirus/2019-ncov/hcp/disposition-in-home-patients.html     CDC: Caring for Someone: www.cdc.gov/coronavirus/2019-ncov/if-you-are-sick/care-for-someone.html     [facility]: Interim Guidance for Hospital Discharge to Home:  www.[email or web address].[email or web address].[email or web address]/diseases/coronavirus/hcp/hospdischarge.pdf    [facility] clinical trials (COVID-19 research studies): [email or web address]     Below are the COVID-19 hotlines at the [facility] of [facility] ([facility]). Interpreters are available.   o For health questions: Call 655-178-6774 or 1-279.234.7000 (7 a.m. to 7 p.m.)  o For questions about schools and childcare: Call 427-439-2474 or 1-911.268.8911 (7 a.m. to 7 p.m.)                   Reason for Disposition    Caller has medication question only, adult not sick, and triager answers question    Additional Information    Negative: Drug overdose and triager unable to answer question    Negative: Caller requesting information unrelated to medicine    Negative: Caller requesting a prescription for Strep throat and has a positive culture result    Negative: Rash while taking a medication or within 3 days of stopping it    Negative: Immunization reaction suspected    Negative: [1] Asthma and [2] having symptoms of asthma (cough, wheezing, etc.)    Negative: [1] Influenza symptoms AND [2] anti-viral med prescription request, such as Tamiflu    Negative: [1] Symptom of illness (e.g., headache, abdominal pain, earache, vomiting) AND [2] more than mild    Negative: MORE THAN A DOUBLE DOSE of a prescription or over-the-counter (OTC) drug    Negative: [1] DOUBLE DOSE (an extra dose or lesser amount) of over-the-counter (OTC) drug AND [2] any symptoms (e.g., dizziness, nausea, pain, sleepiness)    Negative: [1] DOUBLE DOSE (an extra dose or lesser amount) of prescription drug AND [2] any symptoms (e.g., dizziness, nausea, pain, sleepiness)    Negative: Took another person's prescription drug    Negative: [1] DOUBLE DOSE (an extra dose or lesser amount) of prescription drug AND [2] NO symptoms (Exception: a double dose of antibiotics)    Negative: Diabetes drug error or overdose (e.g., took wrong type of insulin or took extra  "dose)    Negative: [1] Request for URGENT new prescription or refill of \"essential\" medication (i.e., likelihood of harm to patient if not taken) AND [2] triager unable to fill per unit policy    Negative: [1] Prescription not at pharmacy AND [2] was prescribed by PCP recently    Negative: [1] Pharmacy calling with prescription questions AND [2] triager unable to answer question    Negative: [1] Caller has URGENT medication question about med that PCP or specialist prescribed AND [2] triager unable to answer question    Negative: [1] Caller has NON-URGENT medication question about med that PCP prescribed AND [2] triager unable to answer question    Negative: [1] Caller requesting a NON-URGENT new prescription or refill AND [2] triager unable to refill per unit policy    Negative: [1] Caller has medication question about med not prescribed by PCP AND [2] triager unable to answer question (e.g., compatibility with other med, storage)    Negative: Caller requesting a CONTROLLED substance prescription refill (e.g., narcotics, ADHD medicines)    Negative: Caller wants to use a complementary or alternative medicine    Negative: [1] Prescription prescribed recently is not at pharmacy AND [2] triager has access to patient's EMR AND [3] prescription is recorded in the EMR    Negative: [1] DOUBLE DOSE (an extra dose or lesser amount) of over-the-counter (OTC) drug AND [2] NO symptoms    Negative: [1] DOUBLE DOSE (an extra dose or lesser amount) of antibiotic drug AND [2] NO symptoms    Protocols used: MEDICATION QUESTION CALL-A-AH      "

## 2022-03-24 ENCOUNTER — TELEPHONE (OUTPATIENT)
Dept: INTERNAL MEDICINE | Facility: CLINIC | Age: 77
End: 2022-03-24

## 2022-03-24 ENCOUNTER — OFFICE VISIT (OUTPATIENT)
Dept: INTERNAL MEDICINE | Facility: CLINIC | Age: 77
End: 2022-03-24
Payer: MEDICARE

## 2022-03-24 VITALS
BODY MASS INDEX: 28.07 KG/M2 | HEART RATE: 82 BPM | HEIGHT: 68 IN | DIASTOLIC BLOOD PRESSURE: 60 MMHG | TEMPERATURE: 97.8 F | SYSTOLIC BLOOD PRESSURE: 105 MMHG | RESPIRATION RATE: 16 BRPM | OXYGEN SATURATION: 98 % | WEIGHT: 185.2 LBS

## 2022-03-24 DIAGNOSIS — I10 HYPERTENSION GOAL BP (BLOOD PRESSURE) < 140/90: ICD-10-CM

## 2022-03-24 DIAGNOSIS — I87.2 VENOUS INSUFFICIENCY: ICD-10-CM

## 2022-03-24 DIAGNOSIS — M25.551 CHRONIC HIP PAIN AFTER TOTAL REPLACEMENT OF RIGHT HIP JOINT: Primary | ICD-10-CM

## 2022-03-24 DIAGNOSIS — Z96.641 CHRONIC HIP PAIN AFTER TOTAL REPLACEMENT OF RIGHT HIP JOINT: Primary | ICD-10-CM

## 2022-03-24 DIAGNOSIS — R60.0 BILATERAL LEG EDEMA: ICD-10-CM

## 2022-03-24 DIAGNOSIS — G89.29 CHRONIC HIP PAIN AFTER TOTAL REPLACEMENT OF RIGHT HIP JOINT: Primary | ICD-10-CM

## 2022-03-24 PROCEDURE — 99214 OFFICE O/P EST MOD 30 MIN: CPT | Performed by: INTERNAL MEDICINE

## 2022-03-24 RX ORDER — GABAPENTIN 300 MG/1
300 CAPSULE ORAL 3 TIMES DAILY
Qty: 270 CAPSULE | Refills: 1 | Status: SHIPPED | OUTPATIENT
Start: 2022-03-24 | End: 2022-09-22

## 2022-03-24 RX ORDER — ACETAZOLAMIDE 500 MG/1
500 CAPSULE, EXTENDED RELEASE ORAL 2 TIMES DAILY
Status: ON HOLD | COMMUNITY
Start: 2022-03-02 | End: 2022-06-06

## 2022-03-24 NOTE — PROGRESS NOTES
Dr Wong's note      Patient's instructions / PLAN:                                                        Plan:  1. Gabapentin 300 mg 3 times a day   2. Furosemide 20 mg twice a day and talk to the vascular doctor about the Right leg swelling   3. Stop the hydrochlorathiazide   4. No changes in the other meds   5. Follow up in about 3 months        ASSESSMENT & PLAN:                                                      (M25.551,  G89.29,  Z96.641) Chronic hip pain after total replacement of right hip joint  (primary encounter diagnosis)  Comment:   Plan: gabapentin (NEURONTIN) 300 MG capsule            (I10) Hypertension goal BP (blood pressure) < 140/90  Comment: Controlled    Plan: Continue same meds, same doses for now     (I87.2) Venous insufficiency  (R60.0) Bilateral leg edema  Comment:   Plan: as above   I sent a message to dr Pina about the today findings        Chief complaint:                                                      R leg edema    SUBJECTIVE:                                                    History of present illness:      Chr vv insuf with edema which is usually more of the left side than the right side  --She developed edema on the right side about a month ago.  We advised her to increase the diuretics.  The edema is better but still persistent.  -- can't put compress sock because echr hip pain   -- scheduled for vein ablation April 11 on the L leg       HTN  -- no lightheadedness     Chr pain  -- the pain is better with Gabapentin 300 tid.     Leg edema  Chr vv insuf with edema more on the L   -- more on the R  -- can't put compress sock because echr hip pain   -- scheduled for vein ablation April 11 on the L leg     LOV:  Plan:  1. Gabapentin 100 mg as per below schedule   2. Do not take more then 1 aspirin a day   3. Continue the other meds, same doses for now.  4.  Labs today - [address]   5. Schedule follow up appointment in about 2 months to discuss the pain       Hyperlipidemia  "Follow-Up      Are you regularly taking any medication or supplement to lower your cholesterol?   Yes- Pravastatin    Are you having muscle aches or other side effects that you think could be caused by your cholesterol lowering medication?  Yes- muscle aches    Hypertension Follow-up      Do you check your blood pressure regularly outside of the clinic? Yes     Are you following a low salt diet? Yes    Are your blood pressures ever more than 140 on the top number (systolic) OR more   than 90 on the bottom number (diastolic), for example 140/90? Yes without her medication in the morning.      How many servings of fruits and vegetables do you eat daily?  0-1    On average, how many sweetened beverages do you drink each day (Examples: soda, juice, sweet tea, etc.  Do NOT count diet or artificially sweetened beverages)?   1    How many days per week do you exercise enough to make your heart beat faster? 3 or less    How many minutes a day do you exercise enough to make your heart beat faster? 9 or less    How many days per week do you miss taking your medication? 0      Review of Systems:                                                      ROS: negative for fever, chills, cough, wheezes, chest pain, shortness of breath, vomiting, abdominal pain, positive for leg swelling          OBJECTIVE:             Physical exam:  Blood pressure 105/60, pulse 82, temperature 97.8  F (36.6  C), temperature source Tympanic, resp. rate 16, height 1.727 m (5' 8\"), weight 84 kg (185 lb 3.2 oz), SpO2 98 %, not currently breastfeeding.     NAD, appears comfortable  Skin: no rashes   Neck: supple, no JVD,  No thyroidmegaly. Lymph nodes nonpalpable cervical and supraclavicular.  Chest: clear to auscultation bilaterally, good respiratory effort  Heart: S1 S2, RRR, no mgr appreciated  Abdomen: soft, not tender,   Extremities: +1 R edema, trace edema on the L   Neurologic: A, Ox3, no focal signs appreciated    PMHx: reviewed  Past Medical " History:   Diagnosis Date     Complication of anesthesia     confusion after anesthesia (after hysterectomy)     First degree AV block 8/11/2015     Former smoker      Gastroesophageal reflux disease      GERD (gastroesophageal reflux disease)      Glaucoma      Heart block     2:1     Hyperlipidemia LDL goal <130 6/14/2013     Hypertension goal BP (blood pressure) < 140/90 12/3/2013     Hypothyroidism      Left atrial dilatation     mild     Mild dilation of ascending aorta - borderline 8/11/2015     Palpitations      Prediabetes 6/14/2013     RBBB 8/11/2015     S/P total knee arthroplasty 7/23/2019     Tachycardia      Varicose veins      Venous insufficiency     s/p bilateral great saphenous vein radiofrequency ablation by Dr. Garcia      PSHx: reviewed  Past Surgical History:   Procedure Laterality Date     ARTHROPLASTY HIP Right 11/20/2020    Procedure: Right total hip arthroplasty using a Biomet Taperloc femoral stem and G7 acetabulum.;  Surgeon: Dragan Muse MD;  Location: [facility] OR     ARTHROPLASTY KNEE Right 7/23/2019    Procedure: Right total knee arthroplasty using an Arthrex Dialogfeedlance knee system;  Surgeon: Dragan Muse MD;  Location: [facility] OR     BREAST SURGERY      right breast ductal surgery due to milk production     BREAST SURGERY Right     ductal surgery due to milk production     COLONOSCOPY N/A 10/24/2014    no further screening. Procedure: COLONOSCOPY;  Surgeon: Pedro Rudd MD;  Location: [facility] GI     FINGER GANGLION CYST EXCISION Left     ring finger     HYSTERECTOMY       HYSTERECTOMY, PAP NO LONGER INDICATED  2009    total hysterectomy and ovaries. benign     SOFT TISSUE SURGERY      ganglion removed from left wrist and ring finger     SURGICAL HISTORY OF -   age 7    tonsillectomy     SURGICAL HISTORY OF -   1/15    left eye cataract     SURGICAL HISTORY OF -   Fall 2016    LINQ placed.     TONSILLECTOMY       WRIST GANGLION EXCISION Left         Meds:  reviewed  Current Outpatient Medications   Medication Sig Dispense Refill     Acetaminophen (TYLENOL PO) Take 500-1,000 mg by mouth every 6 hours as needed for mild pain        acetaZOLAMIDE (DIAMOX SEQUEL) 500 MG 12 hr capsule Take 500 mg by mouth 2 times daily       dorzolamide-timolol (COSOPT) 2-0.5 % ophthalmic solution Place 1 drop into both eyes 2 times daily       famotidine (PEPCID) 10 MG tablet Take 1 tablet (10 mg) by mouth 2 times daily 180 tablet 1     fluticasone (FLONASE) 50 MCG/ACT nasal spray Spray 1-2 sprays into both nostrils daily 48 g 1     furosemide (LASIX) 20 MG tablet Take 1 tablet (20 mg) by mouth daily 90 tablet 3     gabapentin (NEURONTIN) 100 MG capsule Start with 100 mg daily at bed time. Every 3 days increase the dose by 100 mg until 300 mg 3 times a day. The schedule was provided to the patient 270 capsule 1     latanoprost (XALATAN) 0.005 % ophthalmic solution Place 1 drop into the right eye At Bedtime        levothyroxine (SYNTHROID/LEVOTHROID) 88 MCG tablet Take 1 tablet (88 mcg) by mouth daily 90 tablet 1     metoprolol tartrate (LOPRESSOR) 25 MG tablet Take 1 tablet (25 mg) by mouth 2 times daily 180 tablet 3     Multiple Vitamins-Minerals (MULTIVITAMIN ADULT PO) Take 1 tablet by mouth daily       potassium chloride ER (KLOR-CON M) 10 MEQ CR tablet Take 1 tablet (10 mEq) by mouth daily 90 tablet 3     pravastatin (PRAVACHOL) 40 MG tablet Take 1 tablet (40 mg) by mouth At Bedtime 90 tablet 3     traZODone (DESYREL) 50 MG tablet Take 1-2 tablets ( mg) by mouth nightly as needed for sleep 180 tablet 1       Soc Hx: reviewed  Fam Hx: reviewed          Rachel Wong MD  Internal Medicine

## 2022-03-24 NOTE — PATIENT INSTRUCTIONS
Plan:  1. Gabapentin 300 mg 3 times a day   2. Furosemide 20 mg twice a day and talk to the vascular doctor about the Right leg swelling   3. Stop the hydrochlorathiazide   4. No changes in the other meds   5. Follow up in about 3 months

## 2022-03-24 NOTE — TELEPHONE ENCOUNTER
Kulwant Pina    This patient is scheduled for vein ablation on the L leg.  Most recently the R leg is more swollen than the L     Would be advisable to reevaluate her legs before the procedure ?     Rachel Wong MD  Internal Medicine

## 2022-04-06 ENCOUNTER — TELEPHONE (OUTPATIENT)
Dept: CARDIOLOGY | Facility: CLINIC | Age: 77
End: 2022-04-06
Payer: MEDICARE

## 2022-04-06 NOTE — TELEPHONE ENCOUNTER
RN called patient to review with her the Pre- Ablation orders:    Patient is going to callback to confirm her  is ok with 1230pm arrival time    Patient will increase fluid the day before the procedure.  Patient will hold diuretic until after the ablation. Will hold lasix  Patient will not wear compression stockings the day before or the day of the ablation.  Valium was explained to patient and ordered to pharmacy of choice. Called to [facility] in [place] per patient's request.   Patient aware to bring Valium to clinic.  Patient will have a  to bring them home.  Follow-up ultrasound for Wednesday scheduled.  Follow-up OV for 1 month scheduled.     Patient has no questions.     Patient will pickup Allegra 181mg to take BID x2 weeks post procedure.       COVID test scheduled 4/8/22

## 2022-04-08 ENCOUNTER — LAB (OUTPATIENT)
Dept: LAB | Facility: CLINIC | Age: 77
End: 2022-04-08
Payer: MEDICARE

## 2022-04-08 DIAGNOSIS — I87.2 VENOUS INSUFFICIENCY: ICD-10-CM

## 2022-04-08 DIAGNOSIS — G89.29 CHRONIC HIP PAIN AFTER TOTAL REPLACEMENT OF RIGHT HIP JOINT: ICD-10-CM

## 2022-04-08 DIAGNOSIS — Z96.641 CHRONIC HIP PAIN AFTER TOTAL REPLACEMENT OF RIGHT HIP JOINT: ICD-10-CM

## 2022-04-08 DIAGNOSIS — M25.551 CHRONIC HIP PAIN AFTER TOTAL REPLACEMENT OF RIGHT HIP JOINT: ICD-10-CM

## 2022-04-08 PROCEDURE — U0005 INFEC AGEN DETEC AMPLI PROBE: HCPCS

## 2022-04-08 PROCEDURE — U0003 INFECTIOUS AGENT DETECTION BY NUCLEIC ACID (DNA OR RNA); SEVERE ACUTE RESPIRATORY SYNDROME CORONAVIRUS 2 (SARS-COV-2) (CORONAVIRUS DISEASE [COVID-19]), AMPLIFIED PROBE TECHNIQUE, MAKING USE OF HIGH THROUGHPUT TECHNOLOGIES AS DESCRIBED BY CMS-2020-01-R: HCPCS

## 2022-04-08 RX ORDER — GABAPENTIN 100 MG/1
CAPSULE ORAL
Qty: 270 CAPSULE | Refills: 1 | OUTPATIENT
Start: 2022-04-08

## 2022-04-08 NOTE — TELEPHONE ENCOUNTER
VM received from patient, stating that she is coming down with a cold and isn't sure if she should come in for her ablation on Monday. Reviewed with Dr. Pina, who states that if her COVID test is positive, cancel procedure obviously. If COVID test is negative, and she feels sick on Monday, also cancel test. If COVID is negative and she is feeling better Monday, ok to proceed with procedure. Spoke with patient and reviewed these recommendations. Patient verbalized understanding and agreed with plan of care. She will keep in touch with us.

## 2022-04-09 LAB — SARS-COV-2 RNA RESP QL NAA+PROBE: NEGATIVE

## 2022-04-10 ENCOUNTER — OFFICE VISIT (OUTPATIENT)
Dept: URGENT CARE | Facility: URGENT CARE | Age: 77
End: 2022-04-10
Payer: MEDICARE

## 2022-04-10 VITALS
HEART RATE: 95 BPM | RESPIRATION RATE: 16 BRPM | SYSTOLIC BLOOD PRESSURE: 146 MMHG | DIASTOLIC BLOOD PRESSURE: 89 MMHG | TEMPERATURE: 97.8 F | OXYGEN SATURATION: 98 %

## 2022-04-10 DIAGNOSIS — J06.9 UPPER RESPIRATORY TRACT INFECTION, UNSPECIFIED TYPE: Primary | ICD-10-CM

## 2022-04-10 PROCEDURE — 99213 OFFICE O/P EST LOW 20 MIN: CPT | Performed by: PHYSICIAN ASSISTANT

## 2022-04-10 RX ORDER — BENZONATATE 100 MG/1
100 CAPSULE ORAL 3 TIMES DAILY PRN
Qty: 21 CAPSULE | Refills: 0 | Status: ON HOLD | OUTPATIENT
Start: 2022-04-10 | End: 2022-06-06

## 2022-04-10 NOTE — PROGRESS NOTES
Upper respiratory tract infection, unspecified type  - benzonatate (TESSALON) 100 MG capsule; Take 1 capsule (100 mg) by mouth as needed in the morning and 1 capsule (100 mg) as needed at noon and 1 capsule (100 mg) as needed in the evening for cough.     See Patient Instructions      Viral Upper Respiratory Illness (Adult)    You have a viral upper respiratory illness (URI), which is another term for the common cold. This illness is contagious during the first few days. It is spread through the air by coughing and sneezing. It may also be spread by direct contact (touching the sick person and then touching your own eyes, nose, or mouth). Frequent handwashing will decrease risk of spread. Most viral illnesses go away within 7 to 10 days with rest and simple home remedies. Sometimes the illness may last for several weeks. Antibiotics will not kill a virus, and they are generally not prescribed for this condition.  Home care    If symptoms are severe, rest at home for the first 2 to 3 days. When you resume activity, don't let yourself get too tired.    Don't smoke. If you need help stopping, talk with your healthcare provider.    Avoid being exposed to cigarette smoke (yours or others ).    You may use acetaminophen or ibuprofen to control pain and fever, unless another medicine was prescribed. If you have chronic liver or kidney disease, have ever had a stomach ulcer or gastrointestinal bleeding, or are taking blood-thinning medicines, talk with your healthcare provider before using these medicines. Aspirin should never be given to anyone under 18 years of age who is ill with a viral infection or fever. It may cause severe liver or brain damage.    Your appetite may be poor, so a light diet is fine. Stay well hydrated by drinking 6 to 8 glasses of fluids per day (water, soft drinks, juices, tea, or soup). Extra fluids will help loosen secretions in the nose and lungs.    Over-the-counter cold medicines will not  shorten the length of time you re sick, but they may be helpful for the following symptoms: cough, sore throat, and nasal and sinus congestion. If you take prescription medicines, ask your healthcare provider or pharmacist which over-the-counter medicines are safe to use. (Note: Don't use decongestants if you have high blood pressure.)  Follow-up care  Follow up with your healthcare provider, or as advised.  When to seek medical advice  Call your healthcare provider right away if any of these occur:    Cough with lots of colored sputum (mucus)    Severe headache; face, neck, or ear pain    Difficulty swallowing due to throat pain    Fever of 100.4 F (38 C) or higher, or as directed by your healthcare provider  Call 911  Call 911 if any of these occur:    Chest pain, shortness of breath, wheezing, or difficulty breathing    Coughing up blood    Very severe pain with swallowing, especially if it goes along with a muffled voice   [employer] last reviewed this educational content on 6/1/2018 2000-2021 [employer]. All rights reserved. This information is not intended as a substitute for professional medical care. Always follow your healthcare professional's instructions.                 Rajendra Graf PA-C  [facility]    Subjective   76 year old who presents to clinic today for the following health issues:    Nasal Congestion       HPI      Acute Illness  Acute illness concerns: 77 yo F presents with the following constant cough with 102 fever , fatigue Pt has stated she hear wheezing and 2nd breath when inhaling onset T-1 Patient had an negative in clinic covid test on Friday  Onset/Duration: Monday   Symptoms:  Fever: YES  Chills/Sweats: no  Headache (location?): no  Sinus Pressure: no  Conjunctivitis:  no  Ear Pain: no  Rhinorrhea: YES  Congestion: YES  Sore Throat: For a couple of days but not currently  Cough: YES  Wheeze: YES and shortness of breath   Decreased Appetite:  YES  Nausea: no  Vomiting: no  Diarrhea: no  Dysuria/Freq.: no  Dysuria or Hematuria: no  Fatigue/Achiness: YES  Sick/Strep Exposure: Friend had sinus cold  Therapies tried and outcome: Robitussin, Tylenol      Review of Systems   Review of Systems   See HPI     Objective    Temp: 97.8  F (36.6  C)   BP: (!) 146/89 Pulse: 95   Resp: 16 SpO2: 98 %       Physical Exam   Physical Exam  Constitutional:       General: She is not in acute distress.     Appearance: Normal appearance. She is not ill-appearing, toxic-appearing or diaphoretic.   HENT:      Head: Normocephalic and atraumatic.      Right Ear: Tympanic membrane, ear canal and external ear normal. There is no impacted cerumen.      Left Ear: Tympanic membrane, ear canal and external ear normal. There is no impacted cerumen.      Nose: Congestion and rhinorrhea present.      Mouth/Throat:      Mouth: Mucous membranes are moist.      Pharynx: Oropharynx is clear. No oropharyngeal exudate or posterior oropharyngeal erythema.   Cardiovascular:      Rate and Rhythm: Normal rate and regular rhythm.      Pulses: Normal pulses.      Heart sounds: Normal heart sounds. No murmur heard.    No friction rub. No gallop.   Pulmonary:      Effort: Pulmonary effort is normal. No respiratory distress.      Breath sounds: No stridor. Wheezing present. No rhonchi or rales.   Chest:      Chest wall: No tenderness.   Musculoskeletal:      Cervical back: Normal range of motion. No tenderness.   Lymphadenopathy:      Cervical: No cervical adenopathy.   Neurological:      General: No focal deficit present.      Mental Status: She is alert and oriented to person, place, and time. Mental status is at baseline.      Gait: Gait normal.   Psychiatric:         Mood and Affect: Mood normal.         Behavior: Behavior normal.         Thought Content: Thought content normal.         Judgment: Judgment normal.          No results found for this or any previous visit (from the past 24 hour(s)).

## 2022-04-12 ENCOUNTER — TELEPHONE (OUTPATIENT)
Dept: INTERNAL MEDICINE | Facility: CLINIC | Age: 77
End: 2022-04-12
Payer: MEDICARE

## 2022-04-12 DIAGNOSIS — R05.9 COUGH: Primary | ICD-10-CM

## 2022-04-12 NOTE — TELEPHONE ENCOUNTER
S-(situation): cough, fever    B-(background): Seen in  4/8/22, negative Covid 19 test.  Diagnosed with viral infection and given rx for Tessalon.     A-(assessment): Patient states she is feeling a little bit worse today than when seen in  4/8/22.  She has frequent cough, non-productive and gets winded at times due to coughing.  Temperature last evening with digital oral thermometer was 100.3, this morning 99.4.  She has occasional wheezing, nasal congestion, sore throat and fatigue.  Denies chest pain.     R-(recommendations): Offered appt tomorrow morning with partner but patient declined. States she wants to see primary care provider only.  HELEN Jim R.N.

## 2022-04-12 NOTE — TELEPHONE ENCOUNTER
Patient calling  Seen in urgent care 4/10/2022  She still has a fever and bad cough. Patient has been sick for 4 days. She is requesting medication to help. [facility]. Please advise. Ok to call and padmini [phone]

## 2022-04-13 RX ORDER — CEFDINIR 300 MG/1
300 CAPSULE ORAL 2 TIMES DAILY
Qty: 14 CAPSULE | Refills: 0 | Status: SHIPPED | OUTPATIENT
Start: 2022-04-13 | End: 2022-05-26

## 2022-04-13 NOTE — TELEPHONE ENCOUNTER
Start antibiotics right away.  Rx sent  She needs to be seen by anybody in [place].     Dr. Wong next available is next Tuesday and it is not safe to wait until then

## 2022-04-15 ENCOUNTER — OFFICE VISIT (OUTPATIENT)
Dept: INTERNAL MEDICINE | Facility: CLINIC | Age: 77
End: 2022-04-15
Payer: MEDICARE

## 2022-04-15 VITALS
HEART RATE: 84 BPM | DIASTOLIC BLOOD PRESSURE: 68 MMHG | WEIGHT: 177 LBS | SYSTOLIC BLOOD PRESSURE: 128 MMHG | RESPIRATION RATE: 16 BRPM | BODY MASS INDEX: 26.83 KG/M2 | TEMPERATURE: 98.2 F | HEIGHT: 68 IN | OXYGEN SATURATION: 96 %

## 2022-04-15 DIAGNOSIS — R05.9 COUGH: ICD-10-CM

## 2022-04-15 DIAGNOSIS — I47.10 PAROXYSMAL SVT (SUPRAVENTRICULAR TACHYCARDIA) (H): ICD-10-CM

## 2022-04-15 DIAGNOSIS — J20.9 ACUTE BRONCHITIS WITH SYMPTOMS > 10 DAYS: ICD-10-CM

## 2022-04-15 DIAGNOSIS — J47.9 BRONCHIECTASIS WITHOUT COMPLICATION (H): ICD-10-CM

## 2022-04-15 DIAGNOSIS — R91.8 RIGHT UPPER LOBE PULMONARY INFILTRATE: Primary | ICD-10-CM

## 2022-04-15 PROBLEM — I27.20 PULMONARY HYPERTENSION, UNSPECIFIED (H): Status: ACTIVE | Noted: 2022-04-15

## 2022-04-15 PROBLEM — I27.20 PULMONARY HYPERTENSION, UNSPECIFIED (H): Status: RESOLVED | Noted: 2022-04-15 | Resolved: 2022-04-15

## 2022-04-15 PROCEDURE — 99214 OFFICE O/P EST MOD 30 MIN: CPT | Performed by: INTERNAL MEDICINE

## 2022-04-15 RX ORDER — DOXYCYCLINE 100 MG/1
100 CAPSULE ORAL 2 TIMES DAILY
Qty: 20 CAPSULE | Refills: 0 | Status: SHIPPED | OUTPATIENT
Start: 2022-04-15 | End: 2022-05-26

## 2022-04-15 NOTE — PROGRESS NOTES
"Face to face time with patient: 31 minutes (1139---1155, 1228--1243)       Assessment & Plan   (R91.8) Right upper lobe pulmonary infiltrate  (primary encounter diagnosis)  Comment: RUL infiltrate on CXR today, will treat as possible pneumonia. Continue cefdinir, add doxycyline. F/u soon with Dr Wong.     (J20.9) Acute bronchitis with symptoms > 10 days  Comment: Given hx of bronchiectasis, asked patient to submit a sputum culture if able, to r/o pseudomonas.   Plan: Respiratory Aerobic Bacterial Culture,         doxycycline hyclate (VIBRAMYCIN) 100 MG capsule          (J47.9) Bronchiectasis without complication (H)  Comment: Given hx of bronchiectasis, asked patient to submit a sputum culture if able, to r/o pseudomonas.  Plan: XR Chest 2 Views, Respiratory Aerobic Bacterial        Culture, doxycycline hyclate (VIBRAMYCIN) 100         MG capsule    (R05.9) Cough  Plan: XR Chest 2 Views, Respiratory Aerobic Bacterial        Culture          (I47.1) Paroxysmal SVT (supraventricular tachycardia) (H)  Comment: Scheduled ablation on hold due to above respiratory illness. F/u with EP cardiology as they advise.          BMI:   Estimated body mass index is 26.91 kg/m  as calculated from the following:    Height as of this encounter: 1.727 m (5' 8\").    Weight as of this encounter: 80.3 kg (177 lb).     Patient Instructions   I'm glad that you are feeling a bit better.   I'd like to add a second antibiotic that could possibly cover any bacteria that the first antibiotic might miss.     If possible,  a sputum container from the lab downstairs and try to collect any yellow-green sputum that you cough up and bring it back to the lab. Sometimes we can identify bacteria that cause us to change our antibiotic choice.     If feeling fine, see Dr Wong again on 6/23/2022 as scheduled. Call us sooner if problems.               No follow-ups on file.    Cali Welsh MD,   [facility] " "EDILMA Beth is a 76 year old who presents for the following health issues : UC follow up.    Hospitals in [place]     ED/UC Followup:    Facility:  [facility]  Date of visit: 4/10/2022  Reason for visit: upper respiratory infection  Current Status: somewhat improved, cough persists     Symptoms of respiratory illness began on or around April 6 with a runny nose and a cough.  Patient was seen at urgent care on April 10 and was felt to have a viral syndrome.  However,  She contacted our clinic and was started on cefdinir by Dr. Wong on 4/13.  The patient reports that her cough is somewhat improved, but continues to produce dark green and yellow sputum without hemoptysis.  She is not aware of fevers. No dyspnea at rest, but occasional shortness of breath noted with activity.    Of note, the patient has a history of bronchiectasis on CT scan noted in a pulmonary consult with Dr Garcia on 7/21/2020. The patient had an episode of severe pneumonia as a child.     Past medical, family and social histories as well as medications reviewed and updated as needed.    Review of Systems   REVIEW OF SYSTEMS: The following systems have been completely reviewed and are negative except as noted above:   Constitutional, HEENT, respiratory, cardiovascular, gastrointestinal, and neurologic systems.        Objective    /68   Pulse 84   Temp 98.2  F (36.8  C) (Tympanic)   Resp 16   Ht 1.727 m (5' 8\")   Wt 80.3 kg (177 lb)   LMP  (LMP Unknown)   SpO2 96%   Breastfeeding No   BMI 26.91 kg/m    Body mass index is 26.91 kg/m .  Physical Exam   GENERAL: healthy, alert and no distress  NECK: no adenopathy, no asymmetry, masses, or scars and thyroid normal to palpation  RESP: scattered wheezy rhonchi partially clearing with cough.   CV: regular rate and rhythm, normal S1 S2, no S3 or S4, no murmur, click or rub, no peripheral edema and peripheral pulses strong  MS: no gross musculoskeletal defects " noted, no edema  NEURO: Normal strength and tone, mentation intact and speech normal  PSYCH: mentation appears normal, affect normal/bright    Results for orders placed or performed in visit on 04/15/22   XR Chest 2 Views    Narrative    CHEST TWO VIEWS  4/15/2022 12:03 PM     HISTORY: Persistent cough with purulent sputum since 4/10. History of  bronchiectasis. Cough. Bronchiectasis without complication (H).    COMPARISON: November 12, 2019       Impression    IMPRESSION: Right upper lobe infiltrate. Minimal left base atelectasis  and/or infiltrate. No effusions. The cardiac silhouette is not  enlarged. Pulmonary vasculature is unremarkable.     MARQUIS HADREN MD         SYSTEM ID:  [id number]

## 2022-04-15 NOTE — PATIENT INSTRUCTIONS
I'm glad that you are feeling a bit better.   I'd like to add a second antibiotic that could possibly cover any bacteria that the first antibiotic might miss.     If possible,  a sputum container from the lab downstairs and try to collect any yellow-green sputum that you cough up and bring it back to the lab. Sometimes we can identify bacteria that cause us to change our antibiotic choice.     If feeling fine, see Dr Wong again on 6/23/2022 as scheduled. Call us sooner if problems.

## 2022-04-27 ENCOUNTER — TRANSFERRED RECORDS (OUTPATIENT)
Dept: HEALTH INFORMATION MANAGEMENT | Facility: CLINIC | Age: 77
End: 2022-04-27
Payer: MEDICARE

## 2022-04-29 ENCOUNTER — TELEPHONE (OUTPATIENT)
Dept: CARDIOLOGY | Facility: CLINIC | Age: 77
End: 2022-04-29
Payer: MEDICARE

## 2022-04-29 DIAGNOSIS — R60.9 FLUID RETENTION: ICD-10-CM

## 2022-04-29 DIAGNOSIS — I87.2 EDEMA OF LEFT LOWER EXTREMITY DUE TO PERIPHERAL VENOUS INSUFFICIENCY: Primary | ICD-10-CM

## 2022-04-29 RX ORDER — FUROSEMIDE 20 MG
40 TABLET ORAL DAILY
Qty: 180 TABLET | Refills: 1 | Status: SHIPPED | OUTPATIENT
Start: 2022-04-29 | End: 2022-05-02

## 2022-04-29 NOTE — TELEPHONE ENCOUNTER
[facility] Call Center    Phone Message    May a detailed message be left on voicemail: yes     Reason for Call: Other: Ray called wanting to speak to Dr. Anglin's nurse about her leg swelling. Please advise. THank you.      Action Taken: Message routed to:  Other: [place]    Travel Screening: Not Applicable

## 2022-04-29 NOTE — TELEPHONE ENCOUNTER
Call placed to pt to review symptoms. Pt reports that she has had a steady increase in edema over the past month. Pt was originally planned to have GSV venaseal but has been battling with a prolonged respiratory infection.     Pt reports previously she would increase her water pill PRN for edema but was not sure if she should be doing this as she recently hd issues with dehydration. Pt does not weigh herself daily. Some increased SOB over the past month but is unclear if this is from her respiratory infection or fluid.     LOv 11/29/21 with Cedric Anglin for follow up in pt with Hx first-degree AV block, right bundle branch block, paroxysmal SVT, GERD, hypertension, hyperlipidemia, chronic venous insufficiency (status post venous ablation), aortic sclerosis  - pt previously on hydrochlorothiazide but this was stopped during prior admission for mechanical fall.   - pt continues to take lasix 20mg daily without issue.     - 3/24/22 Pt was seen by PMD who recommended increase from 20mg daily to 20md BID. This was to be reviewed post procedure for GSV ablation scheduled for  4/11/22 - this procedure was cancelled due to illness and the lasix was not addressed. No Hx kidney injury.     Routing to Dr. Anglin for review and recommendations.  COSMO Lawton RN, BSN. 04/29/22 3:42 PM

## 2022-05-02 RX ORDER — FUROSEMIDE 20 MG
20 TABLET ORAL 2 TIMES DAILY
Qty: 180 TABLET | Refills: 0 | Status: SHIPPED | OUTPATIENT
Start: 2022-05-02 | End: 2022-09-30

## 2022-05-02 NOTE — TELEPHONE ENCOUNTER
Derrek, Quincy THOMAS MD  You 3 days ago     AH    Thanks for the update, unfortunate that they are experiencing those symptoms. Recommend increasing furosemide to 20mg twice daily, with BMP in 1 week, and cardiology TRISH follow up in 1-2 weeks thanks!    Message text         Pt verbalized understanding. Will send message to scheduling to arrange BMP  COSMO Lawton RN, BSN. 05/02/22 11:05 AM  and OV.

## 2022-05-04 DIAGNOSIS — R60.9 FLUID RETENTION: ICD-10-CM

## 2022-05-04 DIAGNOSIS — I10 ESSENTIAL HYPERTENSION WITH GOAL BLOOD PRESSURE LESS THAN 140/90: ICD-10-CM

## 2022-05-05 RX ORDER — POTASSIUM CHLORIDE 750 MG/1
TABLET, EXTENDED RELEASE ORAL
Qty: 90 TABLET | Refills: 0 | Status: SHIPPED | OUTPATIENT
Start: 2022-05-05 | End: 2022-09-22

## 2022-05-05 NOTE — TELEPHONE ENCOUNTER
Pending Prescriptions:                       Disp   Refills    potassium chloride ER (KLOR-CON M) 10 MEQ *90 tab*3        Sig: TAKE 1 TABLET(10 MEQ) BY MOUTH DAILY

## 2022-05-09 ENCOUNTER — LAB (OUTPATIENT)
Dept: LAB | Facility: CLINIC | Age: 77
End: 2022-05-09
Payer: MEDICARE

## 2022-05-09 DIAGNOSIS — I87.2 EDEMA OF LEFT LOWER EXTREMITY DUE TO PERIPHERAL VENOUS INSUFFICIENCY: ICD-10-CM

## 2022-05-09 LAB
ANION GAP SERPL CALCULATED.3IONS-SCNC: 4 MMOL/L (ref 3–14)
BUN SERPL-MCNC: 23 MG/DL (ref 7–30)
CALCIUM SERPL-MCNC: 9.5 MG/DL (ref 8.5–10.1)
CHLORIDE BLD-SCNC: 105 MMOL/L (ref 94–109)
CO2 SERPL-SCNC: 30 MMOL/L (ref 20–32)
CREAT SERPL-MCNC: 0.54 MG/DL (ref 0.52–1.04)
GFR SERPL CREATININE-BSD FRML MDRD: >90 ML/MIN/1.73M2
GLUCOSE BLD-MCNC: 140 MG/DL (ref 70–99)
POTASSIUM BLD-SCNC: 3.9 MMOL/L (ref 3.4–5.3)
SODIUM SERPL-SCNC: 139 MMOL/L (ref 133–144)

## 2022-05-09 PROCEDURE — 36415 COLL VENOUS BLD VENIPUNCTURE: CPT | Performed by: INTERNAL MEDICINE

## 2022-05-09 PROCEDURE — 80048 BASIC METABOLIC PNL TOTAL CA: CPT | Performed by: INTERNAL MEDICINE

## 2022-05-12 ENCOUNTER — TELEPHONE (OUTPATIENT)
Dept: CARDIOLOGY | Facility: CLINIC | Age: 77
End: 2022-05-12
Payer: MEDICARE

## 2022-05-12 NOTE — TELEPHONE ENCOUNTER
Tele encounter 4/29/2022: pt called to report increased BLE edema. Pt previously on lasix 20mg daily.    ~ Lasix increased to 20mg BID 3/24/22 by PMD. Pt was to have venaseal completed 4/11 but procedure cancelled.   ~Plan for pt to continue lasix 20mg BID with BMP and TRISH OV 2 weeks.     Component      Latest Ref Rng & Units 5/9/2022   Sodium      133 - 144 mmol/L 139   Potassium      3.4 - 5.3 mmol/L 3.9   Chloride      94 - 109 mmol/L 105   Carbon Dioxide      20 - 32 mmol/L 30   Anion Gap      3 - 14 mmol/L 4   Urea Nitrogen      7 - 30 mg/dL 23   Creatinine      0.52 - 1.04 mg/dL 0.54   Calcium      8.5 - 10.1 mg/dL 9.5   Glucose      70 - 99 mg/dL 140 (H)   GFR Estimate      >60 mL/min/1.73m2 >90     ----- Message from Quincy Anglin MD sent at 5/11/2022 10:39 AM CDT -----  Results reviewed, please let the patient know that overall findings are reassuring, normal electrolytes and kidney function. Follow up as previously planned, thanks!      Call placed to pt to review results / recommendations. Line was busy - no answer - unable to leave message will call back.   COSMO Lawton RN, BSN. 05/12/22 1:26 PM     Call placed to pt to review results. Pt reports some edema remain in BLE. Encouraged elevation and ambulation at this time. Pt has follow up 5/27/22 with Basilio Green - will eval at that time and review of dose adjustment needed.   COSMO Lawton RN, BSN.

## 2022-05-16 ENCOUNTER — HOSPITAL ENCOUNTER (OUTPATIENT)
Dept: MAMMOGRAPHY | Facility: CLINIC | Age: 77
Discharge: HOME | End: 2022-05-16
Attending: INTERNAL MEDICINE | Admitting: INTERNAL MEDICINE
Payer: MEDICARE

## 2022-05-16 DIAGNOSIS — Z12.31 VISIT FOR SCREENING MAMMOGRAM: ICD-10-CM

## 2022-05-16 PROCEDURE — 77067 SCR MAMMO BI INCL CAD: CPT

## 2022-05-25 ENCOUNTER — TRANSFERRED RECORDS (OUTPATIENT)
Dept: FAMILY MEDICINE | Facility: CLINIC | Age: 77
End: 2022-05-25

## 2022-05-26 ENCOUNTER — OFFICE VISIT (OUTPATIENT)
Dept: INTERNAL MEDICINE | Facility: CLINIC | Age: 77
End: 2022-05-26
Payer: MEDICARE

## 2022-05-26 VITALS
DIASTOLIC BLOOD PRESSURE: 71 MMHG | SYSTOLIC BLOOD PRESSURE: 126 MMHG | OXYGEN SATURATION: 95 % | RESPIRATION RATE: 16 BRPM | WEIGHT: 178.4 LBS | HEIGHT: 68 IN | TEMPERATURE: 98.2 F | BODY MASS INDEX: 27.04 KG/M2 | HEART RATE: 82 BPM

## 2022-05-26 DIAGNOSIS — S73.034D ANTERIOR DISLOCATION OF RIGHT HIP, SUBSEQUENT ENCOUNTER: ICD-10-CM

## 2022-05-26 DIAGNOSIS — S76.011D STRAIN OF MUSCLE OF RIGHT HIP, SUBSEQUENT ENCOUNTER: ICD-10-CM

## 2022-05-26 DIAGNOSIS — R73.03 PREDIABETES: ICD-10-CM

## 2022-05-26 DIAGNOSIS — Z01.818 PREOP GENERAL PHYSICAL EXAM: Primary | ICD-10-CM

## 2022-05-26 DIAGNOSIS — I47.10 SVT (SUPRAVENTRICULAR TACHYCARDIA) (H): ICD-10-CM

## 2022-05-26 LAB
ERYTHROCYTE [DISTWIDTH] IN BLOOD BY AUTOMATED COUNT: 16.3 % (ref 10–15)
HBA1C MFR BLD: 5.9 % (ref 0–5.6)
HCT VFR BLD AUTO: 39.5 % (ref 35–47)
HGB BLD-MCNC: 12.4 G/DL (ref 11.7–15.7)
MCH RBC QN AUTO: 27.4 PG (ref 26.5–33)
MCHC RBC AUTO-ENTMCNC: 31.4 G/DL (ref 31.5–36.5)
MCV RBC AUTO: 87 FL (ref 78–100)
PLATELET # BLD AUTO: 394 10E3/UL (ref 150–450)
RBC # BLD AUTO: 4.52 10E6/UL (ref 3.8–5.2)
WBC # BLD AUTO: 7.4 10E3/UL (ref 4–11)

## 2022-05-26 PROCEDURE — 85027 COMPLETE CBC AUTOMATED: CPT | Performed by: FAMILY MEDICINE

## 2022-05-26 PROCEDURE — 83036 HEMOGLOBIN GLYCOSYLATED A1C: CPT | Performed by: FAMILY MEDICINE

## 2022-05-26 PROCEDURE — 99214 OFFICE O/P EST MOD 30 MIN: CPT | Performed by: FAMILY MEDICINE

## 2022-05-26 PROCEDURE — 93000 ELECTROCARDIOGRAM COMPLETE: CPT | Performed by: FAMILY MEDICINE

## 2022-05-26 PROCEDURE — 36415 COLL VENOUS BLD VENIPUNCTURE: CPT | Performed by: FAMILY MEDICINE

## 2022-05-26 NOTE — PATIENT INSTRUCTIONS
Preparing for Your Surgery  Getting started  A nurse will call you to review your health history and instructions. They will give you an arrival time based on your scheduled surgery time. Please be ready to share:    Your doctor's clinic name and phone number    Your medical, surgical and anesthesia history    A list of allergies and sensitivities    A list of medicines, including herbal treatments and over-the-counter drugs    Whether the patient has a legal guardian (ask how to send us the papers in advance)  Please tell us if you're pregnant--or if there's any chance you might be pregnant. Some surgeries may injure a fetus (unborn baby), so they require a pregnancy test. Surgeries that are safe for a fetus don't always need a test, and you can choose whether to have one.   If you have a child who's having surgery, please ask for a copy of Preparing for Your Child's Surgery.    Preparing for surgery    Within 30 days of surgery: Have a pre-op exam (sometimes called an H&P, or History and Physical). This can be done at a clinic or pre-operative center.  ? If you're having a , you may not need this exam. Talk to your care team.    At your pre-op exam, talk to your care team about all medicines you take. If you need to stop any medicines before surgery, ask when to start taking them again.  ? We do this for your safety. Many medicines can make you bleed too much during surgery. Some change how well surgery (anesthesia) drugs work.    Call your insurance company to let them know you're having surgery. (If you don't have insurance, call [phone].)    Call your clinic if there's any change in your health. This includes signs of a cold or flu (sore throat, runny nose, cough, rash, fever). It also includes a scrape or scratch near the surgery site.    If you have questions on the day of surgery, call your hospital or surgery center.  COVID testing  You may need to be tested for COVID-19 before having  surgery. If so, we will give you instructions.  Eating and drinking guidelines  For your safety: Unless your surgeon tells you otherwise, follow the guidelines below.    Eat and drink as usual until 8 hours before surgery. After that, no food or milk.    Drink clear liquids until 2 hours before surgery. These are liquids you can see through, like water, Gatorade and Propel Water. You may also have black coffee and tea (no cream or milk).    Nothing by mouth within 2 hours of surgery. This includes gum, candy and breath mints.    If you drink alcohol: Stop drinking it the night before surgery.    If your care team tells you to take medicine on the morning of surgery, it's okay to take it with a sip of water.  Preventing infection    Shower or bathe the night before and morning of your surgery. Follow the instructions your clinic gave you. (If no instructions, use regular soap.)    Don't shave or clip hair near your surgery site. We'll remove the hair if needed.    Don't smoke or vape the morning of surgery. You may chew nicotine gum up to 2 hours before surgery. A nicotine patch is okay.  ? Note: Some surgeries require you to completely quit smoking and nicotine. Check with your surgeon.    Your care team will make every effort to keep you safe from infection. We will:  ? Clean our hands often with soap and water (or an alcohol-based hand rub).  ? Clean the skin at your surgery site with a special soap that kills germs.  ? Give you a special gown to keep you warm. (Cold raises the risk of infection.)  ? Wear special hair covers, masks, gowns and gloves during surgery.  ? Give antibiotic medicine, if prescribed. Not all surgeries need antibiotics.  What to bring on the day of surgery    Photo ID and insurance card    Copy of your health care directive, if you have one    Glasses and hearing aides (bring cases)  ? You can't wear contacts during surgery    Inhaler and eye drops, if you use them (tell us about these when  you arrive)    CPAP machine or breathing device, if you use them    A few personal items, if spending the night    If you have . . .  ? A pacemaker, ICD (cardiac defibrillator) or other implant: Bring the ID card.  ? An implanted stimulator: Bring the remote control.  ? A legal guardian: Bring a copy of the certified (court-stamped) guardianship papers.  Please remove any jewelry, including body piercings. Leave jewelry and other valuables at home.  If you're going home the day of surgery    You must have a responsible adult drive you home. They should stay with you overnight as well.    If you don't have someone to stay with you, and you aren't safe to go home alone, we may keep you overnight. Insurance often won't pay for this.  After surgery  If it's hard to control your pain or you need more pain medicine, please call your surgeon's office.  Questions?   If you have any questions for your care team, list them here: _________________________________________________________________________________________________________________________________________________________________________ ____________________________________ ____________________________________ ____________________________________  For informational purposes only. Not to replace the advice of your health care provider. Copyright   2003, 2019 [facility]. All rights reserved. Clinically reviewed by Sandi Stahl MD. [employer] [id number] - REV 07/21.

## 2022-05-26 NOTE — PROGRESS NOTES
[place] [address] [facility] [address]  Phone: [phone]  Primary Provider: Rachel Miller  Pre-op Performing Provider: ZANDER FERNANDEZ    [id number]}  PREOPERATIVE EVALUATION:  Today's date: 5/26/2022    Teri Beltran is a 76 year old female who presents for a preoperative evaluation.    Surgical Information:  Surgery/Procedure: Right hip abductor repair  Surgery Location: [facility]  [facility]  Surgery Date: 6/6/22  Time of Surgery: 10:20 AM  Where patient plans to recover: At home with family  Fax number for surgical facility: Note does not need to be faxed, will be available electronically in Epic.    Type of Anesthesia Anticipated: General    Assessment & Plan     The proposed surgical procedure is considered INTERMEDIATE risk.    Problem List Items Addressed This Visit     Anterior dislocation of right hip (H)    SVT (supraventricular tachycardia) (H)      Other Visit Diagnoses     Preop general physical exam    -  Primary    Relevant Orders    EKG 12-lead complete w/read - Clinics (Completed)    Strain of muscle of right hip, subsequent encounter                   Risks and Recommendations:  The patient has the following additional risks and recommendations for perioperative complications:   - No identified additional risk factors other than previously addressed      Medications -advised patient to take her metoprolol 25 mg on morning of procedure.      RECOMMENDATION:    APPROVAL GIVEN to proceed with proposed procedure, without further diagnostic evaluation.    [id number]}  This was a 25-minute procedure with review of chart, interview and examine patient, review lab and EKG, assess medications and history of SVT, prepare medical record.      Subjective     HPI related to upcoming procedure:     This patient had a right hip dislocation shortly after her right TRACIE procedure.  In the course of this  dislocation, abductor muscle tear has occurred and she is contemplating a repair at this time.    Patient does have a history of SVT which is controlled by beta-blocker.  Patient has no history of coronary disease, no strokes, no diabetes, no other lung disease.    Patient has no history of anesthesia complications.      Preop Questions 5/26/2022   1. Have you ever had a heart attack or stroke? No   2. Have you ever had surgery on your heart or blood vessels, such as a stent placement, a coronary artery bypass, or surgery on an artery in your head, neck, heart, or legs? No   3. Do you have chest pain with activity? No   4. Do you have a history of  heart failure? No   5. Do you currently have a cold, bronchitis or symptoms of other infection? No   6. Do you have a cough, shortness of breath, or wheezing? No   7. Do you or anyone in your family have previous history of blood clots? No   8. Do you or does anyone in your family have a serious bleeding problem such as prolonged bleeding following surgeries or cuts? No   9. Have you ever had problems with anemia or been told to take iron pills? No   10. Have you had any abnormal blood loss such as black, tarry or bloody stools, or abnormal vaginal bleeding? No   11. Have you ever had a blood transfusion? No   12. Are you willing to have a blood transfusion if it is medically needed before, during, or after your surgery? Yes   13. Have you or any of your relatives ever had problems with anesthesia? No   14. Do you have sleep apnea, excessive snoring or daytime drowsiness? No   15. Do you have any artifical heart valves or other implanted medical devices like a pacemaker, defibrillator, or continuous glucose monitor? No   16. Do you have artificial joints? YES - knee   17. Are you allergic to latex? No       Health Care Directive:  Patient does not have a Health Care Directive or Living Will:     Preoperative Review of :   reviewed - 2 small Rx for 2 mg diazepam, 3  Rx for gabapentin.      Review of Systems    No fever.  No short of breath.  No chest pain, palpitations, syncope.  No abdominal pain or nausea.  No lower extremity edema.  No localized weakness or dizziness.        Patient Active Problem List    Diagnosis Date Noted     SVT (supraventricular tachycardia) (H) 04/15/2022     Priority: Medium     Bronchiectasis without complication (H) 04/15/2022     Priority: Medium     Anterior dislocation of right hip (H) 12/16/2020     Priority: Medium     Fall 12/16/2020     Priority: Medium     S/P total hip arthroplasty 11/20/2020     Priority: Medium     S/P total knee arthroplasty 07/23/2019     Priority: Medium     Venous insufficiency      Priority: Medium     Varicose veins      Priority: Medium     Osteoporosis 08/11/2015     Priority: Medium     RBBB 08/11/2015     Priority: Medium     First degree AV block 08/11/2015     Priority: Medium     Adjustment disorder with depressed mood 08/05/2014     Priority: Medium     Hypertension goal BP (blood pressure) < 140/90 12/03/2013     Priority: Medium     Advanced directives, counseling/discussion 10/02/2013     Priority: Medium     Patient given info and will call if she is interested       BMI 30.0-30.9,adult 07/30/2013     Priority: Medium     Palpitations 07/03/2013     Priority: Medium     Prediabetes 06/14/2013     Priority: Medium     Hyperlipidemia LDL goal <130 06/14/2013     Priority: Medium     Hypothyroidism      Priority: Medium     Tachycardia      Priority: Medium     Glaucoma      Priority: Medium     Problem list name updated by automated process. Provider to review and confirm  Im[employer] Update utility        Past Medical History:   Diagnosis Date     Complication of anesthesia     confusion after anesthesia (after hysterectomy)     First degree AV block 8/11/2015     Former smoker      Gastroesophageal reflux disease      GERD (gastroesophageal reflux disease)      Glaucoma      Heart block     2:1      Hyperlipidemia LDL goal <130 6/14/2013     Hypertension goal BP (blood pressure) < 140/90 12/3/2013     Hypothyroidism      Left atrial dilatation     mild     Mild dilation of ascending aorta - borderline 8/11/2015     Palpitations      Prediabetes 6/14/2013     RBBB 8/11/2015     S/P total knee arthroplasty 7/23/2019     Tachycardia      Varicose veins      Venous insufficiency     s/p bilateral great saphenous vein radiofrequency ablation by Dr. Garcia     Past Surgical History:   Procedure Laterality Date     ARTHROPLASTY HIP Right 11/20/2020    Procedure: Right total hip arthroplasty using a Biomet Taperloc femoral stem and G7 acetabulum.;  Surgeon: Dragan Muse MD;  Location: [facility] OR     ARTHROPLASTY KNEE Right 7/23/2019    Procedure: Right total knee arthroplasty using an [employer] Biker Magazinelance knee system;  Surgeon: Dragan Muse MD;  Location: [facility] OR     BREAST SURGERY      right breast ductal surgery due to milk production     BREAST SURGERY Right     ductal surgery due to milk production     COLONOSCOPY N/A 10/24/2014    no further screening. Procedure: COLONOSCOPY;  Surgeon: Pedro Rudd MD;  Location:  GI     FINGER GANGLION CYST EXCISION Left     ring finger     HYSTERECTOMY       HYSTERECTOMY, PAP NO LONGER INDICATED  2009    total hysterectomy and ovaries. benign     SOFT TISSUE SURGERY      ganglion removed from left wrist and ring finger     SURGICAL HISTORY OF -   age 7    tonsillectomy     SURGICAL HISTORY OF -   1/15    left eye cataract     SURGICAL HISTORY OF -   Fall 2016    LINQ placed.     TONSILLECTOMY       WRIST GANGLION EXCISION Left      Current Outpatient Medications   Medication Sig Dispense Refill     Acetaminophen (TYLENOL PO) Take 500-1,000 mg by mouth every 6 hours as needed for mild pain        acetaZOLAMIDE (DIAMOX SEQUEL) 500 MG 12 hr capsule Take 500 mg by mouth 2 times daily       benzonatate (TESSALON) 100 MG capsule Take 1 capsule (100 mg) by  "mouth as needed in the morning and 1 capsule (100 mg) as needed at noon and 1 capsule (100 mg) as needed in the evening for cough. 21 capsule 0     dorzolamide-timolol (COSOPT) 2-0.5 % ophthalmic solution Place 1 drop into both eyes 2 times daily       doxycycline hyclate (VIBRAMYCIN) 100 MG capsule Take 1 capsule (100 mg) by mouth 2 times daily 20 capsule 0     famotidine (PEPCID) 10 MG tablet Take 1 tablet (10 mg) by mouth 2 times daily 180 tablet 1     furosemide (LASIX) 20 MG tablet Take 1 tablet (20 mg) by mouth 2 times daily 180 tablet 0     gabapentin (NEURONTIN) 100 MG capsule Start with 100 mg daily at bed time. Every 3 days increase the dose by 100 mg until 300 mg 3 times a day. The schedule was provided to the patient 270 capsule 1     gabapentin (NEURONTIN) 300 MG capsule Take 1 capsule (300 mg) by mouth 3 times daily 270 capsule 1     latanoprost (XALATAN) 0.005 % ophthalmic solution Place 1 drop into the right eye At Bedtime        levothyroxine (SYNTHROID/LEVOTHROID) 88 MCG tablet Take 1 tablet (88 mcg) by mouth daily 90 tablet 1     metoprolol tartrate (LOPRESSOR) 25 MG tablet Take 1 tablet (25 mg) by mouth 2 times daily 180 tablet 3     Multiple Vitamins-Minerals (MULTIVITAMIN ADULT PO) Take 1 tablet by mouth daily       potassium chloride ER (KLOR-CON M) 10 MEQ CR tablet TAKE 1 TABLET(10 MEQ) BY MOUTH DAILY 90 tablet 0     pravastatin (PRAVACHOL) 40 MG tablet Take 1 tablet (40 mg) by mouth At Bedtime 90 tablet 3     traZODone (DESYREL) 50 MG tablet Take 1-2 tablets ( mg) by mouth nightly as needed for sleep 180 tablet 1       Allergies   Allergen Reactions     Seasonal Allergies      Simvastatin      \"flushed\"        Social History     Tobacco Use     Smoking status: Former Smoker     Packs/day: 0.50     Years: 18.00     Pack years: 9.00     Types: Cigarettes     Quit date:      Years since quittin.4     Smokeless tobacco: Never Used   Substance Use Topics     Alcohol use: Not " "Currently         History   Drug Use No         Objective     /71 (BP Location: Right arm, Patient Position: Sitting, Cuff Size: Adult Large)   Pulse 82   Temp 98.2  F (36.8  C) (Tympanic)   Resp 16   Ht 1.727 m (5' 8\")   Wt 80.9 kg (178 lb 6.4 oz)   LMP  (LMP Unknown)   SpO2 95%   BMI 27.13 kg/m      Physical Exam    Patient appears in good health in general.  HEENT unremarkable.  Neck no thyromegaly.  Lungs clear.  Cardiac RSR, normal rate, no murmur.  Abdomen nontender  Right hip tenderness.  Extremities no edema.  Motor strength normal.      Recent Labs   Lab Test 05/09/22  1233 01/20/22  1217 08/23/21  1005 08/23/21  1005 12/18/20  0931 12/17/20  0746 12/17/20  0556 10/08/20  1257 08/28/20  1016   HGB  --   --   --  13.0 9.2*  --  8.6*   < > 12.0   PLT  --   --   --  378  --   --  409   < > 337    138   < > 138  --   --  138   < > 137   POTASSIUM 3.9 4.3   < > 3.7  --    < > 3.2*   < > 4.2   CR 0.54 0.71   < > 0.68  --   --  0.57   < > 0.61   A1C  --   --   --   --   --   --   --   --  6.1*    < > = values in this interval not displayed.        Diagnostics:    Results for orders placed or performed in visit on 05/26/22   CBC with platelets     Status: Abnormal   Result Value Ref Range    WBC Count 7.4 4.0 - 11.0 10e3/uL    RBC Count 4.52 3.80 - 5.20 10e6/uL    Hemoglobin 12.4 11.7 - 15.7 g/dL    Hematocrit 39.5 35.0 - 47.0 %    MCV 87 78 - 100 fL    MCH 27.4 26.5 - 33.0 pg    MCHC 31.4 (L) 31.5 - 36.5 g/dL    RDW 16.3 (H) 10.0 - 15.0 %    Platelet Count 394 150 - 450 10e3/uL   Hemoglobin A1c     Status: Abnormal   Result Value Ref Range    Hemoglobin A1C 5.9 (H) 0.0 - 5.6 %         EKG -   Sinus rhythm with first-degree AV block, rate 66.  Conduction otherwise incomplete RBBB.  Intervals otherwise normal.  Axis normal.  ST segments and T waves normal.  No ectopy.  No significant Q waves.  Abnormal EKG with first-degree AV block and incomplete left bundle.  Similar to tracing of " 11-.      Revised Cardiac Risk Index (RCRI):  The patient has the following serious cardiovascular risks for perioperative complications:   - No serious cardiac risks = 0 points     RCRI Interpretation: 1 point: Class II (low risk - 0.9% complication rate)           Signed Electronically by: Troy Dave MD  Copy of this evaluation report is provided to requesting physician.

## 2022-05-26 NOTE — LETTER
[place] [address], [address]  [place] [address]  [phone]        May 31, 2022    Teri Beltran  [address] [facility] [address]            Dear Ms. Teri Beltran:    Preop labs look okay.     You do have a prediabetes situation and should follow with your doctor in 6 to 12 months.     Dr. Dave       Results for orders placed or performed in visit on 05/26/22   CBC with platelets     Status: Abnormal   Result Value Ref Range    WBC Count 7.4 4.0 - 11.0 10e3/uL    RBC Count 4.52 3.80 - 5.20 10e6/uL    Hemoglobin 12.4 11.7 - 15.7 g/dL    Hematocrit 39.5 35.0 - 47.0 %    MCV 87 78 - 100 fL    MCH 27.4 26.5 - 33.0 pg    MCHC 31.4 (L) 31.5 - 36.5 g/dL    RDW 16.3 (H) 10.0 - 15.0 %    Platelet Count 394 150 - 450 10e3/uL   Hemoglobin A1c     Status: Abnormal   Result Value Ref Range    Hemoglobin A1C 5.9 (H) 0.0 - 5.6 %

## 2022-05-27 ENCOUNTER — TELEPHONE (OUTPATIENT)
Dept: CARDIOLOGY | Facility: CLINIC | Age: 77
End: 2022-05-27
Payer: MEDICARE

## 2022-05-27 NOTE — TELEPHONE ENCOUNTER
Pt called stating she has Left Hip Repair on 6/6/22. No cardiac clearance required.     Pt has OV on 5/27/22 with [facility] to follow up on sxs/med changes from 5/12/22.     ~4/29/22 pt had reported increased BLE edema. Previously on Lasix 20 mg daily.     ~3/24/22 Lasix increased to 20 mg BID.     ~5/2/22 message sent to  (see pt call for details)    ~5/9/22 BMP reviewed by . Plan at this time was to follow up with [facility] in 2 weeks.     Appt today was orphaned. Pt states her LE edema has significantly improved. Almost completely gone. Pt continues with Lasix 20 mg BID.     Next appt made for 7/21/22 per pt request. Pt does not want to go to [place] and will need time to recover from knee procedure.     Sending updates to  and team. Hattie RN

## 2022-05-31 DIAGNOSIS — R60.0 BILATERAL LEG EDEMA: Primary | ICD-10-CM

## 2022-05-31 NOTE — TELEPHONE ENCOUNTER
Quincy Anglin MD Robinson, Daniela, RN 4 hours ago (7:52 AM)     AH    Thanks please have her get a repeat BMP some time this week since she's now on double the furosemide dose     Message text         Call placed to pt to review - pt scheduled for BMP per Dr. Anglin .   COSMO Lawton RN, BSN. 05/31/22 12:33 PM

## 2022-06-01 ENCOUNTER — LAB (OUTPATIENT)
Dept: LAB | Facility: CLINIC | Age: 77
End: 2022-06-01
Payer: MEDICARE

## 2022-06-01 DIAGNOSIS — R60.0 BILATERAL LEG EDEMA: ICD-10-CM

## 2022-06-01 LAB
ANION GAP SERPL CALCULATED.3IONS-SCNC: 7 MMOL/L (ref 3–14)
BUN SERPL-MCNC: 21 MG/DL (ref 7–30)
CALCIUM SERPL-MCNC: 9.6 MG/DL (ref 8.5–10.1)
CHLORIDE BLD-SCNC: 106 MMOL/L (ref 94–109)
CO2 SERPL-SCNC: 23 MMOL/L (ref 20–32)
CREAT SERPL-MCNC: 0.62 MG/DL (ref 0.52–1.04)
GFR SERPL CREATININE-BSD FRML MDRD: >90 ML/MIN/1.73M2
GLUCOSE BLD-MCNC: 106 MG/DL (ref 70–99)
POTASSIUM BLD-SCNC: 4.3 MMOL/L (ref 3.4–5.3)
SODIUM SERPL-SCNC: 136 MMOL/L (ref 133–144)

## 2022-06-01 PROCEDURE — 80048 BASIC METABOLIC PNL TOTAL CA: CPT | Performed by: INTERNAL MEDICINE

## 2022-06-01 PROCEDURE — 36415 COLL VENOUS BLD VENIPUNCTURE: CPT | Performed by: INTERNAL MEDICINE

## 2022-06-01 RX ORDER — DORZOLAMIDE HYDROCHLORIDE AND TIMOLOL MALEATE PRESERVATIVE FREE 20; 5 MG/ML; MG/ML
SOLUTION/ DROPS OPHTHALMIC
Status: ON HOLD | COMMUNITY
Start: 2022-05-23 | End: 2022-06-06

## 2022-06-03 ENCOUNTER — TELEPHONE (OUTPATIENT)
Dept: CARDIOLOGY | Facility: CLINIC | Age: 77
End: 2022-06-03
Payer: MEDICARE

## 2022-06-03 ENCOUNTER — LAB (OUTPATIENT)
Dept: LAB | Facility: CLINIC | Age: 77
End: 2022-06-03
Attending: ORTHOPAEDIC SURGERY
Payer: MEDICARE

## 2022-06-03 DIAGNOSIS — Z01.812 PRE-OPERATIVE LABORATORY EXAMINATION: ICD-10-CM

## 2022-06-03 PROCEDURE — U0003 INFECTIOUS AGENT DETECTION BY NUCLEIC ACID (DNA OR RNA); SEVERE ACUTE RESPIRATORY SYNDROME CORONAVIRUS 2 (SARS-COV-2) (CORONAVIRUS DISEASE [COVID-19]), AMPLIFIED PROBE TECHNIQUE, MAKING USE OF HIGH THROUGHPUT TECHNOLOGIES AS DESCRIBED BY CMS-2020-01-R: HCPCS

## 2022-06-03 PROCEDURE — U0005 INFEC AGEN DETEC AMPLI PROBE: HCPCS

## 2022-06-03 NOTE — LETTER
[place] 3, 2022       TO: Teri ZEENAT Devin   [address] [facility] [address]       Dear Ms. Beltran,     Dr. Anglin has reviewed the results of your recent blood work. Overall, result are reassuring and show stable kidney function as well as stable electrolytes. We recommend you continue your medications as currently prescribed without change. A copy of the results is included for your personal records.   Please feel free to contact the Nurse line at [phone] with any questions or concerns.      Component      Latest Ref Rng & Units 6/1/2022   Sodium      133 - 144 mmol/L 136   Potassium      3.4 - 5.3 mmol/L 4.3   Chloride      94 - 109 mmol/L 106   Carbon Dioxide      20 - 32 mmol/L 23   Anion Gap      3 - 14 mmol/L 7   Urea Nitrogen      7 - 30 mg/dL 21   Creatinine      0.52 - 1.04 mg/dL 0.62   Calcium      8.5 - 10.1 mg/dL 9.6   Glucose      70 - 99 mg/dL 106 (H)   GFR Estimate      >60 mL/min/1.73m2 >90     Sincerely,    [facility]

## 2022-06-03 NOTE — TELEPHONE ENCOUNTER
Call placed to pt to review results and recommendaitons:   Component      Latest Ref Rng & Units 6/1/2022   Sodium      133 - 144 mmol/L 136   Potassium      3.4 - 5.3 mmol/L 4.3   Chloride      94 - 109 mmol/L 106   Carbon Dioxide      20 - 32 mmol/L 23   Anion Gap      3 - 14 mmol/L 7   Urea Nitrogen      7 - 30 mg/dL 21   Creatinine      0.52 - 1.04 mg/dL 0.62   Calcium      8.5 - 10.1 mg/dL 9.6   Glucose      70 - 99 mg/dL 106 (H)   GFR Estimate      >60 mL/min/1.73m2 >90     ----- Message from Quincy Anglin MD sent at 6/1/2022  1:05 PM CDT -----  Results reviewed, please let the patient know that overall findings are reassuring, normal potassium and creatinine, recommend continuing current regimen of double the furosemide and KCL supplementation. Follow up as previously planned, thanks!    Call placed to review results and recommendations.left VM with recommendations will mail results as well.   COSMO Lawton RN, BSN.

## 2022-06-04 DIAGNOSIS — I10 HYPERTENSION GOAL BP (BLOOD PRESSURE) < 140/90: ICD-10-CM

## 2022-06-04 DIAGNOSIS — R00.2 PALPITATIONS: ICD-10-CM

## 2022-06-04 LAB — SARS-COV-2 RNA RESP QL NAA+PROBE: NEGATIVE

## 2022-06-06 ENCOUNTER — APPOINTMENT (OUTPATIENT)
Dept: GENERAL RADIOLOGY | Facility: CLINIC | Age: 77
End: 2022-06-06
Attending: ORTHOPAEDIC SURGERY
Payer: MEDICARE

## 2022-06-06 ENCOUNTER — ANESTHESIA (OUTPATIENT)
Dept: SURGERY | Facility: CLINIC | Age: 77
End: 2022-06-06
Payer: MEDICARE

## 2022-06-06 ENCOUNTER — APPOINTMENT (OUTPATIENT)
Dept: PHYSICAL THERAPY | Facility: CLINIC | Age: 77
End: 2022-06-06
Attending: ORTHOPAEDIC SURGERY
Payer: MEDICARE

## 2022-06-06 ENCOUNTER — HOSPITAL ENCOUNTER (OUTPATIENT)
Facility: CLINIC | Age: 77
Discharge: HOME | End: 2022-06-07
Attending: ORTHOPAEDIC SURGERY | Admitting: ORTHOPAEDIC SURGERY
Payer: MEDICARE

## 2022-06-06 ENCOUNTER — ANESTHESIA EVENT (OUTPATIENT)
Dept: SURGERY | Facility: CLINIC | Age: 77
End: 2022-06-06
Payer: MEDICARE

## 2022-06-06 DIAGNOSIS — S76.019A RUPTURE OF TENDON OF HIP ABDUCTOR: Primary | ICD-10-CM

## 2022-06-06 LAB
CREAT SERPL-MCNC: 0.47 MG/DL (ref 0.52–1.04)
GFR SERPL CREATININE-BSD FRML MDRD: >90 ML/MIN/1.73M2
GRAM STAIN RESULT: NORMAL
PLATELET # BLD AUTO: 352 10E3/UL (ref 150–450)

## 2022-06-06 PROCEDURE — 999N000141 HC STATISTIC PRE-PROCEDURE NURSING ASSESSMENT: Performed by: ORTHOPAEDIC SURGERY

## 2022-06-06 PROCEDURE — 250N000011 HC RX IP 250 OP 636: Performed by: PHYSICIAN ASSISTANT

## 2022-06-06 PROCEDURE — 97116 GAIT TRAINING THERAPY: CPT | Mod: GP | Performed by: PHYSICAL THERAPIST

## 2022-06-06 PROCEDURE — C1713 ANCHOR/SCREW BN/BN,TIS/BN: HCPCS | Performed by: ORTHOPAEDIC SURGERY

## 2022-06-06 PROCEDURE — 999N000065 XR PELVIS AND HIP PORTABLE RIGHT 1 VIEW

## 2022-06-06 PROCEDURE — 97161 PT EVAL LOW COMPLEX 20 MIN: CPT | Mod: GP | Performed by: PHYSICAL THERAPIST

## 2022-06-06 PROCEDURE — 85049 AUTOMATED PLATELET COUNT: CPT | Performed by: ORTHOPAEDIC SURGERY

## 2022-06-06 PROCEDURE — 250N000011 HC RX IP 250 OP 636: Performed by: NURSE ANESTHETIST, CERTIFIED REGISTERED

## 2022-06-06 PROCEDURE — 82565 ASSAY OF CREATININE: CPT | Performed by: ORTHOPAEDIC SURGERY

## 2022-06-06 PROCEDURE — 250N000013 HC RX MED GY IP 250 OP 250 PS 637: Performed by: INTERNAL MEDICINE

## 2022-06-06 PROCEDURE — 250N000011 HC RX IP 250 OP 636: Performed by: ORTHOPAEDIC SURGERY

## 2022-06-06 PROCEDURE — 87205 SMEAR GRAM STAIN: CPT | Performed by: ORTHOPAEDIC SURGERY

## 2022-06-06 PROCEDURE — 250N000013 HC RX MED GY IP 250 OP 250 PS 637: Performed by: PHYSICIAN ASSISTANT

## 2022-06-06 PROCEDURE — 360N000077 HC SURGERY LEVEL 4, PER MIN: Performed by: ORTHOPAEDIC SURGERY

## 2022-06-06 PROCEDURE — 250N000013 HC RX MED GY IP 250 OP 250 PS 637: Performed by: ORTHOPAEDIC SURGERY

## 2022-06-06 PROCEDURE — 710N000009 HC RECOVERY PHASE 1, LEVEL 1, PER MIN: Performed by: ORTHOPAEDIC SURGERY

## 2022-06-06 PROCEDURE — 99213 OFFICE O/P EST LOW 20 MIN: CPT | Performed by: INTERNAL MEDICINE

## 2022-06-06 PROCEDURE — 258N000003 HC RX IP 258 OP 636: Performed by: ANESTHESIOLOGY

## 2022-06-06 PROCEDURE — 272N000001 HC OR GENERAL SUPPLY STERILE: Performed by: ORTHOPAEDIC SURGERY

## 2022-06-06 PROCEDURE — 370N000017 HC ANESTHESIA TECHNICAL FEE, PER MIN: Performed by: ORTHOPAEDIC SURGERY

## 2022-06-06 PROCEDURE — 36415 COLL VENOUS BLD VENIPUNCTURE: CPT | Performed by: ORTHOPAEDIC SURGERY

## 2022-06-06 PROCEDURE — 250N000013 HC RX MED GY IP 250 OP 250 PS 637: Performed by: ANESTHESIOLOGY

## 2022-06-06 PROCEDURE — 87075 CULTR BACTERIA EXCEPT BLOOD: CPT | Performed by: ORTHOPAEDIC SURGERY

## 2022-06-06 PROCEDURE — 87070 CULTURE OTHR SPECIMN AEROBIC: CPT | Performed by: ORTHOPAEDIC SURGERY

## 2022-06-06 PROCEDURE — 250N000009 HC RX 250: Performed by: NURSE ANESTHETIST, CERTIFIED REGISTERED

## 2022-06-06 DEVICE — IMP ANCHOR ARTHREX CORKSCREW 6.5MM AR-1925SF: Type: IMPLANTABLE DEVICE | Site: HIP | Status: FUNCTIONAL

## 2022-06-06 RX ORDER — POTASSIUM CHLORIDE 750 MG/1
10 TABLET, EXTENDED RELEASE ORAL DAILY
Status: DISCONTINUED | OUTPATIENT
Start: 2022-06-07 | End: 2022-06-07 | Stop reason: HOSPADM

## 2022-06-06 RX ORDER — FAMOTIDINE 20 MG/1
20 TABLET, FILM COATED ORAL 2 TIMES DAILY
Status: DISCONTINUED | OUTPATIENT
Start: 2022-06-06 | End: 2022-06-07 | Stop reason: HOSPADM

## 2022-06-06 RX ORDER — PHENYLEPHRINE HYDROCHLORIDE 10 MG/ML
INJECTION INTRAVENOUS PRN
Status: DISCONTINUED | OUTPATIENT
Start: 2022-06-06 | End: 2022-06-06

## 2022-06-06 RX ORDER — FENTANYL CITRATE 50 UG/ML
INJECTION, SOLUTION INTRAMUSCULAR; INTRAVENOUS PRN
Status: DISCONTINUED | OUTPATIENT
Start: 2022-06-06 | End: 2022-06-06

## 2022-06-06 RX ORDER — ACETAMINOPHEN 325 MG/1
975 TABLET ORAL EVERY 8 HOURS
Status: DISCONTINUED | OUTPATIENT
Start: 2022-06-06 | End: 2022-06-07 | Stop reason: HOSPADM

## 2022-06-06 RX ORDER — OXYCODONE HYDROCHLORIDE 5 MG/1
5 TABLET ORAL EVERY 4 HOURS PRN
Status: DISCONTINUED | OUTPATIENT
Start: 2022-06-06 | End: 2022-06-06 | Stop reason: HOSPADM

## 2022-06-06 RX ORDER — NALOXONE HYDROCHLORIDE 0.4 MG/ML
0.4 INJECTION, SOLUTION INTRAMUSCULAR; INTRAVENOUS; SUBCUTANEOUS
Status: DISCONTINUED | OUTPATIENT
Start: 2022-06-06 | End: 2022-06-07 | Stop reason: HOSPADM

## 2022-06-06 RX ORDER — CELECOXIB 100 MG/1
100 CAPSULE ORAL 2 TIMES DAILY
Status: DISCONTINUED | OUTPATIENT
Start: 2022-06-06 | End: 2022-06-07 | Stop reason: HOSPADM

## 2022-06-06 RX ORDER — FENTANYL CITRATE 50 UG/ML
50 INJECTION, SOLUTION INTRAMUSCULAR; INTRAVENOUS EVERY 5 MIN PRN
Status: DISCONTINUED | OUTPATIENT
Start: 2022-06-06 | End: 2022-06-06 | Stop reason: HOSPADM

## 2022-06-06 RX ORDER — ENOXAPARIN SODIUM 100 MG/ML
40 INJECTION SUBCUTANEOUS EVERY 24 HOURS
Status: DISCONTINUED | OUTPATIENT
Start: 2022-06-07 | End: 2022-06-07 | Stop reason: HOSPADM

## 2022-06-06 RX ORDER — CEFAZOLIN SODIUM/WATER 2 G/20 ML
2 SYRINGE (ML) INTRAVENOUS SEE ADMIN INSTRUCTIONS
Status: DISCONTINUED | OUTPATIENT
Start: 2022-06-06 | End: 2022-06-06 | Stop reason: HOSPADM

## 2022-06-06 RX ORDER — HYDRALAZINE HYDROCHLORIDE 20 MG/ML
2.5-5 INJECTION INTRAMUSCULAR; INTRAVENOUS EVERY 10 MIN PRN
Status: DISCONTINUED | OUTPATIENT
Start: 2022-06-06 | End: 2022-06-06 | Stop reason: HOSPADM

## 2022-06-06 RX ORDER — NALOXONE HYDROCHLORIDE 0.4 MG/ML
0.2 INJECTION, SOLUTION INTRAMUSCULAR; INTRAVENOUS; SUBCUTANEOUS
Status: DISCONTINUED | OUTPATIENT
Start: 2022-06-06 | End: 2022-06-07 | Stop reason: HOSPADM

## 2022-06-06 RX ORDER — METOPROLOL TARTRATE 25 MG/1
25 TABLET, FILM COATED ORAL 2 TIMES DAILY
Status: DISCONTINUED | OUTPATIENT
Start: 2022-06-06 | End: 2022-06-07 | Stop reason: HOSPADM

## 2022-06-06 RX ORDER — EPHEDRINE SULFATE 50 MG/ML
INJECTION, SOLUTION INTRAMUSCULAR; INTRAVENOUS; SUBCUTANEOUS PRN
Status: DISCONTINUED | OUTPATIENT
Start: 2022-06-06 | End: 2022-06-06

## 2022-06-06 RX ORDER — LATANOPROST 50 UG/ML
1 SOLUTION/ DROPS OPHTHALMIC AT BEDTIME
Status: DISCONTINUED | OUTPATIENT
Start: 2022-06-06 | End: 2022-06-07 | Stop reason: HOSPADM

## 2022-06-06 RX ORDER — GABAPENTIN 100 MG/1
100 CAPSULE ORAL AT BEDTIME
Status: DISCONTINUED | OUTPATIENT
Start: 2022-06-06 | End: 2022-06-06

## 2022-06-06 RX ORDER — FAMOTIDINE 10 MG
10 TABLET ORAL 2 TIMES DAILY
Status: DISCONTINUED | OUTPATIENT
Start: 2022-06-06 | End: 2022-06-06

## 2022-06-06 RX ORDER — LIDOCAINE 40 MG/G
CREAM TOPICAL
Status: DISCONTINUED | OUTPATIENT
Start: 2022-06-06 | End: 2022-06-07 | Stop reason: HOSPADM

## 2022-06-06 RX ORDER — HYDROXYZINE HYDROCHLORIDE 10 MG/1
10 TABLET, FILM COATED ORAL EVERY 6 HOURS PRN
Status: DISCONTINUED | OUTPATIENT
Start: 2022-06-06 | End: 2022-06-07 | Stop reason: HOSPADM

## 2022-06-06 RX ORDER — AMOXICILLIN 250 MG
1 CAPSULE ORAL 2 TIMES DAILY
Status: DISCONTINUED | OUTPATIENT
Start: 2022-06-06 | End: 2022-06-07 | Stop reason: HOSPADM

## 2022-06-06 RX ORDER — PROPOFOL 10 MG/ML
INJECTION, EMULSION INTRAVENOUS CONTINUOUS PRN
Status: DISCONTINUED | OUTPATIENT
Start: 2022-06-06 | End: 2022-06-06

## 2022-06-06 RX ORDER — ONDANSETRON 4 MG/1
4 TABLET, ORALLY DISINTEGRATING ORAL EVERY 6 HOURS PRN
Status: DISCONTINUED | OUTPATIENT
Start: 2022-06-06 | End: 2022-06-07 | Stop reason: HOSPADM

## 2022-06-06 RX ORDER — FENTANYL CITRATE-0.9 % NACL/PF 10 MCG/ML
PLASTIC BAG, INJECTION (ML) INTRAVENOUS PRN
Status: DISCONTINUED | OUTPATIENT
Start: 2022-06-06 | End: 2022-06-06

## 2022-06-06 RX ORDER — OXYCODONE HYDROCHLORIDE 5 MG/1
5-10 TABLET ORAL EVERY 4 HOURS PRN
Qty: 20 TABLET | Refills: 0 | Status: SHIPPED | OUTPATIENT
Start: 2022-06-06 | End: 2022-09-14

## 2022-06-06 RX ORDER — CEFAZOLIN SODIUM 2 G/100ML
2 INJECTION, SOLUTION INTRAVENOUS EVERY 8 HOURS
Status: COMPLETED | OUTPATIENT
Start: 2022-06-06 | End: 2022-06-07

## 2022-06-06 RX ORDER — GABAPENTIN 300 MG/1
300 CAPSULE ORAL 3 TIMES DAILY
Status: DISCONTINUED | OUTPATIENT
Start: 2022-06-06 | End: 2022-06-07 | Stop reason: HOSPADM

## 2022-06-06 RX ORDER — HYDROMORPHONE HCL IN WATER/PF 6 MG/30 ML
0.4 PATIENT CONTROLLED ANALGESIA SYRINGE INTRAVENOUS EVERY 5 MIN PRN
Status: DISCONTINUED | OUTPATIENT
Start: 2022-06-06 | End: 2022-06-06 | Stop reason: HOSPADM

## 2022-06-06 RX ORDER — ACETAMINOPHEN 325 MG/1
650 TABLET ORAL EVERY 4 HOURS PRN
Qty: 100 TABLET | Refills: 0 | Status: ON HOLD | OUTPATIENT
Start: 2022-06-06 | End: 2023-04-27

## 2022-06-06 RX ORDER — BISACODYL 10 MG
10 SUPPOSITORY, RECTAL RECTAL DAILY PRN
Status: DISCONTINUED | OUTPATIENT
Start: 2022-06-06 | End: 2022-06-07 | Stop reason: HOSPADM

## 2022-06-06 RX ORDER — ONDANSETRON 2 MG/ML
4 INJECTION INTRAMUSCULAR; INTRAVENOUS EVERY 6 HOURS PRN
Status: DISCONTINUED | OUTPATIENT
Start: 2022-06-06 | End: 2022-06-07 | Stop reason: HOSPADM

## 2022-06-06 RX ORDER — LABETALOL HYDROCHLORIDE 5 MG/ML
10 INJECTION, SOLUTION INTRAVENOUS
Status: DISCONTINUED | OUTPATIENT
Start: 2022-06-06 | End: 2022-06-06 | Stop reason: HOSPADM

## 2022-06-06 RX ORDER — TRANEXAMIC ACID 10 MG/ML
1 INJECTION, SOLUTION INTRAVENOUS ONCE
Status: COMPLETED | OUTPATIENT
Start: 2022-06-06 | End: 2022-06-06

## 2022-06-06 RX ORDER — LEVOTHYROXINE SODIUM 88 UG/1
88 TABLET ORAL DAILY
Status: DISCONTINUED | OUTPATIENT
Start: 2022-06-07 | End: 2022-06-07 | Stop reason: HOSPADM

## 2022-06-06 RX ORDER — ONDANSETRON 2 MG/ML
4 INJECTION INTRAMUSCULAR; INTRAVENOUS EVERY 30 MIN PRN
Status: DISCONTINUED | OUTPATIENT
Start: 2022-06-06 | End: 2022-06-06 | Stop reason: HOSPADM

## 2022-06-06 RX ORDER — OXYCODONE HYDROCHLORIDE 5 MG/1
5 TABLET ORAL EVERY 4 HOURS PRN
Status: DISCONTINUED | OUTPATIENT
Start: 2022-06-06 | End: 2022-06-07 | Stop reason: HOSPADM

## 2022-06-06 RX ORDER — ACETAZOLAMIDE 500 MG/1
500 CAPSULE, EXTENDED RELEASE ORAL 2 TIMES DAILY
Status: DISCONTINUED | OUTPATIENT
Start: 2022-06-06 | End: 2022-06-06

## 2022-06-06 RX ORDER — SODIUM CHLORIDE, SODIUM LACTATE, POTASSIUM CHLORIDE, CALCIUM CHLORIDE 600; 310; 30; 20 MG/100ML; MG/100ML; MG/100ML; MG/100ML
INJECTION, SOLUTION INTRAVENOUS CONTINUOUS
Status: DISCONTINUED | OUTPATIENT
Start: 2022-06-06 | End: 2022-06-06 | Stop reason: HOSPADM

## 2022-06-06 RX ORDER — TRAZODONE HYDROCHLORIDE 50 MG/1
50-100 TABLET, FILM COATED ORAL
Status: DISCONTINUED | OUTPATIENT
Start: 2022-06-06 | End: 2022-06-07 | Stop reason: HOSPADM

## 2022-06-06 RX ORDER — DORZOLAMIDE HYDROCHLORIDE AND TIMOLOL MALEATE 20; 5 MG/ML; MG/ML
1 SOLUTION/ DROPS OPHTHALMIC 2 TIMES DAILY
Status: DISCONTINUED | OUTPATIENT
Start: 2022-06-06 | End: 2022-06-07 | Stop reason: HOSPADM

## 2022-06-06 RX ORDER — SODIUM CHLORIDE, SODIUM LACTATE, POTASSIUM CHLORIDE, CALCIUM CHLORIDE 600; 310; 30; 20 MG/100ML; MG/100ML; MG/100ML; MG/100ML
INJECTION, SOLUTION INTRAVENOUS CONTINUOUS
Status: DISCONTINUED | OUTPATIENT
Start: 2022-06-06 | End: 2022-06-07 | Stop reason: HOSPADM

## 2022-06-06 RX ORDER — ONDANSETRON 2 MG/ML
INJECTION INTRAMUSCULAR; INTRAVENOUS PRN
Status: DISCONTINUED | OUTPATIENT
Start: 2022-06-06 | End: 2022-06-06

## 2022-06-06 RX ORDER — LIDOCAINE 40 MG/G
CREAM TOPICAL
Status: DISCONTINUED | OUTPATIENT
Start: 2022-06-06 | End: 2022-06-06 | Stop reason: HOSPADM

## 2022-06-06 RX ORDER — BUPIVACAINE HYDROCHLORIDE 5 MG/ML
INJECTION, SOLUTION EPIDURAL; INTRACAUDAL
Status: COMPLETED | OUTPATIENT
Start: 2022-06-06 | End: 2022-06-06

## 2022-06-06 RX ORDER — ALBUTEROL SULFATE 0.83 MG/ML
2.5 SOLUTION RESPIRATORY (INHALATION) EVERY 4 HOURS PRN
Status: DISCONTINUED | OUTPATIENT
Start: 2022-06-06 | End: 2022-06-06 | Stop reason: HOSPADM

## 2022-06-06 RX ORDER — PROCHLORPERAZINE MALEATE 5 MG
5 TABLET ORAL EVERY 6 HOURS PRN
Status: DISCONTINUED | OUTPATIENT
Start: 2022-06-06 | End: 2022-06-07 | Stop reason: HOSPADM

## 2022-06-06 RX ORDER — ONDANSETRON 4 MG/1
4 TABLET, ORALLY DISINTEGRATING ORAL EVERY 30 MIN PRN
Status: DISCONTINUED | OUTPATIENT
Start: 2022-06-06 | End: 2022-06-06 | Stop reason: HOSPADM

## 2022-06-06 RX ORDER — AMOXICILLIN 250 MG
1-2 CAPSULE ORAL 2 TIMES DAILY
Qty: 30 TABLET | Refills: 0 | Status: SHIPPED | OUTPATIENT
Start: 2022-06-06 | End: 2022-09-14

## 2022-06-06 RX ORDER — HYDROMORPHONE HCL IN WATER/PF 6 MG/30 ML
0.4 PATIENT CONTROLLED ANALGESIA SYRINGE INTRAVENOUS
Status: DISCONTINUED | OUTPATIENT
Start: 2022-06-06 | End: 2022-06-07 | Stop reason: HOSPADM

## 2022-06-06 RX ORDER — POLYETHYLENE GLYCOL 3350 17 G/17G
17 POWDER, FOR SOLUTION ORAL DAILY
Status: DISCONTINUED | OUTPATIENT
Start: 2022-06-07 | End: 2022-06-07 | Stop reason: HOSPADM

## 2022-06-06 RX ORDER — DORZOLAMIDE HYDROCHLORIDE AND TIMOLOL MALEATE PRESERVATIVE FREE 20; 5 MG/ML; MG/ML
1 SOLUTION/ DROPS OPHTHALMIC 2 TIMES DAILY
Status: DISCONTINUED | OUTPATIENT
Start: 2022-06-06 | End: 2022-06-06

## 2022-06-06 RX ORDER — HYDROMORPHONE HCL IN WATER/PF 6 MG/30 ML
0.2 PATIENT CONTROLLED ANALGESIA SYRINGE INTRAVENOUS
Status: DISCONTINUED | OUTPATIENT
Start: 2022-06-06 | End: 2022-06-07 | Stop reason: HOSPADM

## 2022-06-06 RX ORDER — CEFAZOLIN SODIUM/WATER 2 G/20 ML
2 SYRINGE (ML) INTRAVENOUS
Status: COMPLETED | OUTPATIENT
Start: 2022-06-06 | End: 2022-06-06

## 2022-06-06 RX ORDER — OXYCODONE HYDROCHLORIDE 5 MG/1
10 TABLET ORAL EVERY 4 HOURS PRN
Status: DISCONTINUED | OUTPATIENT
Start: 2022-06-06 | End: 2022-06-07 | Stop reason: HOSPADM

## 2022-06-06 RX ORDER — FUROSEMIDE 20 MG
20 TABLET ORAL
Status: DISCONTINUED | OUTPATIENT
Start: 2022-06-06 | End: 2022-06-07 | Stop reason: HOSPADM

## 2022-06-06 RX ORDER — ACETAMINOPHEN 325 MG/1
650 TABLET ORAL EVERY 4 HOURS PRN
Status: DISCONTINUED | OUTPATIENT
Start: 2022-06-09 | End: 2022-06-07 | Stop reason: HOSPADM

## 2022-06-06 RX ORDER — CELECOXIB 200 MG/1
400 CAPSULE ORAL ONCE
Status: COMPLETED | OUTPATIENT
Start: 2022-06-06 | End: 2022-06-06

## 2022-06-06 RX ORDER — TRANEXAMIC ACID 650 MG/1
1950 TABLET ORAL ONCE
Status: COMPLETED | OUTPATIENT
Start: 2022-06-06 | End: 2022-06-06

## 2022-06-06 RX ORDER — PRAVASTATIN SODIUM 20 MG
40 TABLET ORAL AT BEDTIME
Status: DISCONTINUED | OUTPATIENT
Start: 2022-06-06 | End: 2022-06-07 | Stop reason: HOSPADM

## 2022-06-06 RX ADMIN — GABAPENTIN 300 MG: 300 CAPSULE ORAL at 20:22

## 2022-06-06 RX ADMIN — FENTANYL CITRATE 50 MCG: 50 INJECTION, SOLUTION INTRAMUSCULAR; INTRAVENOUS at 09:53

## 2022-06-06 RX ADMIN — FAMOTIDINE 20 MG: 20 TABLET ORAL at 20:22

## 2022-06-06 RX ADMIN — TRANEXAMIC ACID 1 G: 10 INJECTION, SOLUTION INTRAVENOUS at 11:09

## 2022-06-06 RX ADMIN — Medication 2 G: at 10:15

## 2022-06-06 RX ADMIN — Medication 100 MCG: at 10:48

## 2022-06-06 RX ADMIN — FENTANYL CITRATE 50 MCG: 50 INJECTION, SOLUTION INTRAMUSCULAR; INTRAVENOUS at 09:56

## 2022-06-06 RX ADMIN — Medication 5 MG: at 10:32

## 2022-06-06 RX ADMIN — OXYCODONE HYDROCHLORIDE 10 MG: 5 TABLET ORAL at 17:31

## 2022-06-06 RX ADMIN — SODIUM CHLORIDE, POTASSIUM CHLORIDE, SODIUM LACTATE AND CALCIUM CHLORIDE: 600; 310; 30; 20 INJECTION, SOLUTION INTRAVENOUS at 11:31

## 2022-06-06 RX ADMIN — ONDANSETRON HYDROCHLORIDE 4 MG: 2 INJECTION, SOLUTION INTRAVENOUS at 11:29

## 2022-06-06 RX ADMIN — SODIUM CHLORIDE, POTASSIUM CHLORIDE, SODIUM LACTATE AND CALCIUM CHLORIDE: 600; 310; 30; 20 INJECTION, SOLUTION INTRAVENOUS at 09:53

## 2022-06-06 RX ADMIN — ACETAMINOPHEN 975 MG: 325 TABLET ORAL at 14:36

## 2022-06-06 RX ADMIN — CELECOXIB 100 MG: 100 CAPSULE ORAL at 20:22

## 2022-06-06 RX ADMIN — Medication 200 MCG: at 11:17

## 2022-06-06 RX ADMIN — SENNOSIDES AND DOCUSATE SODIUM 1 TABLET: 50; 8.6 TABLET ORAL at 20:22

## 2022-06-06 RX ADMIN — LATANOPROST 1 DROP: 50 SOLUTION/ DROPS OPHTHALMIC at 22:08

## 2022-06-06 RX ADMIN — Medication 100 MCG: at 10:59

## 2022-06-06 RX ADMIN — Medication 100 MCG: at 10:32

## 2022-06-06 RX ADMIN — CELECOXIB 400 MG: 200 CAPSULE ORAL at 09:07

## 2022-06-06 RX ADMIN — OXYCODONE HYDROCHLORIDE 5 MG: 5 TABLET ORAL at 13:28

## 2022-06-06 RX ADMIN — GABAPENTIN 300 MG: 300 CAPSULE ORAL at 15:58

## 2022-06-06 RX ADMIN — TRANEXAMIC ACID 1950 MG: 650 TABLET ORAL at 09:06

## 2022-06-06 RX ADMIN — FUROSEMIDE 20 MG: 20 TABLET ORAL at 15:58

## 2022-06-06 RX ADMIN — OXYCODONE HYDROCHLORIDE 10 MG: 5 TABLET ORAL at 22:07

## 2022-06-06 RX ADMIN — CEFAZOLIN SODIUM 2 G: 2 INJECTION, SOLUTION INTRAVENOUS at 17:31

## 2022-06-06 RX ADMIN — BUPIVACAINE HYDROCHLORIDE 2 ML: 5 INJECTION, SOLUTION EPIDURAL; INTRACAUDAL at 09:56

## 2022-06-06 RX ADMIN — METOPROLOL TARTRATE 25 MG: 25 TABLET, FILM COATED ORAL at 20:22

## 2022-06-06 RX ADMIN — Medication 100 MCG: at 10:52

## 2022-06-06 RX ADMIN — ACETAMINOPHEN 975 MG: 325 TABLET ORAL at 22:07

## 2022-06-06 RX ADMIN — DORZOLAMIDE HYDROCHLORIDE AND TIMOLOL MALEATE 1 DROP: 20; 5 SOLUTION OPHTHALMIC at 22:06

## 2022-06-06 RX ADMIN — PROPOFOL 50 MCG/KG/MIN: 10 INJECTION, EMULSION INTRAVENOUS at 10:06

## 2022-06-06 RX ADMIN — FENTANYL CITRATE 100 MCG: 50 INJECTION, SOLUTION INTRAMUSCULAR; INTRAVENOUS at 11:20

## 2022-06-06 RX ADMIN — PRAVASTATIN SODIUM 40 MG: 20 TABLET ORAL at 22:07

## 2022-06-06 NOTE — PHARMACY-ADMISSION MEDICATION HISTORY
Medication history and patient interview completed by pharmacy intern/student or pre-admitting RN.  Reviewed by pharmacist, including [employer] dispense records, [facility] Everywhere, and chart review.       Steven Tejeda, Pharm.D., BCPS      Nurse Complete Ayaka Somers RN Wed Jun 1, 2022  3:30 PM         Medications Prior to Admission   Medication Sig Dispense Refill Last Dose     Acetaminophen (TYLENOL PO) Take 500-1,000 mg by mouth every 6 hours as needed for mild pain         dorzolamide-timolol (COSOPT) 2-0.5 % ophthalmic solution Place 1 drop into both eyes 2 times daily        famotidine (PEPCID) 10 MG tablet Take 1 tablet (10 mg) by mouth 2 times daily 180 tablet 1      furosemide (LASIX) 20 MG tablet Take 1 tablet (20 mg) by mouth 2 times daily 180 tablet 0      gabapentin (NEURONTIN) 300 MG capsule Take 1 capsule (300 mg) by mouth 3 times daily 270 capsule 1      latanoprost (XALATAN) 0.005 % ophthalmic solution Place 1 drop into the right eye At Bedtime         levothyroxine (SYNTHROID/LEVOTHROID) 88 MCG tablet Take 1 tablet (88 mcg) by mouth daily 90 tablet 1      metoprolol tartrate (LOPRESSOR) 25 MG tablet Take 1 tablet (25 mg) by mouth 2 times daily 180 tablet 3 6/6/2022 at Unknown time     Multiple Vitamins-Minerals (MULTIVITAMIN ADULT PO) Take 1 tablet by mouth daily        potassium chloride ER (KLOR-CON M) 10 MEQ CR tablet TAKE 1 TABLET(10 MEQ) BY MOUTH DAILY 90 tablet 0      pravastatin (PRAVACHOL) 40 MG tablet Take 1 tablet (40 mg) by mouth At Bedtime 90 tablet 3      traZODone (DESYREL) 50 MG tablet Take 1-2 tablets ( mg) by mouth nightly as needed for sleep 180 tablet 1

## 2022-06-06 NOTE — PROGRESS NOTES
Arrived to room 608 from PACU at 1400 via cart, transferred to bed via hover mat without difficulty, oriented to room and call system.    Patient vital signs are at baseline: Yes  Patient able to ambulate as they were prior to admission or with assist devices provided by therapies during their stay:  Yes  Patient MUST void prior to discharge:  Yes-SBA, using gait belt, and walker.  Patient able to tolerate oral intake:  Yes -regular diet.   Pain has adequate pain control using Oral analgesics:  Yes-PO oxycodone and scheduled tylenol.  Does patient have an identified :  Yes-Sister Viviane.  Has goal D/C date and time been discussed with patient:  Yes-home tomorrow.    Pt A&O x4. CMS intact. Dressing CDI.

## 2022-06-06 NOTE — OP NOTE
Procedure Date: 06/06/2022    PREOPERATIVE DIAGNOSIS:  Right hip abductor tear.    POSTOPERATIVE DIAGNOSES:  Right hip abductor tear.    PROCEDURE:  Open right hip abductor repair.    SURGEON:  Dragan Muse MD    FIRST ASSISTANT:  Holly Mcdowell PA-C.    INDICATIONS FOR PROCEDURE:  Ms. Beltran is a very pleasant 76-year-old female who had a right total hip arthroplasty done in 11/2020.  This was an uneventful operation.  She did really quite well following surgery, but about 6 weeks later, she was bending over and dislocated her right total hip arthroplasty.  This was reduced in the Emergency Room, but following this, she has always had difficulty with ambulation and a significant Trendelenburg gait, which did not allow her to get off the use of the walker or a cane.  We attempted conservative management with physical therapy, but this was unable to provide her with significant improvement.  MRI scan revealed that she had avulsed her gluteus medius likely at the time of her dislocation.  We discussed treatment options.  I recommended open abductor repair.  She has failed conservative management with oral analgesics, activity modifications, physical therapy and gait aid.  She wished to proceed with surgery.    NARRATIVE EVENTS:  After thorough preoperative evaluation and proper identification of the patient to be operated on, Ms. Beltran was taken to the operating room where she underwent a spinal anesthetic, was placed in the right lateral decubitus position with the right hip up.  Right hip was prepped and draped in the usual sterile manner.  After appropriate surgical pause confirming the patient's extremity to be operated on, 10 minutes after patient received 2 grams of Ancef, right hip was approached through a previous incision, skin and soft tissue sharply dissected down to the tensor fascia.  The fascia was split in line with fiber in line with femur, taken down to the trochanteric bursa.  Here, it was  evident that she had torn away her anterior one-third of gluteus medius repair.  Sutures were visible within the wound and thinned this repair.  We released the joint capsule, which was somewhat scarred.  Through the abductor, we were able to dissect out the anterior one-third of gluteus medius musculature, tag this and advance it forward to its anatomic position of the greater trochanter.  Here, we cleared the area of the greater trochanter from fibrinous debris and placed a 6.5 mm Arthrex suture anchor with two sliding sutures into position.  After we placed this anchor into position, we placed one of the sutures in the posterior remaining abductor, advancing that forward and the other in the most superior aspect of the anterior one-third of gluteus medius musculature that had advanced forward pulling both these into position and then closing that interval nicely.  Over this, we then placed a second Arthrex 6.5 metallic suture anchor, double loaded, just about 6 mm distal to this and placed both arms of this suture into the anterior gluteus medius.  Before advancing it forward, we took cultures of the joint fluid, as well as of the anterior joint capsule, and we also cultured one of the sutures that had been removed.  There was no evidence of purulence or infection within the wound.  We placed the sutures and then snuggly advanced the anterior one-third of the gluteus medius musculature forward to the greater trochanter.  Once this was solidly into position, we then placed #5 Ethibond sutures in the interval of the gluteus medius split, as well as between the gluteus medius and the proximal insertion of the vastus lateralis.  We then placed one suture through drill holes through bone tunnels through the greater trochanter and into the anterior one-third of the gluteus medius.  We felt at this point that we had a good solid repair.  At this point, we thoroughly irrigated the wound.  We closed the tensor fascia with  interrupted 0 Vicryl sutures and a running #1 Stratafix suture.  We placed 1 gram of powdered vancomycin deep to that tensor fascia.  We closed the skin and soft tissue with absorbable suture and staples in the skin.  The patient was placed in a well-padded postoperative dressing and taken to recovery room in stable condition.  She tolerated the procedure without difficulty.    Dragan Muse MD        D: 2022   T: 2022   MT: BRAIN    Name:     JAVON MARTÍNEZPao  MRN:      [id number]        Account:        [id number]   :      1945           Procedure Date: 2022     Document: [id number]

## 2022-06-06 NOTE — ANESTHESIA POSTPROCEDURE EVALUATION
Patient: Teri Beltran    Procedure: Procedure(s):  Right hip abductor repair       Anesthesia Type:  Spinal    Note:  Disposition: Inpatient   Postop Pain Control: Uneventful            Sign Out: Well controlled pain   PONV: No   Neuro/Psych: Uneventful            Sign Out: Acceptable/Baseline neuro status   Airway/Respiratory: Uneventful            Sign Out: Acceptable/Baseline resp. status   CV/Hemodynamics: Uneventful            Sign Out: Acceptable CV status; No obvious hypovolemia; No obvious fluid overload   Other NRE: NONE   DID A NON-ROUTINE EVENT OCCUR? No           Last vitals:  Vitals Value Taken Time   /62 06/06/22 1245   Temp 97.8  F (36.6  C) 06/06/22 1206   Pulse 54 06/06/22 1252   Resp 7 06/06/22 1252   SpO2 99 % 06/06/22 1252   Vitals shown include unvalidated device data.    Electronically Signed By: Binta Bueno MD  June 6, 2022  12:53 PM

## 2022-06-06 NOTE — CONSULTS
[facility]  Hospitalist Consult Note    Name: Teri Beltran      MRN: [id number]  YOB: 1945    Age: 76 year old  Date of admission: 6/6/2022  Primary care provider: Rachel Miller     Requesting Physician:  Dragan Muse MD  Reason for consultation:  Post-operative medical management         Assessment and Plan:   Teri Beltran is a 76 year old female with a history of hypertension, hyperlipidemia, hypothyroidism, chronic venous insufficiency, mild dilation of ascending aorta, GERD, hx of first degree AV block who was admitted for right hip abductor tear repair.    Right Hip Abductor Tear s/p Open Right Hip Abductor Repair on 6/6/2022  - The patient is doing well, currently has well controlled pain and is hemodynamically stable. The hospitalist service will defer diet, activity, DVT prophylaxis, and pain control to the surgical service. Currently the patient is on lovenox subcutaneous for DVT prophylaxis. We agree with continued physical and occupational therapy. Social work may be consulted for possible placement needs. Please continue incentive spirometry and check routine follow up hemoglobin to evaluate for surgical blood loss and the potential need for transfusion.     Hypertension  - Resume PTA metoprolol, lasix once confirmed by pharmacy    Hypothyroidism  - Resume PTA levothyroxine    Chronic Venous Insufficiency  - Had plans for vein ablation with Cardiology Dr. Anglin, but now postponed  - Continue lasix    Hyperlipidemia  - Continue statin    Code status: Full Code  Prophylaxis: Currently on lovenox sq, ultimately deferred to the primary service.  Disposition: Deferred to the primary service    Thank you for the consultation, we will continue to follow along during the hospitalization. Please page with any questions or concerns.         History of Present Illness:   Teri Beltran is a 76 year old female who is being hospitalized for  right hip abductor tear s/p repair. Pre-operative note was fully reviewed and recommendations acknowledged. Op note and anesthesia notes and flow sheets were also reviewed.     The patient had no complications related to the procedure and has had an unremarkable post-operative course to this point. Currently pain is adequately controlled. No nausea, vomiting, diarrhea or constipation. No fevers, chills, diaphoresis. No chest pain, palpitations, dyspnea. Hess catheter still in place. Tolerating oral intake. No excessive somnolence and patient is fully alert and oriented. The patient has no other complaints at this time.                  Past Medical History:     Past Medical History:   Diagnosis Date     First degree AV block 08/11/2015     Former smoker      Gastroesophageal reflux disease      GERD (gastroesophageal reflux disease)      Glaucoma      Heart block     2:1     Heart murmur      Hyperlipidemia LDL goal <130 06/14/2013     Hypertension goal BP (blood pressure) < 140/90 12/03/2013     Hypothyroidism      Left atrial dilatation     mild     Mild dilation of ascending aorta - borderline 08/11/2015     Palpitations      Prediabetes 06/14/2013     RBBB 08/11/2015     S/P total knee arthroplasty 07/23/2019     Tachycardia      Urinary frequency      Varicose veins      Venous insufficiency     s/p bilateral great saphenous vein radiofrequency ablation by Dr. Garcia             Past Surgical History:     Past Surgical History:   Procedure Laterality Date     ARTHROPLASTY HIP Right 11/20/2020    Procedure: Right total hip arthroplasty using a Biomet Taperloc femoral stem and G7 acetabulum.;  Surgeon: Dragan Muse MD;  Location: [facility] OR     ARTHROPLASTY KNEE Right 07/23/2019    Procedure: Right total knee arthroplasty using an Arthrex iBalance knee system;  Surgeon: Dragan Muse MD;  Location: [facility] OR     BREAST SURGERY      right breast ductal surgery due to milk production     BREAST  "SURGERY Right     ductal surgery due to milk production     CATARACT EXTRACTION Right 2022     COLONOSCOPY N/A 10/24/2014    no further screening. Procedure: COLONOSCOPY;  Surgeon: Pedro Rudd MD;  Location:  GI     EYE SURGERY       FINGER GANGLION CYST EXCISION Left     ring finger     HYSTERECTOMY       HYSTERECTOMY, PAP NO LONGER INDICATED      total hysterectomy and ovaries. benign     SOFT TISSUE SURGERY      ganglion removed from left wrist and ring finger     SURGICAL HISTORY OF -   age 7    tonsillectomy     SURGICAL HISTORY OF -   2015    left eye cataract     SURGICAL HISTORY OF -   2016    LINQ placed.     TONSILLECTOMY       WRIST GANGLION EXCISION Left                Social History:     Social History     Tobacco Use     Smoking status: Former Smoker     Packs/day: 0.50     Years: 18.00     Pack years: 9.00     Types: Cigarettes     Quit date:      Years since quittin.4     Smokeless tobacco: Never Used   Substance Use Topics     Alcohol use: Not Currently             Family History:   Family history was fully reviewed and non-contributory in this case.          Allergies:     Allergies   Allergen Reactions     Seasonal Allergies      Simvastatin      \"flushed\"             Medications:     Prior to Admission medications    Medication Sig Last Dose Taking? Auth Provider   Acetaminophen (TYLENOL PO) Take 500-1,000 mg by mouth every 6 hours as needed for mild pain   Yes Reported, Patient   acetaminophen (TYLENOL) 325 MG tablet Take 2 tablets (650 mg) by mouth every 4 hours as needed for other (mild pain)  Yes Dragan Muse MD   acetaZOLAMIDE (DIAMOX SEQUEL) 500 MG 12 hr capsule Take 500 mg by mouth 2 times daily  Yes Reported, Patient   aspirin (ASA) 325 MG EC tablet Take 1 tablet (325 mg) by mouth 2 times daily  Yes Dragan Muse MD   benzonatate (TESSALON) 100 MG capsule Take 1 capsule (100 mg) by mouth as needed in the morning and 1 capsule (100 " mg) as needed at noon and 1 capsule (100 mg) as needed in the evening for cough.  Yes Rajendra Graf PA-C   dorzolamide-timolol (COSOPT) 2-0.5 % ophthalmic solution Place 1 drop into both eyes 2 times daily  Yes Unknown, Entered By History   dorzolamide-timolol PF (COSOPT PF) 2-0.5 % opthalmic solutionh INSTILL ONE DROP IN BOTH EYES TWICE DAILY  Yes Reported, Patient   famotidine (PEPCID) 10 MG tablet Take 1 tablet (10 mg) by mouth 2 times daily  Yes Rachel Miller MD   furosemide (LASIX) 20 MG tablet Take 1 tablet (20 mg) by mouth 2 times daily  Yes Quincy Anglin MD   gabapentin (NEURONTIN) 100 MG capsule Start with 100 mg daily at bed time. Every 3 days increase the dose by 100 mg until 300 mg 3 times a day. The schedule was provided to the patient  Yes Rachel Miller MD   gabapentin (NEURONTIN) 300 MG capsule Take 1 capsule (300 mg) by mouth 3 times daily  Yes Rachel Miller MD   latanoprost (XALATAN) 0.005 % ophthalmic solution Place 1 drop into the right eye At Bedtime   Yes Reported, Patient   levothyroxine (SYNTHROID/LEVOTHROID) 88 MCG tablet Take 1 tablet (88 mcg) by mouth daily  Yes Rachel Miller MD   metoprolol tartrate (LOPRESSOR) 25 MG tablet Take 1 tablet (25 mg) by mouth 2 times daily 6/6/2022 at Unknown time Yes Quincy Anglin MD   Multiple Vitamins-Minerals (MULTIVITAMIN ADULT PO) Take 1 tablet by mouth daily  Yes Reported, Patient   oxyCODONE (ROXICODONE) 5 MG tablet Take 1-2 tablets (5-10 mg) by mouth every 4 hours as needed for moderate to severe pain  Yes Dragan Muse MD   potassium chloride ER (KLOR-CON M) 10 MEQ CR tablet TAKE 1 TABLET(10 MEQ) BY MOUTH DAILY  Yes Rachel Miller MD   pravastatin (PRAVACHOL) 40 MG tablet Take 1 tablet (40 mg) by mouth At Bedtime  Yes Quincy Anglin MD   senna-docusate (SENOKOT-S/PERICOLACE) 8.6-50 MG tablet Take 1-2 tablets by mouth 2 times daily Take while on oral  "narcotics to prevent or treat constipation.  Yes Dragan Muse MD   traZODone (DESYREL) 50 MG tablet Take 1-2 tablets ( mg) by mouth nightly as needed for sleep  Yes Rachel Miller MD       Current hospital administered medication list (MAR) also reviewed.          Review of Systems:     A comprehensive greater than 10 system review of systems was carried out.  Pertinent positives and negatives are noted above.  Otherwise negative for contributory info.            Physical Exam:   Blood pressure (!) 141/70, pulse 61, temperature 97.6  F (36.4  C), temperature source Temporal, resp. rate 10, height 1.727 m (5' 8\"), weight 80.7 kg (178 lb), SpO2 99 %, not currently breastfeeding.  Exam:  GENERAL: No apparent distress. Awake, alert, and fully oriented.  HEENT: Normocephalic, atraumatic. Extraocular movements intact.  CARDIOVASCULAR: Regular rate and rhythm without murmurs or rubs. No S3.  PULMONARY: Clear bilaterally.  ABDOMINAL: Soft, non-tender, non-distended. Bowel sounds normoactive. No hepatosplenomegaly.  EXTREMITIES: No cyanosis or clubbing. No edema.  NEUROLOGICAL: CN 2-12 grossly intact, no focal neurological deficits.  DERMATOLOGICAL: No rash, ulcer, ecchymoses, jaundice.         Data:   EKG/Telemetry:  Personally reviewed available data.    Laboratory: Personally reviewed available data.  No results for input(s): WBC, HGB, HCT, MCV, PLT in the last 168 hours.  Recent Labs   Lab 06/01/22  1227      POTASSIUM 4.3   CHLORIDE 106   CO2 23   ANIONGAP 7   *   BUN 21   CR 0.62   GFRESTIMATED >90   LILIA 9.6     No results for input(s): CULT in the last 168 hours.    Imaging: Personally reviewed available data.  Recent Results (from the past 24 hour(s))   XR Pelvis w Hip Port Right 1 View    Narrative    PELVIS AND HIP PORTABLE RIGHT ONE VIEW   6/6/2022 12:32 PM     HISTORY:  Status post hip surgery.    COMPARISON: X-ray from 12/16/2020.      Impression    IMPRESSION: " There are new suture anchors in the right greater  trochanter and adjacent gas suggesting gluteal tendon repair. No other  change. No evidence of complication or fracture.     NIRU WHITE MD         SYSTEM ID:  [id number]         Dragan Feliciano DO  [facility] Hospitalist  [address] [facility] [place] [address]  06/06/2022

## 2022-06-06 NOTE — ANESTHESIA PROCEDURE NOTES
Intrathecal injection Procedure Note    Pre-Procedure   Staff -        Anesthesiologist:  Binta Bueno MD       Performed By: anesthesiologist       Location: OR       Procedure Start/Stop Times: 6/6/2022 9:56 AM and 6/6/2022 10:05 AM       Pre-Anesthestic Checklist: patient identified, IV checked, risks and benefits discussed, informed consent, monitors and equipment checked, pre-op evaluation, at physician/surgeon's request and post-op pain management  Timeout:       Correct Patient: Yes        Correct Procedure: Yes        Correct Site: Yes        Correct Position: Yes   Procedure Documentation  Procedure: intrathecal injection       Patient Position: sitting       Skin prep: Chloraprep       Insertion Site: L2-3. (midline approach).       Needle Gauge: 22.        Needle Length (Inches): 3.5        Spinal Needle Type: Quincke       # of attempts: 2 and  # of redirects:  2    Assessment/Narrative         Paresthesias: Resolved.       Sensory Level: T8       CSF fluid: clear.       Opening pressure was cmH2O while  Sitting.      Medication(s) Administered   0.5% Bupivacaine PF (Intrathecal) - Intrathecal   2 mL - 6/6/2022 9:56:00 AM  Medication Administration Time: 6/6/2022 9:56 AM

## 2022-06-06 NOTE — ANESTHESIA PREPROCEDURE EVALUATION
Anesthesia Pre-Procedure Evaluation    Patient: Teri Beltran   MRN: [id number] : 1945        Procedure : Procedure(s):  Right hip abductor repair          Past Medical History:   Diagnosis Date     First degree AV block 2015     Former smoker      Gastroesophageal reflux disease      GERD (gastroesophageal reflux disease)      Glaucoma      Heart block     2:1     Heart murmur      Hyperlipidemia LDL goal <130 2013     Hypertension goal BP (blood pressure) < 140/90 2013     Hypothyroidism      Left atrial dilatation     mild     Mild dilation of ascending aorta - borderline 2015     Palpitations      Prediabetes 2013     RBBB 2015     S/P total knee arthroplasty 2019     Tachycardia      Urinary frequency      Varicose veins      Venous insufficiency     s/p bilateral great saphenous vein radiofrequency ablation by Dr. Garcia      Past Surgical History:   Procedure Laterality Date     ARTHROPLASTY HIP Right 2020    Procedure: Right total hip arthroplasty using a Biomet Taperloc femoral stem and G7 acetabulum.;  Surgeon: Dragan Muse MD;  Location: [facility] OR     ARTHROPLASTY KNEE Right 2019    Procedure: Right total knee arthroplasty using an Arthrex SAVORTEXlance knee system;  Surgeon: Dragan Muse MD;  Location: [facility] OR     BREAST SURGERY      right breast ductal surgery due to milk production     BREAST SURGERY Right     ductal surgery due to milk production     CATARACT EXTRACTION Right 2022     COLONOSCOPY N/A 10/24/2014    no further screening. Procedure: COLONOSCOPY;  Surgeon: Pedro Rudd MD;  Location: [facility] GI     EYE SURGERY       FINGER GANGLION CYST EXCISION Left     ring finger     HYSTERECTOMY       HYSTERECTOMY, PAP NO LONGER INDICATED  2009    total hysterectomy and ovaries. benign     SOFT TISSUE SURGERY      ganglion removed from left wrist and ring finger     SURGICAL HISTORY OF -   age 7    tonsillectomy  "    SURGICAL HISTORY OF -   2015    left eye cataract     SURGICAL HISTORY OF -   2016    LINQ placed.     TONSILLECTOMY       WRIST GANGLION EXCISION Left       Allergies   Allergen Reactions     Seasonal Allergies      Simvastatin      \"flushed\"      Social History     Tobacco Use     Smoking status: Former Smoker     Packs/day: 0.50     Years: 18.00     Pack years: 9.00     Types: Cigarettes     Quit date:      Years since quittin.4     Smokeless tobacco: Never Used   Substance Use Topics     Alcohol use: Not Currently      Wt Readings from Last 1 Encounters:   22 80.7 kg (178 lb)        Anesthesia Evaluation            ROS/MED HX  ENT/Pulmonary:  - neg pulmonary ROS     Neurologic:       Cardiovascular: Comment: Heart block, history of SVT, palpitations  Taking metoprolol    Echo   The left ventricle is normal in size. There is normal left ventricular wall  thickness. Left ventricular systolic function is normal. The visual ejection  fraction is estimated at 55-60%. No regional wall motion abnormalities noted.  The right ventricle is normal size. The right ventricular systolic function is  normal.  Trace mitral and tricuspid regurgitation.  There is moderate trileaflet aortic sclerosis. Transaortic gradients are  mildly elevated consistent with aortic sclerosis.  Trivial pericardial effusion.  In direct comparison to the previous report dated 2015, the findings are  similar.    (+) hypertension-----    METS/Exercise Tolerance:     Hematologic:       Musculoskeletal:       GI/Hepatic:     (+) GERD,     Renal/Genitourinary:       Endo:     (+) thyroid problem, hypothyroidism,     Psychiatric/Substance Use:       Infectious Disease:       Malignancy:       Other:            Physical Exam    Airway        Mallampati: I   TM distance: > 3 FB   Neck ROM: full     Respiratory Devices and Support         Dental           Cardiovascular   cardiovascular exam normal          Pulmonary   " pulmonary exam normal                OUTSIDE LABS:  CBC:   Lab Results   Component Value Date    WBC 7.4 05/26/2022    WBC 6.3 08/23/2021    HGB 12.4 05/26/2022    HGB 13.0 08/23/2021    HCT 39.5 05/26/2022    HCT 41.2 08/23/2021     05/26/2022     08/23/2021     BMP:   Lab Results   Component Value Date     06/01/2022     05/09/2022    POTASSIUM 4.3 06/01/2022    POTASSIUM 3.9 05/09/2022    CHLORIDE 106 06/01/2022    CHLORIDE 105 05/09/2022    CO2 23 06/01/2022    CO2 30 05/09/2022    BUN 21 06/01/2022    BUN 23 05/09/2022    CR 0.62 06/01/2022    CR 0.54 05/09/2022     (H) 06/01/2022     (H) 05/09/2022     COAGS: No results found for: PTT, INR, FIBR  POC: No results found for: BGM, HCG, HCGS  HEPATIC:   Lab Results   Component Value Date    ALBUMIN 4.2 08/23/2021    PROTTOTAL 7.7 08/23/2021    ALT 26 08/23/2021    AST 19 08/23/2021    ALKPHOS 86 08/23/2021    BILITOTAL 0.5 08/23/2021     OTHER:   Lab Results   Component Value Date    A1C 5.9 (H) 05/26/2022    LILIA 9.6 06/01/2022    TSH 2.68 08/23/2021    T4 1.23 04/12/2017    CRP 2.9 06/17/2015    CRP 2.9 06/17/2015    SED 16 06/17/2015       Anesthesia Plan    ASA Status:  2   NPO Status:  NPO Appropriate    Anesthesia Type: Spinal.      Maintenance: TIVA.        Consents    Anesthesia Plan(s) and associated risks, benefits, and realistic alternatives discussed. Questions answered and patient/representative(s) expressed understanding.    - Discussed:     - Discussed with:  Patient         Postoperative Care    Pain management: IV analgesics, Oral pain medications, Multi-modal analgesia.   PONV prophylaxis: Ondansetron (or other 5HT-3), Dexamethasone or Solumedrol     Comments:    Other Comments: Patient requests spinal anesthesia            Binta Bueno MD

## 2022-06-06 NOTE — BRIEF OP NOTE
[facility]    Brief Operative Note    Pre-operative diagnosis: Left proximal hamstring tendon rupture [S76.312A]  Post-operative diagnosis Same as pre-operative diagnosis    Procedure: Procedure(s):  Right hip abductor repair  Surgeon: Surgeon(s) and Role:     * Dragan Muse MD - Primary     * Holly Mcdowell PA-C - Assisting  Anesthesia: Spinal   Estimated Blood Loss: Less than 50 ml    Drains: None  Specimens:   ID Type Source Tests Collected by Time Destination   A : Right hip Tissue Hip, Right ANAEROBIC BACTERIAL CULTURE ROUTINE, GRAM STAIN, AEROBIC BACTERIAL CULTURE ROUTINE Dragan Muse MD 6/6/2022 10:40 AM    B : Right hip Aspirate Hip, Right ANAEROBIC BACTERIAL CULTURE ROUTINE, GRAM STAIN, AEROBIC BACTERIAL CULTURE ROUTINE Dragan Muse MD 6/6/2022 10:44 AM    C : Right hip Tissue Hip, Right ANAEROBIC BACTERIAL CULTURE ROUTINE, GRAM STAIN, AEROBIC BACTERIAL CULTURE ROUTINE Dragan Muse MD 6/6/2022 10:45 AM      Findings:   None.  Complications: None.  Implants:   Implant Name Type Inv. Item Serial No.  Lot No. LRB No. Used Action   IMP ANCHOR ARTHREX CORKSCREW 6.5MM [id number] - [id number] [employer] IMP ANCHOR AR[employer]REX CORKSCREW 6.5MM [id number]-[id number]  [employer] [id number] Right 1 Implanted   IMP ANCHOR [employer] CORKSCREW 6.5MM [id number] - [id number] [employer] IMP ANCHOR ARTHREX CORKSCREW 6.5MM [id number]  [employer]X [id number] Right 1 Implanted

## 2022-06-06 NOTE — PROGRESS NOTES
06/06/22 1637   Quick Adds   Type of Visit Initial PT Evaluation   Living Environment   Living Environment Comments Pt lives in home by self, but sister will be able to stay if needed. 2 ASHIA with R railing. Bed on main level, bath downstairs- has chair lift for stairs inside. Use of FWW for mobility outdoors.   Self-Care   Usual Activity Tolerance moderate   Current Activity Tolerance moderate   Equipment Currently Used at Home walker, standard;raised toilet seat;shower chair   Fall history within last six months yes   Number of times patient has fallen within last six months 1   General Information   Onset of Illness/Injury or Date of Surgery 06/06/22   Referring Physician Dragan Muse MD   Patient/Family Therapy Goals Statement (PT) To improve mobility, return home   Pertinent History of Current Problem (include personal factors and/or comorbidities that impact the POC) Pt is a 76 year old female POD0 s/p open right hip abductor repair.   Existing Precautions/Restrictions weight bearing   Weight-Bearing Status - RLE weight-bearing as tolerated   Cognition   Affect/Mental Status (Cognition) WNL   Orientation Status (Cognition) oriented x 4   Follows Commands (Cognition) WFL   Pain Assessment   Patient Currently in Pain Yes, see Vital Sign flowsheet  (4/10 R hip)   Range of Motion (ROM)   ROM Comment decreased R hip ROM; otherwise B LE WFL   Strength (Manual Muscle Testing)   Strength Comments able to complete B SLR   Bed Mobility   Comment, (Bed Mobility) SBA sit to supine   Transfers   Comment, (Transfers) CGA sit <> stand with FWW   Gait/Stairs (Locomotion)   Distance in Feet (Required for LE Total Joints) 8   Pattern (Gait) step-to   Deviations/Abnormal Patterns (Gait) antalgic;base of support, wide;weight shifting decreased   Comment, (Gait/Stairs) CGA with FWW   Balance   Balance Comments good sitting; fair+ standing with FWW   Sensory Examination   Sensory Perception patient reports no sensory  changes   Clinical Impression   Criteria for Skilled Therapeutic Intervention Yes, treatment indicated   PT Diagnosis (PT) impaired functional mobility   Influenced by the following impairments decreased R hip ROM; impaired standing balance   Functional limitations due to impairments impaired bed mobility, transfers, ambulation, stairs   Clinical Presentation (PT Evaluation Complexity) Stable/Uncomplicated   Clinical Presentation Rationale Pt is medically stable   Clinical Decision Making (Complexity) low complexity   Planned Therapy Interventions (PT) balance training;bed mobility training;gait training;home exercise program;patient/family education;ROM (range of motion);stair training;strengthening;transfer training   Anticipated Equipment Needs at Discharge (PT) walker, rolling   Risk & Benefits of therapy have been explained evaluation/treatment results reviewed;care plan/treatment goals reviewed;risks/benefits reviewed;current/potential barriers reviewed;participants voiced agreement with care plan;participants included;patient   PT Discharge Planning   PT Discharge Recommendation (DC Rec)   (defer to ortho)   PT Rationale for DC Rec Patient reports she will not have 24/7 assistance, but that sister will be able to stop by if needed for assistance. Anticipate that patient will be SBA/mod ind with mobility, no physical assistance needed during evaluation.   PT Brief overview of current status Ax1 with FWW   Plan of Care Review   Plan of Care Reviewed With patient   Total Evaluation Time   Total Evaluation Time (Minutes) 8   Physical Therapy Goals   PT Frequency Daily   PT Predicted Duration/Target Date for Goal Attainment 06/07/22   PT Goals Bed Mobility;Transfers;Gait;Stairs   PT: Bed Mobility Modified independent;Supine to/from sit   PT: Transfers Modified independent;Sit to/from stand;Assistive device   PT: Gait Modified independent;100 feet;Assistive device   PT: Stairs 2 stairs;Minimal assist;Rail on right

## 2022-06-06 NOTE — ANESTHESIA CARE TRANSFER NOTE
Patient: Teri Beltran    Procedure: Procedure(s):  Right hip abductor repair       Diagnosis: Left proximal hamstring tendon rupture [S76.312A]  Diagnosis Additional Information: No value filed.    Anesthesia Type:   Spinal     Note:    Oropharynx: oropharynx clear of all foreign objects  Level of Consciousness: awake  Oxygen Supplementation: room air    Independent Airway: airway patency satisfactory and stable  Dentition: dentition unchanged  Vital Signs Stable: post-procedure vital signs reviewed and stable  Report to RN Given: handoff report given  Patient transferred to: PACU    Handoff Report: Identifed the Patient, Identified the Reponsible Provider, Reviewed the pertinent medical history, Discussed the surgical course, Reviewed Intra-OP anesthesia mangement and issues during anesthesia, Set expectations for post-procedure period and Allowed opportunity for questions and acknowledgement of understanding      Vitals:  Vitals Value Taken Time   BP     Temp     Pulse     Resp     SpO2         Electronically Signed By: RUBY Moffett CRNA  June 6, 2022  11:38 AM

## 2022-06-07 ENCOUNTER — APPOINTMENT (OUTPATIENT)
Dept: OCCUPATIONAL THERAPY | Facility: CLINIC | Age: 77
End: 2022-06-07
Attending: ORTHOPAEDIC SURGERY
Payer: MEDICARE

## 2022-06-07 ENCOUNTER — APPOINTMENT (OUTPATIENT)
Dept: PHYSICAL THERAPY | Facility: CLINIC | Age: 77
End: 2022-06-07
Attending: ORTHOPAEDIC SURGERY
Payer: MEDICARE

## 2022-06-07 VITALS
SYSTOLIC BLOOD PRESSURE: 116 MMHG | RESPIRATION RATE: 16 BRPM | WEIGHT: 178 LBS | BODY MASS INDEX: 26.98 KG/M2 | OXYGEN SATURATION: 97 % | DIASTOLIC BLOOD PRESSURE: 62 MMHG | HEART RATE: 58 BPM | TEMPERATURE: 98.2 F | HEIGHT: 68 IN

## 2022-06-07 LAB
ANION GAP SERPL CALCULATED.3IONS-SCNC: 1 MMOL/L (ref 3–14)
BUN SERPL-MCNC: 25 MG/DL (ref 7–30)
CALCIUM SERPL-MCNC: 8.8 MG/DL (ref 8.5–10.1)
CHLORIDE BLD-SCNC: 106 MMOL/L (ref 94–109)
CO2 SERPL-SCNC: 28 MMOL/L (ref 20–32)
CREAT SERPL-MCNC: 0.7 MG/DL (ref 0.52–1.04)
ERYTHROCYTE [DISTWIDTH] IN BLOOD BY AUTOMATED COUNT: 16.7 % (ref 10–15)
FASTING STATUS PATIENT QL REPORTED: ABNORMAL
GFR SERPL CREATININE-BSD FRML MDRD: 89 ML/MIN/1.73M2
GLUCOSE BLD-MCNC: 117 MG/DL (ref 70–99)
GLUCOSE BLD-MCNC: 117 MG/DL (ref 70–99)
HCT VFR BLD AUTO: 37.2 % (ref 35–47)
HGB BLD-MCNC: 11.3 G/DL (ref 11.7–15.7)
MCH RBC QN AUTO: 27.4 PG (ref 26.5–33)
MCHC RBC AUTO-ENTMCNC: 30.4 G/DL (ref 31.5–36.5)
MCV RBC AUTO: 90 FL (ref 78–100)
PLATELET # BLD AUTO: 327 10E3/UL (ref 150–450)
POTASSIUM BLD-SCNC: 4 MMOL/L (ref 3.4–5.3)
RBC # BLD AUTO: 4.13 10E6/UL (ref 3.8–5.2)
SODIUM SERPL-SCNC: 135 MMOL/L (ref 133–144)
WBC # BLD AUTO: 9.4 10E3/UL (ref 4–11)

## 2022-06-07 PROCEDURE — 250N000013 HC RX MED GY IP 250 OP 250 PS 637: Performed by: INTERNAL MEDICINE

## 2022-06-07 PROCEDURE — 99213 OFFICE O/P EST LOW 20 MIN: CPT | Performed by: INTERNAL MEDICINE

## 2022-06-07 PROCEDURE — 97535 SELF CARE MNGMENT TRAINING: CPT | Mod: GO | Performed by: OCCUPATIONAL THERAPIST

## 2022-06-07 PROCEDURE — 97530 THERAPEUTIC ACTIVITIES: CPT | Mod: GP | Performed by: PHYSICAL THERAPIST

## 2022-06-07 PROCEDURE — 97116 GAIT TRAINING THERAPY: CPT | Mod: GP | Performed by: PHYSICAL THERAPIST

## 2022-06-07 PROCEDURE — 85027 COMPLETE CBC AUTOMATED: CPT | Performed by: INTERNAL MEDICINE

## 2022-06-07 PROCEDURE — 97165 OT EVAL LOW COMPLEX 30 MIN: CPT | Mod: GO | Performed by: OCCUPATIONAL THERAPIST

## 2022-06-07 PROCEDURE — 96372 THER/PROPH/DIAG INJ SC/IM: CPT | Performed by: ORTHOPAEDIC SURGERY

## 2022-06-07 PROCEDURE — 80048 BASIC METABOLIC PNL TOTAL CA: CPT | Performed by: INTERNAL MEDICINE

## 2022-06-07 PROCEDURE — 36415 COLL VENOUS BLD VENIPUNCTURE: CPT | Performed by: INTERNAL MEDICINE

## 2022-06-07 PROCEDURE — 250N000013 HC RX MED GY IP 250 OP 250 PS 637: Performed by: ORTHOPAEDIC SURGERY

## 2022-06-07 PROCEDURE — 250N000011 HC RX IP 250 OP 636: Performed by: ORTHOPAEDIC SURGERY

## 2022-06-07 RX ORDER — METOPROLOL TARTRATE 25 MG/1
TABLET, FILM COATED ORAL
Qty: 180 TABLET | Refills: 0 | Status: SHIPPED | OUTPATIENT
Start: 2022-06-07 | End: 2022-09-06

## 2022-06-07 RX ADMIN — SENNOSIDES AND DOCUSATE SODIUM 1 TABLET: 50; 8.6 TABLET ORAL at 08:08

## 2022-06-07 RX ADMIN — ENOXAPARIN SODIUM 40 MG: 40 INJECTION SUBCUTANEOUS at 09:49

## 2022-06-07 RX ADMIN — CELECOXIB 100 MG: 100 CAPSULE ORAL at 08:08

## 2022-06-07 RX ADMIN — METOPROLOL TARTRATE 25 MG: 25 TABLET, FILM COATED ORAL at 08:07

## 2022-06-07 RX ADMIN — OXYCODONE HYDROCHLORIDE 5 MG: 5 TABLET ORAL at 06:22

## 2022-06-07 RX ADMIN — DORZOLAMIDE HYDROCHLORIDE AND TIMOLOL MALEATE 1 DROP: 20; 5 SOLUTION OPHTHALMIC at 08:09

## 2022-06-07 RX ADMIN — FAMOTIDINE 20 MG: 20 TABLET ORAL at 06:27

## 2022-06-07 RX ADMIN — OXYCODONE HYDROCHLORIDE 5 MG: 5 TABLET ORAL at 10:34

## 2022-06-07 RX ADMIN — CEFAZOLIN SODIUM 2 G: 2 INJECTION, SOLUTION INTRAVENOUS at 01:43

## 2022-06-07 RX ADMIN — ACETAMINOPHEN 975 MG: 325 TABLET ORAL at 06:22

## 2022-06-07 RX ADMIN — POTASSIUM CHLORIDE 10 MEQ: 750 TABLET, FILM COATED, EXTENDED RELEASE ORAL at 08:08

## 2022-06-07 RX ADMIN — OXYCODONE HYDROCHLORIDE 10 MG: 5 TABLET ORAL at 01:43

## 2022-06-07 RX ADMIN — FUROSEMIDE 20 MG: 20 TABLET ORAL at 08:07

## 2022-06-07 RX ADMIN — LEVOTHYROXINE SODIUM 88 MCG: 0.09 TABLET ORAL at 08:08

## 2022-06-07 RX ADMIN — GABAPENTIN 300 MG: 300 CAPSULE ORAL at 08:08

## 2022-06-07 RX ADMIN — ONDANSETRON 4 MG: 4 TABLET, ORALLY DISINTEGRATING ORAL at 07:44

## 2022-06-07 NOTE — PLAN OF CARE
Occupational Therapy Discharge Summary    Reason for therapy discharge:    All goals and outcomes met, no further needs identified.    Progress towards therapy goal(s). See goals on Care Plan in [facility] electronic health record for goal details.  Goals met    Therapy recommendation(s):    No further therapy is recommended.    Goal Outcome Evaluation: Home with family assist, AE, and eventual PT

## 2022-06-07 NOTE — PROGRESS NOTES
"Orthopedic Surgery  6/7/2022    S: Patient voices no complaints today.     O: Blood pressure 113/55, pulse 60, temperature 97.4  F (36.3  C), temperature source Temporal, resp. rate 15, height 1.727 m (5' 8\"), weight 80.7 kg (178 lb), SpO2 97 %, not currently breastfeeding.  Lab Results   Component Value Date    HGB 12.4 05/26/2022    HGB 9.2 12/18/2020     No results found for: INR     Neurovascularly intact.  Calves are negative bilaterally, both soft and nontender.  The wound is C/D/I.  The wound looks good with minimal erythema of the surrounding skin.    A: Ms. Beltran is doing well status post Procedure(s):  Open right hip abductor repair.    P: Continue physical therapy.   Anticoagulation home on ASA  Pain management  Discharge planning    Dragan Muse MD  [phone]    "

## 2022-06-07 NOTE — PROGRESS NOTES
06/07/22 0852   Quick Adds   Quick Adds Certification   Type of Visit Initial Occupational Therapy Evaluation   Living Environment   People in Home alone   Current Living Arrangements house  (split entry)   Home Accessibility stairs within home;stairs to enter home   Number of Stairs, Main Entrance 2   Stair Railings, Main Entrance railings safe and in good condition   Number of Stairs, Within Home, Primary seven   Stair Railings, Within Home, Primary other (see comments)  (Pt has lift going up the stairs, needs to use stairs going down)   Transportation Anticipated family or friend will provide   Living Environment Comments Pt will have help as needed from her sister-in-law   Self-Care   Usual Activity Tolerance moderate   Current Activity Tolerance moderate   Equipment Currently Used at Home walker, rolling;grab bar, toilet;grab bar, tub/shower;raised toilet seat   Fall history within last six months yes   Number of times patient has fallen within last six months 1   Activity/Exercise/Self-Care Comment will need to go downstairs for walk-in shower.  Has a walk-in tub on main level.  This has a leak and is being replaced next month   General Information   Onset of Illness/Injury or Date of Surgery 06/06/22   Referring Physician Jed Byrne   Patient/Family Therapy Goal Statement (OT) return home   Additional Occupational Profile Info/Pertinent History of Current Problem Pt is a 76 year old female admitted for a right TRACIE abductor repair.  Pt has had previous TRACIE surgery to this hip.   Performance Patterns (Routines, Roles, Habits) pt states she has been independent with basic ADLS at baseline.  Hurt her leg when reaching down to pick something off the floor, causing a hip dislocation   Existing Precautions/Restrictions lifting;no active hip ABD;90 degree hip flexion   General Observations and Info pt in bed, willing to participate   Cognitive Status Examination   Orientation Status orientation to person,  place and time   Affect/Mental Status (Cognitive) WNL   Follows Commands WNL   Visual Perception   Visual Impairment/Limitations WNL   Pain Assessment   Patient Currently in Pain Yes, see Vital Sign flowsheet   Range of Motion Comprehensive   General Range of Motion no range of motion deficits identified   Strength Comprehensive (MMT)   General Manual Muscle Testing (MMT) Assessment no strength deficits identified   Coordination   Upper Extremity Coordination No deficits were identified   Bed Mobility   Bed Mobility supine-sit;sit-supine   Supine-Sit Bacon (Bed Mobility) supervision;verbal cues;contact guard   Sit-Supine Bacon (Bed Mobility) supervision;verbal cues;contact guard   Assistive Device (Bed Mobility) bed rails   Comment (Bed Mobility) pt will have a small bedrail to use at home   Clinical Impression   Criteria for Skilled Therapeutic Interventions Met (OT) Yes, treatment indicated   OT Diagnosis decreased independence in ADLS   OT Problem List-Impairments impacting ADL activity tolerance impaired;post-surgical precautions;pain   Assessment of Occupational Performance 1-3 Performance Deficits   Identified Performance Deficits decreased independence bathing, dressing, functional mobility   Planned Therapy Interventions (OT) ADL retraining;home program guidelines;progressive activity/exercise   Clinical Decision Making Complexity (OT) low complexity   Risk & Benefits of therapy have been explained evaluation/treatment results reviewed;care plan/treatment goals reviewed;patient   OT Discharge Planning   OT Discharge Recommendation (DC Rec)   (defer to ortho)   OT Rationale for DC Rec Pt has good overall knowledge of activity level and precautions from previous sugeries.  Will have help as needed at home from her Sister-in-law.  Will need to manage stairs down to lower level to use her walk-in shower to take showers.  Anticipate pt chang progress and be able to return home with help from  sister-in-law and AE   OT Brief overview of current status CGA for bed mobility,  Mod I for LE dressing with AE   Therapy Certification   Start of Care Date 06/07/22   Certification date from 06/07/22   Certification date to 06/07/22   Medical Diagnosis Right Abductor TRACIE repair   Total Evaluation Time (Minutes)   Total Evaluation Time (Minutes) 15   OT Goals   Therapy Frequency (OT) One time eval and treatment   OT Predicted Duration/Target Date for Goal Attainment 06/07/22   OT Goals Lower Body Dressing;OT Goal 1   OT: Lower Body Dressing Modified independent;using adaptive equipment;within precautions   OT: Goal 1 Pt will complete car tranfser with SBA and AE as needed

## 2022-06-07 NOTE — PLAN OF CARE
Physical Therapy Discharge Summary    Reason for therapy discharge:    All goals and outcomes met, no further needs identified.    Progress towards therapy goal(s). See goals on Care Plan in [facility] electronic health record for goal details.  Goals met    Therapy recommendation(s):    Defer to ortho

## 2022-06-07 NOTE — PROGRESS NOTES
[facility]    Hospitalist Progress Note  Name: Teri Beltran    MRN: [id number]  Provider:  Dragan Feliciano DO  Date of Service: 06/07/2022    Summary of Stay: Teri Beltran is a 76 year old female with a history of hypertension, hyperlipidemia, hypothyroidism, chronic venous insufficiency, mild dilation of ascending aorta, GERD, hx of first degree AV block who was admitted for right hip abductor tear repair.    TODAY'S PLAN:  Pt medically stable for discharge from IM perspective.    Problem List:   Right Hip Abductor Tear s/p Open Right Hip Abductor Repair on 6/6/2022  - The patient is doing well, currently has well controlled pain and is hemodynamically stable. The hospitalist service will defer diet, activity, DVT prophylaxis, and pain control to the surgical service. Currently the patient is on lovenox subcutaneous for DVT prophylaxis. We agree with continued physical and occupational therapy. Social work may be consulted for possible placement needs. Please continue incentive spirometry and check routine follow up hemoglobin to evaluate for surgical blood loss and the potential need for transfusion.      Hypertension  - Resume PTA metoprolol, lasix once confirmed by pharmacy     Hypothyroidism  - Resume PTA levothyroxine     Chronic Venous Insufficiency  - Had plans for vein ablation with Cardiology Dr. Anglin, but now postponed  - Continue lasix     Hyperlipidemia  - Continue statin    DVT Prophylaxis: Defer to primary service  Code Status: Full Code  Diet: Advance Diet as Tolerated: Regular Diet Adult  Regular Diet Adult    Hess Catheter: Not present  Disposition: Expected discharge today to home. Goals prior to discharge include cleared by ortho.   Family updated today: No     Interval History   Pt seen and examined.  Pt reports mild upset stomach.  Denies BM.    -Data reviewed today: I personally reviewed all new labs and imaging results over the last 24 hours.     Physical Exam    Temp: 98.2  F (36.8  C) Temp src: Temporal BP: 116/62 Pulse: 58   Resp: 16 SpO2: 97 % O2 Device: None (Room air) Oxygen Delivery: 2 LPM  Vitals:    06/06/22 0826   Weight: 80.7 kg (178 lb)     Vital Signs with Ranges  Temp:  [96.8  F (36  C)-98.7  F (37.1  C)] 98.2  F (36.8  C)  Pulse:  [50-76] 58  Resp:  [7-16] 16  BP: (113-168)/() 116/62  SpO2:  [91 %-100 %] 97 %  I/O last 3 completed shifts:  In: 1667 [I.V.:1667]  Out: 350 [Urine:350]    GENERAL: No apparent distress. Awake, alert, and fully oriented.  HEENT: Normocephalic, atraumatic. Extraocular movements intact.  CARDIOVASCULAR: Regular rate and rhythm without murmurs or rubs. No S3.  PULMONARY: Clear bilaterally.  GASTROINTESTINAL: Soft, non-tender, non-distended. Bowel sounds normoactive.   EXTREMITIES: No cyanosis or clubbing. No edema.  NEUROLOGICAL: CN 2-12 grossly intact, no focal neurological deficits.  DERMATOLOGICAL: No rash, ulcer, bruising, nor jaundice.    Medications     lactated ringers Stopped (06/06/22 1834)       acetaminophen  975 mg Oral Q8H     celecoxib  100 mg Oral BID     dorzolamide-timolol  1 drop Both Eyes BID     enoxaparin ANTICOAGULANT  40 mg Subcutaneous Q24H     famotidine  20 mg Oral BID    Or     famotidine  20 mg Intravenous BID     furosemide  20 mg Oral BID     gabapentin  300 mg Oral TID     latanoprost  1 drop Right Eye At Bedtime     levothyroxine  88 mcg Oral Daily     metoprolol tartrate  25 mg Oral BID     polyethylene glycol  17 g Oral Daily     potassium chloride ER  10 mEq Oral Daily     pravastatin  40 mg Oral At Bedtime     senna-docusate  1 tablet Oral BID     sodium chloride (PF)  3 mL Intracatheter Q8H     Data     Laboratory:  Recent Labs   Lab 06/07/22  0824 06/06/22  1501   WBC 9.4  --    HGB 11.3*  --    HCT 37.2  --    MCV 90  --     352     Recent Labs   Lab 06/07/22  0824 06/06/22  1426 06/01/22  1227     --  136   POTASSIUM 4.0  --  4.3   CHLORIDE 106  --  106   CO2 28  --  23    ANIONGAP 1*  --  7   *  117*  --  106*   BUN 25  --  21   CR 0.70 0.47* 0.62   GFRESTIMATED 89 >90 >90   LILIA 8.8  --  9.6     No results for input(s): CULT in the last 168 hours.    Imaging:  Recent Results (from the past 24 hour(s))   XR Pelvis w Hip Port Right 1 View    Narrative    PELVIS AND HIP PORTABLE RIGHT ONE VIEW   6/6/2022 12:32 PM     HISTORY:  Status post hip surgery.    COMPARISON: X-ray from 12/16/2020.      Impression    IMPRESSION: There are new suture anchors in the right greater  trochanter and adjacent gas suggesting gluteal tendon repair. No other  change. No evidence of complication or fracture.     NIRU WHITE MD         SYSTEM ID:  [id number]         Dragan Feliciano DO  [facility] Hospitalist  [address] [facility] [place] [address]  06/07/2022

## 2022-06-07 NOTE — PLAN OF CARE
[id number] RN    Patient vital signs are at baseline: Yes.  Used 2L oxygen overnight.  RA this morning.  Patient able to ambulate as they were prior to admission or with assist devices provided by therapies during their stay:  Yes, Ao1 with walker and GB.   Patient MUST void prior to discharge:  Yes  Patient able to tolerate oral intake:  Yes  Pain has adequate pain control using Oral analgesics:  Yes, oxycodone.  Does patient have an identified :  Yes  Has goal D/C date and time been discussed with patient:  Yes    A&O.  CMS intact.  Dressing CDI.  Patient complained of some stomach upset this morning.  Provided ginger ale and gave 0800 dose of pepcid at 0630.

## 2022-06-07 NOTE — PROGRESS NOTES
Patient vital signs are at baseline: Yes  Patient able to ambulate as they were prior to admission or with assist devices provided by therapies during their stay:  Yes-SBA, using gait belt, and walker.  Patient MUST void prior to discharge:  Yes  Patient able to tolerate oral intake:  Yes-regular diet.   Pain has adequate pain control using Oral analgesics:  Yes-PO oxycodone and scheduled tylenol.  Does patient have an identified :  Yes-Sister Viviane.  Has goal D/C date and time been discussed with patient:  Yes    Pt A&O x4. CMS intact. Dressing CDI.    Reviewed discharge instructions and medications with patient. Questions answered. Patient discharged to home via sister with, discharge instructions, filled medications (tylenol, aspirin, oxycodone, and senna), and belongings.

## 2022-06-11 LAB
BACTERIA SNV CULT: NO GROWTH
BACTERIA TISS BX CULT: NO GROWTH
BACTERIA TISS BX CULT: NO GROWTH

## 2022-06-13 LAB
BACTERIA TISS BX CULT: NORMAL
BACTERIA TISS BX CULT: NORMAL

## 2022-06-20 ENCOUNTER — TRANSFERRED RECORDS (OUTPATIENT)
Dept: FAMILY MEDICINE | Facility: CLINIC | Age: 77
End: 2022-06-20

## 2022-06-20 LAB — BACTERIA SNV CULT: NORMAL

## 2022-06-23 ENCOUNTER — OFFICE VISIT (OUTPATIENT)
Dept: INTERNAL MEDICINE | Facility: CLINIC | Age: 77
End: 2022-06-23
Payer: MEDICARE

## 2022-06-23 VITALS
WEIGHT: 178.2 LBS | BODY MASS INDEX: 27.01 KG/M2 | HEIGHT: 68 IN | SYSTOLIC BLOOD PRESSURE: 120 MMHG | OXYGEN SATURATION: 95 % | TEMPERATURE: 98.3 F | HEART RATE: 85 BPM | DIASTOLIC BLOOD PRESSURE: 76 MMHG | RESPIRATION RATE: 18 BRPM

## 2022-06-23 DIAGNOSIS — I10 HYPERTENSION GOAL BP (BLOOD PRESSURE) < 140/90: ICD-10-CM

## 2022-06-23 DIAGNOSIS — M25.551 CHRONIC HIP PAIN AFTER TOTAL REPLACEMENT OF RIGHT HIP JOINT: ICD-10-CM

## 2022-06-23 DIAGNOSIS — R07.9 CHEST PAIN, UNSPECIFIED TYPE: Primary | ICD-10-CM

## 2022-06-23 DIAGNOSIS — Z96.641 CHRONIC HIP PAIN AFTER TOTAL REPLACEMENT OF RIGHT HIP JOINT: ICD-10-CM

## 2022-06-23 DIAGNOSIS — G89.29 CHRONIC HIP PAIN AFTER TOTAL REPLACEMENT OF RIGHT HIP JOINT: ICD-10-CM

## 2022-06-23 PROCEDURE — 93000 ELECTROCARDIOGRAM COMPLETE: CPT | Performed by: INTERNAL MEDICINE

## 2022-06-23 PROCEDURE — 99214 OFFICE O/P EST MOD 30 MIN: CPT | Performed by: INTERNAL MEDICINE

## 2022-06-23 NOTE — PATIENT INSTRUCTIONS
Plan:  1. Resume aspirin 81 mg daily   2. Heart  Stress test - To schedule this test please call [facility] at: [phone]   3. Electrocardiogram -- .today  4. Continue same meds, same doses for now   5. If you decide to go off Gabapentin, you have to tape it down: ( 1 capsule twice a day for a week, then once a day for a week, then stop it)   6.Follow up in 3 months

## 2022-06-23 NOTE — PROGRESS NOTES
Dr Wong's note      Patient's instructions / PLAN:                                                        Plan:  1. Resume aspirin 81 mg daily   2. Heart  Stress test - To schedule this test please call [facility] at: [phone]   3. Electrocardiogram -- .today  4. Continue same meds, same doses for now   5. If you decide to go off Gabapentin, you have to tape it down: ( 1 capsule twice a day for a week, then once a day for a week, then stop it)   6.Follow up in 3 months      ASSESSMENT & PLAN:                                                      (R07.9) Chest pain, unspecified type  (primary encounter diagnosis)  Comment: She cannot do Lexiscan because she is claustrophobic  Plan: Echocardiogram Dobutamine Stress, EKG 12-lead         complete w/read - Clinics            (M25.551,  G89.29,  Z96.641) Chronic hip pain after total replacement of right hip joint  Comment:   Plan: Gabapentin, as above    (I10) Hypertension goal BP (blood pressure) < 140/90  Comment: Controlled  Plan: Continue same meds, same doses for now        Chief complaint:                                                      Follow up chronic medical problems      SUBJECTIVE:                                                    History of present illness:    CP  -- L ant chest  -- it doesn't radiate  -- not assoc w sob, palp  -- it comes at rest too  -- stress echo - neg  -- she would not do a test where the machine goes over the head     R hip surgery on June 6 for ms repair. She feels better, much less pain. Not healed enough for PT. She has f/u kody w dr Muse in 4 w.  She finds benefit from Gabapentin. She doesn't think she can stop it at this time   She was prescribed  after the surgery. She stopped it because of big bruise on the inner L thigh.   She walks a lot. I don't think she is a risk for DVT at this time.     Labs June -- in normal range     LOV March 2022 : Plan:  1. Gabapentin 300 mg 3 times a day   2. Furosemide 20 mg  "twice a day and talk to the vascular doctor about the Right leg swelling   3. Stop the hydrochlorathiazide   4. No changes in the other meds   5. Follow up in about 3 months          Hyperlipidemia Follow-Up      Are you regularly taking any medication or supplement to lower your cholesterol?   Yes- Pravastatin    Are you having muscle aches or other side effects that you think could be caused by your cholesterol lowering medication?  Yes- muscle aches    Hypertension Follow-up      Do you check your blood pressure regularly outside of the clinic? Yes     Are you following a low salt diet? Yes    Are your blood pressures ever more than 140 on the top number (systolic) OR more   than 90 on the bottom number (diastolic), for example 140/90? No      How many servings of fruits and vegetables do you eat daily?  0-1    On average, how many sweetened beverages do you drink each day (Examples: soda, juice, sweet tea, etc.  Do NOT count diet or artificially sweetened beverages)?   1    How many days per week do you exercise enough to make your heart beat faster? 3 or less    How many minutes a day do you exercise enough to make your heart beat faster? 9 or less    How many days per week do you miss taking your medication? 0      Review of Systems:                                                      ROS: negative for fever, chills, cough, wheezes,  shortness of breath, vomiting, abdominal pain, leg swelling positive for chest pain, as above        OBJECTIVE:             Physical exam:  Blood pressure 120/76, pulse 85, temperature 98.3  F (36.8  C), temperature source Tympanic, resp. rate 18, height 1.727 m (5' 8\"), weight 80.8 kg (178 lb 3.2 oz), SpO2 95 %, not currently breastfeeding.     NAD, appears comfortable  Skin: no rashes   HEENT: PERRLA, EOMI, pink conjunctiva, anicteric sclerae, bilateral tympanic membranes are clinically normal, oropharynx is normal color  Neck: supple, no JVD,  No thyroidmegaly. Lymph nodes " nonpalpable cervical and supraclavicular.  Chest: clear to auscultation bilaterally, good respiratory effort  Heart: S1 S2, RRR, no mgr appreciated.  No chest tenderness where she points to pain  Abdomen: soft, not tender, no hepatosplenomegaly or masses appreciated, no abdominal bruit, present bowel sounds  Extremities: no edema,   Neurologic: A, Ox3, no focal signs appreciated    PMHx: reviewed  Past Medical History:   Diagnosis Date     First degree AV block 08/11/2015     Former smoker      Gastroesophageal reflux disease      GERD (gastroesophageal reflux disease)      Glaucoma      Heart block     2:1     Heart murmur      Hyperlipidemia LDL goal <130 06/14/2013     Hypertension goal BP (blood pressure) < 140/90 12/03/2013     Hypothyroidism      Left atrial dilatation     mild     Mild dilation of ascending aorta - borderline 08/11/2015     Palpitations      Prediabetes 06/14/2013     RBBB 08/11/2015     S/P total knee arthroplasty 07/23/2019     Tachycardia      Urinary frequency      Varicose veins      Venous insufficiency     s/p bilateral great saphenous vein radiofrequency ablation by Dr. Garcia      PSHx: reviewed  Past Surgical History:   Procedure Laterality Date     ARTHROPLASTY HIP Right 11/20/2020    Procedure: Right total hip arthroplasty using a Biomet Taperloc femoral stem and G7 acetabulum.;  Surgeon: Dragan Muse MD;  Location: [facility] OR     ARTHROPLASTY KNEE Right 07/23/2019    Procedure: Right total knee arthroplasty using an Arthrex iBalance knee system;  Surgeon: Dragan Muse MD;  Location: [facility] OR     BREAST SURGERY      right breast ductal surgery due to milk production     BREAST SURGERY Right     ductal surgery due to milk production     CATARACT EXTRACTION Right 05/2022     COLONOSCOPY N/A 10/24/2014    no further screening. Procedure: COLONOSCOPY;  Surgeon: Pedro Rudd MD;  Location:  GI     DECOMPRESSION HIP CORE Right 6/6/2022    Procedure: Open  right hip abductor repair;  Surgeon: Dragan Muse MD;  Location: [facility] OR     EYE SURGERY       FINGER GANGLION CYST EXCISION Left     ring finger     HYSTERECTOMY       HYSTERECTOMY, PAP NO LONGER INDICATED  2009    total hysterectomy and ovaries. benign     SOFT TISSUE SURGERY      ganglion removed from left wrist and ring finger     SURGICAL HISTORY OF -   age 7    tonsillectomy     SURGICAL HISTORY OF -   01/2015    left eye cataract     SURGICAL HISTORY OF -   Fall 2016    LINQ placed.     TONSILLECTOMY       WRIST GANGLION EXCISION Left         Meds: reviewed  Current Outpatient Medications   Medication Sig Dispense Refill     acetaminophen (TYLENOL) 325 MG tablet Take 2 tablets (650 mg) by mouth every 4 hours as needed for other (mild pain) 100 tablet 0     aspirin (ASA) 325 MG EC tablet Take 1 tablet (325 mg) by mouth 2 times daily 70 tablet 0     dorzolamide-timolol (COSOPT) 2-0.5 % ophthalmic solution Place 1 drop into both eyes 2 times daily       famotidine (PEPCID) 10 MG tablet Take 1 tablet (10 mg) by mouth 2 times daily 180 tablet 1     furosemide (LASIX) 20 MG tablet Take 1 tablet (20 mg) by mouth 2 times daily 180 tablet 0     gabapentin (NEURONTIN) 300 MG capsule Take 1 capsule (300 mg) by mouth 3 times daily 270 capsule 1     latanoprost (XALATAN) 0.005 % ophthalmic solution Place 1 drop into the right eye At Bedtime        levothyroxine (SYNTHROID/LEVOTHROID) 88 MCG tablet Take 1 tablet (88 mcg) by mouth daily 90 tablet 1     metoprolol tartrate (LOPRESSOR) 25 MG tablet TAKE 1 TABLET(25 MG) BY MOUTH TWICE DAILY 180 tablet 0     Multiple Vitamins-Minerals (MULTIVITAMIN ADULT PO) Take 1 tablet by mouth daily       oxyCODONE (ROXICODONE) 5 MG tablet Take 1-2 tablets (5-10 mg) by mouth every 4 hours as needed for moderate to severe pain 20 tablet 0     potassium chloride ER (KLOR-CON M) 10 MEQ CR tablet TAKE 1 TABLET(10 MEQ) BY MOUTH DAILY 90 tablet 0     pravastatin (PRAVACHOL) 40 MG  tablet Take 1 tablet (40 mg) by mouth At Bedtime 90 tablet 3     senna-docusate (SENOKOT-S/PERICOLACE) 8.6-50 MG tablet Take 1-2 tablets by mouth 2 times daily Take while on oral narcotics to prevent or treat constipation. 30 tablet 0     traZODone (DESYREL) 50 MG tablet Take 1-2 tablets ( mg) by mouth nightly as needed for sleep 180 tablet 1       Soc Hx: reviewed  Fam Hx: reviewed          Rachel Wong MD  Internal Medicine

## 2022-07-08 DIAGNOSIS — E03.9 HYPOTHYROIDISM, UNSPECIFIED TYPE: ICD-10-CM

## 2022-07-08 RX ORDER — LEVOTHYROXINE SODIUM 88 UG/1
TABLET ORAL
Qty: 90 TABLET | Refills: 0 | Status: SHIPPED | OUTPATIENT
Start: 2022-07-08 | End: 2022-09-22

## 2022-07-08 NOTE — TELEPHONE ENCOUNTER
Medication is being filled for 1 time refill only due to:  Patient needs labs Due for labs in August.     Message place on prescription.

## 2022-07-18 ENCOUNTER — TRANSFERRED RECORDS (OUTPATIENT)
Dept: FAMILY MEDICINE | Facility: CLINIC | Age: 77
End: 2022-07-18

## 2022-07-21 ENCOUNTER — OFFICE VISIT (OUTPATIENT)
Dept: CARDIOLOGY | Facility: CLINIC | Age: 77
End: 2022-07-21
Payer: MEDICARE

## 2022-07-21 VITALS
BODY MASS INDEX: 26.7 KG/M2 | HEIGHT: 68 IN | WEIGHT: 176.2 LBS | HEART RATE: 88 BPM | DIASTOLIC BLOOD PRESSURE: 62 MMHG | SYSTOLIC BLOOD PRESSURE: 114 MMHG

## 2022-07-21 DIAGNOSIS — R60.9 FLUID RETENTION: ICD-10-CM

## 2022-07-21 DIAGNOSIS — I87.2 EDEMA OF LEFT LOWER EXTREMITY DUE TO PERIPHERAL VENOUS INSUFFICIENCY: ICD-10-CM

## 2022-07-21 PROCEDURE — 99214 OFFICE O/P EST MOD 30 MIN: CPT | Performed by: PHYSICIAN ASSISTANT

## 2022-07-21 NOTE — PROGRESS NOTES
CARDIOLOGY CLINIC PROGRESS NOTE    DOS: 2022      Teri Beltran  : 1945, 76 year old  MRN: [id number]      History:  I am meeting Teri Beltran today.  She is a patient of Dr. Anglin.     Teri Beltran is a pleasant 76 year old woman with history of first-degree AV block, right bundle branch block, paroxysmal SVT, GERD, hypertension, hyperlipidemia, chronic venous insufficiency (status post venous ablation), aortic sclerosis.      She saw Dr. Pina in 2022 and was supposed to have vena seal in April, but this got moved out to August.     In March, she started to have more leg swelling. Lasix increased from 20 mg daily to 20 mg BID.  Follow up labs were ok.     22 redo hip surgery.     22 PCP visit: she reported some chest pain. A stress echo was ordered and was scheduled 22 but she could not get there or have it done due to her recent hip surgery.  This is rescheduled for 22    She presents today to follow up on the edema.    She continues on Lasix 20 mg BID.  There is no edema today.   Her labs were ok.   We reviewed her chest pain. She has had a couple episodes of chest pain since her hip surgery.  These occur at rest, are left-sided above her breast.  The most significant episode definitely caused discomfort and lasted 5-10 minutes, then went away. No arm or jaw pain, no diaphoresis.    She also has some palpitations.           ROS:  Skin:  not assessed     Eyes:  not assessed    ENT:  not assessed    Respiratory:  Positive for dyspnea on exertion  Cardiovascular:    Positive for;palpitations;edema;chest pain;fatigue  Gastroenterology: not assessed    Genitourinary:  not assessed    Musculoskeletal:  not assessed    Neurologic:  not assessed    Psychiatric:  not assessed    Heme/Lymph/Imm:  not assessed    Endocrine:  not assessed      PAST MEDICAL HISTORY:  Past Medical History:   Diagnosis Date     First degree AV block 2015     Former smoker       Gastroesophageal reflux disease      GERD (gastroesophageal reflux disease)      Glaucoma      Heart block     2:1     Heart murmur      Hyperlipidemia LDL goal <130 06/14/2013     Hypertension goal BP (blood pressure) < 140/90 12/03/2013     Hypothyroidism      Left atrial dilatation     mild     Mild dilation of ascending aorta - borderline 08/11/2015     Palpitations      Prediabetes 06/14/2013     RBBB 08/11/2015     S/P total knee arthroplasty 07/23/2019     Tachycardia      Urinary frequency      Varicose veins      Venous insufficiency     s/p bilateral great saphenous vein radiofrequency ablation by Dr. Garcia       PAST SURGICAL HISTORY:  Past Surgical History:   Procedure Laterality Date     ARTHROPLASTY HIP Right 11/20/2020    Procedure: Right total hip arthroplasty using a Biomet Taperloc femoral stem and G7 acetabulum.;  Surgeon: Dragan Muse MD;  Location: [facility]     ARTHROPLASTY KNEE Right 07/23/2019    Procedure: Right total knee arthroplasty using an Arthrex Jusplance knee system;  Surgeon: Dragan Muse MD;  Location: [facility] OR     BREAST SURGERY      right breast ductal surgery due to milk production     BREAST SURGERY Right     ductal surgery due to milk production     CATARACT EXTRACTION Right 05/2022     COLONOSCOPY N/A 10/24/2014    no further screening. Procedure: COLONOSCOPY;  Surgeon: Pedro Rudd MD;  Location: [facility]     DECOMPRESSION HIP CORE Right 6/6/2022    Procedure: Open right hip abductor repair;  Surgeon: Dragan Muse MD;  Location: [facility]     EYE SURGERY       FINGER GANGLION CYST EXCISION Left     ring finger     HYSTERECTOMY       HYSTERECTOMY, PAP NO LONGER INDICATED  2009    total hysterectomy and ovaries. benign     SOFT TISSUE SURGERY      ganglion removed from left wrist and ring finger     SURGICAL HISTORY OF -   age 7    tonsillectomy     SURGICAL HISTORY OF -   01/2015    left eye cataract     SURGICAL HISTORY OF -   Fall 2016     LINQ placed.     TONSILLECTOMY       WRIST GANGLION EXCISION Left        SOCIAL HISTORY:  Social History     Socioeconomic History     Marital status:      Spouse name: None     Number of children: None     Years of education: None     Highest education level: None   Tobacco Use     Smoking status: Former Smoker     Packs/day: 0.50     Years: 18.00     Pack years: 9.00     Types: Cigarettes     Quit date:      Years since quittin.5     Smokeless tobacco: Never Used   Vaping Use     Vaping Use: Never used   Substance and Sexual Activity     Alcohol use: Not Currently     Drug use: No     Sexual activity: Yes     Partners: Male   Other Topics Concern     Caffeine Concern No     Comment: 4-5 cups of coffee daily     Special Diet No     Comment: no added salt     Exercise No     Comment: 3 days per week - exercise class        FAMILY HISTORY:  Family History   Problem Relation Age of Onset     Ovarian Cancer Mother      Cancer Mother         ovarian     Breast Cancer Mother      Heart Disease Father         rare; not sure what it was     Diabetes Father         old age     Cerebrovascular Disease Brother 68       MEDS: acetaminophen (TYLENOL) 325 MG tablet, Take 2 tablets (650 mg) by mouth every 4 hours as needed for other (mild pain)  dorzolamide-timolol (COSOPT) 2-0.5 % ophthalmic solution, Place 1 drop into both eyes 2 times daily  famotidine (PEPCID) 10 MG tablet, Take 1 tablet (10 mg) by mouth 2 times daily  furosemide (LASIX) 20 MG tablet, Take 1 tablet (20 mg) by mouth 2 times daily  gabapentin (NEURONTIN) 300 MG capsule, Take 1 capsule (300 mg) by mouth 3 times daily  latanoprost (XALATAN) 0.005 % ophthalmic solution, Place 1 drop into the right eye At Bedtime   levothyroxine (SYNTHROID/LEVOTHROID) 88 MCG tablet, TAKE 1 TABLET(88 MCG) BY MOUTH DAILY  metoprolol tartrate (LOPRESSOR) 25 MG tablet, TAKE 1 TABLET(25 MG) BY MOUTH TWICE DAILY  Multiple Vitamins-Minerals (MULTIVITAMIN ADULT PO), Take 1  "tablet by mouth daily  potassium chloride ER (KLOR-CON M) 10 MEQ CR tablet, TAKE 1 TABLET(10 MEQ) BY MOUTH DAILY  pravastatin (PRAVACHOL) 40 MG tablet, Take 1 tablet (40 mg) by mouth At Bedtime  traZODone (DESYREL) 50 MG tablet, Take 1-2 tablets ( mg) by mouth nightly as needed for sleep  oxyCODONE (ROXICODONE) 5 MG tablet, Take 1-2 tablets (5-10 mg) by mouth every 4 hours as needed for moderate to severe pain (Patient not taking: Reported on 7/21/2022)  senna-docusate (SENOKOT-S/PERICOLACE) 8.6-50 MG tablet, Take 1-2 tablets by mouth 2 times daily Take while on oral narcotics to prevent or treat constipation. (Patient not taking: Reported on 7/21/2022)    No current facility-administered medications on file prior to visit.      ALLERGIES:   Allergies   Allergen Reactions     Seasonal Allergies      Simvastatin      \"flushed\"       PHYSICAL EXAM:  Vitals: /62   Pulse 88   Ht 1.727 m (5' 8\")   Wt 79.9 kg (176 lb 3.2 oz)   LMP  (LMP Unknown)   BMI 26.79 kg/m    Constitutional:  cooperative, alert and oriented, well developed, well nourished, in no acute distress        Skin:  warm and dry to the touch venous stasis changes      Head:  normocephalic, no masses or lesions        Eyes:  pupils equal and round;conjunctivae and lids unremarkable;sclera white        ENT:  no pallor or cyanosis        Neck:  JVP normal        Respiratory:  normal symmetry;clear to auscultation   scant left basal fine rales    Cardiac: regular rhythm;normal S1 and S2       early systolic murmur;RUSB;grade 1          GI:  abdomen soft;BS normoactive        Vascular:   pulses below the femoral arteries are diminished                                    Extremities and Musculoskeletal:           Neurological:  no gross motor deficits;affect appropriate            LABS/DATA:  I reviewed the following:  Component      Latest Ref Rng & Units 6/7/2022           8:24 AM   Sodium      133 - 144 mmol/L 135   Potassium      3.4 - 5.3 " mmol/L 4.0   Chloride      94 - 109 mmol/L 106   Carbon Dioxide      20 - 32 mmol/L 28   Anion Gap      3 - 14 mmol/L 1 (L)   Glucose      70 - 99 mg/dL 117 (H)   Urea Nitrogen      7 - 30 mg/dL 25   Creatinine      0.52 - 1.04 mg/dL 0.70   GFR Estimate      >60 mL/min/1.73m2 89   Calcium      8.5 - 10.1 mg/dL 8.8         ASSESSMENT/PLAN:  # Dizziness, largely resolved after hydrochlorothiazide was discontinued by Dr. Wong    # Conduction abnormalities, known first-degree AV block, right bundle branch block, paroxysmal SVT, followed by my colleague, Dr. Villareal.     # Hypertension.  Well controlled.     # Chronic lower extremity swelling due to venous insufficiency, history of bilateral great saphenous vein radiofrequency ablation.   - She increased Lasix to 20 mg BID with improvement.   - She is scheduled for VenaSeal ablation of left greater saphenous vein with Dr. Pina in August.     # Mild aortic dilatation noted on chart review; however size was normal on TTE 03/27/2018.      # Chest discomfort  - Scheduled for echo stress test next week per PCP      -Continue current cardiac regimen of Lasix 20 mg BID, potassium supplementation 10 mEq daily, metoprolol tartrate 25 mg twice daily, pravastatin 40 mg nightly  - Stress echo next week  - Dr. Pina venous ablation 8/9/22  -Follow-up Dr. Anglin in Nov 2022 with fasting lipids                 Basilio Green PA-C

## 2022-07-21 NOTE — PATIENT INSTRUCTIONS
Regarding the lower extremity swelling - stay on Lasix 20 mg twice daily.  The labs looked good and your swelling looks better.   Proceed with the vein procedure in August by Dr. Pina.     Regarding the chest pain, I agree with Dr. Wong that you should have a stress test.

## 2022-07-21 NOTE — LETTER
2022    Rachel Miller MD  [address] [facility] [address]    RE: Teri Beltran       Dear Colleague,     I had the pleasure of seeing Teri Beltran in the [facility].      CARDIOLOGY CLINIC PROGRESS NOTE    DOS: 2022      Teri Beltran  : 1945, 76 year old  MRN: [id number]      History:  I am meeting Teri Beltran today.  She is a patient of Dr. Anglin.     Teri Beltran is a pleasant 76 year old woman with history of first-degree AV block, right bundle branch block, paroxysmal SVT, GERD, hypertension, hyperlipidemia, chronic venous insufficiency (status post venous ablation), aortic sclerosis.      She saw Dr. Pina in 2022 and was supposed to have vena seal in April, but this got moved out to August.     In March, she started to have more leg swelling. Lasix increased from 20 mg daily to 20 mg BID.  Follow up labs were ok.     22 redo hip surgery.     22 PCP visit: she reported some chest pain. A stress echo was ordered and was scheduled 22 but she could not get there or have it done due to her recent hip surgery.  This is rescheduled for 22    She presents today to follow up on the edema.    She continues on Lasix 20 mg BID.  There is no edema today.   Her labs were ok.   We reviewed her chest pain. She has had a couple episodes of chest pain since her hip surgery.  These occur at rest, are left-sided above her breast.  The most significant episode definitely caused discomfort and lasted 5-10 minutes, then went away. No arm or jaw pain, no diaphoresis.    She also has some palpitations.           ROS:  Skin:  not assessed     Eyes:  not assessed    ENT:  not assessed    Respiratory:  Positive for dyspnea on exertion  Cardiovascular:    Positive for;palpitations;edema;chest pain;fatigue  Gastroenterology: not assessed    Genitourinary:  not assessed    Musculoskeletal:  not assessed    Neurologic:  not  assessed    Psychiatric:  not assessed    Heme/Lymph/Imm:  not assessed    Endocrine:  not assessed      PAST MEDICAL HISTORY:  Past Medical History:   Diagnosis Date     First degree AV block 08/11/2015     Former smoker      Gastroesophageal reflux disease      GERD (gastroesophageal reflux disease)      Glaucoma      Heart block     2:1     Heart murmur      Hyperlipidemia LDL goal <130 06/14/2013     Hypertension goal BP (blood pressure) < 140/90 12/03/2013     Hypothyroidism      Left atrial dilatation     mild     Mild dilation of ascending aorta - borderline 08/11/2015     Palpitations      Prediabetes 06/14/2013     RBBB 08/11/2015     S/P total knee arthroplasty 07/23/2019     Tachycardia      Urinary frequency      Varicose veins      Venous insufficiency     s/p bilateral great saphenous vein radiofrequency ablation by Dr. Garcia       PAST SURGICAL HISTORY:  Past Surgical History:   Procedure Laterality Date     ARTHROPLASTY HIP Right 11/20/2020    Procedure: Right total hip arthroplasty using a Biomet Taperloc femoral stem and G7 acetabulum.;  Surgeon: Dragan Muse MD;  Location: [facility]     ARTHROPLASTY KNEE Right 07/23/2019    Procedure: Right total knee arthroplasty using an Arthrex Applicasalance knee system;  Surgeon: Dragan Muse MD;  Location: [facility] OR     BREAST SURGERY      right breast ductal surgery due to milk production     BREAST SURGERY Right     ductal surgery due to milk production     CATARACT EXTRACTION Right 05/2022     COLONOSCOPY N/A 10/24/2014    no further screening. Procedure: COLONOSCOPY;  Surgeon: Pedro Rudd MD;  Location:  GI     DECOMPRESSION HIP CORE Right 6/6/2022    Procedure: Open right hip abductor repair;  Surgeon: Dragan Muse MD;  Location: [facility] OR     EYE SURGERY       FINGER GANGLION CYST EXCISION Left     ring finger     HYSTERECTOMY       HYSTERECTOMY, PAP NO LONGER INDICATED  2009    total hysterectomy and ovaries. benign      SOFT TISSUE SURGERY      ganglion removed from left wrist and ring finger     SURGICAL HISTORY OF -   age 7    tonsillectomy     SURGICAL HISTORY OF -   2015    left eye cataract     SURGICAL HISTORY OF -   2016    LINQ placed.     TONSILLECTOMY       WRIST GANGLION EXCISION Left        SOCIAL HISTORY:  Social History     Socioeconomic History     Marital status:      Spouse name: None     Number of children: None     Years of education: None     Highest education level: None   Tobacco Use     Smoking status: Former Smoker     Packs/day: 0.50     Years: 18.00     Pack years: 9.00     Types: Cigarettes     Quit date:      Years since quittin.5     Smokeless tobacco: Never Used   Vaping Use     Vaping Use: Never used   Substance and Sexual Activity     Alcohol use: Not Currently     Drug use: No     Sexual activity: Yes     Partners: Male   Other Topics Concern     Caffeine Concern No     Comment: 4-5 cups of coffee daily     Special Diet No     Comment: no added salt     Exercise No     Comment: 3 days per week - exercise class        FAMILY HISTORY:  Family History   Problem Relation Age of Onset     Ovarian Cancer Mother      Cancer Mother         ovarian     Breast Cancer Mother      Heart Disease Father         rare; not sure what it was     Diabetes Father         old age     Cerebrovascular Disease Brother 68       MEDS: acetaminophen (TYLENOL) 325 MG tablet, Take 2 tablets (650 mg) by mouth every 4 hours as needed for other (mild pain)  dorzolamide-timolol (COSOPT) 2-0.5 % ophthalmic solution, Place 1 drop into both eyes 2 times daily  famotidine (PEPCID) 10 MG tablet, Take 1 tablet (10 mg) by mouth 2 times daily  furosemide (LASIX) 20 MG tablet, Take 1 tablet (20 mg) by mouth 2 times daily  gabapentin (NEURONTIN) 300 MG capsule, Take 1 capsule (300 mg) by mouth 3 times daily  latanoprost (XALATAN) 0.005 % ophthalmic solution, Place 1 drop into the right eye At Bedtime  "  levothyroxine (SYNTHROID/LEVOTHROID) 88 MCG tablet, TAKE 1 TABLET(88 MCG) BY MOUTH DAILY  metoprolol tartrate (LOPRESSOR) 25 MG tablet, TAKE 1 TABLET(25 MG) BY MOUTH TWICE DAILY  Multiple Vitamins-Minerals (MULTIVITAMIN ADULT PO), Take 1 tablet by mouth daily  potassium chloride ER (KLOR-CON M) 10 MEQ CR tablet, TAKE 1 TABLET(10 MEQ) BY MOUTH DAILY  pravastatin (PRAVACHOL) 40 MG tablet, Take 1 tablet (40 mg) by mouth At Bedtime  traZODone (DESYREL) 50 MG tablet, Take 1-2 tablets ( mg) by mouth nightly as needed for sleep  oxyCODONE (ROXICODONE) 5 MG tablet, Take 1-2 tablets (5-10 mg) by mouth every 4 hours as needed for moderate to severe pain (Patient not taking: Reported on 7/21/2022)  senna-docusate (SENOKOT-S/PERICOLACE) 8.6-50 MG tablet, Take 1-2 tablets by mouth 2 times daily Take while on oral narcotics to prevent or treat constipation. (Patient not taking: Reported on 7/21/2022)    No current facility-administered medications on file prior to visit.      ALLERGIES:   Allergies   Allergen Reactions     Seasonal Allergies      Simvastatin      \"flushed\"       PHYSICAL EXAM:  Vitals: /62   Pulse 88   Ht 1.727 m (5' 8\")   Wt 79.9 kg (176 lb 3.2 oz)   LMP  (LMP Unknown)   BMI 26.79 kg/m    Constitutional:  cooperative, alert and oriented, well developed, well nourished, in no acute distress        Skin:  warm and dry to the touch venous stasis changes      Head:  normocephalic, no masses or lesions        Eyes:  pupils equal and round;conjunctivae and lids unremarkable;sclera white        ENT:  no pallor or cyanosis        Neck:  JVP normal        Respiratory:  normal symmetry;clear to auscultation   scant left basal fine rales    Cardiac: regular rhythm;normal S1 and S2       early systolic murmur;RUSB;grade 1          GI:  abdomen soft;BS normoactive        Vascular:   pulses below the femoral arteries are diminished                                    Extremities and Musculoskeletal:       "     Neurological:  no gross motor deficits;affect appropriate            LABS/DATA:  I reviewed the following:  Component      Latest Ref Rng & Units 6/7/2022           8:24 AM   Sodium      133 - 144 mmol/L 135   Potassium      3.4 - 5.3 mmol/L 4.0   Chloride      94 - 109 mmol/L 106   Carbon Dioxide      20 - 32 mmol/L 28   Anion Gap      3 - 14 mmol/L 1 (L)   Glucose      70 - 99 mg/dL 117 (H)   Urea Nitrogen      7 - 30 mg/dL 25   Creatinine      0.52 - 1.04 mg/dL 0.70   GFR Estimate      >60 mL/min/1.73m2 89   Calcium      8.5 - 10.1 mg/dL 8.8         ASSESSMENT/PLAN:  # Dizziness, largely resolved after hydrochlorothiazide was discontinued by Dr. Wong    # Conduction abnormalities, known first-degree AV block, right bundle branch block, paroxysmal SVT, followed by my colleague, Dr. Villareal.     # Hypertension.  Well controlled.     # Chronic lower extremity swelling due to venous insufficiency, history of bilateral great saphenous vein radiofrequency ablation.   - She increased Lasix to 20 mg BID with improvement.   - She is scheduled for VenaSeal ablation of left greater saphenous vein with Dr. Pina in August.     # Mild aortic dilatation noted on chart review; however size was normal on TTE 03/27/2018.      # Chest discomfort  - Scheduled for echo stress test next week per PCP      -Continue current cardiac regimen of Lasix 20 mg BID, potassium supplementation 10 mEq daily, metoprolol tartrate 25 mg twice daily, pravastatin 40 mg nightly  - Stress echo next week  - Dr. Pina venous ablation 8/9/22  -Follow-up Dr. Anglin in Nov 2022 with fasting lipids             Basilio Green PA-C      Thank you for allowing me to participate in the care of your patient.      Sincerely,     Basilio Green PA-C     [facility]  cc:   Quincy Anglin MD  [address], [address]  [place]  [place] [address]

## 2022-07-26 ENCOUNTER — HOSPITAL ENCOUNTER (OUTPATIENT)
Dept: CARDIOLOGY | Facility: CLINIC | Age: 77
Discharge: HOME | End: 2022-07-26
Attending: INTERNAL MEDICINE | Admitting: INTERNAL MEDICINE
Payer: MEDICARE

## 2022-07-26 VITALS — DIASTOLIC BLOOD PRESSURE: 70 MMHG | SYSTOLIC BLOOD PRESSURE: 133 MMHG | HEART RATE: 100 BPM

## 2022-07-26 DIAGNOSIS — R07.9 CHEST PAIN, UNSPECIFIED TYPE: ICD-10-CM

## 2022-07-26 PROCEDURE — 999N000208 ECHO STRESS ECHOCARDIOGRAM

## 2022-07-26 PROCEDURE — 250N000011 HC RX IP 250 OP 636

## 2022-07-26 PROCEDURE — 93016 CV STRESS TEST SUPVJ ONLY: CPT | Performed by: INTERNAL MEDICINE

## 2022-07-26 PROCEDURE — 250N000009 HC RX 250: Performed by: PHYSICIAN ASSISTANT

## 2022-07-26 PROCEDURE — 93350 STRESS TTE ONLY: CPT | Mod: 26 | Performed by: INTERNAL MEDICINE

## 2022-07-26 PROCEDURE — 255N000002 HC RX 255 OP 636: Performed by: INTERNAL MEDICINE

## 2022-07-26 PROCEDURE — 93321 DOPPLER ECHO F-UP/LMTD STD: CPT | Mod: 26 | Performed by: INTERNAL MEDICINE

## 2022-07-26 PROCEDURE — 93018 CV STRESS TEST I&R ONLY: CPT | Performed by: INTERNAL MEDICINE

## 2022-07-26 PROCEDURE — 93325 DOPPLER ECHO COLOR FLOW MAPG: CPT | Mod: 26 | Performed by: INTERNAL MEDICINE

## 2022-07-26 RX ORDER — DOBUTAMINE HYDROCHLORIDE 200 MG/100ML
INJECTION INTRAVENOUS
Status: COMPLETED
Start: 2022-07-26 | End: 2022-07-26

## 2022-07-26 RX ORDER — SODIUM CHLORIDE 9 MG/ML
INJECTION, SOLUTION INTRAVENOUS CONTINUOUS
Status: ACTIVE | OUTPATIENT
Start: 2022-07-26 | End: 2022-07-26

## 2022-07-26 RX ORDER — DOBUTAMINE HYDROCHLORIDE 200 MG/100ML
10-50 INJECTION INTRAVENOUS CONTINUOUS
Status: ACTIVE | OUTPATIENT
Start: 2022-07-26 | End: 2022-07-26

## 2022-07-26 RX ORDER — ATROPINE SULFATE 0.1 MG/ML
.2-2 INJECTION INTRAVENOUS
Status: ACTIVE | OUTPATIENT
Start: 2022-07-26 | End: 2022-07-26

## 2022-07-26 RX ORDER — METOPROLOL TARTRATE 1 MG/ML
1-20 INJECTION, SOLUTION INTRAVENOUS
Status: ACTIVE | OUTPATIENT
Start: 2022-07-26 | End: 2022-07-26

## 2022-07-26 RX ADMIN — METOPROLOL TARTRATE 3 MG: 5 INJECTION INTRAVENOUS at 08:47

## 2022-07-26 RX ADMIN — HUMAN ALBUMIN MICROSPHERES AND PERFLUTREN 3 ML: 10; .22 INJECTION, SOLUTION INTRAVENOUS at 08:31

## 2022-07-26 RX ADMIN — DOBUTAMINE HYDROCHLORIDE 10 MCG/KG/MIN: 200 INJECTION INTRAVENOUS at 08:33

## 2022-08-04 ENCOUNTER — TELEPHONE (OUTPATIENT)
Dept: CARDIOLOGY | Facility: CLINIC | Age: 77
End: 2022-08-04

## 2022-08-04 NOTE — TELEPHONE ENCOUNTER
Called patient to review Pre- Ablation orders:    Patient will increase fluid the day before the procedure.  Patient will hold diuretic until after the ablation.  Patient will not wear compression stockings the day before or the day of the ablation.  Valium was explained to patient and ordered to pharmacy of choice. Patient did not need valium has at home from previously scheduled procedure  Patient aware to bring Valium to clinic.  Patient will have a  to bring them home.  Follow-up ultrasound for Wednesday scheduled.  Follow-up OV for 1 month scheduled.     Patient has no questions.     COVID TEST SCHEDULED 8/5/22-PENDING

## 2022-08-05 ENCOUNTER — LAB (OUTPATIENT)
Dept: LAB | Facility: CLINIC | Age: 77
End: 2022-08-05
Payer: MEDICARE

## 2022-08-05 DIAGNOSIS — Z20.822 ENCOUNTER FOR LABORATORY TESTING FOR COVID-19 VIRUS: ICD-10-CM

## 2022-08-05 PROCEDURE — U0003 INFECTIOUS AGENT DETECTION BY NUCLEIC ACID (DNA OR RNA); SEVERE ACUTE RESPIRATORY SYNDROME CORONAVIRUS 2 (SARS-COV-2) (CORONAVIRUS DISEASE [COVID-19]), AMPLIFIED PROBE TECHNIQUE, MAKING USE OF HIGH THROUGHPUT TECHNOLOGIES AS DESCRIBED BY CMS-2020-01-R: HCPCS

## 2022-08-05 PROCEDURE — U0005 INFEC AGEN DETEC AMPLI PROBE: HCPCS

## 2022-08-06 LAB — SARS-COV-2 RNA RESP QL NAA+PROBE: NEGATIVE

## 2022-08-08 ENCOUNTER — ANCILLARY PROCEDURE (OUTPATIENT)
Dept: VASCULAR ULTRASOUND | Facility: CLINIC | Age: 77
End: 2022-08-08
Attending: INTERNAL MEDICINE
Payer: MEDICARE

## 2022-08-08 VITALS — HEART RATE: 69 BPM | SYSTOLIC BLOOD PRESSURE: 127 MMHG | OXYGEN SATURATION: 95 % | DIASTOLIC BLOOD PRESSURE: 80 MMHG

## 2022-08-08 DIAGNOSIS — I87.322 CHRONIC VENOUS HYPERTENSION (IDIOPATHIC) WITH INFLAMMATION OF LEFT LOWER EXTREMITY: ICD-10-CM

## 2022-08-08 DIAGNOSIS — I87.2 VENOUS INSUFFICIENCY: ICD-10-CM

## 2022-08-08 DIAGNOSIS — I87.2 EDEMA OF LEFT LOWER EXTREMITY DUE TO PERIPHERAL VENOUS INSUFFICIENCY: ICD-10-CM

## 2022-08-08 PROCEDURE — 36482 ENDOVEN THER CHEM ADHES 1ST: CPT | Mod: LT | Performed by: INTERNAL MEDICINE

## 2022-08-08 NOTE — PROGRESS NOTES
Patient tolerated procedure. Post-procedure instructions reviewed with patient and patient verbalized understanding. Follow up appointments confirmed with patient. Team 4 nursing phone provided, patient to call with any questions or concerns.

## 2022-08-10 ENCOUNTER — TELEPHONE (OUTPATIENT)
Dept: CARDIOLOGY | Facility: CLINIC | Age: 77
End: 2022-08-10

## 2022-08-10 NOTE — TELEPHONE ENCOUNTER
VM received from patient, calling to clarify the dose of Allegra that she is supposed to take. Spoke with patient. Reviewed that patient should be taking 180 mg BID for two weeks. Patient verbalized understanding and agreed with plan of care.

## 2022-08-11 ENCOUNTER — ANCILLARY PROCEDURE (OUTPATIENT)
Dept: VASCULAR ULTRASOUND | Facility: CLINIC | Age: 77
End: 2022-08-11
Attending: INTERNAL MEDICINE
Payer: MEDICARE

## 2022-08-11 DIAGNOSIS — I87.2 EDEMA OF LEFT LOWER EXTREMITY DUE TO PERIPHERAL VENOUS INSUFFICIENCY: ICD-10-CM

## 2022-08-11 DIAGNOSIS — I87.322 CHRONIC VENOUS HYPERTENSION (IDIOPATHIC) WITH INFLAMMATION OF LEFT LOWER EXTREMITY: ICD-10-CM

## 2022-08-11 DIAGNOSIS — I87.2 VENOUS INSUFFICIENCY: ICD-10-CM

## 2022-08-11 PROCEDURE — 93971 EXTREMITY STUDY: CPT | Mod: LT | Performed by: INTERNAL MEDICINE

## 2022-08-29 ENCOUNTER — TRANSFERRED RECORDS (OUTPATIENT)
Dept: HEALTH INFORMATION MANAGEMENT | Facility: CLINIC | Age: 77
End: 2022-08-29

## 2022-09-06 DIAGNOSIS — R00.2 PALPITATIONS: ICD-10-CM

## 2022-09-06 DIAGNOSIS — I10 HYPERTENSION GOAL BP (BLOOD PRESSURE) < 140/90: ICD-10-CM

## 2022-09-06 RX ORDER — METOPROLOL TARTRATE 25 MG/1
TABLET, FILM COATED ORAL
Qty: 180 TABLET | Refills: 1 | Status: SHIPPED | OUTPATIENT
Start: 2022-09-06 | End: 2023-01-17

## 2022-09-12 NOTE — PATIENT INSTRUCTIONS
Thank you for your visit with the [facility] today.    Today's plan:   Continue with your current medications without changes.   Follow up for an annual visit with Dr. Anglin in November with labs before as previously planned.    If you have questions or concerns, please do not hesitate to call my nurse team at [phone].     Scheduling phone number: [phone]    It was a pleasure seeing you today!     RUBY Loera, CNP  Nurse Practitioner  [facility]  September 14, 2022  ________________________________________________________

## 2022-09-12 NOTE — PROGRESS NOTES
"  Cardiology Clinic Progress Note    Service Date: September 14, 2022    Primary Cardiologist: Dr. Anglin       Reason for visit: Follow up post vein ablation    HPI:   I had the pleasure of seeing Ms. Williamson \"Ray\" Devin in the clinic today. She is a very pleasant 76 year old female with a past medical history notable for first-degree AV block, RBBB, paroxysmal SVT, GERD, hypertension, hyperlipidemia, chronic venous insufficiency, s/p bilateral great saphenous vein radiofrequency ablation, and known aortic sclerosis. The patient has had issues with ankle swelling and varicose veins dating back several years which was not responsive to medical therapy.  The patient was evaluated by Dr. Pina in the clinic in January of this year due to recurrence of her left lower extremity edema and heaviness that likely corresponds to the severe 6.9 seconds of reflux in the calf portion of the remnant greater saphenous vein on venous competency testing. Dr. Pina completed VenaSeal ablation of the left greater saphenous vein on 8/8/22 with mid calf access. Duplex venous ultrasound was completed in follow up on 8/11/22 showing GSV occluded distal thigh to mid calf and no evidence of DVT noted.    Today, Ms. Beltran presents to the clinic in follow up of her recent venous procedure. She tells me that she has been feeling well since. She has noticed improvement in her leg swelling. She has not had pain and feels the puncture sites healed nicely. She had some non-exertional chest discomfort intermittently this summer and met with Dr. Miller. She was sent for a dobutamine stress echocardiogram which showed no evidence of ischemia. EF was normal ejection at 55-60% on the baseline echo. She has been slowed down by musculoskeletal issues with a right hip surgery early in the summer, but has still been getting around well with a walker. She remains quietly active doing things like grocery shopping independently. She has been tolerating " this well without exertional symptoms.    ASSESSMENT AND PLAN:  1. Venous insufficiency, chronic lower extremity edema  - History of bilateral great saphenous vein radiofrequency ablation previously, and now s/p left greater saphenous vein VenaSeal ablation of calf remnant from mid-calf to lower thigh (mid-calf access) by Dr. Pina on 8/8/22.     2. Known first-degree AV block, right bundle branch block, paroxysmal SVT  - Followed by Dr. Villareal with electrophysiology. On metoprolol tartrate 25 mg BID.  We discussed the option of increasing the dose of her metoprolol to 50 mg BID both for further blood pressure control and to help suppress her control episodes of PSVT. She was opposed to this idea as she was previously on a higher dose of metoprolol in the past and felt that this lowered her blood pressure too significantly.  - Reviewed that if she begins to have issues with more frequent or prolonged episodes of palpitations to call the clinic and we could consider repeat event monitoring if needed.     3. Hypertension  - Reasonably well-controlled on metoprolol tartrate 25 mg twice daily, revisited by milligrams daily, and Lasix 20 mg daily with potassium 10 mEq twice daily.    4. Aortic sclerosis without significant aortic insufficiency or stenosis  - This is the cause of her cardiac murmur on physical exam.     Thank you for the opportunity to participate in this pleasant patient's care. She is already scheduled to see Dr. Anglin for routine annual visit in November of this year with a repeat fasting lipid panel and ALT beforehand so we will keep these appointments as planned. We would be happy to see her sooner if needed for any concerns in the meantime.    25 total minutes was spent today including chart review, precharting, history and exam, post visit documentation, and reviewing studies as outlined above.     RUBY Loera, CNP   Nurse Practitioner  [facility]  Pager: [phone]  Text Page   (8am - 5pm, M-F)    Orders this Visit:  No orders of the defined types were placed in this encounter.    No orders of the defined types were placed in this encounter.    There are no discontinued medications.  No diagnosis found.  CURRENT MEDICATIONS:  Current Outpatient Medications   Medication Sig Dispense Refill     acetaminophen (TYLENOL) 325 MG tablet Take 2 tablets (650 mg) by mouth every 4 hours as needed for other (mild pain) 100 tablet 0     dorzolamide-timolol (COSOPT) 2-0.5 % ophthalmic solution Place 1 drop into both eyes 2 times daily       famotidine (PEPCID) 10 MG tablet Take 1 tablet (10 mg) by mouth 2 times daily 180 tablet 1     furosemide (LASIX) 20 MG tablet Take 1 tablet (20 mg) by mouth 2 times daily 180 tablet 0     gabapentin (NEURONTIN) 300 MG capsule Take 1 capsule (300 mg) by mouth 3 times daily 270 capsule 1     latanoprost (XALATAN) 0.005 % ophthalmic solution Place 1 drop into the right eye At Bedtime        levothyroxine (SYNTHROID/LEVOTHROID) 88 MCG tablet TAKE 1 TABLET(88 MCG) BY MOUTH DAILY 90 tablet 0     metoprolol tartrate (LOPRESSOR) 25 MG tablet TAKE 1 TABLET(25 MG) BY MOUTH TWICE DAILY 180 tablet 1     Multiple Vitamins-Minerals (MULTIVITAMIN ADULT PO) Take 1 tablet by mouth daily       oxyCODONE (ROXICODONE) 5 MG tablet Take 1-2 tablets (5-10 mg) by mouth every 4 hours as needed for moderate to severe pain (Patient not taking: Reported on 7/21/2022) 20 tablet 0     potassium chloride ER (KLOR-CON M) 10 MEQ CR tablet TAKE 1 TABLET(10 MEQ) BY MOUTH DAILY 90 tablet 0     pravastatin (PRAVACHOL) 40 MG tablet TAKE 1 TABLET(40 MG) BY MOUTH AT BEDTIME 90 tablet 3     senna-docusate (SENOKOT-S/PERICOLACE) 8.6-50 MG tablet Take 1-2 tablets by mouth 2 times daily Take while on oral narcotics to prevent or treat constipation. (Patient not taking: Reported on 7/21/2022) 30 tablet 0     traZODone (DESYREL) 50 MG tablet Take 1-2 tablets ( mg) by mouth nightly as needed for sleep 180  "tablet 1     ALLERGIES  Allergies   Allergen Reactions     Seasonal Allergies      Simvastatin      \"flushed\"     PAST MEDICAL, SURGICAL, FAMILY HISTORY:  History was reviewed and updated as needed, see medical record.    SOCIAL HISTORY:  Social History     Socioeconomic History     Marital status:      Spouse name: Not on file     Number of children: Not on file     Years of education: Not on file     Highest education level: Not on file   Occupational History     Not on file   Tobacco Use     Smoking status: Former Smoker     Packs/day: 0.50     Years: 18.00     Pack years: 9.00     Types: Cigarettes     Quit date:      Years since quittin.7     Smokeless tobacco: Never Used   Vaping Use     Vaping Use: Never used   Substance and Sexual Activity     Alcohol use: Not Currently     Drug use: No     Sexual activity: Yes     Partners: Male   Other Topics Concern     Parent/sibling w/ CABG, MI or angioplasty before 65F 55M? Not Asked      Service Not Asked     Blood Transfusions Not Asked     Caffeine Concern No     Comment: 4-5 cups of coffee daily     Occupational Exposure Not Asked     Hobby Hazards Not Asked     Sleep Concern Not Asked     Stress Concern Not Asked     Weight Concern Not Asked     Special Diet No     Comment: no added salt     Back Care Not Asked     Exercise No     Comment: 3 days per week - exercise class      Bike Helmet Not Asked     Seat Belt Not Asked     Self-Exams Not Asked   Social History Narrative     Not on file     Social Determinants of Health     Financial Resource Strain: Not on file   Food Insecurity: Not on file   Transportation Needs: Not on file   Physical Activity: Not on file   Stress: Not on file   Social Connections: Not on file   Intimate Partner Violence: Not on file   Housing Stability: Not on file     Review of Systems:  Skin:        Eyes:       ENT:       Respiratory:       Cardiovascular:       Gastroenterology:      Genitourinary:     "   Musculoskeletal:       Neurologic:       Psychiatric:       Heme/Lymph/Imm:       Endocrine:          Physical Exam:  Vitals: LMP  (LMP Unknown)    Wt Readings from Last 4 Encounters:   07/21/22 79.9 kg (176 lb 3.2 oz)   06/23/22 80.8 kg (178 lb 3.2 oz)   06/06/22 80.7 kg (178 lb)   05/26/22 80.9 kg (178 lb 6.4 oz)     CONSTITUTIONAL: Appears her stated age, well nourished, and in no acute distress.  HEENT: Pupils equal, round. Sclerae nonicteric.    NECK: Supple, no masses appreciated or JVD appreciated.  C/V:  Regular rate and rhythm, normal S1 and S2, no S3 or S4, no murmur, rub or gallop.   RESP: Respirations are unlabored. Lungs are clear to auscultation bilaterally without wheezing, rales, or rhonchi.  EXTREM: Trace left lower extremity edema, no right lower extremity edema. No clubbing or cyanosis.  NEURO: Alert and oriented, cooperative. Gait steady. No gross focal deficits.   PSYCH: Affect appropriate, mildly anxious. Mentation normal. Responds to questions appropriately.  SKIN: Warm and dry.     Recent Lab Results:  LIPID RESULTS:  Lab Results   Component Value Date    CHOL 162 08/23/2021    CHOL 178 08/28/2020    HDL 82 08/23/2021    HDL 92 08/28/2020    LDL 49 08/23/2021    LDL 65 08/28/2020    TRIG 156 (H) 08/23/2021    TRIG 106 08/28/2020    CHOLHDLRATIO 2.2 08/11/2015     LIVER ENZYME RESULTS:  Lab Results   Component Value Date    AST 19 08/23/2021    AST 15 08/28/2020    ALT 26 08/23/2021    ALT 19 08/28/2020     CBC RESULTS:  Lab Results   Component Value Date    WBC 9.4 06/07/2022    WBC 6.4 12/17/2020    RBC 4.13 06/07/2022    RBC 2.91 (L) 12/17/2020    HGB 11.3 (L) 06/07/2022    HGB 9.2 (L) 12/18/2020    HCT 37.2 06/07/2022    HCT 27.5 (L) 12/17/2020    MCV 90 06/07/2022    MCV 95 12/17/2020    MCH 27.4 06/07/2022    MCH 29.6 12/17/2020    MCHC 30.4 (L) 06/07/2022    MCHC 31.3 (L) 12/17/2020    RDW 16.7 (H) 06/07/2022    RDW 13.1 12/17/2020     06/07/2022     12/17/2020     BMP  RESULTS:  Lab Results   Component Value Date     06/07/2022     12/17/2020    POTASSIUM 4.0 06/07/2022    POTASSIUM 3.5 12/18/2020    CHLORIDE 106 06/07/2022    CHLORIDE 105 12/17/2020    CO2 28 06/07/2022    CO2 30 12/17/2020    ANIONGAP 1 (L) 06/07/2022    ANIONGAP 3 12/17/2020     (H) 06/07/2022     (H) 06/07/2022    GLC 99 12/17/2020    BUN 25 06/07/2022    BUN 16 12/17/2020    CR 0.70 06/07/2022    CR 0.57 12/17/2020    GFRESTIMATED 89 06/07/2022    GFRESTIMATED 90 12/17/2020    GFRESTBLACK >90 12/17/2020    LILIA 8.8 06/07/2022    LILIA 8.2 (L) 12/17/2020      A1C RESULTS:  Lab Results   Component Value Date    A1C 5.9 (H) 05/26/2022    A1C 6.1 (H) 08/28/2020     CC  Quincy Anglin MD  [id number], [id number]  [place] [address]    This note was completed in part using Dragon voice recognition software. Although reviewed after completion, some word and grammatical errors may occur.

## 2022-09-14 ENCOUNTER — OFFICE VISIT (OUTPATIENT)
Dept: CARDIOLOGY | Facility: CLINIC | Age: 77
End: 2022-09-14
Payer: MEDICARE

## 2022-09-14 VITALS
WEIGHT: 174 LBS | BODY MASS INDEX: 26.37 KG/M2 | HEIGHT: 68 IN | DIASTOLIC BLOOD PRESSURE: 70 MMHG | HEART RATE: 70 BPM | OXYGEN SATURATION: 97 % | SYSTOLIC BLOOD PRESSURE: 112 MMHG

## 2022-09-14 DIAGNOSIS — I87.2 VENOUS INSUFFICIENCY: Primary | ICD-10-CM

## 2022-09-14 DIAGNOSIS — I87.322 CHRONIC VENOUS HYPERTENSION (IDIOPATHIC) WITH INFLAMMATION OF LEFT LOWER EXTREMITY: ICD-10-CM

## 2022-09-14 DIAGNOSIS — R00.2 PALPITATIONS: ICD-10-CM

## 2022-09-14 DIAGNOSIS — I47.10 SVT (SUPRAVENTRICULAR TACHYCARDIA) (H): ICD-10-CM

## 2022-09-14 DIAGNOSIS — I10 ESSENTIAL HYPERTENSION: ICD-10-CM

## 2022-09-14 PROCEDURE — 99213 OFFICE O/P EST LOW 20 MIN: CPT | Performed by: NURSE PRACTITIONER

## 2022-09-14 NOTE — LETTER
"9/14/2022    Rachel Miller MD  [address] [facility] [address]    RE: Teri Beltran       Dear Colleague,     I had the pleasure of seeing Teri Beltran in the [facility].    Cardiology Clinic Progress Note    Service Date: September 14, 2022    Primary Cardiologist: Dr. Anglin       Reason for visit: Follow up post vein ablation    HPI:   I had the pleasure of seeing Ms. Williamson \"Ray\" Devin in the clinic today. She is a very pleasant 76 year old female with a past medical history notable for first-degree AV block, RBBB, paroxysmal SVT, GERD, hypertension, hyperlipidemia, chronic venous insufficiency, s/p bilateral great saphenous vein radiofrequency ablation, and known aortic sclerosis. The patient has had issues with ankle swelling and varicose veins dating back several years which was not responsive to medical therapy.  The patient was evaluated by Dr. Pina in the clinic in January of this year due to recurrence of her left lower extremity edema and heaviness that likely corresponds to the severe 6.9 seconds of reflux in the calf portion of the remnant greater saphenous vein on venous competency testing. Dr. Pina completed VenaSeal ablation of the left greater saphenous vein on 8/8/22 with mid calf access. Duplex venous ultrasound was completed in follow up on 8/11/22 showing GSV occluded distal thigh to mid calf and no evidence of DVT noted.    Today, Ms. Beltran presents to the clinic in follow up of her recent venous procedure. She tells me that she has been feeling well since. She has noticed improvement in her leg swelling. She has not had pain and feels the puncture sites healed nicely. She had some non-exertional chest discomfort intermittently this summer and met with Dr. Miller. She was sent for a dobutamine stress echocardiogram which showed no evidence of ischemia. EF was normal ejection at 55-60% on the baseline echo. She has been slowed down " by musculoskeletal issues with a right hip surgery early in the summer, but has still been getting around well with a walker. She remains quietly active doing things like grocery shopping independently. She has been tolerating this well without exertional symptoms.    ASSESSMENT AND PLAN:  1. Venous insufficiency, chronic lower extremity edema  - History of bilateral great saphenous vein radiofrequency ablation previously, and now s/p left greater saphenous vein VenaSeal ablation of calf remnant from mid-calf to lower thigh (mid-calf access) by Dr. Pina on 8/8/22.     2. Known first-degree AV block, right bundle branch block, paroxysmal SVT  - Followed by Dr. Villareal with electrophysiology. On metoprolol tartrate 25 mg BID.  We discussed the option of increasing the dose of her metoprolol to 50 mg BID both for further blood pressure control and to help suppress her control episodes of PSVT. She was opposed to this idea as she was previously on a higher dose of metoprolol in the past and felt that this lowered her blood pressure too significantly.  - Reviewed that if she begins to have issues with more frequent or prolonged episodes of palpitations to call the clinic and we could consider repeat event monitoring if needed.     3. Hypertension  - Reasonably well-controlled on metoprolol tartrate 25 mg twice daily, revisited by milligrams daily, and Lasix 20 mg daily with potassium 10 mEq twice daily.    4. Aortic sclerosis without significant aortic insufficiency or stenosis  - This is the cause of her cardiac murmur on physical exam.     Thank you for the opportunity to participate in this pleasant patient's care. She is already scheduled to see Dr. Anglin for routine annual visit in November of this year with a repeat fasting lipid panel and ALT beforehand so we will keep these appointments as planned. We would be happy to see her sooner if needed for any concerns in the meantime.    25 total minutes was spent today  including chart review, precharting, history and exam, post visit documentation, and reviewing studies as outlined above.     RUBY Loera, CNP   Nurse Practitioner  [facility]  Pager: [phone]  Text Page  (8am - 5pm, M-F)    Orders this Visit:  No orders of the defined types were placed in this encounter.    No orders of the defined types were placed in this encounter.    There are no discontinued medications.  No diagnosis found.  CURRENT MEDICATIONS:  Current Outpatient Medications   Medication Sig Dispense Refill     acetaminophen (TYLENOL) 325 MG tablet Take 2 tablets (650 mg) by mouth every 4 hours as needed for other (mild pain) 100 tablet 0     dorzolamide-timolol (COSOPT) 2-0.5 % ophthalmic solution Place 1 drop into both eyes 2 times daily       famotidine (PEPCID) 10 MG tablet Take 1 tablet (10 mg) by mouth 2 times daily 180 tablet 1     furosemide (LASIX) 20 MG tablet Take 1 tablet (20 mg) by mouth 2 times daily 180 tablet 0     gabapentin (NEURONTIN) 300 MG capsule Take 1 capsule (300 mg) by mouth 3 times daily 270 capsule 1     latanoprost (XALATAN) 0.005 % ophthalmic solution Place 1 drop into the right eye At Bedtime        levothyroxine (SYNTHROID/LEVOTHROID) 88 MCG tablet TAKE 1 TABLET(88 MCG) BY MOUTH DAILY 90 tablet 0     metoprolol tartrate (LOPRESSOR) 25 MG tablet TAKE 1 TABLET(25 MG) BY MOUTH TWICE DAILY 180 tablet 1     Multiple Vitamins-Minerals (MULTIVITAMIN ADULT PO) Take 1 tablet by mouth daily       oxyCODONE (ROXICODONE) 5 MG tablet Take 1-2 tablets (5-10 mg) by mouth every 4 hours as needed for moderate to severe pain (Patient not taking: Reported on 7/21/2022) 20 tablet 0     potassium chloride ER (KLOR-CON M) 10 MEQ CR tablet TAKE 1 TABLET(10 MEQ) BY MOUTH DAILY 90 tablet 0     pravastatin (PRAVACHOL) 40 MG tablet TAKE 1 TABLET(40 MG) BY MOUTH AT BEDTIME 90 tablet 3     senna-docusate (SENOKOT-S/PERICOLACE) 8.6-50 MG tablet Take 1-2 tablets by mouth 2  "times daily Take while on oral narcotics to prevent or treat constipation. (Patient not taking: Reported on 2022) 30 tablet 0     traZODone (DESYREL) 50 MG tablet Take 1-2 tablets ( mg) by mouth nightly as needed for sleep 180 tablet 1     ALLERGIES  Allergies   Allergen Reactions     Seasonal Allergies      Simvastatin      \"flushed\"     PAST MEDICAL, SURGICAL, FAMILY HISTORY:  History was reviewed and updated as needed, see medical record.    SOCIAL HISTORY:  Social History     Socioeconomic History     Marital status:      Spouse name: Not on file     Number of children: Not on file     Years of education: Not on file     Highest education level: Not on file   Occupational History     Not on file   Tobacco Use     Smoking status: Former Smoker     Packs/day: 0.50     Years: 18.00     Pack years: 9.00     Types: Cigarettes     Quit date:      Years since quittin.7     Smokeless tobacco: Never Used   Vaping Use     Vaping Use: Never used   Substance and Sexual Activity     Alcohol use: Not Currently     Drug use: No     Sexual activity: Yes     Partners: Male   Other Topics Concern     Parent/sibling w/ CABG, MI or angioplasty before 65F 55M? Not Asked      Service Not Asked     Blood Transfusions Not Asked     Caffeine Concern No     Comment: 4-5 cups of coffee daily     Occupational Exposure Not Asked     Hobby Hazards Not Asked     Sleep Concern Not Asked     Stress Concern Not Asked     Weight Concern Not Asked     Special Diet No     Comment: no added salt     Back Care Not Asked     Exercise No     Comment: 3 days per week - exercise class      Bike Helmet Not Asked     Seat Belt Not Asked     Self-Exams Not Asked   Social History Narrative     Not on file     Social Determinants of Health     Financial Resource Strain: Not on file   Food Insecurity: Not on file   Transportation Needs: Not on file   Physical Activity: Not on file   Stress: Not on file   Social Connections: " Not on file   Intimate Partner Violence: Not on file   Housing Stability: Not on file     Review of Systems:  Skin:        Eyes:       ENT:       Respiratory:       Cardiovascular:       Gastroenterology:      Genitourinary:       Musculoskeletal:       Neurologic:       Psychiatric:       Heme/Lymph/Imm:       Endocrine:          Physical Exam:  Vitals: LMP  (LMP Unknown)    Wt Readings from Last 4 Encounters:   07/21/22 79.9 kg (176 lb 3.2 oz)   06/23/22 80.8 kg (178 lb 3.2 oz)   06/06/22 80.7 kg (178 lb)   05/26/22 80.9 kg (178 lb 6.4 oz)     CONSTITUTIONAL: Appears her stated age, well nourished, and in no acute distress.  HEENT: Pupils equal, round. Sclerae nonicteric.    NECK: Supple, no masses appreciated or JVD appreciated.  C/V:  Regular rate and rhythm, normal S1 and S2, no S3 or S4, no murmur, rub or gallop.   RESP: Respirations are unlabored. Lungs are clear to auscultation bilaterally without wheezing, rales, or rhonchi.  EXTREM: Trace left lower extremity edema, no right lower extremity edema. No clubbing or cyanosis.  NEURO: Alert and oriented, cooperative. Gait steady. No gross focal deficits.   PSYCH: Affect appropriate, mildly anxious. Mentation normal. Responds to questions appropriately.  SKIN: Warm and dry.     Recent Lab Results:  LIPID RESULTS:  Lab Results   Component Value Date    CHOL 162 08/23/2021    CHOL 178 08/28/2020    HDL 82 08/23/2021    HDL 92 08/28/2020    LDL 49 08/23/2021    LDL 65 08/28/2020    TRIG 156 (H) 08/23/2021    TRIG 106 08/28/2020    CHOLHDLRATIO 2.2 08/11/2015     LIVER ENZYME RESULTS:  Lab Results   Component Value Date    AST 19 08/23/2021    AST 15 08/28/2020    ALT 26 08/23/2021    ALT 19 08/28/2020     CBC RESULTS:  Lab Results   Component Value Date    WBC 9.4 06/07/2022    WBC 6.4 12/17/2020    RBC 4.13 06/07/2022    RBC 2.91 (L) 12/17/2020    HGB 11.3 (L) 06/07/2022    HGB 9.2 (L) 12/18/2020    HCT 37.2 06/07/2022    HCT 27.5 (L) 12/17/2020    MCV 90  06/07/2022    MCV 95 12/17/2020    MCH 27.4 06/07/2022    MCH 29.6 12/17/2020    MCHC 30.4 (L) 06/07/2022    MCHC 31.3 (L) 12/17/2020    RDW 16.7 (H) 06/07/2022    RDW 13.1 12/17/2020     06/07/2022     12/17/2020     BMP RESULTS:  Lab Results   Component Value Date     06/07/2022     12/17/2020    POTASSIUM 4.0 06/07/2022    POTASSIUM 3.5 12/18/2020    CHLORIDE 106 06/07/2022    CHLORIDE 105 12/17/2020    CO2 28 06/07/2022    CO2 30 12/17/2020    ANIONGAP 1 (L) 06/07/2022    ANIONGAP 3 12/17/2020     (H) 06/07/2022     (H) 06/07/2022    GLC 99 12/17/2020    BUN 25 06/07/2022    BUN 16 12/17/2020    CR 0.70 06/07/2022    CR 0.57 12/17/2020    GFRESTIMATED 89 06/07/2022    GFRESTIMATED 90 12/17/2020    GFRESTBLACK >90 12/17/2020    LILIA 8.8 06/07/2022    LILIA 8.2 (L) 12/17/2020      A1C RESULTS:  Lab Results   Component Value Date    A1C 5.9 (H) 05/26/2022    A1C 6.1 (H) 08/28/2020     CC  Quincy Anglin MD  [id number], [address]  [place] [address]    This note was completed in part using Dragon voice recognition software. Although reviewed after completion, some word and grammatical errors may occur.    Thank you for allowing me to participate in the care of your patient.      Sincerely,     Joshua Sunshine NP     [facility]  cc:   No referring provider defined for this encounter.

## 2022-09-22 ENCOUNTER — OFFICE VISIT (OUTPATIENT)
Dept: INTERNAL MEDICINE | Facility: CLINIC | Age: 77
End: 2022-09-22
Payer: MEDICARE

## 2022-09-22 VITALS
TEMPERATURE: 97.6 F | RESPIRATION RATE: 18 BRPM | HEART RATE: 91 BPM | WEIGHT: 175.7 LBS | BODY MASS INDEX: 26.63 KG/M2 | HEIGHT: 68 IN | DIASTOLIC BLOOD PRESSURE: 71 MMHG | OXYGEN SATURATION: 91 % | SYSTOLIC BLOOD PRESSURE: 135 MMHG

## 2022-09-22 DIAGNOSIS — I10 HYPERTENSION GOAL BP (BLOOD PRESSURE) < 140/90: ICD-10-CM

## 2022-09-22 DIAGNOSIS — I10 ESSENTIAL HYPERTENSION WITH GOAL BLOOD PRESSURE LESS THAN 140/90: ICD-10-CM

## 2022-09-22 DIAGNOSIS — E78.5 HYPERLIPIDEMIA LDL GOAL <130: ICD-10-CM

## 2022-09-22 DIAGNOSIS — G89.29 CHRONIC HIP PAIN AFTER TOTAL REPLACEMENT OF RIGHT HIP JOINT: ICD-10-CM

## 2022-09-22 DIAGNOSIS — F51.02 ADJUSTMENT INSOMNIA: ICD-10-CM

## 2022-09-22 DIAGNOSIS — F41.9 ANXIETY: ICD-10-CM

## 2022-09-22 DIAGNOSIS — R60.9 FLUID RETENTION: ICD-10-CM

## 2022-09-22 DIAGNOSIS — E03.9 HYPOTHYROIDISM, UNSPECIFIED TYPE: ICD-10-CM

## 2022-09-22 DIAGNOSIS — M25.551 CHRONIC HIP PAIN AFTER TOTAL REPLACEMENT OF RIGHT HIP JOINT: ICD-10-CM

## 2022-09-22 DIAGNOSIS — I87.2 EDEMA OF LEFT LOWER EXTREMITY DUE TO PERIPHERAL VENOUS INSUFFICIENCY: ICD-10-CM

## 2022-09-22 DIAGNOSIS — Z96.641 CHRONIC HIP PAIN AFTER TOTAL REPLACEMENT OF RIGHT HIP JOINT: ICD-10-CM

## 2022-09-22 DIAGNOSIS — R00.2 PALPITATIONS: ICD-10-CM

## 2022-09-22 PROCEDURE — 99214 OFFICE O/P EST MOD 30 MIN: CPT | Performed by: INTERNAL MEDICINE

## 2022-09-22 RX ORDER — LEVOTHYROXINE SODIUM 88 UG/1
88 TABLET ORAL DAILY
Qty: 90 TABLET | Refills: 1 | Status: SHIPPED | OUTPATIENT
Start: 2022-09-22 | End: 2023-01-17

## 2022-09-22 RX ORDER — POTASSIUM CHLORIDE 750 MG/1
TABLET, EXTENDED RELEASE ORAL
Qty: 90 TABLET | Refills: 1 | Status: SHIPPED | OUTPATIENT
Start: 2022-09-22 | End: 2023-01-17

## 2022-09-22 RX ORDER — GABAPENTIN 300 MG/1
300 CAPSULE ORAL 3 TIMES DAILY
Qty: 270 CAPSULE | Refills: 1 | Status: SHIPPED | OUTPATIENT
Start: 2022-09-22 | End: 2022-11-04

## 2022-09-22 NOTE — PATIENT INSTRUCTIONS
Plan:  1. Schedule ANNUAL EXAM in January  2. Continue same meds, same doses for now   3. Schedule fasting labs few days before

## 2022-09-22 NOTE — PROGRESS NOTES
Dr Wong's note      Patient's instructions / PLAN:                                                        Plan:  1. Schedule ANNUAL EXAM in January 2. Continue same meds, same doses for now   Schedule fasting labs few days before         ASSESSMENT & PLAN:                                                      (I87.2) Edema of left lower extremity due to peripheral venous insufficiency  (R60.9) Fluid retention  Comment: She can't use compression socks because she dislocated right hip just trying to bend over to  something from the floor  Plan: potassium chloride ER (KLOR-CON M) 10 MEQ CR         tablet, CBC with platelets, CK total,         Comprehensive metabolic panel, Lipid panel         reflex to direct LDL Fasting, TSH with free T4         reflex, Albumin Random Urine Quantitative with         Creat Ratio            (M25.551,  G89.29,  Z96.641) Chronic hip pain after total replacement of right hip joint  Comment:   Plan: gabapentin (NEURONTIN) 300 MG capsule, CBC with        platelets, CK total, Comprehensive metabolic         panel, Lipid panel reflex to direct LDL         Fasting, TSH with free T4 reflex, Albumin         Random Urine Quantitative with Creat Ratio            (E03.9) Hypothyroidism, unspecified type  Comment: Controlled  Plan: levothyroxine (SYNTHROID/LEVOTHROID) 88 MCG         tablet, CBC with platelets, CK total,         Comprehensive metabolic panel, Lipid panel         reflex to direct LDL Fasting, TSH with free T4         reflex, Albumin Random Urine Quantitative with         Creat Ratio            (R00.2) Palpitations  Comment: Controlled  Plan: CBC with platelets, CK total, Comprehensive         metabolic panel, Lipid panel reflex to direct         LDL Fasting, TSH with free T4 reflex, Albumin         Random Urine Quantitative with Creat Ratio            (I10) HTN, goal  < 140/90  Comment: Controlled  Plan: CBC with platelets, CK total, Comprehensive         metabolic panel,  Lipid panel reflex to direct         LDL Fasting, TSH with free T4 reflex, Albumin         Random Urine Quantitative with Creat Ratio            (I10) Essential hypertension with goal blood pressure less than 140/90  Comment:   Plan: potassium chloride ER (KLOR-CON M) 10 MEQ CR         tablet, CBC with platelets, CK total,         Comprehensive metabolic panel, Lipid panel         reflex to direct LDL Fasting, TSH with free T4         reflex, Albumin Random Urine Quantitative with         Creat Ratio            (E78.5) Hyperlipidemia LDL goal <130  Comment: Controlled  Plan: CBC with platelets, CK total, Comprehensive         metabolic panel, Lipid panel reflex to direct         LDL Fasting, TSH with free T4 reflex, Albumin         Random Urine Quantitative with Creat Ratio        Continue same meds, same doses for now     (F41.9) Anxiety  Comment: Controlled    Plan: CBC with platelets, CK total, Comprehensive         metabolic panel, Lipid panel reflex to direct         LDL Fasting, TSH with free T4 reflex, Albumin         Random Urine Quantitative with Creat Ratio        Continue same meds, same doses for now     (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  Comment: Persistent, trazodone has not helped  Plan: CBC with platelets, CK total, Comprehensive         metabolic panel, Lipid panel reflex to direct         LDL Fasting, TSH with free T4 reflex, Albumin         Random Urine Quantitative with Creat Ratio               Chief complaint:                                                      dermatitis f     SUBJECTIVE:                                                    History of present illness:    Stress test was neg    Vv insuf w swollen legs   -- -- hx of vv procedure manu years ago and one small vein L leg -- recent procedure -- temporary realief       LOV: Plan:  1. Resume aspirin 81 mg daily   2. Heart  Stress test - To schedule this test please call [facility] at: [phone]   3. Electrocardiogram -- .today  4. Continue  "same meds, same doses for now   5. If you decide to go off Gabapentin, you have to tape it down: ( 1 capsule twice a day for a week, then once a day for a week, then stop it)   6.Follow up in 3 months        Kirit is a 76 year old, presenting for the following health issues:  Patient is being seen for an 3 month follow up.    HPI     Hyperlipidemia Follow-Up      Are you regularly taking any medication or supplement to lower your cholesterol?   Yes- Pravastatin    Are you having muscle aches or other side effects that you think could be caused by your cholesterol lowering medication?  Yes- muscle aches    Hypertension Follow-up      Do you check your blood pressure regularly outside of the clinic? Yes     Are you following a low salt diet? Yes    Are your blood pressures ever more than 140 on the top number (systolic) OR more   than 90 on the bottom number (diastolic), for example 140/90? Yes systolic      How many servings of fruits and vegetables do you eat daily?  0-1    On average, how many sweetened beverages do you drink each day (Examples: soda, juice, sweet tea, etc.  Do NOT count diet or artificially sweetened beverages)?   1    How many days per week do you exercise enough to make your heart beat faster? 3 or less    How many minutes a day do you exercise enough to make your heart beat faster? 9 or less    How many days per week do you miss taking your medication? 0        Review of Systems:                                                      ROS: negative for fever, chills, cough, wheezes, chest pain, shortness of breath, vomiting, abdominal pain, positive for chronic leg swelling       OBJECTIVE:             Physical exam:  Blood pressure 135/71, pulse 91, temperature 97.6  F (36.4  C), temperature source Tympanic, resp. rate 18, height 1.727 m (5' 8\"), weight 79.7 kg (175 lb 11.2 oz), SpO2 91 %, not currently breastfeeding.     NAD, appears comfortable  Skin: no rashes   Neck: supple, no JVD,  No " thyroidmegaly. Lymph nodes nonpalpable cervical and supraclavicular.  Chest: clear to auscultation bilaterally, good respiratory effort  Heart: S1 S2, RRR, no mgr appreciated  Abdomen: soft, not tender,   Extremities: +1 edema left ankle, trace edema right ankle  Neurologic: A, Ox3, no focal signs appreciated    PMHx: reviewed  Past Medical History:   Diagnosis Date     First degree AV block 08/11/2015     Former smoker      Gastroesophageal reflux disease      GERD (gastroesophageal reflux disease)      Glaucoma      Heart block     2:1     Heart murmur      Hyperlipidemia LDL goal <130 06/14/2013     Hypertension goal BP (blood pressure) < 140/90 12/03/2013     Hypothyroidism      Left atrial dilatation     mild     Mild dilation of ascending aorta - borderline 08/11/2015     Palpitations      Prediabetes 06/14/2013     RBBB 08/11/2015     S/P total knee arthroplasty 07/23/2019     Tachycardia      Urinary frequency      Varicose veins      Venous insufficiency     s/p bilateral great saphenous vein radiofrequency ablation by Dr. Garcia      PSHx: reviewed  Past Surgical History:   Procedure Laterality Date     ARTHROPLASTY HIP Right 11/20/2020    Procedure: Right total hip arthroplasty using a Biomet Taperloc femoral stem and G7 acetabulum.;  Surgeon: Dragan Muse MD;  Location: [facility] OR     ARTHROPLASTY KNEE Right 07/23/2019    Procedure: Right total knee arthroplasty using an Ar[employer]rex iBalance knee system;  Surgeon: Dragan Muse MD;  Location: [facility] OR     BREAST SURGERY      right breast ductal surgery due to milk production     BREAST SURGERY Right     ductal surgery due to milk production     CATARACT EXTRACTION Right 05/2022     COLONOSCOPY N/A 10/24/2014    no further screening. Procedure: COLONOSCOPY;  Surgeon: Pedro Rudd MD;  Location: [facility]     DECOMPRESSION HIP CORE Right 6/6/2022    Procedure: Open right hip abductor repair;  Surgeon: Dragan Muse MD;   Location: [facility] OR     EYE SURGERY       FINGER GANGLION CYST EXCISION Left     ring finger     HYSTERECTOMY       HYSTERECTOMY, PAP NO LONGER INDICATED  2009    total hysterectomy and ovaries. benign     SOFT TISSUE SURGERY      ganglion removed from left wrist and ring finger     SURGICAL HISTORY OF -   age 7    tonsillectomy     SURGICAL HISTORY OF -   01/2015    left eye cataract     SURGICAL HISTORY OF -   Fall 2016    LINQ placed.     TONSILLECTOMY       WRIST GANGLION EXCISION Left         Meds: reviewed  Current Outpatient Medications   Medication Sig Dispense Refill     acetaminophen (TYLENOL) 325 MG tablet Take 2 tablets (650 mg) by mouth every 4 hours as needed for other (mild pain) 100 tablet 0     dorzolamide-timolol (COSOPT) 2-0.5 % ophthalmic solution Place 1 drop into both eyes 2 times daily       famotidine (PEPCID) 10 MG tablet Take 1 tablet (10 mg) by mouth 2 times daily 180 tablet 1     furosemide (LASIX) 20 MG tablet Take 1 tablet (20 mg) by mouth 2 times daily 180 tablet 0     gabapentin (NEURONTIN) 300 MG capsule Take 1 capsule (300 mg) by mouth 3 times daily 270 capsule 1     latanoprost (XALATAN) 0.005 % ophthalmic solution Place 1 drop into the right eye At Bedtime        levothyroxine (SYNTHROID/LEVOTHROID) 88 MCG tablet TAKE 1 TABLET(88 MCG) BY MOUTH DAILY 90 tablet 0     metoprolol tartrate (LOPRESSOR) 25 MG tablet TAKE 1 TABLET(25 MG) BY MOUTH TWICE DAILY 180 tablet 1     Multiple Vitamins-Minerals (MULTIVITAMIN ADULT PO) Take 1 tablet by mouth daily       potassium chloride ER (KLOR-CON M) 10 MEQ CR tablet TAKE 1 TABLET(10 MEQ) BY MOUTH DAILY 90 tablet 0     pravastatin (PRAVACHOL) 40 MG tablet TAKE 1 TABLET(40 MG) BY MOUTH AT BEDTIME 90 tablet 3     traZODone (DESYREL) 50 MG tablet Take 1-2 tablets ( mg) by mouth nightly as needed for sleep 180 tablet 1       Soc Hx: reviewed  Fam Hx: reviewed      Chart documentation was completed, in part, with [employer] voice-recognition software.  Even though reviewed, some grammatical, spelling, and word errors may remain.      Rachel Wong MD  Internal Medicine

## 2022-09-30 DIAGNOSIS — I87.2 EDEMA OF LEFT LOWER EXTREMITY DUE TO PERIPHERAL VENOUS INSUFFICIENCY: ICD-10-CM

## 2022-09-30 DIAGNOSIS — R60.9 FLUID RETENTION: ICD-10-CM

## 2022-09-30 RX ORDER — FUROSEMIDE 20 MG
20 TABLET ORAL 2 TIMES DAILY
Qty: 180 TABLET | Refills: 3 | Status: SHIPPED | OUTPATIENT
Start: 2022-09-30 | End: 2023-11-07

## 2022-10-03 ENCOUNTER — NURSE TRIAGE (OUTPATIENT)
Dept: INTERNAL MEDICINE | Facility: CLINIC | Age: 77
End: 2022-10-03

## 2022-10-03 NOTE — TELEPHONE ENCOUNTER
Patient needs to be evaluated either office visit with any provider or UC as this issue was not addressed at 9/22/2022 visit with the PCP.

## 2022-10-03 NOTE — TELEPHONE ENCOUNTER
"Nurse Triage SBAR    Is this a 2nd Level Triage? NO    Situation: Patient reports she has been having a lot of stomach aches recently and some occasional nausea. Also reports a lot of gas. Patient is requesting a different medication for GERD    Background: Patient states she takes an over the counter medication Pepcid for GERD. States she has been taking this for years but it is no longer working. Patient saw primary care provider on 9/22/22 but the symptoms were not bothering her at that time    Assessment: When asked where the pain is patient stated several times it is \"right where my stomach is\". Discomfort occurs daily and varies in intesity. It is not worse after she eats. Describes as a \"stomach ache\" but not pain. Patient wants it to be known that she does \"not want a scope\"    Protocol Recommended Disposition:   See in Office Today    Recommendation: Patient does not want an appointment. She is requesting a prescription for a different medication for reflux       Routed to provider    Does the patient meet one of the following criteria for ADS visit consideration? 16+ years old, with an [facility] PCP     TIP  Providers, please consider if this condition is appropriate for management at one of our Acute and Diagnostic Services sites.     If patient is a good candidate, please use dotphrase <dot>triageresponse and select Refer to ADS to document.      Reason for Disposition    Age > 60 years    Additional Information    Negative: Passed out (i.e., fainted, collapsed and was not responding)    Negative: Shock suspected (e.g., cold/pale/clammy skin, too weak to stand, low BP, rapid pulse)    Negative: Sounds like a life-threatening emergency to the triager    Negative: Chest pain    Negative: Pain is mainly in upper abdomen (if needed ask: 'is it mainly above the belly button?')    Negative: Abdominal pain and pregnant < 20 weeks    Negative: Abdominal pain and pregnant 20 or more weeks    Negative: SEVERE " abdominal pain (e.g., excruciating)    Negative: Vomiting red blood or black (coffee ground) material    Negative: Bloody, black, or tarry bowel movements  (Exception: Chronic-unchanged black-grey bowel movements and is taking iron pills or Pepto-Bismol.)    Negative: Constant abdominal pain lasting > 2 hours    Negative: Vomiting bile (green color)    Negative: Patient sounds very sick or weak to the triager    Negative: Vomiting and abdomen looks much more swollen than usual    Negative: White of the eyes have turned yellow (i.e., jaundice)    Negative: Blood in urine (red, pink, or tea-colored)    Negative: Fever > 103 F (39.4 C)    Negative: Fever > 101 F (38.3 C) and over 60 years of age    Negative: Fever > 100.0 F (37.8 C) and has diabetes mellitus or a weak immune system (e.g., HIV positive, cancer chemotherapy, organ transplant, splenectomy, chronic steroids)    Negative: Fever > 100.0 F (37.8 C) and bedridden (e.g., nursing home patient, stroke, chronic illness, recovering from surgery)    Negative: Pregnant or could be pregnant (i.e., missed last menstrual period)    Negative: MODERATE pain (e.g., interferes with normal activities that comes and goes (cramps) lasts > 24 hours  (Exception: Pain with Vomiting or Diarrhea - see that Protocol.)    Negative: Unusual vaginal discharge    Protocols used: ABDOMINAL PAIN - FEMALE-A-OH

## 2022-10-04 NOTE — TELEPHONE ENCOUNTER
Patient advised.  Offered to transfer her to scheduling but she states she will have to call back to schedule an appointment.   HELEN Jim R.N.

## 2022-10-10 ENCOUNTER — OFFICE VISIT (OUTPATIENT)
Dept: INTERNAL MEDICINE | Facility: CLINIC | Age: 77
End: 2022-10-10
Payer: MEDICARE

## 2022-10-10 ENCOUNTER — TRANSFERRED RECORDS (OUTPATIENT)
Dept: HEALTH INFORMATION MANAGEMENT | Facility: CLINIC | Age: 77
End: 2022-10-10

## 2022-10-10 VITALS
SYSTOLIC BLOOD PRESSURE: 138 MMHG | HEIGHT: 68 IN | OXYGEN SATURATION: 97 % | TEMPERATURE: 98.1 F | WEIGHT: 178.5 LBS | HEART RATE: 110 BPM | DIASTOLIC BLOOD PRESSURE: 82 MMHG | BODY MASS INDEX: 27.05 KG/M2 | RESPIRATION RATE: 16 BRPM

## 2022-10-10 DIAGNOSIS — R10.13 EPIGASTRIC PAIN: Primary | ICD-10-CM

## 2022-10-10 DIAGNOSIS — Z79.899 ENCOUNTER FOR LONG-TERM (CURRENT) USE OF MEDICATIONS: ICD-10-CM

## 2022-10-10 LAB
ERYTHROCYTE [DISTWIDTH] IN BLOOD BY AUTOMATED COUNT: 14.1 % (ref 10–15)
HCT VFR BLD AUTO: 39.9 % (ref 35–47)
HGB BLD-MCNC: 12.5 G/DL (ref 11.7–15.7)
MCH RBC QN AUTO: 27.8 PG (ref 26.5–33)
MCHC RBC AUTO-ENTMCNC: 31.3 G/DL (ref 31.5–36.5)
MCV RBC AUTO: 89 FL (ref 78–100)
PLATELET # BLD AUTO: 430 10E3/UL (ref 150–450)
RBC # BLD AUTO: 4.5 10E6/UL (ref 3.8–5.2)
WBC # BLD AUTO: 7.6 10E3/UL (ref 4–11)

## 2022-10-10 PROCEDURE — 83690 ASSAY OF LIPASE: CPT | Performed by: INTERNAL MEDICINE

## 2022-10-10 PROCEDURE — 36415 COLL VENOUS BLD VENIPUNCTURE: CPT | Performed by: INTERNAL MEDICINE

## 2022-10-10 PROCEDURE — 85027 COMPLETE CBC AUTOMATED: CPT | Performed by: INTERNAL MEDICINE

## 2022-10-10 PROCEDURE — 80053 COMPREHEN METABOLIC PANEL: CPT | Performed by: INTERNAL MEDICINE

## 2022-10-10 PROCEDURE — 82150 ASSAY OF AMYLASE: CPT | Performed by: INTERNAL MEDICINE

## 2022-10-10 PROCEDURE — 99214 OFFICE O/P EST MOD 30 MIN: CPT | Performed by: INTERNAL MEDICINE

## 2022-10-10 RX ORDER — PANTOPRAZOLE SODIUM 40 MG/1
40 TABLET, DELAYED RELEASE ORAL DAILY
Qty: 30 TABLET | Refills: 0 | Status: SHIPPED | OUTPATIENT
Start: 2022-10-10 | End: 2022-11-11

## 2022-10-10 ASSESSMENT — PAIN SCALES - GENERAL: PAINLEVEL: MILD PAIN (3)

## 2022-10-10 NOTE — PATIENT INSTRUCTIONS
Start on new medication for your stomach - Protonix.   Stop the Pepcid  Call back in one week if your symptoms persist.

## 2022-10-10 NOTE — PROGRESS NOTES
"  Assessment & Plan     Encounter for long-term (current) use of medications  Medications reviewed     Epigastric pain  Possible gastritis, PUD, pancreatitis, cholecystitis,   Assess lab work, change from Pepcid to Protonix  Consider EGD, abd US if symptoms persist     - CBC with platelets  - Comprehensive metabolic panel (BMP + Alb, Alk Phos, ALT, AST, Total. Bili, TP)  - Amylase  - Lipase  - Helicobacter pylori Antigen Stool  - pantoprazole (PROTONIX) 40 MG EC tablet; Take 1 tablet (40 mg) by mouth daily             BMI:   Estimated body mass index is 27.14 kg/m  as calculated from the following:    Height as of this encounter: 1.727 m (5' 8\").    Weight as of this encounter: 81 kg (178 lb 8 oz).       See Patient Instructions    Return in about 6 months (around 4/10/2023) for Physical Exam.    Nadir England MD  [facility] EDILMA Beth is a 76 year old, presenting for the following health issues:  Abdominal Pain      HPI     Abdominal pain x1 week; pt states it aches all of the time,no vomiting, no diarrhea or constipation, just an ache; suspects it is acid reflux    Presents with upper abdominal pain for the last 10 days.   Located in the epigastrium. On and off. Not related to diet. No change of bowel habits.   No fever, chills, nausea, vomiting.   Has history of stomach ulcer in the past. Has been on Pepcid for GERD symptoms.   No increased heartburns noted.   No change of her diet.   No pain radiation. Sometimes has pain in the right upper abdomen. Has had gall bladder problems in the past. No history of abdominal surgery, except hysterectomy.   Has H/O hyperlipidemia. On medical treatment and diet. No side effects. No muscle weakness, myalgias or upset stomach.   Has h/o HTN. on medical treatment. BP has been controlled. No side effects from medications. No CP, HA, dizziness. good compliance with medications and low salt diet.  Has history of hypothyroidism. On " "replacement treatment with Synthroid. No heat /cold intolerance, heart palpitations, weight loss/ gain ,  change in bowel habits.    Review of Systems   Constitutional, HEENT, cardiovascular, pulmonary, gi and gu systems are negative, except as otherwise noted.      Objective    /82 (BP Location: Left arm, Cuff Size: Adult Regular)   Pulse 110   Temp 98.1  F (36.7  C) (Tympanic)   Resp 16   Ht 1.727 m (5' 8\")   Wt 81 kg (178 lb 8 oz)   LMP  (LMP Unknown)   SpO2 97%   BMI 27.14 kg/m    Body mass index is 27.14 kg/m .  Physical Exam   GENERAL: healthy, alert and no distress  NECK: no adenopathy, no asymmetry, masses, or scars and thyroid normal to palpation  RESP: lungs clear to auscultation - no rales, rhonchi or wheezes  CV: regular rate and rhythm, normal S1 S2, no S3 or S4, no murmur, click or rub, no peripheral edema and peripheral pulses strong  ABDOMEN: soft, nontender, no hepatosplenomegaly, no masses and bowel sounds normal  MS: no gross musculoskeletal defects noted, trace LE edema, uses a walker, has pain in the hips with walking, unstable gait   SKIN: no suspicious lesions or rashes  NEURO: Normal strength and tone, mentation intact and speech normal    Lab on 08/05/2022   Component Date Value Ref Range Status     SARS CoV2 PCR 08/05/2022 Negative  Negative Final    NEGATIVE: SARS-CoV-2 (COVID-19) RNA not detected, presumed negative.                   "

## 2022-10-11 LAB
ALBUMIN SERPL-MCNC: 3.9 G/DL (ref 3.4–5)
ALP SERPL-CCNC: 133 U/L (ref 40–150)
ALT SERPL W P-5'-P-CCNC: 38 U/L (ref 0–50)
AMYLASE SERPL-CCNC: 22 U/L (ref 30–110)
ANION GAP SERPL CALCULATED.3IONS-SCNC: 4 MMOL/L (ref 3–14)
AST SERPL W P-5'-P-CCNC: 47 U/L (ref 0–45)
BILIRUB SERPL-MCNC: 0.4 MG/DL (ref 0.2–1.3)
BUN SERPL-MCNC: 23 MG/DL (ref 7–30)
CALCIUM SERPL-MCNC: 10.1 MG/DL (ref 8.5–10.1)
CHLORIDE BLD-SCNC: 108 MMOL/L (ref 94–109)
CO2 SERPL-SCNC: 28 MMOL/L (ref 20–32)
CREAT SERPL-MCNC: 0.54 MG/DL (ref 0.52–1.04)
GFR SERPL CREATININE-BSD FRML MDRD: >90 ML/MIN/1.73M2
GLUCOSE BLD-MCNC: 110 MG/DL (ref 70–99)
LIPASE SERPL-CCNC: 16 U/L (ref 13–60)
POTASSIUM BLD-SCNC: 4.8 MMOL/L (ref 3.4–5.3)
PROT SERPL-MCNC: 7.8 G/DL (ref 6.8–8.8)
SODIUM SERPL-SCNC: 140 MMOL/L (ref 133–144)

## 2022-10-12 ENCOUNTER — NURSE TRIAGE (OUTPATIENT)
Dept: INTERNAL MEDICINE | Facility: CLINIC | Age: 77
End: 2022-10-12
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
## 2022-10-17 ENCOUNTER — TRANSFERRED RECORDS (OUTPATIENT)
Dept: HEALTH INFORMATION MANAGEMENT | Facility: CLINIC | Age: 77
End: 2022-10-17

## 2022-10-18 NOTE — PROGRESS NOTES
Service Date: 11/27/2019      HISTORY OF PRESENT ILLNESS:    I had the pleasure of seeing Ms. Teri Beltran in followup today.  She has the following medical issues:   A.  Exercise-induced syncope in 2016.  EP study was inconclusive.  Of note, she developed hypotension on dobutamine infusion.  She underwent placement of a [employer] loop recorder.   B.  Transient asymptomatic 2:1 AV block in 2016.  We have not seen significant bradycardia over the past 3 years of monitoring.   C.  Hypertension with an element of white-coat hypertension.   D.  Lower extremity edema with previous saphenous vein RF ablation by Dr. Garcia.      Ms. Beltran is doing well overall.  She has not had recurrent syncope since 2016.  We have not recorded significant arrhythmias on her ILR.  On 10/22/2019 she presented to the emergency room with irregular fast heart beating and shortness of breath.  There was no associated cardiac arrhythmia on the monitor.  No significant abnormalities were found in the emergency room.      Because of dyspnea on exertion, Dr. Tipton has ordered PFTs which were unremarkable.  The patient told me today that her dyspnea has improved without specific treatment.  She does not have chest pain on exertion.      PHYSICAL EXAMINATION:   VITAL SIGNS:  Blood pressure 130/70, pulse 72 and regular, weight 86 kg, height 173 cm.   GENERAL:  She is a pleasant woman in no distress.   NECK:  Supple with normal JVP.   LUNGS:  Clear.   CARDIOVASCULAR:  Regular rhythm.  No gallop, murmur or rub.   ABDOMEN:  Soft, nontender.   EXTREMITIES:  No edema.      DIAGNOSTIC STUDIES:    Her 12-lead ECG today showed sinus rhythm with incomplete RBBB and first-degree AV block.        IMPRESSION:   1.  History of syncope during exertion.  Her loop recorder has not shown clinically significant arrhythmias over the past 3 years.  Thus, the likelihood she had an arrhythmia causing syncope back in 2016 is low.  I am wondering whether she may  What Is The Reason For Today's Visit?: Full Body Skin Examination with No Concerns What Is The Reason For Today's Visit? (Being Monitored For X): the risk of recurrence of previously treated lesion(s) have suffered an episode of neurally-mediated syncope with vasodepression/hypotension as we saw significant hypotension when the patient was placed on dobutamine during her EP study in 2016.        I discussed with Ms. Beltran that her loop recorder battery will likely become depleted within the next 6 months.  She will then have the option of removing the monitor or not.  If she wants it removed, we will arrange for the procedure accordingly.      2.  Hypertension.  Under decent control at this point.      3.  Nonspecific dyspnea.  Unclear what causes this, but it has already improved.  I reviewed her recent chest x-ray, which was normal.  The patient does not have chest pain to suggest angina.      The patient will continue followup in our Device Clinic until her loop recorder is depleted.  We will not make an appointment with a provider in the EP Clinic unless new issues arise in the future.      Thank you for the opportunity to be part of her care.        ARTIS SIDDIQUI MD, FACC         cc:   [place] [address]             D: 2019   T: 2019   MT: [id number]      Name:     JAVON BELTRAN   MRN:      [id number]        Account:      [id number]   :      1945           Service Date: 2019      Document: [id number]

## 2022-10-26 NOTE — NURSING NOTE
"/62 (BP Location: Right arm, Patient Position: Sitting, Cuff Size: Adult Regular)   Pulse 88   Temp 98  F (36.7  C) (Oral)   Resp 16   Ht 1.727 m (5' 8\")   Wt 79.4 kg (175 lb 1.6 oz)   LMP  (LMP Unknown)   SpO2 98%   BMI 26.62 kg/m      "
Resident

## 2022-10-31 DIAGNOSIS — Z96.641 CHRONIC HIP PAIN AFTER TOTAL REPLACEMENT OF RIGHT HIP JOINT: ICD-10-CM

## 2022-10-31 DIAGNOSIS — G89.29 CHRONIC HIP PAIN AFTER TOTAL REPLACEMENT OF RIGHT HIP JOINT: ICD-10-CM

## 2022-10-31 DIAGNOSIS — M25.551 CHRONIC HIP PAIN AFTER TOTAL REPLACEMENT OF RIGHT HIP JOINT: ICD-10-CM

## 2022-11-03 NOTE — TELEPHONE ENCOUNTER
Gabapentin (Neurontin) 300 mg cap  Routing refill request to provider for review/approval because:  Drug not on the [facility] refill protocol     LOV 10/10/2022    Next 5 appointments (look out 90 days)      Jan 17, 2023  1:00 PM  (Arrive by 12:40 PM)  Annual Wellness Visit with Rachel Miller MD  [facility] ([facility] ) [address] [facility] [id number] [phone]

## 2022-11-04 RX ORDER — GABAPENTIN 300 MG/1
300 CAPSULE ORAL 3 TIMES DAILY
Qty: 270 CAPSULE | Refills: 1 | Status: SHIPPED | OUTPATIENT
Start: 2022-11-04 | End: 2023-01-17

## 2022-11-08 DIAGNOSIS — R10.13 EPIGASTRIC PAIN: ICD-10-CM

## 2022-11-10 NOTE — TELEPHONE ENCOUNTER
Pantoprazole 40 mg tab  Routing refill request to provider for review/approval because:  Diagnosis of Osteoporosis noted on problem list.      LOV 10/10/2022

## 2022-11-11 RX ORDER — PANTOPRAZOLE SODIUM 40 MG/1
TABLET, DELAYED RELEASE ORAL
Qty: 90 TABLET | Refills: 0 | Status: SHIPPED | OUTPATIENT
Start: 2022-11-11 | End: 2023-06-02

## 2022-11-21 ENCOUNTER — OFFICE VISIT (OUTPATIENT)
Dept: CARDIOLOGY | Facility: CLINIC | Age: 77
End: 2022-11-21
Attending: INTERNAL MEDICINE
Payer: MEDICARE

## 2022-11-21 ENCOUNTER — LAB (OUTPATIENT)
Dept: LAB | Facility: CLINIC | Age: 77
End: 2022-11-21
Payer: MEDICARE

## 2022-11-21 VITALS
DIASTOLIC BLOOD PRESSURE: 70 MMHG | HEART RATE: 79 BPM | BODY MASS INDEX: 27.38 KG/M2 | HEIGHT: 68 IN | OXYGEN SATURATION: 97 % | SYSTOLIC BLOOD PRESSURE: 120 MMHG | WEIGHT: 180.7 LBS

## 2022-11-21 DIAGNOSIS — I10 ESSENTIAL HYPERTENSION WITH GOAL BLOOD PRESSURE LESS THAN 140/90: ICD-10-CM

## 2022-11-21 DIAGNOSIS — E78.5 HYPERLIPIDEMIA LDL GOAL <130: ICD-10-CM

## 2022-11-21 LAB
ALT SERPL W P-5'-P-CCNC: 25 U/L (ref 10–35)
CHOLEST SERPL-MCNC: 187 MG/DL
HDLC SERPL-MCNC: 85 MG/DL
LDLC SERPL CALC-MCNC: 73 MG/DL
NONHDLC SERPL-MCNC: 102 MG/DL
TRIGL SERPL-MCNC: 146 MG/DL

## 2022-11-21 PROCEDURE — 99214 OFFICE O/P EST MOD 30 MIN: CPT | Performed by: INTERNAL MEDICINE

## 2022-11-21 PROCEDURE — 84460 ALANINE AMINO (ALT) (SGPT): CPT | Performed by: INTERNAL MEDICINE

## 2022-11-21 PROCEDURE — 36415 COLL VENOUS BLD VENIPUNCTURE: CPT | Performed by: INTERNAL MEDICINE

## 2022-11-21 PROCEDURE — 80061 LIPID PANEL: CPT | Performed by: INTERNAL MEDICINE

## 2022-11-21 NOTE — LETTER
11/21/2022    Rachel Miller MD  [address] [facility] [address]    RE: Teri Beltran       Dear Colleague,     I had the pleasure of seeing Teri Beltran in the [facility].      Cardiology Clinic Progress Note:    November 21, 2022   Patient Name: Teri Beltran  Patient MRN: [id number]     Consult indication: HTN    HPI:    I had the opportunity to see patient Teri Beltran in cardiology clinic for a follow up visit. Patient is followed by our colleague Rachel Miller MD with Primary Care.     As you know, patient is a pleasant 76-year-old female with a past medical history significant for first-degree AV block, right bundle branch block, paroxysmal SVT, GERD, hypertension, hyperlipidemia, chronic venous insufficiency (status post venous ablation), aortic sclerosis, who presents for follow-up.    I had last seen her in clinic 11/29/2021.  Initially, I had seen her for a preoperative cardiac risk assessment prior to hip surgery 9/18/2020.  She is doing well, dobutamine stress echocardiogram was negative for evidence of ischemic changes.  She has had some issues with lightheadedness, hydrochlorothiazide has since been discontinued.    She had been experiencing persistent worsening lower extremity swelling, ultimately she was seen by my colleague Dr. Pina and underwent venous seal ablation of the left greater saphenous vein 8/8/2022.    Overall patient reports that she has been doing well.  She does still have some occasional nonexertional chest discomfort, which she feels might be indigestion.  Last ischemic evaluation was a dobutamine stress echocardiogram 7/26/2022 that was negative, consistent with a low likelihood of obstructive coronary artery disease.     Assessment and Plan/Recommendations:    # Conduction abnormalities, known first-degree AV block, right bundle branch block, paroxysmal SVT, followed by my colleague, Dr. Villareal.    # Hypertension.  Well controlled.   # Chronic lower extremity swelling due to venous insufficiency, history of bilateral great saphenous vein radiofrequency ablation.      -Overall patient is in stable cardiac health without symptoms concerning for angina or decompensated heart failure.  She does have occasional nonexertional chest discomfort, which does not seem like myocardial ischemia.  Reassuringly, a dobutamine stress echocardiogram 7/26/2022 was negative.  Recommend continuing current regimen of furosemide, metoprolol, pravastatin.  Advised patient to alert our team if she has any worsening of chest discomfort, dyspnea, or other symptoms that are concerning for her.  Advised patient to seek medical care if she develops any severe chest pain/discomfort.  Follow-up with cardiology TRISH in 1 year, or sooner as needed.    Thank you for allowing our team to participate in the care of Teri Beltran.  Please do not hesitate to call or page me with any questions or concerns.    Sincerely,     Quincy Anglin MD, [facility]  Cardiology  Text Page   November 21, 2022    Voice recognition software utilized.     Total time spent on this encounter: 30 minutes, providing care in this encounter including, but not limited to, reviewing prior medical records, laboratory data, imaging studies, diagnostic studies, procedure notes, formulating an assessment and plan, recommendations, discussion and counseling with patient face to face, dictation.    Past Medical History:     Past Medical History:   Diagnosis Date     First degree AV block 08/11/2015     Former smoker      Gastroesophageal reflux disease      GERD (gastroesophageal reflux disease)      Glaucoma      Heart block     2:1     Heart murmur      Hyperlipidemia LDL goal <130 06/14/2013     Hypertension goal BP (blood pressure) < 140/90 12/03/2013     Hypothyroidism      Left atrial dilatation     mild     Mild dilation of ascending aorta - borderline  08/11/2015     Palpitations      Prediabetes 06/14/2013     RBBB 08/11/2015     S/P total knee arthroplasty 07/23/2019     Tachycardia      Urinary frequency      Varicose veins      Venous insufficiency     s/p bilateral great saphenous vein radiofrequency ablation by Dr. Garcia        Past Surgical History:   Past Surgical History:   Procedure Laterality Date     ARTHROPLASTY HIP Right 11/20/2020    Procedure: Right total hip arthroplasty using a [employer] Taperloc femoral stem and G7 acetabulum.;  Surgeon: Dragan Muse MD;  Location: [facility] OR     ARTHROPLASTY KNEE Right 07/23/2019    Procedure: Right total knee arthroplasty using an [employer] knee system;  Surgeon: Dragan Muse MD;  Location: [facility] OR     BREAST SURGERY      right breast ductal surgery due to milk production     BREAST SURGERY Right     ductal surgery due to milk production     CATARACT EXTRACTION Right 05/2022     COLONOSCOPY N/A 10/24/2014    no further screening. Procedure: COLONOSCOPY;  Surgeon: Pedro Rudd MD;  Location: [facility] GI     DECOMPRESSION HIP CORE Right 6/6/2022    Procedure: Open right hip abductor repair;  Surgeon: Dragan Muse MD;  Location: [facility] OR     EYE SURGERY       FINGER GANGLION CYST EXCISION Left     ring finger     HYSTERECTOMY       HYSTERECTOMY, PAP NO LONGER INDICATED  2009    total hysterectomy and ovaries. benign     SOFT TISSUE SURGERY      ganglion removed from left wrist and ring finger     SURGICAL HISTORY OF -   age 7    tonsillectomy     SURGICAL HISTORY OF -   01/2015    left eye cataract     SURGICAL HISTORY OF -   Fall 2016    LINQ placed.     TONSILLECTOMY       WRIST GANGLION EXCISION Left        Medications (outpatient):  Current Outpatient Medications   Medication Sig Dispense Refill     acetaminophen (TYLENOL) 325 MG tablet Take 2 tablets (650 mg) by mouth every 4 hours as needed for other (mild pain) 100 tablet 0     dorzolamide-timolol (COSOPT) 2-0.5 %  "ophthalmic solution Place 1 drop into both eyes 2 times daily       famotidine (PEPCID) 10 MG tablet Take 1 tablet (10 mg) by mouth 2 times daily 180 tablet 1     furosemide (LASIX) 20 MG tablet Take 1 tablet (20 mg) by mouth 2 times daily 180 tablet 3     gabapentin (NEURONTIN) 300 MG capsule Take 1 capsule (300 mg) by mouth 3 times daily 270 capsule 1     latanoprost (XALATAN) 0.005 % ophthalmic solution Place 1 drop into the right eye At Bedtime        levothyroxine (SYNTHROID/LEVOTHROID) 88 MCG tablet Take 1 tablet (88 mcg) by mouth daily 90 tablet 1     metoprolol tartrate (LOPRESSOR) 25 MG tablet TAKE 1 TABLET(25 MG) BY MOUTH TWICE DAILY 180 tablet 1     Multiple Vitamins-Minerals (MULTIVITAMIN ADULT PO) Take 1 tablet by mouth daily       pantoprazole (PROTONIX) 40 MG EC tablet TAKE 1 TABLET(40 MG) BY MOUTH DAILY 90 tablet 0     pravastatin (PRAVACHOL) 40 MG tablet TAKE 1 TABLET(40 MG) BY MOUTH AT BEDTIME 90 tablet 3     potassium chloride ER (KLOR-CON M) 10 MEQ CR tablet TAKE 1 TABLET(10 MEQ) BY MOUTH DAILY (Patient not taking: Reported on 11/21/2022) 90 tablet 1       Allergies:  Allergies   Allergen Reactions     Seasonal Allergies      Simvastatin      \"flushed\"       Social History:   History   Drug Use No      History   Smoking Status     Former     Packs/day: 0.50     Years: 18.00     Types: Cigarettes     Quit date: 1980   Smokeless Tobacco     Never     Social History    Substance and Sexual Activity      Alcohol use: Not Currently       Family History:  Family History   Problem Relation Age of Onset     Ovarian Cancer Mother      Cancer Mother         ovarian     Breast Cancer Mother      Heart Disease Father         rare; not sure what it was     Diabetes Father         old age     Cerebrovascular Disease Brother 68       Review of Systems:   A complete review of systems was negative except as mentioned in the History of Present Illness.     Objective & Physical Exam:  /70 (BP Location: Right " "arm, Patient Position: Sitting, Cuff Size: Adult Regular)   Pulse 79   Ht 1.727 m (5' 8\")   Wt 82 kg (180 lb 11.2 oz)   LMP  (LMP Unknown)   SpO2 97%   BMI 27.48 kg/m    Wt Readings from Last 2 Encounters:   11/21/22 82 kg (180 lb 11.2 oz)   10/10/22 81 kg (178 lb 8 oz)     Body mass index is 27.48 kg/m .   Body surface area is 1.98 meters squared.    Constitutional: appears stated age, in no apparent distress, appears to be well nourished  Head: normocephalic, atraumatic  Neck: supple, trachea midline  Pulmonary: clear to auscultation bilaterally, no wheezes, no rales, no increased work of breathing  Cardiovascular: JVP normal, regular rate, regular rhythm, 1/6 KATTY at the RUSB, trace to 1+ BLE edema   Gastrointestinal: no guarding, non-rigid   Neurologic: awake, alert, moves all extremities  Skin: no jaundice, warm on limited exam  Psychiatric: affect is normal, answers questions appropriately, oriented to self and place    Data reviewed:  Lab Results   Component Value Date    WBC 7.6 10/10/2022    WBC 6.4 12/17/2020    RBC 4.50 10/10/2022    RBC 2.91 (L) 12/17/2020    HGB 12.5 10/10/2022    HGB 9.2 (L) 12/18/2020    HCT 39.9 10/10/2022    HCT 27.5 (L) 12/17/2020    MCV 89 10/10/2022    MCV 95 12/17/2020    MCH 27.8 10/10/2022    MCH 29.6 12/17/2020    MCHC 31.3 (L) 10/10/2022    MCHC 31.3 (L) 12/17/2020    RDW 14.1 10/10/2022    RDW 13.1 12/17/2020     10/10/2022     12/17/2020     Sodium   Date Value Ref Range Status   10/10/2022 140 133 - 144 mmol/L Final   12/17/2020 138 133 - 144 mmol/L Final     Potassium   Date Value Ref Range Status   10/10/2022 4.8 3.4 - 5.3 mmol/L Final     Comment:     Specimen slightly hemolyzed, potassium may be falsely elevated.   12/18/2020 3.5 3.4 - 5.3 mmol/L Final     Chloride   Date Value Ref Range Status   10/10/2022 108 94 - 109 mmol/L Final   12/17/2020 105 94 - 109 mmol/L Final     Carbon Dioxide   Date Value Ref Range Status   12/17/2020 30 20 - 32 mmol/L " Final     Carbon Dioxide (CO2)   Date Value Ref Range Status   10/10/2022 28 20 - 32 mmol/L Final     Anion Gap   Date Value Ref Range Status   10/10/2022 4 3 - 14 mmol/L Final   12/17/2020 3 3 - 14 mmol/L Final     Glucose   Date Value Ref Range Status   10/10/2022 110 (H) 70 - 99 mg/dL Final   12/17/2020 99 70 - 99 mg/dL Final     Urea Nitrogen   Date Value Ref Range Status   10/10/2022 23 7 - 30 mg/dL Final   12/17/2020 16 7 - 30 mg/dL Final     Creatinine   Date Value Ref Range Status   10/10/2022 0.54 0.52 - 1.04 mg/dL Final   12/17/2020 0.57 0.52 - 1.04 mg/dL Final     GFR Estimate   Date Value Ref Range Status   10/10/2022 >90 >60 mL/min/1.73m2 Final     Comment:     Effective December 21, 2021 eGFRcr in adults is calculated using the 2021 CKD-EPI creatinine equation which includes age and gender (Johnnie et al., NEJ, DOI: 10.1056/VXQBdr2623061)   12/17/2020 90 >60 mL/min/[1.73_m2] Final     Comment:     Non  GFR Calc  Starting 12/18/2018, serum creatinine based estimated GFR (eGFR) will be   calculated using the Chronic Kidney Disease Epidemiology Collaboration   (CKD-EPI) equation.       Calcium   Date Value Ref Range Status   10/10/2022 10.1 8.5 - 10.1 mg/dL Final   12/17/2020 8.2 (L) 8.5 - 10.1 mg/dL Final     Bilirubin Total   Date Value Ref Range Status   10/10/2022 0.4 0.2 - 1.3 mg/dL Final   08/28/2020 0.5 0.2 - 1.3 mg/dL Final     Alkaline Phosphatase   Date Value Ref Range Status   10/10/2022 133 40 - 150 U/L Final   08/28/2020 79 40 - 150 U/L Final     ALT   Date Value Ref Range Status   11/21/2022 25 10 - 35 U/L Final   08/28/2020 19 0 - 50 U/L Final     AST   Date Value Ref Range Status   10/10/2022 47 (H) 0 - 45 U/L Final   08/28/2020 15 0 - 45 U/L Final     Recent Labs   Lab Test 08/23/21  1005 08/28/20  1016 09/30/16  1051 08/11/15  1224   CHOL 162 178   < > 176   HDL 82 92   < > 79   LDL 49 65   < > 70   TRIG 156* 106   < > 133   CHOLHDLRATIO  --   --   --  2.2    < > = values  in this interval not displayed.      Lab Results   Component Value Date    A1C 5.9 2022    A1C 6.1 2020    A1C 5.7 10/28/2019    A1C 5.8 2018    A1C 5.9 2017    A1C 6.1 2014        Recent Results (from the past 4320 hour(s))   Echocardiogram Dobutamine Stress    Narrative    [id number]  [id number]  [id number]  [id number]^STEFAN^VIPIN^[place]     [facility]  Echocardiography Laboratory  [address] [place] [address]     Name: JAVON MARTÍNEZ  MRN: [id number]  : 1945  Study Date: 2022 08:18 AM  Age: 76 yrs  Gender: Female  Patient Location: [facility]  Reason For Study: Chest pain, unspecified type  History: High cholesterol  Ordering Physician: VIPIN AVILES  Referring Physician: VIPIN AVILES  Performed By: Emelia Sherman     BSA: 1.8 m2  Height: 63 in  Weight: 179 lb  HR: 69  BP: 154/85 mmHg  Medications: metoprolol  ______________________________________________________________________________  Procedure  Contrast Optison. Dobutamine stress echo with two dimensional color and  spectral Doppler performed.  ______________________________________________________________________________  Interpretation Summary  No hemodynamically significant valvular abnormalities on 2D or color flow  imaging. This test indicates a low probability of severe occlusive coronary  artery disease.  ______________________________________________________________________________  Stress  The patient exhibited dyspnea during the drug infusion.  The drug infusion was stopped due to target heart rate achieved.  The maximum dose of dobutamine was 30mcg/kg/min.  The maximum dose of metoprolol was 3mg.  The patient exhibited no chest pain or ST segment changes with Dobutamine.  Target Heart Rate was achieved.  Normal resting wall motion and no stress-induced wall motion abnormality.  Left ventricular cavity size decreases with exercise.  Global  LV systolic function augments with exercise.  The visual ejection fraction is >70%.     Baseline  The patient is in normal sinus rhythm.  First degree AV block and incomplete RBBB.  The visual ejection fraction is estimated at 55-60%.     Stress Results           Protocol:  Dobutamine        Maximum Predicted HR:   143 bpm           Target HR: 122 bpm           % Maximum Predicted HR: 91 %                    Stage  DurationHeart Rate  BP  Dose    Comment                        (mm:ss)   (bpm)                Stage 1   3:00      77    140/7310.00                Stage 2   3:00     109    109/6820.00                Stage 3   3:17     130    132/6030.00SOB               RecoveryR  6:30      86    139/73     3mg metoprolol            Stress Duration:   9:17 mm:ss *        Recovery Time: 6:30 mm:ss         Maximum Stress HR: 130 bpm *     Aortic Valve  There is trace aortic regurgitation.     ______________________________________________________________________________  Report approved by: Cecile Cisse 07/26/2022 05:10 PM     ______________________________________________________________________________             Thank you for allowing me to participate in the care of your patient.      Sincerely,     Quincy Anglin MD     [facility]  cc:   Quincy Anglin MD  [address], [address] [address]  [place] [id number]

## 2022-11-21 NOTE — PATIENT INSTRUCTIONS
November 21, 2022    Thank you for allowing our Cardiology team to participate in your care.     Please note the following changes to your heart treatment plan:     Medication changes:   - none    Tests to be done:  - none    Follow up:  - Follow up in 1 year, or sooner as needed.      For scheduling, please call [phone].    Please contact our team at [phone] (Yanet PEREZ) or [phone] for any questions or concerns.     If you are having a medical emergency, please call [phone].     Sincerely,    Quincy Anglin MD, FACC  Cardiology    [facility] [place]

## 2022-11-21 NOTE — PROGRESS NOTES
Cardiology Clinic Progress Note:    November 21, 2022   Patient Name: Teri Beltran  Patient MRN: [id number]     Consult indication: HTN    HPI:    I had the opportunity to see patient Teri Beltran in cardiology clinic for a follow up visit. Patient is followed by our colleague Rachel Miller MD with Primary Care.     As you know, patient is a pleasant 76-year-old female with a past medical history significant for first-degree AV block, right bundle branch block, paroxysmal SVT, GERD, hypertension, hyperlipidemia, chronic venous insufficiency (status post venous ablation), aortic sclerosis, who presents for follow-up.    I had last seen her in clinic 11/29/2021.  Initially, I had seen her for a preoperative cardiac risk assessment prior to hip surgery 9/18/2020.  She is doing well, dobutamine stress echocardiogram was negative for evidence of ischemic changes.  She has had some issues with lightheadedness, hydrochlorothiazide has since been discontinued.    She had been experiencing persistent worsening lower extremity swelling, ultimately she was seen by my colleague Dr. Pina and underwent venous seal ablation of the left greater saphenous vein 8/8/2022.    Overall patient reports that she has been doing well.  She does still have some occasional nonexertional chest discomfort, which she feels might be indigestion.  Last ischemic evaluation was a dobutamine stress echocardiogram 7/26/2022 that was negative, consistent with a low likelihood of obstructive coronary artery disease.     Assessment and Plan/Recommendations:    # Conduction abnormalities, known first-degree AV block, right bundle branch block, paroxysmal SVT, followed by my colleague, Dr. Villareal.   # Hypertension.  Well controlled.   # Chronic lower extremity swelling due to venous insufficiency, history of bilateral great saphenous vein radiofrequency ablation.      -Overall patient is in stable cardiac health without  symptoms concerning for angina or decompensated heart failure.  She does have occasional nonexertional chest discomfort, which does not seem like myocardial ischemia.  Reassuringly, a dobutamine stress echocardiogram 7/26/2022 was negative.  Recommend continuing current regimen of furosemide, metoprolol, pravastatin.  Advised patient to alert our team if she has any worsening of chest discomfort, dyspnea, or other symptoms that are concerning for her.  Advised patient to seek medical care if she develops any severe chest pain/discomfort.  Follow-up with cardiology [facility] in 1 year, or sooner as needed.    Thank you for allowing our team to participate in the care of Teri Beltran.  Please do not hesitate to call or page me with any questions or concerns.    Sincerely,     Quincy Anglin MD, [facility]  Cardiology  Text Page   November 21, 2022    Voice recognition software utilized.     Total time spent on this encounter: 30 minutes, providing care in this encounter including, but not limited to, reviewing prior medical records, laboratory data, imaging studies, diagnostic studies, procedure notes, formulating an assessment and plan, recommendations, discussion and counseling with patient face to face, dictation.    Past Medical History:     Past Medical History:   Diagnosis Date     First degree AV block 08/11/2015     Former smoker      Gastroesophageal reflux disease      GERD (gastroesophageal reflux disease)      Glaucoma      Heart block     2:1     Heart murmur      Hyperlipidemia LDL goal <130 06/14/2013     Hypertension goal BP (blood pressure) < 140/90 12/03/2013     Hypothyroidism      Left atrial dilatation     mild     Mild dilation of ascending aorta - borderline 08/11/2015     Palpitations      Prediabetes 06/14/2013     RBBB 08/11/2015     S/P total knee arthroplasty 07/23/2019     Tachycardia      Urinary frequency      Varicose veins      Venous insufficiency     s/p bilateral great  saphenous vein radiofrequency ablation by Dr. Garcia        Past Surgical History:   Past Surgical History:   Procedure Laterality Date     ARTHROPLASTY HIP Right 11/20/2020    Procedure: Right total hip arthroplasty using a Biomet Taperloc femoral stem and G7 acetabulum.;  Surgeon: Dragan Muse MD;  Location: [facility] OR     ARTHROPLASTY KNEE Right 07/23/2019    Procedure: Right total knee arthroplasty using an Ar[employer]rex iBalance knee system;  Surgeon: Dragan Muse MD;  Location: [facility] OR     BREAST SURGERY      right breast ductal surgery due to milk production     BREAST SURGERY Right     ductal surgery due to milk production     CATARACT EXTRACTION Right 05/2022     COLONOSCOPY N/A 10/24/2014    no further screening. Procedure: COLONOSCOPY;  Surgeon: Pedro Rudd MD;  Location: [facility] GI     DECOMPRESSION HIP CORE Right 6/6/2022    Procedure: Open right hip abductor repair;  Surgeon: Dragan Muse MD;  Location: [facility] OR     EYE SURGERY       FINGER GANGLION CYST EXCISION Left     ring finger     HYSTERECTOMY       HYSTERECTOMY, PAP NO LONGER INDICATED  2009    total hysterectomy and ovaries. benign     SOFT TISSUE SURGERY      ganglion removed from left wrist and ring finger     SURGICAL HISTORY OF -   age 7    tonsillectomy     SURGICAL HISTORY OF -   01/2015    left eye cataract     SURGICAL HISTORY OF -   Fall 2016    LINQ placed.     TONSILLECTOMY       WRIST GANGLION EXCISION Left        Medications (outpatient):  Current Outpatient Medications   Medication Sig Dispense Refill     acetaminophen (TYLENOL) 325 MG tablet Take 2 tablets (650 mg) by mouth every 4 hours as needed for other (mild pain) 100 tablet 0     dorzolamide-timolol (COSOPT) 2-0.5 % ophthalmic solution Place 1 drop into both eyes 2 times daily       famotidine (PEPCID) 10 MG tablet Take 1 tablet (10 mg) by mouth 2 times daily 180 tablet 1     furosemide (LASIX) 20 MG tablet Take 1 tablet (20 mg) by mouth 2 times  "daily 180 tablet 3     gabapentin (NEURONTIN) 300 MG capsule Take 1 capsule (300 mg) by mouth 3 times daily 270 capsule 1     latanoprost (XALATAN) 0.005 % ophthalmic solution Place 1 drop into the right eye At Bedtime        levothyroxine (SYNTHROID/LEVOTHROID) 88 MCG tablet Take 1 tablet (88 mcg) by mouth daily 90 tablet 1     metoprolol tartrate (LOPRESSOR) 25 MG tablet TAKE 1 TABLET(25 MG) BY MOUTH TWICE DAILY 180 tablet 1     Multiple Vitamins-Minerals (MULTIVITAMIN ADULT PO) Take 1 tablet by mouth daily       pantoprazole (PROTONIX) 40 MG EC tablet TAKE 1 TABLET(40 MG) BY MOUTH DAILY 90 tablet 0     pravastatin (PRAVACHOL) 40 MG tablet TAKE 1 TABLET(40 MG) BY MOUTH AT BEDTIME 90 tablet 3     potassium chloride ER (KLOR-CON M) 10 MEQ CR tablet TAKE 1 TABLET(10 MEQ) BY MOUTH DAILY (Patient not taking: Reported on 11/21/2022) 90 tablet 1       Allergies:  Allergies   Allergen Reactions     Seasonal Allergies      Simvastatin      \"flushed\"       Social History:   History   Drug Use No      History   Smoking Status     Former     Packs/day: 0.50     Years: 18.00     Types: Cigarettes     Quit date: 1980   Smokeless Tobacco     Never     Social History    Substance and Sexual Activity      Alcohol use: Not Currently       Family History:  Family History   Problem Relation Age of Onset     Ovarian Cancer Mother      Cancer Mother         ovarian     Breast Cancer Mother      Heart Disease Father         rare; not sure what it was     Diabetes Father         old age     Cerebrovascular Disease Brother 68       Review of Systems:   A complete review of systems was negative except as mentioned in the History of Present Illness.     Objective & Physical Exam:  /70 (BP Location: Right arm, Patient Position: Sitting, Cuff Size: Adult Regular)   Pulse 79   Ht 1.727 m (5' 8\")   Wt 82 kg (180 lb 11.2 oz)   LMP  (LMP Unknown)   SpO2 97%   BMI 27.48 kg/m    Wt Readings from Last 2 Encounters:   11/21/22 82 kg (180 " lb 11.2 oz)   10/10/22 81 kg (178 lb 8 oz)     Body mass index is 27.48 kg/m .   Body surface area is 1.98 meters squared.    Constitutional: appears stated age, in no apparent distress, appears to be well nourished  Head: normocephalic, atraumatic  Neck: supple, trachea midline  Pulmonary: clear to auscultation bilaterally, no wheezes, no rales, no increased work of breathing  Cardiovascular: JVP normal, regular rate, regular rhythm, 1/6 KATTY at the RUSB, trace to 1+ BLE edema   Gastrointestinal: no guarding, non-rigid   Neurologic: awake, alert, moves all extremities  Skin: no jaundice, warm on limited exam  Psychiatric: affect is normal, answers questions appropriately, oriented to self and place    Data reviewed:  Lab Results   Component Value Date    WBC 7.6 10/10/2022    WBC 6.4 12/17/2020    RBC 4.50 10/10/2022    RBC 2.91 (L) 12/17/2020    HGB 12.5 10/10/2022    HGB 9.2 (L) 12/18/2020    HCT 39.9 10/10/2022    HCT 27.5 (L) 12/17/2020    MCV 89 10/10/2022    MCV 95 12/17/2020    MCH 27.8 10/10/2022    MCH 29.6 12/17/2020    MCHC 31.3 (L) 10/10/2022    MCHC 31.3 (L) 12/17/2020    RDW 14.1 10/10/2022    RDW 13.1 12/17/2020     10/10/2022     12/17/2020     Sodium   Date Value Ref Range Status   10/10/2022 140 133 - 144 mmol/L Final   12/17/2020 138 133 - 144 mmol/L Final     Potassium   Date Value Ref Range Status   10/10/2022 4.8 3.4 - 5.3 mmol/L Final     Comment:     Specimen slightly hemolyzed, potassium may be falsely elevated.   12/18/2020 3.5 3.4 - 5.3 mmol/L Final     Chloride   Date Value Ref Range Status   10/10/2022 108 94 - 109 mmol/L Final   12/17/2020 105 94 - 109 mmol/L Final     Carbon Dioxide   Date Value Ref Range Status   12/17/2020 30 20 - 32 mmol/L Final     Carbon Dioxide (CO2)   Date Value Ref Range Status   10/10/2022 28 20 - 32 mmol/L Final     Anion Gap   Date Value Ref Range Status   10/10/2022 4 3 - 14 mmol/L Final   12/17/2020 3 3 - 14 mmol/L Final     Glucose   Date  Value Ref Range Status   10/10/2022 110 (H) 70 - 99 mg/dL Final   12/17/2020 99 70 - 99 mg/dL Final     Urea Nitrogen   Date Value Ref Range Status   10/10/2022 23 7 - 30 mg/dL Final   12/17/2020 16 7 - 30 mg/dL Final     Creatinine   Date Value Ref Range Status   10/10/2022 0.54 0.52 - 1.04 mg/dL Final   12/17/2020 0.57 0.52 - 1.04 mg/dL Final     GFR Estimate   Date Value Ref Range Status   10/10/2022 >90 >60 mL/min/1.73m2 Final     Comment:     Effective December 21, 2021 eGFRcr in adults is calculated using the 2021 CKD-EPI creatinine equation which includes age and gender (Johnnie et al., [place], DOI: 10.1056/IOADmp6181889)   12/17/2020 90 >60 mL/min/[1.73_m2] Final     Comment:     Non  GFR Calc  Starting 12/18/2018, serum creatinine based estimated GFR (eGFR) will be   calculated using the Chronic Kidney Disease Epidemiology Collaboration   (CKD-EPI) equation.       Calcium   Date Value Ref Range Status   10/10/2022 10.1 8.5 - 10.1 mg/dL Final   12/17/2020 8.2 (L) 8.5 - 10.1 mg/dL Final     Bilirubin Total   Date Value Ref Range Status   10/10/2022 0.4 0.2 - 1.3 mg/dL Final   08/28/2020 0.5 0.2 - 1.3 mg/dL Final     Alkaline Phosphatase   Date Value Ref Range Status   10/10/2022 133 40 - 150 U/L Final   08/28/2020 79 40 - 150 U/L Final     ALT   Date Value Ref Range Status   11/21/2022 25 10 - 35 U/L Final   08/28/2020 19 0 - 50 U/L Final     AST   Date Value Ref Range Status   10/10/2022 47 (H) 0 - 45 U/L Final   08/28/2020 15 0 - 45 U/L Final     Recent Labs   Lab Test 08/23/21  1005 08/28/20  1016 09/30/16  1051 08/11/15  1224   CHOL 162 178   < > 176   HDL 82 92   < > 79   LDL 49 65   < > 70   TRIG 156* 106   < > 133   CHOLHDLRATIO  --   --   --  2.2    < > = values in this interval not displayed.      Lab Results   Component Value Date    A1C 5.9 05/26/2022    A1C 6.1 08/28/2020    A1C 5.7 10/28/2019    A1C 5.8 09/28/2018    A1C 5.9 09/28/2017    A1C 6.1 11/14/2014        Recent Results (from  the past 4320 hour(s))   Echocardiogram Dobutamine Stress    Narrative    [id number]  [id number]  [id number]  [id number]^STEFAN^VIPIN^[place]     [facility]  Echocardiography Laboratory  [address] [place] [address]     Name: JAVON MARTÍNEZ  MRN: [id number]  : 1945  Study Date: 2022 08:18 AM  Age: 76 yrs  Gender: Female  Patient Location: [facility]  Reason For Study: Chest pain, unspecified type  History: High cholesterol  Ordering Physician: VIPIN AVILES  Referring Physician: VIPIN AVILES  Performed By: Emelia Sherman     BSA: 1.8 m2  Height: 63 in  Weight: 179 lb  HR: 69  BP: 154/85 mmHg  Medications: metoprolol  ______________________________________________________________________________  Procedure  Contrast Optison. Dobutamine stress echo with two dimensional color and  spectral Doppler performed.  ______________________________________________________________________________  Interpretation Summary  No hemodynamically significant valvular abnormalities on 2D or color flow  imaging. This test indicates a low probability of severe occlusive coronary  artery disease.  ______________________________________________________________________________  Stress  The patient exhibited dyspnea during the drug infusion.  The drug infusion was stopped due to target heart rate achieved.  The maximum dose of dobutamine was 30mcg/kg/min.  The maximum dose of metoprolol was 3mg.  The patient exhibited no chest pain or ST segment changes with Dobutamine.  Target Heart Rate was achieved.  Normal resting wall motion and no stress-induced wall motion abnormality.  Left ventricular cavity size decreases with exercise.  Global LV systolic function augments with exercise.  The visual ejection fraction is >70%.     Baseline  The patient is in normal sinus rhythm.  First degree AV block and incomplete RBBB.  The visual ejection fraction is estimated at  55-60%.     Stress Results           Protocol:  Dobutamine        Maximum Predicted HR:   143 bpm           Target HR: 122 bpm           % Maximum Predicted HR: 91 %                    Stage  DurationHeart Rate  BP  Dose    Comment                        (mm:ss)   (bpm)                Stage 1   3:00      77    140/7310.00                Stage 2   3:00     109    109/6820.00                Stage 3   3:17     130    132/6030.00SOB               RecoveryR  6:30      86    139/73     3mg metoprolol            Stress Duration:   9:17 mm:ss *        Recovery Time: 6:30 mm:ss         Maximum Stress HR: 130 bpm *     Aortic Valve  There is trace aortic regurgitation.     ______________________________________________________________________________  Report approved by: Cecile Cisse 07/26/2022 05:10 PM     ______________________________________________________________________________

## 2022-11-28 ENCOUNTER — TRANSFERRED RECORDS (OUTPATIENT)
Dept: HEALTH INFORMATION MANAGEMENT | Facility: CLINIC | Age: 77
End: 2022-11-28

## 2023-01-10 ENCOUNTER — LAB (OUTPATIENT)
Dept: LAB | Facility: CLINIC | Age: 78
End: 2023-01-10
Payer: MEDICARE

## 2023-01-10 DIAGNOSIS — Z96.641 CHRONIC HIP PAIN AFTER TOTAL REPLACEMENT OF RIGHT HIP JOINT: ICD-10-CM

## 2023-01-10 DIAGNOSIS — E78.5 HYPERLIPIDEMIA LDL GOAL <130: ICD-10-CM

## 2023-01-10 DIAGNOSIS — E03.9 HYPOTHYROIDISM, UNSPECIFIED TYPE: ICD-10-CM

## 2023-01-10 DIAGNOSIS — R00.2 PALPITATIONS: ICD-10-CM

## 2023-01-10 DIAGNOSIS — I10 HYPERTENSION GOAL BP (BLOOD PRESSURE) < 140/90: ICD-10-CM

## 2023-01-10 DIAGNOSIS — G89.29 CHRONIC HIP PAIN AFTER TOTAL REPLACEMENT OF RIGHT HIP JOINT: ICD-10-CM

## 2023-01-10 DIAGNOSIS — M25.551 CHRONIC HIP PAIN AFTER TOTAL REPLACEMENT OF RIGHT HIP JOINT: ICD-10-CM

## 2023-01-10 DIAGNOSIS — I10 ESSENTIAL HYPERTENSION WITH GOAL BLOOD PRESSURE LESS THAN 140/90: ICD-10-CM

## 2023-01-10 DIAGNOSIS — R73.9 BLOOD SUGAR INCREASED: ICD-10-CM

## 2023-01-10 DIAGNOSIS — I87.2 EDEMA OF LEFT LOWER EXTREMITY DUE TO PERIPHERAL VENOUS INSUFFICIENCY: ICD-10-CM

## 2023-01-10 DIAGNOSIS — R60.9 FLUID RETENTION: ICD-10-CM

## 2023-01-10 DIAGNOSIS — F41.9 ANXIETY: ICD-10-CM

## 2023-01-10 DIAGNOSIS — F51.02 ADJUSTMENT INSOMNIA: ICD-10-CM

## 2023-01-10 LAB
ALBUMIN SERPL BCG-MCNC: 4.7 G/DL (ref 3.5–5.2)
ALP SERPL-CCNC: 115 U/L (ref 35–104)
ALT SERPL W P-5'-P-CCNC: 22 U/L (ref 10–35)
ANION GAP SERPL CALCULATED.3IONS-SCNC: 15 MMOL/L (ref 7–15)
AST SERPL W P-5'-P-CCNC: 27 U/L (ref 10–35)
BILIRUB SERPL-MCNC: 0.5 MG/DL
BUN SERPL-MCNC: 19.4 MG/DL (ref 8–23)
CALCIUM SERPL-MCNC: 10.2 MG/DL (ref 8.8–10.2)
CHLORIDE SERPL-SCNC: 102 MMOL/L (ref 98–107)
CHOLEST SERPL-MCNC: 181 MG/DL
CK SERPL-CCNC: 86 U/L (ref 26–192)
CREAT SERPL-MCNC: 0.62 MG/DL (ref 0.51–0.95)
CREAT UR-MCNC: 87.2 MG/DL
DEPRECATED HCO3 PLAS-SCNC: 24 MMOL/L (ref 22–29)
ERYTHROCYTE [DISTWIDTH] IN BLOOD BY AUTOMATED COUNT: 14.2 % (ref 10–15)
GFR SERPL CREATININE-BSD FRML MDRD: >90 ML/MIN/1.73M2
GLUCOSE SERPL-MCNC: 107 MG/DL (ref 70–99)
HCT VFR BLD AUTO: 35.8 % (ref 35–47)
HDLC SERPL-MCNC: 84 MG/DL
HGB BLD-MCNC: 11.8 G/DL (ref 11.7–15.7)
LDLC SERPL CALC-MCNC: 75 MG/DL
MCH RBC QN AUTO: 26.9 PG (ref 26.5–33)
MCHC RBC AUTO-ENTMCNC: 33 G/DL (ref 31.5–36.5)
MCV RBC AUTO: 82 FL (ref 78–100)
MICROALBUMIN UR-MCNC: <12 MG/L
MICROALBUMIN/CREAT UR: NORMAL MG/G{CREAT}
NONHDLC SERPL-MCNC: 97 MG/DL
PLATELET # BLD AUTO: 397 10E3/UL (ref 150–450)
POTASSIUM SERPL-SCNC: 4.1 MMOL/L (ref 3.4–5.3)
PROT SERPL-MCNC: 7.7 G/DL (ref 6.4–8.3)
RBC # BLD AUTO: 4.39 10E6/UL (ref 3.8–5.2)
SODIUM SERPL-SCNC: 141 MMOL/L (ref 136–145)
TRIGL SERPL-MCNC: 112 MG/DL
TSH SERPL DL<=0.005 MIU/L-ACNC: 2.3 UIU/ML (ref 0.3–4.2)
WBC # BLD AUTO: 5.7 10E3/UL (ref 4–11)

## 2023-01-10 PROCEDURE — 36415 COLL VENOUS BLD VENIPUNCTURE: CPT | Performed by: INTERNAL MEDICINE

## 2023-01-10 PROCEDURE — 82043 UR ALBUMIN QUANTITATIVE: CPT | Performed by: INTERNAL MEDICINE

## 2023-01-10 PROCEDURE — 80053 COMPREHEN METABOLIC PANEL: CPT | Performed by: INTERNAL MEDICINE

## 2023-01-10 PROCEDURE — 83036 HEMOGLOBIN GLYCOSYLATED A1C: CPT

## 2023-01-10 PROCEDURE — 80061 LIPID PANEL: CPT | Performed by: INTERNAL MEDICINE

## 2023-01-10 PROCEDURE — 82550 ASSAY OF CK (CPK): CPT | Performed by: INTERNAL MEDICINE

## 2023-01-10 PROCEDURE — 85027 COMPLETE CBC AUTOMATED: CPT | Performed by: INTERNAL MEDICINE

## 2023-01-10 PROCEDURE — 82570 ASSAY OF URINE CREATININE: CPT | Performed by: INTERNAL MEDICINE

## 2023-01-10 PROCEDURE — 84443 ASSAY THYROID STIM HORMONE: CPT | Performed by: INTERNAL MEDICINE

## 2023-01-14 DIAGNOSIS — R73.9 BLOOD SUGAR INCREASED: Primary | ICD-10-CM

## 2023-01-16 LAB — HBA1C MFR BLD: 6 % (ref 0–5.6)

## 2023-01-17 ENCOUNTER — OFFICE VISIT (OUTPATIENT)
Dept: INTERNAL MEDICINE | Facility: CLINIC | Age: 78
End: 2023-01-17
Payer: MEDICARE

## 2023-01-17 VITALS
RESPIRATION RATE: 18 BRPM | OXYGEN SATURATION: 98 % | SYSTOLIC BLOOD PRESSURE: 139 MMHG | TEMPERATURE: 98.7 F | BODY MASS INDEX: 26.07 KG/M2 | HEART RATE: 91 BPM | DIASTOLIC BLOOD PRESSURE: 73 MMHG | WEIGHT: 172 LBS | HEIGHT: 68 IN

## 2023-01-17 DIAGNOSIS — E78.5 HYPERLIPIDEMIA LDL GOAL <130: ICD-10-CM

## 2023-01-17 DIAGNOSIS — Z00.00 ROUTINE GENERAL MEDICAL EXAMINATION AT A HEALTH CARE FACILITY: Primary | ICD-10-CM

## 2023-01-17 DIAGNOSIS — Z79.899 ENCOUNTER FOR LONG-TERM (CURRENT) USE OF MEDICATIONS: ICD-10-CM

## 2023-01-17 DIAGNOSIS — E03.9 HYPOTHYROIDISM, UNSPECIFIED TYPE: ICD-10-CM

## 2023-01-17 DIAGNOSIS — R60.9 FLUID RETENTION: ICD-10-CM

## 2023-01-17 DIAGNOSIS — M25.551 CHRONIC HIP PAIN AFTER TOTAL REPLACEMENT OF RIGHT HIP JOINT: ICD-10-CM

## 2023-01-17 DIAGNOSIS — R00.2 PALPITATIONS: ICD-10-CM

## 2023-01-17 DIAGNOSIS — I10 HYPERTENSION GOAL BP (BLOOD PRESSURE) < 140/90: ICD-10-CM

## 2023-01-17 DIAGNOSIS — I77.810 THORACIC AORTIC ECTASIA (H): ICD-10-CM

## 2023-01-17 DIAGNOSIS — G89.29 CHRONIC HIP PAIN AFTER TOTAL REPLACEMENT OF RIGHT HIP JOINT: ICD-10-CM

## 2023-01-17 DIAGNOSIS — I47.10 SUPRAVENTRICULAR TACHYCARDIA (H): ICD-10-CM

## 2023-01-17 DIAGNOSIS — Z96.641 CHRONIC HIP PAIN AFTER TOTAL REPLACEMENT OF RIGHT HIP JOINT: ICD-10-CM

## 2023-01-17 PROBLEM — J47.9 BRONCHIECTASIS WITHOUT COMPLICATION (H): Status: RESOLVED | Noted: 2022-04-15 | Resolved: 2023-01-17

## 2023-01-17 PROCEDURE — 99214 OFFICE O/P EST MOD 30 MIN: CPT | Mod: 25 | Performed by: INTERNAL MEDICINE

## 2023-01-17 PROCEDURE — G0439 PPPS, SUBSEQ VISIT: HCPCS | Performed by: INTERNAL MEDICINE

## 2023-01-17 RX ORDER — METOPROLOL TARTRATE 25 MG/1
TABLET, FILM COATED ORAL
Qty: 180 TABLET | Refills: 1 | Status: SHIPPED | OUTPATIENT
Start: 2023-01-17 | End: 2023-08-22

## 2023-01-17 RX ORDER — PRAVASTATIN SODIUM 40 MG
40 TABLET ORAL AT BEDTIME
Qty: 90 TABLET | Refills: 1 | Status: SHIPPED | OUTPATIENT
Start: 2023-01-17 | End: 2023-08-22

## 2023-01-17 RX ORDER — LEVOTHYROXINE SODIUM 88 UG/1
88 TABLET ORAL DAILY
Qty: 90 TABLET | Refills: 1 | Status: SHIPPED | OUTPATIENT
Start: 2023-01-17 | End: 2023-04-10

## 2023-01-17 RX ORDER — GABAPENTIN 300 MG/1
300 CAPSULE ORAL 3 TIMES DAILY
Qty: 270 CAPSULE | Refills: 1 | Status: SHIPPED | OUTPATIENT
Start: 2023-01-17 | End: 2023-08-14

## 2023-01-17 RX ORDER — POTASSIUM CHLORIDE 750 MG/1
TABLET, EXTENDED RELEASE ORAL
Qty: 90 TABLET | Refills: 1 | Status: SHIPPED | OUTPATIENT
Start: 2023-01-17 | End: 2023-08-11

## 2023-01-17 ASSESSMENT — ENCOUNTER SYMPTOMS
DIARRHEA: 0
PARESTHESIAS: 0
NAUSEA: 0
EYE PAIN: 0
HEADACHES: 0
DYSURIA: 0
FEVER: 0
CHILLS: 0
HEARTBURN: 0
MYALGIAS: 1
DIZZINESS: 0
ABDOMINAL PAIN: 0
PALPITATIONS: 0
WEAKNESS: 0
CONSTIPATION: 0
NERVOUS/ANXIOUS: 0
JOINT SWELLING: 0
FREQUENCY: 0
ARTHRALGIAS: 1
SORE THROAT: 0
SHORTNESS OF BREATH: 0
BREAST MASS: 0

## 2023-01-17 ASSESSMENT — ACTIVITIES OF DAILY LIVING (ADL): CURRENT_FUNCTION: NO ASSISTANCE NEEDED

## 2023-01-17 ASSESSMENT — PAIN SCALES - GENERAL: PAINLEVEL: NO PAIN (0)

## 2023-01-17 NOTE — PROGRESS NOTES
Dr Wong's note    Patient's instructions / PLAN:                                                      Currently in significant shortness of breath patient's been waiting for 45-minute for her to have some first-degree 323 trifecta 2 weeks ago could not get the prescription so that it was important [place] you.  Improvement reschedule Hello Dr. 32-year-old I would suggest.  This will be next course especially appropriate results    6  Plan:  1.Continue same meds, same doses for now   2. Follow up in 6 months      ASSESSMENT & PLAN:                                                      (Z00.00) Routine general medical examination at a health care facility  Comment:   Plan:          (I10) HTN, goal  < 140/90  (primary encounter diagnosis)  Comment: Controlled  Plan: metoprolol tartrate (LOPRESSOR) 25 MG tablet            (R00.2) Palpitations   (I47.1) Supraventricular tachycardia (H)  (I77.810) Thoracic aortic ectasia (H)  Comment: Stable  Plan: metoprolol tartrate (LOPRESSOR) 25 MG tablet        Follow-up with cardiology    (E78.5) Hyperlipidemia LDL goal <130  Comment: Controlled  Plan: pravastatin (PRAVACHOL) 40 MG tablet            (R60.9) Fluid retention  Comment:   Plan: potassium chloride ER (KLOR-CON M) 10 MEQ CR         tablet            (E03.9) Hypothyroidism, unspecified type  Comment: Controlled  Plan: levothyroxine (SYNTHROID/LEVOTHROID) 88 MCG         tablet            (M25.551,  G89.29,  Z96.641) Chronic hip pain after total replacement of right hip joint  Comment: Ongoing  Plan: gabapentin (NEURONTIN) 300 MG capsule            (Z79.899) Encounter for long-term (current) use of medications         Chief Complaint:                                                        Annual exam  Follow up chronic medical problems      SUBJECTIVE:                                                    History of present illness     We reviewed the chronic medical problems as above.   I reviewed the recent tests results in  Epic     Cardio note Nov 2022 -- reviewed. Stable from cardio aspect. F/u w cardio in 1 y    Hip surgery -- complications. Ortho - Dr Muse     Labs Chris 10 -- Discussed      ROS:     See below        PMHx: - reviewed  Past Medical History:   Diagnosis Date     First degree AV block 08/11/2015     Former smoker      Gastroesophageal reflux disease      GERD (gastroesophageal reflux disease)      Glaucoma      Heart block     2:1     Heart murmur      Hyperlipidemia LDL goal <130 06/14/2013     Hypertension goal BP (blood pressure) < 140/90 12/03/2013     Hypothyroidism      Left atrial dilatation     mild     Mild dilation of ascending aorta - borderline 08/11/2015     Palpitations      Prediabetes 06/14/2013     RBBB 08/11/2015     S/P total knee arthroplasty 07/23/2019     Tachycardia      Urinary frequency      Varicose veins      Venous insufficiency     s/p bilateral great saphenous vein radiofrequency ablation by Dr. Garcia       PSHx: reviewed  Past Surgical History:   Procedure Laterality Date     ARTHROPLASTY HIP Right 11/20/2020    Procedure: Right total hip arthroplasty using a Biomet Taperloc femoral stem and G7 acetabulum.;  Surgeon: Dragan Muse MD;  Location: [facility] OR     ARTHROPLASTY KNEE Right 07/23/2019    Procedure: Right total knee arthroplasty using an [employer] knee system;  Surgeon: Dragan Muse MD;  Location: [facility] OR     BREAST SURGERY      right breast ductal surgery due to milk production     BREAST SURGERY Right     ductal surgery due to milk production     CATARACT EXTRACTION Right 05/2022     COLONOSCOPY N/A 10/24/2014    no further screening. Procedure: COLONOSCOPY;  Surgeon: Pedro Rudd MD;  Location: [facility]     DECOMPRESSION HIP CORE Right 6/6/2022    Procedure: Open right hip abductor repair;  Surgeon: Dragan Muse MD;  Location: [facility] OR     EYE SURGERY       FINGER GANGLION CYST EXCISION Left     ring finger     HYSTERECTOMY        HYSTERECTOMY, PAP NO LONGER INDICATED      total hysterectomy and ovaries. benign     SOFT TISSUE SURGERY      ganglion removed from left wrist and ring finger     SURGICAL HISTORY OF -   age 7    tonsillectomy     SURGICAL HISTORY OF -   2015    left eye cataract     SURGICAL HISTORY OF -   2016    LINQ placed.     TONSILLECTOMY       WRIST GANGLION EXCISION Left         Soc Hx: No daily alcohol, no smoking  Social History     Socioeconomic History     Marital status:      Spouse name: Not on file     Number of children: Not on file     Years of education: Not on file     Highest education level: Not on file   Occupational History     Not on file   Tobacco Use     Smoking status: Former     Packs/day: 0.50     Years: 18.00     Pack years: 9.00     Types: Cigarettes     Quit date:      Years since quittin.0     Passive exposure: Past     Smokeless tobacco: Never   Vaping Use     Vaping Use: Never used   Substance and Sexual Activity     Alcohol use: Not Currently     Drug use: No     Sexual activity: Yes     Partners: Male   Other Topics Concern     Parent/sibling w/ CABG, MI or angioplasty before 65F 55M? Not Asked      Service Not Asked     Blood Transfusions Not Asked     Caffeine Concern No     Comment: 4-5 cups of coffee daily     Occupational Exposure Not Asked     Hobby Hazards Not Asked     Sleep Concern Not Asked     Stress Concern Not Asked     Weight Concern Not Asked     Special Diet No     Comment: no added salt     Back Care Not Asked     Exercise No     Comment: 3 days per week - exercise class      Bike Helmet Not Asked     Seat Belt Not Asked     Self-Exams Not Asked   Social History Narrative     Not on file     Social Determinants of Health     Financial Resource Strain: Not on file   Food Insecurity: Not on file   Transportation Needs: Not on file   Physical Activity: Not on file   Stress: Not on file   Social Connections: Not on file   Intimate Partner  "Violence: Not on file   Housing Stability: Not on file        Fam Hx: reviewed  Family History   Problem Relation Age of Onset     Ovarian Cancer Mother      Cancer Mother         ovarian     Breast Cancer Mother      Heart Disease Father         rare; not sure what it was     Diabetes Father         old age     Cerebrovascular Disease Brother 68         Screening: reviewed      All: reviewed    Meds: reviewed  Current Outpatient Medications   Medication Sig Dispense Refill     acetaminophen (TYLENOL) 325 MG tablet Take 2 tablets (650 mg) by mouth every 4 hours as needed for other (mild pain) 100 tablet 0     dorzolamide-timolol (COSOPT) 2-0.5 % ophthalmic solution Place 1 drop into both eyes 2 times daily       famotidine (PEPCID) 10 MG tablet Take 1 tablet (10 mg) by mouth 2 times daily 180 tablet 1     furosemide (LASIX) 20 MG tablet Take 1 tablet (20 mg) by mouth 2 times daily 180 tablet 3     gabapentin (NEURONTIN) 300 MG capsule Take 1 capsule (300 mg) by mouth 3 times daily 270 capsule 1     latanoprost (XALATAN) 0.005 % ophthalmic solution Place 1 drop into the right eye At Bedtime        levothyroxine (SYNTHROID/LEVOTHROID) 88 MCG tablet Take 1 tablet (88 mcg) by mouth daily 90 tablet 1     metoprolol tartrate (LOPRESSOR) 25 MG tablet TAKE 1 TABLET(25 MG) BY MOUTH TWICE DAILY 180 tablet 1     Multiple Vitamins-Minerals (MULTIVITAMIN ADULT PO) Take 1 tablet by mouth daily       pantoprazole (PROTONIX) 40 MG EC tablet TAKE 1 TABLET(40 MG) BY MOUTH DAILY 90 tablet 0     potassium chloride ER (KLOR-CON M) 10 MEQ CR tablet TAKE 1 TABLET(10 MEQ) BY MOUTH DAILY 90 tablet 1     pravastatin (PRAVACHOL) 40 MG tablet TAKE 1 TABLET(40 MG) BY MOUTH AT BEDTIME 90 tablet 3       OBJECTIVE:                                                    Physical Exam :  Blood pressure 139/73, pulse 91, temperature 98.7  F (37.1  C), temperature source Oral, resp. rate 18, height 1.727 m (5' 8\"), weight 78 kg (172 lb), SpO2 98 %, not " "currently breastfeeding.     NAD, appears comfortable  Skin clear, no rashes  Neck: supple, no JVD,  no thyroidmegaly  Lymph nodes non palpable in the cervical, supraclavicular axillaries,   Chest: clear to auscultation with good respiratory effort  Cardiac: S1S2, RRR, no mgr appreciated  Abdomen: soft, not tender, not distended, audible bowel sound, no hepatosplenomegaly, no palpable masses, no abdominal bruits  Extremities: no cyanosis, clubbing or edema.   Neuro: A, Ox3, no focal signs.  Breast exam in supine and erect position: they are symmetrical, no skin changes, no tenderness or nodes on palpation. Nipples are erect, no skin lesions, no discharge on pressure.    Pelvic exam: deferred, s/p menopause, no symptoms, no hx of abnormal pap         Rachel Wong MD  Internal Medicine         SUBJECTIVE:   Kirit is a 77 year old who presents for Preventive Visit.  Patient has been advised of split billing requirements and indicates understanding: Yes  Are you in the first 12 months of your Medicare coverage?  No    Healthy Habits:     In general, how would you rate your overall health?  Good    Frequency of exercise:  None    Do you usually eat at least 4 servings of fruit and vegetables a day, include whole grains    & fiber and avoid regularly eating high fat or \"junk\" foods?  No    Taking medications regularly:  No    Barriers to taking medications:  None    Medication side effects:  None    Ability to successfully perform activities of daily living:  No assistance needed    Home Safety:  No safety concerns identified    Hearing Impairment:  No hearing concerns    In the past 6 months, have you been bothered by leaking of urine?  No    In general, how would you rate your overall mental or emotional health?  Good      PHQ-2 Total Score: 0    Additional concerns today:  No      Have you ever done Advance Care Planning? (For example, a Health Directive, POLST, or a discussion with a medical provider or your loved " ones about your wishes): No, advance care planning information given to patient to review.  Patient declined advance care planning discussion at this time.       Fall risk  Fallen 2 or more times in the past year?: No  Any fall with injury in the past year?: No    Cognitive Screening   1) Repeat 3 items (Leader, Season, Table)    2) Clock draw: NORMAL  3) 3 item recall: Recalls 3 objects  Results: 3 items recalled: COGNITIVE IMPAIRMENT LESS LIKELY    Mini-CogTM Copyright [employer]. Licensed by the author for use in [facility]; reprinted with permission (zehra@[facility]). All rights reserved.      Do you have sleep apnea, excessive snoring or daytime drowsiness?: no    Reviewed and updated as needed this visit by clinical staff                  Reviewed and updated as needed this visit by Provider                 Social History     Tobacco Use     Smoking status: Former     Packs/day: 0.50     Years: 18.00     Pack years: 9.00     Types: Cigarettes     Quit date:      Years since quittin.0     Smokeless tobacco: Never   Substance Use Topics     Alcohol use: Not Currently               Hyperlipidemia Follow-Up      Are you regularly taking any medication or supplement to lower your cholesterol?   Yes- Pravastatin    Are you having muscle aches or other side effects that you think could be caused by your cholesterol lowering medication?  Yes- muscle aches    Hypertension Follow-up      Do you check your blood pressure regularly outside of the clinic? Yes     Are you following a low salt diet? Yes    Are your blood pressures ever more than 140 on the top number (systolic) OR more   than 90 on the bottom number (diastolic), for example 140/90? No      Current providers sharing in care for this patient include:   Patient Care Team:  Rachel Miller MD as PCP - General (Internal Medicine)  Rachel Miller MD as Assigned PCP  Quincy Anglin MD as Assigned Heart and Vascular  Provider    The following health maintenance items are reviewed in Epic and correct as of today:  Health Maintenance   Topic Date Due     URINE DRUG SCREEN  Never done     MEDICARE ANNUAL WELLNESS VISIT  10/08/2022     PHQ-2 (once per calendar year)  01/01/2023     ANNUAL REVIEW OF HM ORDERS  04/15/2023     FALL RISK ASSESSMENT  05/26/2023     ADVANCE CARE PLANNING  10/08/2026     LIPID  01/10/2028     DTAP/TDAP/TD IMMUNIZATION (4 - Td or Tdap) 12/10/2029     DEXA  11/08/2036     HEPATITIS C SCREENING  Completed     INFLUENZA VACCINE  Completed     Pneumococcal Vaccine: 65+ Years  Completed     ZOSTER IMMUNIZATION  Completed     COVID-19 Vaccine  Completed     IPV IMMUNIZATION  Aged Out     MENINGITIS IMMUNIZATION  Aged Out     MAMMO SCREENING  Discontinued     COLORECTAL CANCER SCREENING  Discontinued     Labs reviewed in EPIC        Pertinent mammograms are reviewed under the imaging tab.    Review of Systems   Constitutional: Negative for chills and fever.   HENT: Positive for hearing loss. Negative for congestion, ear pain and sore throat.    Eyes: Negative for pain and visual disturbance.   Respiratory: Negative for shortness of breath.    Cardiovascular: Positive for peripheral edema. Negative for chest pain and palpitations.   Gastrointestinal: Negative for abdominal pain, constipation, diarrhea, heartburn and nausea.   Breasts:  Negative for tenderness, breast mass and discharge.   Genitourinary: Negative for dysuria, frequency, genital sores, pelvic pain, urgency and vaginal bleeding.   Musculoskeletal: Positive for arthralgias and myalgias. Negative for joint swelling.   Skin: Negative for rash.   Neurological: Negative for dizziness, weakness, headaches and paresthesias.   Psychiatric/Behavioral: Negative for mood changes. The patient is not nervous/anxious.          Patient has been advised of split billing requirements and indicates understanding: Yes At the check in, at the   "      COUNSELING:  Reviewed preventive health counseling, as reflected in patient instructions       Regular exercise       Healthy diet/nutrition      BMI:   Estimated body mass index is 27.48 kg/m  as calculated from the following:    Height as of 11/21/22: 5' 8\" (1.727 m).    Weight as of 11/21/22: 180 lb 11.2 oz (82 kg).   Weight management plan: Discussed healthy diet and exercise guidelines      She reports that she quit smoking about 43 years ago. Her smoking use included cigarettes. She has a 9.00 pack-year smoking history. She has never used smokeless tobacco.      Appropriate preventive services were discussed with this patient, including applicable screening as appropriate for cardiovascular disease, diabetes, osteopenia/osteoporosis, and glaucoma.  As appropriate for age/gender, discussed screening for colorectal cancer, prostate cancer, breast cancer, and cervical cancer. Checklist reviewing preventive services available has been given to the patient.    Reviewed patients plan of care and provided an AVS. The Basic Care Plan (routine screening as documented in Health Maintenance) for Teri meets the Care Plan requirement. This Care Plan has been established and reviewed with the Patient.          Rachel Miller MD  [facility]    Identified Health Risks:  "

## 2023-01-18 ENCOUNTER — TRANSFERRED RECORDS (OUTPATIENT)
Dept: HEALTH INFORMATION MANAGEMENT | Facility: CLINIC | Age: 78
End: 2023-01-18

## 2023-02-02 ENCOUNTER — TRANSFERRED RECORDS (OUTPATIENT)
Dept: HEALTH INFORMATION MANAGEMENT | Facility: CLINIC | Age: 78
End: 2023-02-02

## 2023-02-16 ENCOUNTER — TELEPHONE (OUTPATIENT)
Dept: GENERAL RADIOLOGY | Facility: CLINIC | Age: 78
End: 2023-02-16
Payer: MEDICARE

## 2023-03-09 ENCOUNTER — TELEPHONE (OUTPATIENT)
Dept: INTERNAL MEDICINE | Facility: CLINIC | Age: 78
End: 2023-03-09
Payer: MEDICARE

## 2023-03-09 DIAGNOSIS — I99.9 LYMPHEDEMA DUE TO VENOUS DISEASE: Primary | ICD-10-CM

## 2023-03-09 DIAGNOSIS — I89.0 LYMPHEDEMA DUE TO VENOUS DISEASE: Primary | ICD-10-CM

## 2023-03-09 NOTE — TELEPHONE ENCOUNTER
Patient calls to say that her water pill os not working. In the last week her feet are still swollen in the am and has difficult time putting her shoes on.  In the afternoon she says her  ankles are fat      Best number to call back [phone]

## 2023-03-10 ENCOUNTER — NURSE TRIAGE (OUTPATIENT)
Dept: NURSING | Facility: CLINIC | Age: 78
End: 2023-03-10
Payer: MEDICARE

## 2023-03-10 NOTE — TELEPHONE ENCOUNTER
S-(situation): bilateral lower extremity edema    B-(background): going up to mid tib area.  Started about a week ago.  Staying about the same.  No hx of CHF.    A-(assessment): can sink her fingers in.  States when she puts her shoes on in am, her shoes are tight. Denied redness, increased warmth, pain.  Currently taking lasix 20 mg bid.  Has not missed any doses.  Has not had an increase in sodium and she is doesn't use salt or eat prepared foods.      R-(recommendations): please advise if needs her dose increased.

## 2023-03-11 NOTE — TELEPHONE ENCOUNTER
Teri calls and says that she wonders if her water pills need to be changed. Pt. Says that her lower legs and ankle have swelling. Pt. Says that when she pushes on her upper calf, her fingers sink in some. Pt. Says that she did not want to be triaged and will just talk to her DrPao On Monday. COVID 19 Nurse Triage Plan/Patient Instructions    Please be aware that novel coronavirus (COVID-19) may be circulating in the community. If you develop symptoms such as fever, cough, or SOB or if you have concerns about the presence of another infection including coronavirus (COVID-19), please contact your health care provider or visit [email or web address].     Disposition/Instructions    Home care recommended. Follow home care protocol based instructions.    Thank you for taking steps to prevent the spread of this virus.  o Limit your contact with others.  o Wear a simple mask to cover your cough.  o Wash your hands well and often.    Resources    [facility]: About COVID-19: [email or web address]    CDC: What to Do If You're Sick: www.cdc.gov/coronavirus/2019-ncov/about/steps-when-sick.html    CDC: Ending Home Isolation: www.cdc.gov/coronavirus/2019-ncov/hcp/disposition-in-home-patients.html     CDC: Caring for Someone: www.cdc.gov/coronavirus/2019-ncov/if-you-are-sick/care-for-someone.html     [facility]: Interim Guidance for Hospital Discharge to Home: www.health.[facility]/diseases/coronavirus/hcp/hospdischarge.pdf    [facility] clinical trials (COVID-19 research studies): [email or web address].[facility]-clinical-trials     Below are the COVID-19 hotlines at the [place] Department of Health ([facility]). Interpreters are available.   o For health questions: Call 024-930-5399 or 1-351.543.4509 (7 a.m. to 7 p.m.)  o For questions about schools and childcare: Call 700-693-2782 or 1-554.404.3067 (7 a.m. to 7 p.m.)                     Reason for Disposition    Health Information question, no triage  required and triager able to answer question    Additional Information    Negative: [1] Caller is not with the adult (patient) AND [2] reporting urgent symptoms    Negative: Lab result questions    Negative: Medication questions    Negative: Caller can't be reached by phone    Negative: Caller has already spoken to PCP or another triager    Negative: RN needs further essential information from caller in order to complete triage    Negative: Requesting regular office appointment    Negative: [1] Caller requesting NON-URGENT health information AND [2] PCP's office is the best resource    Protocols used: INFORMATION ONLY CALL - NO TRIAGE-A-

## 2023-03-12 NOTE — TELEPHONE ENCOUNTER
Her edema is secondary to venous insufficiency.  For a week or 2 she can increase the furosemide to 40 mg in the morning and 20 mg in the afternoon  The best treatment for her is lymphedema treatment for which I made a referral

## 2023-03-13 NOTE — TELEPHONE ENCOUNTER
Patient called and message given. Patient stated she understood provider directions. She will call to schedule appt with lymphedema

## 2023-03-22 NOTE — ANESTHESIA CARE TRANSFER NOTE
Patient: Teri Beltran    Procedure(s):  Right total knee arthroplasty (choice anesthesia)    Diagnosis: DJD  Diagnosis Additional Information: No value filed.    Anesthesia Type:   Periph. Nerve Block for postop pain, Spinal     Note:  Airway :Face Mask  Patient transferred to:PACU  Comments: Pt to PACU. Monitors attached. VSS. Pt conversing with staff. Report to RN.Handoff Report: Identifed the Patient, Identified the Reponsible Provider, Reviewed the pertinent medical history, Discussed the surgical course, Reviewed Intra-OP anesthesia mangement and issues during anesthesia, Set expectations for post-procedure period and Allowed opportunity for questions and acknowledgement of understanding      Vitals: (Last set prior to Anesthesia Care Transfer)    CRNA VITALS  7/23/2019 1427 - 7/23/2019 1502      7/23/2019             Pulse:  92    SpO2:  82 %  (Abnormal)                 Electronically Signed By: RUBY Liu CRNA  July 23, 2019  3:02 PM  
10-Mar-2023 15:19

## 2023-03-30 DIAGNOSIS — R00.2 PALPITATIONS: ICD-10-CM

## 2023-03-30 DIAGNOSIS — I10 HYPERTENSION GOAL BP (BLOOD PRESSURE) < 140/90: ICD-10-CM

## 2023-03-30 RX ORDER — METOPROLOL TARTRATE 25 MG/1
TABLET, FILM COATED ORAL
Qty: 180 TABLET | Refills: 1 | Status: CANCELLED | OUTPATIENT
Start: 2023-03-30

## 2023-04-06 DIAGNOSIS — E03.9 HYPOTHYROIDISM, UNSPECIFIED TYPE: ICD-10-CM

## 2023-04-07 ENCOUNTER — TELEPHONE (OUTPATIENT)
Dept: INTERNAL MEDICINE | Facility: CLINIC | Age: 78
End: 2023-04-07
Payer: MEDICARE

## 2023-04-07 NOTE — TELEPHONE ENCOUNTER
Prior Authorization Retail Medication Request    Medication/Dose: pantoprazole (PROTONIX) 40 MG EC tablet  ICD code (if different than what is on RX):    Edema of left lower extremity due to peripheral venous insufficiency [I87.2]       Fluid retention [R60.9]           Previously Tried and Failed:    Rationale:      Insurance Name:    Insurance ID:        Pharmacy Information (if different than what is on RX)  Name:    Phone:

## 2023-04-10 ENCOUNTER — TELEPHONE (OUTPATIENT)
Dept: CARDIOLOGY | Facility: CLINIC | Age: 78
End: 2023-04-10
Payer: MEDICARE

## 2023-04-10 RX ORDER — LEVOTHYROXINE SODIUM 88 UG/1
88 TABLET ORAL DAILY
Qty: 90 TABLET | Refills: 1 | Status: SHIPPED | OUTPATIENT
Start: 2023-04-10 | End: 2024-01-22

## 2023-04-10 NOTE — TELEPHONE ENCOUNTER
See call placed to PMD via pt regarding increased edema as well as concerns about diuretic 4/10/2023.   Rachel Miller MD     DC    3/12/23  8:13 AM  Note     Her edema is secondary to venous insufficiency.  For a week or 2 she can increase the furosemide to 40 mg in the morning and 20 mg in the afternoon  The best treatment for her is lymphedema treatment for which I made a referral        Pt requesting cardiology to review if this is OK at this itme.     LOV 11/21/23   # Conduction abnormalities, known first-degree AV block, right bundle branch block, paroxysmal SVT, followed by my colleague, Dr. Villareal.   # Hypertension.  Well controlled.   # Chronic lower extremity swelling due to venous insufficiency, history of bilateral great saphenous vein radiofrequency ablation.       -Overall patient is in stable cardiac health without symptoms concerning for angina or decompensated heart failure.  She does have occasional nonexertional chest discomfort, which does not seem like myocardial ischemia.  Reassuringly, a dobutamine stress echocardiogram 7/26/2022 was negative.  Recommend continuing current regimen of furosemide, metoprolol, pravastatin.  Advised patient to alert our team if she has any worsening of chest discomfort, dyspnea, or other symptoms that are concerning for her.  Advised patient to seek medical care if she develops any severe chest pain/discomfort.  Follow-up with cardiology TRISH in 1 year, or sooner as needed    routing to provider for review.   COSMO Lawton RN, BSN.

## 2023-04-10 NOTE — TELEPHONE ENCOUNTER
Patient is calling and said she followed Dr Wong's instructions to increase her furosemide. Since she went back to taking furosemide 20mg twice daily her legs and feet are very swollen. She will see lymphedema doctor but not until 5/1/23. The patient said she will also check with her cardiologist but she wants to know if she can increase her furosemide in the mean time.

## 2023-04-10 NOTE — TELEPHONE ENCOUNTER
Quincy Anglin MD  You 20 minutes ago (4:38 PM)     AH  Agree this is a good plan      Call placed to pt to review. No answer - LVM / DAKOTAB 04/10/23 4:59 PM     Call placed to pt. Left detailed VM with recommendations follow plan as placed by PMD. Left call back number for concerns / questions.   COSMO Lawton RN, BSN.

## 2023-04-10 NOTE — TELEPHONE ENCOUNTER
[facility]    Phone Message    May a detailed message be left on voicemail: no     Reason for Call: Other: Ray called to report she has been experiencing edema in both legs and ankles. Pt's PCP has scheduled her an appointment on 5/1/23 to discuss wraps to reduce the swelling. Please reach out to her at [phone]    Action Taken: Other: [facility]    Travel Screening: Not Applicable

## 2023-04-11 NOTE — TELEPHONE ENCOUNTER
Patient calls back and informed of recommendation from Dr. Wong. Patient reads back directions and verbalizes understanding.     Allie Bear RN  [facility]

## 2023-04-11 NOTE — TELEPHONE ENCOUNTER
Lets try again furosemide 40 mg in the morning and 20 mg in the afternoon and keep the same treatment until her next appointment with the lymphedema clinic

## 2023-04-11 NOTE — TELEPHONE ENCOUNTER
Patient calls regarding pantoprazole prior authorization. She states her insurance is only allowing her to fill this twice a year and the pharmacy told her Dr. Wong will need to re-write it. Informed patient that our office did receive notification that insurance is not covering this, but will try to get a prior authorization from insurance to cover it as currently written. We would reach back out to her if we are not able to get it covered to let her know what the next step should be.     Diagnosis code listed below for Pantoprazole are incorrect. Per prescription, diagnosis code is:   Epigastric pain [R10.13]    Allie Bear RN  [facility]

## 2023-04-12 NOTE — TELEPHONE ENCOUNTER
Prior Authorization Approval    Authorization Effective Date: 3/13/2023  Authorization Expiration Date: 4/11/2024  Medication: pantoprazole (PROTONIX) 40 MG EC tablet  Approved Dose/Quantity:    Reference #:     Insurance Company: [email or web address] - Phone [phone] Fax [phone]  Expected CoPay:       CoPay Card Available:      Foundation Assistance Needed:    Which Pharmacy is filling the prescription (Not needed for infusion/clinic administered): [id number] [id number] [facility] AT [place] [address] & [facility]  Pharmacy Notified: Yes  Patient Notified: Yes  **Instructed pharmacy to notify patient when script is ready to /ship.**

## 2023-04-12 NOTE — TELEPHONE ENCOUNTER
Central Prior Authorization Team   Phone: [phone]    PA Initiation    Medication: pantoprazole (PROTONIX) 40 MG EC tablet  Insurance Company: [employer] - Phone [phone] Fax [phone]  Pharmacy Filling the Rx: [id number] - [place], [place] - [address] AT [place] [address] & [facility]  Filling Pharmacy Phone: [phone]  Filling Pharmacy Fax:    Start Date: 4/12/2023

## 2023-04-25 ENCOUNTER — APPOINTMENT (OUTPATIENT)
Dept: CT IMAGING | Facility: CLINIC | Age: 78
End: 2023-04-25
Attending: EMERGENCY MEDICINE
Payer: MEDICARE

## 2023-04-25 ENCOUNTER — HOSPITAL ENCOUNTER (OUTPATIENT)
Facility: CLINIC | Age: 78
Setting detail: OBSERVATION
Discharge: NURSING, REHAB OR LONG-TERM CARE | End: 2023-04-27
Attending: EMERGENCY MEDICINE | Admitting: INTERNAL MEDICINE
Payer: MEDICARE

## 2023-04-25 ENCOUNTER — APPOINTMENT (OUTPATIENT)
Dept: GENERAL RADIOLOGY | Facility: CLINIC | Age: 78
End: 2023-04-25
Attending: EMERGENCY MEDICINE
Payer: MEDICARE

## 2023-04-25 DIAGNOSIS — S09.90XA CLOSED HEAD INJURY, INITIAL ENCOUNTER: ICD-10-CM

## 2023-04-25 DIAGNOSIS — S06.0X9A CONCUSSION WITH LOSS OF CONSCIOUSNESS, INITIAL ENCOUNTER: ICD-10-CM

## 2023-04-25 DIAGNOSIS — K59.03 DRUG-INDUCED CONSTIPATION: ICD-10-CM

## 2023-04-25 DIAGNOSIS — S00.03XA HEMATOMA OF SCALP, INITIAL ENCOUNTER: ICD-10-CM

## 2023-04-25 DIAGNOSIS — M25.551 HIP PAIN, RIGHT: ICD-10-CM

## 2023-04-25 DIAGNOSIS — I87.2 VENOUS INSUFFICIENCY: Primary | ICD-10-CM

## 2023-04-25 DIAGNOSIS — R10.12 LUQ ABDOMINAL PAIN: ICD-10-CM
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
## 2023-04-25 DIAGNOSIS — M54.50 ACUTE MIDLINE LOW BACK PAIN WITHOUT SCIATICA: ICD-10-CM

## 2023-04-25 LAB
ANION GAP SERPL CALCULATED.3IONS-SCNC: 13 MMOL/L (ref 7–15)
BUN SERPL-MCNC: 33.5 MG/DL (ref 8–23)
CALCIUM SERPL-MCNC: 9.4 MG/DL (ref 8.8–10.2)
CHLORIDE SERPL-SCNC: 102 MMOL/L (ref 98–107)
CK SERPL-CCNC: 122 U/L (ref 26–192)
CREAT SERPL-MCNC: 0.91 MG/DL (ref 0.51–0.95)
DEPRECATED HCO3 PLAS-SCNC: 26 MMOL/L (ref 22–29)
GFR SERPL CREATININE-BSD FRML MDRD: 64 ML/MIN/1.73M2
GLUCOSE SERPL-MCNC: 161 MG/DL (ref 70–99)
MAGNESIUM SERPL-MCNC: 2.1 MG/DL (ref 1.7–2.3)
POTASSIUM SERPL-SCNC: 4 MMOL/L (ref 3.4–5.3)
SODIUM SERPL-SCNC: 141 MMOL/L (ref 136–145)
TROPONIN T SERPL HS-MCNC: 27 NG/L
TSH SERPL DL<=0.005 MIU/L-ACNC: 3.93 UIU/ML (ref 0.3–4.2)

## 2023-04-25 PROCEDURE — 258N000003 HC RX IP 258 OP 636: Performed by: EMERGENCY MEDICINE

## 2023-04-25 PROCEDURE — 70450 CT HEAD/BRAIN W/O DYE: CPT | Mod: MA

## 2023-04-25 PROCEDURE — 73552 X-RAY EXAM OF FEMUR 2/>: CPT | Mod: LT

## 2023-04-25 PROCEDURE — 83735 ASSAY OF MAGNESIUM: CPT | Performed by: EMERGENCY MEDICINE

## 2023-04-25 PROCEDURE — 70486 CT MAXILLOFACIAL W/O DYE: CPT | Mod: MA

## 2023-04-25 PROCEDURE — 82550 ASSAY OF CK (CPK): CPT | Performed by: EMERGENCY MEDICINE

## 2023-04-25 PROCEDURE — 72131 CT LUMBAR SPINE W/O DYE: CPT | Mod: MA

## 2023-04-25 PROCEDURE — 96361 HYDRATE IV INFUSION ADD-ON: CPT

## 2023-04-25 PROCEDURE — 74176 CT ABD & PELVIS W/O CONTRAST: CPT | Mod: MA

## 2023-04-25 PROCEDURE — 36415 COLL VENOUS BLD VENIPUNCTURE: CPT | Performed by: EMERGENCY MEDICINE

## 2023-04-25 PROCEDURE — 99285 EMERGENCY DEPT VISIT HI MDM: CPT | Mod: 25

## 2023-04-25 PROCEDURE — 250N000011 HC RX IP 250 OP 636: Performed by: EMERGENCY MEDICINE

## 2023-04-25 PROCEDURE — 84443 ASSAY THYROID STIM HORMONE: CPT | Performed by: EMERGENCY MEDICINE

## 2023-04-25 PROCEDURE — 72125 CT NECK SPINE W/O DYE: CPT | Mod: MA

## 2023-04-25 PROCEDURE — 82310 ASSAY OF CALCIUM: CPT | Performed by: EMERGENCY MEDICINE

## 2023-04-25 PROCEDURE — 96374 THER/PROPH/DIAG INJ IV PUSH: CPT

## 2023-04-25 PROCEDURE — 93005 ELECTROCARDIOGRAM TRACING: CPT

## 2023-04-25 PROCEDURE — 84484 ASSAY OF TROPONIN QUANT: CPT | Performed by: EMERGENCY MEDICINE

## 2023-04-25 RX ORDER — HYDROMORPHONE HYDROCHLORIDE 1 MG/ML
0.3 INJECTION, SOLUTION INTRAMUSCULAR; INTRAVENOUS; SUBCUTANEOUS ONCE
Status: COMPLETED | OUTPATIENT
Start: 2023-04-25 | End: 2023-04-25

## 2023-04-25 RX ADMIN — SODIUM CHLORIDE 1000 ML: 9 INJECTION, SOLUTION INTRAVENOUS at 23:09

## 2023-04-25 RX ADMIN — HYDROMORPHONE HYDROCHLORIDE 0.3 MG: 1 INJECTION, SOLUTION INTRAMUSCULAR; INTRAVENOUS; SUBCUTANEOUS at 22:16

## 2023-04-25 ASSESSMENT — ENCOUNTER SYMPTOMS
NECK PAIN: 0
WOUND: 1
ARTHRALGIAS: 1
BACK PAIN: 0
ABDOMINAL PAIN: 1
DYSURIA: 0
BLOOD IN STOOL: 0

## 2023-04-25 ASSESSMENT — ACTIVITIES OF DAILY LIVING (ADL)
ADLS_ACUITY_SCORE: 35
ADLS_ACUITY_SCORE: 35

## 2023-04-26 ENCOUNTER — APPOINTMENT (OUTPATIENT)
Dept: PHYSICAL THERAPY | Facility: CLINIC | Age: 78
End: 2023-04-26
Attending: INTERNAL MEDICINE
Payer: MEDICARE

## 2023-04-26 PROBLEM — M25.551 HIP PAIN, RIGHT: Status: ACTIVE | Noted: 2023-04-26

## 2023-04-26 PROBLEM — S06.0X9A CONCUSSION WITH LOSS OF CONSCIOUSNESS, INITIAL ENCOUNTER: Status: ACTIVE | Noted: 2023-04-26

## 2023-04-26 PROBLEM — M54.50 ACUTE MIDLINE LOW BACK PAIN WITHOUT SCIATICA: Status: ACTIVE | Noted: 2023-04-26

## 2023-04-26 PROBLEM — S00.03XA HEMATOMA OF SCALP, INITIAL ENCOUNTER: Status: ACTIVE | Noted: 2023-04-26

## 2023-04-26 PROBLEM — S09.90XA CLOSED HEAD INJURY, INITIAL ENCOUNTER: Status: ACTIVE | Noted: 2023-04-26

## 2023-04-26 PROBLEM — R10.12 LUQ ABDOMINAL PAIN: Status: ACTIVE | Noted: 2023-04-26
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
## 2023-04-26 LAB
ALBUMIN UR-MCNC: NEGATIVE MG/DL
ANION GAP SERPL CALCULATED.3IONS-SCNC: 11 MMOL/L (ref 7–15)
APPEARANCE UR: CLEAR
ATRIAL RATE - MUSE: 88 BPM
BASOPHILS # BLD AUTO: 0.1 10E3/UL (ref 0–0.2)
BASOPHILS NFR BLD AUTO: 1 %
BILIRUB UR QL STRIP: NEGATIVE
BUN SERPL-MCNC: 30.6 MG/DL (ref 8–23)
CALCIUM SERPL-MCNC: 8.9 MG/DL (ref 8.8–10.2)
CHLORIDE SERPL-SCNC: 105 MMOL/L (ref 98–107)
COLOR UR AUTO: ABNORMAL
CREAT SERPL-MCNC: 0.69 MG/DL (ref 0.51–0.95)
DEPRECATED HCO3 PLAS-SCNC: 26 MMOL/L (ref 22–29)
DIASTOLIC BLOOD PRESSURE - MUSE: NORMAL MMHG
EOSINOPHIL # BLD AUTO: 0.2 10E3/UL (ref 0–0.7)
EOSINOPHIL NFR BLD AUTO: 2 %
ERYTHROCYTE [DISTWIDTH] IN BLOOD BY AUTOMATED COUNT: 16.2 % (ref 10–15)
ERYTHROCYTE [DISTWIDTH] IN BLOOD BY AUTOMATED COUNT: 16.3 % (ref 10–15)
GFR SERPL CREATININE-BSD FRML MDRD: 88 ML/MIN/1.73M2
GLUCOSE SERPL-MCNC: 111 MG/DL (ref 70–99)
GLUCOSE UR STRIP-MCNC: NEGATIVE MG/DL
HCT VFR BLD AUTO: 30.4 % (ref 35–47)
HCT VFR BLD AUTO: 32.6 % (ref 35–47)
HGB BLD-MCNC: 10 G/DL (ref 11.7–15.7)
HGB BLD-MCNC: 9.5 G/DL (ref 11.7–15.7)
HGB UR QL STRIP: NEGATIVE
IMM GRANULOCYTES # BLD: 0 10E3/UL
IMM GRANULOCYTES NFR BLD: 0 %
INTERPRETATION ECG - MUSE: NORMAL
KETONES UR STRIP-MCNC: NEGATIVE MG/DL
LEUKOCYTE ESTERASE UR QL STRIP: NEGATIVE
LYMPHOCYTES # BLD AUTO: 1.6 10E3/UL (ref 0.8–5.3)
LYMPHOCYTES NFR BLD AUTO: 17 %
MCH RBC QN AUTO: 26.1 PG (ref 26.5–33)
MCH RBC QN AUTO: 26.7 PG (ref 26.5–33)
MCHC RBC AUTO-ENTMCNC: 30.7 G/DL (ref 31.5–36.5)
MCHC RBC AUTO-ENTMCNC: 31.3 G/DL (ref 31.5–36.5)
MCV RBC AUTO: 85 FL (ref 78–100)
MCV RBC AUTO: 85 FL (ref 78–100)
MONOCYTES # BLD AUTO: 1.1 10E3/UL (ref 0–1.3)
MONOCYTES NFR BLD AUTO: 12 %
MUCOUS THREADS #/AREA URNS LPF: PRESENT /LPF
NEUTROPHILS # BLD AUTO: 6.6 10E3/UL (ref 1.6–8.3)
NEUTROPHILS NFR BLD AUTO: 68 %
NITRATE UR QL: NEGATIVE
NRBC # BLD AUTO: 0 10E3/UL
NRBC BLD AUTO-RTO: 0 /100
P AXIS - MUSE: 86 DEGREES
PH UR STRIP: 6.5 [PH] (ref 5–7)
PLATELET # BLD AUTO: 335 10E3/UL (ref 150–450)
PLATELET # BLD AUTO: 360 10E3/UL (ref 150–450)
POTASSIUM SERPL-SCNC: 3.9 MMOL/L (ref 3.4–5.3)
PR INTERVAL - MUSE: 246 MS
QRS DURATION - MUSE: 112 MS
QT - MUSE: 390 MS
QTC - MUSE: 471 MS
R AXIS - MUSE: 2 DEGREES
RBC # BLD AUTO: 3.56 10E6/UL (ref 3.8–5.2)
RBC # BLD AUTO: 3.83 10E6/UL (ref 3.8–5.2)
RBC URINE: 0 /HPF
SODIUM SERPL-SCNC: 142 MMOL/L (ref 136–145)
SP GR UR STRIP: 1.01 (ref 1–1.03)
SQUAMOUS EPITHELIAL: <1 /HPF
SYSTOLIC BLOOD PRESSURE - MUSE: NORMAL MMHG
T AXIS - MUSE: 19 DEGREES
TROPONIN T SERPL HS-MCNC: 25 NG/L
UROBILINOGEN UR STRIP-MCNC: NORMAL MG/DL
VENTRICULAR RATE- MUSE: 88 BPM
WBC # BLD AUTO: 6.7 10E3/UL (ref 4–11)
WBC # BLD AUTO: 9.6 10E3/UL (ref 4–11)
WBC URINE: <1 /HPF

## 2023-04-26 PROCEDURE — 84484 ASSAY OF TROPONIN QUANT: CPT | Performed by: EMERGENCY MEDICINE

## 2023-04-26 PROCEDURE — 99222 1ST HOSP IP/OBS MODERATE 55: CPT | Mod: AI | Performed by: INTERNAL MEDICINE

## 2023-04-26 PROCEDURE — 97161 PT EVAL LOW COMPLEX 20 MIN: CPT | Mod: GP

## 2023-04-26 PROCEDURE — 250N000011 HC RX IP 250 OP 636: Performed by: EMERGENCY MEDICINE

## 2023-04-26 PROCEDURE — 250N000013 HC RX MED GY IP 250 OP 250 PS 637: Performed by: INTERNAL MEDICINE

## 2023-04-26 PROCEDURE — 96361 HYDRATE IV INFUSION ADD-ON: CPT

## 2023-04-26 PROCEDURE — 36415 COLL VENOUS BLD VENIPUNCTURE: CPT | Performed by: INTERNAL MEDICINE

## 2023-04-26 PROCEDURE — G0378 HOSPITAL OBSERVATION PER HR: HCPCS

## 2023-04-26 PROCEDURE — 36415 COLL VENOUS BLD VENIPUNCTURE: CPT | Performed by: EMERGENCY MEDICINE

## 2023-04-26 PROCEDURE — 85025 COMPLETE CBC W/AUTO DIFF WBC: CPT | Performed by: EMERGENCY MEDICINE

## 2023-04-26 PROCEDURE — 96376 TX/PRO/DX INJ SAME DRUG ADON: CPT

## 2023-04-26 PROCEDURE — 97116 GAIT TRAINING THERAPY: CPT | Mod: GP

## 2023-04-26 PROCEDURE — 85027 COMPLETE CBC AUTOMATED: CPT | Performed by: INTERNAL MEDICINE

## 2023-04-26 PROCEDURE — 258N000003 HC RX IP 258 OP 636: Performed by: EMERGENCY MEDICINE

## 2023-04-26 PROCEDURE — 80048 BASIC METABOLIC PNL TOTAL CA: CPT | Performed by: INTERNAL MEDICINE

## 2023-04-26 PROCEDURE — 81001 URINALYSIS AUTO W/SCOPE: CPT | Performed by: EMERGENCY MEDICINE

## 2023-04-26 PROCEDURE — 96374 THER/PROPH/DIAG INJ IV PUSH: CPT | Mod: 59

## 2023-04-26 PROCEDURE — 96375 TX/PRO/DX INJ NEW DRUG ADDON: CPT

## 2023-04-26 RX ORDER — HYDROMORPHONE HYDROCHLORIDE 1 MG/ML
0.3 INJECTION, SOLUTION INTRAMUSCULAR; INTRAVENOUS; SUBCUTANEOUS
Status: DISCONTINUED | OUTPATIENT
Start: 2023-04-26 | End: 2023-04-26

## 2023-04-26 RX ORDER — ACETAMINOPHEN 325 MG/1
975 TABLET ORAL EVERY 8 HOURS
Status: DISCONTINUED | OUTPATIENT
Start: 2023-04-26 | End: 2023-04-27 | Stop reason: HOSPADM

## 2023-04-26 RX ORDER — KETOROLAC TROMETHAMINE 15 MG/ML
15 INJECTION, SOLUTION INTRAMUSCULAR; INTRAVENOUS ONCE
Status: COMPLETED | OUTPATIENT
Start: 2023-04-26 | End: 2023-04-26

## 2023-04-26 RX ORDER — ONDANSETRON 2 MG/ML
4 INJECTION INTRAMUSCULAR; INTRAVENOUS EVERY 6 HOURS PRN
Status: DISCONTINUED | OUTPATIENT
Start: 2023-04-26 | End: 2023-04-27 | Stop reason: HOSPADM

## 2023-04-26 RX ORDER — GABAPENTIN 300 MG/1
300 CAPSULE ORAL 3 TIMES DAILY
Status: DISCONTINUED | OUTPATIENT
Start: 2023-04-26 | End: 2023-04-27 | Stop reason: HOSPADM

## 2023-04-26 RX ORDER — FUROSEMIDE 20 MG
20 TABLET ORAL
Status: DISCONTINUED | OUTPATIENT
Start: 2023-04-26 | End: 2023-04-27 | Stop reason: HOSPADM

## 2023-04-26 RX ORDER — FAMOTIDINE 10 MG
10 TABLET ORAL 2 TIMES DAILY
Status: DISCONTINUED | OUTPATIENT
Start: 2023-04-26 | End: 2023-04-26

## 2023-04-26 RX ORDER — AMOXICILLIN 250 MG
2 CAPSULE ORAL 2 TIMES DAILY PRN
Status: DISCONTINUED | OUTPATIENT
Start: 2023-04-26 | End: 2023-04-27 | Stop reason: HOSPADM

## 2023-04-26 RX ORDER — PROCHLORPERAZINE MALEATE 5 MG
5 TABLET ORAL EVERY 6 HOURS PRN
Status: DISCONTINUED | OUTPATIENT
Start: 2023-04-26 | End: 2023-04-27 | Stop reason: HOSPADM

## 2023-04-26 RX ORDER — AMOXICILLIN 250 MG
1 CAPSULE ORAL 2 TIMES DAILY PRN
Status: DISCONTINUED | OUTPATIENT
Start: 2023-04-26 | End: 2023-04-27 | Stop reason: HOSPADM

## 2023-04-26 RX ORDER — POLYETHYLENE GLYCOL 3350 17 G/17G
17 POWDER, FOR SOLUTION ORAL DAILY PRN
Status: DISCONTINUED | OUTPATIENT
Start: 2023-04-26 | End: 2023-04-27 | Stop reason: HOSPADM

## 2023-04-26 RX ORDER — LIDOCAINE 4 G/G
1-2 PATCH TOPICAL
Status: DISCONTINUED | OUTPATIENT
Start: 2023-04-26 | End: 2023-04-27 | Stop reason: HOSPADM

## 2023-04-26 RX ORDER — METOPROLOL TARTRATE 25 MG/1
25 TABLET, FILM COATED ORAL 2 TIMES DAILY
Status: DISCONTINUED | OUTPATIENT
Start: 2023-04-26 | End: 2023-04-27 | Stop reason: HOSPADM

## 2023-04-26 RX ORDER — LEVOTHYROXINE SODIUM 88 UG/1
88 TABLET ORAL DAILY
Status: DISCONTINUED | OUTPATIENT
Start: 2023-04-26 | End: 2023-04-27 | Stop reason: HOSPADM

## 2023-04-26 RX ORDER — CELECOXIB 100 MG/1
100 CAPSULE ORAL 2 TIMES DAILY
Status: DISCONTINUED | OUTPATIENT
Start: 2023-04-26 | End: 2023-04-27 | Stop reason: HOSPADM

## 2023-04-26 RX ORDER — DORZOLAMIDE HYDROCHLORIDE AND TIMOLOL MALEATE PRESERVATIVE FREE 20; 5 MG/ML; MG/ML
1 SOLUTION/ DROPS OPHTHALMIC 2 TIMES DAILY
Status: DISCONTINUED | OUTPATIENT
Start: 2023-04-26 | End: 2023-04-26

## 2023-04-26 RX ORDER — POTASSIUM CHLORIDE 750 MG/1
10 TABLET, EXTENDED RELEASE ORAL DAILY
Status: DISCONTINUED | OUTPATIENT
Start: 2023-04-26 | End: 2023-04-27 | Stop reason: HOSPADM

## 2023-04-26 RX ORDER — PRAVASTATIN SODIUM 20 MG
40 TABLET ORAL AT BEDTIME
Status: DISCONTINUED | OUTPATIENT
Start: 2023-04-26 | End: 2023-04-27 | Stop reason: HOSPADM

## 2023-04-26 RX ORDER — PANTOPRAZOLE SODIUM 40 MG/1
40 TABLET, DELAYED RELEASE ORAL DAILY
Status: DISCONTINUED | OUTPATIENT
Start: 2023-04-26 | End: 2023-04-27 | Stop reason: HOSPADM

## 2023-04-26 RX ORDER — SODIUM CHLORIDE 9 MG/ML
INJECTION, SOLUTION INTRAVENOUS CONTINUOUS
Status: DISCONTINUED | OUTPATIENT
Start: 2023-04-26 | End: 2023-04-26

## 2023-04-26 RX ORDER — DORZOLAMIDE HYDROCHLORIDE AND TIMOLOL MALEATE 20; 5 MG/ML; MG/ML
1 SOLUTION/ DROPS OPHTHALMIC 2 TIMES DAILY
Status: DISCONTINUED | OUTPATIENT
Start: 2023-04-26 | End: 2023-04-27 | Stop reason: HOSPADM

## 2023-04-26 RX ORDER — LATANOPROST 50 UG/ML
1 SOLUTION/ DROPS OPHTHALMIC AT BEDTIME
Status: DISCONTINUED | OUTPATIENT
Start: 2023-04-26 | End: 2023-04-27 | Stop reason: HOSPADM

## 2023-04-26 RX ORDER — OXYCODONE HYDROCHLORIDE 5 MG/1
5 TABLET ORAL EVERY 4 HOURS PRN
Status: DISCONTINUED | OUTPATIENT
Start: 2023-04-26 | End: 2023-04-27 | Stop reason: HOSPADM

## 2023-04-26 RX ORDER — ONDANSETRON 4 MG/1
4 TABLET, ORALLY DISINTEGRATING ORAL EVERY 6 HOURS PRN
Status: DISCONTINUED | OUTPATIENT
Start: 2023-04-26 | End: 2023-04-27 | Stop reason: HOSPADM

## 2023-04-26 RX ORDER — LANOLIN ALCOHOL/MO/W.PET/CERES
3 CREAM (GRAM) TOPICAL
Status: DISCONTINUED | OUTPATIENT
Start: 2023-04-26 | End: 2023-04-27 | Stop reason: HOSPADM

## 2023-04-26 RX ORDER — PROCHLORPERAZINE 25 MG
12.5 SUPPOSITORY, RECTAL RECTAL EVERY 12 HOURS PRN
Status: DISCONTINUED | OUTPATIENT
Start: 2023-04-26 | End: 2023-04-27 | Stop reason: HOSPADM

## 2023-04-26 RX ORDER — DORZOLAMIDE HYDROCHLORIDE AND TIMOLOL MALEATE PRESERVATIVE FREE 20; 5 MG/ML; MG/ML
1 SOLUTION/ DROPS OPHTHALMIC 2 TIMES DAILY
COMMUNITY

## 2023-04-26 RX ADMIN — SODIUM CHLORIDE: 9 INJECTION, SOLUTION INTRAVENOUS at 01:08

## 2023-04-26 RX ADMIN — METOPROLOL TARTRATE 25 MG: 25 TABLET, FILM COATED ORAL at 20:38

## 2023-04-26 RX ADMIN — FUROSEMIDE 20 MG: 20 TABLET ORAL at 16:16

## 2023-04-26 RX ADMIN — POTASSIUM CHLORIDE 10 MEQ: 750 TABLET, FILM COATED, EXTENDED RELEASE ORAL at 09:12

## 2023-04-26 RX ADMIN — HYDROMORPHONE HYDROCHLORIDE 0.3 MG: 1 INJECTION, SOLUTION INTRAMUSCULAR; INTRAVENOUS; SUBCUTANEOUS at 00:55

## 2023-04-26 RX ADMIN — PRAVASTATIN SODIUM 40 MG: 20 TABLET ORAL at 21:14

## 2023-04-26 RX ADMIN — DORZOLAMIDE HYDROCHLORIDE AND TIMOLOL MALEATE 1 DROP: 22.3; 6.8 SOLUTION/ DROPS OPHTHALMIC at 20:38

## 2023-04-26 RX ADMIN — PANTOPRAZOLE SODIUM 40 MG: 40 TABLET, DELAYED RELEASE ORAL at 09:10

## 2023-04-26 RX ADMIN — GABAPENTIN 300 MG: 300 CAPSULE ORAL at 13:39

## 2023-04-26 RX ADMIN — ACETAMINOPHEN 975 MG: 325 TABLET, FILM COATED ORAL at 09:15

## 2023-04-26 RX ADMIN — OXYCODONE HYDROCHLORIDE 2.5 MG: 5 TABLET ORAL at 11:16

## 2023-04-26 RX ADMIN — ACETAMINOPHEN 975 MG: 325 TABLET, FILM COATED ORAL at 02:29

## 2023-04-26 RX ADMIN — CELECOXIB 100 MG: 100 CAPSULE ORAL at 09:09

## 2023-04-26 RX ADMIN — CELECOXIB 100 MG: 100 CAPSULE ORAL at 20:38

## 2023-04-26 RX ADMIN — DORZOLAMIDE HYDROCHLORIDE AND TIMOLOL MALEATE 1 DROP: 22.3; 6.8 SOLUTION/ DROPS OPHTHALMIC at 10:52

## 2023-04-26 RX ADMIN — LATANOPROST 1 DROP: 50 SOLUTION/ DROPS OPHTHALMIC at 21:14

## 2023-04-26 RX ADMIN — OXYCODONE HYDROCHLORIDE 2.5 MG: 5 TABLET ORAL at 21:17

## 2023-04-26 RX ADMIN — GABAPENTIN 300 MG: 300 CAPSULE ORAL at 20:38

## 2023-04-26 RX ADMIN — LEVOTHYROXINE SODIUM 88 MCG: 0.09 TABLET ORAL at 09:16

## 2023-04-26 RX ADMIN — KETOROLAC TROMETHAMINE 15 MG: 15 INJECTION, SOLUTION INTRAMUSCULAR; INTRAVENOUS at 01:08

## 2023-04-26 RX ADMIN — GABAPENTIN 300 MG: 300 CAPSULE ORAL at 09:09

## 2023-04-26 RX ADMIN — FUROSEMIDE 20 MG: 20 TABLET ORAL at 09:16

## 2023-04-26 RX ADMIN — METOPROLOL TARTRATE 25 MG: 25 TABLET, FILM COATED ORAL at 09:10

## 2023-04-26 ASSESSMENT — ACTIVITIES OF DAILY LIVING (ADL)
ADLS_ACUITY_SCORE: 22
ADLS_ACUITY_SCORE: 35
ADLS_ACUITY_SCORE: 35
ADLS_ACUITY_SCORE: 22
ADLS_ACUITY_SCORE: 35
ADLS_ACUITY_SCORE: 35
ADLS_ACUITY_SCORE: 37
ADLS_ACUITY_SCORE: 22
ADLS_ACUITY_SCORE: 35
ADLS_ACUITY_SCORE: 37
ADLS_ACUITY_SCORE: 35
ADLS_ACUITY_SCORE: 22

## 2023-04-26 NOTE — ED NOTES
"[facility]  ED Nurse Handoff Report    Teri Beltran is a 77 year old female   ED Chief complaint: Fall  . ED Diagnosis:   Final diagnoses:   Fall, initial encounter   Closed head injury, initial encounter   Concussion with loss of consciousness, initial encounter   Hip pain, right   LUQ abdominal pain   Acute midline low back pain without sciatica   Hematoma of scalp, initial encounter     Allergies:   Allergies   Allergen Reactions     Seasonal Allergies      Simvastatin      \"flushed\"       Code Status: Full Code  Activity level - Baseline/Home:  Independent. Activity Level - Current:   Assist X 2. Lift room needed: No. Bariatric: No   Needed: No   Isolation: No. Infection: Not Applicable.     Vital Signs:   Vitals:    04/25/23 2115 04/25/23 2213 04/26/23 0100   BP: (!) 150/63 133/69 (!) 158/95   Pulse: 86  102   Resp: 18  16   Temp: 98.6  F (37  C)     SpO2: 98% 96% 96%   Weight: 81.6 kg (180 lb)     Height: 1.575 m (5' 2\")         Cardiac Rhythm:  ,      Pain level:    Patient confused: No. Patient Falls Risk: Yes.   Elimination Status: Has voided   Patient Report - Initial Complaint: Fall. Focused Assessment:  Teri Beltran is a 77 year old female with a history of hypertension and hyperlipidemia who presents after falling in her garage while throwing something in the trash bin. She believes that she missed a step and got off balance. She did hit the back of her head and has some dried blood there. She is having head pain, right hip and leg pain, right jaw pain, and new abdominal pain that was not present before the fall. She is not anticoagulated, but did take Aspirin last night. No back, neck, chest pain, blood in stool, or dysuria.      Independent Historian:   None - Patient Only     Review of External Notes      ROS:  Review of Systems   Cardiovascular: Negative for chest pain.   Gastrointestinal: Positive for abdominal pain. Negative for blood in stool.   Genitourinary: " "Negative for dysuria.   Musculoskeletal: Positive for arthralgias. Negative for back pain and neck pain.   Skin: Positive for wound.   All other systems reviewed and are negative.        Allergies:  Simvastatin      Medications:    Famotidine  Furosemide  Gabapentin  Levothyroxine  Metoprolol tartrate  Pantoprazole  Potassium chloride  Pravastatin     Past Medical History:    GERD  Heart murmur  Hyperlipidemia  Hypertension  Glaucoma  First degree AV block  Hypothyroidism  Prediabetes  Mile dilation of ascending aorta  Thyroid disease   RBBB  Varicose veins  Adjustment disorder with depressed mood  Osteoporosis   SVT     Past Surgical History:    Right hip arthroplasty  Right knee arthroplasty  Right breast ductal surgery  Cataract extraction  Hip core decompression  Hysterectomy  Tonsillectomy  Left finger ganglion cyst excison      Family History:    Mother- breast cancer, ovarian cancer  Father- diabetes, heart disease     Social History:  The patient presents via EMS  Lives independently at home     Physical Exam      Patient Vitals for the past 24 hrs:    BP Temp Pulse Resp SpO2 Height Weight   04/25/23 2213 133/69 -- -- -- 96 % -- --   04/25/23 2115 (!) 150/63 98.6  F (37  C) 86 18 98 % 1.575 m (5' 2\") 81.6 kg (180 lb)         Physical Exam  General:          Alert, appears elderly, otherwise well-developed and well-nourished. Cooperative.                           In mild distress  HEENT:  Head:               Large occipital hematoma with mattered hair/blood  Ears:                External ears are normal  Mouth/Throat:  Oropharynx is without erythema or exudate and mucous membranes are dry.   Eyes:               Conjunctivae normal and EOM are normal. No scleral icterus.                          Pupils are equal, round, and reactive to light.   Neck:               Normal range of motion. Neck supple.  CV:                  Normal rate, regular rhythm, normal heart sounds and radial pulses are 2+ and symmetric. "  Systolic murmur.  Resp:              Breath sounds are clear bilaterally                          Non-labored, no retractions or accessory muscle use  GI:                   Abdomen is soft, no distension, LUQ tenderness. No rebound or guarding.  No CVA tenderness bilaterally  MS:                  Normal range of motion. No edema.                          Back atraumatic.                          No midline cervical, thoracic, or lumbar tenderness                          Right Hip:                          There is abnormal ROM of the hip, tender to lateral palpation of right hip.                            Flexion and Extension of the hip is abnormal                          There is no evidence of clinical dislocation                          There is no hematoma or obvious bursitis                          The femur appears normal and without pain                          There is no hematoma to the thigh                          Sensory and Motor exam to the thigh and distal leg are normal                          The knee, shin, ankle, and foot are without pain                          Normal distal pulses are detected                          Left Hip:                          There is normal ROM of the hip                          Flexion and Extension of the hip is normal                          There is no evidence of clinical dislocation                          There is no hematoma or obvious bursitis                          The femur appears normal and without pain                          There is no hematoma to the thigh                          Sensory and Motor exam to the thigh and distal leg are normal                          The knee, shin, ankle, and foot are without pain                          Normal distal pulses are detected  Skin:               Warm and dry.  Pale.  Hematoma and suspected laceration to the occiput.  Neuro: Alert. Globally weak.  GCS: 15  Psych:   Normal mood and  affect.  Tests Performed:   Head CT w/o contrast   Final Result   IMPRESSION:   HEAD CT:   1.  Right parietal scalp and subgaleal swelling. No acute intracranial hemorrhage or calvarial fracture.      2.  Stable age-related changes.      FACIAL BONE CT:   1.  No acute facial fracture.      2.  Findings suggesting mild right odontogenic maxillary sinusitis.      CERVICAL SPINE CT:   1.  No acute cervical spine fracture.      Cervical spine CT w/o contrast   Final Result   IMPRESSION:   HEAD CT:   1.  Right parietal scalp and subgaleal swelling. No acute intracranial hemorrhage or calvarial fracture.      2.  Stable age-related changes.      FACIAL BONE CT:   1.  No acute facial fracture.      2.  Findings suggesting mild right odontogenic maxillary sinusitis.      CERVICAL SPINE CT:   1.  No acute cervical spine fracture.      CT Facial Bones without Contrast   Final Result   IMPRESSION:   HEAD CT:   1.  Right parietal scalp and subgaleal swelling. No acute intracranial hemorrhage or calvarial fracture.      2.  Stable age-related changes.      FACIAL BONE CT:   1.  No acute facial fracture.      2.  Findings suggesting mild right odontogenic maxillary sinusitis.      CERVICAL SPINE CT:   1.  No acute cervical spine fracture.      Lumbar spine CT w/o contrast   Final Result   IMPRESSION:   1.  No acute lumbar spine fracture.      XR Femur Right 2 Views   Final Result   IMPRESSION: Within normal limits. No fracture. Total hip and total knee arthroplasties. Components demonstrate normal alignment.      Abd/pelvis CT no contrast - Stone Protocol   Final Result   IMPRESSION:       1.  No acute findings in the abdomen and pelvis.      2.  Cholelithiasis and colonic diverticulosis.      3.  Advanced spinal degenerative changes.         XR Femur Left 2 Views   Final Result   IMPRESSION: No fracture. Mild-to-moderate degenerative narrowing and degenerative changes left hip and visualized left knee. Heterotopic calcifications  about the left hip. No osteolytic or osteoblastic lesions.       Abnormal Results:   Labs Ordered and Resulted from Time of ED Arrival to Time of ED Departure   BASIC METABOLIC PANEL - Abnormal       Result Value    Sodium 141      Potassium 4.0      Chloride 102      Carbon Dioxide (CO2) 26      Anion Gap 13      Urea Nitrogen 33.5 (*)     Creatinine 0.91      Calcium 9.4      Glucose 161 (*)     GFR Estimate 64     TROPONIN T, HIGH SENSITIVITY - Abnormal    Troponin T, High Sensitivity 27 (*)    ROUTINE UA WITH MICROSCOPIC REFLEX TO CULTURE - Abnormal    Color Urine Light Yellow      Appearance Urine Clear      Glucose Urine Negative      Bilirubin Urine Negative      Ketones Urine Negative      Specific Gravity Urine 1.015      Blood Urine Negative      pH Urine 6.5      Protein Albumin Urine Negative      Urobilinogen Urine Normal      Nitrite Urine Negative      Leukocyte Esterase Urine Negative      Mucus Urine Present (*)     RBC Urine 0      WBC Urine <1      Squamous Epithelials Urine <1     TSH WITH FREE T4 REFLEX - Normal    TSH 3.93     MAGNESIUM - Normal    Magnesium 2.1     CK TOTAL - Normal         TROPONIN T, HIGH SENSITIVITY   Treatments provided: pain medications  Family Comments: daughter notified of admit  OBS brochure/video discussed/provided to patient:  Yes  ED Medications:   Medications   HYDROmorphone (PF) (DILAUDID) injection 0.3 mg (0.3 mg Intravenous $Given 4/26/23 0055)   sodium chloride 0.9% infusion ( Intravenous $New Bag 4/26/23 0108)   HYDROmorphone (PF) (DILAUDID) injection 0.3 mg (0.3 mg Intravenous $Given 4/25/23 2216)   0.9% sodium chloride BOLUS (0 mLs Intravenous Stopped 4/26/23 0055)   ketorolac (TORADOL) injection 15 mg (15 mg Intravenous $Given 4/26/23 0108)     Drips infusing:  No  For the majority of the shift, the patient's behavior Green. Interventions performed were NA.    Sepsis treatment initiated: No     Patient tested for COVID 19 prior to admission: NO    ED  Nurse Name/Phone Number: Sharon Reynolds RN,   1:33 AM  RECEIVING UNIT ED HANDOFF REVIEW    Above ED Nurse Handoff Report was reviewed: Yes  Reviewed by: AZAM PERALTA RN on April 26, 2023 at 12:40 PM

## 2023-04-26 NOTE — ED TRIAGE NOTES
Pt arrives via EMS from home with reports of fall at standing position; pt lost balance and fell. Pt did have LOC, unknown how long. Pt denies any neck pain; pt has large hematoma to posterior head, no active bleeding. Pt has round hematoma to right lateral thigh; pt reports hx of hip and knee arthroplasty. Pt has pulses present, bilateral. Pt has decreased sensation to left leg which states is normal. Pt denies getting dizzy or lightheaded prior to falling. Pt able to recall events prior to falling and when she woke up.      Triage Assessment     Row Name 04/25/23 2117       Triage Assessment (Adult)    Airway WDL WDL       Respiratory WDL    Respiratory WDL WDL       Skin Circulation/Temperature WDL    Skin Circulation/Temperature WDL circulation    Skin Temperature cool       Cardiac WDL    Cardiac WDL WDL       Peripheral/Neurovascular WDL    Peripheral Neurovascular WDL pulse assessment    Pulse Assessment posterior tibial;popliteal       Pulse Radial    Left Radial Pulse 2+ (normal)    Right Radial Pulse 2+ (normal)       Pulse Posterior Tibial    Left Posterior Tibial Pulse 2+ (normal)    Right Posterior Tibial Pulse 2+ (normal)       Pulse Popliteal    Left Popliteal Pulse 2+ (normal)    Right Popliteal Pulse 2+ (normal)       Cognitive/Neuro/Behavioral WDL    Cognitive/Neuro/Behavioral WDL arousability;level of consciousness;speech;orientation    Level of Consciousness alert    Arousal Level opens eyes spontaneously    Orientation oriented x 4    Speech spontaneous;clear

## 2023-04-26 NOTE — PLAN OF CARE
ROOM # 206-1    Living Situation (if not independent, order SW consult): home alone   Facility name:  : [facility] (daughter)    Activity level at baseline: Ind with a walker  Activity level on admit: assist of x1 with a walker    Who will be transporting you at discharge: TBD    Patient registered to observation; given Patient Bill of Rights; given the opportunity to ask questions about observation status and their plan of care.  Patient has been oriented to the observation room, bathroom and call light is in place.    Discussed discharge goals and expectations with patient/family.

## 2023-04-26 NOTE — PHARMACY-ADMISSION MEDICATION HISTORY
Pharmacist Admission Medication History    Admission medication history is complete. The information provided in this note is only as accurate as the sources available at the time of the update.    Medication reconciliation/reorder completed by provider prior to medication history? Yes    Information Source(s): Patient and CareEverywhere/SureScripts via in-person   Patient doesn't really recognize medications by name, she stated I would have to explain what they are for. She did recognize all medications but was a little hazy on her gabapentin dosing, struggled to remember if she takes it BID or TID.     Pertinent Information: none    Changes made to PTA medication list:    Added: None    Deleted: famotidine    Changed: None     Allergies reviewed with patient and updates made in EHR: yes    Medication History Completed By:    Corrine HolguinD, [facility]   Emergency Medicine Pharmacist  [phone] or [place]ila  April 26, 2023    Prior to Admission medications    Medication Sig Last Dose Taking? Auth Provider Long Term End Date   dorzolamide-timolol PF (COSOPT PF) 2-0.5 % opthalmic solutionh Place 1 drop into both eyes 2 times daily 4/25/2023 at pm Yes Unknown, Entered By History No    furosemide (LASIX) 20 MG tablet Take 1 tablet (20 mg) by mouth 2 times daily 4/25/2023 at suppertime Yes Quincy Anglin MD Yes    gabapentin (NEURONTIN) 300 MG capsule Take 1 capsule (300 mg) by mouth 3 times daily 4/25/2023 at suppertime Yes Rachel Miller MD Yes    levothyroxine (SYNTHROID/LEVOTHROID) 88 MCG tablet Take 1 tablet (88 mcg) by mouth daily 4/25/2023 at am Yes Rachel Miller MD Yes    metoprolol tartrate (LOPRESSOR) 25 MG tablet TAKE 1 TABLET(25 MG) BY MOUTH TWICE DAILY 4/25/2023 at suppertime Yes Rachel Miller MD Yes    Multiple Vitamins-Minerals (MULTIVITAMIN ADULT PO) Take 1 tablet by mouth daily 4/25/2023 at am Yes Reported, Patient     pantoprazole (PROTONIX) 40  MG EC tablet TAKE 1 TABLET(40 MG) BY MOUTH DAILY 4/25/2023 at am Yes Rachel Miller MD     potassium chloride ER (KLOR-CON M) 10 MEQ CR tablet TAKE 1 TABLET(10 MEQ) BY MOUTH DAILY 4/25/2023 at am Yes Rachel Miller MD     acetaminophen (TYLENOL) 325 MG tablet Take 2 tablets (650 mg) by mouth every 4 hours as needed for other (mild pain) prn at [facility]  [facility]     latanoprost (XALATAN) 0.005 % ophthalmic solution Place 1 drop into the right eye At Bedtime  4/24/2023 at hs  Reported, Patient Yes    pravastatin (PRAVACHOL) 40 MG tablet Take 1 tablet (40 mg) by mouth At Bedtime 4/24/2023 at hs  Rachel Miller MD Yes

## 2023-04-26 NOTE — PROGRESS NOTES
[facility]  OUTPATIENT PHYSICAL THERAPY EVALUATION  PLAN OF TREATMENT FOR OUTPATIENT REHABILITATION  (COMPLETE FOR INITIAL CLAIMS ONLY)  Patient's Last Name, First Name, M.I.  YOB: 1945  Teri Beltran                        Provider's Name  [facility] Medical Record No.  [id number]                             Onset Date:  04/25/23   Start of Care Date:  04/26/23   Type:     _X_PT   ___OT   ___SLP Medical Diagnosis:  fall with LOC, hematoma of scalp              PT Diagnosis:  impaired IND with functional mobility Visits from SOC:  1     See note for plan of treatment, functional goals and certification details    I CERTIFY THE NEED FOR THESE SERVICES FURNISHED UNDER        THIS PLAN OF TREATMENT AND WHILE UNDER MY CARE     (Physician co-signature of this document indicates review and certification of the therapy plan).              04/26/23 0900   Appointment Info   Signing Clinician's Name / Credentials (PT) Domenica Collazo DPT   Quick Adds   Quick Adds Certification   Living Environment   People in Home alone   Current Living Arrangements house   Home Accessibility stairs to enter home;stairs within home   Number of Stairs, Main Entrance 3   Stair Railings, Main Entrance railings safe and in good condition   Number of Stairs, Within Home, Primary six   Stair Railings, Within Home, Primary railing on left side (ascending)  (also has chair lift up to second level of home)   Transportation Anticipated family or friend will provide   Living Environment Comments Laundry in basement, 6 stairs. Pt reports she has some family in the area, but reports they cannot stay with her or provide increased assist.   Self-Care   Usual Activity Tolerance moderate   Current Activity Tolerance fair   Equipment Currently Used at Home walker, rolling   Fall history within last six months yes   Number of times patient has fallen within last six months 1  "  Activity/Exercise/Self-Care Comment Pt reports she uses FWW for longer distances, no AD for shorter distances. Jyoti/IND for mobility and ADLs at baseline.   General Information   Onset of Illness/Injury or Date of Surgery 04/25/23   Referring Physician Kyler Gonzalez MD   Patient/Family Therapy Goals Statement (PT) to return home   Pertinent History of Current Problem (include personal factors and/or comorbidities that impact the POC) \"77 year old female admitted on 4/25/2023 after a fall with closed head trauma, concussion, and loss of consciousness. She has history of hypertension, hypercholesterolemia, hypothyroidism, prediabetes, GERD, glaucoma, right bundle branch block, first-degree AV block, and heart block.  She presented to the emergency department after a fall.  She was in the garage taking some trash out to the trash bin when she fell backward and hit the back of her head.  She lost consciousness.  She is not sure for how long.  She had significant bleeding.  She was not able to get up.  She crawled out into her driveway and hollered for her neighbor who heard her and called 911.  She reported headache and pain in right hip, right leg, right jaw, and abdomen.  Emergency department evaluation showed heart rate in the 90s to 100s but otherwise unremarkable vital signs.  Laboratory evaluation showed troponin 27 (without chest pain), TSH 3.93, and unremarkable basic metabolic panel.  CT of head showed right parietal scalp hematoma but no intracranial hemorrhage.  Facial CT showed no fractures.  CT of C-spine showed no fracture.  CT of L-spine showed no fracture.  CT of abdomen and pelvis showed no acute injuries.  Gallstones and diverticulosis were noted.  X-ray of both femurs were unremarkable.  Patient was having ongoing pain and was not really able to get up and ambulate in the emergency department.  I was asked to admit her to the hospital for monitoring and mobility evaluation\"   Existing " "Precautions/Restrictions fall   General Observations Pt greeted in supine, agreeable to session   Cognition   Affect/Mental Status (Cognition) WFL   Orientation Status (Cognition) oriented x 4   Follows Commands (Cognition) WFL   Pain Assessment   Patient Currently in Pain No  (none at rest, R hip \"stiffness\" with mobility)   Integumentary/Edema   Integumentary/Edema Comments 2+ pitting edema, B feet (per pt this is her baseline). Hematoma on posterior scalp   Posture    Posture Forward head position;Protracted shoulders   Range of Motion (ROM)   Range of Motion ROM is WFL   Strength (Manual Muscle Testing)   Strength (Manual Muscle Testing) Able to perform R SLR;Able to perform L SLR;Deficits observed during functional mobility   Strength Comments decreased functional strength and endurance R>L, prior R hip and knee sx   Bed Mobility   Bed Mobility supine-sit   Comment, (Bed Mobility) SBA   Transfers   Transfers sit-stand transfer   Comment, (Transfers) CGA at FWW   Gait/Stairs (Locomotion)   [place] Level (Gait) contact guard   Assistive Device (Gait) walker, front-wheeled   Distance in Feet 5 ft eval   Distance in Feet (Gait) 120   Pattern (Gait) step-to   Deviations/Abnormal Patterns (Gait) right sided deviations;antalgic;gait speed decreased   Balance   Balance Comments falls history, benefits from use of FWW for upright mobility   Sensory Examination   Sensory Perception Comments 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 BLE light touch intact   Clinical Impression   Criteria for Skilled Therapeutic Intervention Yes, treatment indicated   PT Diagnosis (PT) impaired IND with functional mobility   Influenced by the following impairments decreased actiivity tolerance, pain, balance, strength   Functional limitations due to impairments impaired bed mobility, transfers, ambulaiton   Clinical Presentation (PT Evaluation Complexity) Stable/Uncomplicated   Clinical Presentation Rationale Based on current " presentation, PMH, social support   Clinical Decision Making (Complexity) low complexity   Planned Therapy Interventions (PT) balance training;bed mobility training;gait training;home exercise program;patient/family education;stair training;strengthening;transfer training;progressive activity/exercise;home program guidelines   Risk & Benefits of therapy have been explained evaluation/treatment results reviewed;care plan/treatment goals reviewed;risks/benefits reviewed;current/potential barriers reviewed;participants voiced agreement with care plan;participants included;patient   PT Total Evaluation Time   PT Eval, Low Complexity Minutes (47795) 10   Therapy Certification   Start of care date 04/26/23   Certification date from 04/26/23   Certification date to 05/03/23   Medical Diagnosis fall with LOC, hematoma of scalp   Physical Therapy Goals   PT Frequency Daily   PT Predicted Duration/Target Date for Goal Attainment 05/03/23   PT Goals Bed Mobility;Transfers;Gait;Stairs;PT Goal 1   PT: Bed Mobility Modified independent;Supine to/from sit   PT: Transfers Modified independent;Sit to/from stand;Bed to/from chair;Assistive device   PT: Gait Modified independent;Assistive device;Rolling walker;Greater than 200 feet   PT: Stairs 6 stairs;Rail on left;Supervision/stand-by assist   PT: Goal 1 Pt will score above cut-off for increased falls risk on dynamic balance testing to demonstrate safety with functional mobility at [place]   Interventions   Interventions Quick Adds Gait Training;Therapeutic Activity   Therapeutic Activity   Therapeutic Activities: dynamic activities to improve functional performance Minutes (75185) 10   Symptoms Noted During/After Treatment Fatigue;Dizziness   Treatment Detail/Skilled Intervention Pt completed sup>sit with SBA. In sitting, notes mild dizziness that improves within less than 1 min. Edu on mobility precautions related to dizziness, including taking increased time with transitions,  "sitting therex in initial sitting. Following eval, completes further sit<>stands CGA at FWW. Sit>sup with SBA, able to reposition in bed with SBA and cues. Pt remained supine with all needs in reach.   Gait Training   Gait Training Minutes (87748) 10   Symptoms Noted During/After Treatment (Gait Training) fatigue;increased pain   Treatment Detail/Skilled Intervention Pt cued for gait at FWW, CGA. R hip hike and antalgic pattern throughout, pt endorses \"stiffness\" that improves over increased distance. No overt LOB noted. Pt cued for decreased hip hike, minimal change noted.   PT Discharge Planning   PT Plan progress gait with AD, trial SEC vs no AD, stair trial   PT Discharge Recommendation (DC Rec) Transitional Care Facility   PT Rationale for DC Rec Pt currently below baseline, [facility] at [facility] for mobility. Pt lives alone and reports she her family cannot provide increased assist at [place]. Pt limited by pain, decreased activity tolerance, balance deficits. Has not yet trialed stairs, has 3 ASHIA home and 6 stairs to access shower and laundry. Will benefit from muna skilled PT at TCU to maximize safety and IND before returning home alone. Will need to progress to be [place] for all mobility to return home.   PT Brief overview of current status Ax1 FWW   Total Session Time   Timed Code Treatment Minutes 20   Total Session Time (sum of timed and untimed services) 30     "

## 2023-04-26 NOTE — PLAN OF CARE
PRIMARY DIAGNOSIS: FALL w/ LOC  OUTPATIENT/OBSERVATION GOALS TO BE MET BEFORE DISCHARGE:  1. Orthostatic performed: No    2. Diagnostic testing complete & at baseline neurologic testing: Yes    3. Cleared by consultants (if involved): No    4. Interpretation of cardiac rhythm per telemetry tech: SR 70s    5. Tolerating adequate PO diet and medications: Yes    6. Return to near baseline physical activity or neurologic status: No    Discharge Planner Nurse   Safe discharge environment identified: Yes  Barriers to discharge: Yes       Entered by: Malgorzata Foy RN 04/26/2023   A&O x4. VSS, RA. Up to bedside commode w/ SBA to A1. Pt endorsing dizziness w/ movement. Large hematoma to posterior scalp, minor bleeding. Round hematoma to R outer thigh. Pt mainly c/o aching headache. Sched tylenol given, intervention effective per pt report. Pt denies nausea this shift. Plan- pain mgmt, PT eval.     Please review provider order for any additional goals.   Nurse to notify provider when observation goals have been met and patient is ready for discharge.

## 2023-04-26 NOTE — ED NOTES
Cleaned pt's scalp. Abrasions visualized. MD notified. Applied monitoring devices (EKG, BP, and pulse ox) onto Patient.

## 2023-04-26 NOTE — PROGRESS NOTES
"[facility]    Medicine Progress Note - Hospitalist Service    Date of Admission:  4/25/2023    Assessment & Plan   Teri Beltran is a 77 year old female admitted on 4/25/2023 with primary diagnosis of fall with poorly controlled pain.    Ms. Beltran evidently had gone out to her garage and fell striking the back of her head.  She lost consciousness though for an unclear period of time.  She noticed significant bleeding was unable to get up and crawled out to get help from a neighbor.  Evaluation in the emergency department was reassuring vis-à-vis fractures.  She was admitted for monitoring in the acute period postinjury and evaluation for return to independence.    Past medical history notable for SVT, first-degree AV block, hypertension patient elation and aortic sclerosis without stenosis.    Diagnoses:  1.  Concussion status post fall with LOC.  2.  Hypertension.  3.  Right UE weakmess, currently undergoing outpatient workup.  4.  GERD on Pepcid and Protonix.  5.  Mildly elevated troponin not concerning for ACS, down-trending.  Unclear etiology.  6.  Mild hyperglycemia.  Patient is \"prediabetic\".  7.  Bilat LE edema.  Likely venous insufficiency.     Plan:  1.  Pt requires on-going monitoring for headache and nausea which reportedly is worsened with activity.   2.  OT/PT evals.      Diet: Regular Diet Adult    DVT Prophylaxis: Pneumatic Compression Devices  Hess Catheter: Not present  Lines: None     Cardiac Monitoring: None  Code Status: Full Code      Clinically Significant Risk Factors Present on Admission                       # Obesity: Estimated body mass index is 32.92 kg/m  as calculated from the following:    Height as of this encounter: 1.575 m (5' 2\").    Weight as of this encounter: 81.6 kg (180 lb).           Disposition Plan      Expected Discharge Date: 04/27/2023                  Jose Elizabeth MD  Hospitalist Service  [facility]  Securely " message with Gila (more info)  Text page via [facility] Paging/Directory   ______________________________________________________________________    Interval History    Chart reviewed, pt interviewed.    Pt was admitted overnight by Dr. Gonzalez. I reviewed his note and plan.       I spoke with pt's daughter who indicates awareness and concern for mother. I told her that it is not completely clear how long the pt would need to remain in the hospital for monitoring. At this time, PT has indicated that the patient should discharge to TCU.    Physical Exam   Vital Signs: Temp: 98.2  F (36.8  C) Temp src: Oral BP: 137/73 Pulse: 75   Resp: 18 SpO2: 96 % O2 Device: None (Room air)    Weight: 180 lbs 0 oz    Constitutional: awake, alert, cooperative, no apparent distress, and appears stated age  Eyes: extra-ocular muscles intact and sclera clear  Hematologic / Lymphatic: no cervical lymphadenopathy and no supraclavicular lymphadenopathy  Respiratory: No increased work of breathing, good air exchange, clear to auscultation bilaterally, no crackles or wheezing  Cardiovascular: regular rate and rhythm and murmurs include systolic murmur II/VI located at left upper sternal border without radiation  GI: normal bowel sounds, soft, non-distended and non-tender  Musculoskeletal: Bilat LE edema.    Neurologic: flaccid paralysis involving the right hand    Medical Decision Making       45 MINUTES SPENT BY ME on the date of service doing chart review, history, exam, documentation & further activities per the note.      Data    Results for orders placed or performed during the hospital encounter of 04/25/23 (from the past 24 hour(s))   Basic metabolic panel   Result Value Ref Range    Sodium 141 136 - 145 mmol/L    Potassium 4.0 3.4 - 5.3 mmol/L    Chloride 102 98 - 107 mmol/L    Carbon Dioxide (CO2) 26 22 - 29 mmol/L    Anion Gap 13 7 - 15 mmol/L    Urea Nitrogen 33.5 (H) 8.0 - 23.0 mg/dL    Creatinine 0.91 0.51 - 0.95 mg/dL    Calcium  9.4 8.8 - 10.2 mg/dL    Glucose 161 (H) 70 - 99 mg/dL    GFR Estimate 64 >60 mL/min/1.73m2   ABO/Rh type and screen *Canceled*    Narrative    The following orders were created for panel order ABO/Rh type and screen.  Procedure                               Abnormality         Status                     ---------                               -----------         ------                     Adult Type and Screen[[id number]]                                                         Please view results for these tests on the individual orders.   Troponin T, High Sensitivity   Result Value Ref Range    Troponin T, High Sensitivity 27 (H) <=14 ng/L   TSH with free T4 reflex   Result Value Ref Range    TSH 3.93 0.30 - 4.20 uIU/mL   Magnesium   Result Value Ref Range    Magnesium 2.1 1.7 - 2.3 mg/dL   CK total   Result Value Ref Range     26 - 192 U/L   XR Femur Left 2 Views    Narrative    EXAM: XR FEMUR LEFT 2 VIEWS  LOCATION: [facility]  DATE/TIME: 4/25/2023 11:04 PM CDT    INDICATION: Left thigh pain after fall.  COMPARISON: None.      Impression    IMPRESSION: No fracture. Mild-to-moderate degenerative narrowing and degenerative changes left hip and visualized left knee. Heterotopic calcifications about the left hip. No osteolytic or osteoblastic lesions.   Abd/pelvis CT no contrast - Stone Protocol    Narrative    EXAM: CT ABDOMEN PELVIS W/O CONTRAST  LOCATION: [facility]  DATE/TIME: 4/25/2023 11:05 PM CDT    INDICATION: Left flank and left upper quadrant abdominal pain after fall  COMPARISON: None.  TECHNIQUE: CT scan of the abdomen and pelvis was performed without IV contrast. Multiplanar reformats were obtained. Dose reduction techniques were used.  CONTRAST: None.    FINDINGS:   LOWER CHEST: Scattered atelectasis in the lung bases. No confluent consolidation or pleural effusion.    HEPATOBILIARY: Liver is normal in size without focal lesions or surface nodularity.  Tiny gallstones present without acute cholecystitis.    PANCREAS: Normal.    SPLEEN: Normal.    ADRENAL GLANDS: Normal.    KIDNEYS/BLADDER: Normal.    BOWEL: Moderate amount of stool in the colon. Colonic diverticulosis noted without acute diverticulitis. Appendix is normal. No bowel wall thickening or bowel dilatation.    LYMPH NODES: Normal.    VASCULATURE: Minimal aortic atherosclerotic calcifications. No abdominal aortic aneurysm.    PELVIC ORGANS: Status post hysterectomy. No abnormal adnexal masses.    MUSCULOSKELETAL: Advanced spinal spondylosis with diffuse mild, moderate and severe degenerative disc space narrowing with scattered vacuum disc, most advanced at T12/L1, as well as grade 2 degenerative anterolisthesis of L5 on S1, diffuse, multilevel   bilateral facet arthropathy more pronounced in the lumbar spine and multilevel moderate and severe neuroforaminal stenosis in the lower thoracic and lumbar spine. No suspicious osseous lesions or acute fractures.        Impression    IMPRESSION:     1.  No acute findings in the abdomen and pelvis.    2.  Cholelithiasis and colonic diverticulosis.    3.  Advanced spinal degenerative changes.     XR Femur Right 2 Views    Narrative    EXAM: XR FEMUR RIGHT 2 VIEWS  LOCATION: [facility]  DATE/TIME: 4/25/2023 11:10 PM CDT    INDICATION: fall, pain  COMPARISON: Knee film from 07/23/2019      Impression    IMPRESSION: Within normal limits. No fracture. Total hip and total knee arthroplasties. Components demonstrate normal alignment.   Lumbar spine CT w/o contrast    Narrative    EXAM: CT LUMBAR SPINE W/O CONTRAST  LOCATION: [facility]  DATE/TIME: 4/25/2023 11:13 PM CDT    INDICATION: Fall, pain.  COMPARISON: None.  TECHNIQUE: Routine CT Lumbar Spine without IV contrast. Multiplanar reformats. Dose reduction techniques were used.     FINDINGS:  VERTEBRA: Five lumbar-type vertebral bodies with partial lumbarization of S1. Mild  right convexity lumbar curvature. 7 mm degenerative anterolisthesis L5 on S1. Alignment is otherwise normal. No acute lumbar spine fracture. Moderate to marked multilevel   degenerative disc disease. Ankylosis of the T12-L1 interspace. Severe right L5-S1 facet arthropathy and moderate to marked changes at a few additional levels. 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    CANAL/FORAMINA: Moderate canal narrowing at L5-S1 and mild changes at a few additional levels. Marked right L4-L5 and L5-S1 degenerative foraminal narrowing.    PARASPINAL: Please refer to CT abdomen and pelvis same day for extraspinal findings.      Impression    IMPRESSION:  1.  No acute lumbar spine fracture.   CT Facial Bones without Contrast    Narrative    EXAM: CT HEAD W/O CONTRAST, CT CERVICAL SPINE W/O CONTRAST, CT FACIAL BONES WITHOUT CONTRAST  LOCATION: [facility]  DATE/TIME: 4/25/2023 11:15 PM CDT    INDICATION: Fall, occipital hematoma.  COMPARISON: 12/6/2020 head CT  TECHNIQUE:   1) Routine CT Head without IV contrast. Multiplanar reformats. Dose reduction techniques were used.  2) Routine CT Facial Bones without IV contrast. Multiplanar reformats. Dose reduction techniques were used.  3) Routine CT Cervical Spine without IV contrast. Multiplanar reformats. Dose reduction techniques were used.    FINDINGS:  HEAD CT:   INTRACRANIAL CONTENTS: No intracranial hemorrhage, extraaxial collection, or mass effect.  No CT evidence of acute infarct. Mild volume loss and presumed chronic small vessel ischemia are stable.    OSSEOUS STRUCTURES/SOFT TISSUES: Right parietal scalp and subgaleal swelling. No calvarial fracture.    FACIAL BONE CT:  OSSEOUS STRUCTURES/SOFT TISSUES: No localized soft tissue swelling/inflammation. No facial bone fracture or malalignment. Periapical lucency associated with the posterior most right maxillary molar with discontinuous osseous covering extending  into the   right maxillary antrum mild overlying mucosal thickening suggesting the possibility of mild chronic odontogenic sinusitis.    ORBITAL CONTENTS: No acute abnormality.    SINUSES: Mild scattered paranasal sinus mucosal disease.     CERVICAL SPINE CT:   VERTEBRA: 3 mm degenerative anterolisthesis C2 on C3 and 4 mm degenerative anterolisthesis C7 on T1. Alignment is otherwise normal. No acute cervical spine fracture or posttraumatic subluxation. Marked multilevel degenerative disc disease and moderate to   marked multilevel facet arthropathy.    CANAL/FORAMINA: Mild to moderate canal narrowing at C5-C6 and mild changes elsewhere. Moderate to marked multilevel degenerative foraminal narrowing.    PARASPINAL: No extraspinal abnormality. Visualized lung fields are clear.      Impression    IMPRESSION:  HEAD CT:  1.  Right parietal scalp and subgaleal swelling. No acute intracranial hemorrhage or calvarial fracture.    2.  Stable age-related changes.    FACIAL BONE CT:  1.  No acute facial fracture.    2.  Findings suggesting mild right odontogenic maxillary sinusitis.    CERVICAL SPINE CT:  1.  No acute cervical spine fracture.   EKG 12-lead, tracing only   Result Value Ref Range    Systolic Blood Pressure  mmHg    Diastolic Blood Pressure  mmHg    Ventricular Rate 88 BPM    Atrial Rate 88 BPM    MA Interval 246 ms    QRS Duration 112 ms     ms    QTc 471 ms    P Axis 86 degrees    R AXIS 2 degrees    T Axis 19 degrees    Interpretation ECG       Sinus rhythm with 1st degree A-V block  Incomplete right bundle branch block  Borderline ECG  When compared with ECG of 26-JUL-2022 08:12,  No significant change was found  Confirmed by - EMERGENCY ROOM, PHYSICIAN ([id number]),  CHRISTI CASTELLANOS (Cruz) on 4/26/2023 7:05:41 AM     Cervical spine CT w/o contrast    Narrative    EXAM: CT HEAD W/O CONTRAST, CT CERVICAL SPINE W/O CONTRAST, CT FACIAL BONES WITHOUT CONTRAST  LOCATION: [facility]  DATE/TIME: 4/25/2023 11:15 PM CDT    INDICATION: Fall, occipital hematoma.  COMPARISON: 12/6/2020 head CT  TECHNIQUE:   1) Routine CT Head without IV contrast. Multiplanar reformats. Dose reduction techniques were used.  2) Routine CT Facial Bones without IV contrast. Multiplanar reformats. Dose reduction techniques were used.  3) Routine CT Cervical Spine without IV contrast. Multiplanar reformats. Dose reduction techniques were used.    FINDINGS:  HEAD CT:   INTRACRANIAL CONTENTS: No intracranial hemorrhage, extraaxial collection, or mass effect.  No CT evidence of acute infarct. Mild volume loss and presumed chronic small vessel ischemia are stable.    OSSEOUS STRUCTURES/SOFT TISSUES: Right parietal scalp and subgaleal swelling. No calvarial fracture.    FACIAL BONE CT:  OSSEOUS STRUCTURES/SOFT TISSUES: No localized soft tissue swelling/inflammation. No facial bone fracture or malalignment. Periapical lucency associated with the posterior most right maxillary molar with discontinuous osseous covering extending into the   right maxillary antrum mild overlying mucosal thickening suggesting the possibility of mild chronic odontogenic sinusitis.    ORBITAL CONTENTS: No acute abnormality.    SINUSES: Mild scattered paranasal sinus mucosal disease.     CERVICAL SPINE CT:   VERTEBRA: 3 mm degenerative anterolisthesis C2 on C3 and 4 mm degenerative anterolisthesis C7 on T1. Alignment is otherwise normal. No acute cervical spine fracture or posttraumatic subluxation. Marked multilevel degenerative disc disease and moderate to   marked multilevel facet arthropathy.    CANAL/FORAMINA: Mild to moderate canal narrowing at C5-C6 and mild changes elsewhere. Moderate to marked multilevel degenerative foraminal narrowing.    PARASPINAL: No extraspinal abnormality. Visualized lung fields are clear.      Impression    IMPRESSION:  HEAD CT:  1.  Right parietal scalp and subgaleal swelling. No acute intracranial hemorrhage  or calvarial fracture.    2.  Stable age-related changes.    FACIAL BONE CT:  1.  No acute facial fracture.    2.  Findings suggesting mild right odontogenic maxillary sinusitis.    CERVICAL SPINE CT:  1.  No acute cervical spine fracture.   Head CT w/o contrast    Narrative    EXAM: CT HEAD W/O CONTRAST, CT CERVICAL SPINE W/O CONTRAST, CT FACIAL BONES WITHOUT CONTRAST  LOCATION: [facility]  DATE/TIME: 4/25/2023 11:15 PM CDT    INDICATION: Fall, occipital hematoma.  COMPARISON: 12/6/2020 head CT  TECHNIQUE:   1) Routine CT Head without IV contrast. Multiplanar reformats. Dose reduction techniques were used.  2) Routine CT Facial Bones without IV contrast. Multiplanar reformats. Dose reduction techniques were used.  3) Routine CT Cervical Spine without IV contrast. Multiplanar reformats. Dose reduction techniques were used.    FINDINGS:  HEAD CT:   INTRACRANIAL CONTENTS: No intracranial hemorrhage, extraaxial collection, or mass effect.  No CT evidence of acute infarct. Mild volume loss and presumed chronic small vessel ischemia are stable.    OSSEOUS STRUCTURES/SOFT TISSUES: Right parietal scalp and subgaleal swelling. No calvarial fracture.    FACIAL BONE CT:  OSSEOUS STRUCTURES/SOFT TISSUES: No localized soft tissue swelling/inflammation. No facial bone fracture or malalignment. Periapical lucency associated with the posterior most right maxillary molar with discontinuous osseous covering extending into the   right maxillary antrum mild overlying mucosal thickening suggesting the possibility of mild chronic odontogenic sinusitis.    ORBITAL CONTENTS: No acute abnormality.    SINUSES: Mild scattered paranasal sinus mucosal disease.     CERVICAL SPINE CT:   VERTEBRA: 3 mm degenerative anterolisthesis C2 on C3 and 4 mm degenerative anterolisthesis C7 on T1. Alignment is otherwise normal. No acute cervical spine fracture or posttraumatic subluxation. Marked multilevel degenerative disc disease  and moderate to   marked multilevel facet arthropathy.    CANAL/FORAMINA: Mild to moderate canal narrowing at C5-C6 and mild changes elsewhere. Moderate to marked multilevel degenerative foraminal narrowing.    PARASPINAL: No extraspinal abnormality. Visualized lung fields are clear.      Impression    IMPRESSION:  HEAD CT:  1.  Right parietal scalp and subgaleal swelling. No acute intracranial hemorrhage or calvarial fracture.    2.  Stable age-related changes.    FACIAL BONE CT:  1.  No acute facial fracture.    2.  Findings suggesting mild right odontogenic maxillary sinusitis.    CERVICAL SPINE CT:  1.  No acute cervical spine fracture.   UA with Microscopic reflex to Culture    Specimen: Urine, Catheter   Result Value Ref Range    Color Urine Light Yellow Colorless, Straw, Light Yellow, Yellow    Appearance Urine Clear Clear    Glucose Urine Negative Negative mg/dL    Bilirubin Urine Negative Negative    Ketones Urine Negative Negative mg/dL    Specific Gravity Urine 1.015 1.003 - 1.035    Blood Urine Negative Negative    pH Urine 6.5 5.0 - 7.0    Protein Albumin Urine Negative Negative mg/dL    Urobilinogen Urine Normal Normal, 2.0 mg/dL    Nitrite Urine Negative Negative    Leukocyte Esterase Urine Negative Negative    Mucus Urine Present (A) None Seen /LPF    RBC Urine 0 <=2 /HPF    WBC Urine <1 <=5 /HPF    Squamous Epithelials Urine <1 <=1 /HPF    Narrative    Urine Culture not indicated   CBC with platelets differential    Narrative    The following orders were created for panel order CBC with platelets differential.  Procedure                               Abnormality         Status                     ---------                               -----------         ------                     CBC with platelets and d...[[id number]]  Abnormal            Final result                 Please view results for these tests on the individual orders.   Troponin T, High Sensitivity   Result Value Ref Range    Troponin T,  High Sensitivity 25 (H) <=14 ng/L   CBC with platelets and differential   Result Value Ref Range    WBC Count 9.6 4.0 - 11.0 10e3/uL    RBC Count 3.83 3.80 - 5.20 10e6/uL    Hemoglobin 10.0 (L) 11.7 - 15.7 g/dL    Hematocrit 32.6 (L) 35.0 - 47.0 %    MCV 85 78 - 100 fL    MCH 26.1 (L) 26.5 - 33.0 pg    MCHC 30.7 (L) 31.5 - 36.5 g/dL    RDW 16.2 (H) 10.0 - 15.0 %    Platelet Count 360 150 - 450 10e3/uL    % Neutrophils 68 %    % Lymphocytes 17 %    % Monocytes 12 %    % Eosinophils 2 %    % Basophils 1 %    % Immature Granulocytes 0 %    NRBCs per 100 WBC 0 <1 /100    Absolute Neutrophils 6.6 1.6 - 8.3 10e3/uL    Absolute Lymphocytes 1.6 0.8 - 5.3 10e3/uL    Absolute Monocytes 1.1 0.0 - 1.3 10e3/uL    Absolute Eosinophils 0.2 0.0 - 0.7 10e3/uL    Absolute Basophils 0.1 0.0 - 0.2 10e3/uL    Absolute Immature Granulocytes 0.0 <=0.4 10e3/uL    Absolute NRBCs 0.0 10e3/uL   CBC with platelets   Result Value Ref Range    WBC Count 6.7 4.0 - 11.0 10e3/uL    RBC Count 3.56 (L) 3.80 - 5.20 10e6/uL    Hemoglobin 9.5 (L) 11.7 - 15.7 g/dL    Hematocrit 30.4 (L) 35.0 - 47.0 %    MCV 85 78 - 100 fL    MCH 26.7 26.5 - 33.0 pg    MCHC 31.3 (L) 31.5 - 36.5 g/dL    RDW 16.3 (H) 10.0 - 15.0 %    Platelet Count 335 150 - 450 10e3/uL   Basic metabolic panel   Result Value Ref Range    Sodium 142 136 - 145 mmol/L    Potassium 3.9 3.4 - 5.3 mmol/L    Chloride 105 98 - 107 mmol/L    Carbon Dioxide (CO2) 26 22 - 29 mmol/L    Anion Gap 11 7 - 15 mmol/L    Urea Nitrogen 30.6 (H) 8.0 - 23.0 mg/dL    Creatinine 0.69 0.51 - 0.95 mg/dL    Calcium 8.9 8.8 - 10.2 mg/dL    Glucose 111 (H) 70 - 99 mg/dL    GFR Estimate 88 >60 mL/min/1.73m2

## 2023-04-26 NOTE — H&P
[facility]    History and Physical - Hospitalist Service       Date of Admission:  4/25/2023    Assessment & Plan      Teri Beltran is a 77 year old female admitted on 4/25/2023 after a fall with closed head trauma, concussion, and loss of consciousness. She has history of hypertension, hypercholesterolemia, hypothyroidism, prediabetes, GERD, glaucoma, right bundle branch block, first-degree AV block, and heart block.  She presented to the emergency department after a fall.  She was in the garage taking some trash out to the trash bin when she fell backward and hit the back of her head.  She lost consciousness.  She is not sure for how long.  She had significant bleeding.  She was not able to get up.  She crawled out into her driveway and hollered for her neighbor who heard her and called 911.  She reported headache and pain in right hip, right leg, right jaw, and abdomen.  Emergency department evaluation showed heart rate in the 90s to 100s but otherwise unremarkable vital signs.  Laboratory evaluation showed troponin 27 (without chest pain), TSH 3.93, and unremarkable basic metabolic panel.  CT of head showed right parietal scalp hematoma but no intracranial hemorrhage.  Facial CT showed no fractures.  CT of C-spine showed no fracture.  CT of L-spine showed no fracture.  CT of abdomen and pelvis showed no acute injuries.  Gallstones and diverticulosis were noted.  X-ray of both femurs were unremarkable.  Patient was having ongoing pain and was not really able to get up and ambulate in the emergency department.  I was asked to admit her to the hospital for monitoring and mobility evaluation.    Problem list:    Fall  Closed head trauma  Concussion  Loss of consciousness  Posterior scalp hematoma without laceration  -Admit to observation  -Pain control with scheduled Tylenol, scheduled Celebrex, as needed oxycodone, and as needed Lidoderm patches  -Antiemetics as needed  -If significant  "nausea repeat head CT  -PT evaluation for mobility assessment    Hypertension  Hypercholesterolemia  History of first-degree AV block and right bundle branch block  -Resume prior to admission metoprolol, Lasix, and Pravachol    Hypothyroidism  -Resume prior to admission levothyroxine    GERD  -Pepcid and Protonix    Glaucoma  -Resume prior to admission eyedrops    Prediabetes  -Blood glucose was 161, nonfasting  -Check glucose in the morning with basic metabolic panel     Diet:   Regular  DVT Prophylaxis: Ambulate every shift  Hess Catheter: Not present  Lines: None     Cardiac Monitoring: None  Code Status:  Full code    Clinically Significant Risk Factors Present on Admission                       # Obesity: Estimated body mass index is 32.92 kg/m  as calculated from the following:    Height as of this encounter: 1.575 m (5' 2\").    Weight as of this encounter: 81.6 kg (180 lb).           Disposition Plan      Expected Discharge Date: 04/27/2023                  Kyler Gonzalez MD  Hospitalist Service  [facility]  Securely message with [employer] (more info)  Text page via [facility] Paging/Directory     ______________________________________________________________________    Chief Complaint   Fall with head trauma, loss of consciousness, headache, and diffuse body pain    History is obtained from the patient, Dr. Liang, and the medical record    History of Present Illness      Teri Beltran is a 77 year old female admitted on 4/25/2023 after a fall with closed head trauma, concussion, and loss of consciousness. She has history of hypertension, hypercholesterolemia, hypothyroidism, prediabetes, GERD, glaucoma, right bundle branch block, first-degree AV block, and heart block.  She presented to the emergency department after a fall.  She was in the garage taking some trash out to the trash bin when she fell backward and hit the back of her head.  She lost consciousness.  She is not sure for " how long.  She had significant bleeding.  She was not able to get up.  She crawled out into her driveway and hollered for her neighbor who heard her and called 911.  She reported headache and pain in right hip, right leg, right jaw, and abdomen.  Emergency department evaluation showed heart rate in the 90s to 100s but otherwise unremarkable vital signs.  Laboratory evaluation showed troponin 27 (without chest pain), TSH 3.93, and unremarkable basic metabolic panel.  CT of head showed right parietal scalp hematoma but no intracranial hemorrhage.  Facial CT showed no fractures.  CT of C-spine showed no fracture.  CT of L-spine showed no fracture.  CT of abdomen and pelvis showed no acute injuries.  Gallstones and diverticulosis were noted.  X-ray of both femurs were unremarkable.  Patient was having ongoing pain and was not really able to get up and ambulate in the emergency department.  I was asked to admit her to the hospital for monitoring and mobility evaluation.      Past Medical History    Past Medical History:   Diagnosis Date     Concussion with loss of consciousness, initial encounter 4/26/2023     First degree AV block 08/11/2015     Former smoker      Gastroesophageal reflux disease      GERD (gastroesophageal reflux disease)      Glaucoma      Heart block     2:1     Heart murmur      Hyperlipidemia LDL goal <130 06/14/2013     Hypertension goal BP (blood pressure) < 140/90 12/03/2013     Hypothyroidism      Left atrial dilatation     mild     Mild dilation of ascending aorta - borderline 08/11/2015     Palpitations      Prediabetes 06/14/2013     RBBB 08/11/2015     S/P total knee arthroplasty 07/23/2019     Tachycardia      Urinary frequency      Varicose veins      Venous insufficiency     s/p bilateral great saphenous vein radiofrequency ablation by Dr. Garcia       Past Surgical History   Past Surgical History:   Procedure Laterality Date     ARTHROPLASTY HIP Right 11/20/2020    Procedure: Right  total hip arthroplasty using a Biomet Taperloc femoral stem and G7 acetabulum.;  Surgeon: Dragan Muse MD;  Location: [facility] OR     ARTHROPLASTY KNEE Right 07/23/2019    Procedure: Right total knee arthroplasty using an Arthrex iBalance knee system;  Surgeon: Dragan Muse MD;  Location: [facility] OR     BREAST SURGERY      right breast ductal surgery due to milk production     BREAST SURGERY Right     ductal surgery due to milk production     CATARACT EXTRACTION Right 05/2022     COLONOSCOPY N/A 10/24/2014    no further screening. Procedure: COLONOSCOPY;  Surgeon: Pedro Rudd MD;  Location: [facility] GI     DECOMPRESSION HIP CORE Right 6/6/2022    Procedure: Open right hip abductor repair;  Surgeon: Dragan Muse MD;  Location: [facility] OR     EYE SURGERY       FINGER GANGLION CYST EXCISION Left     ring finger     HYSTERECTOMY       HYSTERECTOMY, PAP NO LONGER INDICATED  2009    total hysterectomy and ovaries. benign     SOFT TISSUE SURGERY      ganglion removed from left wrist and ring finger     SURGICAL HISTORY OF -   age 7    tonsillectomy     SURGICAL HISTORY OF -   01/2015    left eye cataract     SURGICAL HISTORY OF -   Fall 2016    LINQ placed.     TONSILLECTOMY       WRIST GANGLION EXCISION Left        Prior to Admission Medications   Prior to Admission Medications   Prescriptions Last Dose Informant Patient Reported? Taking?   Multiple Vitamins-Minerals (MULTIVITAMIN ADULT PO)   Yes No   Sig: Take 1 tablet by mouth daily   acetaminophen (TYLENOL) 325 MG tablet   No No   Sig: Take 2 tablets (650 mg) by mouth every 4 hours as needed for other (mild pain)   dorzolamide-timolol (COSOPT) 2-0.5 % ophthalmic solution   Yes No   Sig: Place 1 drop into both eyes 2 times daily   famotidine (PEPCID) 10 MG tablet   No No   Sig: Take 1 tablet (10 mg) by mouth 2 times daily   furosemide (LASIX) 20 MG tablet   No No   Sig: Take 1 tablet (20 mg) by mouth 2 times daily   gabapentin (NEURONTIN) 300 MG  "capsule   No No   Sig: Take 1 capsule (300 mg) by mouth 3 times daily   latanoprost (XALATAN) 0.005 % ophthalmic solution   Yes No   Sig: Place 1 drop into the right eye At Bedtime    levothyroxine (SYNTHROID/LEVOTHROID) 88 MCG tablet   No No   Sig: Take 1 tablet (88 mcg) by mouth daily   metoprolol tartrate (LOPRESSOR) 25 MG tablet   No No   Sig: TAKE 1 TABLET(25 MG) BY MOUTH TWICE DAILY   pantoprazole (PROTONIX) 40 MG EC tablet   No No   Sig: TAKE 1 TABLET(40 MG) BY MOUTH DAILY   potassium chloride ER (KLOR-CON M) 10 MEQ CR tablet   No No   Sig: TAKE 1 TABLET(10 MEQ) BY MOUTH DAILY   pravastatin (PRAVACHOL) 40 MG tablet   No No   Sig: Take 1 tablet (40 mg) by mouth At Bedtime      Facility-Administered Medications: None        Review of Systems    The 10 point Review of Systems is negative other than noted in the HPI or here.     Social History   I have reviewed this patient's social history and updated it with pertinent information if needed.  Social History     Tobacco Use     Smoking status: Former     Packs/day: 0.50     Years: 18.00     Pack years: 9.00     Types: Cigarettes     Quit date:      Years since quittin.3     Passive exposure: Past     Smokeless tobacco: Never   Vaping Use     Vaping status: Never Used     Passive vaping exposure: Yes   Substance Use Topics     Alcohol use: Not Currently     Drug use: No       Family History   I have reviewed this patient's family history and updated it with pertinent information if needed.  Family History   Problem Relation Age of Onset     Ovarian Cancer Mother      Cancer Mother         ovarian     Breast Cancer Mother      Heart Disease Father         rare; not sure what it was     Diabetes Father         old age     Cerebrovascular Disease Brother 68       Allergies   Allergies   Allergen Reactions     Seasonal Allergies      Simvastatin      \"flushed\"        Physical Exam   Vital Signs: Temp: 98.6  F (37  C)   BP: (!) 158/95 Pulse: 102   Resp: 16 " SpO2: 96 % O2 Device: None (Room air)    Weight: 180 lbs 0 oz    GENERAL: Pleasant and cooperative. No acute distress.  EYES: Pupils equal and round. No scleral erythema or icterus.  ENT: External ears are normal without deformity. Posterior oropharynx is without erythem, swelling, or exudate.  HEAD: Posterior lower scalp hematoma  NECK: Supple. No masses or swelling. No tenderness. Thyroid is normal without mass or tenderness.  CHEST: Clear to auscultation. Normal breath sounds. No retractions.   CV: Regular rate and rhythm. No JVD. Pulses normal.  ABDOMEN: Bowel sounds present. No tenderness. No masses or hernia.  EXTREMETIES: No clubbing, cyanosis, or ischemia.  SKIN: Warm and dry to touch. No wounds or rashes.  NEUROLOGIC: Strength and sensation are normal. Deep tendon reflexes are normal. Cranial nerves are normal.        Medical Decision Making       65 MINUTES SPENT BY ME on the date of service doing chart review, history, exam, documentation & further activities per the note.      Data     I have personally reviewed the following data over the past 24 hrs:    9.6  \   10.0 (L)   / 360     141 102 33.5 (H) /  161 (H)   4.0 26 0.91 \       Trop: 27 (H) BNP: N/A       TSH: 3.93 T4: N/A A1C: N/A       Imaging results reviewed over the past 24 hrs:   Recent Results (from the past 24 hour(s))   XR Femur Left 2 Views    Narrative    EXAM: XR FEMUR LEFT 2 VIEWS  LOCATION: [facility]  DATE/TIME: 4/25/2023 11:04 PM CDT    INDICATION: Left thigh pain after fall.  COMPARISON: None.      Impression    IMPRESSION: No fracture. Mild-to-moderate degenerative narrowing and degenerative changes left hip and visualized left knee. Heterotopic calcifications about the left hip. No osteolytic or osteoblastic lesions.   Abd/pelvis CT no contrast - Stone Protocol    Narrative    EXAM: CT ABDOMEN PELVIS W/O CONTRAST  LOCATION: [facility]  DATE/TIME: 4/25/2023 11:05 PM CDT    INDICATION:  Left flank and left upper quadrant abdominal pain after fall  COMPARISON: None.  TECHNIQUE: CT scan of the abdomen and pelvis was performed without IV contrast. Multiplanar reformats were obtained. Dose reduction techniques were used.  CONTRAST: None.    FINDINGS:   LOWER CHEST: Scattered atelectasis in the lung bases. No confluent consolidation or pleural effusion.    HEPATOBILIARY: Liver is normal in size without focal lesions or surface nodularity. Tiny gallstones present without acute cholecystitis.    PANCREAS: Normal.    SPLEEN: Normal.    ADRENAL GLANDS: Normal.    KIDNEYS/BLADDER: Normal.    BOWEL: Moderate amount of stool in the colon. Colonic diverticulosis noted without acute diverticulitis. Appendix is normal. No bowel wall thickening or bowel dilatation.    LYMPH NODES: Normal.    VASCULATURE: Minimal aortic atherosclerotic calcifications. No abdominal aortic aneurysm.    PELVIC ORGANS: Status post hysterectomy. No abnormal adnexal masses.    MUSCULOSKELETAL: Advanced spinal spondylosis with diffuse mild, moderate and severe degenerative disc space narrowing with scattered vacuum disc, most advanced at T12/L1, as well as grade 2 degenerative anterolisthesis of L5 on S1, diffuse, multilevel   bilateral facet arthropathy more pronounced in the lumbar spine and multilevel moderate and severe neuroforaminal stenosis in the lower thoracic and lumbar spine. No suspicious osseous lesions or acute fractures.        Impression    IMPRESSION:     1.  No acute findings in the abdomen and pelvis.    2.  Cholelithiasis and colonic diverticulosis.    3.  Advanced spinal degenerative changes.     XR Femur Right 2 Views    Narrative    EXAM: XR FEMUR RIGHT 2 VIEWS  LOCATION: [facility]  DATE/TIME: 4/25/2023 11:10 PM CDT    INDICATION: fall, pain  COMPARISON: Knee film from 07/23/2019      Impression    IMPRESSION: Within normal limits. No fracture. Total hip and total knee arthroplasties.  Components demonstrate normal alignment.   Lumbar spine CT w/o contrast    Narrative    EXAM: CT LUMBAR SPINE W/O CONTRAST  LOCATION: [facility]  DATE/TIME: 4/25/2023 11:13 PM CDT    INDICATION: Fall, pain.  COMPARISON: None.  TECHNIQUE: Routine CT Lumbar Spine without IV contrast. Multiplanar reformats. Dose reduction techniques were used.     FINDINGS:  VERTEBRA: Five lumbar-type vertebral bodies with partial lumbarization of S1. Mild right convexity lumbar curvature. 7 mm degenerative anterolisthesis L5 on S1. Alignment is otherwise normal. No acute lumbar spine fracture. Moderate to marked multilevel   degenerative disc disease. Ankylosis of the T12-L1 interspace. Severe right L5-S1 facet arthropathy and moderate to marked changes at a few additional levels. 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    CANAL/FORAMINA: Moderate canal narrowing at L5-S1 and mild changes at a few additional levels. Marked right L4-L5 and L5-S1 degenerative foraminal narrowing.    PARASPINAL: Please refer to CT abdomen and pelvis same day for extraspinal findings.      Impression    IMPRESSION:  1.  No acute lumbar spine fracture.   CT Facial Bones without Contrast    Narrative    EXAM: CT HEAD W/O CONTRAST, CT CERVICAL SPINE W/O CONTRAST, CT FACIAL BONES WITHOUT CONTRAST  LOCATION: [facility]  DATE/TIME: 4/25/2023 11:15 PM CDT    INDICATION: Fall, occipital hematoma.  COMPARISON: 12/6/2020 head CT  TECHNIQUE:   1) Routine CT Head without IV contrast. Multiplanar reformats. Dose reduction techniques were used.  2) Routine CT Facial Bones without IV contrast. Multiplanar reformats. Dose reduction techniques were used.  3) Routine CT Cervical Spine without IV contrast. Multiplanar reformats. Dose reduction techniques were used.    FINDINGS:  HEAD CT:   INTRACRANIAL CONTENTS: No intracranial hemorrhage, extraaxial collection, or mass effect.  No CT  evidence of acute infarct. Mild volume loss and presumed chronic small vessel ischemia are stable.    OSSEOUS STRUCTURES/SOFT TISSUES: Right parietal scalp and subgaleal swelling. No calvarial fracture.    FACIAL BONE CT:  OSSEOUS STRUCTURES/SOFT TISSUES: No localized soft tissue swelling/inflammation. No facial bone fracture or malalignment. Periapical lucency associated with the posterior most right maxillary molar with discontinuous osseous covering extending into the   right maxillary antrum mild overlying mucosal thickening suggesting the possibility of mild chronic odontogenic sinusitis.    ORBITAL CONTENTS: No acute abnormality.    SINUSES: Mild scattered paranasal sinus mucosal disease.     CERVICAL SPINE CT:   VERTEBRA: 3 mm degenerative anterolisthesis C2 on C3 and 4 mm degenerative anterolisthesis C7 on T1. Alignment is otherwise normal. No acute cervical spine fracture or posttraumatic subluxation. Marked multilevel degenerative disc disease and moderate to   marked multilevel facet arthropathy.    CANAL/FORAMINA: Mild to moderate canal narrowing at C5-C6 and mild changes elsewhere. Moderate to marked multilevel degenerative foraminal narrowing.    PARASPINAL: No extraspinal abnormality. Visualized lung fields are clear.      Impression    IMPRESSION:  HEAD CT:  1.  Right parietal scalp and subgaleal swelling. No acute intracranial hemorrhage or calvarial fracture.    2.  Stable age-related changes.    FACIAL BONE CT:  1.  No acute facial fracture.    2.  Findings suggesting mild right odontogenic maxillary sinusitis.    CERVICAL SPINE CT:  1.  No acute cervical spine fracture.   Cervical spine CT w/o contrast    Narrative    EXAM: CT HEAD W/O CONTRAST, CT CERVICAL SPINE W/O CONTRAST, CT FACIAL BONES WITHOUT CONTRAST  LOCATION: [facility]  DATE/TIME: 4/25/2023 11:15 PM CDT    INDICATION: Fall, occipital hematoma.  COMPARISON: 12/6/2020 head CT  TECHNIQUE:   1) Routine CT Head  without IV contrast. Multiplanar reformats. Dose reduction techniques were used.  2) Routine CT Facial Bones without IV contrast. Multiplanar reformats. Dose reduction techniques were used.  3) Routine CT Cervical Spine without IV contrast. Multiplanar reformats. Dose reduction techniques were used.    FINDINGS:  HEAD CT:   INTRACRANIAL CONTENTS: No intracranial hemorrhage, extraaxial collection, or mass effect.  No CT evidence of acute infarct. Mild volume loss and presumed chronic small vessel ischemia are stable.    OSSEOUS STRUCTURES/SOFT TISSUES: Right parietal scalp and subgaleal swelling. No calvarial fracture.    FACIAL BONE CT:  OSSEOUS STRUCTURES/SOFT TISSUES: No localized soft tissue swelling/inflammation. No facial bone fracture or malalignment. Periapical lucency associated with the posterior most right maxillary molar with discontinuous osseous covering extending into the   right maxillary antrum mild overlying mucosal thickening suggesting the possibility of mild chronic odontogenic sinusitis.    ORBITAL CONTENTS: No acute abnormality.    SINUSES: Mild scattered paranasal sinus mucosal disease.     CERVICAL SPINE CT:   VERTEBRA: 3 mm degenerative anterolisthesis C2 on C3 and 4 mm degenerative anterolisthesis C7 on T1. Alignment is otherwise normal. No acute cervical spine fracture or posttraumatic subluxation. Marked multilevel degenerative disc disease and moderate to   marked multilevel facet arthropathy.    CANAL/FORAMINA: Mild to moderate canal narrowing at C5-C6 and mild changes elsewhere. Moderate to marked multilevel degenerative foraminal narrowing.    PARASPINAL: No extraspinal abnormality. Visualized lung fields are clear.      Impression    IMPRESSION:  HEAD CT:  1.  Right parietal scalp and subgaleal swelling. No acute intracranial hemorrhage or calvarial fracture.    2.  Stable age-related changes.    FACIAL BONE CT:  1.  No acute facial fracture.    2.  Findings suggesting mild right  odontogenic maxillary sinusitis.    CERVICAL SPINE CT:  1.  No acute cervical spine fracture.   Head CT w/o contrast    Narrative    EXAM: CT HEAD W/O CONTRAST, CT CERVICAL SPINE W/O CONTRAST, CT FACIAL BONES WITHOUT CONTRAST  LOCATION: [facility]  DATE/TIME: 4/25/2023 11:15 PM CDT    INDICATION: Fall, occipital hematoma.  COMPARISON: 12/6/2020 head CT  TECHNIQUE:   1) Routine CT Head without IV contrast. Multiplanar reformats. Dose reduction techniques were used.  2) Routine CT Facial Bones without IV contrast. Multiplanar reformats. Dose reduction techniques were used.  3) Routine CT Cervical Spine without IV contrast. Multiplanar reformats. Dose reduction techniques were used.    FINDINGS:  HEAD CT:   INTRACRANIAL CONTENTS: No intracranial hemorrhage, extraaxial collection, or mass effect.  No CT evidence of acute infarct. Mild volume loss and presumed chronic small vessel ischemia are stable.    OSSEOUS STRUCTURES/SOFT TISSUES: Right parietal scalp and subgaleal swelling. No calvarial fracture.    FACIAL BONE CT:  OSSEOUS STRUCTURES/SOFT TISSUES: No localized soft tissue swelling/inflammation. No facial bone fracture or malalignment. Periapical lucency associated with the posterior most right maxillary molar with discontinuous osseous covering extending into the   right maxillary antrum mild overlying mucosal thickening suggesting the possibility of mild chronic odontogenic sinusitis.    ORBITAL CONTENTS: No acute abnormality.    SINUSES: Mild scattered paranasal sinus mucosal disease.     CERVICAL SPINE CT:   VERTEBRA: 3 mm degenerative anterolisthesis C2 on C3 and 4 mm degenerative anterolisthesis C7 on T1. Alignment is otherwise normal. No acute cervical spine fracture or posttraumatic subluxation. Marked multilevel degenerative disc disease and moderate to   marked multilevel facet arthropathy.    CANAL/FORAMINA: Mild to moderate canal narrowing at C5-C6 and mild changes elsewhere.  Moderate to marked multilevel degenerative foraminal narrowing.    PARASPINAL: No extraspinal abnormality. Visualized lung fields are clear.      Impression    IMPRESSION:  HEAD CT:  1.  Right parietal scalp and subgaleal swelling. No acute intracranial hemorrhage or calvarial fracture.    2.  Stable age-related changes.    FACIAL BONE CT:  1.  No acute facial fracture.    2.  Findings suggesting mild right odontogenic maxillary sinusitis.    CERVICAL SPINE CT:  1.  No acute cervical spine fracture.     Recent Labs   Lab 04/26/23  0144 04/25/23  2224   WBC 9.6  --    HGB 10.0*  --    MCV 85  --      --    NA  --  141   POTASSIUM  --  4.0   CHLORIDE  --  102   CO2  --  26   BUN  --  33.5*   CR  --  0.91   ANIONGAP  --  13   LILIA  --  9.4   GLC  --  161*

## 2023-04-26 NOTE — ED PROVIDER NOTES
History     Chief Complaint:  Fall       HPI   Teri Beltran is a 77 year old female with a history of hypertension and hyperlipidemia who presents after falling in her garage while throwing something in the trash bin. She believes that she missed a step and got off balance. She did hit the back of her head and has some dried blood there. She is having head pain, right hip and leg pain, right jaw pain, and new abdominal pain that was not present before the fall. She is not anticoagulated, but did take Aspirin last night. No back, neck, chest pain, blood in stool, or dysuria.     Independent Historian:   None - Patient Only    ROS:  Review of Systems   Cardiovascular: Negative for chest pain.   Gastrointestinal: Positive for abdominal pain. Negative for blood in stool.   Genitourinary: Negative for dysuria.   Musculoskeletal: Positive for arthralgias. Negative for back pain and neck pain.   Skin: Positive for wound.   All other systems reviewed and are negative.      Allergies:  Simvastatin     Medications:    Famotidine  Furosemide  Gabapentin  Levothyroxine  Metoprolol tartrate  Pantoprazole  Potassium chloride  Pravastatin    Past Medical History:    GERD  Heart murmur  Hyperlipidemia  Hypertension  Glaucoma  First degree AV block  Hypothyroidism  Prediabetes  Mile dilation of ascending aorta  Thyroid disease   RBBB  Varicose veins  Adjustment disorder with depressed mood  Osteoporosis   SVT    Past Surgical History:    Right hip arthroplasty  Right knee arthroplasty  Right breast ductal surgery  Cataract extraction  Hip core decompression  Hysterectomy  Tonsillectomy  Left finger ganglion cyst excison     Family History:    Mother- breast cancer, ovarian cancer  Father- diabetes, heart disease    Social History:  The patient presents via EMS  Lives independently at home    Physical Exam     Patient Vitals for the past 24 hrs:   BP Temp Pulse Resp SpO2 Height Weight   04/26/23 0100 (!) 158/95 -- 102 16 96 %  "-- --   04/25/23 2213 133/69 -- -- -- 96 % -- --   04/25/23 2115 (!) 150/63 98.6  F (37  C) 86 18 98 % 1.575 m (5' 2\") 81.6 kg (180 lb)        Physical Exam  General: Alert, appears elderly, otherwise well-developed and well-nourished. Cooperative.     In mild distress  HEENT:  Head:  Large occipital hematoma with mattered hair/blood  Ears:  External ears are normal  Mouth/Throat:  Oropharynx is without erythema or exudate and mucous membranes are dry.   Eyes:   Conjunctivae normal and EOM are normal. No scleral icterus.    Pupils are equal, round, and reactive to light.   Neck:   Normal range of motion. Neck supple.  CV:  Normal rate, regular rhythm, normal heart sounds and radial pulses are 2+ and symmetric.  Systolic murmur.  Resp:  Breath sounds are clear bilaterally    Non-labored, no retractions or accessory muscle use  GI:  Abdomen is soft, no distension, LUQ tenderness. No rebound or guarding.  No CVA tenderness bilaterally  MS:  Normal range of motion. No edema.    Back atraumatic.    No midline cervical, thoracic, or lumbar tenderness    Right Hip:    There is abnormal ROM of the hip, tender to lateral palpation of right hip.      Flexion and Extension of the hip is abnormal    There is no evidence of clinical dislocation    There is no hematoma or obvious bursitis    The femur appears normal and without pain    There is no hematoma to the thigh    Sensory and Motor exam to the thigh and distal leg are normal    The knee, shin, ankle, and foot are without pain    Normal distal pulses are detected    Left Hip:    There is normal ROM of the hip    Flexion and Extension of the hip is normal    There is no evidence of clinical dislocation    There is no hematoma or obvious bursitis    The femur appears normal and without pain    There is no hematoma to the thigh    Sensory and Motor exam to the thigh and distal leg are normal    The knee, shin, ankle, and foot are without pain    Normal distal pulses are " detected  Skin:  Warm and dry.  Pale.  Hematoma and suspected laceration to the occiput.  Neuro: Alert. Globally weak.  GCS: 15  Psych:  Normal mood and affect.    Emergency Department Course   ECG  ECG taken at 2313, ECG read at 2324  Sinus rhythm with 1st degree AV block; incomplete RBBB   No significant changes as compared to prior, dated 6/23/22.  Rate 88 bpm. TX interval 246 ms. QRS duration 112 ms. QT/QTc 390/471 ms. P-R-T axes 86 2 19.      Imaging:  Head CT w/o contrast   Final Result   IMPRESSION:   HEAD CT:   1.  Right parietal scalp and subgaleal swelling. No acute intracranial hemorrhage or calvarial fracture.      2.  Stable age-related changes.      FACIAL BONE CT:   1.  No acute facial fracture.      2.  Findings suggesting mild right odontogenic maxillary sinusitis.      CERVICAL SPINE CT:   1.  No acute cervical spine fracture.      Cervical spine CT w/o contrast   Final Result   IMPRESSION:   HEAD CT:   1.  Right parietal scalp and subgaleal swelling. No acute intracranial hemorrhage or calvarial fracture.      2.  Stable age-related changes.      FACIAL BONE CT:   1.  No acute facial fracture.      2.  Findings suggesting mild right odontogenic maxillary sinusitis.      CERVICAL SPINE CT:   1.  No acute cervical spine fracture.      CT Facial Bones without Contrast   Final Result   IMPRESSION:   HEAD CT:   1.  Right parietal scalp and subgaleal swelling. No acute intracranial hemorrhage or calvarial fracture.      2.  Stable age-related changes.      FACIAL BONE CT:   1.  No acute facial fracture.      2.  Findings suggesting mild right odontogenic maxillary sinusitis.      CERVICAL SPINE CT:   1.  No acute cervical spine fracture.      Lumbar spine CT w/o contrast   Final Result   IMPRESSION:   1.  No acute lumbar spine fracture.      XR Femur Right 2 Views   Final Result   IMPRESSION: Within normal limits. No fracture. Total hip and total knee arthroplasties. Components demonstrate normal  alignment.      Abd/pelvis CT no contrast - Stone Protocol   Final Result   IMPRESSION:       1.  No acute findings in the abdomen and pelvis.      2.  Cholelithiasis and colonic diverticulosis.      3.  Advanced spinal degenerative changes.         XR Femur Left 2 Views   Final Result   IMPRESSION: No fracture. Mild-to-moderate degenerative narrowing and degenerative changes left hip and visualized left knee. Heterotopic calcifications about the left hip. No osteolytic or osteoblastic lesions.         Report per radiology    Laboratory:  Labs Ordered and Resulted from Time of ED Arrival to Time of ED Departure   BASIC METABOLIC PANEL - Abnormal       Result Value    Sodium 141      Potassium 4.0      Chloride 102      Carbon Dioxide (CO2) 26      Anion Gap 13      Urea Nitrogen 33.5 (*)     Creatinine 0.91      Calcium 9.4      Glucose 161 (*)     GFR Estimate 64     TROPONIN T, HIGH SENSITIVITY - Abnormal    Troponin T, High Sensitivity 27 (*)    ROUTINE UA WITH MICROSCOPIC REFLEX TO CULTURE - Abnormal    Color Urine Light Yellow      Appearance Urine Clear      Glucose Urine Negative      Bilirubin Urine Negative      Ketones Urine Negative      Specific Gravity Urine 1.015      Blood Urine Negative      pH Urine 6.5      Protein Albumin Urine Negative      Urobilinogen Urine Normal      Nitrite Urine Negative      Leukocyte Esterase Urine Negative      Mucus Urine Present (*)     RBC Urine 0      WBC Urine <1      Squamous Epithelials Urine <1     TSH WITH FREE T4 REFLEX - Normal    TSH 3.93     MAGNESIUM - Normal    Magnesium 2.1     CK TOTAL - Normal         TROPONIN T, HIGH SENSITIVITY        Emergency Department Course & Assessments:     Interventions:  Medications   HYDROmorphone (PF) (DILAUDID) injection 0.3 mg (0.3 mg Intravenous $Given 4/26/23 0055)   sodium chloride 0.9% infusion ( Intravenous $New Bag 4/26/23 0108)   HYDROmorphone (PF) (DILAUDID) injection 0.3 mg (0.3 mg Intravenous $Given  4/25/23 2216)   0.9% sodium chloride BOLUS (0 mLs Intravenous Stopped 4/26/23 0055)   ketorolac (TORADOL) injection 15 mg (15 mg Intravenous $Given 4/26/23 0108)      Assessments:  2217 I obtained history and examined the patient as noted above.   0053 I rechecked the patient and explained all findings.     Independent Interpretation (X-rays, CTs, rhythm strip):  I independently reviewed CT imaging of the head which showed no signs of intracranial hemorrhage.    Consultations/Discussion of Management or Tests:  0130 I spoke with Dr. Gonzalez from hospitalist service regarding the patient's presentation and workup. Accepts care of patient.      Social Determinants of Health affecting care:   None    Disposition:  The patient was admitted to the hospital under the care of Dr. Gonzalez.     Impression & Plan      Medical Decision Making:  Patient is a 77-year-old female who had a unwitnessed fall in her garage earlier this evening.  Patient does not remember the exact scenario of what caused her to fall but she believes it might have been a mechanical etiology.  I do suspect she lost consciousness during the fall as she had a prolonged period on the ground.  She had a large hematoma to the posterior occiput.  Thankfully after cleaning there is no evidence of an acute laceration but rather just to abrasion to the posterior scalp.  No need for repair.  CT imaging obtained of the head, cervical spine, and facial bones which were grossly negative for acute traumatic etiologies.  There was a incidental finding of mild right odontogenic maxillary sinusitis.  Thankfully no intracranial hemorrhage.  CT imaging of the abdomen and pelvis obtained due to left-sided abdominal pain on arrival.  Thankfully no acute traumatic etiologies identified.  Incidental findings of gallstones and colonic diverticulosis.  CT of the L-spine showed no sign of acute fracture or subluxation.  X-rays of the femurs bilaterally showed no acute fractures  or dislocations.  CK was not elevated and lower concern for rhabdomyolysis.  Urinalysis shows no sign of infection.  EKG showed no concerning ischemic changes and initial troponin within typical reference range.  She has not been having chest pain or shortness of breath and so lower suspicion for ACS although we will continue to trend troponin studies upon admission.  Thankfully work-up is relatively unremarkable in terms of possible syncope and/or traumatic etiologies.  Patient is having quite a bit of discomfort associated with the fall and injury sustained today.  We will plan to admit for observation overnight for continued monitoring and pain control.  Would be helpful to have physical therapy assess the patient tomorrow and ensure that she is ambulating well.  She does live independently and so would be returning to an independent home without assistance, should she return home tomorrow.  Care discussed with Dr. Gonzalez who agreed to observation admission    Diagnosis:    ICD-10-CM    1. Fall, initial encounter  W19.XXXA       2. Closed head injury, initial encounter  S09.90XA       3. Concussion with loss of consciousness, initial encounter  S06.0X9A       4. Hip pain, right  M25.551       5. LUQ abdominal pain  R10.12       6. Acute midline low back pain without sciatica  M54.50       7. Hematoma of scalp, initial encounter  S00.03XA             Scribe Disclosure:  I, Danielle Gutierrez, am serving as a scribe at 10:12 PM on 4/25/2023 to document services personally performed by Corey Liang MD based on my observations and the provider's statements to me.      4/25/2023   Corey Liang MD White, Scott, MD  04/26/23 0157

## 2023-04-27 ENCOUNTER — LAB REQUISITION (OUTPATIENT)
Dept: LAB | Facility: CLINIC | Age: 78
End: 2023-04-27
Payer: MEDICARE

## 2023-04-27 ENCOUNTER — APPOINTMENT (OUTPATIENT)
Dept: PHYSICAL THERAPY | Facility: CLINIC | Age: 78
End: 2023-04-27
Payer: MEDICARE

## 2023-04-27 VITALS
TEMPERATURE: 98.2 F | SYSTOLIC BLOOD PRESSURE: 154 MMHG | RESPIRATION RATE: 18 BRPM | BODY MASS INDEX: 29.18 KG/M2 | HEIGHT: 68 IN | DIASTOLIC BLOOD PRESSURE: 77 MMHG | OXYGEN SATURATION: 96 % | HEART RATE: 77 BPM | WEIGHT: 192.5 LBS

## 2023-04-27 DIAGNOSIS — Z11.1 ENCOUNTER FOR SCREENING FOR RESPIRATORY TUBERCULOSIS: ICD-10-CM

## 2023-04-27 PROCEDURE — G0378 HOSPITAL OBSERVATION PER HR: HCPCS

## 2023-04-27 PROCEDURE — 250N000013 HC RX MED GY IP 250 OP 250 PS 637: Performed by: INTERNAL MEDICINE

## 2023-04-27 PROCEDURE — 97530 THERAPEUTIC ACTIVITIES: CPT | Mod: GP

## 2023-04-27 PROCEDURE — 97116 GAIT TRAINING THERAPY: CPT | Mod: GP

## 2023-04-27 PROCEDURE — 99239 HOSP IP/OBS DSCHRG MGMT >30: CPT | Performed by: PHYSICIAN ASSISTANT

## 2023-04-27 RX ORDER — CELECOXIB 100 MG/1
100 CAPSULE ORAL 2 TIMES DAILY
DISCHARGE
Start: 2023-04-27 | End: 2023-04-28

## 2023-04-27 RX ORDER — ACETAMINOPHEN 325 MG/1
975 TABLET ORAL EVERY 8 HOURS
Status: ON HOLD | DISCHARGE
Start: 2023-04-27 | End: 2024-08-25

## 2023-04-27 RX ORDER — OXYCODONE HYDROCHLORIDE 5 MG/1
2.5 TABLET ORAL EVERY 6 HOURS PRN
Qty: 6 TABLET | Refills: 0 | Status: SHIPPED | OUTPATIENT
Start: 2023-04-27 | End: 2023-05-04

## 2023-04-27 RX ORDER — POLYETHYLENE GLYCOL 3350 17 G/17G
17 POWDER, FOR SOLUTION ORAL DAILY
Qty: 510 G | DISCHARGE
Start: 2023-04-27 | End: 2024-01-22

## 2023-04-27 RX ORDER — LIDOCAINE 4 G/G
1-2 PATCH TOPICAL EVERY 24 HOURS
DISCHARGE
Start: 2023-04-27 | End: 2023-05-03

## 2023-04-27 RX ADMIN — FUROSEMIDE 20 MG: 20 TABLET ORAL at 15:22

## 2023-04-27 RX ADMIN — POTASSIUM CHLORIDE 10 MEQ: 750 TABLET, FILM COATED, EXTENDED RELEASE ORAL at 08:18

## 2023-04-27 RX ADMIN — METOPROLOL TARTRATE 25 MG: 25 TABLET, FILM COATED ORAL at 08:18

## 2023-04-27 RX ADMIN — FUROSEMIDE 20 MG: 20 TABLET ORAL at 08:18

## 2023-04-27 RX ADMIN — DORZOLAMIDE HYDROCHLORIDE AND TIMOLOL MALEATE 1 DROP: 22.3; 6.8 SOLUTION/ DROPS OPHTHALMIC at 08:17

## 2023-04-27 RX ADMIN — PANTOPRAZOLE SODIUM 40 MG: 40 TABLET, DELAYED RELEASE ORAL at 08:18

## 2023-04-27 RX ADMIN — GABAPENTIN 300 MG: 300 CAPSULE ORAL at 15:22

## 2023-04-27 RX ADMIN — LEVOTHYROXINE SODIUM 88 MCG: 0.09 TABLET ORAL at 08:18

## 2023-04-27 RX ADMIN — GABAPENTIN 300 MG: 300 CAPSULE ORAL at 08:18

## 2023-04-27 RX ADMIN — ACETAMINOPHEN 975 MG: 325 TABLET, FILM COATED ORAL at 02:58

## 2023-04-27 RX ADMIN — CELECOXIB 100 MG: 100 CAPSULE ORAL at 08:18

## 2023-04-27 RX ADMIN — ACETAMINOPHEN 975 MG: 325 TABLET, FILM COATED ORAL at 10:51

## 2023-04-27 ASSESSMENT — ACTIVITIES OF DAILY LIVING (ADL)
ADLS_ACUITY_SCORE: 22

## 2023-04-27 NOTE — PLAN OF CARE
"PRIMARY DIAGNOSIS: Hematoma of scalp  OUTPATIENT/OBSERVATION GOALS TO BE MET BEFORE DISCHARGE:  ADLs back to baseline: Yes    Activity and level of assistance: Up with A1    Pain status: Improved-controlled with oral pain medications.    Return to near baseline physical activity: No     Discharge Planner Nurse   Safe discharge environment identified: Yes  Barriers to discharge: Yes       Entered by: Corie Benton RN 04/27/2023  Pt AO x4. VSS on RA, LS clear, BS active. Pt up with A1 with walker and gait belt. Oxy given x1 during the night and was effective. PIV SL. Tolerating Regular diet. Scans negative. Plan for TCU placement.   /63 (BP Location: Right arm)   Pulse 70   Temp 98.2  F (36.8  C) (Oral)   Resp 18   Ht 1.727 m (5' 8\")   Wt 87.3 kg (192 lb 8 oz)   LMP  (LMP Unknown)   SpO2 90%   BMI 29.27 kg/m    Please review provider order for any additional goals.   Nurse to notify provider when observation goals have been met and patient is ready for discharge.  "

## 2023-04-27 NOTE — PLAN OF CARE
Patient's After Visit Summary was reviewed with patient.   Patient verbalized understanding of After Visit Summary, recommended follow up and was given an opportunity to ask questions.   Discharge medications sent home with patient/family: No   Discharged with other:[facility]

## 2023-04-27 NOTE — DISCHARGE SUMMARY
Discharge Summary  Hospitalist    Date of Admission:  4/25/2023  Date of Discharge:  4/27/2023  Provider:  Zita Reyes PA-C  Date of Service (when I last saw the patient): 04/27/23    Discharge Diagnoses    S/p fall with closed head injury  Concussion  LOC  Posterior scalp hematoma without laceration     Other medical issues:  Past Medical History:   Diagnosis Date     Concussion with loss of consciousness, initial encounter 4/26/2023     First degree AV block 08/11/2015     Former smoker      Gastroesophageal reflux disease      GERD (gastroesophageal reflux disease)      Glaucoma      Heart block     2:1     Heart murmur      Hyperlipidemia LDL goal <130 06/14/2013     Hypertension goal BP (blood pressure) < 140/90 12/03/2013     Hypothyroidism      Left atrial dilatation     mild     Mild dilation of ascending aorta - borderline 08/11/2015     Palpitations      Prediabetes 06/14/2013     RBBB 08/11/2015     S/P total knee arthroplasty 07/23/2019     Tachycardia      Urinary frequency      Varicose veins      Venous insufficiency     s/p bilateral great saphenous vein radiofrequency ablation by Dr. Garcia     History of Present Illness   Teri Beltran is a 77 year old female admitted on 4/25/2023 after a fall with closed head trauma, concussion, and loss of consciousness. She has history of hypertension, hypercholesterolemia, hypothyroidism, prediabetes, GERD, glaucoma, right bundle branch block, first-degree AV block, and heart block.  She presented to the emergency department after a fall.  She was in the garage taking some trash out to the trash bin when she fell backward and hit the back of her head.  She lost consciousness.  She is not sure for how long.  She had significant bleeding.  She was not able to get up.  She crawled out into her driveway and hollered for her neighbor who heard her and called 911.  She reported headache and pain in right hip, right leg,  right jaw, and abdomen.  Emergency department evaluation showed heart rate in the 90s to 100s but otherwise unremarkable vital signs.  Laboratory evaluation showed troponin 27 (without chest pain), TSH 3.93, and unremarkable basic metabolic panel.  CT of head showed right parietal scalp hematoma but no intracranial hemorrhage.  Facial CT showed no fractures.  CT of C-spine showed no fracture.  CT of L-spine showed no fracture.  CT of abdomen and pelvis showed no acute injuries.  Gallstones and diverticulosis were noted.  X-ray of both femurs were unremarkable.  Patient was having ongoing pain and was not really able to get up and ambulate in the emergency department.  I was asked to admit her to the hospital for monitoring and mobility evaluation. Please see the admission history and physical for full details.    Hospital Course   Teri Beltran was admitted on 4/25/2023.  The following problems were addressed during her hospitalization:    S/p fall with closed head trauma  Concussion  Loss of consciousness  Posterior scalp hematoma without laceration  -Pain control with scheduled Tylenol, scheduled Celebrex (short duration), as needed oxycodone, and as needed Lidoderm patches  -nausea improved prior to discharge  -left lower rib pain the day of discharge, discussed with patient and decided against x-ray to look for rib fracture since this would still just be pain control   -PT evaluation for mobility assessment, recommending TCU  -SW assisted with TCU placement      Acute anemia  -initial hemoglobin of 10.0 with slight drop to 9.5, suspect related to hematoma with no other evidence of bleeding  -recheck for stability/improvement at TCU    Hypertension  Hypercholesterolemia  History of first-degree AV block and right bundle branch block  -continue PTA metoprolol, Lasix, and Pravachol     Hypothyroidism  -continue PTA levothyroxine     GERD  -Pepcid and Protonix     Glaucoma  -Resume prior to admission  "eyedrops     Prediabetes  -Blood glucose was 161, nonfasting with recheck improved to 111   -previous A1c of 6.0 in 01/2023, continue to monitor with PCP      Pending Results   None    Code Status   Full Code       Primary Care Physician   Rachel Miller    Exam:    /70 (BP Location: Right arm, Patient Position: Semi-Posada's, Cuff Size: Adult Regular)   Pulse 58   Temp 98.3  F (36.8  C) (Oral)   Resp 16   Ht 1.727 m (5' 8\")   Wt 87.3 kg (192 lb 8 oz)   LMP  (LMP Unknown)   SpO2 97%   BMI 29.27 kg/m    GENERAL: Pleasant and cooperative. No acute distress.  HEENT: PEERLA. External ears are normal without deformity. Posterior oropharynx is without erythem, swelling  HEAD: Posterior lower scalp hematoma  NECK: Supple. No tenderness.   CHEST: Clear to auscultation. Normal breath sounds. No retractions. Mild tenderness over left lower rib cage.  CV: Regular rate and rhythm. No JVD. Pulses normal.  ABDOMEN: Bowel sounds present. No tenderness. No masses or hernia.  EXTREMETIES: 1+ LE edema  SKIN: Warm and dry to touch. No wounds or rashes.  NEUROLOGIC: Strength and sensation are normal. Cranial nerves are normal.    Discharge Disposition   Discharged to rehabilitation facility    Consultations This Hospital Stay   PHYSICAL THERAPY ADULT IP CONSULT  CARE MANAGEMENT / SOCIAL WORK IP CONSULT  OCCUPATIONAL THERAPY ADULT IP CONSULT  PHYSICAL THERAPY ADULT IP CONSULT  OCCUPATIONAL THERAPY ADULT IP CONSULT    Time Spent on this Encounter   Alissa CONTRERAS PA-C, personally saw the patient today and spent greater than 30 minutes discharging this patient.    Discharge Orders      Primary Care - Care Coordination Referral      General info for SNF    Length of Stay Estimate: Short Term Care: Estimated # of Days <30  Condition at Discharge: Improving  Level of care:skilled   Rehabilitation Potential: Good  Admission H&P remains valid and up-to-date: Yes  Recent Chemotherapy: N/A  Use Nursing Home " "Standing Orders: Yes     Mantoux instructions    Give two-step Mantoux (PPD) Per Facility Policy Yes     Follow Up and recommended labs and tests    Follow up with Nursing home physician.  No follow up labs or test are needed.     Reason for your hospital stay    You were admitted due to concerns for a fall with closed head trauma, concussion, and loss of consciousness.  Your work-up included basic labs and imaging which revealed a right parietal scalp hematoma without any intracranial hemorrhaging.  The remainder of your imaging was negative for an acute fracture.  You did have pain over your left lower ribs the day of discharge and a discussion was had about imaging to assess for a rib fracture, since this would not  this was not pursued.  Your concussion symptoms should continue to improve as they have been while you were admitted.  You were started on a pain regimen with improvement in your pain.  You were seen by physical therapy with recommendation for rehab.  Social work assisted with rehab placement.     Activity - Up with assistive device     Activity - Up with nursing assistance     Full Code     Physical Therapy Adult Consult    Evaluate and treat as clinically indicated.    Reason:  s/p fall, concussion     Occupational Therapy Adult Consult    Evaluate and treat as clinically indicated.    Reason: s/p fall, concussion     Fall precautions     Orthotics and Prosthetics DME Compression; Leg; Knee; Bilateral; Other (comments) (15/20 mmHg with zipper); 6 Pair    DME Documentation:   Describe the reason for need to support medical necessity: pt has chronic LE edema but needs devices with side zipper.  She is unable to get the \"socks\" on.      I, the undersigned, certify that the above prescribed supplies are medically necessary for this patient and is both reasonable and necessary in reference to accepted standards of medical and necessary in reference to accepted standards of medical " practice in the treatment of this patient's condition and is not prescribed as a convenience.     Diet    Follow this diet upon discharge: Orders Placed This Encounter      Regular Diet Adult     Discharge Medications   Current Discharge Medication List      START taking these medications    Details   celecoxib (CELEBREX) 100 MG capsule Take 1 capsule (100 mg) by mouth 2 times daily for 3 days    Associated Diagnoses: Fall, initial encounter; Hematoma of scalp, initial encounter      Lidocaine (LIDOCARE) 4 % Patch Place 1-2 patches onto the skin every 24 hours To prevent lidocaine toxicity, patient should be patch free for 12 hrs daily.    Associated Diagnoses: Fall, initial encounter; Hematoma of scalp, initial encounter      oxyCODONE (ROXICODONE) 5 MG tablet Take 0.5 tablets (2.5 mg) by mouth every 6 hours as needed for moderate pain  Qty: 6 tablet, Refills: 0    Associated Diagnoses: Fall, initial encounter; Hematoma of scalp, initial encounter      polyethylene glycol (MIRALAX) 17 GM/Dose powder Take 17 g by mouth daily  Qty: 510 g    Associated Diagnoses: Drug-induced constipation         CONTINUE these medications which have CHANGED    Details   acetaminophen (TYLENOL) 325 MG tablet Take 3 tablets (975 mg) by mouth every 8 hours    Associated Diagnoses: Fall, initial encounter; Hematoma of scalp, initial encounter         CONTINUE these medications which have NOT CHANGED    Details   dorzolamide-timolol PF (COSOPT PF) 2-0.5 % opthalmic solutionh Place 1 drop into both eyes 2 times daily      furosemide (LASIX) 20 MG tablet Take 1 tablet (20 mg) by mouth 2 times daily  Qty: 180 tablet, Refills: 3    Associated Diagnoses: Edema of left lower extremity due to peripheral venous insufficiency; Fluid retention      gabapentin (NEURONTIN) 300 MG capsule Take 1 capsule (300 mg) by mouth 3 times daily  Qty: 270 capsule, Refills: 1    Associated Diagnoses: Chronic hip pain after total replacement of right hip joint     "  levothyroxine (SYNTHROID/LEVOTHROID) 88 MCG tablet Take 1 tablet (88 mcg) by mouth daily  Qty: 90 tablet, Refills: 1    Associated Diagnoses: Hypothyroidism, unspecified type      metoprolol tartrate (LOPRESSOR) 25 MG tablet TAKE 1 TABLET(25 MG) BY MOUTH TWICE DAILY  Qty: 180 tablet, Refills: 1    Associated Diagnoses: Palpitations; Hypertension goal BP (blood pressure) < 140/90      Multiple Vitamins-Minerals (MULTIVITAMIN ADULT PO) Take 1 tablet by mouth daily      pantoprazole (PROTONIX) 40 MG EC tablet TAKE 1 TABLET(40 MG) BY MOUTH DAILY  Qty: 90 tablet, Refills: 0    Comments: **Patient requests 90 days supply**  Associated Diagnoses: Epigastric pain      potassium chloride ER (KLOR-CON M) 10 MEQ CR tablet TAKE 1 TABLET(10 MEQ) BY MOUTH DAILY  Qty: 90 tablet, Refills: 1    Associated Diagnoses: Fluid retention      latanoprost (XALATAN) 0.005 % ophthalmic solution Place 1 drop into the right eye At Bedtime       pravastatin (PRAVACHOL) 40 MG tablet Take 1 tablet (40 mg) by mouth At Bedtime  Qty: 90 tablet, Refills: 1    Associated Diagnoses: Hyperlipidemia LDL goal <130         STOP taking these medications       dorzolamide-timolol (COSOPT) 2-0.5 % ophthalmic solution Comments:   Reason for Stopping:             Allergies   Allergies   Allergen Reactions     Seasonal Allergies      Simvastatin Other (See Comments)     \"flushed\"     Data   Results for orders placed or performed during the hospital encounter of 04/25/23   Head CT w/o contrast     Status: None    Narrative    EXAM: CT HEAD W/O CONTRAST, CT CERVICAL SPINE W/O CONTRAST, CT FACIAL BONES WITHOUT CONTRAST  LOCATION: [facility]  DATE/TIME: 4/25/2023 11:15 PM CDT    INDICATION: Fall, occipital hematoma.  COMPARISON: 12/6/2020 head CT  TECHNIQUE:   1) Routine CT Head without IV contrast. Multiplanar reformats. Dose reduction techniques were used.  2) Routine CT Facial Bones without IV contrast. Multiplanar reformats. Dose " reduction techniques were used.  3) Routine CT Cervical Spine without IV contrast. Multiplanar reformats. Dose reduction techniques were used.    FINDINGS:  HEAD CT:   INTRACRANIAL CONTENTS: No intracranial hemorrhage, extraaxial collection, or mass effect.  No CT evidence of acute infarct. Mild volume loss and presumed chronic small vessel ischemia are stable.    OSSEOUS STRUCTURES/SOFT TISSUES: Right parietal scalp and subgaleal swelling. No calvarial fracture.    FACIAL BONE CT:  OSSEOUS STRUCTURES/SOFT TISSUES: No localized soft tissue swelling/inflammation. No facial bone fracture or malalignment. Periapical lucency associated with the posterior most right maxillary molar with discontinuous osseous covering extending into the   right maxillary antrum mild overlying mucosal thickening suggesting the possibility of mild chronic odontogenic sinusitis.    ORBITAL CONTENTS: No acute abnormality.    SINUSES: Mild scattered paranasal sinus mucosal disease.     CERVICAL SPINE CT:   VERTEBRA: 3 mm degenerative anterolisthesis C2 on C3 and 4 mm degenerative anterolisthesis C7 on T1. Alignment is otherwise normal. No acute cervical spine fracture or posttraumatic subluxation. Marked multilevel degenerative disc disease and moderate to   marked multilevel facet arthropathy.    CANAL/FORAMINA: Mild to moderate canal narrowing at C5-C6 and mild changes elsewhere. Moderate to marked multilevel degenerative foraminal narrowing.    PARASPINAL: No extraspinal abnormality. Visualized lung fields are clear.      Impression    IMPRESSION:  HEAD CT:  1.  Right parietal scalp and subgaleal swelling. No acute intracranial hemorrhage or calvarial fracture.    2.  Stable age-related changes.    FACIAL BONE CT:  1.  No acute facial fracture.    2.  Findings suggesting mild right odontogenic maxillary sinusitis.    CERVICAL SPINE CT:  1.  No acute cervical spine fracture.   CT Facial Bones without Contrast     Status: None    Narrative     EXAM: CT HEAD W/O CONTRAST, CT CERVICAL SPINE W/O CONTRAST, CT FACIAL BONES WITHOUT CONTRAST  LOCATION: [facility]  DATE/TIME: 4/25/2023 11:15 PM CDT    INDICATION: Fall, occipital hematoma.  COMPARISON: 12/6/2020 head CT  TECHNIQUE:   1) Routine CT Head without IV contrast. Multiplanar reformats. Dose reduction techniques were used.  2) Routine CT Facial Bones without IV contrast. Multiplanar reformats. Dose reduction techniques were used.  3) Routine CT Cervical Spine without IV contrast. Multiplanar reformats. Dose reduction techniques were used.    FINDINGS:  HEAD CT:   INTRACRANIAL CONTENTS: No intracranial hemorrhage, extraaxial collection, or mass effect.  No CT evidence of acute infarct. Mild volume loss and presumed chronic small vessel ischemia are stable.    OSSEOUS STRUCTURES/SOFT TISSUES: Right parietal scalp and subgaleal swelling. No calvarial fracture.    FACIAL BONE CT:  OSSEOUS STRUCTURES/SOFT TISSUES: No localized soft tissue swelling/inflammation. No facial bone fracture or malalignment. Periapical lucency associated with the posterior most right maxillary molar with discontinuous osseous covering extending into the   right maxillary antrum mild overlying mucosal thickening suggesting the possibility of mild chronic odontogenic sinusitis.    ORBITAL CONTENTS: No acute abnormality.    SINUSES: Mild scattered paranasal sinus mucosal disease.     CERVICAL SPINE CT:   VERTEBRA: 3 mm degenerative anterolisthesis C2 on C3 and 4 mm degenerative anterolisthesis C7 on T1. Alignment is otherwise normal. No acute cervical spine fracture or posttraumatic subluxation. Marked multilevel degenerative disc disease and moderate to   marked multilevel facet arthropathy.    CANAL/FORAMINA: Mild to moderate canal narrowing at C5-C6 and mild changes elsewhere. Moderate to marked multilevel degenerative foraminal narrowing.    PARASPINAL: No extraspinal abnormality. Visualized lung fields are  clear.      Impression    IMPRESSION:  HEAD CT:  1.  Right parietal scalp and subgaleal swelling. No acute intracranial hemorrhage or calvarial fracture.    2.  Stable age-related changes.    FACIAL BONE CT:  1.  No acute facial fracture.    2.  Findings suggesting mild right odontogenic maxillary sinusitis.    CERVICAL SPINE CT:  1.  No acute cervical spine fracture.   Lumbar spine CT w/o contrast     Status: None    Narrative    EXAM: CT LUMBAR SPINE W/O CONTRAST  LOCATION: [facility]  DATE/TIME: 4/25/2023 11:13 PM CDT    INDICATION: Fall, pain.  COMPARISON: None.  TECHNIQUE: Routine CT Lumbar Spine without IV contrast. Multiplanar reformats. Dose reduction techniques were used.     FINDINGS:  VERTEBRA: Five lumbar-type vertebral bodies with partial lumbarization of S1. Mild right convexity lumbar curvature. 7 mm degenerative anterolisthesis L5 on S1. Alignment is otherwise normal. No acute lumbar spine fracture. Moderate to marked multilevel   degenerative disc disease. Ankylosis of the T12-L1 interspace. Severe right L5-S1 facet arthropathy and moderate to marked changes at a few additional levels. 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    CANAL/FORAMINA: Moderate canal narrowing at L5-S1 and mild changes at a few additional levels. Marked right L4-L5 and L5-S1 degenerative foraminal narrowing.    PARASPINAL: Please refer to CT abdomen and pelvis same day for extraspinal findings.      Impression    IMPRESSION:  1.  No acute lumbar spine fracture.   XR Femur Right 2 Views     Status: None    Narrative    EXAM: XR FEMUR RIGHT 2 VIEWS  LOCATION: [facility]  DATE/TIME: 4/25/2023 11:10 PM CDT    INDICATION: fall, pain  COMPARISON: Knee film from 07/23/2019      Impression    IMPRESSION: Within normal limits. No fracture. Total hip and total knee arthroplasties. Components demonstrate normal alignment.   Cervical spine CT w/o  contrast     Status: None    Narrative    EXAM: CT HEAD W/O CONTRAST, CT CERVICAL SPINE W/O CONTRAST, CT FACIAL BONES WITHOUT CONTRAST  LOCATION: [facility]  DATE/TIME: 4/25/2023 11:15 PM CDT    INDICATION: Fall, occipital hematoma.  COMPARISON: 12/6/2020 head CT  TECHNIQUE:   1) Routine CT Head without IV contrast. Multiplanar reformats. Dose reduction techniques were used.  2) Routine CT Facial Bones without IV contrast. Multiplanar reformats. Dose reduction techniques were used.  3) Routine CT Cervical Spine without IV contrast. Multiplanar reformats. Dose reduction techniques were used.    FINDINGS:  HEAD CT:   INTRACRANIAL CONTENTS: No intracranial hemorrhage, extraaxial collection, or mass effect.  No CT evidence of acute infarct. Mild volume loss and presumed chronic small vessel ischemia are stable.    OSSEOUS STRUCTURES/SOFT TISSUES: Right parietal scalp and subgaleal swelling. No calvarial fracture.    FACIAL BONE CT:  OSSEOUS STRUCTURES/SOFT TISSUES: No localized soft tissue swelling/inflammation. No facial bone fracture or malalignment. Periapical lucency associated with the posterior most right maxillary molar with discontinuous osseous covering extending into the   right maxillary antrum mild overlying mucosal thickening suggesting the possibility of mild chronic odontogenic sinusitis.    ORBITAL CONTENTS: No acute abnormality.    SINUSES: Mild scattered paranasal sinus mucosal disease.     CERVICAL SPINE CT:   VERTEBRA: 3 mm degenerative anterolisthesis C2 on C3 and 4 mm degenerative anterolisthesis C7 on T1. Alignment is otherwise normal. No acute cervical spine fracture or posttraumatic subluxation. Marked multilevel degenerative disc disease and moderate to   marked multilevel facet arthropathy.    CANAL/FORAMINA: Mild to moderate canal narrowing at C5-C6 and mild changes elsewhere. Moderate to marked multilevel degenerative foraminal narrowing.    PARASPINAL: No extraspinal  abnormality. Visualized lung fields are clear.      Impression    IMPRESSION:  HEAD CT:  1.  Right parietal scalp and subgaleal swelling. No acute intracranial hemorrhage or calvarial fracture.    2.  Stable age-related changes.    FACIAL BONE CT:  1.  No acute facial fracture.    2.  Findings suggesting mild right odontogenic maxillary sinusitis.    CERVICAL SPINE CT:  1.  No acute cervical spine fracture.   Abd/pelvis CT no contrast - Stone Protocol     Status: None    Narrative    EXAM: CT ABDOMEN PELVIS W/O CONTRAST  LOCATION: [facility]  DATE/TIME: 4/25/2023 11:05 PM CDT    INDICATION: Left flank and left upper quadrant abdominal pain after fall  COMPARISON: None.  TECHNIQUE: CT scan of the abdomen and pelvis was performed without IV contrast. Multiplanar reformats were obtained. Dose reduction techniques were used.  CONTRAST: None.    FINDINGS:   LOWER CHEST: Scattered atelectasis in the lung bases. No confluent consolidation or pleural effusion.    HEPATOBILIARY: Liver is normal in size without focal lesions or surface nodularity. Tiny gallstones present without acute cholecystitis.    PANCREAS: Normal.    SPLEEN: Normal.    ADRENAL GLANDS: Normal.    KIDNEYS/BLADDER: Normal.    BOWEL: Moderate amount of stool in the colon. Colonic diverticulosis noted without acute diverticulitis. Appendix is normal. No bowel wall thickening or bowel dilatation.    LYMPH NODES: Normal.    VASCULATURE: Minimal aortic atherosclerotic calcifications. No abdominal aortic aneurysm.    PELVIC ORGANS: Status post hysterectomy. No abnormal adnexal masses.    MUSCULOSKELETAL: Advanced spinal spondylosis with diffuse mild, moderate and severe degenerative disc space narrowing with scattered vacuum disc, most advanced at T12/L1, as well as grade 2 degenerative anterolisthesis of L5 on S1, diffuse, multilevel   bilateral facet arthropathy more pronounced in the lumbar spine and multilevel moderate and severe  neuroforaminal stenosis in the lower thoracic and lumbar spine. No suspicious osseous lesions or acute fractures.        Impression    IMPRESSION:     1.  No acute findings in the abdomen and pelvis.    2.  Cholelithiasis and colonic diverticulosis.    3.  Advanced spinal degenerative changes.     XR Femur Left 2 Views     Status: None    Narrative    EXAM: XR FEMUR LEFT 2 VIEWS  LOCATION: [facility]  DATE/TIME: 4/25/2023 11:04 PM CDT    INDICATION: Left thigh pain after fall.  COMPARISON: None.      Impression    IMPRESSION: No fracture. Mild-to-moderate degenerative narrowing and degenerative changes left hip and visualized left knee. Heterotopic calcifications about the left hip. No osteolytic or osteoblastic lesions.   Basic metabolic panel     Status: Abnormal   Result Value Ref Range    Sodium 141 136 - 145 mmol/L    Potassium 4.0 3.4 - 5.3 mmol/L    Chloride 102 98 - 107 mmol/L    Carbon Dioxide (CO2) 26 22 - 29 mmol/L    Anion Gap 13 7 - 15 mmol/L    Urea Nitrogen 33.5 (H) 8.0 - 23.0 mg/dL    Creatinine 0.91 0.51 - 0.95 mg/dL    Calcium 9.4 8.8 - 10.2 mg/dL    Glucose 161 (H) 70 - 99 mg/dL    GFR Estimate 64 >60 mL/min/1.73m2   Troponin T, High Sensitivity     Status: Abnormal   Result Value Ref Range    Troponin T, High Sensitivity 27 (H) <=14 ng/L   TSH with free T4 reflex     Status: Normal   Result Value Ref Range    TSH 3.93 0.30 - 4.20 uIU/mL   Magnesium     Status: Normal   Result Value Ref Range    Magnesium 2.1 1.7 - 2.3 mg/dL   UA with Microscopic reflex to Culture     Status: Abnormal    Specimen: Urine, Catheter   Result Value Ref Range    Color Urine Light Yellow Colorless, Straw, Light Yellow, Yellow    Appearance Urine Clear Clear    Glucose Urine Negative Negative mg/dL    Bilirubin Urine Negative Negative    Ketones Urine Negative Negative mg/dL    Specific Gravity Urine 1.015 1.003 - 1.035    Blood Urine Negative Negative    pH Urine 6.5 5.0 - 7.0    Protein Albumin  Urine Negative Negative mg/dL    Urobilinogen Urine Normal Normal, 2.0 mg/dL    Nitrite Urine Negative Negative    Leukocyte Esterase Urine Negative Negative    Mucus Urine Present (A) None Seen /LPF    RBC Urine 0 <=2 /HPF    WBC Urine <1 <=5 /HPF    Squamous Epithelials Urine <1 <=1 /HPF    Narrative    Urine Culture not indicated   CK total     Status: Normal   Result Value Ref Range     26 - 192 U/L   Troponin T, High Sensitivity     Status: Abnormal   Result Value Ref Range    Troponin T, High Sensitivity 25 (H) <=14 ng/L   CBC with platelets and differential     Status: Abnormal   Result Value Ref Range    WBC Count 9.6 4.0 - 11.0 10e3/uL    RBC Count 3.83 3.80 - 5.20 10e6/uL    Hemoglobin 10.0 (L) 11.7 - 15.7 g/dL    Hematocrit 32.6 (L) 35.0 - 47.0 %    MCV 85 78 - 100 fL    MCH 26.1 (L) 26.5 - 33.0 pg    MCHC 30.7 (L) 31.5 - 36.5 g/dL    RDW 16.2 (H) 10.0 - 15.0 %    Platelet Count 360 150 - 450 10e3/uL    % Neutrophils 68 %    % Lymphocytes 17 %    % Monocytes 12 %    % Eosinophils 2 %    % Basophils 1 %    % Immature Granulocytes 0 %    NRBCs per 100 WBC 0 <1 /100    Absolute Neutrophils 6.6 1.6 - 8.3 10e3/uL    Absolute Lymphocytes 1.6 0.8 - 5.3 10e3/uL    Absolute Monocytes 1.1 0.0 - 1.3 10e3/uL    Absolute Eosinophils 0.2 0.0 - 0.7 10e3/uL    Absolute Basophils 0.1 0.0 - 0.2 10e3/uL    Absolute Immature Granulocytes 0.0 <=0.4 10e3/uL    Absolute NRBCs 0.0 10e3/uL   CBC with platelets     Status: Abnormal   Result Value Ref Range    WBC Count 6.7 4.0 - 11.0 10e3/uL    RBC Count 3.56 (L) 3.80 - 5.20 10e6/uL    Hemoglobin 9.5 (L) 11.7 - 15.7 g/dL    Hematocrit 30.4 (L) 35.0 - 47.0 %    MCV 85 78 - 100 fL    MCH 26.7 26.5 - 33.0 pg    MCHC 31.3 (L) 31.5 - 36.5 g/dL    RDW 16.3 (H) 10.0 - 15.0 %    Platelet Count 335 150 - 450 10e3/uL   Basic metabolic panel     Status: Abnormal   Result Value Ref Range    Sodium 142 136 - 145 mmol/L    Potassium 3.9 3.4 - 5.3 mmol/L    Chloride 105 98 - 107 mmol/L     Carbon Dioxide (CO2) 26 22 - 29 mmol/L    Anion Gap 11 7 - 15 mmol/L    Urea Nitrogen 30.6 (H) 8.0 - 23.0 mg/dL    Creatinine 0.69 0.51 - 0.95 mg/dL    Calcium 8.9 8.8 - 10.2 mg/dL    Glucose 111 (H) 70 - 99 mg/dL    GFR Estimate 88 >60 mL/min/1.73m2   EKG 12-lead, tracing only     Status: None   Result Value Ref Range    Systolic Blood Pressure  mmHg    Diastolic Blood Pressure  mmHg    Ventricular Rate 88 BPM    Atrial Rate 88 BPM    NH Interval 246 ms    QRS Duration 112 ms     ms    QTc 471 ms    P Axis 86 degrees    R AXIS 2 degrees    T Axis 19 degrees    Interpretation ECG       Sinus rhythm with 1st degree A-V block  Incomplete right bundle branch block  Borderline ECG  When compared with ECG of 26-JUL-2022 08:12,  No significant change was found  Confirmed by - EMERGENCY ROOM, PHYSICIAN ([id number]),  CHRISTI CASTELLANOS (Cruz) on 4/26/2023 7:05:41 AM     ABO/Rh type and screen *Canceled*     Status: None ()    Narrative    The following orders were created for panel order ABO/Rh type and screen.  Procedure                               Abnormality         Status                     ---------                               -----------         ------                     Adult Type and Screen[[id number]]                                                         Please view results for these tests on the individual orders.   CBC with platelets differential     Status: Abnormal    Narrative    The following orders were created for panel order CBC with platelets differential.  Procedure                               Abnormality         Status                     ---------                               -----------         ------                     CBC with platelets and d...[[id number]]  Abnormal            Final result                 Please view results for these tests on the individual orders.       Alissa Reyes PA-C

## 2023-04-27 NOTE — PLAN OF CARE
PRIMARY DIAGNOSIS: Hematoma of scalp  OUTPATIENT/OBSERVATION GOALS TO BE MET BEFORE DISCHARGE:  ADLs back to baseline: Yes    Activity and level of assistance: Up with A1    Pain status: Improved-controlled with oral pain medications.    Return to near baseline physical activity: No     Discharge Planner Nurse   Safe discharge environment identified: Yes  Barriers to discharge: Yes       Entered by: Corie Benton RN 04/26/2023  Pt AO x4. VSS on RA, LS clear, BS active. Pt up with A1 with walker and gait belt. Pt denies pain, PIV SL. Tolerating Regular diet. Scans negative. Plan for TCU placement.   Please review provider order for any additional goals.   Nurse to notify provider when observation goals have been met and patient is ready for discharge.

## 2023-04-27 NOTE — PLAN OF CARE
"OT: Orders received. Chart reviewed and discussed with care team.  IP OT not indicate as patient is expected to discharge to TCU.  Per PT \"Pt currently below baseline, Ax1 at [facility] for mobility. Pt lives alone and reports she her family cannot provide increased assist at DC. Pt limited by pain, decreased activity tolerance, balance deficits. Has not yet trialed stairs, has 3 ASHIA home and 6 stairs to access shower and laundry. Will benefit from furter skilled PT at TCU to maximize safety and IND before returning home alone. Will need to progress to be [place] for all mobility to return home.\" Defer discharge recommendations to care team, defer OT to next level of care.  Will complete orders.      "

## 2023-04-27 NOTE — CONSULTS
Care Management Initial Consult    General Information  Assessment completed with: Patient,    Type of CM/SW Visit: Offer D/C Planning    Primary Care Provider verified and updated as needed:     Readmission within the last 30 days:     Reason for Consult: discharge planning  Advance Care Planning: Advance Care Planning Reviewed: no concerns identified       Communication Assessment  Patient's communication style: spoken language (English or Bilingual)    Hearing Difficulty or Deaf: no   Wear Glasses or Blind: no    Cognitive  Cognitive/Neuro/Behavioral: WDL  Level of Consciousness: alert  Arousal Level: opens eyes spontaneously  Orientation: oriented x 4        Speech: clear, spontaneous, logical    Living Environment:   People in home: alone     Current living Arrangements: house      Able to return to prior arrangements: yes     Family/Social Support:  Care provided by: self  Provides care for: no one  Marital Status:   Children          Description of Support System: Supportive, Involved       Current Resources:   Patient receiving home care services:  No   Equipment currently used at home: walker, rolling    Employment/Financial:  Employment Status: retired      Financial Concerns: insurance, none   Referral to Financial Worker: No     Does the patient's insurance plan have a 3 day qualifying hospital stay waiver?  No    Lifestyle & Psychosocial Needs:  Social Determinants of Health     Tobacco Use: Medium Risk (4/26/2023)    Patient History      Smoking Tobacco Use: Former      Smokeless Tobacco Use: Never      Passive Exposure: Past   Alcohol Use: Not on file   Financial Resource Strain: Not on file   Food Insecurity: Not on file   Transportation Needs: Not on file   Physical Activity: Not on file   Stress: Not on file   Social Connections: Not on file   Intimate Partner Violence: Not on file   Depression: Not at risk (1/17/2023)    PHQ-2      PHQ-2 Score: 0   Housing Stability: Not on file      Functional Status:  Prior to admission patient needed assistance: Patient was admitted under observation status for a fall, back pain.     SW met with patient. She lives alone in home and is independent at baseline.     PT evaluated patient and recommended TCU. Patient is agreeable. She has been to [facility] in the past and had a very positive experience. She is hoping to stay closer to [place] for TCU and is requesting that referrals be sent to [facility] and [facility] in [place]. She is agreeable to a semi-prvt room.     Reviewed MOON letter with patient.     Mental Health Status:  Mental Health Status: No Current Concerns       Chemical Dependency Status:  Chemical Dependency Status: No Current Concerns           Values/Beliefs:  Spiritual, Cultural Beliefs, Christianity Practices, Values that affect care: Yes             Additional Information:  Plan: TCU, referrals sent, semi-prvt room. Reviewed out of pocket cost for [facility] transport, $81.80 for base rate and $5.26 per mile to the destination. Spoke with patient, they expressed understanding and are agreeable to this. SW will continue to follow.     SILVIA Moeller, [facility]   Inpatient Care Coordination  [facility]   [phone]

## 2023-04-27 NOTE — PLAN OF CARE
PRIMARY DIAGNOSIS: FALL/ CLOSED HEAD INJURY     OUTPATIENT/OBSERVATION GOALS TO BE MET BEFORE DISCHARGE  1. Orthostatic performed: No    2. Tolerating PO medications: Yes    3. Return to near baseline physical activity: No    4. Cleared for discharge by consultants (if involved): Yes    Discharge Planner Nurse   Safe discharge environment identified: Yes  Barriers to discharge: Yes       Entered by: AZAM PERALTA RN 04/27/2023    Vital signs:  Temp: 97.7  F (36.5  C) Temp src: Oral BP: (!) 140/73 Pulse: 74   Resp: 18 SpO2: 94 % O2 Device: None (Room air) Alert and oriented x4. Reported headache and body sore 3/10. Neuro intact except for new mild blurry vision to the left eye reported this morning. Provider notified during rounds. Has chronic  weakness, numbness, tingling to right arm due to nerve impingement per pt. Assist of x1 with a walker and gait belt. TCU recommended by PT. SW consulted for discharge process. On regular diet. PIV saline locked. Will continue to monitor.      Please review provider order for any additional goals.   Nurse to notify provider when observation goals have been met and patient is ready for discharge.

## 2023-04-27 NOTE — PROGRESS NOTES
Care Management Discharge Note    Discharge Date: 04/28/2023    Discharge Disposition: [facility]    Discharge Services:  PT/OT    Discharge Transportation: [facility] transport arranged from [id number]    Private pay costs discussed: transportation costs    PAS Confirmation Code:  [id number]  Patient/family educated on Medicare website which has current facility and service quality ratings:  Yes    Education Provided on the Discharge Plan:  Yes  Persons Notified of Discharge Plans: Patient, [facility] admissions  Patient/Family in Agreement with the Plan:  Yes    Handoff Referral Completed: No    Additional Information:  Patient accepted at [facility] TCU, which is pt's first preference. PAS completed. Orders to be faxed. SW will continue to follow.     SILVIA Moeller, [facility]   Inpatient Care Coordination  [facility]   [phone]

## 2023-04-27 NOTE — PLAN OF CARE
PRIMARY DIAGNOSIS: FALL/ CLOSED HEAD INJURY     OUTPATIENT/OBSERVATION GOALS TO BE MET BEFORE DISCHARGE  1. Orthostatic performed: No    2. Tolerating PO medications: Yes    3. Return to near baseline physical activity: No    4. Cleared for discharge by consultants (if involved): Yes    Discharge Planner Nurse   Safe discharge environment identified: Yes  Barriers to discharge: Yes       Entered by: AZAM PERALTA RN 04/27/2023    Vital signs:  Temp: 98.3  F (36.8  C) Temp src: Oral BP: 128/70 Pulse: 58   Resp: 16 SpO2: 97 % O2 Device: None (Room air) Alert and oriented x4. Reported headache and body sore 3/10. Neuro intact except for new mild blurry vision to the left eye reported this morning. Provider notified during rounds. Has chronic  weakness, numbness, tingling to right arm due to nerve impingement per pt. Assist of x1 with a walker and gait belt. TCU recommended by PT. SW consulted for discharge process. On regular diet. PIV saline locked. Plan for discharge to [facility]CU this afternoon via [facility] transport. Will continue to monitor.     Please review provider order for any additional goals.   Nurse to notify provider when observation goals have been met and patient is ready for discharge.

## 2023-04-27 NOTE — PLAN OF CARE
"PRIMARY DIAGNOSIS: Hematoma of scalp  OUTPATIENT/OBSERVATION GOALS TO BE MET BEFORE DISCHARGE:  ADLs back to baseline: Yes    Activity and level of assistance: Up with A1    Pain status: Improved-controlled with oral pain medications.    Return to near baseline physical activity: No     Discharge Planner Nurse   Safe discharge environment identified: Yes  Barriers to discharge: Yes       Entered by: Corie Benton RN 04/27/2023  Pt AO x4. VSS on RA, LS clear, BS active. Pt up with A1 with walker and gait belt. Oxy given x1 for pain 5/10, med effective. PIV SL. Tolerating Regular diet. Scans negative. Plan for TCU placement.   /63 (BP Location: Right arm)   Pulse 70   Temp 98.2  F (36.8  C) (Oral)   Resp 18   Ht 1.727 m (5' 8\")   Wt 87.3 kg (192 lb 8 oz)   LMP  (LMP Unknown)   SpO2 90%   BMI 29.27 kg/m    Please review provider order for any additional goals.   Nurse to notify provider when observation goals have been met and patient is ready for discharge.  "

## 2023-04-28 ENCOUNTER — PATIENT OUTREACH (OUTPATIENT)
Dept: CARE COORDINATION | Facility: CLINIC | Age: 78
End: 2023-04-28
Payer: MEDICARE

## 2023-04-28 ENCOUNTER — TRANSITIONAL CARE UNIT VISIT (OUTPATIENT)
Dept: GERIATRICS | Facility: CLINIC | Age: 78
End: 2023-04-28
Payer: MEDICARE

## 2023-04-28 VITALS
SYSTOLIC BLOOD PRESSURE: 134 MMHG | BODY MASS INDEX: 29.27 KG/M2 | HEART RATE: 69 BPM | OXYGEN SATURATION: 96 % | DIASTOLIC BLOOD PRESSURE: 69 MMHG | TEMPERATURE: 97.9 F | RESPIRATION RATE: 16 BRPM | HEIGHT: 68 IN

## 2023-04-28 DIAGNOSIS — E03.9 HYPOTHYROIDISM, UNSPECIFIED TYPE: ICD-10-CM

## 2023-04-28 DIAGNOSIS — S00.03XA HEMATOMA OF SCALP, INITIAL ENCOUNTER: ICD-10-CM
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
## 2023-04-28 DIAGNOSIS — K21.9 CHRONIC GERD: ICD-10-CM

## 2023-04-28 DIAGNOSIS — S09.90XD CLOSED HEAD INJURY, SUBSEQUENT ENCOUNTER: ICD-10-CM

## 2023-04-28 DIAGNOSIS — R53.81 PHYSICAL DECONDITIONING: ICD-10-CM
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
## 2023-04-28 DIAGNOSIS — D64.9 ANEMIA, UNSPECIFIED TYPE: ICD-10-CM

## 2023-04-28 DIAGNOSIS — R60.0 LEG EDEMA: ICD-10-CM

## 2023-04-28 PROCEDURE — 99310 SBSQ NF CARE HIGH MDM 45: CPT | Performed by: PHYSICIAN ASSISTANT

## 2023-04-28 PROCEDURE — 36415 COLL VENOUS BLD VENIPUNCTURE: CPT | Performed by: INTERNAL MEDICINE

## 2023-04-28 PROCEDURE — 86481 TB AG RESPONSE T-CELL SUSP: CPT | Performed by: INTERNAL MEDICINE

## 2023-04-28 PROCEDURE — P9603 ONE-WAY ALLOW PRORATED MILES: HCPCS | Performed by: INTERNAL MEDICINE

## 2023-04-28 RX ORDER — CELECOXIB 100 MG/1
100 CAPSULE ORAL 2 TIMES DAILY
Start: 2023-04-28 | End: 2023-05-09

## 2023-04-28 NOTE — LETTER
[facility]   To:             Please give to facility    From:   Jacque Josue RN  Care Coordinator   [facility]   P: [phone] [email or web address]   Patient Name:  Teri Beltran   YOB: 1945     Admit date: 4/27/23      *Information Needed:  Please contact me when the patient will discharge (or if they will move to long term care)- include the discharge date, disposition, and main diagnosis   If the patient is discharged with home care services, please provide the name of the agency    Also- Please inform me if a care conference is being held.   Phone, Fax or Email with information                   Thank you

## 2023-04-28 NOTE — LETTER
"    4/28/2023        RE: Teri Beltran  [address] [facility] [address]        [facility]    PRIMARY CARE PROVIDER AND CLINIC:  Rachel Miller MD, [address] / [facility] [address]  Chief Complaint   Patient presents with     Hospital F/U      [facility] Record Number:  [id number]  Place of Service where encounter took place:  [facility] ([facility]) [[id number]]    Teri Beltran  is a 77 year old  (1945), admitted to the above facility from  [facility]. Hospital stay 4/25/23 through 4/27/23..   HPI:      Notes from hospitalization reviewed including H&Pand D/c summary    Teri Beltran is an exceptionally pleasant 77-year-old female with a past medical history of hypertension, first-degree AV block, lower extremity edema and GERD who was recently hospitalized at [facility] following a fall.  She lives independently in a split-level home.  Was walking into her garage when she missed a step and fell.  She landed backwards on the cement.  Had a loss of consciousness and was unable to get up when she awoke.  Typically does wear a life alert bracelet but had taken it off earlier in the day due to some irritation from the string.  Ultimately was able to crawl out into her driveway and yell for help and received assistance from the neighbor.  Had extensive imaging in the emergency department which thankfully was negative for intracranial pathology or acute fracture.  Admitted to observation and continued to have difficulty with mobility in the setting of suspected post concussive syndrome and generalized muscle soreness related to her fall.  Assessed by therapies and discharged to TCU    Today \"Kirit\" is evaluated in her TCU room.  Up sitting at the edge of the bed and fully dressed.  Each day is getting progressively better.  Already worked with physical therapy and is eager to discharge as soon as they feel she " "is ready.  Continues to have a mild headache but feels between the scheduled Tylenol and Celebrex her pain is relatively well controlled.  Has been taking oxycodone only at night to help her fall asleep in the setting of generalized pain.  Additionally uses gabapentin chronically due to some chronic pain related to a previous hip replacement and subsequent dislocation.  Denies any further dizziness.  No shortness of breath.  Has chronic lower extremity edema and came from the hospital with orders for zippered compression stockings which nurse manager states they are working on obtaining.  Denies constipation.  Typically lives independently and does not need use of assistive device    Review of nursing home EMR: -152      CODE STATUS/ADVANCE DIRECTIVES DISCUSSION:  Full Code  CPR/Full code   ALLERGIES:   Allergies   Allergen Reactions     Seasonal Allergies      Simvastatin Other (See Comments)     \"flushed\"      PAST MEDICAL HISTORY:   Past Medical History:   Diagnosis Date     Concussion with loss of consciousness, initial encounter 4/26/2023     First degree AV block 08/11/2015     Former smoker      Gastroesophageal reflux disease      GERD (gastroesophageal reflux disease)      Glaucoma      Heart block     2:1     Heart murmur      Hyperlipidemia LDL goal <130 06/14/2013     Hypertension goal BP (blood pressure) < 140/90 12/03/2013     Hypothyroidism      Left atrial dilatation     mild     Mild dilation of ascending aorta - borderline 08/11/2015     Palpitations      Prediabetes 06/14/2013     RBBB 08/11/2015     S/P total knee arthroplasty 07/23/2019     Tachycardia      Urinary frequency      Varicose veins      Venous insufficiency     s/p bilateral great saphenous vein radiofrequency ablation by Dr. Garcia      PAST SURGICAL HISTORY:   has a past surgical history that includes hysterectomy, pap no longer indicated (2009); surgical history of -  (age 7); Colonoscopy (N/A, 10/24/2014); surgical " history of -  (01/2015); surgical history of -  (Fall 2016); Arthroplasty knee (Right, 07/23/2019); Breast surgery; Soft tissue surgery; Arthroplasty hip (Right, 11/20/2020); Tonsillectomy; Hysterectomy; Wrist Ganglion Excision (Left); Finger Ganglion Cyst Excision (Left); Breast surgery (Right); Eye surgery; Cataract Extraction (Right, 05/2022); and Decompression hip core (Right, 6/6/2022).  FAMILY HISTORY: family history includes Breast Cancer in her mother; Cancer in her mother; Cerebrovascular Disease (age of onset: 68) in her brother; Diabetes in her father; Heart Disease in her father; Ovarian Cancer in her mother.  SOCIAL HISTORY:   reports that she quit smoking about 43 years ago. Her smoking use included cigarettes. She has a 9.00 pack-year smoking history. She has been exposed to tobacco smoke. She has never used smokeless tobacco. She reports that she does not currently use alcohol. She reports that she does not use drugs.  Patient's living condition: lives alone    Post Discharge Medication Reconciliation Status:   MED REC REQUIRED  Post Medication Reconciliation Status: discharge medications reconciled and changed, per note/orders       Current Outpatient Medications   Medication Sig     acetaminophen (TYLENOL) 325 MG tablet Take 3 tablets (975 mg) by mouth every 8 hours     dorzolamide-timolol PF (COSOPT PF) 2-0.5 % opthalmic solutionh Place 1 drop into both eyes 2 times daily     furosemide (LASIX) 20 MG tablet Take 1 tablet (20 mg) by mouth 2 times daily     gabapentin (NEURONTIN) 300 MG capsule Take 1 capsule (300 mg) by mouth 3 times daily     latanoprost (XALATAN) 0.005 % ophthalmic solution Place 1 drop into the right eye At Bedtime      levothyroxine (SYNTHROID/LEVOTHROID) 88 MCG tablet Take 1 tablet (88 mcg) by mouth daily     Lidocaine (LIDOCARE) 4 % Patch Place 1-2 patches onto the skin every 24 hours To prevent lidocaine toxicity, patient should be patch free for 12 hrs daily.     metoprolol  "tartrate (LOPRESSOR) 25 MG tablet TAKE 1 TABLET(25 MG) BY MOUTH TWICE DAILY     oxyCODONE (ROXICODONE) 5 MG tablet Take 0.5 tablets (2.5 mg) by mouth every 6 hours as needed for moderate pain     pantoprazole (PROTONIX) 40 MG EC tablet TAKE 1 TABLET(40 MG) BY MOUTH DAILY     polyethylene glycol (MIRALAX) 17 GM/Dose powder Take 17 g by mouth daily     potassium chloride ER (KLOR-CON M) 10 MEQ CR tablet TAKE 1 TABLET(10 MEQ) BY MOUTH DAILY     pravastatin (PRAVACHOL) 40 MG tablet Take 1 tablet (40 mg) by mouth At Bedtime     celecoxib (CELEBREX) 100 MG capsule Take 1 capsule (100 mg) by mouth 2 times daily for 3 days     Multiple Vitamins-Minerals (MULTIVITAMIN ADULT PO) Take 1 tablet by mouth daily     No current facility-administered medications for this visit.       ROS:  4 point ROS including Respiratory, CV, GI and , other than that noted in the HPI,  is negative    Vitals:  /69   Pulse 69   Temp 97.9  F (36.6  C)   Resp 16   Ht 1.727 m (5' 8\")   LMP  (LMP Unknown)   SpO2 96%   BMI 29.27 kg/m    Exam:  Physical Exam  Constitutional:       General: She is not in acute distress.     Appearance: Normal appearance.   HENT:      Head: Normocephalic and atraumatic.   Eyes:      General: No scleral icterus.  Cardiovascular:      Rate and Rhythm: Normal rate and regular rhythm.   Pulmonary:      Effort: Pulmonary effort is normal.      Breath sounds: No wheezing.   Musculoskeletal:      Right lower leg: Edema present.      Left lower leg: Edema present.   Skin:     General: Skin is warm and dry.      Findings: No rash.   Neurological:      General: No focal deficit present.      Mental Status: She is alert.   Psychiatric:         Mood and Affect: Mood normal.         Behavior: Behavior normal.           Lab/Diagnostic data:  Recent labs in [facility] reviewed by me today.  and   Most Recent 3 CBC's:  Recent Labs   Lab Test 04/26/23  0745 04/26/23  0144 01/10/23  1023   WBC 6.7 9.6 5.7   HGB 9.5* 10.0* 11.8   MCV " 85 85 82    360 397     Most Recent 3 BMP's:  Recent Labs   Lab Test 04/26/23  0745 04/25/23  2224 01/10/23  1023    141 141   POTASSIUM 3.9 4.0 4.1   CHLORIDE 105 102 102   CO2 26 26 24   BUN 30.6* 33.5* 19.4   CR 0.69 0.91 0.62   ANIONGAP 11 13 15   LILIA 8.9 9.4 10.2   * 161* 107*       ASSESSMENT/PLAN:  Fall  Closed head trauma  LOC  Posterior scalp hematoma without laceration  Physical deconditioning: Suffered a significant fall in her garage.  Thankfully extensive imaging negative for intracranial pathology or fracture.  Some ongoing headache, visual changes and generalized aches and pains in setting of fall and likely postconcussive syndrome  - Continue pain control with scheduled Tylenol and Celebrex.  Finds the Celebrex helpful so we will remove and date.  No previous history of GI bleed and is on PPI  - Continue low-dose as needed oxycodone, minimal use thus far  - Continues on PTA gabapentin which she took for chronic pain of her left hip  - PT/OT/social work    Anemia: Hemoglobin fell to 9.5 in the setting of hematoma/bruising  - CBC 5/1    Hypertension  Lower extremity edema  - Continue metoprolol, Lasix and KCl    Glaucoma  - Continue eyedrops      Hypothyroidism  - Continue Synthroid    GERD  -Continue PPI      Orders:  Removed and date of Celebrex  CBC 5/1    A total 50 min were spent on this initial visit including chart review, medication reconciliation, evaluating patient discussing plan of care and documenting    This note was completed in part using Dragon voice recognition software. Although reviewed after completion, some word and grammatical errors may occur.      Electronically signed by:  Karlene Le PA-C                       Sincerely,        Karlene Le PA-C

## 2023-04-28 NOTE — PLAN OF CARE
"Physical Therapy Discharge Summary     Reason for therapy discharge:    Discharged to transitional care facility.     Progress towards therapy goal(s). See goals on Care Plan in [facility] electronic health record for goal details.  Goals not met.  Barriers to achieving goals:  discharge to TCU   Therapy recommendation(s):    Continued therapy is recommended.  Rationale/Recommendations:  \"Pt currently below baseline, Ax1 at [facility] for mobility. Pt lives alone and reports she her family cannot provide increased assist at DC. Pt limited by pain, decreased activity tolerance, balance deficits. Has not yet trialed stairs, has 3 ASHIA home and 6 stairs to access shower and laundry. Will benefit from furter skilled PT at TCU to maximize safety and IND before returning home alone. Will need to progress to be [place] for all mobility to return home.\" Per last PT note  "

## 2023-04-28 NOTE — PROGRESS NOTES
Clinic Care Coordination Contact  Care Coordination Transition Communication    Clinical Data: Patient was hospitalized at [facility] from 4/25/23 to 4/27/23 with diagnosis of fall.     Transition to Facility:              [facility]              Contact name and phone number/fax: [phone]    Plan: RN/SW Care Coordinator will await notification from facility staff informing RN/SW Care Coordinator of patient's discharge plans/needs. RN/SW Care Coordinator will review chart and outreach to facility staff every 4 weeks and as needed.     Jacque Josue RN Care Coordinator  [facility]  Email: [email or web address]  Phone: [phone]

## 2023-04-28 NOTE — PROGRESS NOTES
"[facility] GERIATRICS    PRIMARY CARE PROVIDER AND CLINIC:  Rachel Miller MD, [address] / [facility] [address]  Chief Complaint   Patient presents with     Hospital F/U      [place] Medical Record Number:  [id number]  Place of Service where encounter took place:  [facility] ([facility]) [[id number]]    Teri Beltran  is a 77 year old  (1945), admitted to the above facility from  [facility]. Hospital stay 4/25/23 through 4/27/23..   HPI:      Notes from hospitalization reviewed including H&Pand D/c summary    Teri Beltran is an exceptionally pleasant 77-year-old female with a past medical history of hypertension, first-degree AV block, lower extremity edema and GERD who was recently hospitalized at [facility] following a fall.  She lives independently in a split-level home.  Was walking into her garage when she missed a step and fell.  She landed backwards on the cement.  Had a loss of consciousness and was unable to get up when she awoke.  Typically does wear a life alert bracelet but had taken it off earlier in the day due to some irritation from the string.  Ultimately was able to crawl out into her driveway and yell for help and received assistance from the neighbor.  Had extensive imaging in the emergency department which thankfully was negative for intracranial pathology or acute fracture.  Admitted to observation and continued to have difficulty with mobility in the setting of suspected post concussive syndrome and generalized muscle soreness related to her fall.  Assessed by therapies and discharged to TCU    Today \"Ray\" is evaluated in her TCU room.  Up sitting at the edge of the bed and fully dressed.  Each day is getting progressively better.  Already worked with physical therapy and is eager to discharge as soon as they feel she is ready.  Continues to have a mild headache but feels between the scheduled Tylenol and " "Celebrex her pain is relatively well controlled.  Has been taking oxycodone only at night to help her fall asleep in the setting of generalized pain.  Additionally uses gabapentin chronically due to some chronic pain related to a previous hip replacement and subsequent dislocation.  Denies any further dizziness.  No shortness of breath.  Has chronic lower extremity edema and came from the hospital with orders for zippered compression stockings which nurse manager states they are working on obtaining.  Denies constipation.  Typically lives independently and does not need use of assistive device    Review of nursing home EMR: -152      CODE STATUS/ADVANCE DIRECTIVES DISCUSSION:  Full Code  CPR/Full code   ALLERGIES:   Allergies   Allergen Reactions     Seasonal Allergies      Simvastatin Other (See Comments)     \"flushed\"      PAST MEDICAL HISTORY:   Past Medical History:   Diagnosis Date     Concussion with loss of consciousness, initial encounter 4/26/2023     First degree AV block 08/11/2015     Former smoker      Gastroesophageal reflux disease      GERD (gastroesophageal reflux disease)      Glaucoma      Heart block     2:1     Heart murmur      Hyperlipidemia LDL goal <130 06/14/2013     Hypertension goal BP (blood pressure) < 140/90 12/03/2013     Hypothyroidism      Left atrial dilatation     mild     Mild dilation of ascending aorta - borderline 08/11/2015     Palpitations      Prediabetes 06/14/2013     RBBB 08/11/2015     S/P total knee arthroplasty 07/23/2019     Tachycardia      Urinary frequency      Varicose veins      Venous insufficiency     s/p bilateral great saphenous vein radiofrequency ablation by Dr. Garcia      PAST SURGICAL HISTORY:   has a past surgical history that includes hysterectomy, pap no longer indicated (2009); surgical history of -  (age 7); Colonoscopy (N/A, 10/24/2014); surgical history of -  (01/2015); surgical history of -  (Fall 2016); Arthroplasty knee (Right, " 07/23/2019); Breast surgery; Soft tissue surgery; Arthroplasty hip (Right, 11/20/2020); Tonsillectomy; Hysterectomy; Wrist Ganglion Excision (Left); Finger Ganglion Cyst Excision (Left); Breast surgery (Right); Eye surgery; Cataract Extraction (Right, 05/2022); and Decompression hip core (Right, 6/6/2022).  FAMILY HISTORY: family history includes Breast Cancer in her mother; Cancer in her mother; Cerebrovascular Disease (age of onset: 68) in her brother; Diabetes in her father; Heart Disease in her father; Ovarian Cancer in her mother.  SOCIAL HISTORY:   reports that she quit smoking about 43 years ago. Her smoking use included cigarettes. She has a 9.00 pack-year smoking history. She has been exposed to tobacco smoke. She has never used smokeless tobacco. She reports that she does not currently use alcohol. She reports that she does not use drugs.  Patient's living condition: lives alone    Post Discharge Medication Reconciliation Status:   MED REC REQUIRED  Post Medication Reconciliation Status: discharge medications reconciled and changed, per note/orders       Current Outpatient Medications   Medication Sig     acetaminophen (TYLENOL) 325 MG tablet Take 3 tablets (975 mg) by mouth every 8 hours     dorzolamide-timolol PF (COSOPT PF) 2-0.5 % opthalmic solutionh Place 1 drop into both eyes 2 times daily     furosemide (LASIX) 20 MG tablet Take 1 tablet (20 mg) by mouth 2 times daily     gabapentin (NEURONTIN) 300 MG capsule Take 1 capsule (300 mg) by mouth 3 times daily     latanoprost (XALATAN) 0.005 % ophthalmic solution Place 1 drop into the right eye At Bedtime      levothyroxine (SYNTHROID/LEVOTHROID) 88 MCG tablet Take 1 tablet (88 mcg) by mouth daily     Lidocaine (LIDOCARE) 4 % Patch Place 1-2 patches onto the skin every 24 hours To prevent lidocaine toxicity, patient should be patch free for 12 hrs daily.     metoprolol tartrate (LOPRESSOR) 25 MG tablet TAKE 1 TABLET(25 MG) BY MOUTH TWICE DAILY      "oxyCODONE (ROXICODONE) 5 MG tablet Take 0.5 tablets (2.5 mg) by mouth every 6 hours as needed for moderate pain     pantoprazole (PROTONIX) 40 MG EC tablet TAKE 1 TABLET(40 MG) BY MOUTH DAILY     polyethylene glycol (MIRALAX) 17 GM/Dose powder Take 17 g by mouth daily     potassium chloride ER (KLOR-CON M) 10 MEQ CR tablet TAKE 1 TABLET(10 MEQ) BY MOUTH DAILY     pravastatin (PRAVACHOL) 40 MG tablet Take 1 tablet (40 mg) by mouth At Bedtime     celecoxib (CELEBREX) 100 MG capsule Take 1 capsule (100 mg) by mouth 2 times daily for 3 days     Multiple Vitamins-Minerals (MULTIVITAMIN ADULT PO) Take 1 tablet by mouth daily     No current facility-administered medications for this visit.       ROS:  4 point ROS including Respiratory, CV, GI and , other than that noted in the HPI,  is negative    Vitals:  /69   Pulse 69   Temp 97.9  F (36.6  C)   Resp 16   Ht 1.727 m (5' 8\")   LMP  (LMP Unknown)   SpO2 96%   BMI 29.27 kg/m    Exam:  Physical Exam  Constitutional:       General: She is not in acute distress.     Appearance: Normal appearance.   HENT:      Head: Normocephalic and atraumatic.   Eyes:      General: No scleral icterus.  Cardiovascular:      Rate and Rhythm: Normal rate and regular rhythm.   Pulmonary:      Effort: Pulmonary effort is normal.      Breath sounds: No wheezing.   Musculoskeletal:      Right lower leg: Edema present.      Left lower leg: Edema present.   Skin:     General: Skin is warm and dry.      Findings: No rash.   Neurological:      General: No focal deficit present.      Mental Status: She is alert.   Psychiatric:         Mood and Affect: Mood normal.         Behavior: Behavior normal.           Lab/Diagnostic data:  Recent labs in [facility] reviewed by me today.  and   Most Recent 3 CBC's:  Recent Labs   Lab Test 04/26/23  0745 04/26/23  0144 01/10/23  1023   WBC 6.7 9.6 5.7   HGB 9.5* 10.0* 11.8   MCV 85 85 82    360 397     Most Recent 3 BMP's:  Recent Labs   Lab Test " 04/26/23  0745 04/25/23  2224 01/10/23  1023    141 141   POTASSIUM 3.9 4.0 4.1   CHLORIDE 105 102 102   CO2 26 26 24   BUN 30.6* 33.5* 19.4   CR 0.69 0.91 0.62   ANIONGAP 11 13 15   LILIA 8.9 9.4 10.2   * 161* 107*       ASSESSMENT/PLAN:  Fall  Closed head trauma  LOC  Posterior scalp hematoma without laceration  Physical deconditioning: Suffered a significant fall in her garage.  Thankfully extensive imaging negative for intracranial pathology or fracture.  Some ongoing headache, visual changes and generalized aches and pains in setting of fall and likely postconcussive syndrome  - Continue pain control with scheduled Tylenol and Celebrex.  Finds the Celebrex helpful so we will remove and date.  No previous history of GI bleed and is on PPI  - Continue low-dose as needed oxycodone, minimal use thus far  - Continues on PTA gabapentin which she took for chronic pain of her left hip  - PT/OT/social work    Anemia: Hemoglobin fell to 9.5 in the setting of hematoma/bruising  - CBC 5/1    Hypertension  Lower extremity edema  - Continue metoprolol, Lasix and KCl    Glaucoma  - Continue eyedrops      Hypothyroidism  - Continue Synthroid    GERD  -Continue PPI      Orders:  Removed and date of Celebrex  CBC 5/1    A total 50 min were spent on this initial visit including chart review, medication reconciliation, evaluating patient discussing plan of care and documenting    This note was completed in part using Dragon voice recognition software. Although reviewed after completion, some word and grammatical errors may occur.      Electronically signed by:  Karlene Le PA-C

## 2023-04-29 ENCOUNTER — LAB REQUISITION (OUTPATIENT)
Dept: LAB | Facility: CLINIC | Age: 78
End: 2023-04-29
Payer: MEDICARE

## 2023-04-29 DIAGNOSIS — D64.9 ANEMIA, UNSPECIFIED: ICD-10-CM

## 2023-04-29 LAB
GAMMA INTERFERON BACKGROUND BLD IA-ACNC: 0.04 IU/ML
M TB IFN-G BLD-IMP: NEGATIVE
M TB IFN-G CD4+ BCKGRND COR BLD-ACNC: 9.96 IU/ML
MITOGEN IGNF BCKGRD COR BLD-ACNC: 0 IU/ML
MITOGEN IGNF BCKGRD COR BLD-ACNC: 0.01 IU/ML
QUANTIFERON MITOGEN: 10 IU/ML
QUANTIFERON NIL TUBE: 0.04 IU/ML
QUANTIFERON TB1 TUBE: 0.05 IU/ML
QUANTIFERON TB2 TUBE: 0.04

## 2023-05-01 LAB
ERYTHROCYTE [DISTWIDTH] IN BLOOD BY AUTOMATED COUNT: 16.6 % (ref 10–15)
HCT VFR BLD AUTO: 32.5 % (ref 35–47)
HGB BLD-MCNC: 9.7 G/DL (ref 11.7–15.7)
MCH RBC QN AUTO: 26.4 PG (ref 26.5–33)
MCHC RBC AUTO-ENTMCNC: 29.8 G/DL (ref 31.5–36.5)
MCV RBC AUTO: 88 FL (ref 78–100)
PLATELET # BLD AUTO: 346 10E3/UL (ref 150–450)
RBC # BLD AUTO: 3.68 10E6/UL (ref 3.8–5.2)
WBC # BLD AUTO: 5.4 10E3/UL (ref 4–11)

## 2023-05-01 PROCEDURE — 85027 COMPLETE CBC AUTOMATED: CPT | Performed by: PHYSICIAN ASSISTANT

## 2023-05-01 PROCEDURE — 36415 COLL VENOUS BLD VENIPUNCTURE: CPT | Performed by: PHYSICIAN ASSISTANT

## 2023-05-01 PROCEDURE — P9604 ONE-WAY ALLOW PRORATED TRIP: HCPCS | Performed by: PHYSICIAN ASSISTANT

## 2023-05-02 ENCOUNTER — TRANSITIONAL CARE UNIT VISIT (OUTPATIENT)
Dept: GERIATRICS | Facility: CLINIC | Age: 78
End: 2023-05-02
Payer: MEDICARE

## 2023-05-02 VITALS
OXYGEN SATURATION: 96 % | BODY MASS INDEX: 28.19 KG/M2 | RESPIRATION RATE: 18 BRPM | TEMPERATURE: 97.3 F | WEIGHT: 186 LBS | SYSTOLIC BLOOD PRESSURE: 152 MMHG | HEART RATE: 81 BPM | HEIGHT: 68 IN | DIASTOLIC BLOOD PRESSURE: 93 MMHG

## 2023-05-02 DIAGNOSIS — I89.0 LYMPHEDEMA: ICD-10-CM
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
## 2023-05-02 DIAGNOSIS — I10 HYPERTENSION GOAL BP (BLOOD PRESSURE) < 140/90: ICD-10-CM

## 2023-05-02 DIAGNOSIS — R53.81 PHYSICAL DECONDITIONING: ICD-10-CM

## 2023-05-02 DIAGNOSIS — S06.0X9D CONCUSSION WITH LOSS OF CONSCIOUSNESS, SUBSEQUENT ENCOUNTER: ICD-10-CM

## 2023-05-02 DIAGNOSIS — M79.89 PAIN AND SWELLING OF RIGHT UPPER EXTREMITY: ICD-10-CM

## 2023-05-02 DIAGNOSIS — M79.601 PAIN AND SWELLING OF RIGHT UPPER EXTREMITY: ICD-10-CM

## 2023-05-02 DIAGNOSIS — E03.9 HYPOTHYROIDISM, UNSPECIFIED TYPE: ICD-10-CM

## 2023-05-02 DIAGNOSIS — S09.90XD CLOSED HEAD INJURY, SUBSEQUENT ENCOUNTER: ICD-10-CM

## 2023-05-02 DIAGNOSIS — D62 ABLA (ACUTE BLOOD LOSS ANEMIA): ICD-10-CM

## 2023-05-02 DIAGNOSIS — R41.89 COGNITIVE IMPAIRMENT: ICD-10-CM

## 2023-05-02 PROCEDURE — 99306 1ST NF CARE HIGH MDM 50: CPT | Performed by: INTERNAL MEDICINE

## 2023-05-02 NOTE — LETTER
"    5/2/2023        RE: Teri Beltran  [address]  [facility] [address]        [facility]  PHYSICIAN NOTE    PRIMARY CARE PROVIDER AND CLINIC:  Rachel Miller MD, [address] / [facility] [address]    Chief Complaint   Patient presents with     Hospital F/U     [place] Medical Record Number:  [id number]  Place of Service where encounter took place:  [facility] ([facility]) [[id number]]    Teri Beltran is a 77 year old (1945), admitted to the above facility from  [facility]. Hospital stay 4/25/23 through 4/27/23. Admitted to this facility for  rehab, medical management and nursing care.     HPI:    HPI information obtained from: facility chart records, facility staff, patient report and [facility] chart review.     Brief summary of hospital course: Teri \"Kirit\" Devin is a 77yoF admitted after falling in her garage taking out the garbage, accidentally missing a step. Hit her head and subsequent reported LOC unknown amount of time and sustained a right parietal scalp hematoma with underlying subgaleal swelling; thankfully no signs of intracranial hemorrhage or other major injuries sustained. Admitted for observation. She has underlying comorbidities of: HTN, heart block, lymphedema, hypothyroidism, GERD, prediabetes and glaucoma.    Updates on status since skilled nursing admission: Kirit is seen in her room and is impressed with her progress. Is now able to walk independently in hallways with walker (doesn't use assist device at baseline) after physical therapy clearance and later on rounds indeed I see her independently doing this. Still sore on R side where she fell (Arm, Hip) with activity but pushing herself. Hoping to discharge home when ready. Headaches improving, mild visual blurring improving and also some occasional orthostatic type dizziness when initially stands up but takes her time - no specific vertigo. " "No prior h/o headaches. Retells of how she lost her footing in the garage and ended up on the cement floor with LOC and called to neighbors for help. Since she landed more on R side, has hematoma R hip that nursing is monitoring and declines my direct visualization of it today. Says follows with [facility] for h/o R shoulder chronic swelling and \"neuritis\" since childhood and dx with nerve impingement on R arm and has declined shoulder surgery. Has newer R hand swelling she thinks maybe since her recent fall but good ROM and no discrete pain (though later I find [facility] notes - see below - referencing swelling of R hand too earlier this year). Talks of her lymphedema and needing therapy for this; disappointed as had thin legs much of her life.  Since admission to TCU, -150/60-90s with HR 50-90s, afebrile with stable weight.     CODE STATUS/ADVANCE DIRECTIVES DISCUSSION:   CPR/Full code   Patient's living condition: lives alone    ALLERGIES: Seasonal allergies and Simvastatin    Past Medical History:   Diagnosis Date     Concussion with loss of consciousness, initial encounter 4/26/2023     First degree AV block 08/11/2015     Former smoker      Gastroesophageal reflux disease      GERD (gastroesophageal reflux disease)      Glaucoma      Heart block     2:1     Heart murmur      Hyperlipidemia LDL goal <130 06/14/2013     Hypertension goal BP (blood pressure) < 140/90 12/03/2013     Hypothyroidism      Left atrial dilatation     mild     Mild dilation of ascending aorta - borderline 08/11/2015     Palpitations      Prediabetes 06/14/2013     RBBB 08/11/2015     S/P total knee arthroplasty 07/23/2019     Tachycardia      Urinary frequency      Varicose veins      Venous insufficiency     s/p bilateral great saphenous vein radiofrequency ablation by Dr. Garcia      Past Surgical History:   Procedure Laterality Date     ARTHROPLASTY HIP Right 11/20/2020    Procedure: Right total hip arthroplasty using a [employer]omet Taperloc " femoral stem and G7 acetabulum.;  Surgeon: Dragan Muse MD;  Location: [facility] OR     ARTHROPLASTY KNEE Right 07/23/2019    Procedure: Right total knee arthroplasty using an [employer] Senior [employer] knee system;  Surgeon: Dragan Muse MD;  Location: [facility] OR     BREAST SURGERY      right breast ductal surgery due to milk production     BREAST SURGERY Right     ductal surgery due to milk production     CATARACT EXTRACTION Right 05/2022     COLONOSCOPY N/A 10/24/2014    no further screening. Procedure: COLONOSCOPY;  Surgeon: Pedro Rudd MD;  Location: [facility] GI     DECOMPRESSION HIP CORE Right 6/6/2022    Procedure: Open right hip abductor repair;  Surgeon: Dragan Muse MD;  Location: [facility] OR     EYE SURGERY       FINGER GANGLION CYST EXCISION Left     ring finger     HYSTERECTOMY       HYSTERECTOMY, PAP NO LONGER INDICATED  2009    total hysterectomy and ovaries. benign     SOFT TISSUE SURGERY      ganglion removed from left wrist and ring finger     SURGICAL HISTORY OF -   age 7    tonsillectomy     SURGICAL HISTORY OF -   01/2015    left eye cataract     SURGICAL HISTORY OF -   Fall 2016    LINQ placed.     TONSILLECTOMY       WRIST GANGLION EXCISION Left          MED REC REQUIRED  Post Medication Reconciliation Status:  Discharge medications reconciled, continue medications without change    Current Outpatient Medications   Medication Sig Dispense Refill     acetaminophen (TYLENOL) 325 MG tablet Take 3 tablets (975 mg) by mouth every 8 hours       celecoxib (CELEBREX) 100 MG capsule Take 1 capsule (100 mg) by mouth 2 times daily       dorzolamide-timolol PF (COSOPT PF) 2-0.5 % opthalmic solutionh Place 1 drop into both eyes 2 times daily       furosemide (LASIX) 20 MG tablet Take 1 tablet (20 mg) by mouth 2 times daily 180 tablet 3     gabapentin (NEURONTIN) 300 MG capsule Take 1 capsule (300 mg) by mouth 3 times daily 270 capsule 1     latanoprost (XALATAN) 0.005 % ophthalmic solution  "Place 1 drop into the right eye At Bedtime        levothyroxine (SYNTHROID/LEVOTHROID) 88 MCG tablet Take 1 tablet (88 mcg) by mouth daily 90 tablet 1     Lidocaine (LIDOCARE) 4 % Patch Place 1-2 patches onto the skin every 24 hours To prevent lidocaine toxicity, patient should be patch free for 12 hrs daily.       metoprolol tartrate (LOPRESSOR) 25 MG tablet TAKE 1 TABLET(25 MG) BY MOUTH TWICE DAILY 180 tablet 1     Multiple Vitamins-Minerals (MULTIVITAMIN ADULT PO) Take 1 tablet by mouth daily       oxyCODONE (ROXICODONE) 5 MG tablet Take 0.5 tablets (2.5 mg) by mouth every 6 hours as needed for moderate pain 6 tablet 0     pantoprazole (PROTONIX) 40 MG EC tablet TAKE 1 TABLET(40 MG) BY MOUTH DAILY 90 tablet 0     polyethylene glycol (MIRALAX) 17 GM/Dose powder Take 17 g by mouth daily 510 g      potassium chloride ER (KLOR-CON M) 10 MEQ CR tablet TAKE 1 TABLET(10 MEQ) BY MOUTH DAILY 90 tablet 1     pravastatin (PRAVACHOL) 40 MG tablet Take 1 tablet (40 mg) by mouth At Bedtime 90 tablet 1       ROS:  10 point ROS of systems including Constitutional, Eyes, Respiratory, Cardiovascular, Gastroenterology, Genitourinary, Integumentary, Musculoskeletal, Psychiatric were all negative except for pertinent positives noted in my HPI.    Exam:  BP (!) 152/93   Pulse 81   Temp 97.3  F (36.3  C)   Resp 18   Ht 1.727 m (5' 8\")   Wt 84.4 kg (186 lb)   LMP  (LMP Unknown)   SpO2 96%   BMI 28.28 kg/m    Alert, pleasant, NAD, casually dressed sitting up on edge of the bed  Moist oral mucosa  Pupils equal  Heart tones regular with 2/6 systolic murmur  Lungs clear  Abdomen soft  R shoulder swelling and limited mobility and also some swelling R hand with good hand ; no synovitis  Lymphedema bilateral legs with TG in place  No tenderness R hip to palpation  Healing R posterior scalp hematoma  Walks with Trendelenburg gait with walker down the hallway  Somewhat vague historian at times, otherwise more specific  Mood " euthymic    Lab/Diagnostic data:  Hgb stable at 9.7 yesterday (9.5-10 in the hospital)    4/26/23 BMP unremarkable with Cr 0.69    TSH within normal limits 3.93 this hospitalization    4/25/23 CT Head:  IMPRESSION:  HEAD CT:  1.  Right parietal scalp and subgaleal swelling. No acute intracranial hemorrhage or calvarial fracture.  2.  Stable age-related changes.    ASSESSMENT/PLAN:  Fall, subsequent encounter  Closed head injury, subsequent encounter  Concussion with loss of consciousness, subsequent encounter  ABLA (acute blood loss anemia)  Physical deconditioning  See HPI above of her trip/fall and subsequent LOC and head injury with post-concussive symptoms that are thankfully waning (headaches, visual disturbance and mild dizziness)  Mild Hgb drop discussed today from her baseline that has remained stable on recheck - Hgb stable at 9.7 yesterday (9.5-10 in the hospital)  Likely some drop d/t bleeding from scalp and reported R hip hematoma  Continue scheduled Tylenol; using sparingly PRN Oxycodone and on bowel regimen  She also remains on chronic Gabapentin and Naproxen (has PPI GI protection and normal Cr)  Continue physical therapy and occupational therapy with acute on chronic physical debility with chronic RUE pathology (see below)  Has care conference planned this upcoming Friday and her hope is to get strong enough to return home  She has progressed to independent ambulation on the unit with walker    Pain and swelling of right upper extremity  Was able to find scanned in [facility] notes from as recently as Feb 2023 noting symptoms r/t RUE. Per notes, xrays showed significant arthritis and subluxation of 2nd and 3rd digits, advanced wrist and thumb CMC arthritis. She has progressing hand weakness and chronic shoulder pain and obvious swelling. A RUE EMG was ordered for evaluation for intrinsic weakness. She was also given an injection in R CMC thumb joint.  Though not scanned in, she makes reference to the  "completion of the previously ordered EMG and that she has \"pinched nerves\".   She is aware of her chronic R shoulder arthropathy and I wonder if someone ever talked about surgery as she tells me she wouldn't ever do a shoulder replacement as she has seen her daughter struggling with the ramifications of her shoulder surgery.  She remains on chronic Gabapentin and Naproxen (has PPI GI protection and normal Cr); now also on scheduled Tylenol with PRN Oxycodone available  Continue physical therapy and occupational therapy in this regard too as may have injured it a bit more since she fell on R side    Cognitive impairment  Screening test at TCU notes BIMS 13/15 which is slightly low and healthy PHQ=0  At times slightly vague in history giving but otherwise also atune to some details  Consider further occupational therapy eval and treat given she lives independently and also drives    Hypertension goal BP (blood pressure) < 140/90  Since admission to TCU, -150/60-90s with HR 50-90s, afebrile with stable weight  BP reasonable for age/comorbidities  Continue low dose Metoprolol though note h/o heart block and occasional black so keep that in mind if has on-going dizziness    Lymphedema  Eventually outpatient lymphedema therapy but can do so in house while at TCU  Has orders for bilateral zippered compression hose 15-20 mmHg that are on order; 6 pairs  Continues on home Lasix though sometimes this can make lymphedema treatment harder and would primarily focus on lymphedema eval/treat    Hypothyroidism, unspecified type  Remains on Levothyroxine therapy  TSH within normal limits 3.93 this hospitalization        Electronically signed by:  Chika Starks DO        Sincerely,        Chika Starks, DO      "

## 2023-05-02 NOTE — PROGRESS NOTES
"[facility]  PHYSICIAN NOTE    PRIMARY CARE PROVIDER AND CLINIC:  Rachel Miller MD, [address] / [facility] [address]    Chief Complaint   Patient presents with     Hospital F/U     [facility] Medical Record Number:  [id number]  Place of Service where encounter took place:  [facility] ([facility]) [[id number]]    Teri Beltran is a 77 year old (1945), admitted to the above facility from  [facility]. Hospital stay 4/25/23 through 4/27/23. Admitted to this facility for  rehab, medical management and nursing care.     HPI:    HPI information obtained from: facility chart records, facility staff, patient report and [facility] chart review.     Brief summary of hospital course: Teri \"Kirit\" Devin is a 77yoF admitted after falling in her garage taking out the garbage, accidentally missing a step. Hit her head and subsequent reported LOC unknown amount of time and sustained a right parietal scalp hematoma with underlying subgaleal swelling; thankfully no signs of intracranial hemorrhage or other major injuries sustained. Admitted for observation. She has underlying comorbidities of: HTN, heart block, lymphedema, hypothyroidism, GERD, prediabetes and glaucoma.    Updates on status since skilled nursing admission: Kirit is seen in her room and is impressed with her progress. Is now able to walk independently in hallways with walker (doesn't use assist device at baseline) after physical therapy clearance and later on rounds indeed I see her independently doing this. Still sore on R side where she fell (Arm, Hip) with activity but pushing herself. Hoping to discharge home when ready. Headaches improving, mild visual blurring improving and also some occasional orthostatic type dizziness when initially stands up but takes her time - no specific vertigo. No prior h/o headaches. Retells of how she lost her footing in the garage and ended up on the " "cement floor with LOC and called to neighbors for help. Since she landed more on R side, has hematoma R hip that nursing is monitoring and declines my direct visualization of it today. Says follows with [facility] for h/o R shoulder chronic swelling and \"neuritis\" since childhood and dx with nerve impingement on R arm and has declined shoulder surgery. Has newer R hand swelling she thinks maybe since her recent fall but good ROM and no discrete pain (though later I find [facility] notes - see below - referencing swelling of R hand too earlier this year). Talks of her lymphedema and needing therapy for this; disappointed as had thin legs much of her life.  Since admission to TCU, -150/60-90s with HR 50-90s, afebrile with stable weight.     CODE STATUS/ADVANCE DIRECTIVES DISCUSSION:   CPR/Full code   Patient's living condition: lives alone    ALLERGIES: Seasonal allergies and Simvastatin    Past Medical History:   Diagnosis Date     Concussion with loss of consciousness, initial encounter 4/26/2023     First degree AV block 08/11/2015     Former smoker      Gastroesophageal reflux disease      GERD (gastroesophageal reflux disease)      Glaucoma      Heart block     2:1     Heart murmur      Hyperlipidemia LDL goal <130 06/14/2013     Hypertension goal BP (blood pressure) < 140/90 12/03/2013     Hypothyroidism      Left atrial dilatation     mild     Mild dilation of ascending aorta - borderline 08/11/2015     Palpitations      Prediabetes 06/14/2013     RBBB 08/11/2015     S/P total knee arthroplasty 07/23/2019     Tachycardia      Urinary frequency      Varicose veins      Venous insufficiency     s/p bilateral great saphenous vein radiofrequency ablation by Dr. Garcia      Past Surgical History:   Procedure Laterality Date     ARTHROPLASTY HIP Right 11/20/2020    Procedure: Right total hip arthroplasty using a Biomet Taperloc femoral stem and G7 acetabulum.;  Surgeon: Dragan Muse MD;  Location:  OR     " ARTHROPLASTY KNEE Right 07/23/2019    Procedure: Right total knee arthroplasty using an Arthrex iBalance knee system;  Surgeon: Dragan Muse MD;  Location: [facility] OR     BREAST SURGERY      right breast ductal surgery due to milk production     BREAST SURGERY Right     ductal surgery due to milk production     CATARACT EXTRACTION Right 05/2022     COLONOSCOPY N/A 10/24/2014    no further screening. Procedure: COLONOSCOPY;  Surgeon: Pedro Rudd MD;  Location: [facility] GI     DECOMPRESSION HIP CORE Right 6/6/2022    Procedure: Open right hip abductor repair;  Surgeon: Dragan Muse MD;  Location: [facility] OR     EYE SURGERY       FINGER GANGLION CYST EXCISION Left     ring finger     HYSTERECTOMY       HYSTERECTOMY, PAP NO LONGER INDICATED  2009    total hysterectomy and ovaries. benign     SOFT TISSUE SURGERY      ganglion removed from left wrist and ring finger     SURGICAL HISTORY OF -   age 7    tonsillectomy     SURGICAL HISTORY OF -   01/2015    left eye cataract     SURGICAL HISTORY OF -   Fall 2016    LINQ placed.     TONSILLECTOMY       WRIST GANGLION EXCISION Left          MED REC REQUIRED  Post Medication Reconciliation Status:  Discharge medications reconciled, continue medications without change    Current Outpatient Medications   Medication Sig Dispense Refill     acetaminophen (TYLENOL) 325 MG tablet Take 3 tablets (975 mg) by mouth every 8 hours       celecoxib (CELEBREX) 100 MG capsule Take 1 capsule (100 mg) by mouth 2 times daily       dorzolamide-timolol PF (COSOPT PF) 2-0.5 % opthalmic solutionh Place 1 drop into both eyes 2 times daily       furosemide (LASIX) 20 MG tablet Take 1 tablet (20 mg) by mouth 2 times daily 180 tablet 3     gabapentin (NEURONTIN) 300 MG capsule Take 1 capsule (300 mg) by mouth 3 times daily 270 capsule 1     latanoprost (XALATAN) 0.005 % ophthalmic solution Place 1 drop into the right eye At Bedtime        levothyroxine (SYNTHROID/LEVOTHROID) 88 MCG  "tablet Take 1 tablet (88 mcg) by mouth daily 90 tablet 1     Lidocaine (LIDOCARE) 4 % Patch Place 1-2 patches onto the skin every 24 hours To prevent lidocaine toxicity, patient should be patch free for 12 hrs daily.       metoprolol tartrate (LOPRESSOR) 25 MG tablet TAKE 1 TABLET(25 MG) BY MOUTH TWICE DAILY 180 tablet 1     Multiple Vitamins-Minerals (MULTIVITAMIN ADULT PO) Take 1 tablet by mouth daily       oxyCODONE (ROXICODONE) 5 MG tablet Take 0.5 tablets (2.5 mg) by mouth every 6 hours as needed for moderate pain 6 tablet 0     pantoprazole (PROTONIX) 40 MG EC tablet TAKE 1 TABLET(40 MG) BY MOUTH DAILY 90 tablet 0     polyethylene glycol (MIRALAX) 17 GM/Dose powder Take 17 g by mouth daily 510 g      potassium chloride ER (KLOR-CON M) 10 MEQ CR tablet TAKE 1 TABLET(10 MEQ) BY MOUTH DAILY 90 tablet 1     pravastatin (PRAVACHOL) 40 MG tablet Take 1 tablet (40 mg) by mouth At Bedtime 90 tablet 1       ROS:  10 point ROS of systems including Constitutional, Eyes, Respiratory, Cardiovascular, Gastroenterology, Genitourinary, Integumentary, Musculoskeletal, Psychiatric were all negative except for pertinent positives noted in my HPI.    Exam:  BP (!) 152/93   Pulse 81   Temp 97.3  F (36.3  C)   Resp 18   Ht 1.727 m (5' 8\")   Wt 84.4 kg (186 lb)   LMP  (LMP Unknown)   SpO2 96%   BMI 28.28 kg/m    Alert, pleasant, NAD, casually dressed sitting up on edge of the bed  Moist oral mucosa  Pupils equal  Heart tones regular with 2/6 systolic murmur  Lungs clear  Abdomen soft  R shoulder swelling and limited mobility and also some swelling R hand with good hand ; no synovitis  Lymphedema bilateral legs with TG in place  No tenderness R hip to palpation  Healing R posterior scalp hematoma  Walks with Trendelenburg gait with walker down the hallway  Somewhat vague historian at times, otherwise more specific  Mood euthymic    Lab/Diagnostic data:  Hgb stable at 9.7 yesterday (9.5-10 in the hospital)    4/26/23 BMP " "unremarkable with Cr 0.69    TSH within normal limits 3.93 this hospitalization    4/25/23 CT Head:  IMPRESSION:  HEAD CT:  1.  Right parietal scalp and subgaleal swelling. No acute intracranial hemorrhage or calvarial fracture.  2.  Stable age-related changes.    ASSESSMENT/PLAN:  Fall, subsequent encounter  Closed head injury, subsequent encounter  Concussion with loss of consciousness, subsequent encounter  ABLA (acute blood loss anemia)  Physical deconditioning  See HPI above of her trip/fall and subsequent LOC and head injury with post-concussive symptoms that are thankfully waning (headaches, visual disturbance and mild dizziness)  Mild Hgb drop discussed today from her baseline that has remained stable on recheck - Hgb stable at 9.7 yesterday (9.5-10 in the hospital)  Likely some drop d/t bleeding from scalp and reported R hip hematoma  Continue scheduled Tylenol; using sparingly PRN Oxycodone and on bowel regimen  She also remains on chronic Gabapentin and Naproxen (has PPI GI protection and normal Cr)  Continue physical therapy and occupational therapy with acute on chronic physical debility with chronic RUE pathology (see below)  Has care conference planned this upcoming Friday and her hope is to get strong enough to return home  She has progressed to independent ambulation on the unit with walker    Pain and swelling of right upper extremity  Was able to find scanned in [facility] notes from as recently as Feb 2023 noting symptoms r/t RUE. Per notes, xrays showed significant arthritis and subluxation of 2nd and 3rd digits, advanced wrist and thumb CMC arthritis. She has progressing hand weakness and chronic shoulder pain and obvious swelling. A RUE EMG was ordered for evaluation for intrinsic weakness. She was also given an injection in R CMC thumb joint.  Though not scanned in, she makes reference to the completion of the previously ordered EMG and that she has \"pinched nerves\".   She is aware of her chronic R " shoulder arthropathy and I wonder if someone ever talked about surgery as she tells me she wouldn't ever do a shoulder replacement as she has seen her daughter struggling with the ramifications of her shoulder surgery.  She remains on chronic Gabapentin and Naproxen (has PPI GI protection and normal Cr); now also on scheduled Tylenol with PRN Oxycodone available  Continue physical therapy and occupational therapy in this regard too as may have injured it a bit more since she fell on R side    Cognitive impairment  Screening test at [facility] notes BIMS 13/15 which is slightly low and healthy PHQ=0  At times slightly vague in history giving but otherwise also atune to some details  Consider further occupational therapy eval and treat given she lives independently and also drives    Hypertension goal BP (blood pressure) < 140/90  Since admission to TCU, -150/60-90s with HR 50-90s, afebrile with stable weight  BP reasonable for age/comorbidities  Continue low dose Metoprolol though note h/o heart block and occasional black so keep that in mind if has on-going dizziness    Lymphedema  Eventually outpatient lymphedema therapy but can do so in house while at TCU  Has orders for bilateral zippered compression hose 15-20 mmHg that are on order; 6 pairs  Continues on home Lasix though sometimes this can make lymphedema treatment harder and would primarily focus on lymphedema eval/treat    Hypothyroidism, unspecified type  Remains on Levothyroxine therapy  TSH within normal limits 3.93 this hospitalization        Electronically signed by:  Chika Starks DO
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
## 2023-05-04 DIAGNOSIS — S00.03XA HEMATOMA OF SCALP, INITIAL ENCOUNTER: ICD-10-CM

## 2023-05-04 RX ORDER — OXYCODONE HYDROCHLORIDE 5 MG/1
2.5 TABLET ORAL EVERY 6 HOURS PRN
Qty: 15 TABLET | Refills: 0 | Status: SHIPPED | OUTPATIENT
Start: 2023-05-04 | End: 2024-01-22

## 2023-05-05 ENCOUNTER — TRANSITIONAL CARE UNIT VISIT (OUTPATIENT)
Dept: GERIATRICS | Facility: CLINIC | Age: 78
End: 2023-05-05
Payer: MEDICARE

## 2023-05-05 VITALS
HEIGHT: 68 IN | TEMPERATURE: 97.9 F | OXYGEN SATURATION: 99 % | DIASTOLIC BLOOD PRESSURE: 71 MMHG | WEIGHT: 184.8 LBS | RESPIRATION RATE: 18 BRPM | BODY MASS INDEX: 28.01 KG/M2 | SYSTOLIC BLOOD PRESSURE: 134 MMHG | HEART RATE: 79 BPM
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
## 2023-05-05 DIAGNOSIS — I10 HYPERTENSION GOAL BP (BLOOD PRESSURE) < 140/90: ICD-10-CM

## 2023-05-05 DIAGNOSIS — S00.03XA HEMATOMA OF SCALP, INITIAL ENCOUNTER: ICD-10-CM

## 2023-05-05 DIAGNOSIS — D62 ABLA (ACUTE BLOOD LOSS ANEMIA): ICD-10-CM

## 2023-05-05 DIAGNOSIS — R53.81 PHYSICAL DECONDITIONING: ICD-10-CM

## 2023-05-05 DIAGNOSIS — S09.90XD CLOSED HEAD INJURY, SUBSEQUENT ENCOUNTER: ICD-10-CM

## 2023-05-05 DIAGNOSIS — R60.0 LEG EDEMA: ICD-10-CM

## 2023-05-05 DIAGNOSIS — H04.123 DRY EYES: Primary | ICD-10-CM

## 2023-05-05 PROCEDURE — 99309 SBSQ NF CARE MODERATE MDM 30: CPT | Performed by: PHYSICIAN ASSISTANT

## 2023-05-05 NOTE — LETTER
"    5/5/2023        RE: Teri Beltran  [address] [facility] [address]          Chief Complaint   Patient presents with     RECHECK       HPI:  Teri Beltran is a 77 year old  (1945), who is being seen today for an episodic care visit at: [facility] ([facility]) [[id number]].     Brief summary:   Teri Beltran is an exceptionally pleasant 77-year-old female with a past medical history of hypertension, first-degree AV block, lower extremity edema and GERD who was recently hospitalized at [facility] following a fall.  She lives independently in a split-level home.  Was walking into her garage when she missed a step and fell.  She landed backwards on the cement.  Had a loss of consciousness and was unable to get up when she awoke.  Typically does wear a life alert bracelet but had taken it off earlier in the day due to some irritation from the string.  Ultimately was able to crawl out into her driveway and yell for help and received assistance from the neighbor.  Had extensive imaging in the emergency department which thankfully was negative for intracranial pathology or acute fracture.  Admitted to observation and continued to have difficulty with mobility in the setting of suspected post concussive syndrome and generalized muscle soreness related to her fall.  Assessed by therapies and discharged to [facility]    Today's concern is: Doing well. Hoping for d/c next week. Only using oxycodone at bedtime. Still occasional HA but not worse with therapy. Blurred vision improved. Denies constipation. C/o dry eyes 2/2 allergies and request lubricating gtts    Review of nursing home EMR: -152    Allergies, and PMH/PSH reviewed in [facility] today.    REVIEW OF SYSTEMS:  4 point ROS including Respiratory, CV, GI and , other than that noted in the HPI,  is negative    Objective:   /71   Pulse 79   Temp 97.9  F (36.6  C)   Resp 18   Ht 1.727 m (5' 8\")   Wt 83.8 kg (184 lb 12.8 " oz)   LMP  (LMP Unknown)   SpO2 99%   BMI 28.10 kg/m      Physical Exam  Constitutional:       General: She is not in acute distress.     Appearance: Normal appearance.   HENT:      Head: Normocephalic and atraumatic.   Cardiovascular:      Rate and Rhythm: Normal rate and regular rhythm.      Heart sounds: No murmur heard.  Pulmonary:      Effort: Pulmonary effort is normal.      Breath sounds: No wheezing.   Musculoskeletal:      Comments: Compression socks in place.    Neurological:      Mental Status: She is alert. Mental status is at baseline.   Psychiatric:         Mood and Affect: Mood normal.         Behavior: Behavior normal.          MED REC REQUIRED  Post Medication Reconciliation Status: patient was not discharged from an inpatient facility or TCU      Recent labs in EPIC reviewed by me today.  and   Most Recent 3 CBC's:Recent Labs   Lab Test 05/01/23  0610 04/26/23  0745 04/26/23  0144   WBC 5.4 6.7 9.6   HGB 9.7* 9.5* 10.0*   MCV 88 85 85    335 360     Most Recent 3 BMP's:Recent Labs   Lab Test 04/26/23  0745 04/25/23  2224 01/10/23  1023    141 141   POTASSIUM 3.9 4.0 4.1   CHLORIDE 105 102 102   CO2 26 26 24   BUN 30.6* 33.5* 19.4   CR 0.69 0.91 0.62   ANIONGAP 11 13 15   LILIA 8.9 9.4 10.2   * 161* 107*       Assessment/Plan:  Fall  Closed head trauma  LOC  Posterior scalp hematoma without laceration  Physical deconditioning: Suffered a significant fall in her garage.  Thankfully extensive imaging negative for intracranial pathology or fracture.    - Continue pain control with scheduled Tylenol, Celebrex and lidoderm patch.    No previous history of GI bleed and is on PPI  - Continue low-dose as needed oxycodone, uses 1x daily at HS  - Continues on PTA gabapentin which she took for chronic pain of her left hip  - PT/OT/social work    Anemia: Hemoglobin fell to 9.5 in the setting of hematoma/bruising  - CBC 5/1 stable at 9/7    Hypertension  Lower extremity edema  - Continue  metoprolol, Lasix and KCl    Glaucoma  Dry Eyes  - Continue eyedrops  -Add lubricating gtts      Hypothyroidism  - Continue Synthroid    GERD  -Continue PPI    Orders:  - carboxymethylcellulose (CARBOXYMETHYLCELLULOSE SODIUM) 0.5 % SOLN ophthalmic solution; Place 1 drop into both eyes 4 times daily as needed for dry eyes      Electronically signed by: Karlene Le PA-C           Sincerely,        Karlene Le PA-C

## 2023-05-05 NOTE — PROGRESS NOTES
"  Chief Complaint   Patient presents with     RECHECK       HPI:  Teri Beltran is a 77 year old  (1945), who is being seen today for an episodic care visit at: [facility] ([facility]) [[id number]].     Brief summary:   Teri Beltran is an exceptionally pleasant 77-year-old female with a past medical history of hypertension, first-degree AV block, lower extremity edema and GERD who was recently hospitalized at [facility] following a fall.  She lives independently in a split-level home.  Was walking into her garage when she missed a step and fell.  She landed backwards on the cement.  Had a loss of consciousness and was unable to get up when she awoke.  Typically does wear a life alert bracelet but had taken it off earlier in the day due to some irritation from the string.  Ultimately was able to crawl out into her driveway and yell for help and received assistance from the neighbor.  Had extensive imaging in the emergency department which thankfully was negative for intracranial pathology or acute fracture.  Admitted to observation and continued to have difficulty with mobility in the setting of suspected post concussive syndrome and generalized muscle soreness related to her fall.  Assessed by therapies and discharged to [facility]    Today's concern is: Doing well. Hoping for d/c next week. Only using oxycodone at bedtime. Still occasional HA but not worse with therapy. Blurred vision improved. Denies constipation. C/o dry eyes 2/2 allergies and request lubricating gtts    Review of nursing home EMR: -152    Allergies, and PMH/PSH reviewed in EPIC today.    REVIEW OF SYSTEMS:  4 point ROS including Respiratory, CV, GI and , other than that noted in the HPI,  is negative    Objective:   /71   Pulse 79   Temp 97.9  F (36.6  C)   Resp 18   Ht 1.727 m (5' 8\")   Wt 83.8 kg (184 lb 12.8 oz)   LMP  (LMP Unknown)   SpO2 99%   BMI 28.10 kg/m      Physical Exam  Constitutional:  "      General: She is not in acute distress.     Appearance: Normal appearance.   HENT:      Head: Normocephalic and atraumatic.   Cardiovascular:      Rate and Rhythm: Normal rate and regular rhythm.      Heart sounds: No murmur heard.  Pulmonary:      Effort: Pulmonary effort is normal.      Breath sounds: No wheezing.   Musculoskeletal:      Comments: Compression socks in place.    Neurological:      Mental Status: She is alert. Mental status is at baseline.   Psychiatric:         Mood and Affect: Mood normal.         Behavior: Behavior normal.          MED REC REQUIRED  Post Medication Reconciliation Status: patient was not discharged from an inpatient facility or TCU      Recent labs in EPIC reviewed by me today.  and   Most Recent 3 CBC's:Recent Labs   Lab Test 05/01/23  0610 04/26/23  0745 04/26/23  0144   WBC 5.4 6.7 9.6   HGB 9.7* 9.5* 10.0*   MCV 88 85 85    335 360     Most Recent 3 BMP's:Recent Labs   Lab Test 04/26/23  0745 04/25/23  2224 01/10/23  1023    141 141   POTASSIUM 3.9 4.0 4.1   CHLORIDE 105 102 102   CO2 26 26 24   BUN 30.6* 33.5* 19.4   CR 0.69 0.91 0.62   ANIONGAP 11 13 15   LILIA 8.9 9.4 10.2   * 161* 107*       Assessment/Plan:  Fall  Closed head trauma  LOC  Posterior scalp hematoma without laceration  Physical deconditioning: Suffered a significant fall in her garage.  Thankfully extensive imaging negative for intracranial pathology or fracture.    - Continue pain control with scheduled Tylenol, Celebrex and lidoderm patch.    No previous history of GI bleed and is on PPI  - Continue low-dose as needed oxycodone, uses 1x daily at HS  - Continues on PTA gabapentin which she took for chronic pain of her left hip  - PT/OT/social work    Anemia: Hemoglobin fell to 9.5 in the setting of hematoma/bruising  - CBC 5/1 stable at 9/7    Hypertension  Lower extremity edema  - Continue metoprolol, Lasix and KCl    Glaucoma  Dry Eyes  - Continue eyedrops  -Add lubricating  gtts      Hypothyroidism  - Continue Synthroid    GERD  -Continue PPI    Orders:  - carboxymethylcellulose (CARBOXYMETHYLCELLULOSE SODIUM) 0.5 % SOLN ophthalmic solution; Place 1 drop into both eyes 4 times daily as needed for dry eyes      Electronically signed by: Karlene Le PA-C

## 2023-05-06 RX ORDER — CARBOXYMETHYLCELLULOSE SODIUM 5 MG/ML
1 SOLUTION/ DROPS OPHTHALMIC 4 TIMES DAILY PRN
Start: 2023-05-06

## 2023-05-06 RX ORDER — LIDOCAINE 4 G/G
PATCH TOPICAL EVERY 24 HOURS
COMMUNITY
End: 2023-05-09

## 2023-05-06 NOTE — CONFIDENTIAL NOTE
May 6, 2023    Teri Beltran  1945      ORDERS:     1. Carboxymethylcellulose sodium 0.5% eye solution. 1 gtt both eyes qid dx dry eyes. Ok for patient to self administer      Karlene Le PA-C

## 2023-05-09 ENCOUNTER — DISCHARGE SUMMARY NURSING HOME (OUTPATIENT)
Dept: GERIATRICS | Facility: CLINIC | Age: 78
End: 2023-05-09
Payer: MEDICARE

## 2023-05-09 VITALS
RESPIRATION RATE: 17 BRPM | HEART RATE: 78 BPM | SYSTOLIC BLOOD PRESSURE: 156 MMHG | WEIGHT: 180.2 LBS | OXYGEN SATURATION: 98 % | DIASTOLIC BLOOD PRESSURE: 78 MMHG | TEMPERATURE: 97.9 F | HEIGHT: 68 IN | BODY MASS INDEX: 27.31 KG/M2

## 2023-05-09 DIAGNOSIS — S00.03XA HEMATOMA OF SCALP, INITIAL ENCOUNTER: ICD-10-CM

## 2023-05-09 DIAGNOSIS — R53.81 PHYSICAL DECONDITIONING: ICD-10-CM

## 2023-05-09 DIAGNOSIS — I10 HYPERTENSION GOAL BP (BLOOD PRESSURE) < 140/90: ICD-10-CM

## 2023-05-09 DIAGNOSIS — R41.89 COGNITIVE IMPAIRMENT: ICD-10-CM

## 2023-05-09 DIAGNOSIS — S06.0X9D CONCUSSION WITH LOSS OF CONSCIOUSNESS, SUBSEQUENT ENCOUNTER: ICD-10-CM
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
## 2023-05-09 DIAGNOSIS — S09.90XD CLOSED HEAD INJURY, SUBSEQUENT ENCOUNTER: Primary | ICD-10-CM

## 2023-05-09 PROCEDURE — 99316 NF DSCHRG MGMT 30 MIN+: CPT | Performed by: PHYSICIAN ASSISTANT

## 2023-05-09 RX ORDER — CELECOXIB 100 MG/1
100 CAPSULE ORAL 2 TIMES DAILY
Qty: 60 CAPSULE | Refills: 0 | Status: SHIPPED | OUTPATIENT
Start: 2023-05-09 | End: 2023-05-30 | Stop reason: SINTOL

## 2023-05-09 RX ORDER — LIDOCAINE 4 G/G
PATCH TOPICAL EVERY 24 HOURS
Qty: 10 PATCH | Refills: 0 | Status: SHIPPED | OUTPATIENT
Start: 2023-05-09 | End: 2024-01-22

## 2023-05-09 NOTE — LETTER
5/9/2023        RE: Teri Beltran  [address] [facility] [address]        [facility] GERIATRICS DISCHARGE SUMMARY  PATIENT'S NAME: Teri Beltran  YOB: 1945  MEDICAL RECORD NUMBER:  [id number]  Place of Service where encounter took place:  [facility] ([facility]) [[id number]]    PRIMARY CARE PROVIDER AND CLINIC RESPONSIBLE AFTER TRANSFER:   Rachel Miller MD, [address] / [facility] [address]    [facility] Provider     Transferring providers: Karlene Le PA-C, Chika Starks DO  Recent Hospitalization/ED:  [facility] stay 4/25/23 to 4/27/23.  Date of SNF Admission: April 27, 2023  Date of SNF (anticipated) Discharge: May 11, 2023  Discharged to: previous independent home  Cognitive Scores: SLUMS: 18/30 and CPT: 4.3/5.6  Physical Function: Ambulating 500 ft with FWW independently   DME: No new DME needed    CODE STATUS/ADVANCE DIRECTIVES DISCUSSION:  Full Code   ALLERGIES: Seasonal allergies and Simvastatin    Summary of nursing facility stay:   Teri Beltran is an exceptionally pleasant 77-year-old female with a past medical history of hypertension, first-degree AV block, lower extremity edema and GERD who was recently hospitalized at [facility] following a fall.  She lives independently in a split-level home.  Was walking into her garage when she missed a step and fell.  She landed backwards on the cement.  Had a loss of consciousness and was unable to get up when she awoke.  Typically does wear a life alert bracelet but had taken it off earlier in the day due to some irritation from the string.  Ultimately was able to crawl out into her driveway and yell for help and received assistance from the neighbor.  Had extensive imaging in the emergency department which thankfully was negative for intracranial pathology or acute fracture.  Admitted to observation and continued to have difficulty with  mobility in the setting of suspected post concussive syndrome and generalized muscle soreness related to her fall.  Assessed by therapies and discharged to TCU.    Patient admitted to TCU and progressed with therapy. Pain well controlled. Will continue outpatient therapy    Fall  Closed head trauma  LOC  Posterior scalp hematoma without laceration  Physical deconditioning: Suffered a significant fall in her garage.  Thankfully extensive imaging negative for intracranial pathology or fracture.    - Continue pain control with scheduled Tylenol, Celebrex and lidoderm patch.    No previous history of GI bleed and is on PPI  - Taking oxycodone 2.5 mg prn at TCU, using 1x per day at night. Declines need for script at home and indicates she has her own supply  - Continues on PTA gabapentin which she took for chronic pain of her left hip  - PT/OT/social work    Cognitive Impairment:  - SLUMS 18/30 and CPT 4.3.   - Continue outpatient PT  - Driving not recommended based on CPT score, follow up with PCP and/or driving assessment    Anemia: Hemoglobin fell to 9.5 in the setting of hematoma/bruising  - CBC 5/1 stable at 9/7    Hypertension  Lower extremity edema  - Continue metoprolol, Lasix and KCl    Glaucoma  Dry Eyes  - Continue eyedrops    Hypothyroidism  - Continue Synthroid    GERD  -Continue PPI      Discharge Medications:  MED REC REQUIRED  Post Medication Reconciliation Status: discharge medications reconciled and changed, per note/orders       Current Outpatient Medications   Medication Sig Dispense Refill     acetaminophen (TYLENOL) 325 MG tablet Take 3 tablets (975 mg) by mouth every 8 hours       carboxymethylcellulose (CARBOXYMETHYLCELLULOSE SODIUM) 0.5 % SOLN ophthalmic solution Place 1 drop into both eyes 4 times daily as needed for dry eyes       celecoxib (CELEBREX) 100 MG capsule Take 1 capsule (100 mg) by mouth 2 times daily       dorzolamide-timolol PF (COSOPT PF) 2-0.5 % opthalmic solutionh Place 1 drop  into both eyes 2 times daily       furosemide (LASIX) 20 MG tablet Take 1 tablet (20 mg) by mouth 2 times daily 180 tablet 3     gabapentin (NEURONTIN) 300 MG capsule Take 1 capsule (300 mg) by mouth 3 times daily 270 capsule 1     latanoprost (XALATAN) 0.005 % ophthalmic solution Place 1 drop into the right eye At Bedtime        levothyroxine (SYNTHROID/LEVOTHROID) 88 MCG tablet Take 1 tablet (88 mcg) by mouth daily 90 tablet 1     Lidocaine (LIDOCARE) 4 % Patch Place onto the skin every 24 hours To prevent lidocaine toxicity, patient should be patch free for 12 hrs daily.       metoprolol tartrate (LOPRESSOR) 25 MG tablet TAKE 1 TABLET(25 MG) BY MOUTH TWICE DAILY 180 tablet 1     Multiple Vitamins-Minerals (MULTIVITAMIN ADULT PO) Take 1 tablet by mouth daily       oxyCODONE (ROXICODONE) 5 MG tablet Take 0.5 tablets (2.5 mg) by mouth every 6 hours as needed for moderate pain 15 tablet 0     pantoprazole (PROTONIX) 40 MG EC tablet TAKE 1 TABLET(40 MG) BY MOUTH DAILY 90 tablet 0     polyethylene glycol (MIRALAX) 17 GM/Dose powder Take 17 g by mouth daily 510 g      potassium chloride ER (KLOR-CON M) 10 MEQ CR tablet TAKE 1 TABLET(10 MEQ) BY MOUTH DAILY 90 tablet 1     pravastatin (PRAVACHOL) 40 MG tablet Take 1 tablet (40 mg) by mouth At Bedtime 90 tablet 1        Controlled medications:   not applicable/none     Past Medical History:   Past Medical History:   Diagnosis Date     Concussion with loss of consciousness, initial encounter 4/26/2023     First degree AV block 08/11/2015     Former smoker      Gastroesophageal reflux disease      GERD (gastroesophageal reflux disease)      Glaucoma      Heart block     2:1     Heart murmur      Hyperlipidemia LDL goal <130 06/14/2013     Hypertension goal BP (blood pressure) < 140/90 12/03/2013     Hypothyroidism      Left atrial dilatation     mild     Mild dilation of ascending aorta - borderline 08/11/2015     Palpitations      Prediabetes 06/14/2013     RBBB 08/11/2015  "    S/P total knee arthroplasty 07/23/2019     Tachycardia      Urinary frequency      Varicose veins      Venous insufficiency     s/p bilateral great saphenous vein radiofrequency ablation by Dr. Garcia     Physical Exam:   Vitals: BP (!) 156/78   Pulse 78   Temp 97.9  F (36.6  C)   Resp 17   Ht 1.727 m (5' 8\")   Wt 81.7 kg (180 lb 3.2 oz)   LMP  (LMP Unknown)   SpO2 98%   BMI 27.40 kg/m    BMI: Body mass index is 27.4 kg/m .  Physical Exam  Constitutional:       General: She is not in acute distress.     Appearance: Normal appearance.   HENT:      Head: Normocephalic and atraumatic.   Cardiovascular:      Rate and Rhythm: Normal rate and regular rhythm.      Heart sounds: No murmur heard.  Pulmonary:      Effort: Pulmonary effort is normal.   Musculoskeletal:      Right lower leg: No edema.   Neurological:      General: No focal deficit present.      Mental Status: She is alert. Mental status is at baseline.   Psychiatric:         Mood and Affect: Mood normal.         Behavior: Behavior normal.           SNF labs: Recent labs in [facility] reviewed by me today.  and   Most Recent 3 CBC's:Recent Labs   Lab Test 05/01/23  0610 04/26/23  0745 04/26/23  0144   WBC 5.4 6.7 9.6   HGB 9.7* 9.5* 10.0*   MCV 88 85 85    335 360     Most Recent 3 BMP's:Recent Labs   Lab Test 04/26/23  0745 04/25/23  2224 01/10/23  1023    141 141   POTASSIUM 3.9 4.0 4.1   CHLORIDE 105 102 102   CO2 26 26 24   BUN 30.6* 33.5* 19.4   CR 0.69 0.91 0.62   ANIONGAP 11 13 15   LILIA 8.9 9.4 10.2   * 161* 107*       DISCHARGE PLAN:    Follow up labs: No labs orders/due    Medical Follow Up:      Follow up with primary care provider in 2 weeks    [facility] scheduled appointments:  Next 5 appointments (look out 90 days)    Jul 25, 2023  1:30 PM  (Arrive by 1:10 PM)  Provider Visit with Rachel Miller MD  [facility] ([facility] ) [address]  [address]  [facility] [address]  [phone]          Discharge Services: Home Care:  Occupational Therapy, Physical Therapy, Registered Nurse and Home Health Aide    Discharge Instructions Verbalized to Patient at Discharge:     Driving is not recommended until cleared by Rachel Miller to resume.     TOTAL DISCHARGE TIME:   Greater than 30 minutes  Electronically signed by:  Karlene Le PA-C     Home care Face to Face documentation done in [facility] attached to Home care orders for [facility].                   Sincerely,        Karlene Le PA-C

## 2023-05-09 NOTE — PROGRESS NOTES
[facility] GERIATRICS DISCHARGE SUMMARY  PATIENT'S NAME: Teri Beltran  YOB: 1945  MEDICAL RECORD NUMBER:  [id number]  Place of Service where encounter took place:  [facility] ([facility]) [[id number]]    PRIMARY CARE PROVIDER AND CLINIC RESPONSIBLE AFTER TRANSFER:   [facility] [address] [facility] [address]    [facility] [place]     Transferring providers: Karlene Le PA-C, Chika Starks DO  Recent Hospitalization/ED:  [facility] stay 4/25/23 to 4/27/23.  Date of SNF Admission: April 27, 2023  Date of SNF (anticipated) Discharge: May 11, 2023  Discharged to: previous independent home  Cognitive Scores: SLUMS: 18/30 and CPT: 4.3/5.6  Physical Function: Ambulating 500 ft with FWW independently   DME: No new DME needed    CODE STATUS/ADVANCE DIRECTIVES DISCUSSION:  Full Code   ALLERGIES: Seasonal allergies and Simvastatin    Summary of nursing facility stay:   Teri Beltran is an exceptionally pleasant 77-year-old female with a past medical history of hypertension, first-degree AV block, lower extremity edema and GERD who was recently hospitalized at [facility] following a fall.  She lives independently in a split-level home.  Was walking into her garage when she missed a step and fell.  She landed backwards on the cement.  Had a loss of consciousness and was unable to get up when she awoke.  Typically does wear a life alert bracelet but had taken it off earlier in the day due to some irritation from the string.  Ultimately was able to crawl out into her driveway and yell for help and received assistance from the neighbor.  Had extensive imaging in the emergency department which thankfully was negative for intracranial pathology or acute fracture.  Admitted to observation and continued to have difficulty with mobility in the setting of suspected post concussive syndrome and generalized muscle soreness  related to her fall.  Assessed by therapies and discharged to TCU.    Patient admitted to TCU and progressed with therapy. Pain well controlled. Will continue outpatient therapy    Fall  Closed head trauma  LOC  Posterior scalp hematoma without laceration  Physical deconditioning: Suffered a significant fall in her garage.  Thankfully extensive imaging negative for intracranial pathology or fracture.    - Continue pain control with scheduled Tylenol, Celebrex and lidoderm patch.    No previous history of GI bleed and is on PPI  - Taking oxycodone 2.5 mg prn at TCU, using 1x per day at night. Declines need for script at home and indicates she has her own supply  - Continues on PTA gabapentin which she took for chronic pain of her left hip  - PT/OT/social work    Cognitive Impairment:  - SLUMS 18/30 and CPT 4.3.   - Continue outpatient PT  - Driving not recommended based on CPT score, follow up with PCP and/or driving assessment    Anemia: Hemoglobin fell to 9.5 in the setting of hematoma/bruising  - CBC 5/1 stable at 9/7    Hypertension  Lower extremity edema  - Continue metoprolol, Lasix and KCl    Glaucoma  Dry Eyes  - Continue eyedrops    Hypothyroidism  - Continue Synthroid    GERD  -Continue PPI      Discharge Medications:  MED REC REQUIRED  Post Medication Reconciliation Status: discharge medications reconciled and changed, per note/orders       Current Outpatient Medications   Medication Sig Dispense Refill     acetaminophen (TYLENOL) 325 MG tablet Take 3 tablets (975 mg) by mouth every 8 hours       carboxymethylcellulose (CARBOXYMETHYLCELLULOSE SODIUM) 0.5 % SOLN ophthalmic solution Place 1 drop into both eyes 4 times daily as needed for dry eyes       celecoxib (CELEBREX) 100 MG capsule Take 1 capsule (100 mg) by mouth 2 times daily       dorzolamide-timolol PF (COSOPT PF) 2-0.5 % opthalmic solutionh Place 1 drop into both eyes 2 times daily       furosemide (LASIX) 20 MG tablet Take 1 tablet (20 mg) by  mouth 2 times daily 180 tablet 3     gabapentin (NEURONTIN) 300 MG capsule Take 1 capsule (300 mg) by mouth 3 times daily 270 capsule 1     latanoprost (XALATAN) 0.005 % ophthalmic solution Place 1 drop into the right eye At Bedtime        levothyroxine (SYNTHROID/LEVOTHROID) 88 MCG tablet Take 1 tablet (88 mcg) by mouth daily 90 tablet 1     Lidocaine (LIDOCARE) 4 % Patch Place onto the skin every 24 hours To prevent lidocaine toxicity, patient should be patch free for 12 hrs daily.       metoprolol tartrate (LOPRESSOR) 25 MG tablet TAKE 1 TABLET(25 MG) BY MOUTH TWICE DAILY 180 tablet 1     Multiple Vitamins-Minerals (MULTIVITAMIN ADULT PO) Take 1 tablet by mouth daily       oxyCODONE (ROXICODONE) 5 MG tablet Take 0.5 tablets (2.5 mg) by mouth every 6 hours as needed for moderate pain 15 tablet 0     pantoprazole (PROTONIX) 40 MG EC tablet TAKE 1 TABLET(40 MG) BY MOUTH DAILY 90 tablet 0     polyethylene glycol (MIRALAX) 17 GM/Dose powder Take 17 g by mouth daily 510 g      potassium chloride ER (KLOR-CON M) 10 MEQ CR tablet TAKE 1 TABLET(10 MEQ) BY MOUTH DAILY 90 tablet 1     pravastatin (PRAVACHOL) 40 MG tablet Take 1 tablet (40 mg) by mouth At Bedtime 90 tablet 1        Controlled medications:   not applicable/none     Past Medical History:   Past Medical History:   Diagnosis Date     Concussion with loss of consciousness, initial encounter 4/26/2023     First degree AV block 08/11/2015     Former smoker      Gastroesophageal reflux disease      GERD (gastroesophageal reflux disease)      Glaucoma      Heart block     2:1     Heart murmur      Hyperlipidemia LDL goal <130 06/14/2013     Hypertension goal BP (blood pressure) < 140/90 12/03/2013     Hypothyroidism      Left atrial dilatation     mild     Mild dilation of ascending aorta - borderline 08/11/2015     Palpitations      Prediabetes 06/14/2013     RBBB 08/11/2015     S/P total knee arthroplasty 07/23/2019     Tachycardia      Urinary frequency       "Varicose veins      Venous insufficiency     s/p bilateral great saphenous vein radiofrequency ablation by Dr. Garcia     Physical Exam:   Vitals: BP (!) 156/78   Pulse 78   Temp 97.9  F (36.6  C)   Resp 17   Ht 1.727 m (5' 8\")   Wt 81.7 kg (180 lb 3.2 oz)   LMP  (LMP Unknown)   SpO2 98%   BMI 27.40 kg/m    BMI: Body mass index is 27.4 kg/m .  Physical Exam  Constitutional:       General: She is not in acute distress.     Appearance: Normal appearance.   HENT:      Head: Normocephalic and atraumatic.   Cardiovascular:      Rate and Rhythm: Normal rate and regular rhythm.      Heart sounds: No murmur heard.  Pulmonary:      Effort: Pulmonary effort is normal.   Musculoskeletal:      Right lower leg: No edema.   Neurological:      General: No focal deficit present.      Mental Status: She is alert. Mental status is at baseline.   Psychiatric:         Mood and Affect: Mood normal.         Behavior: Behavior normal.           SNF labs: Recent labs in [facility] reviewed by me today.  and   Most Recent 3 CBC's:Recent Labs   Lab Test 05/01/23  0610 04/26/23  0745 04/26/23  0144   WBC 5.4 6.7 9.6   HGB 9.7* 9.5* 10.0*   MCV 88 85 85    335 360     Most Recent 3 BMP's:Recent Labs   Lab Test 04/26/23  0745 04/25/23  2224 01/10/23  1023    141 141   POTASSIUM 3.9 4.0 4.1   CHLORIDE 105 102 102   CO2 26 26 24   BUN 30.6* 33.5* 19.4   CR 0.69 0.91 0.62   ANIONGAP 11 13 15   LILIA 8.9 9.4 10.2   * 161* 107*       DISCHARGE PLAN:    Follow up labs: No labs orders/due    Medical Follow Up:      Follow up with primary care provider in 2 weeks    [facility] scheduled appointments:  Next 5 appointments (look out 90 days)    Jul 25, 2023  1:30 PM  (Arrive by 1:10 PM)  Provider Visit with Rachel Miller MD  [facility] ([facility] ) [address]  [address]  [facility] [id number] [phone]          Discharge Services: Home " Care:  Occupational Therapy, Physical Therapy, Registered Nurse and Home Health Aide    Discharge Instructions Verbalized to Patient at Discharge:     Driving is not recommended until cleared by Rachel Miller to resume.     TOTAL DISCHARGE TIME:   Greater than 30 minutes  Electronically signed by:  Karlene Le PA-C     Home care Face to Face documentation done in [facility] attached to Home care orders for [facility].

## 2023-05-09 NOTE — PATIENT INSTRUCTIONS
Teri Beltran  YOB: 1945                                   Discharge Date: 5/11/23      PHYSICIAN's DISCHARGE SUMMARY / ORDER SHEET  1. Discharge to: Home with home care  2. Medications:      Current Outpatient Medications   Medication Sig Dispense Refill    acetaminophen (TYLENOL) 325 MG tablet Take 3 tablets (975 mg) by mouth every 8 hours      carboxymethylcellulose (CARBOXYMETHYLCELLULOSE SODIUM) 0.5 % SOLN ophthalmic solution Place 1 drop into both eyes 4 times daily as needed for dry eyes      celecoxib (CELEBREX) 100 MG capsule Take 1 capsule (100 mg) by mouth 2 times daily 60 capsule 0    dorzolamide-timolol PF (COSOPT PF) 2-0.5 % opthalmic solutionh Place 1 drop into both eyes 2 times daily      furosemide (LASIX) 20 MG tablet Take 1 tablet (20 mg) by mouth 2 times daily 180 tablet 3    gabapentin (NEURONTIN) 300 MG capsule Take 1 capsule (300 mg) by mouth 3 times daily 270 capsule 1    latanoprost (XALATAN) 0.005 % ophthalmic solution Place 1 drop into the right eye At Bedtime       levothyroxine (SYNTHROID/LEVOTHROID) 88 MCG tablet Take 1 tablet (88 mcg) by mouth daily 90 tablet 1    Lidocaine (LIDOCARE) 4 % Patch Place onto the skin every 24 hours To prevent lidocaine toxicity, patient should be patch free for 12 hrs daily. Future refills by PCP Dr. Rachel Miller with phone number [phone]. 10 patch 0    metoprolol tartrate (LOPRESSOR) 25 MG tablet TAKE 1 TABLET(25 MG) BY MOUTH TWICE DAILY 180 tablet 1    Multiple Vitamins-Minerals (MULTIVITAMIN ADULT PO) Take 1 tablet by mouth daily      oxyCODONE (ROXICODONE) 5 MG tablet Take 0.5 tablets (2.5 mg) by mouth every 6 hours as needed for moderate pain 15 tablet 0    pantoprazole (PROTONIX) 40 MG EC tablet TAKE 1 TABLET(40 MG) BY MOUTH DAILY 90 tablet 0    polyethylene glycol (MIRALAX) 17 GM/Dose powder Take 17 g by mouth daily 510 g     potassium chloride ER (KLOR-CON M) 10 MEQ CR tablet TAKE 1 TABLET(10 MEQ) BY MOUTH  DAILY 90 tablet 1    pravastatin (PRAVACHOL) 40 MG tablet Take 1 tablet (40 mg) by mouth At Bedtime 90 tablet 1    DISCHARGE PLAN:  Follow up labs: No labs orders/due  Medical Follow Up:      Follow up with primary care provider in 2 weeks  Current [place] scheduled appointments:  Next 5 appointments (look out 90 days)      Jul 25, 2023  1:30 PM  (Arrive by 1:10 PM)  Provider Visit with Rachel Miller MD  [facility] ([facility] ) [address]  [address]  [facility] [id number] [phone]          Discharge Services: Home Care:  Occupational Therapy, Physical Therapy, Registered Nurse and Home Health Aide  Discharge Instructions Verbalized to Patient at Discharge:   Driving is not recommended until cleared by Rachel Miller to resume.       Discharge patient to home with current medications and treatments  Discharge Home with Home care as above  - Nursing call in 30 days supply of needed meds to pharmacy of choice upon discharge. Future refills by PCP Dr. Rachel Miller with phone number [phone].  - Please send home with original of these discharge instructions and copy for chart.       ______________________________  Karlene Le PA-C   [facility]                   5/9/2023

## 2023-05-15 ENCOUNTER — TELEPHONE (OUTPATIENT)
Dept: INTERNAL MEDICINE | Facility: CLINIC | Age: 78
End: 2023-05-15
Payer: MEDICARE

## 2023-05-15 ENCOUNTER — MEDICAL CORRESPONDENCE (OUTPATIENT)
Dept: HEALTH INFORMATION MANAGEMENT | Facility: CLINIC | Age: 78
End: 2023-05-15

## 2023-05-15 NOTE — TELEPHONE ENCOUNTER
Farida from [facility] care calling for verbal orders for skilled nursing  1 visit a week for 3 weeks  3 PRN's   OT & PT evaluate and treat  Ok to call and lm [phone]

## 2023-05-22 ENCOUNTER — TELEPHONE (OUTPATIENT)
Dept: INTERNAL MEDICINE | Facility: CLINIC | Age: 78
End: 2023-05-22
Payer: MEDICARE

## 2023-05-22 NOTE — TELEPHONE ENCOUNTER
Janie, nurse from [facility].  Had a home visit today and noted some differences in how the patient takes some of her medication.    Celebrex is PRN instead of BID.  Lidocaine patch is PRN instead of q 24 hours  Mirilax is PRN ( not currently needed, but wanted it available in case ) instead of Daily  Claritin PRN, not currently on medication list.    Will forward to PCP.

## 2023-05-23 ENCOUNTER — PATIENT OUTREACH (OUTPATIENT)
Dept: CARE COORDINATION | Facility: CLINIC | Age: 78
End: 2023-05-23
Payer: MEDICARE

## 2023-05-23 ASSESSMENT — ACTIVITIES OF DAILY LIVING (ADL): DEPENDENT_IADLS:: INDEPENDENT

## 2023-05-23 NOTE — PROGRESS NOTES
Clinic Care Coordination Contact    Clinic Care Coordination Contact  OUTREACH    Referral Information:  Referral Source: SNF/TCU    Chief Complaint   Patient presents with     Clinic Care Coordination - Initial     Post TCU discharge assessment      Universal Utilization: Reviewed 5/23/2023  Clinic Utilization  Difficulty keeping appointments:: No  Compliance Concerns: No  No-Show Concerns: No  No PCP office visit in Past Year: No  Utilization    Hospital Admissions  2             ED Visits  1             No Show Count (past year)  1                Current as of: 5/23/2023  4:11 AM            Clinical Concerns:  Current Medical Concerns:    Patient Active Problem List   Diagnosis     Hypothyroidism     Tachycardia     Glaucoma     Prediabetes     Hyperlipidemia LDL goal <130     Palpitations     BMI 30.0-30.9,adult     Advanced directives, counseling/discussion     Hypertension goal BP (blood pressure) < 140/90     Adjustment disorder with depressed mood     Osteoporosis     RBBB     First degree AV block     Venous insufficiency     Varicose veins     S/P total knee arthroplasty     S/P total hip arthroplasty     Anterior dislocation of right hip (H)     Fall     SVT (supraventricular tachycardia) (H)     Rupture of tendon of hip abductor     LUQ abdominal pain     Hip pain, right     Closed head injury, initial encounter     Hematoma of scalp, initial encounter     Fall, initial encounter     Concussion with loss of consciousness, initial encounter     Acute midline low back pain without sciatica   Current Behavioral Concerns: none noted.     Education Provided to patient: [facility] role and reason for call.       Health Maintenance Reviewed: Due/Overdue   Health Maintenance Due   Topic Date Due     URINE DRUG SCREEN  Never done     COVID-19 Vaccine (6 - Pfizer series) 02/21/2023   Clinical Pathway: None    Medication Management:  Medication review status: Medications not discussed during this encounter due to nature  of call.   Current Outpatient Medications   Medication     acetaminophen (TYLENOL) 325 MG tablet     carboxymethylcellulose (CARBOXYMETHYLCELLULOSE SODIUM) 0.5 % SOLN ophthalmic solution     celecoxib (CELEBREX) 100 MG capsule     dorzolamide-timolol PF (COSOPT PF) 2-0.5 % opthalmic solutionh     furosemide (LASIX) 20 MG tablet     gabapentin (NEURONTIN) 300 MG capsule     latanoprost (XALATAN) 0.005 % ophthalmic solution     levothyroxine (SYNTHROID/LEVOTHROID) 88 MCG tablet     Lidocaine (LIDOCARE) 4 % Patch     metoprolol tartrate (LOPRESSOR) 25 MG tablet     Multiple Vitamins-Minerals (MULTIVITAMIN ADULT PO)     oxyCODONE (ROXICODONE) 5 MG tablet     pantoprazole (PROTONIX) 40 MG EC tablet     polyethylene glycol (MIRALAX) 17 GM/Dose powder     potassium chloride ER (KLOR-CON M) 10 MEQ CR tablet     pravastatin (PRAVACHOL) 40 MG tablet     No current facility-administered medications for this visit.     Functional Status:  Dependent ADLs:: Independent  Dependent IADLs:: Independent    Living Situation:  Current living arrangement:: I live in a private home  Type of residence:: Private home - stairs    Lifestyle & Psychosocial Needs:  Social Determinants of Health     Tobacco Use: Medium Risk (4/26/2023)    Patient History      Smoking Tobacco Use: Former      Smokeless Tobacco Use: Never      Passive Exposure: Past   Alcohol Use: Not on file   Financial Resource Strain: Not on file   Food Insecurity: Not on file   Transportation Needs: Not on file   Physical Activity: Not on file   Stress: Not on file   Social Connections: Not on file   Intimate Partner Violence: Not on file   Depression: Not at risk (1/17/2023)    PHQ-2      PHQ-2 Score: 0   Housing Stability: Not on file     Diet:: Regular  Jainism or spiritual beliefs that impact treatment:: No  Mental health DX:: Yes  Mental health DX how managed:: None  Mental health management concern (GOAL):: No  Chemical Dependency Status: No Current Concerns     [facility]  called and spoke with patient regarding post TCU discharge assessment. Patient reports she is doing well back at home and has homecare visiting for RN, HHA, OT, and PT. Patient stated she has all the support and resources needed at this time and denied any ongoing CC needs. Patient thankful for call and politely declined CC services. Preference is to contact CC if/when needs arise.     Resources and Interventions:  Current Resources:   Skilled Home Care Services: Skilled Nursing, Home Health Aid, Physical Therapy, Occupational Therapy  Community Resources: None  Equipment Currently Used at Home: walker, rolling  Employment Status: retired  Advance Care Plan/Directive  Advanced Care Plans/Directives on file:: No  Referrals Placed: None     Care Plan: None    Future Appointments              In 1 week Rachel Miller MD [place], [place]    In 2 months Rachel Miller MD [place]        Plan: Patient was provided with SW CC's contact information and encouraged to call with questions, concerns, and/or support needs.  CC will remain available as needed. No further care coordination outreaches will be made at this time.     SILVIA Caballero/LICCODIE  Social Work Care Coordinator  [facility] - [place], [place], [facility], and [place]  Phone: [phone]

## 2023-05-24 NOTE — TELEPHONE ENCOUNTER
Left message on [employer] asking Janie to call back.  Outgoing message did not identitfy the owner of the voicemail. HELEN Jim R.N.

## 2023-05-26 ENCOUNTER — TELEPHONE (OUTPATIENT)
Dept: INTERNAL MEDICINE | Facility: CLINIC | Age: 78
End: 2023-05-26
Payer: MEDICARE

## 2023-05-26 NOTE — TELEPHONE ENCOUNTER
Vandana with [facility] calls for verbal order      Physical Therapy  1x4wks   1 additional bi weekly visit     Best number to call back [phone]

## 2023-05-30 ENCOUNTER — OFFICE VISIT (OUTPATIENT)
Dept: INTERNAL MEDICINE | Facility: CLINIC | Age: 78
End: 2023-05-30
Payer: MEDICARE

## 2023-05-30 VITALS
OXYGEN SATURATION: 99 % | RESPIRATION RATE: 18 BRPM | TEMPERATURE: 98.2 F | HEIGHT: 68 IN | DIASTOLIC BLOOD PRESSURE: 72 MMHG | HEART RATE: 89 BPM | SYSTOLIC BLOOD PRESSURE: 112 MMHG | WEIGHT: 183.6 LBS | BODY MASS INDEX: 27.83 KG/M2

## 2023-05-30 DIAGNOSIS — R42 LIGHTHEADEDNESS: ICD-10-CM

## 2023-05-30 DIAGNOSIS — I10 HYPERTENSION GOAL BP (BLOOD PRESSURE) < 140/90: ICD-10-CM

## 2023-05-30 DIAGNOSIS — S06.0X9A CONCUSSION WITH LOSS OF CONSCIOUSNESS, INITIAL ENCOUNTER: ICD-10-CM

## 2023-05-30 DIAGNOSIS — S09.90XA CLOSED HEAD INJURY, INITIAL ENCOUNTER: ICD-10-CM

## 2023-05-30 DIAGNOSIS — Z09 HOSPITAL DISCHARGE FOLLOW-UP: Primary | ICD-10-CM

## 2023-05-30 PROCEDURE — 99213 OFFICE O/P EST LOW 20 MIN: CPT | Performed by: INTERNAL MEDICINE

## 2023-05-30 ASSESSMENT — PAIN SCALES - GENERAL: PAINLEVEL: NO PAIN (0)

## 2023-05-30 NOTE — PATIENT INSTRUCTIONS
Plan:  1. Stop Celebrex   2. May try to decrease the lasix to   -- 2 tablets every other day and   -- 1 tablet every other day   3. Continue the other meds, same doses for now.  4. Keep the July 25 appointment   5. Let's look for an appointment in June

## 2023-05-30 NOTE — PROGRESS NOTES
Dr Wong's note      Patient's instructions / PLAN:                                                        Plan:  1. Stop Celebrex   2. May try to decrease the lasix to   -- 2 tablets every other day and   -- 1 tablet every other day   3. Continue the other meds, same doses for now.  4. Keep the July 25 appointment   5. Let's look for an appointment in June       ASSESSMENT & PLAN:                                                      Hospital follow-up after fall with concussion and loss of consciousness.  Amazingly, she has not suffered any fracture.  She spent time in TCU and now she is home with [facility].  Her blood pressure is on the low side, but not hypotension.  Sometimes she feels lightheaded when she moves quickly.  I advised her to wait for 10 seconds when she stands up to make sure she does not have lightheadedness when she walks.  She has not done hypertensive medication.  She takes furosemide twice a day for chronic leg edema which is secondary to venous insufficiency and not CHF.  We can try a lower dose of furosemide, as above  She was prescribed Celebrex 100 mg twice a day as an anti-inflammatory medication.  She does not know which inflammation.  It upsets her stomach.  I think we should stop it.    (Z09) Hospital discharge follow-up  (primary encounter diagnosis)  (S06.0X9A) Concussion with loss of consciousness, initial encounter  (S09.90XA) Closed head injury, initial encounter  Feeling better    (I10) Hypertension goal BP (blood pressure) < 140/90  (R42) Lightheadedness  Comment:   Plan: As above       Chief complaint:                                                      hosp f/u     SUBJECTIVE:                                                    History of present illness:    Concussion  -- hosp discharge summary reviewed  -- she feels well  -- some dizziness if she moves quickly     question about celebrex- it upsets stomach    /77      Chr vv insuff w chr leg edema  -- she has chr swollen  legs  -- now w compression socks w zipper        Shalonda Beth is a 77 year old, presenting for the following health issues:  No chief complaint on file.         View : No data to display.              HPI       Hospital Follow-up Visit:    Hospital/Nursing Home/IP Rehab Facility: [facility]  Date of Admission: 04/25/2023  Date of Discharge: 04/27/2023  Reason(s) for Admission: Hospital Problem List       Venous insufficiency     LUQ abdominal pain     Hip pain, right     Closed head injury, initial encounter     Hematoma of scalp, initial encounter     Fall, initial encounter     Concussion with loss of consciousness, initial encounter     Acute midline low back pain without sciatica      Was your hospitalization related to COVID-19? No   Problems taking medications regularly:  None  Medication changes since discharge:   START taking these medications     Details   celecoxib (CELEBREX) 100 MG capsule Take 1 capsule (100 mg) by mouth 2 times daily for 3 days     Associated Diagnoses: Fall, initial encounter; Hematoma of scalp, initial encounter       Lidocaine (LIDOCARE) 4 % Patch Place 1-2 patches onto the skin every 24 hours To prevent lidocaine toxicity, patient should be patch free for 12 hrs daily.     Associated Diagnoses: Fall, initial encounter; Hematoma of scalp, initial encounter       oxyCODONE (ROXICODONE) 5 MG tablet Take 0.5 tablets (2.5 mg) by mouth every 6 hours as needed for moderate pain  Qty: 6 tablet, Refills: 0     Associated Diagnoses: Fall, initial encounter; Hematoma of scalp, initial encounter       polyethylene glycol (MIRALAX) 17 GM/Dose powder Take 17 g by mouth daily  Qty: 510 g     Associated Diagnoses: Drug-induced constipation                 CONTINUE these medications which have CHANGED     Details   acetaminophen (TYLENOL) 325 MG tablet Take 3 tablets (975 mg) by mouth every 8 hours     Associated Diagnoses: Fall, initial encounter; Hematoma of scalp,  "initial encounter               Problems adhering to non-medication therapy:  None    Summary of hospitalization:  [facility] discharge summary reviewed  Diagnostic Tests/Treatments reviewed.  Follow up needed: as above   Other Healthcare Providers Involved in Patient s Care:         no  Update since discharge: improved.         Plan of care communicated with patient             Hospital Follow-up Visit:    Hospital/Nursing Home/IP Rehab Facility: [facility]  Date of Admission: 04/27/2023  Date of Discharge: 05/11/2023  Reason(s) for Admission: See hospital notes above.    Was your hospitalization related to COVID-19? No   Problems taking medications regularly:  None  Medication changes since discharge: See hospital notes above.  Problems adhering to non-medication therapy:  None    Summary of hospitalization:  [facility] discharge summary reviewed  Diagnostic Tests/Treatments reviewed.  Follow up needed: as above   Other Healthcare Providers Involved in Patient s Care:         None  Update since discharge: improved.     Review of Systems:                                                      ROS: negative for fever, chills, cough, wheezes, chest pain, shortness of breath, vomiting, abdominal pain, leg swelling       OBJECTIVE:             Physical exam:   Blood pressure 112/72, pulse 89, temperature 98.2  F (36.8  C), temperature source Tympanic, resp. rate 18, height 1.727 m (5' 8\"), weight 83.3 kg (183 lb 9.6 oz), SpO2 99 %, not currently breastfeeding.     NAD, appears comfortable  Skin: no rashes     Neck: supple, no JVD,  No thyroidmegaly. Lymph nodes nonpalpable cervical and supraclavicular.  Chest: clear to auscultation bilaterally, good respiratory effort  Heart: S1 S2, RRR, no mgr appreciated  Abdomen: soft, not tender,   Extremities: no edema,   Neurologic: A, Ox3, no focal signs appreciated    PMHx: reviewed  Past Medical History:   Diagnosis Date     " Concussion with loss of consciousness, initial encounter 4/26/2023     First degree AV block 08/11/2015     Former smoker      Gastroesophageal reflux disease      GERD (gastroesophageal reflux disease)      Glaucoma      Heart block     2:1     Heart murmur      Hyperlipidemia LDL goal <130 06/14/2013     Hypertension goal BP (blood pressure) < 140/90 12/03/2013     Hypothyroidism      Left atrial dilatation     mild     Mild dilation of ascending aorta - borderline 08/11/2015     Palpitations      Prediabetes 06/14/2013     RBBB 08/11/2015     S/P total knee arthroplasty 07/23/2019     Tachycardia      Urinary frequency      Varicose veins      Venous insufficiency     s/p bilateral great saphenous vein radiofrequency ablation by Dr. Garcia      PSHx: reviewed  Past Surgical History:   Procedure Laterality Date     ARTHROPLASTY HIP Right 11/20/2020    Procedure: Right total hip arthroplasty using a Biomet Taperloc femoral stem and G7 acetabulum.;  Surgeon: Dragan Muse MD;  Location: [facility] OR     ARTHROPLASTY KNEE Right 07/23/2019    Procedure: Right total knee arthroplasty using an Arthrex clipkitlance knee system;  Surgeon: Dragan Muse MD;  Location: [facility] OR     BREAST SURGERY      right breast ductal surgery due to milk production     BREAST SURGERY Right     ductal surgery due to milk production     CATARACT EXTRACTION Right 05/2022     COLONOSCOPY N/A 10/24/2014    no further screening. Procedure: COLONOSCOPY;  Surgeon: Pedro Rudd MD;  Location: [facility] GI     DECOMPRESSION HIP CORE Right 6/6/2022    Procedure: Open right hip abductor repair;  Surgeon: Dragan Muse MD;  Location: [facility] OR     EYE SURGERY       FINGER GANGLION CYST EXCISION Left     ring finger     HYSTERECTOMY       HYSTERECTOMY, PAP NO LONGER INDICATED  2009    total hysterectomy and ovaries. benign     SOFT TISSUE SURGERY      ganglion removed from left wrist and ring finger     SURGICAL HISTORY OF -   age  7    tonsillectomy     SURGICAL HISTORY OF -   01/2015    left eye cataract     SURGICAL HISTORY OF -   Fall 2016    LINQ placed.     TONSILLECTOMY       WRIST GANGLION EXCISION Left         Meds: reviewed  Current Outpatient Medications   Medication Sig Dispense Refill     acetaminophen (TYLENOL) 325 MG tablet Take 3 tablets (975 mg) by mouth every 8 hours       carboxymethylcellulose (CARBOXYMETHYLCELLULOSE SODIUM) 0.5 % SOLN ophthalmic solution Place 1 drop into both eyes 4 times daily as needed for dry eyes       celecoxib (CELEBREX) 100 MG capsule Take 1 capsule (100 mg) by mouth 2 times daily 60 capsule 0     dorzolamide-timolol PF (COSOPT PF) 2-0.5 % opthalmic solutionh Place 1 drop into both eyes 2 times daily       furosemide (LASIX) 20 MG tablet Take 1 tablet (20 mg) by mouth 2 times daily 180 tablet 3     gabapentin (NEURONTIN) 300 MG capsule Take 1 capsule (300 mg) by mouth 3 times daily 270 capsule 1     latanoprost (XALATAN) 0.005 % ophthalmic solution Place 1 drop into the right eye At Bedtime        levothyroxine (SYNTHROID/LEVOTHROID) 88 MCG tablet Take 1 tablet (88 mcg) by mouth daily 90 tablet 1     Lidocaine (LIDOCARE) 4 % Patch Place onto the skin every 24 hours To prevent lidocaine toxicity, patient should be patch free for 12 hrs daily. Future refills by PCP Dr. Rachel Miller with phone number [phone]. 10 patch 0     metoprolol tartrate (LOPRESSOR) 25 MG tablet TAKE 1 TABLET(25 MG) BY MOUTH TWICE DAILY 180 tablet 1     Multiple Vitamins-Minerals (MULTIVITAMIN ADULT PO) Take 1 tablet by mouth daily       oxyCODONE (ROXICODONE) 5 MG tablet Take 0.5 tablets (2.5 mg) by mouth every 6 hours as needed for moderate pain 15 tablet 0     pantoprazole (PROTONIX) 40 MG EC tablet TAKE 1 TABLET(40 MG) BY MOUTH DAILY 90 tablet 0     polyethylene glycol (MIRALAX) 17 GM/Dose powder Take 17 g by mouth daily 510 g      potassium chloride ER (KLOR-CON M) 10 MEQ CR tablet TAKE 1 TABLET(10  MEQ) BY MOUTH DAILY 90 tablet 1     pravastatin (PRAVACHOL) 40 MG tablet Take 1 tablet (40 mg) by mouth At Bedtime 90 tablet 1       Soc Hx: reviewed  Fam Hx: reviewed      Chart documentation was completed, in part, with codesy voice-recognition software. Even though reviewed, some grammatical, spelling, and word errors may remain.      Rachel Wong MD  Internal Medicine

## 2023-05-31 ENCOUNTER — TELEPHONE (OUTPATIENT)
Dept: INTERNAL MEDICINE | Facility: CLINIC | Age: 78
End: 2023-05-31
Payer: MEDICARE

## 2023-05-31 NOTE — TELEPHONE ENCOUNTER
Giovanna from [facility][1] calling for verbal orders for  OT  2 visits a week for 4 weeks  Ok to call and lm [phone]

## 2023-06-02 ENCOUNTER — MEDICAL CORRESPONDENCE (OUTPATIENT)
Dept: HEALTH INFORMATION MANAGEMENT | Facility: CLINIC | Age: 78
End: 2023-06-02

## 2023-06-02 DIAGNOSIS — R10.13 EPIGASTRIC PAIN: ICD-10-CM

## 2023-06-02 DIAGNOSIS — Z53.9 DIAGNOSIS NOT YET DEFINED: Primary | ICD-10-CM

## 2023-06-02 PROCEDURE — G0180 MD CERTIFICATION HHA PATIENT: HCPCS | Performed by: INTERNAL MEDICINE

## 2023-06-02 NOTE — TELEPHONE ENCOUNTER
pantoprazole (PROTONIX) 40 MG EC tablet      Last Written Prescription Date:  11/11/22  Last Fill Quantity: 90,   # refills: 0  Last Office Visit: 05/30/23  Future Office visit:    Next 5 appointments (look out 90 days)    Jun 20, 2023  2:00 PM  (Arrive by 1:40 PM)  Provider Visit with Rachel Miller MD  [facility] ([facility] ) [address]  [address]  [facility] [address]  [phone]   Jul 25, 2023  1:30 PM  (Arrive by 1:10 PM)  Provider Visit with Rachel Miller MD  [facility] ([facility] ) [address]  [address]  [facility] [address]  [phone]

## 2023-06-04 RX ORDER — PANTOPRAZOLE SODIUM 40 MG/1
40 TABLET, DELAYED RELEASE ORAL DAILY
Qty: 90 TABLET | Refills: 0 | Status: SHIPPED | OUTPATIENT
Start: 2023-06-04 | End: 2023-09-05

## 2023-06-04 NOTE — TELEPHONE ENCOUNTER
Pending Prescriptions:                       Disp   Refills    pantoprazole (PROTONIX) 40 MG EC tablet    90 tab*0        Sig: Take 1 tablet (40 mg) by mouth daily    Routing refill request to provider for review/approval because:  Needs provider review

## 2023-06-05 ENCOUNTER — TELEPHONE (OUTPATIENT)
Dept: INTERNAL MEDICINE | Facility: CLINIC | Age: 78
End: 2023-06-05
Payer: MEDICARE

## 2023-06-05 NOTE — TELEPHONE ENCOUNTER
Fax received from [facility] - OT, PT, SN, and SLP 05/31/23 for review and signature.  Put in Dr. Wong's in basket.

## 2023-06-07 ENCOUNTER — MEDICAL CORRESPONDENCE (OUTPATIENT)
Dept: HEALTH INFORMATION MANAGEMENT | Facility: CLINIC | Age: 78
End: 2023-06-07

## 2023-06-08 ENCOUNTER — TRANSFERRED RECORDS (OUTPATIENT)
Dept: HEALTH INFORMATION MANAGEMENT | Facility: CLINIC | Age: 78
End: 2023-06-08
Payer: MEDICARE

## 2023-06-20 ENCOUNTER — OFFICE VISIT (OUTPATIENT)
Dept: INTERNAL MEDICINE | Facility: CLINIC | Age: 78
End: 2023-06-20
Payer: MEDICARE

## 2023-06-20 VITALS
HEIGHT: 68 IN | SYSTOLIC BLOOD PRESSURE: 113 MMHG | OXYGEN SATURATION: 95 % | BODY MASS INDEX: 27.13 KG/M2 | WEIGHT: 179 LBS | RESPIRATION RATE: 14 BRPM | TEMPERATURE: 98.4 F | HEART RATE: 84 BPM | DIASTOLIC BLOOD PRESSURE: 73 MMHG

## 2023-06-20 DIAGNOSIS — I10 HYPERTENSION GOAL BP (BLOOD PRESSURE) < 140/90: ICD-10-CM

## 2023-06-20 DIAGNOSIS — R00.2 PALPITATIONS: ICD-10-CM

## 2023-06-20 DIAGNOSIS — I87.2 VENOUS INSUFFICIENCY: ICD-10-CM

## 2023-06-20 DIAGNOSIS — R42 LIGHTHEADEDNESS: Primary | ICD-10-CM

## 2023-06-20 PROCEDURE — 99214 OFFICE O/P EST MOD 30 MIN: CPT | Performed by: INTERNAL MEDICINE

## 2023-06-20 ASSESSMENT — PAIN SCALES - GENERAL: PAINLEVEL: NO PAIN (0)

## 2023-06-20 NOTE — PROGRESS NOTES
Dr Wong's note      Patient's instructions / PLAN:                                                        Plan:  1. Continue Furosemide/lasix same dose ( 2 tablets every other day and  1 tablet every other day ) +/- 1 tablet as we discussed   2. Continue the other meds, same doses for now.  3. Let's move the appointment from July 25 to Aug        ASSESSMENT & PLAN:                                                      (R42) Lightheadedness  (primary encounter diagnosis)  Comment: Little better, but persists.  Most likely because of blood pressure with orthostatic changes  She takes low-dose of metoprolol 25 mg daily to help with the palpitation.   The only thing we can change is the furosemide dose.  She feels much better with the present dose.  And she prefers to continue it.  We talked about adjustment in the dose, if the blood pressure goes to 100 or if she has increased edema    (R00.2) Palpitations  Comment:   Plan: Continue metoprolol       (I87.2) Venous insufficiency  Comment:   Plan: Continue compression socks, keep leg elevated, furosemide as above       Chief complaint:                                                      Follow up chronic medical problems      SUBJECTIVE:                                                    History of present illness:    She hasn't noticed any difference by stopping Celebrex, so we will discontinue it  The leg egema is about the same ( trace ) by decreasing the furosemide dose    She still has episodes of liughtheadedness when she stands up quickly She sits back and she feels well    The dizziness when she turns in bed at night gets better      + 1 ankle edema, the rest of the legs - skin wrinkles     LOV:Plan:  1. Stop Celebrex   2. May try to decrease the lasix to   -- 2 tablets every other day and   -- 1 tablet every other day   3. Continue the other meds, same doses for now.  4. Keep the July 25 appointment   5. Let's look for an appointment in June     "    Shalonda Beth is a 77 year old, presenting for the following health issues:  Patient is being seen for an follow up.      06/20/2023    1:34 PM   Additional Questions   Roomed by Tori Lawson     HPI     Hyperlipidemia Follow-Up      Are you regularly taking any medication or supplement to lower your cholesterol?   Yes- pravastatin    Are you having muscle aches or other side effects that you think could be caused by your cholesterol lowering medication?  Yes- muscle aches    Hypertension Follow-up      Do you check your blood pressure regularly outside of the clinic? Yes     Are you following a low salt diet? Yes    Are your blood pressures ever more than 140 on the top number (systolic) OR more   than 90 on the bottom number (diastolic), for example 140/90? No      How many servings of fruits and vegetables do you eat daily?  0-1    On average, how many sweetened beverages do you drink each day (Examples: soda, juice, sweet tea, etc.  Do NOT count diet or artificially sweetened beverages)?   0    How many days per week do you exercise enough to make your heart beat faster? 3 or less    How many minutes a day do you exercise enough to make your heart beat faster? 9 or less    How many days per week do you miss taking your medication? 0      Review of Systems:                                                      ROS: negative for fever, chills, cough, wheezes, chest pain, shortness of breath, vomiting, abdominal pain,pos  leg swelling       OBJECTIVE:             Physical exam:  Blood pressure 113/73, pulse 84, temperature 98.4  F (36.9  C), temperature source Tympanic, resp. rate 14, height 1.727 m (5' 8\"), weight 81.2 kg (179 lb), SpO2 95 %, not currently breastfeeding.     NAD, appears comfortable  Heart: S1 S2, RRR, no mgr appreciated  Extremities: as above   Neurologic: A, Ox3, no focal signs appreciated    PMHx: reviewed  Past Medical History:   Diagnosis Date     Concussion with loss of consciousness, " initial encounter 4/26/2023     First degree AV block 08/11/2015     Former smoker      Gastroesophageal reflux disease      GERD (gastroesophageal reflux disease)      Glaucoma      Heart block     2:1     Heart murmur      Hyperlipidemia LDL goal <130 06/14/2013     Hypertension goal BP (blood pressure) < 140/90 12/03/2013     Hypothyroidism      Left atrial dilatation     mild     Mild dilation of ascending aorta - borderline 08/11/2015     Palpitations      Prediabetes 06/14/2013     RBBB 08/11/2015     S/P total knee arthroplasty 07/23/2019     Tachycardia      Urinary frequency      Varicose veins      Venous insufficiency     s/p bilateral great saphenous vein radiofrequency ablation by Dr. Garcia      PSHx: reviewed  Past Surgical History:   Procedure Laterality Date     ARTHROPLASTY HIP Right 11/20/2020    Procedure: Right total hip arthroplasty using a Biomet Taperloc femoral stem and G7 acetabulum.;  Surgeon: Dragan Muse MD;  Location: [facility]     ARTHROPLASTY KNEE Right 07/23/2019    Procedure: Right total knee arthroplasty using an [employer] [place] Microlance knee system;  Surgeon: Dragan Muse MD;  Location: [facility] OR     BREAST SURGERY      right breast ductal surgery due to milk production     BREAST SURGERY Right     ductal surgery due to milk production     CATARACT EXTRACTION Right 05/2022     COLONOSCOPY N/A 10/24/2014    no further screening. Procedure: COLONOSCOPY;  Surgeon: Pedro Rudd MD;  Location: [facility]     DECOMPRESSION HIP CORE Right 6/6/2022    Procedure: Open right hip abductor repair;  Surgeon: Dragan Muse MD;  Location: [facility]     EYE SURGERY       FINGER GANGLION CYST EXCISION Left     ring finger     HYSTERECTOMY       HYSTERECTOMY, PAP NO LONGER INDICATED  2009    total hysterectomy and ovaries. benign     SOFT TISSUE SURGERY      ganglion removed from left wrist and ring finger     SURGICAL HISTORY OF -   age 7    tonsillectomy     SURGICAL HISTORY  OF -   01/2015    left eye cataract     SURGICAL HISTORY OF -   Fall 2016    LINQ placed.     TONSILLECTOMY       WRIST GANGLION EXCISION Left         Meds: reviewed  Current Outpatient Medications   Medication Sig Dispense Refill     acetaminophen (TYLENOL) 325 MG tablet Take 3 tablets (975 mg) by mouth every 8 hours       carboxymethylcellulose (CARBOXYMETHYLCELLULOSE SODIUM) 0.5 % SOLN ophthalmic solution Place 1 drop into both eyes 4 times daily as needed for dry eyes       dorzolamide-timolol PF (COSOPT PF) 2-0.5 % opthalmic solutionh Place 1 drop into both eyes 2 times daily       furosemide (LASIX) 20 MG tablet Take 1 tablet (20 mg) by mouth 2 times daily 180 tablet 3     gabapentin (NEURONTIN) 300 MG capsule Take 1 capsule (300 mg) by mouth 3 times daily 270 capsule 1     latanoprost (XALATAN) 0.005 % ophthalmic solution Place 1 drop into the right eye At Bedtime        levothyroxine (SYNTHROID/LEVOTHROID) 88 MCG tablet Take 1 tablet (88 mcg) by mouth daily 90 tablet 1     Lidocaine (LIDOCARE) 4 % Patch Place onto the skin every 24 hours To prevent lidocaine toxicity, patient should be patch free for 12 hrs daily. Future refills by PCP Dr. Rachel Miller with phone number [phone]. 10 patch 0     metoprolol tartrate (LOPRESSOR) 25 MG tablet TAKE 1 TABLET(25 MG) BY MOUTH TWICE DAILY 180 tablet 1     Multiple Vitamins-Minerals (MULTIVITAMIN ADULT PO) Take 1 tablet by mouth daily       oxyCODONE (ROXICODONE) 5 MG tablet Take 0.5 tablets (2.5 mg) by mouth every 6 hours as needed for moderate pain 15 tablet 0     pantoprazole (PROTONIX) 40 MG EC tablet Take 1 tablet (40 mg) by mouth daily 90 tablet 0     polyethylene glycol (MIRALAX) 17 GM/Dose powder Take 17 g by mouth daily 510 g      potassium chloride ER (KLOR-CON M) 10 MEQ CR tablet TAKE 1 TABLET(10 MEQ) BY MOUTH DAILY 90 tablet 1     pravastatin (PRAVACHOL) 40 MG tablet Take 1 tablet (40 mg) by mouth At Bedtime 90 tablet 1       Soc Hx:  reviewed  Fam Hx: reviewed      Chart documentation was completed, in part, with [employer] voice-recognition software. Even though reviewed, some grammatical, spelling, and word errors may remain.      Rachel Wong MD  Internal Medicine

## 2023-06-20 NOTE — PATIENT INSTRUCTIONS
Plan:  1. Continue Furosemide/lasix same dose ( 2 tablets every other day and  1 tablet every other day ) +/- 1 tablet as we discussed   2. Continue the other meds, same doses for now.  3. Let's move the appointment from July 25 to Aug

## 2023-07-20 ENCOUNTER — THERAPY VISIT (OUTPATIENT)
Dept: PHYSICAL THERAPY | Facility: CLINIC | Age: 78
End: 2023-07-20
Attending: INTERNAL MEDICINE
Payer: MEDICARE

## 2023-07-20 DIAGNOSIS — I89.0 LYMPHEDEMA DUE TO VENOUS DISEASE: ICD-10-CM

## 2023-07-20 DIAGNOSIS — I99.9 LYMPHEDEMA DUE TO VENOUS DISEASE: ICD-10-CM

## 2023-07-20 DIAGNOSIS — L90.5 SCAR CONDITION AND FIBROSIS OF SKIN: ICD-10-CM

## 2023-07-20 DIAGNOSIS — I89.0 LYMPHEDEMA OF BOTH LOWER EXTREMITIES: Primary | ICD-10-CM

## 2023-07-20 PROCEDURE — 97110 THERAPEUTIC EXERCISES: CPT | Mod: GP | Performed by: PHYSICAL THERAPIST

## 2023-07-20 PROCEDURE — 97140 MANUAL THERAPY 1/> REGIONS: CPT | Mod: GP | Performed by: PHYSICAL THERAPIST

## 2023-07-20 PROCEDURE — 97162 PT EVAL MOD COMPLEX 30 MIN: CPT | Mod: GP | Performed by: PHYSICAL THERAPIST

## 2023-07-20 NOTE — PROGRESS NOTES
PHYSICAL THERAPY EVALUATION  Type of Visit: Evaluation    See electronic medical record for Abuse and Falls Screening details.    Subjective       Presenting condition or subjective complaint:  LE edema  Date of onset: 03/09/23    Relevant medical history:   c/o worsening LE edema d/t venous insufficiency and limited benefit from compression stockings. PMHX:  Venous insufficiency s/p B vein abaltion, 4/26/2023 concussion d/t fall, heart block, arthritis with arthroplasties, hypothyroidism  Dates & types of surgery:  s/p LE ablations, 7/2019 TKA, 11/2020 TR TRACIE    Prior diagnostic imaging/testing results:    US with venous insufficiency   Prior therapy history for the same diagnosis, illness or injury:    no; use of comrpession knee highs 15-20mmhg with zipper    Prior Level of Function  Transfers: Independent  Ambulation: Independent using Fww 1/2 block  ADL: Independent      Living Environment  Social support:   lives alone  Type of home:  split level home   Stairs to enter the home:       3 steps to enter with hand rail  Stairs inside the home:       split level with chair lift (previously used by ); 6 steps with rail to basement laundry  Help at home:  daughter assists with groceries; housekeeping  Equipment owned:   compression socks and FWW, SEC    Employment:    retired  Hobbies/Interests:      Patient goals for therapy:  better manage leg swelling    Pain assessment: Pain denied     Objective       EDEMA EVALUATION  Additional history:  Body part affected by edema:  R>L LE  If cancer related, treatment:  NA  If not cancer related, problems with veins or cause of swelling:  venous insufficiency and inactivity  Distance able to walk:  1/2 block  Sensation problems in hands/feet:    decreased R lateral lower leg and lateral foot  Edema etiology: Chronic Venous Insufficiency, Surgery, TRACIE, TKA    FUNCTIONAL SCALES  LLIS = 14    Cognitive Status Examination  Orientation: Oriented to person, place and time    Level of Consciousness: Alert  Follows Commands and Answers Questions: 100% of the time  Personal Safety and Judgement: Intact      EDEMA  Skin Condition: Hemosiderin deposits, Intact, Pitting, Venous distention   3+ R dorsal foot and 2+ R>L pretibial and L dorsal foot  Scar: Yes  Capillary Refill: Symmetrical  Stemmer Sign: +  Ulceration: No    GIRTH MEASUREMENTS: Refer to separate girth measurement flowsheet.       VOLUME LE  Right LE (mL)    Left LE (mL)    LE Volume Comparison RLE volume greater than LLE volume   % Difference 19%                 RANGE OF MOTION: able to cross legs and reach feet. B shoulder flexion = 120  STRENGTH: R DF = 3-/5; B shoulders and hips weakness  POSTURE: forward head and B shoulder protraction IR  ACTIVITIES OF DAILY LIVING: Independent  BED MOBILITY: WNL  TRANSFERS: use of UE for STS  GAIT/LOCOMOTION: signficant B trendelenberg; 1/2 block using FWW  BALANCE: unable to perform SLS  SENSATION: decreased along R lateral lower leg and foot     Assessment & Plan   CLINICAL IMPRESSIONS  Medical Diagnosis: lymphedema, LE edema    Treatment Diagnosis: lymphedema, fibrosis   Impression/Assessment: Patient is a 77 year old female with worsening LE edema complaints.  The following significant findings have been identified: Decreased ROM/flexibility, Decreased strength, Impaired balance, Impaired sensation, Edema, Impaired gait, Impaired muscle performance, Decreased activity tolerance and Impaired posture. These impairments interfere with their ability to perform self care tasks, recreational activities, household chores, driving , household mobility, community mobility and elevated risk of wounds and infections as compared to previous level of function.     Clinical Decision Making (Complexity):  Clinical Presentation: Evolving/Changing  Clinical Presentation Rationale: based on medical and personal factors listed in PT evaluation  Clinical Decision Making (Complexity): Moderate  complexity    PLAN OF CARE  Treatment Interventions:  Interventions: Manual Therapy, Neuromuscular Re-education, Therapeutic Exercise    Long Term Goals     PT Goal 1  Goal Identifier: Home program  Goal Description: Patient will be independent in home program to manage conditions of lymphedema and fibrosis.  Rationale: to maximize safety and independence with performance of ADLs and functional tasks;to maximize safety and independence within the home;to maximize safety and independence with self cares  Target Date: 10/18/23  PT Goal 2  Goal Identifier: LE volume  Goal Description: Patient will demonstrate decreased volume of B LE by 5% to decrease risk of infection.  Rationale: to maximize safety and independence with performance of ADLs and functional tasks;to maximize safety and independence with self cares  Target Date: 10/18/23  PT Goal 3  Goal Identifier: LLIS  Goal Description: Patient will demonstrate an 8 point decrease (6 or less) in LLIS ( to demonstrate a decreased impact of lymphedema on function.  Rationale: to maximize safety and independence with performance of ADLs and functional tasks;to maximize safety and independence within the home;to maximize safety and independence within the community;to maximize safety and independence with self cares  Target Date: 10/18/23      Frequency of Treatment:  1x/week x 4 weeks then decrease to 2x/month x 2 months  Duration of Treatment: 90 days    Recommended Referrals to Other Professionals: Physical Therapy  Education Assessment:   Learner/Method: Patient    Risks and benefits of evaluation/treatment have been explained.   Patient/Family/caregiver agrees with Plan of Care.     Evaluation Time:     PT Eval, Moderate Complexity Minutes (61455): 30     Signing Clinician: Ines Rae PT      [facility]                                                                                   OUTPATIENT PHYSICAL THERAPY      PLAN OF TREATMENT  FOR OUTPATIENT REHABILITATION   Patient's Last Name, First Name, RICARDOPaoDIANEPao  Teri Beltran YOB: 1945   Provider's Name   [facility]   Medical Record No.  [id number]     Onset Date: 03/09/23  Start of Care Date:       Medical Diagnosis:  lymphedema, LE edema      PT Treatment Diagnosis:  lymphedema, fibrosis Plan of Treatment  Frequency/Duration:  / 90 days    Certification date from 07/20/23 to 10/18/23         See note for plan of treatment details and functional goals     Ines Rae, PT                         I CERTIFY THE NEED FOR THESE SERVICES FURNISHED UNDER        THIS PLAN OF TREATMENT AND WHILE UNDER MY CARE     (Physician attestation of this document indicates review and certification of the therapy plan).                  Referring Provider:  Rachel Wong-*      Initial Assessment  See Epic Evaluation-

## 2023-08-11 DIAGNOSIS — R60.9 FLUID RETENTION: ICD-10-CM

## 2023-08-11 RX ORDER — POTASSIUM CHLORIDE 750 MG/1
TABLET, EXTENDED RELEASE ORAL
Qty: 90 TABLET | Refills: 2 | Status: SHIPPED | OUTPATIENT
Start: 2023-08-11 | End: 2024-01-22

## 2023-08-11 NOTE — TELEPHONE ENCOUNTER
Pending Prescriptions:                       Disp   Refills    potassium chloride ER (KLOR-CON M) 10 MEQ*90 tab*2            Sig: TAKE 1 TABLET(10 MEQ) BY MOUTH DAILY    Prescription approved per [facility] Refill Protocol.

## 2023-08-14 DIAGNOSIS — M25.551 CHRONIC HIP PAIN AFTER TOTAL REPLACEMENT OF RIGHT HIP JOINT: ICD-10-CM

## 2023-08-14 DIAGNOSIS — G89.29 CHRONIC HIP PAIN AFTER TOTAL REPLACEMENT OF RIGHT HIP JOINT: ICD-10-CM

## 2023-08-14 DIAGNOSIS — Z96.641 CHRONIC HIP PAIN AFTER TOTAL REPLACEMENT OF RIGHT HIP JOINT: ICD-10-CM

## 2023-08-15 RX ORDER — GABAPENTIN 300 MG/1
300 CAPSULE ORAL 3 TIMES DAILY
Qty: 270 CAPSULE | Refills: 1 | Status: SHIPPED | OUTPATIENT
Start: 2023-08-15 | End: 2024-01-22

## 2023-08-22 ENCOUNTER — OFFICE VISIT (OUTPATIENT)
Dept: INTERNAL MEDICINE | Facility: CLINIC | Age: 78
End: 2023-08-22
Payer: MEDICARE

## 2023-08-22 VITALS
HEART RATE: 74 BPM | HEIGHT: 68 IN | OXYGEN SATURATION: 97 % | SYSTOLIC BLOOD PRESSURE: 127 MMHG | RESPIRATION RATE: 16 BRPM | BODY MASS INDEX: 27.58 KG/M2 | WEIGHT: 182 LBS | TEMPERATURE: 97.8 F | DIASTOLIC BLOOD PRESSURE: 68 MMHG

## 2023-08-22 DIAGNOSIS — R00.2 PALPITATIONS: ICD-10-CM

## 2023-08-22 DIAGNOSIS — E78.5 HYPERLIPIDEMIA LDL GOAL <130: ICD-10-CM

## 2023-08-22 DIAGNOSIS — M25.551 CHRONIC HIP PAIN AFTER TOTAL REPLACEMENT OF RIGHT HIP JOINT: ICD-10-CM

## 2023-08-22 DIAGNOSIS — I10 HYPERTENSION GOAL BP (BLOOD PRESSURE) < 140/90: ICD-10-CM

## 2023-08-22 DIAGNOSIS — Z00.00 ROUTINE GENERAL MEDICAL EXAMINATION AT A HEALTH CARE FACILITY: ICD-10-CM

## 2023-08-22 DIAGNOSIS — S20.152A: Primary | ICD-10-CM

## 2023-08-22 DIAGNOSIS — Z96.641 CHRONIC HIP PAIN AFTER TOTAL REPLACEMENT OF RIGHT HIP JOINT: ICD-10-CM

## 2023-08-22 DIAGNOSIS — G89.29 CHRONIC HIP PAIN AFTER TOTAL REPLACEMENT OF RIGHT HIP JOINT: ICD-10-CM

## 2023-08-22 PROCEDURE — 99214 OFFICE O/P EST MOD 30 MIN: CPT | Performed by: INTERNAL MEDICINE

## 2023-08-22 RX ORDER — METOPROLOL TARTRATE 25 MG/1
TABLET, FILM COATED ORAL
Qty: 180 TABLET | Refills: 1 | Status: SHIPPED | OUTPATIENT
Start: 2023-08-22 | End: 2024-01-22

## 2023-08-22 RX ORDER — PRAVASTATIN SODIUM 40 MG
40 TABLET ORAL AT BEDTIME
Qty: 90 TABLET | Refills: 1 | Status: SHIPPED | OUTPATIENT
Start: 2023-08-22 | End: 2024-01-22

## 2023-08-22 NOTE — PROGRESS NOTES
Dr Wong's note      Patient's instructions / PLAN:                                                        Plan:  Breast surgeon referral   2. Continue same meds, same doses for now   3. Schedule ANNUAL EXAM after Jan 18  4. Please make a lab appointment for fasting labs  few days before        ASSESSMENT & PLAN:                                                      (S23.842A) Foreign body of left breast  (primary encounter diagnosis)  Comment:  See below    Plan: Adult General Surg Referral, CBC with         platelets, Comprehensive metabolic panel, Lipid        panel reflex to direct LDL Fasting, TSH with         free T4 reflex, Albumin Random Urine         Quantitative with Creat Ratio            (E78.5) Hyperlipidemia LDL goal <130  Comment: Controlled    Plan: pravastatin (PRAVACHOL) 40 MG tablet, CBC with         platelets, Comprehensive metabolic panel, Lipid        panel reflex to direct LDL Fasting, TSH with         free T4 reflex, Albumin Random Urine         Quantitative with Creat Ratio            (M25.551,  G89.29,  Z96.641) Chronic hip pain after total replacement of right hip joint  Comment: Controlled  w gabapentin   Plan: CBC with platelets, Comprehensive metabolic         panel, Lipid panel reflex to direct LDL         Fasting, TSH with free T4 reflex, Albumin         Random Urine Quantitative with Creat Ratio            (R00.2) Palpitations  Comment: Controlled    Plan: metoprolol tartrate (LOPRESSOR) 25 MG tablet,         CBC with platelets, Comprehensive metabolic         panel, Lipid panel reflex to direct LDL         Fasting, TSH with free T4 reflex, Albumin         Random Urine Quantitative with Creat Ratio            (I10) HTN, goal  < 140/90  Comment: Controlled    Plan: metoprolol tartrate (LOPRESSOR) 25 MG tablet,         CBC with platelets, Comprehensive metabolic         panel, Lipid panel reflex to direct LDL         Fasting, TSH with free T4 reflex, Albumin         Random Urine  Quantitative with Creat Ratio            (Z00.00) Routine general medical examination at a health care facility  Comment:   Plan: CBC with platelets, Comprehensive metabolic         panel, Lipid panel reflex to direct LDL         Fasting, TSH with free T4 reflex, Albumin         Random Urine Quantitative with Creat Ratio               Chief complaint:                                                      Follow up chronic medical problems   L breast     SUBJECTIVE:                                                    History of present illness:    Lightheadedness episodes  -- better, but still present    Heart murmur -- valve ok on echo 2020    Implantable heart monitor placed in 2020. It migrated to the L breast causing pain       June 20 LOV: Plan:  1. Continue Furosemide/lasix same dose ( 2 tablets every other day and  1 tablet every other day ) +/- 1 tablet as we discussed   2. Continue the other meds, same doses for now.  3. Let's move the appointment from July 25 to Aug       Shalonda Beth is a 77 year old, presenting for the following health issues:  Follow Up and Head Injury      8/22/2023    12:45 PM   Additional Questions   Roomed by COSMO Moura LPN   Accompanied by self         8/22/2023    12:45 PM   Patient Reported Additional Medications   Patient reports taking the following new medications none       History of Present Illness       Reason for visit:  Folliw up    She eats 2-3 servings of fruits and vegetables daily.She consumes 0 sweetened beverage(s) daily.She exercises with enough effort to increase her heart rate 9 or less minutes per day.  She exercises with enough effort to increase her heart rate 3 or less days per week.   She is taking medications regularly.       Review of Systems:                                                      ROS: negative for fever, chills, cough, wheezes, chest pain, shortness of breath, vomiting, abdominal pain, leg swelling       OBJECTIVE:             Physical  "exam:  Blood pressure 127/68, pulse 74, temperature 97.8  F (36.6  C), temperature source Oral, resp. rate 16, height 1.727 m (5' 8\"), weight 82.6 kg (182 lb), SpO2 97 %, not currently breastfeeding.     NAD, appears comfortable  Skin: no rashes   Neck: supple, no JVD,  No thyroidmegaly. Lymph nodes nonpalpable cervical and supraclavicular.  Chest: clear to auscultation bilaterally, good respiratory effort  Heart: S1 S2, RRR, no mgr appreciated  Abdomen: soft, not tender,   Extremities: trace edema,   Neurologic: A, Ox3, no focal signs appreciated    PMHx: reviewed  Past Medical History:   Diagnosis Date    Concussion with loss of consciousness, initial encounter 4/26/2023    First degree AV block 08/11/2015    Former smoker     Gastroesophageal reflux disease     GERD (gastroesophageal reflux disease)     Glaucoma     Heart block     2:1    Heart murmur     Hyperlipidemia LDL goal <130 06/14/2013    Hypertension goal BP (blood pressure) < 140/90 12/03/2013    Hypothyroidism     Left atrial dilatation     mild    Mild dilation of ascending aorta - borderline 08/11/2015    Palpitations     Prediabetes 06/14/2013    RBBB 08/11/2015    S/P total knee arthroplasty 07/23/2019    Tachycardia     Urinary frequency     Varicose veins     Venous insufficiency     s/p bilateral great saphenous vein radiofrequency ablation by Dr. Garcia      PSHx: reviewed  Past Surgical History:   Procedure Laterality Date    ARTHROPLASTY HIP Right 11/20/2020    Procedure: Right total hip arthroplasty using a Biomet Taperloc femoral stem and G7 acetabulum.;  Surgeon: Dragan Muse MD;  Location: [facility]    ARTHROPLASTY KNEE Right 07/23/2019    Procedure: Right total knee arthroplasty using an [employer]mit[employer]ance knee system;  Surgeon: Dragan Muse MD;  Location: [facility] OR    BREAST SURGERY      right breast ductal surgery due to milk production    BREAST SURGERY Right     ductal surgery due to milk production    CATARACT " EXTRACTION Right 05/2022    COLONOSCOPY N/A 10/24/2014    no further screening. Procedure: COLONOSCOPY;  Surgeon: Pedro Rudd MD;  Location: [facility]    DECOMPRESSION HIP CORE Right 6/6/2022    Procedure: Open right hip abductor repair;  Surgeon: Dragan Muse MD;  Location: [facility]    EYE SURGERY      FINGER GANGLION CYST EXCISION Left     ring finger    HYSTERECTOMY      HYSTERECTOMY, PAP NO LONGER INDICATED  2009    total hysterectomy and ovaries. benign    SOFT TISSUE SURGERY      ganglion removed from left wrist and ring finger    SURGICAL HISTORY OF -   age 7    tonsillectomy    SURGICAL HISTORY OF -   01/2015    left eye cataract    SURGICAL HISTORY OF -   Fall 2016    LINQ placed.    TONSILLECTOMY      WRIST GANGLION EXCISION Left         Meds: reviewed  Current Outpatient Medications   Medication Sig Dispense Refill    acetaminophen (TYLENOL) 325 MG tablet Take 3 tablets (975 mg) by mouth every 8 hours      carboxymethylcellulose (CARBOXYMETHYLCELLULOSE SODIUM) 0.5 % SOLN ophthalmic solution Place 1 drop into both eyes 4 times daily as needed for dry eyes      dorzolamide-timolol PF (COSOPT PF) 2-0.5 % opthalmic solutionh Place 1 drop into both eyes 2 times daily      furosemide (LASIX) 20 MG tablet Take 1 tablet (20 mg) by mouth 2 times daily 180 tablet 3    gabapentin (NEURONTIN) 300 MG capsule Take 1 capsule (300 mg) by mouth 3 times daily 270 capsule 1    latanoprost (XALATAN) 0.005 % ophthalmic solution Place 1 drop into the right eye At Bedtime       levothyroxine (SYNTHROID/LEVOTHROID) 88 MCG tablet Take 1 tablet (88 mcg) by mouth daily 90 tablet 1    Lidocaine (LIDOCARE) 4 % Patch Place onto the skin every 24 hours To prevent lidocaine toxicity, patient should be patch free for 12 hrs daily. Future refills by PCP Dr. Rachel Miller with phone number [phone]. 10 patch 0    metoprolol tartrate (LOPRESSOR) 25 MG tablet TAKE 1 TABLET(25 MG) BY MOUTH TWICE DAILY 180  tablet 1    Multiple Vitamins-Minerals (MULTIVITAMIN ADULT PO) Take 1 tablet by mouth daily      pantoprazole (PROTONIX) 40 MG EC tablet Take 1 tablet (40 mg) by mouth daily 90 tablet 0    potassium chloride ER (KLOR-CON M) 10 MEQ CR tablet TAKE 1 TABLET(10 MEQ) BY MOUTH DAILY 90 tablet 2    pravastatin (PRAVACHOL) 40 MG tablet Take 1 tablet (40 mg) by mouth At Bedtime 90 tablet 1    oxyCODONE (ROXICODONE) 5 MG tablet Take 0.5 tablets (2.5 mg) by mouth every 6 hours as needed for moderate pain (Patient not taking: Reported on 8/22/2023) 15 tablet 0    polyethylene glycol (MIRALAX) 17 GM/Dose powder Take 17 g by mouth daily (Patient not taking: Reported on 8/22/2023) 510 g        Soc Hx: reviewed  Fam Hx: reviewed      Chart documentation was completed, in part, with [employer] voice-recognition software. Even though reviewed, some grammatical, spelling, and word errors may remain.      Rachel Wong MD  Internal Medicine

## 2023-08-22 NOTE — PATIENT INSTRUCTIONS
Plan:  Breast surgeon referral   2. Continue same meds, same doses for now   3. Schedule ANNUAL EXAM after Jan 18  4. Please make a lab appointment for fasting labs  few days before

## 2023-08-28 ENCOUNTER — TRANSFERRED RECORDS (OUTPATIENT)
Dept: HEALTH INFORMATION MANAGEMENT | Facility: CLINIC | Age: 78
End: 2023-08-28
Payer: MEDICARE

## 2023-08-31 ENCOUNTER — OFFICE VISIT (OUTPATIENT)
Dept: SURGERY | Facility: CLINIC | Age: 78
End: 2023-08-31
Payer: MEDICARE

## 2023-08-31 VITALS
WEIGHT: 182 LBS | SYSTOLIC BLOOD PRESSURE: 122 MMHG | HEART RATE: 70 BPM | HEIGHT: 68 IN | BODY MASS INDEX: 27.58 KG/M2 | OXYGEN SATURATION: 99 % | DIASTOLIC BLOOD PRESSURE: 62 MMHG | RESPIRATION RATE: 15 BRPM

## 2023-08-31 DIAGNOSIS — S20.152A: Primary | ICD-10-CM

## 2023-08-31 PROCEDURE — 99203 OFFICE O/P NEW LOW 30 MIN: CPT | Performed by: SURGERY

## 2023-08-31 PROCEDURE — 10120 INC&RMVL FB SUBQ TISS SMPL: CPT | Performed by: SURGERY

## 2023-08-31 NOTE — LETTER
"August 31, 2023      RE:   Teri Beltran 1945      Dear Colleague,    Thank you for referring your patient, Teri Beltran, to [facility]. Please see a copy of my visit note below.    HPI:  Teri presents today for a  retained event monitor  on her left breast for the past several years. She relates that the device has migrated inferiorly several cm since its placement and is causing pain to the overlying skin. She has not had any infectious symptoms or drainage. It is readily visible on a mammogram from last May as well as a CT chest.  PE:  /62   Pulse 70   Resp 15   Ht 1.727 m (5' 8\")   Wt 82.6 kg (182 lb)   LMP  (LMP Unknown)   SpO2 99%   BMI 27.67 kg/m    General appearance: well-nourished, no apparent distress  Skin: There is a 2.5 cm  linear foreign body palpable in the lower inner left breast.  The overlying skin is healthy and intact.         Plan:  We will schedule excision at her convenience. I relayed that this could easily be done under local anesthesia and offered a procedure today. She is interested in going forth this afternoon.    General Surgery Operative Note      Pre-operative diagnosis: Left breast foreign body.   Post-operative diagnosis: Same    Procedure: Removal left breast foreign body.    Surgeon: Mao Terry MD   Assistant(s): NONE   Anesthesia: Local    Estimated blood loss: 1 cc     Specimens: * Cannot find log *       The left breast was prepped and draped in standard sterile fashion.  The skin overlying the mass was anesthetized with local anesthetic.  The medial edge was palpated and a skin arias was made. A 5mm incision was made over this. Sharp dissection was taken down to the device which was then grasped with a Ximena clamp and pulled up through the incision. It was then cut free from the surrounding capsule with a #15 blade. Once freed it was discarded. Direct pressure was held over the incision. It was " then closed with interrupted 4-0 Vicryl subcuticular sutures and Steri-Strips.  The patient tolerated the procedure well.        Again, thank you for allowing me to participate in the care of your patient.      Sincerely,      Mao Bob MD

## 2023-08-31 NOTE — PROGRESS NOTES
"HPI:  Teri presents today for a  retained event monitor  on her left breast for the past several years. She relates that the device has migrated inferiorly several cm since its placement and is causing pain to the overlying skin. She has not had any infectious symptoms or drainage. It is readily visible on a mammogram from last May as well as a CT chest.  PE:  /62   Pulse 70   Resp 15   Ht 1.727 m (5' 8\")   Wt 82.6 kg (182 lb)   LMP  (LMP Unknown)   SpO2 99%   BMI 27.67 kg/m    General appearance: well-nourished, no apparent distress  Skin: There is a 2.5 cm  linear foreign body palpable in the lower inner left breast.  The overlying skin is healthy and intact.         Plan:  We will schedule excision at her convenience. I relayed that this could easily be done under local anesthesia and offered a procedure today. She is interested in going forth this afternoon.    General Surgery Operative Note      Pre-operative diagnosis: Left breast foreign body.   Post-operative diagnosis: Same    Procedure: Removal left breast foreign body.    Surgeon: Mao Terry MD   Assistant(s): NONE   Anesthesia: Local    Estimated blood loss: 1 cc     Specimens: * Cannot find log *       The left breast was prepped and draped in standard sterile fashion.  The skin overlying the mass was anesthetized with local anesthetic.  The medial edge was palpated and a skin arias was made. A 5mm incision was made over this. Sharp dissection was taken down to the device which was then grasped with a Ximena clamp and pulled up through the incision. It was then cut free from the surrounding capsule with a #15 blade. Once freed it was discarded. Direct pressure was held over the incision. It was then closed with interrupted 4-0 Vicryl subcuticular sutures and Steri-Strips.  The patient tolerated the procedure well.        Mao Terry MD      Please route or send letter to:  Primary Care Provider (PCP)          "

## 2023-09-05 DIAGNOSIS — R10.13 EPIGASTRIC PAIN: ICD-10-CM

## 2023-09-05 RX ORDER — PANTOPRAZOLE SODIUM 40 MG/1
40 TABLET, DELAYED RELEASE ORAL DAILY
Qty: 90 TABLET | Refills: 0 | Status: SHIPPED | OUTPATIENT
Start: 2023-09-05 | End: 2023-12-02

## 2023-09-15 ENCOUNTER — TELEPHONE (OUTPATIENT)
Dept: SURGERY | Facility: CLINIC | Age: 78
End: 2023-09-15
Payer: MEDICARE

## 2023-09-15 NOTE — TELEPHONE ENCOUNTER
Name of caller: Patient    Reason for Call:  Shooting pain    Surgeon:  Dr. Bob    Recent Surgery:  Yes.    If yes, when & what type:  Breast, 8/31/23      Best phone number to reach pt at is: [phone]  Ok to leave a message with medical info? Yes.    Pharmacy preferred (if calling for a refill): na

## 2023-09-15 NOTE — TELEPHONE ENCOUNTER
S/p Removal left breast foreign body.    Procedure date: 8/31/23  Surgeon: Dr. Bob    Patient reports that she has been having intermittent shooting pain in left breast since shortly after surgery.  The pain is sharp, but fleeting and lasts only a couple of seconds.      She tells me she had bruising on 1/2 of her breast after surgery.  This is now resolving and has gotten much small.  No firm mass, breast is soft.    No growing redness, worsening swelling or thick drainage from incision.  No fever/chills.        - She can try Ibuprofen (taken with food) 600mg BID.    -cold pack to breast for comfort  - she should expect gradual improvement, resolution of intermittent pain.    -if she is not having any improvement in the next week or worsening / new symptoms she will call our office for a post-op appointment to ensure proper healing.      Patient verbalizes understanding and is in agreement with plan

## 2023-09-28 ENCOUNTER — THERAPY VISIT (OUTPATIENT)
Dept: PHYSICAL THERAPY | Facility: CLINIC | Age: 78
End: 2023-09-28
Payer: MEDICARE

## 2023-09-28 DIAGNOSIS — I89.0 LYMPHEDEMA DUE TO VENOUS DISEASE: Primary | ICD-10-CM

## 2023-09-28 DIAGNOSIS — L90.5 SCAR CONDITION AND FIBROSIS OF SKIN: ICD-10-CM

## 2023-09-28 DIAGNOSIS — I99.9 LYMPHEDEMA DUE TO VENOUS DISEASE: Primary | ICD-10-CM

## 2023-09-28 DIAGNOSIS — I89.0 LYMPHEDEMA OF BOTH LOWER EXTREMITIES: ICD-10-CM

## 2023-09-28 PROCEDURE — 97140 MANUAL THERAPY 1/> REGIONS: CPT | Mod: GP | Performed by: PHYSICAL THERAPIST

## 2023-09-28 PROCEDURE — 97110 THERAPEUTIC EXERCISES: CPT | Mod: GP | Performed by: PHYSICAL THERAPIST

## 2023-10-04 ENCOUNTER — THERAPY VISIT (OUTPATIENT)
Dept: PHYSICAL THERAPY | Facility: CLINIC | Age: 78
End: 2023-10-04
Payer: MEDICARE

## 2023-10-04 DIAGNOSIS — I89.0 LYMPHEDEMA DUE TO VENOUS DISEASE: Primary | ICD-10-CM

## 2023-10-04 DIAGNOSIS — L90.5 SCAR CONDITION AND FIBROSIS OF SKIN: ICD-10-CM

## 2023-10-04 DIAGNOSIS — I99.9 LYMPHEDEMA DUE TO VENOUS DISEASE: Primary | ICD-10-CM

## 2023-10-04 DIAGNOSIS — I89.0 LYMPHEDEMA OF BOTH LOWER EXTREMITIES: ICD-10-CM

## 2023-10-04 PROCEDURE — 97140 MANUAL THERAPY 1/> REGIONS: CPT | Mod: GP | Performed by: PHYSICAL THERAPIST

## 2023-10-05 ENCOUNTER — HOSPITAL ENCOUNTER (OUTPATIENT)
Dept: MAMMOGRAPHY | Facility: CLINIC | Age: 78
Discharge: HOME | End: 2023-10-05
Attending: INTERNAL MEDICINE | Admitting: INTERNAL MEDICINE
Payer: MEDICARE

## 2023-10-05 DIAGNOSIS — Z12.31 VISIT FOR SCREENING MAMMOGRAM: ICD-10-CM

## 2023-10-05 PROCEDURE — 77067 SCR MAMMO BI INCL CAD: CPT

## 2023-10-19 ENCOUNTER — THERAPY VISIT (OUTPATIENT)
Dept: PHYSICAL THERAPY | Facility: CLINIC | Age: 78
End: 2023-10-19
Payer: MEDICARE

## 2023-10-19 DIAGNOSIS — I89.0 LYMPHEDEMA OF BOTH LOWER EXTREMITIES: ICD-10-CM

## 2023-10-19 DIAGNOSIS — I99.9 LYMPHEDEMA DUE TO VENOUS DISEASE: Primary | ICD-10-CM

## 2023-10-19 DIAGNOSIS — I89.0 LYMPHEDEMA DUE TO VENOUS DISEASE: Primary | ICD-10-CM

## 2023-10-19 DIAGNOSIS — L90.5 SCAR CONDITION AND FIBROSIS OF SKIN: ICD-10-CM

## 2023-10-19 PROCEDURE — 97140 MANUAL THERAPY 1/> REGIONS: CPT | Mod: GP | Performed by: PHYSICAL THERAPIST

## 2023-10-19 PROCEDURE — 97110 THERAPEUTIC EXERCISES: CPT | Mod: GP | Performed by: PHYSICAL THERAPIST

## 2023-10-25 ENCOUNTER — THERAPY VISIT (OUTPATIENT)
Dept: PHYSICAL THERAPY | Facility: CLINIC | Age: 78
End: 2023-10-25
Payer: MEDICARE

## 2023-10-25 DIAGNOSIS — I99.9 LYMPHEDEMA DUE TO VENOUS DISEASE: Primary | ICD-10-CM

## 2023-10-25 DIAGNOSIS — I89.0 LYMPHEDEMA OF BOTH LOWER EXTREMITIES: ICD-10-CM

## 2023-10-25 DIAGNOSIS — I89.0 LYMPHEDEMA DUE TO VENOUS DISEASE: Primary | ICD-10-CM

## 2023-10-25 DIAGNOSIS — L90.5 SCAR CONDITION AND FIBROSIS OF SKIN: ICD-10-CM

## 2023-10-25 PROCEDURE — 97110 THERAPEUTIC EXERCISES: CPT | Mod: GP | Performed by: PHYSICAL THERAPIST

## 2023-10-25 PROCEDURE — 97140 MANUAL THERAPY 1/> REGIONS: CPT | Mod: GP | Performed by: PHYSICAL THERAPIST

## 2023-11-07 DIAGNOSIS — I87.2 EDEMA OF LEFT LOWER EXTREMITY DUE TO PERIPHERAL VENOUS INSUFFICIENCY: ICD-10-CM

## 2023-11-07 DIAGNOSIS — R60.9 FLUID RETENTION: ICD-10-CM

## 2023-11-07 RX ORDER — FUROSEMIDE 20 MG
20 TABLET ORAL 2 TIMES DAILY
Qty: 180 TABLET | Refills: 0 | Status: SHIPPED | OUTPATIENT
Start: 2023-11-07 | End: 2024-02-06

## 2023-11-08 ENCOUNTER — THERAPY VISIT (OUTPATIENT)
Dept: PHYSICAL THERAPY | Facility: CLINIC | Age: 78
End: 2023-11-08
Payer: MEDICARE

## 2023-11-08 DIAGNOSIS — I89.0 LYMPHEDEMA DUE TO VENOUS DISEASE: Primary | ICD-10-CM

## 2023-11-08 DIAGNOSIS — I99.9 LYMPHEDEMA DUE TO VENOUS DISEASE: Primary | ICD-10-CM

## 2023-11-08 DIAGNOSIS — I89.0 LYMPHEDEMA OF BOTH LOWER EXTREMITIES: ICD-10-CM

## 2023-11-08 DIAGNOSIS — L90.5 SCAR CONDITION AND FIBROSIS OF SKIN: ICD-10-CM

## 2023-11-08 PROCEDURE — 97110 THERAPEUTIC EXERCISES: CPT | Mod: GP | Performed by: PHYSICAL THERAPIST

## 2023-11-08 PROCEDURE — 97140 MANUAL THERAPY 1/> REGIONS: CPT | Mod: GP | Performed by: PHYSICAL THERAPIST

## 2023-11-20 ENCOUNTER — OFFICE VISIT (OUTPATIENT)
Dept: CARDIOLOGY | Facility: CLINIC | Age: 78
End: 2023-11-20
Attending: INTERNAL MEDICINE
Payer: MEDICARE

## 2023-11-20 VITALS
BODY MASS INDEX: 28.6 KG/M2 | HEIGHT: 68 IN | HEART RATE: 72 BPM | SYSTOLIC BLOOD PRESSURE: 138 MMHG | WEIGHT: 188.7 LBS | DIASTOLIC BLOOD PRESSURE: 68 MMHG

## 2023-11-20 DIAGNOSIS — E78.5 HYPERLIPIDEMIA LDL GOAL <130: ICD-10-CM

## 2023-11-20 DIAGNOSIS — I35.1 NONRHEUMATIC AORTIC VALVE INSUFFICIENCY: ICD-10-CM

## 2023-11-20 DIAGNOSIS — R01.1 HEART MURMUR: ICD-10-CM

## 2023-11-20 DIAGNOSIS — I10 ESSENTIAL HYPERTENSION WITH GOAL BLOOD PRESSURE LESS THAN 140/90: Primary | ICD-10-CM

## 2023-11-20 DIAGNOSIS — I87.2 VENOUS INSUFFICIENCY: ICD-10-CM

## 2023-11-20 PROCEDURE — 99214 OFFICE O/P EST MOD 30 MIN: CPT | Performed by: NURSE PRACTITIONER

## 2023-11-20 NOTE — PATIENT INSTRUCTIONS
Thanks for participating in a office visit with the [facility] clinic today.    Doing well on a cardiac standpoint   Reviewed results of last stress echocardiogram - stable.   Leg swelling - continue lasix, compression stocking and lymphedema wraps.   Blood pressures well controlled   Continue current medical therapy   Follow up echocardiogram in 1 year.     Follow up in 1 year with Dr. Anglin with echo prior.     Please call my nurse at  [phone] with any questions or concerns.    Scheduling phone number: [phone]  Reminder: Please bring in all current medications, over the counter supplements and vitamin bottles to your next appointment.

## 2023-11-20 NOTE — PROGRESS NOTES
CARDIOLOGY CLINIC NOTE    PRIMARY CARDIOLOGIST  Dr. Anglin     PRIMARY CARE PHYSICIAN:  Rachel Miller    HISTORY OF PRESENT ILLNESS:  Teri Beltran is a very pleasant 77 year old female with a past medical history significant for first-degree AV block, right bundle branch block, paroxysmal SVT, GERD, hypertension, hyperlipidemia, hypothyroidism, chronic venous insufficiency status post venous ablation, and chronic left lower extremity edema.    She returns to the office today for an annual follow-up.  She has been doing well on a cardiac standpoint, denies chest pain, shortness of breath, palpitations, PND, orthopnea, presyncope, or syncope.  Upon exam, lungs are clear bilaterally, heart rate and rhythm regular, no palpitations, audible systolic murmur and Chronic bilateral lower extremity edema, managed with compression stockings and lymphedema wraps.    On 4/25/2023, she presented to the emergency room via EMS after a fall with closed head trauma, concussion, and loss of consciousness.  She was in the garage taking some trash out of the trash bins where she fell backwards and hit the back of her head, losing consciousness. She was brought to the emergency room where she underwent a CT of the head that showed a right parietal scalp hematoma but no intracranial hemorrhage.  There was no facial fractures and C-spine was negative.  She did have a drop in hemoglobin from 10-9.5.  No medication changes were made.    Stress echocardiogram on 7/26/2022 was negative for ischemia.  EF 55 to 60%.  Trace aortic regurgitation.    Blood pressure is well controlled at 138/68  Last BMP was unremarkable.   Lipid panel showed a total cholesterol of 181, HDL 84, LDL 75, triglycerides 112.     She does not engage in any routine exercise  Compliant with all medications.      PAST MEDICAL HISTORY:  Past Medical History:   Diagnosis Date    Concussion with loss of consciousness, initial encounter 4/26/2023    First degree  AV block 08/11/2015    Former smoker     Gastroesophageal reflux disease     GERD (gastroesophageal reflux disease)     Glaucoma     Heart block     2:1    Heart murmur     Hyperlipidemia LDL goal <130 06/14/2013    Hypertension goal BP (blood pressure) < 140/90 12/03/2013    Hypothyroidism     Left atrial dilatation     mild    Mild dilation of ascending aorta - borderline 08/11/2015    Palpitations     Prediabetes 06/14/2013    RBBB 08/11/2015    S/P total knee arthroplasty 07/23/2019    Tachycardia     Urinary frequency     Varicose veins     Venous insufficiency     s/p bilateral great saphenous vein radiofrequency ablation by Dr. Garcia       MEDICATIONS:  Current Outpatient Medications   Medication    acetaminophen (TYLENOL) 325 MG tablet    carboxymethylcellulose (CARBOXYMETHYLCELLULOSE SODIUM) 0.5 % SOLN ophthalmic solution    dorzolamide-timolol PF (COSOPT PF) 2-0.5 % opthalmic solutionh    furosemide (LASIX) 20 MG tablet    gabapentin (NEURONTIN) 300 MG capsule    latanoprost (XALATAN) 0.005 % ophthalmic solution    levothyroxine (SYNTHROID/LEVOTHROID) 88 MCG tablet    metoprolol tartrate (LOPRESSOR) 25 MG tablet    Multiple Vitamins-Minerals (MULTIVITAMIN ADULT PO)    pantoprazole (PROTONIX) 40 MG EC tablet    potassium chloride ER (KLOR-CON M) 10 MEQ CR tablet    pravastatin (PRAVACHOL) 40 MG tablet    Lidocaine (LIDOCARE) 4 % Patch    oxyCODONE (ROXICODONE) 5 MG tablet    polyethylene glycol (MIRALAX) 17 GM/Dose powder     No current facility-administered medications for this visit.       SOCIAL HISTORY:  I have reviewed this patient's social history and updated it with pertinent information if needed. Teri Beltran  reports that she quit smoking about 43 years ago. Her smoking use included cigarettes. She has a 9.00 pack-year smoking history. She has been exposed to tobacco smoke. She has never used smokeless tobacco. She reports that she does not currently use alcohol. She reports that she  does not use drugs.    PHYSICAL EXAM:  Pulse:  [72] 72  BP: (138)/(68) 138/68  188 lbs 11.2 oz    Constitutional: alert, no distress  Respiratory: Good bilateral air entry  Cardiovascular: Regular rate and rhythm, audible systolic murmur  GI: nondistended  Neuropsychiatric: appropriate affact    ASSESSMENT/PLAN:  Pertinent issues addressed/ reviewed during this cardiology visit  Hypertension -well-controlled, continue metoprolol  Hyperlipidemia -on pravastatin  Venous insufficiency -status post venous ablation, chronic bilateral lower extremity edema.  Continue furosemide, compression stockings and lymphedema wraps  Obesity -encourage exercise and weight loss  Aortic regurgitation -audible systolic murmur upon exam.  Recommend a resting echocardiogram in 1 year for surveillance monitoring.    Follow-up in 1 year with Dr. Guerra or sooner if needed    It was a pleasure seeing this patient in clinic today. Please do not hesitate to contact me with any future questions.     RUBY Medina, CNP  Cardiology - [facility]  11/20/2023    Review of the result(s) of each unique test - Last echocardiogram, CBC, BMP, lipid panel     The level of medical decision making during this visit was of moderate complexity.    This note was completed in part using dictation via the Dragon voice recognition software. Some word and grammatical errors may occur and must be interpreted in the appropriate clinical context.  If there are any questions pertaining to this issue, please contact me for further clarification.

## 2023-11-20 NOTE — LETTER
11/20/2023    Rachel Miller MD  [address] [facility] [address]    RE: Teri Beltran       Dear Colleague,     I had the pleasure of seeing Teri Beltran in the [facility].  CARDIOLOGY CLINIC NOTE    PRIMARY CARDIOLOGIST  Dr. Anglin     PRIMARY CARE PHYSICIAN:  Rachel Miller    HISTORY OF PRESENT ILLNESS:  Teri Beltran is a very pleasant 77 year old female with a past medical history significant for first-degree AV block, right bundle branch block, paroxysmal SVT, GERD, hypertension, hyperlipidemia, hypothyroidism, chronic venous insufficiency status post venous ablation, and chronic left lower extremity edema.    She returns to the office today for an annual follow-up.  She has been doing well on a cardiac standpoint, denies chest pain, shortness of breath, palpitations, PND, orthopnea, presyncope, or syncope.  Upon exam, lungs are clear bilaterally, heart rate and rhythm regular, no palpitations, audible systolic murmur and Chronic bilateral lower extremity edema, managed with compression stockings and lymphedema wraps.    On 4/25/2023, she presented to the emergency room via EMS after a fall with closed head trauma, concussion, and loss of consciousness.  She was in the garage taking some trash out of the trash bins where she fell backwards and hit the back of her head, losing consciousness. She was brought to the emergency room where she underwent a CT of the head that showed a right parietal scalp hematoma but no intracranial hemorrhage.  There was no facial fractures and C-spine was negative.  She did have a drop in hemoglobin from 10-9.5.  No medication changes were made.    Stress echocardiogram on 7/26/2022 was negative for ischemia.  EF 55 to 60%.  Trace aortic regurgitation.    Blood pressure is well controlled at 138/68  Last BMP was unremarkable.   Lipid panel showed a total cholesterol of 181, HDL 84, LDL 75, triglycerides 112.      She does not engage in any routine exercise  Compliant with all medications.      PAST MEDICAL HISTORY:  Past Medical History:   Diagnosis Date    Concussion with loss of consciousness, initial encounter 4/26/2023    First degree AV block 08/11/2015    Former smoker     Gastroesophageal reflux disease     GERD (gastroesophageal reflux disease)     Glaucoma     Heart block     2:1    Heart murmur     Hyperlipidemia LDL goal <130 06/14/2013    Hypertension goal BP (blood pressure) < 140/90 12/03/2013    Hypothyroidism     Left atrial dilatation     mild    Mild dilation of ascending aorta - borderline 08/11/2015    Palpitations     Prediabetes 06/14/2013    RBBB 08/11/2015    S/P total knee arthroplasty 07/23/2019    Tachycardia     Urinary frequency     Varicose veins     Venous insufficiency     s/p bilateral great saphenous vein radiofrequency ablation by Dr. Garcia       MEDICATIONS:  Current Outpatient Medications   Medication    acetaminophen (TYLENOL) 325 MG tablet    carboxymethylcellulose (CARBOXYMETHYLCELLULOSE SODIUM) 0.5 % SOLN ophthalmic solution    dorzolamide-timolol PF (COSOPT PF) 2-0.5 % opthalmic solutionh    furosemide (LASIX) 20 MG tablet    gabapentin (NEURONTIN) 300 MG capsule    latanoprost (XALATAN) 0.005 % ophthalmic solution    levothyroxine (SYNTHROID/LEVOTHROID) 88 MCG tablet    metoprolol tartrate (LOPRESSOR) 25 MG tablet    Multiple Vitamins-Minerals (MULTIVITAMIN ADULT PO)    pantoprazole (PROTONIX) 40 MG EC tablet    potassium chloride ER (KLOR-CON M) 10 MEQ CR tablet    pravastatin (PRAVACHOL) 40 MG tablet    Lidocaine (LIDOCARE) 4 % Patch    oxyCODONE (ROXICODONE) 5 MG tablet    polyethylene glycol (MIRALAX) 17 GM/Dose powder     No current facility-administered medications for this visit.       SOCIAL HISTORY:  I have reviewed this patient's social history and updated it with pertinent information if needed. Teri Beltran  reports that she quit smoking about 43 years ago.  Her smoking use included cigarettes. She has a 9.00 pack-year smoking history. She has been exposed to tobacco smoke. She has never used smokeless tobacco. She reports that she does not currently use alcohol. She reports that she does not use drugs.    PHYSICAL EXAM:  Pulse:  [72] 72  BP: (138)/(68) 138/68  188 lbs 11.2 oz    Constitutional: alert, no distress  Respiratory: Good bilateral air entry  Cardiovascular: Regular rate and rhythm, audible systolic murmur  GI: nondistended  Neuropsychiatric: appropriate affact    ASSESSMENT/PLAN:  Pertinent issues addressed/ reviewed during this cardiology visit  Hypertension -well-controlled, continue metoprolol  Hyperlipidemia -on pravastatin  Venous insufficiency -status post venous ablation, chronic bilateral lower extremity edema.  Continue furosemide, compression stockings and lymphedema wraps  Obesity -encourage exercise and weight loss  Aortic regurgitation -audible systolic murmur upon exam.  Recommend a resting echocardiogram in 1 year for surveillance monitoring.    Follow-up in 1 year with Dr. Guerra or sooner if needed    It was a pleasure seeing this patient in clinic today. Please do not hesitate to contact me with any future questions.     RUBY Medina, CNP  Cardiology - [facility]  11/20/2023    Review of the result(s) of each unique test - Last echocardiogram, CBC, BMP, lipid panel     The level of medical decision making during this visit was of moderate complexity.    This note was completed in part using dictation via the Dragon voice recognition software. Some word and grammatical errors may occur and must be interpreted in the appropriate clinical context.  If there are any questions pertaining to this issue, please contact me for further clarification.      Thank you for allowing me to participate in the care of your patient.      Sincerely,     RUBY Medina CNP     [facility]  Care  cc:   Quincy Anglin MD  [address], [place] [address]  [place] [id number]

## 2023-11-22 ENCOUNTER — THERAPY VISIT (OUTPATIENT)
Dept: PHYSICAL THERAPY | Facility: CLINIC | Age: 78
End: 2023-11-22
Payer: MEDICARE

## 2023-11-22 DIAGNOSIS — I89.0 LYMPHEDEMA DUE TO VENOUS DISEASE: Primary | ICD-10-CM

## 2023-11-22 DIAGNOSIS — I99.9 LYMPHEDEMA DUE TO VENOUS DISEASE: Primary | ICD-10-CM

## 2023-11-22 DIAGNOSIS — I89.0 LYMPHEDEMA OF BOTH LOWER EXTREMITIES: ICD-10-CM

## 2023-11-22 DIAGNOSIS — L90.5 SCAR CONDITION AND FIBROSIS OF SKIN: ICD-10-CM

## 2023-11-22 PROCEDURE — 97140 MANUAL THERAPY 1/> REGIONS: CPT | Mod: GP | Performed by: PHYSICAL THERAPIST

## 2023-11-22 PROCEDURE — 97110 THERAPEUTIC EXERCISES: CPT | Mod: GP | Performed by: PHYSICAL THERAPIST

## 2023-11-30 ENCOUNTER — THERAPY VISIT (OUTPATIENT)
Dept: PHYSICAL THERAPY | Facility: CLINIC | Age: 78
End: 2023-11-30
Payer: MEDICARE

## 2023-11-30 DIAGNOSIS — L90.5 SCAR CONDITION AND FIBROSIS OF SKIN: ICD-10-CM

## 2023-11-30 DIAGNOSIS — I89.0 LYMPHEDEMA OF BOTH LOWER EXTREMITIES: ICD-10-CM

## 2023-11-30 DIAGNOSIS — I99.9 LYMPHEDEMA DUE TO VENOUS DISEASE: Primary | ICD-10-CM

## 2023-11-30 DIAGNOSIS — I89.0 LYMPHEDEMA DUE TO VENOUS DISEASE: Primary | ICD-10-CM

## 2023-11-30 PROCEDURE — 97140 MANUAL THERAPY 1/> REGIONS: CPT | Mod: GP | Performed by: PHYSICAL THERAPIST

## 2023-11-30 PROCEDURE — 97110 THERAPEUTIC EXERCISES: CPT | Mod: GP | Performed by: PHYSICAL THERAPIST

## 2023-12-01 DIAGNOSIS — R10.13 EPIGASTRIC PAIN: ICD-10-CM

## 2023-12-02 RX ORDER — PANTOPRAZOLE SODIUM 40 MG/1
40 TABLET, DELAYED RELEASE ORAL DAILY
Qty: 90 TABLET | Refills: 0 | Status: SHIPPED | OUTPATIENT
Start: 2023-12-02 | End: 2024-03-04

## 2023-12-06 ENCOUNTER — THERAPY VISIT (OUTPATIENT)
Dept: PHYSICAL THERAPY | Facility: CLINIC | Age: 78
End: 2023-12-06
Payer: MEDICARE

## 2023-12-06 DIAGNOSIS — I99.9 LYMPHEDEMA DUE TO VENOUS DISEASE: Primary | ICD-10-CM

## 2023-12-06 DIAGNOSIS — I89.0 LYMPHEDEMA DUE TO VENOUS DISEASE: Primary | ICD-10-CM

## 2023-12-06 DIAGNOSIS — I89.0 LYMPHEDEMA OF BOTH LOWER EXTREMITIES: ICD-10-CM

## 2023-12-06 DIAGNOSIS — L90.5 SCAR CONDITION AND FIBROSIS OF SKIN: ICD-10-CM

## 2023-12-06 PROCEDURE — 97140 MANUAL THERAPY 1/> REGIONS: CPT | Mod: GP | Performed by: PHYSICAL THERAPIST

## 2024-01-08 ENCOUNTER — THERAPY VISIT (OUTPATIENT)
Dept: PHYSICAL THERAPY | Facility: CLINIC | Age: 79
End: 2024-01-08
Payer: MEDICARE

## 2024-01-08 DIAGNOSIS — I89.0 LYMPHEDEMA OF BOTH LOWER EXTREMITIES: ICD-10-CM

## 2024-01-08 DIAGNOSIS — L90.5 SCAR CONDITION AND FIBROSIS OF SKIN: ICD-10-CM

## 2024-01-08 DIAGNOSIS — I99.9 LYMPHEDEMA DUE TO VENOUS DISEASE: Primary | ICD-10-CM

## 2024-01-08 DIAGNOSIS — I89.0 LYMPHEDEMA DUE TO VENOUS DISEASE: Primary | ICD-10-CM

## 2024-01-08 PROCEDURE — 97140 MANUAL THERAPY 1/> REGIONS: CPT | Mod: GP | Performed by: PHYSICAL THERAPIST

## 2024-01-12 ENCOUNTER — LAB (OUTPATIENT)
Dept: LAB | Facility: CLINIC | Age: 79
End: 2024-01-12
Payer: MEDICARE

## 2024-01-12 DIAGNOSIS — D64.9 ANEMIA, UNSPECIFIED TYPE: ICD-10-CM

## 2024-01-12 DIAGNOSIS — S20.152A: ICD-10-CM

## 2024-01-12 DIAGNOSIS — Z96.641 CHRONIC HIP PAIN AFTER TOTAL REPLACEMENT OF RIGHT HIP JOINT: ICD-10-CM

## 2024-01-12 DIAGNOSIS — I10 HYPERTENSION GOAL BP (BLOOD PRESSURE) < 140/90: ICD-10-CM

## 2024-01-12 DIAGNOSIS — R73.9 BLOOD SUGAR INCREASED: ICD-10-CM

## 2024-01-12 DIAGNOSIS — Z00.00 ROUTINE GENERAL MEDICAL EXAMINATION AT A HEALTH CARE FACILITY: ICD-10-CM

## 2024-01-12 DIAGNOSIS — E78.5 HYPERLIPIDEMIA LDL GOAL <130: ICD-10-CM

## 2024-01-12 DIAGNOSIS — M25.551 CHRONIC HIP PAIN AFTER TOTAL REPLACEMENT OF RIGHT HIP JOINT: ICD-10-CM

## 2024-01-12 DIAGNOSIS — G89.29 CHRONIC HIP PAIN AFTER TOTAL REPLACEMENT OF RIGHT HIP JOINT: ICD-10-CM

## 2024-01-12 DIAGNOSIS — R00.2 PALPITATIONS: ICD-10-CM

## 2024-01-12 LAB
ERYTHROCYTE [DISTWIDTH] IN BLOOD BY AUTOMATED COUNT: 15.6 % (ref 10–15)
HCT VFR BLD AUTO: 32.1 % (ref 35–47)
HGB BLD-MCNC: 9.8 G/DL (ref 11.7–15.7)
MCH RBC QN AUTO: 24 PG (ref 26.5–33)
MCHC RBC AUTO-ENTMCNC: 30.5 G/DL (ref 31.5–36.5)
MCV RBC AUTO: 79 FL (ref 78–100)
PLATELET # BLD AUTO: 416 10E3/UL (ref 150–450)
RBC # BLD AUTO: 4.09 10E6/UL (ref 3.8–5.2)
WBC # BLD AUTO: 5.6 10E3/UL (ref 4–11)

## 2024-01-12 PROCEDURE — 82728 ASSAY OF FERRITIN: CPT | Performed by: INTERNAL MEDICINE

## 2024-01-12 PROCEDURE — 82043 UR ALBUMIN QUANTITATIVE: CPT | Performed by: INTERNAL MEDICINE

## 2024-01-12 PROCEDURE — 36415 COLL VENOUS BLD VENIPUNCTURE: CPT | Performed by: INTERNAL MEDICINE

## 2024-01-12 PROCEDURE — 80053 COMPREHEN METABOLIC PANEL: CPT | Performed by: INTERNAL MEDICINE

## 2024-01-12 PROCEDURE — 82570 ASSAY OF URINE CREATININE: CPT | Performed by: INTERNAL MEDICINE

## 2024-01-12 PROCEDURE — 83036 HEMOGLOBIN GLYCOSYLATED A1C: CPT | Performed by: INTERNAL MEDICINE

## 2024-01-12 PROCEDURE — 85027 COMPLETE CBC AUTOMATED: CPT | Performed by: INTERNAL MEDICINE

## 2024-01-12 PROCEDURE — 83550 IRON BINDING TEST: CPT | Performed by: INTERNAL MEDICINE

## 2024-01-12 PROCEDURE — 80061 LIPID PANEL: CPT | Performed by: INTERNAL MEDICINE

## 2024-01-12 PROCEDURE — 83540 ASSAY OF IRON: CPT | Performed by: INTERNAL MEDICINE

## 2024-01-12 PROCEDURE — 84443 ASSAY THYROID STIM HORMONE: CPT | Performed by: INTERNAL MEDICINE

## 2024-01-13 DIAGNOSIS — R73.9 BLOOD SUGAR INCREASED: ICD-10-CM

## 2024-01-13 DIAGNOSIS — D64.9 ANEMIA, UNSPECIFIED TYPE: Primary | ICD-10-CM

## 2024-01-13 LAB
ALBUMIN SERPL BCG-MCNC: 4.5 G/DL (ref 3.5–5.2)
ALP SERPL-CCNC: 110 U/L (ref 40–150)
ALT SERPL W P-5'-P-CCNC: 17 U/L (ref 0–50)
ANION GAP SERPL CALCULATED.3IONS-SCNC: 10 MMOL/L (ref 7–15)
AST SERPL W P-5'-P-CCNC: 23 U/L (ref 0–45)
BILIRUB SERPL-MCNC: 0.3 MG/DL
BUN SERPL-MCNC: 19.8 MG/DL (ref 8–23)
CALCIUM SERPL-MCNC: 9.5 MG/DL (ref 8.8–10.2)
CHLORIDE SERPL-SCNC: 105 MMOL/L (ref 98–107)
CHOLEST SERPL-MCNC: 159 MG/DL
CREAT SERPL-MCNC: 0.63 MG/DL (ref 0.51–0.95)
CREAT UR-MCNC: 68.3 MG/DL
DEPRECATED HCO3 PLAS-SCNC: 25 MMOL/L (ref 22–29)
EGFRCR SERPLBLD CKD-EPI 2021: 90 ML/MIN/1.73M2
FASTING STATUS PATIENT QL REPORTED: YES
FERRITIN SERPL-MCNC: 15 NG/ML (ref 11–328)
GLUCOSE SERPL-MCNC: 98 MG/DL (ref 70–99)
HDLC SERPL-MCNC: 73 MG/DL
IRON BINDING CAPACITY (ROCHE): 442 UG/DL (ref 240–430)
IRON SATN MFR SERPL: 5 % (ref 15–46)
IRON SERPL-MCNC: 23 UG/DL (ref 37–145)
LDLC SERPL CALC-MCNC: 61 MG/DL
MICROALBUMIN UR-MCNC: <12 MG/L
MICROALBUMIN/CREAT UR: NORMAL MG/G{CREAT}
NONHDLC SERPL-MCNC: 86 MG/DL
POTASSIUM SERPL-SCNC: 4.4 MMOL/L (ref 3.4–5.3)
PROT SERPL-MCNC: 7.4 G/DL (ref 6.4–8.3)
SODIUM SERPL-SCNC: 140 MMOL/L (ref 135–145)
TRIGL SERPL-MCNC: 123 MG/DL
TSH SERPL DL<=0.005 MIU/L-ACNC: 2.98 UIU/ML (ref 0.3–4.2)

## 2024-01-15 LAB — HBA1C MFR BLD: 6 % (ref 0–5.6)

## 2024-01-16 ENCOUNTER — THERAPY VISIT (OUTPATIENT)
Dept: PHYSICAL THERAPY | Facility: CLINIC | Age: 79
End: 2024-01-16
Payer: MEDICARE

## 2024-01-16 DIAGNOSIS — I89.0 LYMPHEDEMA DUE TO VENOUS DISEASE: Primary | ICD-10-CM

## 2024-01-16 DIAGNOSIS — I99.9 LYMPHEDEMA DUE TO VENOUS DISEASE: Primary | ICD-10-CM

## 2024-01-16 DIAGNOSIS — I89.0 LYMPHEDEMA OF BOTH LOWER EXTREMITIES: ICD-10-CM

## 2024-01-16 DIAGNOSIS — L90.5 SCAR CONDITION AND FIBROSIS OF SKIN: ICD-10-CM

## 2024-01-16 PROCEDURE — 97110 THERAPEUTIC EXERCISES: CPT | Mod: GP | Performed by: PHYSICAL THERAPIST

## 2024-01-16 PROCEDURE — 97140 MANUAL THERAPY 1/> REGIONS: CPT | Mod: GP | Performed by: PHYSICAL THERAPIST

## 2024-01-22 ENCOUNTER — ORDERS ONLY (AUTO-RELEASED) (OUTPATIENT)
Dept: INTERNAL MEDICINE | Facility: CLINIC | Age: 79
End: 2024-01-22

## 2024-01-22 ENCOUNTER — OFFICE VISIT (OUTPATIENT)
Dept: INTERNAL MEDICINE | Facility: CLINIC | Age: 79
End: 2024-01-22
Payer: MEDICARE

## 2024-01-22 VITALS
BODY MASS INDEX: 29.21 KG/M2 | RESPIRATION RATE: 18 BRPM | HEART RATE: 82 BPM | SYSTOLIC BLOOD PRESSURE: 135 MMHG | HEIGHT: 68 IN | TEMPERATURE: 97 F | DIASTOLIC BLOOD PRESSURE: 68 MMHG | OXYGEN SATURATION: 96 % | WEIGHT: 192.7 LBS

## 2024-01-22 DIAGNOSIS — R10.13 EPIGASTRIC PAIN: ICD-10-CM

## 2024-01-22 DIAGNOSIS — G89.29 CHRONIC HIP PAIN AFTER TOTAL REPLACEMENT OF RIGHT HIP JOINT: ICD-10-CM

## 2024-01-22 DIAGNOSIS — E78.5 HYPERLIPIDEMIA LDL GOAL <130: ICD-10-CM

## 2024-01-22 DIAGNOSIS — R60.9 FLUID RETENTION: ICD-10-CM

## 2024-01-22 DIAGNOSIS — D50.9 IRON DEFICIENCY ANEMIA, UNSPECIFIED IRON DEFICIENCY ANEMIA TYPE: ICD-10-CM

## 2024-01-22 DIAGNOSIS — I10 HYPERTENSION GOAL BP (BLOOD PRESSURE) < 140/90: ICD-10-CM

## 2024-01-22 DIAGNOSIS — Z00.00 ENCOUNTER FOR MEDICARE ANNUAL WELLNESS EXAM: ICD-10-CM

## 2024-01-22 DIAGNOSIS — M25.551 CHRONIC HIP PAIN AFTER TOTAL REPLACEMENT OF RIGHT HIP JOINT: ICD-10-CM

## 2024-01-22 DIAGNOSIS — Z12.11 SPECIAL SCREENING FOR MALIGNANT NEOPLASMS, COLON: ICD-10-CM

## 2024-01-22 DIAGNOSIS — Z96.641 CHRONIC HIP PAIN AFTER TOTAL REPLACEMENT OF RIGHT HIP JOINT: ICD-10-CM

## 2024-01-22 DIAGNOSIS — R01.1 HEART MURMUR: ICD-10-CM

## 2024-01-22 DIAGNOSIS — R00.2 PALPITATIONS: ICD-10-CM

## 2024-01-22 DIAGNOSIS — E03.9 HYPOTHYROIDISM, UNSPECIFIED TYPE: ICD-10-CM

## 2024-01-22 DIAGNOSIS — Z00.00 ROUTINE GENERAL MEDICAL EXAMINATION AT A HEALTH CARE FACILITY: Primary | ICD-10-CM

## 2024-01-22 LAB
ALBUMIN UR-MCNC: NEGATIVE MG/DL
APPEARANCE UR: CLEAR
BACTERIA #/AREA URNS HPF: ABNORMAL /HPF
BILIRUB UR QL STRIP: NEGATIVE
COLOR UR AUTO: YELLOW
GLUCOSE UR STRIP-MCNC: NEGATIVE MG/DL
HGB UR QL STRIP: NEGATIVE
KETONES UR STRIP-MCNC: NEGATIVE MG/DL
LEUKOCYTE ESTERASE UR QL STRIP: ABNORMAL
MUCOUS THREADS #/AREA URNS LPF: PRESENT /LPF
NITRATE UR QL: NEGATIVE
PH UR STRIP: 6 [PH] (ref 5–7)
RBC #/AREA URNS AUTO: ABNORMAL /HPF
SP GR UR STRIP: 1.02 (ref 1–1.03)
SQUAMOUS #/AREA URNS AUTO: ABNORMAL /LPF
UROBILINOGEN UR STRIP-ACNC: 0.2 E.U./DL
WBC #/AREA URNS AUTO: ABNORMAL /HPF

## 2024-01-22 PROCEDURE — G0439 PPPS, SUBSEQ VISIT: HCPCS | Performed by: INTERNAL MEDICINE

## 2024-01-22 PROCEDURE — 99214 OFFICE O/P EST MOD 30 MIN: CPT | Mod: 25 | Performed by: INTERNAL MEDICINE

## 2024-01-22 PROCEDURE — 81001 URINALYSIS AUTO W/SCOPE: CPT | Performed by: INTERNAL MEDICINE

## 2024-01-22 RX ORDER — LEVOTHYROXINE SODIUM 88 UG/1
88 TABLET ORAL DAILY
Qty: 90 TABLET | Refills: 1 | Status: SHIPPED | OUTPATIENT
Start: 2024-01-22

## 2024-01-22 RX ORDER — PRAVASTATIN SODIUM 40 MG
40 TABLET ORAL AT BEDTIME
Qty: 90 TABLET | Refills: 1 | Status: SHIPPED | OUTPATIENT
Start: 2024-01-22 | End: 2024-08-20

## 2024-01-22 RX ORDER — POTASSIUM CHLORIDE 750 MG/1
TABLET, EXTENDED RELEASE ORAL
Qty: 90 TABLET | Refills: 2 | Status: SHIPPED | OUTPATIENT
Start: 2024-01-22

## 2024-01-22 RX ORDER — GABAPENTIN 300 MG/1
300 CAPSULE ORAL 3 TIMES DAILY
Qty: 270 CAPSULE | Refills: 1 | Status: SHIPPED | OUTPATIENT
Start: 2024-01-22 | End: 2024-08-09

## 2024-01-22 RX ORDER — METOPROLOL TARTRATE 25 MG/1
TABLET, FILM COATED ORAL
Qty: 180 TABLET | Refills: 1 | Status: SHIPPED | OUTPATIENT
Start: 2024-01-22 | End: 2024-10-03

## 2024-01-22 ASSESSMENT — ENCOUNTER SYMPTOMS
PARESTHESIAS: 0
SORE THROAT: 0
HEARTBURN: 0
DYSURIA: 0
DIZZINESS: 1
MYALGIAS: 0
HEMATURIA: 0
FREQUENCY: 0
ABDOMINAL PAIN: 0
SHORTNESS OF BREATH: 0
PALPITATIONS: 0
EYE PAIN: 0
CONSTIPATION: 0
WEAKNESS: 0
HEADACHES: 0
JOINT SWELLING: 0
BREAST MASS: 0
HEMATOCHEZIA: 0
NERVOUS/ANXIOUS: 0
ARTHRALGIAS: 1
DIARRHEA: 0
NAUSEA: 0
COUGH: 1
FEVER: 0
CHILLS: 0

## 2024-01-22 ASSESSMENT — ACTIVITIES OF DAILY LIVING (ADL): CURRENT_FUNCTION: NO ASSISTANCE NEEDED

## 2024-01-22 ASSESSMENT — PAIN SCALES - GENERAL: PAINLEVEL: MODERATE PAIN (4)

## 2024-01-22 NOTE — PATIENT INSTRUCTIONS
"Plan:  Upper endoscopy  - EGD   2. Cologuard stool test  3. Start Iron 1 tablet twice a week and if no cosntipation increase it to 1 tablet daily  -- Vitron is a good brand -- over the counter   4. Urine test today - [address]  5. Continue same meds, same doses for now   6. Please make a lab appointment for non fasting labs  in April 7. Please make an appointment few days after the labs to discuss about the results.   8. The following vaccines are recommended for you. Please check with your insurance about coverage.  Some insurances cover better if you have these vaccines at the pharmacy:  -- Pneumonia 20   9. Echocardiogram -- To schedule this test please call [facility] at: [phone]       Patient Education   Personalized Prevention Plan  You are due for the preventive services outlined below.  Your care team is available to assist you in scheduling these services.  If you have already completed any of these items, please share that information with your care team to update in your medical record.  Health Maintenance Due   Topic Date Due     URINE DRUG SCREEN  Never done     ANNUAL REVIEW OF HM ORDERS  04/15/2023     Learning About Being Physically Active  What is physical activity?     Being physically active means doing any kind of activity that gets your body moving.  The types of physical activity that can help you get fit and stay healthy include:  Aerobic or \"cardio\" activities. These make your heart beat faster and make you breathe harder, such as brisk walking, riding a bike, or running. They strengthen your heart and lungs and build up your endurance.  Strength training activities. These make your muscles work against, or \"resist,\" something. Examples include lifting weights or doing push-ups. These activities help tone and strengthen your muscles and bones.  Stretches. These let you move your joints and muscles through their full range of motion. Stretching helps you be more flexible.  Reaching a " balance between these three types of physical activity is important because each one contributes to your overall fitness.  What are the benefits of being active?  Being active is one of the best things you can do for your health. It helps you to:  Feel stronger and have more energy to do all the things you like to do.  Focus better at school or work.  Feel, think, and sleep better.  Reach and stay at a healthy weight.  Lose fat and build lean muscle.  Lower your risk for serious health problems, including diabetes, heart attack, high blood pressure, and some cancers.  Keep your heart, lungs, bones, muscles, and joints strong and healthy.  How can you make being active part of your life?  Start slowly. Make it your long-term goal to get at least 30 minutes of exercise on most days of the week. Walking is a good choice. You also may want to do other activities, such as running, swimming, cycling, or playing tennis or team sports.  Pick activities that you like--ones that make your heart beat faster, your muscles stronger, and your muscles and joints more flexible. If you find more than one thing you like doing, do them all. You don't have to do the same thing every day.  Get your heart pumping every day. Any activity that makes your heart beat faster and keeps it at that rate for a while counts.  Here are some great ways to get your heart beating faster:  Go for a brisk walk, run, or hike.  Go for a swim or bike ride.  Take an online exercise class or dance.  Play a game of touch football, basketball, or soccer.  Play tennis, pickleball, or racquetball.  Climb stairs.  Even some household chores can be aerobic. Just do them at a faster pace. Raking or mowing the lawn, sweeping the garage, and vacuuming and cleaning your home all can help get your heart rate up.  Strengthen your muscles during the week. You don't have to lift heavy weights or grow big, bulky muscles to get stronger. Doing a few simple activities that  "make your muscles work against, or \"resist,\" something can help you get stronger. Aim for at least twice a week.  For example, you can:  Do push-ups or sit-ups, which use your own body weight as resistance.  Lift weights or dumbbells or use stretch bands at home or in a gym or community center.  Stretch your muscles often. Stretching will help you as you become more active. It can help you stay flexible and loosen tight muscles. It can also help improve your balance and posture and can be a great way to relax.  Be sure to stretch the muscles you'll be using when you work out. It's best to warm your muscles slightly before you stretch them. Walk or do some other light aerobic activity for a few minutes. Then start stretching.  When you stretch your muscles:  Do it slowly. Stretching is not about going fast or making sudden movements.  Don't push or bounce during a stretch.  Hold each stretch for at least 15 to 30 seconds, if you can. You should feel a stretch in the muscle, but not pain.  Breathe out as you do the stretch. Then breathe in as you hold the stretch. Don't hold your breath.  If you're worried about how more activity might affect your health, have a checkup before you start. Follow any special advice your doctor gives you for getting a smart start.  Where can you learn more?  Go to https://www.[employer].net/patiented  Enter W332 in the search box to learn more about \"Learning About Being Physically Active.\"  Current as of: June 6, 2023               Content Version: 13.8    3656-6248 [employer].   Care instructions adapted under license by your healthcare professional. If you have questions about a medical condition or this instruction, always ask your healthcare professional. [employer] disclaims any warranty or liability for your use of this information.      Learning About Dietary Guidelines  What are the Dietary Guidelines for Americans?     Dietary Guidelines for " Americans provide tips for eating well and staying healthy. This helps reduce the risk for long-term (chronic) diseases.  These guidelines recommend that you:  Eat and drink the right amount for you. The [place] government's food guide is called MyPlate. It can help you make your own well-balanced eating plan.  Try to balance your eating with your activity. This helps you stay at a healthy weight.  Drink alcohol in moderation, if at all.  Limit foods high in salt, saturated fat, trans fat, and added sugar.  These guidelines are from the [place] Department of Agriculture and the [place] Department of Health and Human Services. They are updated every 5 years.  What is MyPlate?  MyPlate is the [place] government's food guide. It can help you make your own well-balanced eating plan. A balanced eating plan means that you eat enough, but not too much, and that your food gives you the nutrients you need to stay healthy.  MyPlate focuses on eating plenty of whole grains, fruits, and vegetables, and on limiting fat and sugar. It is available online at www.ChooseMyPlate.gov.  How can you get started?  If you're trying to eat healthier, you can slowly change your eating habits over time. You don't have to make big changes all at once. Start by adding one or two healthy foods to your meals each day.  Grains  Choose whole-grain breads and cereals and whole-wheat pasta and whole-grain crackers.  Vegetables  Eat a variety of vegetables every day. They have lots of nutrients and are part of a heart-healthy diet.  Fruits  Eat a variety of fruits every day. Fruits contain lots of nutrients. Choose fresh fruit instead of fruit juice.  Protein foods  Choose fish and lean poultry more often. Eat red meat and fried meats less often. Dried beans, tofu, and nuts are also good sources of protein.  Dairy  Choose low-fat or fat-free products from this food group. If you have problems digesting milk, try eating cheese or yogurt instead.  Fats and  "oils  Limit fats and oils if you're trying to cut calories. Choose healthy fats when you cook. These include canola oil and olive oil.  Where can you learn more?  Go to https://www.[employer].net/patiented  Enter D676 in the search box to learn more about \"Learning About Dietary Guidelines.\"  Current as of: February 28, 2023               Content Version: 13.8    4269-6386 [employer].   Care instructions adapted under license by your healthcare professional. If you have questions about a medical condition or this instruction, always ask your healthcare professional. [employer] disclaims any warranty or liability for your use of this information.      Bladder Training: Care Instructions  Your Care Instructions     Bladder training is used to treat urge incontinence and stress incontinence. Urge incontinence means that the need to urinate comes on so fast that you can't get to a toilet in time. Stress incontinence means that you leak urine because of pressure on your bladder. For example, it may happen when you laugh, cough, or lift something heavy.  Bladder training can increase how long you can wait before you have to urinate. It can also help your bladder hold more urine. And it can give you better control over the urge to urinate.  It is important to remember that bladder training takes a few weeks to a few months to make a difference. You may not see results right away, but don't give up.  Follow-up care is a key part of your treatment and safety. Be sure to make and go to all appointments, and call your doctor if you are having problems. It's also a good idea to know your test results and keep a list of the medicines you take.  How can you care for yourself at home?  Work with your doctor to come up with a bladder training program that is right for you. You may use one or more of the following methods.  Delayed urination  In the beginning, try to keep from urinating for 5 minutes " "after you first feel the need to go.  While you wait, take deep, slow breaths to relax. Kegel exercises can also help you delay the need to go to the bathroom.  After some practice, when you can easily wait 5 minutes to urinate, try to wait 10 minutes before you urinate.  Slowly increase the waiting period until you are able to control when you have to urinate.  Scheduled urination  Empty your bladder when you first wake up in the morning.  Schedule times throughout the day when you will urinate.  Start by going to the bathroom every hour, even if you don't need to go.  Slowly increase the time between trips to the bathroom.  When you have found a schedule that works well for you, keep doing it.  If you wake up during the night and have to urinate, do it. Apply your schedule to waking hours only.  Kegel exercises  These tighten and strengthen pelvic muscles, which can help you control the flow of urine. (If doing these exercises causes pain, stop doing them and talk with your doctor.) To do Kegel exercises:  Squeeze your muscles as if you were trying not to pass gas. Or squeeze your muscles as if you were stopping the flow of urine. Your belly, legs, and buttocks shouldn't move.  Hold the squeeze for 3 seconds, then relax for 5 to 10 seconds.  Start with 3 seconds, then add 1 second each week until you are able to squeeze for 10 seconds.  Repeat the exercise 10 times a session. Do 3 to 8 sessions a day.  When should you call for help?  Watch closely for changes in your health, and be sure to contact your doctor if:    Your incontinence is getting worse.     You do not get better as expected.   Where can you learn more?  Go to https://www.[employer].net/patiented  Enter V684 in the search box to learn more about \"Bladder Training: Care Instructions.\"  Current as of: February 28, 2023               Content Version: 13.8    7959-9399 [employer].   Care instructions adapted under license by your healthcare " professional. If you have questions about a medical condition or this instruction, always ask your healthcare professional. [employer] disclaims any warranty or liability for your use of this information.

## 2024-01-22 NOTE — PROGRESS NOTES
Dr Wong's note    Patient's instructions / PLAN:                                                        Plan:  Upper endoscopy  - EGD   2. Cologuard stool test  3. Start Iron 1 tablet twice a week and if no cosntipation increase it to 1 tablet daily  -- Vitron is a good brand -- over the counter   4. Urine test today - [address]  5. Continue same meds, same doses for now   6. Please make a lab appointment for non fasting labs  in April 7. Please make an appointment few days after the labs to discuss about the results.   8. The following vaccines are recommended for you. Please check with your insurance about coverage.  Some insurances cover better if you have these vaccines at the pharmacy:  -- Pneumonia 20   9. Echocardiogram -- To schedule this test please call [facility] at: [phone]         ASSESSMENT & PLAN:                                                      (Z00.00) Routine general medical examination at a health care facility  (primary encounter diagnosis)  Comment:   Plan:     (D50.9) Iron deficiency anemia, unspecified iron deficiency anemia type  Comment: refuses colonoscopy   Plan: UA with Microscopic reflex to Culture, CBC with        platelets, Iron & Iron Binding Capacity,         Ferritin, UA Microscopic with Reflex to Culture            (R10.13) Epigastric pain  Comment:   Plan: Adult GI  Referral - Procedure Only            (Z12.11) Special screening for malignant neoplasms, colon  Comment: refuses colonoscopy   Plan: COLOGUARD(EX[employer] SCIENCES)            (E03.9) Hypothyroidism, unspecified type  Comment: Controlled  Plan: levothyroxine (SYNTHROID/LEVOTHROID) 88 MCG         tablet            (R00.2) Palpitations  Comment: Controlled  Plan: metoprolol tartrate (LOPRESSOR) 25 MG tablet            (I10) HTN, goal  < 140/90  Comment: Controlled  Plan: metoprolol tartrate (LOPRESSOR) 25 MG tablet            (R60.9) Fluid retention  Comment: Secondary to venous insufficiency, as per prior  cardiology assessment  Plan: potassium chloride ER (KLOR-CON M) 10 MEQ CR         tablet            (E78.5) Hyperlipidemia LDL goal <130  Comment: Controlled  Plan: pravastatin (PRAVACHOL) 40 MG tablet            (M25.551,  G89.29,  Z96.641) Chronic hip pain after total replacement of right hip joint  Comment:   Plan: gabapentin (NEURONTIN) 300 MG capsule          (R01.1) Heart murmur  Comment: She has history of heart murmur secondary to aortic sclerosis, but I think she needs a follow-up echocardiogram because the murmur is very loud today  Plan: Echocardiogram Complete               Chief Complaint:                                                        Annual exam  Follow up chronic medical problems      SUBJECTIVE:                                                    History of present illness     We reviewed the chronic medical problems as above.   I reviewed the recent tests results in Epic       Iron Def anemia  -- she refuses colonoscopy even I suggested MAC  -- she agrees for EGD. She thinks she might have trouble with the stomach because sometimes it hurts   -- she agrees for cologuard   -- Denies blood in stools urine, denies vaginal bleeding      ROS:     See below      PMHx: - reviewed  Past Medical History:   Diagnosis Date     Concussion with loss of consciousness, initial encounter 4/26/2023     First degree AV block 08/11/2015     Former smoker      Gastroesophageal reflux disease      GERD (gastroesophageal reflux disease)      Glaucoma      Heart block     2:1     Heart murmur      Hyperlipidemia LDL goal <130 06/14/2013     Hypertension goal BP (blood pressure) < 140/90 12/03/2013     Hypothyroidism      Left atrial dilatation     mild     Mild dilation of ascending aorta - borderline 08/11/2015     Palpitations      Prediabetes 06/14/2013     RBBB 08/11/2015     S/P total knee arthroplasty 07/23/2019     Tachycardia      Urinary frequency      Varicose veins      Venous insufficiency     s/p  bilateral great saphenous vein radiofrequency ablation by Dr. Garcia       PSHx: reviewed  Past Surgical History:   Procedure Laterality Date     ARTHROPLASTY HIP Right 11/20/2020    Procedure: Right total hip arthroplasty using a Biomet Taperloc femoral stem and G7 acetabulum.;  Surgeon: Dragan Muse MD;  Location: [facility]     ARTHROPLASTY KNEE Right 07/23/2019    Procedure: Right total knee arthroplasty using an Arthrex iBalance knee system;  Surgeon: Dragan Muse MD;  Location: [facility] OR     BREAST SURGERY      right breast ductal surgery due to milk production     BREAST SURGERY Right     ductal surgery due to milk production     CATARACT EXTRACTION Right 05/2022     COLONOSCOPY N/A 10/24/2014    no further screening. Procedure: COLONOSCOPY;  Surgeon: Pedro Rudd MD;  Location: [facility]     DECOMPRESSION HIP CORE Right 6/6/2022    Procedure: Open right hip abductor repair;  Surgeon: Dragan Muse MD;  Location: [facility] OR     EYE SURGERY       FINGER GANGLION CYST EXCISION Left     ring finger     HYSTERECTOMY       HYSTERECTOMY, PAP NO LONGER INDICATED  2009    total hysterectomy and ovaries. benign     SOFT TISSUE SURGERY      ganglion removed from left wrist and ring finger     SURGICAL HISTORY OF -   age 7    tonsillectomy     SURGICAL HISTORY OF -   01/2015    left eye cataract     SURGICAL HISTORY OF -   Fall 2016    LINQ placed.     TONSILLECTOMY       WRIST GANGLION EXCISION Left         Soc Hx: No daily alcohol, no smoking  Social History     Socioeconomic History     Marital status:      Spouse name: Not on file     Number of children: Not on file     Years of education: Not on file     Highest education level: Not on file   Occupational History     Not on file   Tobacco Use     Smoking status: Former     Packs/day: 0.50     Years: 18.00     Additional pack years: 0.00     Total pack years: 9.00     Types: Cigarettes     Quit date: 1980     Years since quitting:  44.0     Passive exposure: Past     Smokeless tobacco: Never   Vaping Use     Vaping Use: Never used   Substance and Sexual Activity     Alcohol use: Not Currently     Drug use: No     Sexual activity: Yes     Partners: Male   Other Topics Concern     Parent/sibling w/ CABG, MI or angioplasty before 65F 55M? Not Asked      Service Not Asked     Blood Transfusions Not Asked     Caffeine Concern No     Comment: 4-5 cups of coffee daily     Occupational Exposure Not Asked     Hobby Hazards Not Asked     Sleep Concern Not Asked     Stress Concern Not Asked     Weight Concern Not Asked     Special Diet No     Comment: no added salt     Back Care Not Asked     Exercise No     Comment: 3 days per week - exercise class      Bike Helmet Not Asked     Seat Belt Not Asked     Self-Exams Not Asked   Social History Narrative     Not on file     Social Determinants of Health     Financial Resource Strain: Low Risk  (1/22/2024)    Financial Resource Strain      Within the past 12 months, have you or your family members you live with been unable to get utilities (heat, electricity) when it was really needed?: No   Food Insecurity: Low Risk  (1/22/2024)    Food Insecurity      Within the past 12 months, did you worry that your food would run out before you got money to buy more?: No      Within the past 12 months, did the food you bought just not last and you didn t have money to get more?: No   Transportation Needs: Low Risk  (1/22/2024)    Transportation Needs      Within the past 12 months, has lack of transportation kept you from medical appointments, getting your medicines, non-medical meetings or appointments, work, or from getting things that you need?: No   Physical Activity: Not on file   Stress: Not on file   Social Connections: Not on file   Interpersonal Safety: Low Risk  (1/22/2024)    Interpersonal Safety      Do you feel physically and emotionally safe where you currently live?: Yes      Within the past 12  months, have you been hit, slapped, kicked or otherwise physically hurt by someone?: No      Within the past 12 months, have you been humiliated or emotionally abused in other ways by your partner or ex-partner?: No   Housing Stability: Low Risk  (1/22/2024)    Housing Stability      Do you have housing? : Yes      Are you worried about losing your housing?: No        Fam Hx: reviewed  Family History   Problem Relation Age of Onset     Ovarian Cancer Mother      Cancer Mother         ovarian     Breast Cancer Mother      Heart Disease Father         rare; not sure what it was     Diabetes Father         old age     Cerebrovascular Disease Brother 68         Screening: reviewed      All: reviewed    Meds: reviewed  Current Outpatient Medications   Medication Sig Dispense Refill     acetaminophen (TYLENOL) 325 MG tablet Take 3 tablets (975 mg) by mouth every 8 hours (Patient taking differently: Take 650 mg by mouth at bedtime)       carboxymethylcellulose (CARBOXYMETHYLCELLULOSE SODIUM) 0.5 % SOLN ophthalmic solution Place 1 drop into both eyes 4 times daily as needed for dry eyes       dorzolamide-timolol PF (COSOPT PF) 2-0.5 % opthalmic solutionh Place 1 drop into both eyes 2 times daily       furosemide (LASIX) 20 MG tablet Take 1 tablet (20 mg) by mouth 2 times daily 180 tablet 0     gabapentin (NEURONTIN) 300 MG capsule Take 1 capsule (300 mg) by mouth 3 times daily 270 capsule 1     latanoprost (XALATAN) 0.005 % ophthalmic solution Place 1 drop into the right eye At Bedtime        levothyroxine (SYNTHROID/LEVOTHROID) 88 MCG tablet Take 1 tablet (88 mcg) by mouth daily 90 tablet 1     metoprolol tartrate (LOPRESSOR) 25 MG tablet TAKE 1 TABLET(25 MG) BY MOUTH TWICE DAILY 180 tablet 1     Multiple Vitamins-Minerals (MULTIVITAMIN ADULT PO) Take 1 tablet by mouth daily       pantoprazole (PROTONIX) 40 MG EC tablet TAKE 1 TABLET(40 MG) BY MOUTH DAILY 90 tablet 0     potassium chloride ER (KLOR-CON M) 10 MEQ CR tablet  "TAKE 1 TABLET(10 MEQ) BY MOUTH DAILY 90 tablet 2     pravastatin (PRAVACHOL) 40 MG tablet Take 1 tablet (40 mg) by mouth At Bedtime 90 tablet 1       OBJECTIVE:                                                    Physical Exam :  Blood pressure 135/68, pulse 82, temperature 97  F (36.1  C), temperature source Tympanic, resp. rate 18, height 1.727 m (5' 8\"), weight 87.4 kg (192 lb 11.2 oz), SpO2 96%, not currently breastfeeding.     NAD, appears comfortable  Skin clear, no rashes  Neck: supple, no JVD,  no thyroidmegaly  Lymph nodes non palpable in the cervical, supraclavicular axillaries,   Chest: clear to auscultation with good respiratory effort  Cardiac: S1S2, RRR, no mgr appreciated  Abdomen: soft, not tender, not distended, audible bowel sound, no hepatosplenomegaly, no palpable masses, no abdominal bruits  Extremities: no cyanosis, clubbing or edema.   Neuro: A, Ox3, no focal signs.  Breast exam in supine and erect position: they are symmetrical, no skin changes, no tenderness or nodes on palpation. Nipples are erect, no skin lesions, no discharge on pressure.    Pelvic exam: deferred, s/p menopause, no symptoms, no hx of abnormal pap         Rachel Wong MD  Internal Medicine       #############################################  Preventive Care Visit  [facility]  Rachel Miller MD, Internal Medicine  Jan 22, 2024      SUBJECTIVE:   Kirit is a 78 year old, presenting for the following:  Patient is being seen for an wellness visit.        1/22/2024    11:24 AM   Additional Questions   Roomed by Tori Lawson     Are you in the first 12 months of your Medicare coverage?  No    Healthy Habits:     In general, how would you rate your overall health?  Good    Frequency of exercise:  1 day/week    Duration of exercise:  Less than 15 minutes    Do you usually eat at least 4 servings of fruit and vegetables a day, include whole grains    & fiber and avoid regularly eating high fat " "or \"junk\" foods?  No    Taking medications regularly:  Yes    Medication side effects:  None    Ability to successfully perform activities of daily living:  No assistance needed    Home Safety:  No safety concerns identified    Hearing Impairment:  No hearing concerns    In the past 6 months, have you been bothered by leaking of urine? Yes    In general, how would you rate your overall mental or emotional health?  Good    Additional concerns today:  No          Have you ever done Advance Care Planning? (For example, a Health Directive, POLST, or a discussion with a medical provider or your loved ones about your wishes): No, advance care planning information given to patient to review.  Patient declined advance care planning discussion at this time.       Fall risk  Fallen 2 or more times in the past year?: No  Any fall with injury in the past year?: No    Cognitive Screening   1) Repeat 3 items (Leader, Season, Table)    2) Clock draw: NORMAL  3) 3 item recall: Recalls 3 objects  Results: 3 items recalled: COGNITIVE IMPAIRMENT LESS LIKELY    Mini-CogTM Copyright [employer]. Licensed by the author for use in [facility]; reprinted with permission (zehra@[facility]). All rights reserved.      Do you have sleep apnea, excessive snoring or daytime drowsiness? : no    Reviewed and updated as needed this visit by clinical staff                  Reviewed and updated as needed this visit by Provider                  Social History     Tobacco Use     Smoking status: Former     Packs/day: 0.50     Years: 18.00     Additional pack years: 0.00     Total pack years: 9.00     Types: Cigarettes     Quit date:      Years since quittin.0     Passive exposure: Past     Smokeless tobacco: Never   Substance Use Topics     Alcohol use: Not Currently             2023    12:25 PM   Alcohol Use   Prescreen: >3 drinks/day or >7 drinks/week? No     Do you have a current opioid prescription? No  Do you use any other " controlled substances or medications that are not prescribed by a provider? None                Hyperlipidemia Follow-Up    Are you regularly taking any medication or supplement to lower your cholesterol?   Yes- pravastatin  Are you having muscle aches or other side effects that you think could be caused by your cholesterol lowering medication?  Yes-      Hypertension Follow-up    Do you check your blood pressure regularly outside of the clinic? No   Are you following a low salt diet? Yes  Are your blood pressures ever more than 140 on the top number (systolic) OR more   than 90 on the bottom number (diastolic), for example 140/90? Yes    BP Readings from Last 2 Encounters:   11/20/23 138/68   08/31/23 122/62     Hemoglobin A1C (%)   Date Value   01/12/2024 6.0 (H)   01/10/2023 6.0 (H)   08/28/2020 6.1 (H)   10/28/2019 5.7 (H)     LDL Cholesterol Calculated (mg/dL)   Date Value   01/12/2024 61   01/10/2023 75   08/28/2020 65   10/28/2019 76       Current providers sharing in care for this patient include:   Patient Care Team:  Rachle Miller MD as PCP - General (Internal Medicine)  Rachel Miller MD as Assigned PCP  Edith Pradhan [facility]  Karlene Le PA-C as Physician Assistant (Physician Assistant - Medical)  Silvana Swenson APRN CNP as Nurse Practitioner (Cardiovascular Disease)  Mao Terry MD as Assigned Surgical Provider  Silvana Swenson APRN CNP as Assigned Heart and Vascular Provider    The following health maintenance items are reviewed in Epic and correct as of today:  Health Maintenance   Topic Date Due     URINE DRUG SCREEN  Never done     ANNUAL REVIEW OF HM ORDERS  04/15/2023     PHQ-2 (once per calendar year)  01/01/2024     FALL RISK ASSESSMENT  01/17/2024     MEDICARE ANNUAL WELLNESS VISIT  01/17/2024     TSH W/FREE T4 REFLEX  01/12/2025     ADVANCE CARE PLANNING  01/17/2028     LIPID  01/12/2029     DTAP/TDAP/TD IMMUNIZATION (4  "- Td or Tdap) 12/10/2029     DEXA  11/08/2036     HEPATITIS C SCREENING  Completed     INFLUENZA VACCINE  Completed     Pneumococcal Vaccine: 65+ Years  Completed     ZOSTER IMMUNIZATION  Completed     RSV VACCINE (Pregnancy & 60+)  Completed     COVID-19 Vaccine  Completed     IPV IMMUNIZATION  Aged Out     HPV IMMUNIZATION  Aged Out     MENINGITIS IMMUNIZATION  Aged Out     RSV MONOCLONAL ANTIBODY  Aged Out     MAMMO SCREENING  Discontinued     COLORECTAL CANCER SCREENING  Discontinued     Labs reviewed in EPIC        Pertinent mammograms are reviewed under the imaging tab.  Review of Systems   Constitutional:  Negative for chills and fever.   HENT:  Negative for congestion, ear pain, hearing loss and sore throat.    Eyes:  Negative for pain and visual disturbance.   Respiratory:  Positive for cough. Negative for shortness of breath.    Cardiovascular:  Positive for chest pain. Negative for palpitations.   Gastrointestinal:  Negative for abdominal pain, constipation, diarrhea and nausea.   Genitourinary:  Negative for dysuria, frequency, genital sores, hematuria, pelvic pain, urgency, vaginal bleeding and vaginal discharge.   Musculoskeletal:  Positive for arthralgias. Negative for joint swelling and myalgias.   Skin:  Negative for rash.   Neurological:  Positive for dizziness. Negative for weakness and headaches.   Psychiatric/Behavioral:  The patient is not nervous/anxious.         Patient has been advised of split billing requirements and indicates understanding: Yes At the check in, at the        Counseling  Reviewed preventive health counseling, as reflected in patient instructions       Regular exercise       Healthy diet/nutrition      BMI  Estimated body mass index is 28.69 kg/m  as calculated from the following:    Height as of 11/20/23: 1.727 m (5' 8\").    Weight as of 11/20/23: 85.6 kg (188 lb 11.2 oz).   Weight management plan: Discussed healthy diet and exercise guidelines      She reports " that she quit smoking about 44 years ago. Her smoking use included cigarettes. She has a 9 pack-year smoking history. She has been exposed to tobacco smoke. She has never used smokeless tobacco.      Appropriate preventive services were discussed with this patient, including applicable screening as appropriate for fall prevention, nutrition, physical activity, Tobacco-use cessation, weight loss and cognition.  Checklist reviewing preventive services available has been given to the patient.    Reviewed patients plan of care and provided an AVS. The Basic Care Plan (routine screening as documented in Health Maintenance) for Teri meets the Care Plan requirement. This Care Plan has been established and reviewed with the Patient.          Signed Electronically by: Rachel Miller MD    Identified Health Risks  I have reviewed Opioid Use Disorder and Substance Use Disorder risk factors and made any needed referrals.   Answers submitted by the patient for this visit:  Annual Preventive Visit (Submitted on 1/22/2024)  Chief Complaint: Annual Exam:  Blood in stool: No  heartburn: No  peripheral edema: Yes  mood changes: No  Skin sensation changes: No  tenderness: No  breast mass: No  She is at risk for lack of exercise and has been provided with information to increase physical activity for the benefit of her well-being.  The patient was counseled and encouraged to consider modifying their diet and eating habits. She was provided with information on recommended healthy diet options.  Information on urinary incontinence and treatment options given to patient.

## 2024-01-29 ENCOUNTER — TELEPHONE (OUTPATIENT)
Dept: GASTROENTEROLOGY | Facility: CLINIC | Age: 79
End: 2024-01-29
Payer: MEDICARE

## 2024-01-29 NOTE — TELEPHONE ENCOUNTER
Caller: Teri  Reason for Reschedule/Cancellation (please be detailed, any staff messages or encounters to note?): Schedule conflict      Prior to reschedule please review:  Ordering Provider: Rachel Miller  Sedation Determined: Moderate  Does patient have any ASC Exclusions, please identify?: N      Notes on Cancelled Procedure:  Procedure: Upper Endoscopy [EGD]   Date: 4/23/24  Location: [facility]; [address] [place] [address]  Surgeon: Tobin      Rescheduled: Yes  Procedure: Lower Endoscopy [Colonoscopy]   Date: 4/9/24  Location: [facility]; [address] [place] [address]  Surgeon: Tobin  Sedation Level Scheduled  Moderate,  Reason for Sedation Level Order  Prep/Instructions updated and sent: Letter       Send In - basket message to [facility] - [facility] if EUS  procedure is canceled or rescheduled: [ N/A, YES or NO] N/A

## 2024-01-29 NOTE — TELEPHONE ENCOUNTER
"Endoscopy Scheduling Screen    Have you had a positive Covid test in the last 14 days?  No    Are you active on MyChart?   No    What insurance is in the chart?  Other:  BCBS/Medicare    Ordering/Referring Provider: Rachel Miller MD   (If ordering provider performs procedure, schedule with ordering provider unless otherwise instructed. )    BMI: Estimated body mass index is 29.3 kg/m  as calculated from the following:    Height as of 1/22/24: 1.727 m (5' 8\").    Weight as of 1/22/24: 87.4 kg (192 lb 11.2 oz).     Sedation Ordered  moderate sedation.   If patient BMI > 50 do not schedule in ASC.    If patient BMI > 45 do not schedule at [facility].    Are you taking methadone or Suboxone?  No    Have you had difficulties, pain, or discomfort during past endoscopy procedures?  No    Are you taking any prescription medications for pain 3 or more times per week?   NO - No RN review required.    Do you have a history of malignant hyperthermia or adverse reaction to anesthesia?  No    (Females) Are you currently pregnant?   No     Have you been diagnosed or told you have pulmonary hypertension?   No    Do you have an LVAD?  No    Have you been told you have moderate to severe sleep apnea?  No    Have you been told you have COPD, asthma, or any other lung disease?  No    Do you have any heart conditions?  Yes     In the past year, have you had any hospitalizations for heart related issues including cardiomyopathy, heart attack, or stent placement?  No    Do you have any implantable devices in your body (pacemaker, ICD)?  No    Do you take nitroglycerine?  No    Have you ever had an organ transplant?   No    Have you ever had or are you awaiting a heart or lung transplant?   No    Have you had a stroke or transient ischemic attack (TIA aka \"mini stroke\" in the last 6 months?   No    Have you been diagnosed with or been told you have cirrhosis of the liver?   No    Are you currently on dialysis?   No    Do you " "need assistance transferring?   No    BMI: Estimated body mass index is 29.3 kg/m  as calculated from the following:    Height as of 1/22/24: 1.727 m (5' 8\").    Weight as of 1/22/24: 87.4 kg (192 lb 11.2 oz).     Is patients BMI > 40 and scheduling location [facility]?  No    Do you take an injectable medication for weight loss or diabetes (excluding insulin)?  No    Do you take the medication Naltrexone?  No    Do you take blood thinners?  No       Prep   Are you currently on dialysis or do you have chronic kidney disease?  No    Do you have a diagnosis of diabetes?  No    Do you have a diagnosis of cystic fibrosis (CF)?  No    On a regular basis do you go 3 -5 days between bowel movements?  Yes (Extended Prep)    BMI > 40?  No    Preferred Pharmacy:    [id number] - [place], [place] - [email or web address] [place] [id number] & [facility]  [id number] [place] [address] [facility] [address]  Phone: [phone] Fax: [phone]      Final Scheduling Details   Colonoscopy prep sent?  N/A    Procedure scheduled  Colonoscopy    Surgeon:  Tobin     Date of procedure:  04/23/2024     Pre-OP / PAC:   No - Not required for this site.    Location  [facility] - Patient preference.    Sedation   Moderate Sedation - Per order.      Patient Reminders:   You will receive a call from a Nurse to review instructions and health history.  This assessment must be completed prior to your procedure.  Failure to complete the Nurse assessment may result in the procedure being cancelled.      On the day of your procedure, please designate an adult(s) who can drive you home stay with you for the next 24 hours. The medicines used in the exam will make you sleepy. You will not be able to drive.      You cannot take public transportation, ride share services, or non-medical taxi service without a responsible caregiver.  Medical transport services are allowed with the requirement that a responsible caregiver will receive you at your destination.  " We require that drivers and caregivers are confirmed prior to your procedure.

## 2024-02-01 ENCOUNTER — HOSPITAL ENCOUNTER (OUTPATIENT)
Dept: CARDIOLOGY | Facility: CLINIC | Age: 79
Discharge: HOME | End: 2024-02-01
Attending: INTERNAL MEDICINE | Admitting: INTERNAL MEDICINE
Payer: MEDICARE

## 2024-02-01 DIAGNOSIS — R01.1 HEART MURMUR: ICD-10-CM

## 2024-02-01 LAB — LVEF ECHO: NORMAL

## 2024-02-01 PROCEDURE — 93306 TTE W/DOPPLER COMPLETE: CPT | Mod: 26 | Performed by: INTERNAL MEDICINE

## 2024-02-01 PROCEDURE — 93306 TTE W/DOPPLER COMPLETE: CPT

## 2024-02-06 DIAGNOSIS — R60.9 FLUID RETENTION: ICD-10-CM

## 2024-02-06 DIAGNOSIS — I87.2 EDEMA OF LEFT LOWER EXTREMITY DUE TO PERIPHERAL VENOUS INSUFFICIENCY: ICD-10-CM

## 2024-02-06 RX ORDER — FUROSEMIDE 20 MG
20 TABLET ORAL 2 TIMES DAILY
Qty: 180 TABLET | Refills: 3 | Status: SHIPPED | OUTPATIENT
Start: 2024-02-06

## 2024-02-07 ENCOUNTER — TELEPHONE (OUTPATIENT)
Dept: INTERNAL MEDICINE | Facility: CLINIC | Age: 79
End: 2024-02-07
Payer: MEDICARE

## 2024-02-07 DIAGNOSIS — I89.0 LYMPHEDEMA: Primary | ICD-10-CM

## 2024-02-07 NOTE — TELEPHONE ENCOUNTER
Request for office notes regarding lymphedema received via fax. Form in your mailbox to be signed.

## 2024-02-09 NOTE — TELEPHONE ENCOUNTER
"Neither Jackie or writer have been able to find office visit notes that specifically address lymphedema. Please advise.    Also routing to Ines Rae to see if she is able to assist with this. Per the request we received from [facility]:    \"We received a request from Ines Rae for a lymphedema compression device. Before we can begin the process on this patient, we must have the most recent clinical notes. Please make sure the following was included in the notes on that visit: diagnosis of lymphedema.\"     "

## 2024-02-09 NOTE — TELEPHONE ENCOUNTER
Fabby with [employer] calls in regards to most recent OV notes. There is no documentation in the visit notes of a diagnosis of lymphedema or any other note edema or swelling. Without this documentation, they cannot fill the patient's order.     Also see RN note below routed to Ines Rae    Do the OV notes need to be addended and re-sent? Please contact Fabby with any questions at [phone]

## 2024-02-13 PROBLEM — I89.0 LYMPHEDEMA: Status: ACTIVE | Noted: 2024-02-13

## 2024-02-14 NOTE — TELEPHONE ENCOUNTER
(I89.0) Lymphedema - failure of conservative therapy of daily compression, elevation and exercise for at least 4 weeks  (primary encounter diagnosis)

## 2024-02-22 NOTE — PROGRESS NOTES
01/16/24 0500   Appointment Info   Signing clinician's name / credentials Ines Rae, SHANIKA, Cristi   Total/Authorized Visits 8 session requested; Medicare; [employer] Federal Employee; certification   Visits Used 11   Medical Diagnosis lymphedema, LE edema   PT Tx Diagnosis lymphedema, fibrosis   Precautions/Limitations h/o R TRACIE but stating no precautions; falls precautions   Other pertinent information c/o worsening LE edema d/t venous insufficiency and limited benefit from compression stockings. PMHX:  Venous insufficiency s/p B vein abaltion, 4/26/2023 concussion d/t fall, heart block, arthritis with arthroplasties, hypothyroidism  Dates & types of surgery:  s/p LE ablations, 7/2019 RTKA, 11/2020 TR TRACIE   Progress Note/Certification   Onset of illness/injury or Date of Surgery 03/09/23   Therapy Frequency 1x/month x 3 months   Predicted Duration 90 days   Certification date from 01/16/24   Certification date to 04/15/24   Progress Note Completed Date 01/16/24   PT Goal 1   Goal Identifier Home program   Goal Description Patient will be independent in home program to manage conditions of lymphedema and fibrosis.   Rationale to maximize safety and independence with performance of ADLs and functional tasks;to maximize safety and independence within the home;to maximize safety and independence with self cares   Target Date 04/15/24  (goal date extended as awaiting compression garments and compression pump)   PT Goal 2   Goal Identifier LE volume   Goal Description Patient will demonstrate decreased volume of B LE by 5% to decrease risk of infection.   Rationale to maximize safety and independence with performance of ADLs and functional tasks;to maximize safety and independence with self cares   Goal Progress MET; R decreased 22.3%   Target Date 12/01/23   Date Met 12/06/23   PT Goal 3   Goal Identifier LLIS   Goal Description Patient will demonstrate an 8 point decrease (6 or less) in LLIS ( to demonstrate a  decreased impact of lymphedema on function.   Rationale to maximize safety and independence with performance of ADLs and functional tasks;to maximize safety and independence within the home;to maximize safety and independence within the community;to maximize safety and independence with self cares   Target Date 03/15/24  (goal date extended as awaiting compression garments and pump)   Subjective Report   Subjective Report to dept in old compression knee highs   Objective Measure 1   Objective Measure LE edema   Details 2+ proximal R LE and L pretibial and b dorsal feet; < 1+ distal R LE noted and rubbery fibrosis and hyperpigmentation of R>L LE   Objective Measure 2   Objective Measure volume to 40cm   Details R = 2713ml (increased 5.6% from lowest volume; L= 2568ml (increased 8% from lowest volume)   Objective Measure 3   Objective Measure LLIS   Details 14   Objective Measure 4   Objective Measure circumferences LEs   Details SA: B = 25cm; calf: R= 42cm; L = 38cm; length = 40cm; size large Therafirm Corespun knee highs regular length ($45-55) 10-15mmhg   Objective Measure 5   Objective Measure  strength   Details R = 10lbs; L = 31lbs likley due to cervical radiculopahthy with visible mm atrophy R>L hand   Therapeutic Procedure/Exercise   Therapeutic Procedures: strength, endurance, ROM, flexibillity minutes (91534) 15   Ther Proc 1 edema exs   Skilled Intervention further instructed pt in benefit of geting up and moving during day every hour and in walking with walker as providing most normal gait mechanics which also promotes mm facilitation for lyphatic clearing.  providing further instruction in and pt demonstrating LE edema exs in sequence and to perform 1x/day but encourage perfomring diaphragmatic breathing and ankle pumps thorughout the day  further instructed in and pt demonstrating standing HR and seated TR and standing GS stretch to limit venous HTN/ and edema   Patient Response/Progress good  tolerance   Manual Therapy   Manual Therapy: Mobilization, MFR, MLD, friction massage minutes (90244) 40   Manual Therapy 1 postiioning, garments, GCB, MLD self lymphatic draiange;, pump   Skilled Intervention pt to dept in 10-15mmhg compression knee highs B LEs today.  noting snag and hole on 1 with pt sttaing it got caught on ring.  educating pt in option to don any kind of rubber gloves to better protect socks and option to apply clear nail polish now to limit further expnasion of run.  also educating in garment gloves and to discuss donning/doffing options with fitter at tomorrow's appointment.   pt stating she is open to GCB of B LEs prior to fitter appointment tomorrow. with goal of little to no pitting of B lower legs priro to fitting for velcro comrpession wraps. intructed pt in plan to then schedule appointment with fitter for assessing self donning/doffing and fit of new garments once arrives.  then instructed pt in and issued writteni nstructions in home porgram of daily wear of likley velcro comrpession wraps with option to wear through night if added value and comfortable but needing to remove every day to perform skin cares.  If not in velcro wraps instructed pt to wear compression knee highs but noting edema refill with only 10-15mmhg.  and pt stating  strength poor limiting abiltiy to don/doff stronger compression at this time.    further instructed in GCB today of B LEs adn likely benefit of perofmring GCB of B LE 1x/week ok to leave on 1-2 days if feeling and looking ok.  further educated in self lymphatic draiange massage with emphasis on clearin gof knees and thighs proximally and perfomring MLD of trunk and thighs with emphasis on clearing of L and R knees today   Patient Response/Progress Patient with R>L leg and foot lymphedema demonstrating increased volume of both LEs as well as increased fibrosis despite daily wear of compresiion knee highs and 1x/week gradient compression bandaging of  R LE as well as elevation and exercises greater than 4 weeks. .  pt would benefit from trialing both a velcro lower leg and foot compression garment as well as a compression pump as demonstrates signficant hand strength weakness d/t cervical radiculopahties and difficulty with donning and doffing of compression socks and with lacking sufficient tension during application of short stretch bandages. Patient may benefit also from trialing either custom compression knee highs with optioin for zippers to provide optimal comrpession and softening of fibrosis as well as considering garment devices to increase abilty for donning and doffing.   Education   Learner/Method Patient   Plan   Home program get up and move every hour during day with at least 1 extended walk using FWW, positioning to decrease edema refill/constriction, B LE HC stretch, standing HR x 10, seated TRs x 10, edema exs; use of new velcro wraps B LE most days and option to wear thorugh night removing daily for skin cares, use of current 10-15mmhg comrpession knee highs on limited basis and limited ability to limit edema refill; GCB of B LE 1x/week option to leave on 1-2 days if feeling and looking ok.   Updates to plan of care goal 2 met; progressing with other goals; awaiting new garments and compression pump   Plan for next session LLIS, volume LEs, recert, assess GCB, assess new velcro garments, assess effectincess of home program   Comments   [facility]  1pm;placed new garment/bandaging order for [facility] velcro and comrpession garments and GCB supplies Rachel Miller MD; (TCO Balance:  alternating toe taps, STS with chair in front, standard stance with HTs); email to [employer] per pt request   Total Session Time   Timed Code Treatment Minutes 55   Total Treatment Time (sum of timed and untimed services) 55         [facility]                                                                                    OUTPATIENT PHYSICAL THERAPY    PLAN OF TREATMENT FOR OUTPATIENT REHABILITATION   Patient's Last Name, First Name, Teri Colon YOB: 1945   Provider's Name   [facility]   Medical Record No.  [id number]     Onset Date: 03/09/23  Start of Care Date:       Medical Diagnosis:  lymphedema, LE edema      PT Treatment Diagnosis:  lymphedema, fibrosis Plan of Treatment  Frequency/Duration: (P) 1x/month x 3 months/ 90 days    Certification date from (P) 01/16/24 to (P) 04/15/24         See note for plan of treatment details and functional goals     Ines Rae, PT                         I CERTIFY THE NEED FOR THESE SERVICES FURNISHED UNDER        THIS PLAN OF TREATMENT AND WHILE UNDER MY CARE     (Physician attestation of this document indicates review and certification of the therapy plan).              Referring Provider:  Rachel Miller    Initial Assessment  See Epic Evaluation-              PLAN  Continue therapy per current plan of care.    Beginning/End Dates of Progress Note Reporting Period:  (P) 01/16/24 to 04/15/2024    Referring Provider:  Rachel Wong-*

## 2024-02-27 ENCOUNTER — THERAPY VISIT (OUTPATIENT)
Dept: PHYSICAL THERAPY | Facility: CLINIC | Age: 79
End: 2024-02-27
Payer: MEDICARE

## 2024-02-27 DIAGNOSIS — I99.9 LYMPHEDEMA DUE TO VENOUS DISEASE: Primary | ICD-10-CM

## 2024-02-27 DIAGNOSIS — I89.0 LYMPHEDEMA DUE TO VENOUS DISEASE: Primary | ICD-10-CM

## 2024-02-27 DIAGNOSIS — L90.5 SCAR CONDITION AND FIBROSIS OF SKIN: ICD-10-CM

## 2024-02-27 DIAGNOSIS — I89.0 LYMPHEDEMA OF BOTH LOWER EXTREMITIES: ICD-10-CM

## 2024-02-27 PROCEDURE — 97110 THERAPEUTIC EXERCISES: CPT | Mod: GP | Performed by: PHYSICAL THERAPIST

## 2024-02-27 PROCEDURE — 97140 MANUAL THERAPY 1/> REGIONS: CPT | Mod: GP | Performed by: PHYSICAL THERAPIST

## 2024-03-03 DIAGNOSIS — R10.13 EPIGASTRIC PAIN: ICD-10-CM

## 2024-03-04 RX ORDER — PANTOPRAZOLE SODIUM 40 MG/1
40 TABLET, DELAYED RELEASE ORAL DAILY
Qty: 90 TABLET | Refills: 0 | Status: SHIPPED | OUTPATIENT
Start: 2024-03-04 | End: 2024-06-02

## 2024-03-19 ENCOUNTER — TELEPHONE (OUTPATIENT)
Dept: INTERNAL MEDICINE | Facility: CLINIC | Age: 79
End: 2024-03-19
Payer: MEDICARE

## 2024-03-22 NOTE — TELEPHONE ENCOUNTER
Loyd from [employer] calling requesting these forms and states they were not received. Faxed again to [phone] delon Harp

## 2024-03-24 ENCOUNTER — TELEPHONE (OUTPATIENT)
Dept: GASTROENTEROLOGY | Facility: CLINIC | Age: 79
End: 2024-03-24
Payer: MEDICARE

## 2024-03-24 NOTE — TELEPHONE ENCOUNTER
Pre visit planning completed.      Procedure details:    Patient scheduled for Upper endoscopy (EGD) on 4/9/24.     Arrival time: 1030. Procedure time 1115    Facility location: [facility]; [address] [place] [address]. Check in location: Main entrance at . Come through the roundabout underneath the awning (not the ER entrance).    Sedation type: Conscious sedation - pulmonary hypertension is noted as 'resolved'    Pre op exam needed? N/A    Indication for procedure: iron def anemia, abd pain      Chart review:     Electronic implanted devices? No    Recent diagnosis of diverticulitis within the last 6 weeks? No    Diabetic? No      Medication review:    Anticoagulants? No    NSAIDS? No    Other medication HOLDING recommendations:  N/A      Prep for procedure:     Bowel prep recommendation: N/A    Prep instructions sent via letter         Sonia Cabral RN  Endoscopy Procedure Pre Assessment RN  [phone] option 4

## 2024-03-24 NOTE — LETTER
March 24, 2024      Teri Beltran  [address] [facility] [address]              Dear Trei,          Upper endoscopy (EGD)     Procedure date: 4/9/24    Anticipated arrival time: 10:30 AM   (Arrival times are subject to change)    Facility location: [facility]; [address] [facility] [place] [address] Check in location: Main entrance at . Come through the roundabout underneath the awning (not the ER entrance).      Important Procedure Reminders:     Prep Instructions:   Instructions on how to prepare for your upcoming procedure are found below. Please read instructions carefully. Deviation from instructions may result in less than desired outcomes and procedure may need to be rescheduled.   If you have additional questions regarding how to prepare for your upcoming procedure, contact our endoscopy pre assessment nurses at [phone] option 4 Monday through Friday 7:00am-5:00pm     Policy:   The medications used during the procedure will make you sleepy, so you won't be able to drive. On the day of your procedure, please have an adult ready to drive you home and stay with you for the next 6 hours.   You can't use public transportation, ride-share services, or non-medical taxi services without a responsible caregiver. Medical transport services are okay, but a caregiver must be there to receive you at your destination.   Make sure your  and caregiver are confirmed before your procedure.      Day of procedure:  Please keep in mind that arrival times may change. Let your  know there might be a one-hour window for changes.  We ask that you please check in at the  with your . Your  should remain on campus.  Expect to be at the procedure center for about 1.5-2.5 hours.    Please do not wear jewelry (i.e. earrings, rings, necklaces, watches, etc). Leave your purse, billfold, credit cards, and other valuables at home.   Bring insurance card and ID.      To cancel or reschedule your procedure:   Within 14 days of your procedure if you develop any flu-like symptoms (such as fever, cough, shortness of breath), COVID-19 like symptoms or exposure to COVID-19, contact our endoscopy team at [phone] option 4 to determine if procedure can be completed or needs to be delayed.   If you need to cancel or reschedule, our endoscopy scheduling team can be reached at [phone], option 2. Monday through Friday, 7:00am-5:00pm.      Medication Reminders:    Please note the following medication holding recommendations:   N/A    ----------------------------------------------------------------------------------------------------------------------------------------------------------------------------------------------------------------------------------------------------------------------      Instructions for Your Upper Endoscopy      Before your procedure, a nurse will call you to go over instructions and your health history. It's important to complete the nurse assessment before your procedure. If you don't, your procedure might have to be canceled.    What is an upper endoscopy?  - This is an exam that checks for problems linked to heartburn, swallowing or belly (abdominal) pain or other symptoms of the upper GI tract. Depending on your symptoms, the doctor will look at your esophagus (food pipe), stomach and the part of the stomach that enters the small intestine.     Getting ready  - Dress in comfortable, loose clothing.  - Bring your insurance card. Leave your purse, billfold, credit cards and other valuables at home.  - Bring a list of your medicines and known allergies. If you have a pacemaker or ICD, please bring your information card.  - We do our best to stay on time, but there may be a delay. Please bring something to pass the time, such as a newspaper or book.    - Important: You must complete all steps before the exam.       Seven days before the exam   - Talk  to your doctor: If you take blood-thinners (such as Coumadin, Plavix, Xarelto), your prescription or schedule may need to change before the test.  - Talk to your prescribing provider: If you take prescription NSAIDS (such as Sulindac, Celebrex, Mobic, Relafen). Your prescribing provider will tell you what to do.   - Continue taking prescribed aspirin; talk to your prescribing doctor with any concerns.    - If you have diabetes: Ask to have your exam early in the morning. Also, ask your doctor if you should change your diet or medicines    One day before the exam   - Stop eating all solid foods 8 hours prior to arrival time. You may drink clear liquids.   **If you have achalasia (treated or untreated) or gastroparesis, please be on a clear liquid diet for 24 hours prior to your exam and stop drinking all liquids at midnight.   - Stop taking sucralfate medication.  - Stop taking NSAID pain relievers, such as Advil, Ibuprofen, Motrin, etc.  You may take Tylenol.    Day of the exam  - You may drink clear liquids until 2 hours before your arrival time.  - You may take all of your morning medicines (except for diabetes pills) as usual with 4 oz. of water up to 2 hours before your arrival time.  - If you take diabetes medicine (pills): do not take them the morning of your test.  - Bring a list of your medicines and known allergies.  - Please do not wear jewelry (i.e. earrings, rings, necklaces, watches, etc) . Leave your purse, billfold, credit cards, and other valuables at home.   - Please arrive with an adult who can take you home after the test: The medicine will make you sleepy. If you do not have a , we may cancel your test.     What are clear liquids?   You may have:  - Water, tea, coffee (no cream)  - Soda pop, Gatorade   - Clear nutrition drinks (Enlive, Resource Breeze)   - Jell-O, Popsicles (no milk or fruit pieces) or sorbet   - Fat-free soup broth or bouillon  - Plain hard cand, such as clear life savers    - Clear juices and fruit-flavored drinks such as apple juice, white grape juice, Hi-C and Felipe-Aid    Do not have:  - Milk or milk products such as ice cream, malts or shakes  - Juices with pulp such as orange, grapefruit, pineapple or tomato juice  - Cream soups of any kind  - Alcohol       During the exam  - The exam lasts from 10 to 20 minutes.   - We will review the risks and benefits of the exam. We will then ask you to sign a consent form.  - We will place a small needle (IV) in your hand or arm. We will give you medicine through the IV to keep you comfortable.   - The endoscope will be advanced through your mouth and the exam completed.  - When we put air into your stomach, you may feel full or have mild cramps. We will remove the air at the end of the exam.  - To reduce discomfort, breathe at a slow, even pace. Try to relax the muscles in your neck and shoulders.  - We may take a small piece of tissue (a biopsy) to test in the lab. It will not hurt. We may also take pictures of your insides.     After the exam  - You will rest in the recovery area until you feel ready to leave. This takes about 30 to 60 minutes.   - We will remove the IV.  - You may burp up air left in your stomach.  - You may feel drowsy or a little dizzy from the medicine.   - Your doctor will discuss your results. You will receive your test results in 7 to 10 business days by letter or MyChart.   - Your throat may feel numb. One hour after the exam, swallow a small amount of cool water. If you can swallow easily, you may go back to your regular diet and medicines.  - Your throat may be sore for the rest of the day. Throat lozenges or ice chips may help.  - Do not drive for 24 hours.    Call us at once if you have:  - Unusual pain or problems swallowing, unusual stomach or chest pain.  - Vomit that looks like coffee grounds or black or bloody stools (bowel movements).  - A fever above 100.6  F (37.5  C) when taken under the  tongue.    Test results  - You will receive your results in 7 to 10 business days by phone, letter or MyChart.    For questions or appointments, call:  [facility]   [phone], option 2  Monday through Friday, 7 a.m. to 5 p.m.  (If it is after hours please reach out to the clinic or provider you were scheduled with.)    You should be aware that your endoscopist may be a part of a study to improve endoscopy procedures.  As part of a study, pictures gathered from your procedure may be stored and analyzed in a de-identified manner.

## 2024-03-26 ENCOUNTER — TELEPHONE (OUTPATIENT)
Dept: INTERNAL MEDICINE | Facility: CLINIC | Age: 79
End: 2024-03-26
Payer: MEDICARE

## 2024-03-26 NOTE — TELEPHONE ENCOUNTER
Fax received from its learning - Pneumatic Compression Device for review and signature.  Put in Dr. Wong's in basket.

## 2024-03-26 NOTE — TELEPHONE ENCOUNTER
Pre assessment completed for upcoming procedure.   (Please see previous telephone encounter notes for complete details)      Procedure details:    Arrival time was changed since chart reviewed. New arrival time is 0930 and facility location reviewed.     Pre op exam needed? N/A    Designated  policy reviewed. Instructed to have someone stay 6  hours post procedure.       Medication review:    Medications reviewed. Please see supporting documentation below. Holding recommendations discussed (if applicable).       Prep for procedure:     Procedure prep instructions reviewed.        Any additional information needed:  N/A      Patient  verbalized understanding and had no questions or concerns at this time.      Sonia Cabral RN  Endoscopy Procedure Pre Assessment RN  [phone] option 4

## 2024-03-27 ENCOUNTER — MEDICAL CORRESPONDENCE (OUTPATIENT)
Dept: HEALTH INFORMATION MANAGEMENT | Facility: CLINIC | Age: 79
End: 2024-03-27

## 2024-04-07 LAB — NONINV COLON CA DNA+OCC BLD SCRN STL QL: NEGATIVE

## 2024-04-09 ENCOUNTER — HOSPITAL ENCOUNTER (OUTPATIENT)
Facility: CLINIC | Age: 79
Discharge: HOME | End: 2024-04-09
Attending: INTERNAL MEDICINE | Admitting: INTERNAL MEDICINE
Payer: MEDICARE

## 2024-04-09 VITALS
BODY MASS INDEX: 29.1 KG/M2 | DIASTOLIC BLOOD PRESSURE: 112 MMHG | OXYGEN SATURATION: 95 % | HEIGHT: 68 IN | SYSTOLIC BLOOD PRESSURE: 167 MMHG | WEIGHT: 192 LBS | RESPIRATION RATE: 16 BRPM | HEART RATE: 79 BPM

## 2024-04-09 LAB — UPPER GI ENDOSCOPY: NORMAL

## 2024-04-09 PROCEDURE — 88305 TISSUE EXAM BY PATHOLOGIST: CPT | Mod: 26 | Performed by: PATHOLOGY

## 2024-04-09 PROCEDURE — 250N000009 HC RX 250: Performed by: INTERNAL MEDICINE

## 2024-04-09 PROCEDURE — 43239 EGD BIOPSY SINGLE/MULTIPLE: CPT | Performed by: INTERNAL MEDICINE

## 2024-04-09 PROCEDURE — 88305 TISSUE EXAM BY PATHOLOGIST: CPT | Mod: TC | Performed by: INTERNAL MEDICINE

## 2024-04-09 PROCEDURE — 999N000099 HC STATISTIC MODERATE SEDATION < 10 MIN: Performed by: INTERNAL MEDICINE

## 2024-04-09 PROCEDURE — 250N000011 HC RX IP 250 OP 636: Performed by: INTERNAL MEDICINE

## 2024-04-09 RX ORDER — PROCHLORPERAZINE MALEATE 5 MG
5 TABLET ORAL EVERY 6 HOURS PRN
Status: DISCONTINUED | OUTPATIENT
Start: 2024-04-09 | End: 2024-04-09 | Stop reason: HOSPADM

## 2024-04-09 RX ORDER — EPINEPHRINE 1 MG/ML
0.1 INJECTION, SOLUTION INTRAMUSCULAR; SUBCUTANEOUS
Status: DISCONTINUED | OUTPATIENT
Start: 2024-04-09 | End: 2024-04-09 | Stop reason: HOSPADM

## 2024-04-09 RX ORDER — LIDOCAINE 40 MG/G
CREAM TOPICAL
Status: DISCONTINUED | OUTPATIENT
Start: 2024-04-09 | End: 2024-04-09 | Stop reason: HOSPADM

## 2024-04-09 RX ORDER — FENTANYL CITRATE 50 UG/ML
INJECTION, SOLUTION INTRAMUSCULAR; INTRAVENOUS PRN
Status: DISCONTINUED | OUTPATIENT
Start: 2024-04-09 | End: 2024-04-09 | Stop reason: HOSPADM

## 2024-04-09 RX ORDER — ATROPINE SULFATE 0.1 MG/ML
1 INJECTION INTRAVENOUS
Status: DISCONTINUED | OUTPATIENT
Start: 2024-04-09 | End: 2024-04-09 | Stop reason: HOSPADM

## 2024-04-09 RX ORDER — SIMETHICONE 40MG/0.6ML
133 SUSPENSION, DROPS(FINAL DOSAGE FORM)(ML) ORAL
Status: DISCONTINUED | OUTPATIENT
Start: 2024-04-09 | End: 2024-04-09 | Stop reason: HOSPADM

## 2024-04-09 RX ORDER — NALOXONE HYDROCHLORIDE 0.4 MG/ML
0.2 INJECTION, SOLUTION INTRAMUSCULAR; INTRAVENOUS; SUBCUTANEOUS
Status: DISCONTINUED | OUTPATIENT
Start: 2024-04-09 | End: 2024-04-09 | Stop reason: HOSPADM

## 2024-04-09 RX ORDER — NALOXONE HYDROCHLORIDE 0.4 MG/ML
0.4 INJECTION, SOLUTION INTRAMUSCULAR; INTRAVENOUS; SUBCUTANEOUS
Status: DISCONTINUED | OUTPATIENT
Start: 2024-04-09 | End: 2024-04-09 | Stop reason: HOSPADM

## 2024-04-09 RX ORDER — FLUMAZENIL 0.1 MG/ML
0.2 INJECTION, SOLUTION INTRAVENOUS
Status: DISCONTINUED | OUTPATIENT
Start: 2024-04-09 | End: 2024-04-09 | Stop reason: HOSPADM

## 2024-04-09 RX ORDER — FENTANYL CITRATE 50 UG/ML
50-100 INJECTION, SOLUTION INTRAMUSCULAR; INTRAVENOUS EVERY 5 MIN PRN
Status: DISCONTINUED | OUTPATIENT
Start: 2024-04-09 | End: 2024-04-09 | Stop reason: HOSPADM

## 2024-04-09 RX ORDER — ONDANSETRON 4 MG/1
4 TABLET, ORALLY DISINTEGRATING ORAL EVERY 6 HOURS PRN
Status: DISCONTINUED | OUTPATIENT
Start: 2024-04-09 | End: 2024-04-09 | Stop reason: HOSPADM

## 2024-04-09 RX ORDER — ONDANSETRON 2 MG/ML
4 INJECTION INTRAMUSCULAR; INTRAVENOUS
Status: DISCONTINUED | OUTPATIENT
Start: 2024-04-09 | End: 2024-04-09 | Stop reason: HOSPADM

## 2024-04-09 RX ORDER — ONDANSETRON 2 MG/ML
4 INJECTION INTRAMUSCULAR; INTRAVENOUS EVERY 6 HOURS PRN
Status: DISCONTINUED | OUTPATIENT
Start: 2024-04-09 | End: 2024-04-09 | Stop reason: HOSPADM

## 2024-04-09 RX ORDER — DIPHENHYDRAMINE HYDROCHLORIDE 50 MG/ML
25-50 INJECTION INTRAMUSCULAR; INTRAVENOUS
Status: DISCONTINUED | OUTPATIENT
Start: 2024-04-09 | End: 2024-04-09 | Stop reason: HOSPADM

## 2024-04-09 ASSESSMENT — ACTIVITIES OF DAILY LIVING (ADL)
ADLS_ACUITY_SCORE: 38
ADLS_ACUITY_SCORE: 38

## 2024-04-09 NOTE — DISCHARGE INSTRUCTIONS
All discharge instructions including provation report gone over with patient. All questions answered to patients satisfactory.

## 2024-04-09 NOTE — H&P
Pre-Endoscopy History and Physical     Teri Beltran MRN# [id number]   YOB: 1945 Age: 78 year old     Date of Procedure: 4/9/2024  Primary care provider: Rachel Miller  Type of Endoscopy: Gastroscopy with possible biopsy, possible dilation  Reason for Procedure: anemia  Type of Anesthesia Anticipated: Conscious Sedation    HPI:    Teri is a 78 year old female who will be undergoing the above procedure.      A history and physical has been performed. The patient's medications and allergies have been reviewed. The risks and benefits of the procedure and the sedation options and risks were discussed with the patient.  All questions were answered and informed consent was obtained.      She denies a personal or family history of anesthesia complications or bleeding disorders.     Patient Active Problem List   Diagnosis    Hypothyroidism    Tachycardia    Glaucoma    Prediabetes    Hyperlipidemia LDL goal <130    Palpitations    BMI 30.0-30.9,adult    Advanced directives, counseling/discussion    Hypertension goal BP (blood pressure) < 140/90    Adjustment disorder with depressed mood    Osteoporosis    RBBB    First degree AV block    Venous insufficiency    Varicose veins    S/P total knee arthroplasty    S/P total hip arthroplasty    Anterior dislocation of right hip (H)    Fall    SVT (supraventricular tachycardia) (H24)    Rupture of tendon of hip abductor    LUQ abdominal pain    Hip pain, right    Closed head injury, initial encounter    Hematoma of scalp, initial encounter    Fall, initial encounter    Concussion with loss of consciousness, initial encounter    Acute midline low back pain without sciatica    Aortic regurgitation    Lymphedema - failure of conservative therapy of daily compression, elevation and exercise for at least 4 weeks        Past Medical History:   Diagnosis Date    Concussion with loss of consciousness, initial encounter 4/26/2023    First degree AV  block 08/11/2015    Former smoker     Gastroesophageal reflux disease     GERD (gastroesophageal reflux disease)     Glaucoma     Heart block     2:1    Heart murmur     Hyperlipidemia LDL goal <130 06/14/2013    Hypertension goal BP (blood pressure) < 140/90 12/03/2013    Hypothyroidism     Left atrial dilatation     mild    Mild dilation of ascending aorta - borderline 08/11/2015    Palpitations     Prediabetes 06/14/2013    RBBB 08/11/2015    S/P total knee arthroplasty 07/23/2019    Tachycardia     Urinary frequency     Varicose veins     Venous insufficiency     s/p bilateral great saphenous vein radiofrequency ablation by Dr. Garcia        Past Surgical History:   Procedure Laterality Date    ARTHROPLASTY HIP Right 11/20/2020    Procedure: Right total hip arthroplasty using a Biomet Taperloc femoral stem and G7 acetabulum.;  Surgeon: Dragan Muse MD;  Location: [facility] OR    ARTHROPLASTY KNEE Right 07/23/2019    Procedure: Right total knee arthroplasty using an [employer] knee system;  Surgeon: Dragan Muse MD;  Location: [facility] OR    BREAST SURGERY      right breast ductal surgery due to milk production    BREAST SURGERY Right     ductal surgery due to milk production    CATARACT EXTRACTION Right 05/2022    COLONOSCOPY N/A 10/24/2014    no further screening. Procedure: COLONOSCOPY;  Surgeon: Pedro Rudd MD;  Location: [facility]    DECOMPRESSION HIP CORE Right 6/6/2022    Procedure: Open right hip abductor repair;  Surgeon: Dragan Muse MD;  Location: [facility] OR    EYE SURGERY      FINGER GANGLION CYST EXCISION Left     ring finger    HYSTERECTOMY      HYSTERECTOMY, PAP NO LONGER INDICATED  2009    total hysterectomy and ovaries. benign    SOFT TISSUE SURGERY      ganglion removed from left wrist and ring finger    SURGICAL HISTORY OF -   age 7    tonsillectomy    SURGICAL HISTORY OF -   01/2015    left eye cataract    SURGICAL HISTORY OF -   Fall 2016    LINQ placed.     TONSILLECTOMY      WRIST GANGLION EXCISION Left        Social History     Tobacco Use    Smoking status: Former     Packs/day: 0.50     Years: 18.00     Additional pack years: 0.00     Total pack years: 9.00     Types: Cigarettes     Quit date:      Years since quittin.3     Passive exposure: Past    Smokeless tobacco: Never   Substance Use Topics    Alcohol use: Not Currently       Family History   Problem Relation Age of Onset    Ovarian Cancer Mother     Cancer Mother         ovarian    Breast Cancer Mother     Heart Disease Father         rare; not sure what it was    Diabetes Father         old age    Cerebrovascular Disease Brother 68       Prior to Admission medications    Medication Sig Start Date End Date Taking? Authorizing Provider   acetaminophen (TYLENOL) 325 MG tablet Take 3 tablets (975 mg) by mouth every 8 hours  Patient taking differently: Take 650 mg by mouth at bedtime 23   Alissa Reyes PA-C   carboxymethylcellulose (CARBOXYMETHYLCELLULOSE SODIUM) 0.5 % SOLN ophthalmic solution Place 1 drop into both eyes 4 times daily as needed for dry eyes 23   Karlene Le PA-C   dorzolamide-timolol PF (COSOPT PF) 2-0.5 % opthalmic solutionh Place 1 drop into both eyes 2 times daily    Unknown, Entered By History   furosemide (LASIX) 20 MG tablet Take 1 tablet (20 mg) by mouth 2 times daily 24   Silvana Swenson APRN CNP   gabapentin (NEURONTIN) 300 MG capsule Take 1 capsule (300 mg) by mouth 3 times daily 24   Rachel Miller MD   latanoprost (XALATAN) 0.005 % ophthalmic solution Place 1 drop into the right eye At Bedtime     Reported, Patient   levothyroxine (SYNTHROID/LEVOTHROID) 88 MCG tablet Take 1 tablet (88 mcg) by mouth daily 24   Rachel Miller MD   metoprolol tartrate (LOPRESSOR) 25 MG tablet TAKE 1 TABLET(25 MG) BY MOUTH TWICE DAILY 24   Rachel Miller MD   Multiple Vitamins-Minerals (MULTIVITAMIN  "ADULT PO) Take 1 tablet by mouth daily    Reported, Patient   pantoprazole (PROTONIX) 40 MG EC tablet TAKE 1 TABLET(40 MG) BY MOUTH DAILY 3/4/24   Rachel Miller MD   potassium chloride ER (KLOR-CON M) 10 MEQ CR tablet TAKE 1 TABLET(10 MEQ) BY MOUTH DAILY 1/22/24   Rachel Miller MD   pravastatin (PRAVACHOL) 40 MG tablet Take 1 tablet (40 mg) by mouth at bedtime 1/22/24   Rachel Miller MD       Allergies   Allergen Reactions    Seasonal Allergies     Simvastatin Other (See Comments)     \"flushed\"        REVIEW OF SYSTEMS:   5 point ROS negative except as noted above in HPI, including Gen., Resp., CV, GI &  system review.    PHYSICAL EXAM:   LMP  (LMP Unknown)  Estimated body mass index is 29.3 kg/m  as calculated from the following:    Height as of 1/22/24: 1.727 m (5' 8\").    Weight as of 1/22/24: 87.4 kg (192 lb 11.2 oz).   GENERAL APPEARANCE: alert, and oriented  MENTAL STATUS: alert  AIRWAY EXAM: Mallampatti Class I (visualization of the soft palate, fauces, uvula, anterior and posterior pillars)  RESP: lungs clear to auscultation - no rales, rhonchi or wheezes  CV: regular rates and rhythm  DIAGNOSTICS:    Not indicated    IMPRESSION   ASA Class 2 - Mild systemic disease    PLAN:   Plan for Gastroscopy with possible biopsy, possible dilation. We discussed the risks, benefits and alternatives and the patient wished to proceed.    The above has been forwarded to the consulting provider.      Signed Electronically by: Pedro Rudd MD  April 9, 2024          "

## 2024-04-10 ENCOUNTER — THERAPY VISIT (OUTPATIENT)
Dept: PHYSICAL THERAPY | Facility: CLINIC | Age: 79
End: 2024-04-10
Payer: MEDICARE

## 2024-04-10 DIAGNOSIS — L90.5 SCAR CONDITION AND FIBROSIS OF SKIN: ICD-10-CM

## 2024-04-10 DIAGNOSIS — I99.9 LYMPHEDEMA DUE TO VENOUS DISEASE: Primary | ICD-10-CM

## 2024-04-10 DIAGNOSIS — I89.0 LYMPHEDEMA OF BOTH LOWER EXTREMITIES: ICD-10-CM

## 2024-04-10 DIAGNOSIS — I89.0 LYMPHEDEMA DUE TO VENOUS DISEASE: Primary | ICD-10-CM

## 2024-04-10 LAB
PATH REPORT.COMMENTS IMP SPEC: NORMAL
PATH REPORT.COMMENTS IMP SPEC: NORMAL
PATH REPORT.FINAL DX SPEC: NORMAL
PATH REPORT.GROSS SPEC: NORMAL
PATH REPORT.MICROSCOPIC SPEC OTHER STN: NORMAL
PATH REPORT.RELEVANT HX SPEC: NORMAL
PHOTO IMAGE: NORMAL

## 2024-04-10 PROCEDURE — 97110 THERAPEUTIC EXERCISES: CPT | Mod: GP | Performed by: PHYSICAL THERAPIST

## 2024-04-10 PROCEDURE — 97140 MANUAL THERAPY 1/> REGIONS: CPT | Mod: GP | Performed by: PHYSICAL THERAPIST

## 2024-04-10 NOTE — PATIENT INSTRUCTIONS
LYMPHEDEMA and EXERCISE HOME PROGRAM        1) Wear your compression knee highs or velcro wraps with sock liners daily removing at night  to limit edema refill. Laundering as directed. (Compression socks should be laundered after each use and line dry to bring back full elasticity.  Velcro wraps are spot clean only, velcro wrap liner socks laundered after each use)    2) Perform bandaging of your lower legs/feet as directed by your therapist; consider bandaging 1 leg 1 week then alternate bandaging other leg next week for total of 2x/month each leg/foot. option to leave on 1-2 days as long as feeling ok and looking ok to reduce edema accumulating with garments alone.    3) Use of Biotab compression pump 1-2x/day x 1 hour both legs preferably without clothing on legs.    4) Occasionally assess your legs  for edema before and after use of garments/bandaging/ pump to assist in assessing effectiveness or need for modifications.    5)  Perform Edema / Stretching / Strengthening / Aerobic / Balance exercises as directed. Insufficient activity is a risk factor for lymphedema.      6) Continue to follow good skin care practices and precautions.    7)  Compression garments on average have a 3-6 month life expectancy depending on type of garment (ready made vs custom) and their use and wear.  (1 pair AW compression knee highs should last 4 months, 2 pairs = 8 months) Replace your day compression garments based on signs of wear (such as excessive pilling, running, snags, or holes), loss of elasticity and compression, or improper fit (too tight or too loose).   A.  If your garment is still fitting well:  reorder the same type and size garment.  B.  If your garment is not fitting properly (because of change in arm / leg size): call your doctor to assure no further medical intervention indicated then consider a referral to see your edema therapist for a recheck.  C.  You may need to be re-measured for a new compression garment if  you have had a significant change in your weight (greater than 15-20lbs).  D.  A new onset of other medical conditions or challenges in using current garments may indicate the need for a new compression garment.    8)  Replace your bandages every 12 months or sooner if you notice loss of elasticity or excessive wear and tear despite proper care and laundering.    9)  Contact your physician with any worsening of edema not controlled with current home program, signs/symptoms of infection (redness, pain, warmth, fever, increased swelling) or increased challenges with current recommended home program.    10)  You should consider contact your edema therapist with any questions/concerns regarding your edema/ home program. You may need to contact your doctor first to get a new order if treatment indicated.

## 2024-04-10 NOTE — PROGRESS NOTES
04/10/24 0500   Appointment Info   Signing clinician's name / credentials Ines Rae, SHANIKA, Cristi   Total/Authorized Visits 8 session requested; Medicare; [employer] Federal Employee; certification   Visits Used 13   Medical Diagnosis lymphedema, LE edema   PT Tx Diagnosis lymphedema, fibrosis   Precautions/Limitations h/o R TRACIE but stating no precautions; falls precautions   Other pertinent information c/o worsening LE edema d/t venous insufficiency and limited benefit from compression stockings. PMHX:  Venous insufficiency s/p B vein abaltion, 4/26/2023 concussion d/t fall, heart block, arthritis with arthroplasties, hypothyroidism  Dates & types of surgery:  s/p LE ablations, 7/2019 RTKA, 11/2020 TR TRACIE; pt stating seen by cardiology about 2 years ago and no concerns   Progress Note/Certification   Onset of illness/injury or Date of Surgery 03/09/23   Therapy Frequency 1x/month x 3 months   Predicted Duration 90 days   Certification date from 01/16/24   Certification date to 04/15/24   Progress Note Completed Date 04/10/24   PT Goal 1   Goal Identifier Home program   Goal Description Patient will be independent in home program to manage conditions of lymphedema and fibrosis.   Rationale to maximize safety and independence with performance of ADLs and functional tasks;to maximize safety and independence within the home;to maximize safety and independence with self cares   Goal Progress MET   Target Date 04/15/24  (goal date extended as awaiting compression garments and compression pump)   Date Met 04/10/24   PT Goal 2   Goal Identifier LE volume   Goal Description Patient will demonstrate decreased volume of B LE by 5% to decrease risk of infection.   Rationale to maximize safety and independence with performance of ADLs and functional tasks;to maximize safety and independence with self cares   Goal Progress MET; R decreased 22.3%   Target Date 12/01/23   Date Met 12/06/23   PT Goal 3   Goal Identifier LLIS   Goal  Description Patient will demonstrate an 8 point decrease (6 or less) in LLIS ( to demonstrate a decreased impact of lymphedema on function.   Rationale to maximize safety and independence with performance of ADLs and functional tasks;to maximize safety and independence within the home;to maximize safety and independence within the community;to maximize safety and independence with self cares   Target Date 04/15/24  (goal date extended as just received new velcro compression garments and has not yet received pump)   Goal Progress not met; 9 (decreased 5 points) sufficient for discharge   Date Met 04/10/24   Subjective Report   Subjective Report to dept in new compression knee highs L LE and GCB R LE from yesterday.  typically wearing compression knee highs on both lower legs daily and 1x/week GCB of R LE x 24 hrs and stating has performed GCB of L LE as well   Objective Measure 1   Objective Measure LE edema   Details 1+ proximal R LE and 2+ L pretibial and b dorsal feet; 1+ distal R LE noted and rubbery fibrosis and hyperpigmentation of R>L LE; B medial knees with 1+ pitting and increased pooling edema B knees today   Objective Measure 2   Objective Measure volume to 40cm   Details R lower leg decreased 44% from highest volume, L LE decreased 3.8% from prior session   Objective Measure 3   Objective Measure LLIS   Details 9; decreased 5 points   Objective Measure 4   Objective Measure circumferences LEs   Details SA: B = 25cm; calf: R= 42cm; L = 38cm; length = 40cm; size large Therafirm Corespun knee highs regular length ($45-55) 10-15mmhg   Objective Measure 5   Objective Measure  strength   Details R = 10lbs; L = 31lbs likley due to cervical radiculopahthy with visible mm atrophy R>L hand   Objective Measure 6   Objective Measure garments/ compression pump   Details AW cotton casual size large knee highs 10-15mmhg; Sigvaris Compreflex size medium tall; / Biotab pump   Therapeutic Procedure/Exercise    Therapeutic Procedures: strength, endurance, ROM, flexibility minutes (81183) 15   Ther Proc 1 edema exs, walking for imroving edema, stretches, B HR/ seated TR   Skilled Intervention providing additional instruction in benefit of getting up and moving during day every hour with at least 1 longer duration walk using FWW and in perfomring LE edema exs full sequence 1x/day but to perfrom diaphragmatic breathing and ankle pumps thorughout the day. also instructed in standing HR and seated TR x 15-20 reps for addtional calf mm pump clearing effects and in performing standing GS stretching daily preferrably in am to optimize DF and decrease venous HTN/edema. also educated pt to continue with all other home program exs for core and LE and UE strengthening and balance   Patient Response/Progress verbalized understanding   Manual Therapy   Manual Therapy: Mobilization, MFR, MLD, friction massage minutes (29883) 45   Manual Therapy 1 volume, postiioning, garments, GCB, self lymphatic draiange;, pump   Skilled Intervention pt stating received comrpession pump about 1 week ago and using B legs 1x/day and reporting good edema reduction.  continues to wear comrpsesion knee highs daily removing at night. still reporting increased challenge donning velcro wraps due to worsening hand strength.  performing GCB of R LE only 1x/week leaving on x 24 hours.  assessed pt applied R LE GCB demonstrating no compression over much of foot and pt stating she realized she did not extend to base of toes and donning shoes also pushed back resulting in signficant pooling of edema R foot and mallowli but signficant reduction and limited edema of R lower leg with some pooling of edema proximal lower leg as well as L LE and B knees and distal thighs.  volume assessed with details in objective measures with further reduction especially R lower leg with addition of compression pump.  providing furtehr instruction in donning/doffing of both  compression knee highs and velcro com,pression wraps as pt reporting further hand weakness which may limit abiltiy to don garments.  pt to follow up with hand specialist.  providing further education in optimizing home program using AW compression knee highs daily or option to wear velcro wraps with sock liners removing at night and instructing pt in trialing performing GCB of 1 lower leg/foot 1 week then alternate legs next week for GCB 2/xweek both lower legs and continued use of compression pump which extends to thighs at least 1x/day.  updated home program and issued written instructions   Education   Learner/Method Patient   Plan   Home program get up and move every hour during day with at least 1 extended walk using FWW, positioning to decrease edema refill/constriction, B LE HC stretch, standing HR x 10-20, seated TRs x 10-20, edema exs; use of new velcro wraps or AW compression knee highs daily and removing daily for skin cares,  GCB of 1 lower leg/foot 1 week then alteranate to opposite leg next week, option to leave on 1-2 days if feeling and looking ok.   Updates to plan of care goals 1 and 2 met ; goal 3 partially met and sufficient for discharge   Plan for next session d/c to home program   Comments   Comments garment/bandaging order for [employer] velcro and comrpession garments and GCB supplies Rachel Miller MD; ([facility] Balance:  alternating toe taps, STS with chair in front, standard stance with HTs); Biotab pump   Total Session Time   Timed Code Treatment Minutes 60   Total Treatment Time (sum of timed and untimed services) 60         DISCHARGE  Reason for Discharge: Patient has met all goals.    Equipment Issued: Biotab compression pump; bandaging supplies for lower legs, velcro comrpession wraps with sock liners B Les; AW cotton knee highs 10-15mmhg    Discharge Plan: Patient to continue home program.    Referring Provider:  Rachel Wong-*     No

## 2024-04-17 ENCOUNTER — LAB (OUTPATIENT)
Dept: LAB | Facility: CLINIC | Age: 79
End: 2024-04-17
Payer: MEDICARE

## 2024-04-17 DIAGNOSIS — D50.9 IRON DEFICIENCY ANEMIA, UNSPECIFIED IRON DEFICIENCY ANEMIA TYPE: ICD-10-CM

## 2024-04-17 LAB
ERYTHROCYTE [DISTWIDTH] IN BLOOD BY AUTOMATED COUNT: 16.5 % (ref 10–15)
HCT VFR BLD AUTO: 31.3 % (ref 35–47)
HGB BLD-MCNC: 9.3 G/DL (ref 11.7–15.7)
MCH RBC QN AUTO: 22.4 PG (ref 26.5–33)
MCHC RBC AUTO-ENTMCNC: 29.7 G/DL (ref 31.5–36.5)
MCV RBC AUTO: 75 FL (ref 78–100)
PLATELET # BLD AUTO: 456 10E3/UL (ref 150–450)
RBC # BLD AUTO: 4.16 10E6/UL (ref 3.8–5.2)
WBC # BLD AUTO: 5.7 10E3/UL (ref 4–11)

## 2024-04-17 PROCEDURE — 82728 ASSAY OF FERRITIN: CPT

## 2024-04-17 PROCEDURE — 36415 COLL VENOUS BLD VENIPUNCTURE: CPT

## 2024-04-17 PROCEDURE — 85027 COMPLETE CBC AUTOMATED: CPT

## 2024-04-17 PROCEDURE — 83540 ASSAY OF IRON: CPT

## 2024-04-17 PROCEDURE — 83550 IRON BINDING TEST: CPT

## 2024-04-19 LAB
FERRITIN SERPL-MCNC: 13 NG/ML (ref 11–328)
IRON BINDING CAPACITY (ROCHE): 512 UG/DL (ref 240–430)
IRON SATN MFR SERPL: 3 % (ref 15–46)
IRON SERPL-MCNC: 17 UG/DL (ref 37–145)

## 2024-04-23 ENCOUNTER — OFFICE VISIT (OUTPATIENT)
Dept: INTERNAL MEDICINE | Facility: CLINIC | Age: 79
End: 2024-04-23
Payer: MEDICARE

## 2024-04-23 VITALS
RESPIRATION RATE: 18 BRPM | SYSTOLIC BLOOD PRESSURE: 154 MMHG | TEMPERATURE: 97.5 F | DIASTOLIC BLOOD PRESSURE: 63 MMHG | WEIGHT: 189.1 LBS | OXYGEN SATURATION: 99 % | HEIGHT: 68 IN | BODY MASS INDEX: 28.66 KG/M2 | HEART RATE: 77 BPM

## 2024-04-23 DIAGNOSIS — D50.9 IRON DEFICIENCY ANEMIA, UNSPECIFIED IRON DEFICIENCY ANEMIA TYPE: Primary | ICD-10-CM

## 2024-04-23 PROCEDURE — 99214 OFFICE O/P EST MOD 30 MIN: CPT | Performed by: INTERNAL MEDICINE

## 2024-04-23 RX ORDER — EPINEPHRINE 1 MG/ML
0.3 INJECTION, SOLUTION, CONCENTRATE INTRAVENOUS EVERY 5 MIN PRN
Status: CANCELLED | OUTPATIENT
Start: 2024-04-30

## 2024-04-23 RX ORDER — HEPARIN SODIUM (PORCINE) LOCK FLUSH IV SOLN 100 UNIT/ML 100 UNIT/ML
5 SOLUTION INTRAVENOUS
Status: CANCELLED | OUTPATIENT
Start: 2024-04-30

## 2024-04-23 RX ORDER — METHYLPREDNISOLONE SODIUM SUCCINATE 125 MG/2ML
125 INJECTION, POWDER, LYOPHILIZED, FOR SOLUTION INTRAMUSCULAR; INTRAVENOUS
Status: CANCELLED
Start: 2024-04-30

## 2024-04-23 RX ORDER — HEPARIN SODIUM,PORCINE 10 UNIT/ML
5-20 VIAL (ML) INTRAVENOUS DAILY PRN
Status: CANCELLED | OUTPATIENT
Start: 2024-04-30

## 2024-04-23 RX ORDER — ALBUTEROL SULFATE 0.83 MG/ML
2.5 SOLUTION RESPIRATORY (INHALATION)
Status: CANCELLED | OUTPATIENT
Start: 2024-04-30

## 2024-04-23 RX ORDER — MEPERIDINE HYDROCHLORIDE 25 MG/ML
25 INJECTION INTRAMUSCULAR; INTRAVENOUS; SUBCUTANEOUS EVERY 30 MIN PRN
Status: CANCELLED | OUTPATIENT
Start: 2024-04-30

## 2024-04-23 RX ORDER — ALBUTEROL SULFATE 90 UG/1
1-2 AEROSOL, METERED RESPIRATORY (INHALATION)
Status: CANCELLED
Start: 2024-04-30

## 2024-04-23 RX ORDER — DIPHENHYDRAMINE HYDROCHLORIDE 50 MG/ML
50 INJECTION INTRAMUSCULAR; INTRAVENOUS
Status: CANCELLED
Start: 2024-04-30

## 2024-04-23 ASSESSMENT — PAIN SCALES - GENERAL: PAINLEVEL: NO PAIN (0)

## 2024-04-23 NOTE — PROGRESS NOTES
Dr Wong's note      Patient's instructions / PLAN:                                                        Plan:  We will arrange iron infusion  2. If Injectafer is not covered, we will order Venofer   3. Please make a lab appointment for non fasting labs  in June 4. Please make an appointment few days after the labs to discuss about the results.           ASSESSMENT & PLAN:                                                      (D50.9) Iron deficiency anemia, unspecified iron deficiency anemia type  (primary encounter diagnosis)  Comment: no obvious etiol, see below   Plan: CBC with platelets, Iron & Iron Binding         Capacity, Ferritin, CBC with platelets, Iron &         Iron Binding Capacity, Ferritin        Iv iron as venofer or injectafer          Chief complaint:                                                      Iron def anemia    SUBJECTIVE:                                                    History of present illness:      Iron Anemia  -- EGD neg   -- stomach pain w po Iron   -- neg colonoscopy 2019  -- no GI symptoms  -- neg cologuard  -- no blood in stools or urine    JAn LOV: Plan:  Upper endoscopy  - EGD   2. Cologuard stool test  3. Start Iron 1 tablet twice a week and if no cosntipation increase it to 1 tablet daily  -- Vitron is a good brand -- over the counter   4. Urine test today - [address]  5. Continue same meds, same doses for now   6. Please make a lab appointment for non fasting labs  in April 7. Please make an appointment few days after the labs to discuss about the results.   8. The following vaccines are recommended for you. Please check with your insurance about coverage.  Some insurances cover better if you have these vaccines at the pharmacy:  -- Pneumonia 20   9. Echocardiogram -- To schedule this test please call [facility] at: [phone]     Shalonda Beth is a 78 year old, presenting for the following health issues:  Follow Up      4/23/2024    12:46 PM   Additional  "Questions   Roomed by Tori Lawson     [facility]       Hyperlipidemia Follow-Up    Are you regularly taking any medication or supplement to lower your cholesterol?   Yes- pravastatin  Are you having muscle aches or other side effects that you think could be caused by your cholesterol lowering medication?  Yes-      Hypertension Follow-up    Do you check your blood pressure regularly outside of the clinic? No   Are you following a low salt diet? Yes  Are your blood pressures ever more than 140 on the top number (systolic) OR more   than 90 on the bottom number (diastolic), for example 140/90? Yes  How many servings of fruits and vegetables do you eat daily?  0-1  On average, how many sweetened beverages do you drink each day (Examples: soda, juice, sweet tea, etc.  Do NOT count diet or artificially sweetened beverages)?   1  How many days per week do you exercise enough to make your heart beat faster? 3 or less  How many minutes a day do you exercise enough to make your heart beat faster? 9 or less  How many days per week do you miss taking your medication? 0      Review of Systems:                                                      ROS: negative for fever, chills, cough, wheezes, chest pain, shortness of breath, vomiting, abdominal pain, leg swelling      OBJECTIVE:             Physical exam:  Blood pressure (!) 154/63, pulse 77, temperature 97.5  F (36.4  C), temperature source Tympanic, resp. rate 18, height 1.727 m (5' 8\"), weight 85.8 kg (189 lb 1.6 oz), SpO2 99%, not currently breastfeeding.     NAD, appears comfortable    Neurologic: A, Ox3, no focal signs appreciated    PMHx: reviewed  Past Medical History:   Diagnosis Date    Concussion with loss of consciousness, initial encounter 4/26/2023    First degree AV block 08/11/2015    Former smoker     Gastroesophageal reflux disease     GERD (gastroesophageal reflux disease)     Glaucoma     Heart block     2:1    Heart murmur     Hyperlipidemia LDL goal <130 " 06/14/2013    Hypertension goal BP (blood pressure) < 140/90 12/03/2013    Hypothyroidism     Left atrial dilatation     mild    Mild dilation of ascending aorta - borderline 08/11/2015    Palpitations     Prediabetes 06/14/2013    RBBB 08/11/2015    S/P total knee arthroplasty 07/23/2019    Tachycardia     Urinary frequency     Varicose veins     Venous insufficiency     s/p bilateral great saphenous vein radiofrequency ablation by Dr. Garcia      PSHx: reviewed  Past Surgical History:   Procedure Laterality Date    ARTHROPLASTY HIP Right 11/20/2020    Procedure: Right total hip arthroplasty using a Biomet Taperloc femoral stem and G7 acetabulum.;  Surgeon: Dragan Muse MD;  Location: [facility] OR    ARTHROPLASTY KNEE Right 07/23/2019    Procedure: Right total knee arthroplasty using an Arthrex Netviewerlance knee system;  Surgeon: Dragan Muse MD;  Location: [facility] OR    BREAST SURGERY      right breast ductal surgery due to milk production    BREAST SURGERY Right     ductal surgery due to milk production    CATARACT EXTRACTION Right 05/2022    COLONOSCOPY N/A 10/24/2014    no further screening. Procedure: COLONOSCOPY;  Surgeon: Pedro Rudd MD;  Location: [facility] GI    DECOMPRESSION HIP CORE Right 6/6/2022    Procedure: Open right hip abductor repair;  Surgeon: Dragan Muse MD;  Location:  OR    ESOPHAGOSCOPY, GASTROSCOPY, DUODENOSCOPY (EGD), COMBINED N/A 4/9/2024    Procedure: Esophagoscopy, gastroscopy, duodenoscopy (EGD), combined biopsies using cold bx forcep;  Surgeon: Pdero Rudd MD;  Location:  GI    EYE SURGERY      FINGER GANGLION CYST EXCISION Left     ring finger    HYSTERECTOMY      HYSTERECTOMY, PAP NO LONGER INDICATED  2009    total hysterectomy and ovaries. benign    SOFT TISSUE SURGERY      ganglion removed from left wrist and ring finger    SURGICAL HISTORY OF -   age 7    tonsillectomy    SURGICAL HISTORY OF -   01/2015    left eye cataract    SURGICAL  HISTORY OF -   Fall 2016    LINQ placed.    TONSILLECTOMY      WRIST GANGLION EXCISION Left         Meds: reviewed  Current Outpatient Medications   Medication Sig Dispense Refill    acetaminophen (TYLENOL) 325 MG tablet Take 3 tablets (975 mg) by mouth every 8 hours (Patient taking differently: Take 650 mg by mouth at bedtime)      carboxymethylcellulose (CARBOXYMETHYLCELLULOSE SODIUM) 0.5 % SOLN ophthalmic solution Place 1 drop into both eyes 4 times daily as needed for dry eyes      dorzolamide-timolol PF (COSOPT PF) 2-0.5 % opthalmic solutionh Place 1 drop into both eyes 2 times daily      furosemide (LASIX) 20 MG tablet Take 1 tablet (20 mg) by mouth 2 times daily 180 tablet 3    gabapentin (NEURONTIN) 300 MG capsule Take 1 capsule (300 mg) by mouth 3 times daily 270 capsule 1    latanoprost (XALATAN) 0.005 % ophthalmic solution Place 1 drop into the right eye At Bedtime       levothyroxine (SYNTHROID/LEVOTHROID) 88 MCG tablet Take 1 tablet (88 mcg) by mouth daily 90 tablet 1    metoprolol tartrate (LOPRESSOR) 25 MG tablet TAKE 1 TABLET(25 MG) BY MOUTH TWICE DAILY 180 tablet 1    Multiple Vitamins-Minerals (MULTIVITAMIN ADULT PO) Take 1 tablet by mouth daily      pantoprazole (PROTONIX) 40 MG EC tablet TAKE 1 TABLET(40 MG) BY MOUTH DAILY 90 tablet 0    potassium chloride ER (KLOR-CON M) 10 MEQ CR tablet TAKE 1 TABLET(10 MEQ) BY MOUTH DAILY 90 tablet 2    pravastatin (PRAVACHOL) 40 MG tablet Take 1 tablet (40 mg) by mouth at bedtime 90 tablet 1       Soc Hx: reviewed  Fam Hx: reviewed      Chart documentation was completed, in part, with [employer] voice-recognition software. Even though reviewed, some grammatical, spelling, and word errors may remain.      Rachel Wong MD  Internal Medicine       Signed Electronically by: Rachel Miller MD

## 2024-04-23 NOTE — PATIENT INSTRUCTIONS
Plan:  We will arrange iron infusion  2. If Injectafer is not covered, we will order Venofer   3. Please make a lab appointment for non fasting labs  in June 4. Please make an appointment few days after the labs to discuss about the results.

## 2024-05-10 ENCOUNTER — INFUSION THERAPY VISIT (OUTPATIENT)
Dept: INFUSION THERAPY | Facility: CLINIC | Age: 79
End: 2024-05-10
Attending: INTERNAL MEDICINE
Payer: MEDICARE

## 2024-05-10 VITALS
TEMPERATURE: 97.8 F | RESPIRATION RATE: 16 BRPM | HEART RATE: 68 BPM | OXYGEN SATURATION: 95 % | SYSTOLIC BLOOD PRESSURE: 127 MMHG | DIASTOLIC BLOOD PRESSURE: 75 MMHG

## 2024-05-10 DIAGNOSIS — D50.9 IRON DEFICIENCY ANEMIA, UNSPECIFIED IRON DEFICIENCY ANEMIA TYPE: Primary | ICD-10-CM

## 2024-05-10 PROCEDURE — 258N000003 HC RX IP 258 OP 636: Performed by: INTERNAL MEDICINE

## 2024-05-10 PROCEDURE — 250N000011 HC RX IP 250 OP 636: Mod: JZ | Performed by: INTERNAL MEDICINE

## 2024-05-10 PROCEDURE — 96365 THER/PROPH/DIAG IV INF INIT: CPT

## 2024-05-10 RX ORDER — EPINEPHRINE 1 MG/ML
0.3 INJECTION, SOLUTION INTRAMUSCULAR; SUBCUTANEOUS EVERY 5 MIN PRN
Status: CANCELLED | OUTPATIENT
Start: 2024-05-17

## 2024-05-10 RX ORDER — DIPHENHYDRAMINE HYDROCHLORIDE 50 MG/ML
50 INJECTION INTRAMUSCULAR; INTRAVENOUS
Status: CANCELLED
Start: 2024-05-17

## 2024-05-10 RX ORDER — HEPARIN SODIUM,PORCINE 10 UNIT/ML
5-20 VIAL (ML) INTRAVENOUS DAILY PRN
Status: CANCELLED | OUTPATIENT
Start: 2024-05-17

## 2024-05-10 RX ORDER — METHYLPREDNISOLONE SODIUM SUCCINATE 125 MG/2ML
125 INJECTION, POWDER, LYOPHILIZED, FOR SOLUTION INTRAMUSCULAR; INTRAVENOUS
Status: CANCELLED
Start: 2024-05-17

## 2024-05-10 RX ORDER — ALBUTEROL SULFATE 90 UG/1
1-2 AEROSOL, METERED RESPIRATORY (INHALATION)
Status: CANCELLED
Start: 2024-05-17

## 2024-05-10 RX ORDER — MEPERIDINE HYDROCHLORIDE 25 MG/ML
25 INJECTION INTRAMUSCULAR; INTRAVENOUS; SUBCUTANEOUS EVERY 30 MIN PRN
Status: CANCELLED | OUTPATIENT
Start: 2024-05-17

## 2024-05-10 RX ORDER — ALBUTEROL SULFATE 0.83 MG/ML
2.5 SOLUTION RESPIRATORY (INHALATION)
Status: CANCELLED | OUTPATIENT
Start: 2024-05-17

## 2024-05-10 RX ORDER — HEPARIN SODIUM (PORCINE) LOCK FLUSH IV SOLN 100 UNIT/ML 100 UNIT/ML
5 SOLUTION INTRAVENOUS
Status: CANCELLED | OUTPATIENT
Start: 2024-05-17

## 2024-05-10 RX ADMIN — SODIUM CHLORIDE 250 ML: 9 INJECTION, SOLUTION INTRAVENOUS at 12:53

## 2024-05-10 RX ADMIN — FERRIC CARBOXYMALTOSE INJECTION 750 MG: 50 INJECTION, SOLUTION INTRAVENOUS at 12:55

## 2024-05-10 NOTE — PROGRESS NOTES
Infusion Nursing Note:  Teri Beltran presents today for Injectafer #1 of 2.    Patient seen by provider today: No   present during visit today: Not Applicable.    Note: Patient oriented to infusion center, room and call bell system.  Educated on Injectafer, including potential side effects, signs/symptoms to monitor for, and when to seek medical attention.  Written material on Injectafer given to patient.  All questions answered.  Patient verbalized understanding and is in agreement with this plan.    Intravenous Access:  Peripheral IV placed.    Treatment Conditions:   04/17/24 13:32   Ferritin 13   Iron 17 (L)   Iron Binding Capacity 512 (H)   Iron Sat Index 3 (L)   Hemoglobin 9.3 (L)     Post Infusion Assessment:  Patient tolerated infusion without incident.  Patient observed for 30 minutes post Injectafer per protocol.  Blood return noted pre and post infusion.  Site patent and intact, free from redness, edema or discomfort.  No evidence of extravasations.  Access discontinued per protocol.     Discharge Plan:   Discharge instructions reviewed with: Patient.  Patient and/or family verbalized understanding of discharge instructions and all questions answered.  AVS to patient via [place].  Patient will return 5/17 for next appointment.   Patient discharged in stable condition accompanied by: self.  Departure Mode: Ambulatory with walker.      Luis Moyer RN

## 2024-05-17 ENCOUNTER — INFUSION THERAPY VISIT (OUTPATIENT)
Dept: INFUSION THERAPY | Facility: CLINIC | Age: 79
End: 2024-05-17
Attending: INTERNAL MEDICINE
Payer: MEDICARE

## 2024-05-17 VITALS
SYSTOLIC BLOOD PRESSURE: 114 MMHG | RESPIRATION RATE: 16 BRPM | DIASTOLIC BLOOD PRESSURE: 73 MMHG | HEART RATE: 71 BPM | TEMPERATURE: 98.6 F | OXYGEN SATURATION: 96 %

## 2024-05-17 DIAGNOSIS — D50.9 IRON DEFICIENCY ANEMIA, UNSPECIFIED IRON DEFICIENCY ANEMIA TYPE: Primary | ICD-10-CM

## 2024-05-17 PROCEDURE — 250N000011 HC RX IP 250 OP 636: Mod: JZ | Performed by: INTERNAL MEDICINE

## 2024-05-17 PROCEDURE — 258N000003 HC RX IP 258 OP 636: Performed by: INTERNAL MEDICINE

## 2024-05-17 PROCEDURE — 96365 THER/PROPH/DIAG IV INF INIT: CPT

## 2024-05-17 RX ORDER — EPINEPHRINE 1 MG/ML
0.3 INJECTION, SOLUTION INTRAMUSCULAR; SUBCUTANEOUS EVERY 5 MIN PRN
OUTPATIENT
Start: 2024-05-17

## 2024-05-17 RX ORDER — DIPHENHYDRAMINE HYDROCHLORIDE 50 MG/ML
50 INJECTION INTRAMUSCULAR; INTRAVENOUS
Start: 2024-05-17

## 2024-05-17 RX ORDER — METHYLPREDNISOLONE SODIUM SUCCINATE 125 MG/2ML
125 INJECTION, POWDER, LYOPHILIZED, FOR SOLUTION INTRAMUSCULAR; INTRAVENOUS
Start: 2024-05-17

## 2024-05-17 RX ORDER — HEPARIN SODIUM (PORCINE) LOCK FLUSH IV SOLN 100 UNIT/ML 100 UNIT/ML
5 SOLUTION INTRAVENOUS
OUTPATIENT
Start: 2024-05-17

## 2024-05-17 RX ORDER — ALBUTEROL SULFATE 90 UG/1
1-2 AEROSOL, METERED RESPIRATORY (INHALATION)
Start: 2024-05-17

## 2024-05-17 RX ORDER — ALBUTEROL SULFATE 0.83 MG/ML
2.5 SOLUTION RESPIRATORY (INHALATION)
OUTPATIENT
Start: 2024-05-17

## 2024-05-17 RX ORDER — HEPARIN SODIUM,PORCINE 10 UNIT/ML
5-20 VIAL (ML) INTRAVENOUS DAILY PRN
OUTPATIENT
Start: 2024-05-17

## 2024-05-17 RX ORDER — MEPERIDINE HYDROCHLORIDE 25 MG/ML
25 INJECTION INTRAMUSCULAR; INTRAVENOUS; SUBCUTANEOUS EVERY 30 MIN PRN
OUTPATIENT
Start: 2024-05-17

## 2024-05-17 RX ADMIN — FERRIC CARBOXYMALTOSE INJECTION 750 MG: 50 INJECTION, SOLUTION INTRAVENOUS at 14:31

## 2024-05-17 RX ADMIN — SODIUM CHLORIDE 250 ML: 9 INJECTION, SOLUTION INTRAVENOUS at 14:32

## 2024-05-17 ASSESSMENT — PAIN SCALES - GENERAL: PAINLEVEL: MODERATE PAIN (4)

## 2024-05-17 NOTE — PROGRESS NOTES
Infusion Nursing Note:  Teri Beltran presents today for Injectafer, dose 2 of 2.    Patient seen by provider today: No   present during visit today: Not Applicable.    Note: Reports some improved fatigue since receiving first dose. Had some mild stomach upset after first dose; informed her this may happen with second dose as well.    Has follow-up scheduled with PCP in June.    Intravenous Access:  Peripheral IV placed.    Treatment Conditions:  Not Applicable.    Post Infusion Assessment:  Patient tolerated infusion without incident.  Patient observed for 30 minutes post Injectafer per protocol.  Blood return noted pre and post infusion.  Site patent and intact, free from redness, edema or discomfort.  No evidence of extravasations.  Access discontinued per protocol.     Discharge Plan:   AVS to patient via MYCHART.  Patient discharged in stable condition accompanied by: self.  Departure Mode: Ambulatory and walker.    Arelis Lewis RN

## 2024-05-20 ENCOUNTER — TELEPHONE (OUTPATIENT)
Dept: INTERNAL MEDICINE | Facility: CLINIC | Age: 79
End: 2024-05-20
Payer: MEDICARE

## 2024-05-31 DIAGNOSIS — R10.13 EPIGASTRIC PAIN: ICD-10-CM

## 2024-06-02 RX ORDER — PANTOPRAZOLE SODIUM 40 MG/1
40 TABLET, DELAYED RELEASE ORAL DAILY
Qty: 90 TABLET | Refills: 1 | Status: SHIPPED | OUTPATIENT
Start: 2024-06-02 | End: 2024-09-03

## 2024-06-18 ENCOUNTER — LAB (OUTPATIENT)
Dept: LAB | Facility: CLINIC | Age: 79
End: 2024-06-18
Payer: MEDICARE

## 2024-06-18 DIAGNOSIS — D50.9 IRON DEFICIENCY ANEMIA, UNSPECIFIED IRON DEFICIENCY ANEMIA TYPE: ICD-10-CM

## 2024-06-18 LAB
ACANTHOCYTES BLD QL SMEAR: ABNORMAL
AUER BODIES BLD QL SMEAR: ABNORMAL
BASO STIPL BLD QL SMEAR: ABNORMAL
BITE CELLS BLD QL SMEAR: ABNORMAL
BLISTER CELLS BLD QL SMEAR: ABNORMAL
BURR CELLS BLD QL SMEAR: ABNORMAL
DACRYOCYTES BLD QL SMEAR: ABNORMAL
ELLIPTOCYTES BLD QL SMEAR: SLIGHT
ERYTHROCYTE [DISTWIDTH] IN BLOOD BY AUTOMATED COUNT: ABNORMAL %
FERRITIN SERPL-MCNC: 217 NG/ML (ref 11–328)
FRAGMENTS BLD QL SMEAR: SLIGHT
GIANT PLATELETS BLD QL SMEAR: ABNORMAL
HCT VFR BLD AUTO: 38.8 % (ref 35–47)
HGB BLD-MCNC: 11.7 G/DL (ref 11.7–15.7)
HGB C CRYSTALS: ABNORMAL
HOWELL-JOLLY BOD BLD QL SMEAR: ABNORMAL
IRON BINDING CAPACITY (ROCHE): 303 UG/DL (ref 240–430)
IRON SATN MFR SERPL: 19 % (ref 15–46)
IRON SERPL-MCNC: 57 UG/DL (ref 37–145)
MCH RBC QN AUTO: 25.7 PG (ref 26.5–33)
MCHC RBC AUTO-ENTMCNC: 30.2 G/DL (ref 31.5–36.5)
MCV RBC AUTO: 85 FL (ref 78–100)
NEUTS HYPERSEG BLD QL SMEAR: ABNORMAL
PATH REV: ABNORMAL
PLAT MORPH BLD: ABNORMAL
PLATELET # BLD AUTO: 371 10E3/UL (ref 150–450)
POLYCHROMASIA BLD QL SMEAR: ABNORMAL
RBC # BLD AUTO: 4.56 10E6/UL (ref 3.8–5.2)
RBC AGGLUT BLD QL: ABNORMAL
RBC MORPH BLD: ABNORMAL
ROULEAUX BLD QL SMEAR: ABNORMAL
SICKLE CELLS BLD QL SMEAR: ABNORMAL
SMUDGE CELLS BLD QL SMEAR: ABNORMAL
SPHEROCYTES BLD QL SMEAR: ABNORMAL
STOMATOCYTES BLD QL SMEAR: ABNORMAL
TARGETS BLD QL SMEAR: ABNORMAL
TOXIC GRANULES BLD QL SMEAR: ABNORMAL
VARIANT LYMPHS BLD QL SMEAR: ABNORMAL
WBC # BLD AUTO: 5.7 10E3/UL (ref 4–11)

## 2024-06-18 PROCEDURE — 83550 IRON BINDING TEST: CPT

## 2024-06-18 PROCEDURE — 36415 COLL VENOUS BLD VENIPUNCTURE: CPT

## 2024-06-18 PROCEDURE — 85027 COMPLETE CBC AUTOMATED: CPT

## 2024-06-18 PROCEDURE — 83540 ASSAY OF IRON: CPT

## 2024-06-18 PROCEDURE — 82728 ASSAY OF FERRITIN: CPT

## 2024-06-25 ENCOUNTER — OFFICE VISIT (OUTPATIENT)
Dept: INTERNAL MEDICINE | Facility: CLINIC | Age: 79
End: 2024-06-25
Payer: MEDICARE

## 2024-06-25 VITALS
DIASTOLIC BLOOD PRESSURE: 60 MMHG | SYSTOLIC BLOOD PRESSURE: 110 MMHG | OXYGEN SATURATION: 96 % | WEIGHT: 186.3 LBS | HEIGHT: 68 IN | TEMPERATURE: 98 F | BODY MASS INDEX: 28.23 KG/M2 | RESPIRATION RATE: 18 BRPM | HEART RATE: 80 BPM

## 2024-06-25 DIAGNOSIS — R71.8 ABNORMAL RBC: Primary | ICD-10-CM

## 2024-06-25 DIAGNOSIS — D50.9 IRON DEFICIENCY ANEMIA, UNSPECIFIED IRON DEFICIENCY ANEMIA TYPE: ICD-10-CM

## 2024-06-25 PROCEDURE — 99213 OFFICE O/P EST LOW 20 MIN: CPT | Performed by: INTERNAL MEDICINE

## 2024-06-25 ASSESSMENT — PAIN SCALES - GENERAL: PAINLEVEL: NO PAIN (0)

## 2024-06-25 NOTE — PROGRESS NOTES
Dr Wong's note      Patient's instructions / PLAN:                                                        Plan:  Continue same meds, same doses for now   2. Schedule follow up appointment in Oct _ Nov   3. Please make a lab appointment for non fasting labs  in a month  4. If abnormal labs, I might consider Hematology referral      ASSESSMENT & PLAN:                                                      (R71.8) Abnormal RBC  (primary encounter diagnosis)  Comment:   Plan: Lab Blood Morphology Pathologist Review            (D50.9) Iron deficiency anemia, unspecified iron deficiency anemia type  Comment: Better after infusion, no source of bleeding  Plan: Monitor hemoglobin       Chief complaint:                                                      Iron deficiency anemia    SUBJECTIVE:                                                    History of present illness:    Iron Def anemia  -- s/p iron infusion  -- Labs: Hgb and Iron are normal   -- fragmented RBC on periph smear    Heart murmur 2/6      Subjective   Ray is a 78 year old, presenting for the following health issues:  Patient is being seen for an follow up.      6/25/2024     2:11 PM   Additional Questions   Roomed by Tori Lawson     HPI       Hyperlipidemia Follow-Up    Are you regularly taking any medication or supplement to lower your cholesterol?   Yes- Pravastatin  Are you having muscle aches or other side effects that you think could be caused by your cholesterol lowering medication?  Yes- muscle aches    Hypertension Follow-up    Do you check your blood pressure regularly outside of the clinic? No   Are you following a low salt diet? Yes  Are your blood pressures ever more than 140 on the top number (systolic) OR more   than 90 on the bottom number (diastolic), for example 140/90? No  How many servings of fruits and vegetables do you eat daily?  0-1  On average, how many sweetened beverages do you drink each day (Examples: soda, juice, sweet tea, etc.   "Do NOT count diet or artificially sweetened beverages)?   0  How many days per week do you exercise enough to make your heart beat faster? 3 or less  How many minutes a day do you exercise enough to make your heart beat faster? 9 or less  How many days per week do you miss taking your medication? 0      Review of Systems:                                                      ROS: negative for fever, chills, cough, wheezes, chest pain, shortness of breath, vomiting, abdominal pain, leg swelling       OBJECTIVE:             Physical exam:  Blood pressure 110/60, pulse 80, temperature 98  F (36.7  C), temperature source Tympanic, resp. rate 18, height 1.727 m (5' 8\"), weight 84.5 kg (186 lb 4.8 oz), SpO2 96%, not currently breastfeeding.     NAD, appears comfortable      PMHx: reviewed  Past Medical History:   Diagnosis Date    Concussion with loss of consciousness, initial encounter 4/26/2023    First degree AV block 08/11/2015    Former smoker     Gastroesophageal reflux disease     GERD (gastroesophageal reflux disease)     Glaucoma     Heart block     2:1    Heart murmur     Hyperlipidemia LDL goal <130 06/14/2013    Hypertension goal BP (blood pressure) < 140/90 12/03/2013    Hypothyroidism     Left atrial dilatation     mild    Mild dilation of ascending aorta - borderline 08/11/2015    Palpitations     Prediabetes 06/14/2013    RBBB 08/11/2015    S/P total knee arthroplasty 07/23/2019    Tachycardia     Urinary frequency     Varicose veins     Venous insufficiency     s/p bilateral great saphenous vein radiofrequency ablation by Dr. Garcia      PSHx: reviewed  Past Surgical History:   Procedure Laterality Date    ARTHROPLASTY HIP Right 11/20/2020    Procedure: Right total hip arthroplasty using a Biomet Taperloc femoral stem and G7 acetabulum.;  Surgeon: Dragan Muse MD;  Location: [facility] OR    ARTHROPLASTY KNEE Right 07/23/2019    Procedure: Right total knee arthroplasty using an Arthrex iBalance " knee system;  Surgeon: Dragan Muse MD;  Location: [facility] OR    BREAST SURGERY      right breast ductal surgery due to milk production    BREAST SURGERY Right     ductal surgery due to milk production    CATARACT EXTRACTION Right 05/2022    COLONOSCOPY N/A 10/24/2014    no further screening. Procedure: COLONOSCOPY;  Surgeon: Pedro Rudd MD;  Location: [facility]    DECOMPRESSION HIP CORE Right 6/6/2022    Procedure: Open right hip abductor repair;  Surgeon: Dragan Muse MD;  Location: [facility] OR    ESOPHAGOSCOPY, GASTROSCOPY, DUODENOSCOPY (EGD), COMBINED N/A 4/9/2024    Procedure: Esophagoscopy, gastroscopy, duodenoscopy (EGD), combined biopsies using cold bx forcep;  Surgeon: Pedro Rudd MD;  Location: [facility]    EYE SURGERY      FINGER GANGLION CYST EXCISION Left     ring finger    HYSTERECTOMY      HYSTERECTOMY, PAP NO LONGER INDICATED  2009    total hysterectomy and ovaries. benign    SOFT TISSUE SURGERY      ganglion removed from left wrist and ring finger    SURGICAL HISTORY OF -   age 7    tonsillectomy    SURGICAL HISTORY OF -   01/2015    left eye cataract    SURGICAL HISTORY OF -   Fall 2016    LINQ placed.    TONSILLECTOMY      WRIST GANGLION EXCISION Left         Meds: reviewed  Current Outpatient Medications   Medication Sig Dispense Refill    acetaminophen (TYLENOL) 325 MG tablet Take 3 tablets (975 mg) by mouth every 8 hours (Patient taking differently: Take 650 mg by mouth at bedtime)      carboxymethylcellulose (CARBOXYMETHYLCELLULOSE SODIUM) 0.5 % SOLN ophthalmic solution Place 1 drop into both eyes 4 times daily as needed for dry eyes      dorzolamide-timolol PF (COSOPT PF) 2-0.5 % opthalmic solutionh Place 1 drop into both eyes 2 times daily      furosemide (LASIX) 20 MG tablet Take 1 tablet (20 mg) by mouth 2 times daily 180 tablet 3    gabapentin (NEURONTIN) 300 MG capsule Take 1 capsule (300 mg) by mouth 3 times daily 270 capsule 1    latanoprost (XALATAN) 0.005 %  ophthalmic solution Place 1 drop into the right eye At Bedtime       levothyroxine (SYNTHROID/LEVOTHROID) 88 MCG tablet Take 1 tablet (88 mcg) by mouth daily 90 tablet 1    metoprolol tartrate (LOPRESSOR) 25 MG tablet TAKE 1 TABLET(25 MG) BY MOUTH TWICE DAILY 180 tablet 1    Multiple Vitamins-Minerals (MULTIVITAMIN ADULT PO) Take 1 tablet by mouth daily      pantoprazole (PROTONIX) 40 MG EC tablet TAKE 1 TABLET(40 MG) BY MOUTH DAILY 90 tablet 1    potassium chloride ER (KLOR-CON M) 10 MEQ CR tablet TAKE 1 TABLET(10 MEQ) BY MOUTH DAILY 90 tablet 2    pravastatin (PRAVACHOL) 40 MG tablet Take 1 tablet (40 mg) by mouth at bedtime 90 tablet 1       Soc Hx: reviewed  Fam Hx: reviewed      Chart documentation was completed, in part, with [employer] voice-recognition software. Even though reviewed, some grammatical, spelling, and word errors may remain.      Rachel Wong MD  Internal Medicine           Signed Electronically by: Rachel Miller MD

## 2024-06-25 NOTE — PATIENT INSTRUCTIONS
Plan:  Continue same meds, same doses for now   2. Schedule follow up appointment in Oct _ Nov   3. Please make a lab appointment for non fasting labs  in a month  4. If abnormal labs, I might consider Hematology referral

## 2024-07-25 ENCOUNTER — LAB (OUTPATIENT)
Dept: LAB | Facility: CLINIC | Age: 79
End: 2024-07-25
Payer: MEDICARE

## 2024-07-25 DIAGNOSIS — R71.8 ABNORMAL RBC: ICD-10-CM

## 2024-07-25 LAB
BASOPHILS # BLD AUTO: 0.1 10E3/UL (ref 0–0.2)
BASOPHILS NFR BLD AUTO: 1 %
EOSINOPHIL # BLD AUTO: 0.2 10E3/UL (ref 0–0.7)
EOSINOPHIL NFR BLD AUTO: 4 %
ERYTHROCYTE [DISTWIDTH] IN BLOOD BY AUTOMATED COUNT: 20 % (ref 10–15)
HCT VFR BLD AUTO: 38.1 % (ref 35–47)
HGB BLD-MCNC: 12.1 G/DL (ref 11.7–15.7)
IMM GRANULOCYTES # BLD: 0 10E3/UL
IMM GRANULOCYTES NFR BLD: 0 %
LYMPHOCYTES # BLD AUTO: 1.5 10E3/UL (ref 0.8–5.3)
LYMPHOCYTES NFR BLD AUTO: 23 %
MCH RBC QN AUTO: 27.6 PG (ref 26.5–33)
MCHC RBC AUTO-ENTMCNC: 31.8 G/DL (ref 31.5–36.5)
MCV RBC AUTO: 87 FL (ref 78–100)
MONOCYTES # BLD AUTO: 0.7 10E3/UL (ref 0–1.3)
MONOCYTES NFR BLD AUTO: 11 %
NEUTROPHILS # BLD AUTO: 3.9 10E3/UL (ref 1.6–8.3)
NEUTROPHILS NFR BLD AUTO: 61 %
PLATELET # BLD AUTO: 344 10E3/UL (ref 150–450)
RBC # BLD AUTO: 4.39 10E6/UL (ref 3.8–5.2)
RETICS # AUTO: 0.05 10E6/UL (ref 0.03–0.1)
RETICS/RBC NFR AUTO: 1.2 % (ref 0.5–2)
WBC # BLD AUTO: 6.4 10E3/UL (ref 4–11)

## 2024-07-25 PROCEDURE — 85025 COMPLETE CBC W/AUTO DIFF WBC: CPT

## 2024-07-25 PROCEDURE — 85045 AUTOMATED RETICULOCYTE COUNT: CPT

## 2024-07-25 PROCEDURE — 36415 COLL VENOUS BLD VENIPUNCTURE: CPT

## 2024-07-28 LAB
PATH REPORT.COMMENTS IMP SPEC: NORMAL
PATH REPORT.FINAL DX SPEC: NORMAL
PATH REPORT.MICROSCOPIC SPEC OTHER STN: NORMAL
PATH REPORT.MICROSCOPIC SPEC OTHER STN: NORMAL
PATH REPORT.RELEVANT HX SPEC: NORMAL

## 2024-08-08 DIAGNOSIS — G89.29 CHRONIC HIP PAIN AFTER TOTAL REPLACEMENT OF RIGHT HIP JOINT: ICD-10-CM

## 2024-08-08 DIAGNOSIS — M25.551 CHRONIC HIP PAIN AFTER TOTAL REPLACEMENT OF RIGHT HIP JOINT: ICD-10-CM

## 2024-08-08 DIAGNOSIS — Z96.641 CHRONIC HIP PAIN AFTER TOTAL REPLACEMENT OF RIGHT HIP JOINT: ICD-10-CM

## 2024-08-09 RX ORDER — GABAPENTIN 300 MG/1
300 CAPSULE ORAL 3 TIMES DAILY
Qty: 270 CAPSULE | Refills: 1 | Status: SHIPPED | OUTPATIENT
Start: 2024-08-09

## 2024-08-09 NOTE — TELEPHONE ENCOUNTER
Patient calling to have her Gabapentin refilled.  Will run out by this weekend.    Please advise, thanks.

## 2024-08-19 DIAGNOSIS — E78.5 HYPERLIPIDEMIA LDL GOAL <130: ICD-10-CM

## 2024-08-20 RX ORDER — PRAVASTATIN SODIUM 40 MG
40 TABLET ORAL AT BEDTIME
Qty: 90 TABLET | Refills: 1 | Status: SHIPPED | OUTPATIENT
Start: 2024-08-20

## 2024-08-24 ENCOUNTER — APPOINTMENT (OUTPATIENT)
Dept: CT IMAGING | Facility: CLINIC | Age: 79
End: 2024-08-24
Attending: STUDENT IN AN ORGANIZED HEALTH CARE EDUCATION/TRAINING PROGRAM
Payer: MEDICARE

## 2024-08-24 ENCOUNTER — APPOINTMENT (OUTPATIENT)
Dept: GENERAL RADIOLOGY | Facility: CLINIC | Age: 79
End: 2024-08-24
Attending: STUDENT IN AN ORGANIZED HEALTH CARE EDUCATION/TRAINING PROGRAM
Payer: MEDICARE

## 2024-08-24 ENCOUNTER — HOSPITAL ENCOUNTER (OUTPATIENT)
Facility: CLINIC | Age: 79
Setting detail: OBSERVATION
Discharge: HOME | End: 2024-08-26
Attending: STUDENT IN AN ORGANIZED HEALTH CARE EDUCATION/TRAINING PROGRAM | Admitting: INTERNAL MEDICINE
Payer: MEDICARE
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
## 2024-08-24 DIAGNOSIS — M24.411 CHRONIC DISLOCATION OF RIGHT SHOULDER: ICD-10-CM

## 2024-08-24 PROCEDURE — 71250 CT THORAX DX C-: CPT | Mod: MA

## 2024-08-24 PROCEDURE — 99222 1ST HOSP IP/OBS MODERATE 55: CPT | Mod: AI | Performed by: INTERNAL MEDICINE

## 2024-08-24 PROCEDURE — 250N000013 HC RX MED GY IP 250 OP 250 PS 637: Performed by: STUDENT IN AN ORGANIZED HEALTH CARE EDUCATION/TRAINING PROGRAM

## 2024-08-24 PROCEDURE — G0378 HOSPITAL OBSERVATION PER HR: HCPCS

## 2024-08-24 PROCEDURE — 73030 X-RAY EXAM OF SHOULDER: CPT | Mod: RT

## 2024-08-24 PROCEDURE — 73502 X-RAY EXAM HIP UNI 2-3 VIEWS: CPT

## 2024-08-24 PROCEDURE — 250N000011 HC RX IP 250 OP 636: Performed by: STUDENT IN AN ORGANIZED HEALTH CARE EDUCATION/TRAINING PROGRAM

## 2024-08-24 PROCEDURE — 96374 THER/PROPH/DIAG INJ IV PUSH: CPT

## 2024-08-24 PROCEDURE — 99285 EMERGENCY DEPT VISIT HI MDM: CPT | Mod: 25

## 2024-08-24 PROCEDURE — 70450 CT HEAD/BRAIN W/O DYE: CPT | Mod: MA

## 2024-08-24 PROCEDURE — 72125 CT NECK SPINE W/O DYE: CPT | Mod: MA

## 2024-08-24 PROCEDURE — 96376 TX/PRO/DX INJ SAME DRUG ADON: CPT

## 2024-08-24 PROCEDURE — 96375 TX/PRO/DX INJ NEW DRUG ADDON: CPT

## 2024-08-24 RX ORDER — MORPHINE SULFATE 2 MG/ML
2 INJECTION, SOLUTION INTRAMUSCULAR; INTRAVENOUS ONCE
Status: COMPLETED | OUTPATIENT
Start: 2024-08-24 | End: 2024-08-24

## 2024-08-24 RX ORDER — KETOROLAC TROMETHAMINE 15 MG/ML
15 INJECTION, SOLUTION INTRAMUSCULAR; INTRAVENOUS ONCE
Status: COMPLETED | OUTPATIENT
Start: 2024-08-24 | End: 2024-08-24

## 2024-08-24 RX ORDER — ACETAMINOPHEN 500 MG
1000 TABLET ORAL ONCE
Status: COMPLETED | OUTPATIENT
Start: 2024-08-24 | End: 2024-08-24

## 2024-08-24 RX ADMIN — MORPHINE SULFATE 2 MG: 2 INJECTION, SOLUTION INTRAMUSCULAR; INTRAVENOUS at 21:00

## 2024-08-24 RX ADMIN — MORPHINE SULFATE 2 MG: 2 INJECTION, SOLUTION INTRAMUSCULAR; INTRAVENOUS at 22:42

## 2024-08-24 RX ADMIN — ACETAMINOPHEN 1000 MG: 500 TABLET, FILM COATED ORAL at 21:00

## 2024-08-24 RX ADMIN — KETOROLAC TROMETHAMINE 15 MG: 15 INJECTION, SOLUTION INTRAMUSCULAR; INTRAVENOUS at 20:59

## 2024-08-24 ASSESSMENT — ACTIVITIES OF DAILY LIVING (ADL)
ADLS_ACUITY_SCORE: 38

## 2024-08-24 ASSESSMENT — COLUMBIA-SUICIDE SEVERITY RATING SCALE - C-SSRS
6. HAVE YOU EVER DONE ANYTHING, STARTED TO DO ANYTHING, OR PREPARED TO DO ANYTHING TO END YOUR LIFE?: NO
2. HAVE YOU ACTUALLY HAD ANY THOUGHTS OF KILLING YOURSELF IN THE PAST MONTH?: NO
1. IN THE PAST MONTH, HAVE YOU WISHED YOU WERE DEAD OR WISHED YOU COULD GO TO SLEEP AND NOT WAKE UP?: NO

## 2024-08-25 ENCOUNTER — APPOINTMENT (OUTPATIENT)
Dept: PHYSICAL THERAPY | Facility: CLINIC | Age: 79
End: 2024-08-25
Attending: INTERNAL MEDICINE
Payer: MEDICARE

## 2024-08-25 LAB
ANION GAP SERPL CALCULATED.3IONS-SCNC: 11 MMOL/L (ref 7–15)
BASOPHILS # BLD AUTO: 0.1 10E3/UL (ref 0–0.2)
BASOPHILS NFR BLD AUTO: 1 %
BUN SERPL-MCNC: 25.8 MG/DL (ref 8–23)
CALCIUM SERPL-MCNC: 9.3 MG/DL (ref 8.8–10.4)
CHLORIDE SERPL-SCNC: 106 MMOL/L (ref 98–107)
CREAT SERPL-MCNC: 0.81 MG/DL (ref 0.51–0.95)
EGFRCR SERPLBLD CKD-EPI 2021: 73 ML/MIN/1.73M2
EOSINOPHIL # BLD AUTO: 0.3 10E3/UL (ref 0–0.7)
EOSINOPHIL NFR BLD AUTO: 4 %
ERYTHROCYTE [DISTWIDTH] IN BLOOD BY AUTOMATED COUNT: 15.3 % (ref 10–15)
GLUCOSE SERPL-MCNC: 113 MG/DL (ref 70–99)
HCO3 SERPL-SCNC: 24 MMOL/L (ref 22–29)
HCT VFR BLD AUTO: 36.2 % (ref 35–47)
HGB BLD-MCNC: 10.9 G/DL (ref 11.7–15.7)
IMM GRANULOCYTES # BLD: 0 10E3/UL
IMM GRANULOCYTES NFR BLD: 0 %
LYMPHOCYTES # BLD AUTO: 2.4 10E3/UL (ref 0.8–5.3)
LYMPHOCYTES NFR BLD AUTO: 33 %
MCH RBC QN AUTO: 27.9 PG (ref 26.5–33)
MCHC RBC AUTO-ENTMCNC: 30.1 G/DL (ref 31.5–36.5)
MCV RBC AUTO: 93 FL (ref 78–100)
MONOCYTES # BLD AUTO: 1 10E3/UL (ref 0–1.3)
MONOCYTES NFR BLD AUTO: 14 %
NEUTROPHILS # BLD AUTO: 3.6 10E3/UL (ref 1.6–8.3)
NEUTROPHILS NFR BLD AUTO: 48 %
NRBC # BLD AUTO: 0 10E3/UL
NRBC BLD AUTO-RTO: 0 /100
PLATELET # BLD AUTO: 347 10E3/UL (ref 150–450)
POTASSIUM SERPL-SCNC: 4.1 MMOL/L (ref 3.4–5.3)
RBC # BLD AUTO: 3.91 10E6/UL (ref 3.8–5.2)
SODIUM SERPL-SCNC: 141 MMOL/L (ref 135–145)
WBC # BLD AUTO: 7.4 10E3/UL (ref 4–11)

## 2024-08-25 PROCEDURE — 97530 THERAPEUTIC ACTIVITIES: CPT | Mod: GP | Performed by: PHYSICAL THERAPIST

## 2024-08-25 PROCEDURE — 36415 COLL VENOUS BLD VENIPUNCTURE: CPT | Performed by: INTERNAL MEDICINE

## 2024-08-25 PROCEDURE — 97161 PT EVAL LOW COMPLEX 20 MIN: CPT | Mod: GP | Performed by: PHYSICAL THERAPIST

## 2024-08-25 PROCEDURE — G0378 HOSPITAL OBSERVATION PER HR: HCPCS

## 2024-08-25 PROCEDURE — 85025 COMPLETE CBC W/AUTO DIFF WBC: CPT | Performed by: INTERNAL MEDICINE

## 2024-08-25 PROCEDURE — 250N000013 HC RX MED GY IP 250 OP 250 PS 637: Performed by: INTERNAL MEDICINE

## 2024-08-25 PROCEDURE — 99232 SBSQ HOSP IP/OBS MODERATE 35: CPT | Performed by: INTERNAL MEDICINE

## 2024-08-25 PROCEDURE — 97116 GAIT TRAINING THERAPY: CPT | Mod: GP | Performed by: PHYSICAL THERAPIST

## 2024-08-25 PROCEDURE — 80048 BASIC METABOLIC PNL TOTAL CA: CPT | Performed by: INTERNAL MEDICINE

## 2024-08-25 RX ORDER — NALOXONE HYDROCHLORIDE 0.4 MG/ML
0.4 INJECTION, SOLUTION INTRAMUSCULAR; INTRAVENOUS; SUBCUTANEOUS
Status: DISCONTINUED | OUTPATIENT
Start: 2024-08-25 | End: 2024-08-26 | Stop reason: HOSPADM

## 2024-08-25 RX ORDER — HYDROMORPHONE HYDROCHLORIDE 2 MG/1
2 TABLET ORAL EVERY 4 HOURS PRN
Status: DISCONTINUED | OUTPATIENT
Start: 2024-08-25 | End: 2024-08-26 | Stop reason: HOSPADM

## 2024-08-25 RX ORDER — NALOXONE HYDROCHLORIDE 0.4 MG/ML
0.2 INJECTION, SOLUTION INTRAMUSCULAR; INTRAVENOUS; SUBCUTANEOUS
Status: DISCONTINUED | OUTPATIENT
Start: 2024-08-25 | End: 2024-08-26 | Stop reason: HOSPADM

## 2024-08-25 RX ORDER — ONDANSETRON 4 MG/1
4 TABLET, ORALLY DISINTEGRATING ORAL EVERY 6 HOURS PRN
Status: DISCONTINUED | OUTPATIENT
Start: 2024-08-25 | End: 2024-08-26 | Stop reason: HOSPADM

## 2024-08-25 RX ORDER — AMOXICILLIN 250 MG
2 CAPSULE ORAL 2 TIMES DAILY PRN
Status: DISCONTINUED | OUTPATIENT
Start: 2024-08-25 | End: 2024-08-26 | Stop reason: HOSPADM

## 2024-08-25 RX ORDER — ONDANSETRON 2 MG/ML
4 INJECTION INTRAMUSCULAR; INTRAVENOUS EVERY 6 HOURS PRN
Status: DISCONTINUED | OUTPATIENT
Start: 2024-08-25 | End: 2024-08-26 | Stop reason: HOSPADM

## 2024-08-25 RX ORDER — AMOXICILLIN 250 MG
1 CAPSULE ORAL 2 TIMES DAILY PRN
Status: DISCONTINUED | OUTPATIENT
Start: 2024-08-25 | End: 2024-08-26 | Stop reason: HOSPADM

## 2024-08-25 RX ORDER — GABAPENTIN 300 MG/1
300 CAPSULE ORAL 3 TIMES DAILY
Status: DISCONTINUED | OUTPATIENT
Start: 2024-08-25 | End: 2024-08-26 | Stop reason: HOSPADM

## 2024-08-25 RX ORDER — METOPROLOL TARTRATE 25 MG/1
25 TABLET, FILM COATED ORAL 2 TIMES DAILY
Status: DISCONTINUED | OUTPATIENT
Start: 2024-08-25 | End: 2024-08-26 | Stop reason: HOSPADM

## 2024-08-25 RX ORDER — DORZOLAMIDE HYDROCHLORIDE AND TIMOLOL MALEATE 20; 5 MG/ML; MG/ML
1 SOLUTION/ DROPS OPHTHALMIC 2 TIMES DAILY
Status: DISCONTINUED | OUTPATIENT
Start: 2024-08-25 | End: 2024-08-26 | Stop reason: HOSPADM

## 2024-08-25 RX ORDER — LATANOPROST 50 UG/ML
1 SOLUTION/ DROPS OPHTHALMIC AT BEDTIME
Status: DISCONTINUED | OUTPATIENT
Start: 2024-08-25 | End: 2024-08-26 | Stop reason: HOSPADM

## 2024-08-25 RX ORDER — ACETAMINOPHEN 500 MG
1000 TABLET ORAL AT BEDTIME
Status: ON HOLD | COMMUNITY
End: 2024-08-26

## 2024-08-25 RX ORDER — CALCIUM CARBONATE 500 MG/1
1000 TABLET, CHEWABLE ORAL 4 TIMES DAILY PRN
Status: DISCONTINUED | OUTPATIENT
Start: 2024-08-25 | End: 2024-08-26 | Stop reason: HOSPADM

## 2024-08-25 RX ORDER — ACETAMINOPHEN 650 MG/1
650 SUPPOSITORY RECTAL EVERY 4 HOURS PRN
Status: DISCONTINUED | OUTPATIENT
Start: 2024-08-25 | End: 2024-08-26 | Stop reason: HOSPADM

## 2024-08-25 RX ORDER — PRAVASTATIN SODIUM 20 MG
40 TABLET ORAL AT BEDTIME
Status: DISCONTINUED | OUTPATIENT
Start: 2024-08-25 | End: 2024-08-26 | Stop reason: HOSPADM

## 2024-08-25 RX ORDER — LEVOTHYROXINE SODIUM 88 UG/1
88 TABLET ORAL DAILY
Status: DISCONTINUED | OUTPATIENT
Start: 2024-08-25 | End: 2024-08-26 | Stop reason: HOSPADM

## 2024-08-25 RX ORDER — CARBOXYMETHYLCELLULOSE SODIUM 10 MG/ML
1 GEL OPHTHALMIC 4 TIMES DAILY PRN
Status: DISCONTINUED | OUTPATIENT
Start: 2024-08-25 | End: 2024-08-26 | Stop reason: HOSPADM

## 2024-08-25 RX ORDER — PANTOPRAZOLE SODIUM 40 MG/1
40 TABLET, DELAYED RELEASE ORAL DAILY
Status: DISCONTINUED | OUTPATIENT
Start: 2024-08-25 | End: 2024-08-26 | Stop reason: HOSPADM

## 2024-08-25 RX ORDER — ACETAMINOPHEN 325 MG/1
650 TABLET ORAL EVERY 4 HOURS PRN
Status: DISCONTINUED | OUTPATIENT
Start: 2024-08-25 | End: 2024-08-26 | Stop reason: HOSPADM

## 2024-08-25 RX ORDER — LIDOCAINE 40 MG/G
CREAM TOPICAL
Status: DISCONTINUED | OUTPATIENT
Start: 2024-08-25 | End: 2024-08-26 | Stop reason: HOSPADM

## 2024-08-25 RX ADMIN — PANTOPRAZOLE SODIUM 40 MG: 40 TABLET, DELAYED RELEASE ORAL at 09:54

## 2024-08-25 RX ADMIN — DORZOLAMIDE HYDROCHLORIDE AND TIMOLOL MALEATE 1 DROP: 20; 5 SOLUTION OPHTHALMIC at 09:56

## 2024-08-25 RX ADMIN — GABAPENTIN 300 MG: 300 CAPSULE ORAL at 13:40

## 2024-08-25 RX ADMIN — LEVOTHYROXINE SODIUM 88 MCG: 0.09 TABLET ORAL at 09:55

## 2024-08-25 RX ADMIN — METOPROLOL TARTRATE 25 MG: 25 TABLET, FILM COATED ORAL at 21:45

## 2024-08-25 RX ADMIN — GABAPENTIN 300 MG: 300 CAPSULE ORAL at 09:54

## 2024-08-25 RX ADMIN — PRAVASTATIN SODIUM 40 MG: 20 TABLET ORAL at 21:45

## 2024-08-25 RX ADMIN — GABAPENTIN 300 MG: 300 CAPSULE ORAL at 21:45

## 2024-08-25 RX ADMIN — LATANOPROST 1 DROP: 50 SOLUTION/ DROPS OPHTHALMIC at 02:39

## 2024-08-25 RX ADMIN — DORZOLAMIDE HYDROCHLORIDE AND TIMOLOL MALEATE 1 DROP: 20; 5 SOLUTION OPHTHALMIC at 21:45

## 2024-08-25 RX ADMIN — PRAVASTATIN SODIUM 40 MG: 20 TABLET ORAL at 02:38

## 2024-08-25 RX ADMIN — METOPROLOL TARTRATE 25 MG: 25 TABLET, FILM COATED ORAL at 09:55

## 2024-08-25 RX ADMIN — LATANOPROST 1 DROP: 50 SOLUTION/ DROPS OPHTHALMIC at 21:45

## 2024-08-25 RX ADMIN — ACETAMINOPHEN 650 MG: 325 TABLET, FILM COATED ORAL at 13:40

## 2024-08-25 RX ADMIN — ACETAMINOPHEN 650 MG: 325 TABLET, FILM COATED ORAL at 21:45

## 2024-08-25 RX ADMIN — ACETAMINOPHEN 650 MG: 325 TABLET, FILM COATED ORAL at 09:55

## 2024-08-25 ASSESSMENT — ACTIVITIES OF DAILY LIVING (ADL)
ADLS_ACUITY_SCORE: 25
DEPENDENT_IADLS:: INDEPENDENT;TRANSPORTATION
ADLS_ACUITY_SCORE: 25
ADLS_ACUITY_SCORE: 23
ADLS_ACUITY_SCORE: 25
ADLS_ACUITY_SCORE: 38
ADLS_ACUITY_SCORE: 25
ADLS_ACUITY_SCORE: 23
ADLS_ACUITY_SCORE: 25
ADLS_ACUITY_SCORE: 23
ADLS_ACUITY_SCORE: 25

## 2024-08-25 NOTE — PROGRESS NOTES
08/25/24 1222   Appointment Info   Signing Clinician's Name / Credentials (PT) Jean Claude Pollock DPT   Living Environment   Transportation Anticipated family or friend will provide   Living Environment Comments Pt lives in split level home with dgt, 3 [address] with railings. Has stair lift inside for managing stairs. Use of walker for activity at baseline.   Self-Care   Usual Activity Tolerance moderate   Current Activity Tolerance moderate   Equipment Currently Used at Home walker, rolling   Fall history within last six months yes   Number of times patient has fallen within last six months 1   General Information   Onset of Illness/Injury or Date of Surgery 08/24/24   Referring Physician Mahnaz Wills MD   Patient/Family Therapy Goals Statement (PT) To return home   Pertinent History of Current Problem (include personal factors and/or comorbidities that impact the POC) Per H&P, pt is a  78 year old female patient with extensive past medical history including hypertension, hyperlipidemia, left atrial dilatation, first-degree AV block, hypothyroidism, GERD, history of right total hip replacement, who came to emergency room for evaluation after a fall.   Existing Precautions/Restrictions fall  (per pt report from ortho, no need for sling/precautions with shoulder)   Cognition   Affect/Mental Status (Cognition) WFL   Orientation Status (Cognition) oriented x 4   Follows Commands (Cognition) WFL   Pain Assessment   Patient Currently in Pain Yes, see Vital Sign flowsheet  (rib discomfort)   Range of Motion (ROM)   ROM Comment B LE WFL   Strength (Manual Muscle Testing)   Strength (Manual Muscle Testing) Able to perform R SLR;Able to perform L SLR   Bed Mobility   Comment, (Bed Mobility) SBA supine <> sit   Transfers   Comment, (Transfers) SBA sit <> stand with FWW   Gait/Stairs (Locomotion)   Distance in Feet (Gait) 20   Pattern (Gait) step-through   Deviations/Abnormal Patterns (Gait) antalgic;base of  support, wide;gait speed decreased   Comment, (Gait/Stairs) SBA with FWW   Balance   Balance Comments good sitting; good- standing with FWW   Clinical Impression   Criteria for Skilled Therapeutic Intervention Yes, treatment indicated   PT Diagnosis (PT) decreased functional mobility   Influenced by the following impairments impaired balance, decreased activity tolerance   Functional limitations due to impairments impaired transfers, ambulation, stairs   Clinical Presentation (PT Evaluation Complexity) stable   Clinical Presentation Rationale Functional status   Clinical Decision Making (Complexity) low complexity   Planned Therapy Interventions (PT) balance training;bed mobility training;gait training;home exercise program;patient/family education;ROM (range of motion);stair training;strengthening;transfer training   Risk & Benefits of therapy have been explained evaluation/treatment results reviewed;care plan/treatment goals reviewed;risks/benefits reviewed;current/potential barriers reviewed;participants voiced agreement with care plan;participants included;patient   PT Total Evaluation Time   PT Eval, Low Complexity Minutes (66185) 10   Physical Therapy Goals   PT Frequency Daily   PT Predicted Duration/Target Date for Goal Attainment 08/26/24   PT Goals Bed Mobility;Transfers;Gait;Stairs   PT: Bed Mobility Modified independent;Supine to/from sit   PT: Transfers Modified independent;Sit to/from stand;Assistive device   PT: Gait Modified independent;Assistive device;150 feet   PT: Stairs Supervision/stand-by assist;3 stairs;Rail on both sides   Interventions   Interventions Quick Adds Gait Training;Therapeutic Activity   Therapeutic Activity   Therapeutic Activities: dynamic activities to improve functional performance Minutes (91286) 14   Treatment Detail/Skilled Intervention Pt reporting ortho MD told her no needs for sling, to use for comfort but patient reporting no shoulder pain. Instructed in supine to sit  transfer with SBA. Pt able to don B socks sitting EOB with SBA, good sitting balance EOB. Instructed in sit <> Stand transfers x2 with sBA, FWW for support. SBA for return to supine, pt reports dgt able to assist if needed. Discussed with pt d/c recommendation for HHPT to progress activity, pt agreeable. RN/SW updated to status.   Gait Training   Gait Training Minutes (25266) 13   Symptoms Noted During/After Treatment (Gait Training) fatigue   Treatment Detail/Skilled Intervention Instructed pt to ambulate with FWW and SBA. VC for AD proximity, decreased GS throughout. No LOB. Instructed in navigating x4 stairs with B railings, VC for step to patterning.   Distance in Feet 260   PT Discharge Planning   PT Plan progress ind with transfers, bed mobility, ambulation, stairs   PT Discharge Recommendation (DC Rec) home with home care physical therapy;home with assist   PT Rationale for DC Rec Pt able to perform all mobility SBA, FWW. Tolerated ~260 ft ambulation, navigation of stairs. Anticipate pt to be safe to discharge home with HHPT to progress ind with mobility.    PT Brief overview of current status SBA with FWW   Total Session Time   Timed Code Treatment Minutes 27   Total Session Time (sum of timed and untimed services) 37

## 2024-08-25 NOTE — PLAN OF CARE
PRIMARY DIAGNOSIS: ACUTE PAIN  OUTPATIENT/OBSERVATION GOALS TO BE MET BEFORE DISCHARGE:  1. Pain Status: Improved-controlled with oral pain medications.    2. Return to near baseline physical activity:  Progressing Ax1 walker and GB.    3. Cleared for discharge by consultants (if involved): No    Discharge Planner Nurse   Safe discharge environment identified: Yes  Barriers to discharge: Yes       Entered by: Aicha Ariza RN 08/25/2024 =   A&Ox4, VSS on RA. Reporting right arm pain. PRN Tylenol and arm in sling. NPO per order, Awaiting ortho surgery and PT to follow.     Please review provider order for any additional goals. Nurse to notify provider when observation goals have been met and patient is ready for discharge.    Plan of Care Reviewed With: patient  Overall Patient Progress: improving  Outcome Evaluation: Right arm pain, Up Ax1 walker and GB.    Problem: Adult Inpatient Plan of Care  Goal: Plan of Care Review  Outcome: Progressing  Flowsheets (Taken 8/25/2024 1711)  Outcome Evaluation: Right arm pain, Up Ax1 walker and GB.  Plan of Care Reviewed With: patient  Overall Patient Progress: improving  Goal: Patient-Specific Goal (Individualized)  Outcome: Progressing  Goal: Absence of Hospital-Acquired Illness or Injury  Outcome: Progressing  Intervention: Identify and Manage Fall Risk  Recent Flowsheet Documentation  Taken 8/25/2024 0955 by Aicha Ariza RN  Safety Promotion/Fall Prevention:   nonskid shoes/slippers when out of bed   safety round/check completed  Intervention: Prevent Skin Injury  Recent Flowsheet Documentation  Taken 8/25/2024 0955 by Aicha Ariza RN  Body Position: position changed independently  Intervention: Prevent Infection  Recent Flowsheet Documentation  Taken 8/25/2024 0955 by Aicha Ariza RN  Infection Prevention:   hand hygiene promoted   personal protective equipment utilized   rest/sleep promoted  Goal:  Optimal Comfort and Wellbeing  Outcome: Progressing  Goal: Readiness for Transition of Care  Outcome: Progressing     Problem: Pain Acute  Goal: Optimal Pain Control and Function  Outcome: Progressing  Intervention: Prevent or Manage Pain  Recent Flowsheet Documentation  Taken 8/25/2024 0955 by Aicha Ariza RN  Medication Review/Management: medications reviewed     Problem: Comorbidity Management  Goal: Blood Pressure in Desired Range  Outcome: Progressing  Intervention: Maintain Blood Pressure Management  Recent Flowsheet Documentation  Taken 8/25/2024 0955 by Aicha Ariza RN  Medication Review/Management: medications reviewed

## 2024-08-25 NOTE — H&P
[facility]    History and Physical  Hospitalist       Date of Admission:  8/24/2024  Date of Service (when I saw the patient): 08/24/24    Assessment & Plan   Teri Beltran is a 78 year old female patient with extensive past medical history including hypertension, hyperlipidemia, left atrial dilatation, first-degree AV block, hypothyroidism, GERD, history of right total hip replacement, who came to emergency room for evaluation after a fall. Patient states that she tripped and fell in the parking lot this evening.  She states that she landed on her right side. She hit her right shoulder. She denies hitting her head or any loss of consciousness. She also denies dizziness or lightheadedness.  She stated that she started having pain on her right shoulder. She came to emergency room for evaluation.  CT of the head without contrast showed no evidence of acute intracranial abnormality. CT of the chest without contrast also showed no traumatic acute abnormality.  There is stable mediastinal lymphadenopathy. There is erosive changes of humeral heads with large right shoulder effusion.  CT of the cervical spine is negative for acute fracture or posttraumatic subluxation.  X-ray of the right shoulder showed advanced right shoulder arthropathy, posterior dislocation of the humeral head at the glenohumeral joint with unknown chronicity.  X-ray of the pelvis and right hip showed evidence of right total hip arthroplasty.  No evidence of acute fracture.  In the ER, she was given Tylenol, Toradol 50 mg IV, morphine 2 mg IV x 2. Patient continued to have right shoulder pain. Patient was admitted to the hospital for further management.    Mechanical fall with right shoulder pain  Posterior dislocation of right humeral head with unknown chronicity  Patient states that she tripped and fell in a parking lot this evening.  Patient complains of right shoulder pain.  X-ray of the right shoulder shows severe  degenerative changes, posterior dislocation of the humeral head.  Patient continues to complain of pain despite IV Toradol and IV morphine.  -Will order IV Dilaudid as needed for pain.  Will also order Tylenol as needed.  -Will consult orthopedic surgery for evaluation in the morning.    Hypertension  Will resume metoprolol    Hyperlipidemia  -Will resume pravastatin    GERD  -Will resume pantoprazole    Hypothyroidism  -Will resume levothyroxine    DVT Prophylaxis: Pneumatic Compression Devices  Code Status: Full code    Disposition: Expected discharge in 1-2 days     Mahnaz Wills MD    Primary Care Physician   Rachel Miller    Chief Complaint   Fall, right shoulder pain    History is obtained from the patient    History of Present Illness   Teri Beltran is a 78 year old female patient with extensive past medical history including hypertension, hyperlipidemia, left atrial dilatation, first-degree AV block, hypothyroidism, GERD, history of right total hip replacement, who came to emergency room for evaluation after a fall.  Patient states that she tripped and fell in the parking lot this evening.  She states that she landed on her right side. She hit her right shoulder. She denies hitting her head or any loss of consciousness.  She also denies dizziness or lightheadedness.  She stated that she started having pain on her right shoulder.  She came to emergency room for evaluation.  On arrival to emergency room, her vital signs were checked and showed temperature 98, pulse 82, blood pressure 167 130, oxygen saturation 97% on room air.  CT of the head without contrast showed no evidence of acute intracranial abnormality. CT of the chest without contrast also showed no traumatic acute abnormality.  There is stable mediastinal lymphadenopathy.  There is erosive changes of humeral heads with large right shoulder effusion.  CT of the cervical spine is negative for acute fracture or posttraumatic  subluxation.  X-ray of the right shoulder showed advanced right shoulder arthropathy, posterior dislocation of the humeral head at the glenohumeral joint with unknown chronicity.  X-ray of the pelvis and right hip showed evidence of right total hip arthroplasty.  No evidence of acute fracture.  In the ER, she was given Tylenol, Toradol 50 mg IV, morphine 2 mg IV x 2.  Patient continued to have right shoulder pain.  Patient was admitted to the hospital for further management.    Past Medical History    I have reviewed this patient's medical history and updated it with pertinent information if needed.   Past Medical History:   Diagnosis Date    Concussion with loss of consciousness, initial encounter 4/26/2023    First degree AV block 08/11/2015    Former smoker     Gastroesophageal reflux disease     GERD (gastroesophageal reflux disease)     Glaucoma     Heart block     2:1    Heart murmur     Hyperlipidemia LDL goal <130 06/14/2013    Hypertension goal BP (blood pressure) < 140/90 12/03/2013    Hypothyroidism     Left atrial dilatation     mild    Mild dilation of ascending aorta - borderline 08/11/2015    Palpitations     Prediabetes 06/14/2013    RBBB 08/11/2015    S/P total knee arthroplasty 07/23/2019    Tachycardia     Urinary frequency     Varicose veins     Venous insufficiency     s/p bilateral great saphenous vein radiofrequency ablation by Dr. Garcia       Past Surgical History   I have reviewed this patient's surgical history and updated it with pertinent information if needed.  Past Surgical History:   Procedure Laterality Date    ARTHROPLASTY HIP Right 11/20/2020    Procedure: Right total hip arthroplasty using a Biomet Taperloc femoral stem and G7 acetabulum.;  Surgeon: Dragan Muse MD;  Location: [facility] OR    ARTHROPLASTY KNEE Right 07/23/2019    Procedure: Right total knee arthroplasty using an Arthrex iBalance knee system;  Surgeon: Dragan Muse MD;  Location:  OR     BREAST SURGERY      right breast ductal surgery due to milk production    BREAST SURGERY Right     ductal surgery due to milk production    CATARACT EXTRACTION Right 05/2022    COLONOSCOPY N/A 10/24/2014    no further screening. Procedure: COLONOSCOPY;  Surgeon: Pedro Rudd MD;  Location: [facility] GI    DECOMPRESSION HIP CORE Right 6/6/2022    Procedure: Open right hip abductor repair;  Surgeon: Dragan Muse MD;  Location: [facility] OR    ESOPHAGOSCOPY, GASTROSCOPY, DUODENOSCOPY (EGD), COMBINED N/A 4/9/2024    Procedure: Esophagoscopy, gastroscopy, duodenoscopy (EGD), combined biopsies using cold bx forcep;  Surgeon: Pedro Rudd MD;  Location:  GI    EYE SURGERY      FINGER GANGLION CYST EXCISION Left     ring finger    HYSTERECTOMY      HYSTERECTOMY, PAP NO LONGER INDICATED  2009    total hysterectomy and ovaries. benign    SOFT TISSUE SURGERY      ganglion removed from left wrist and ring finger    SURGICAL HISTORY OF -   age 7    tonsillectomy    SURGICAL HISTORY OF -   01/2015    left eye cataract    SURGICAL HISTORY OF -   Fall 2016    LINQ placed.    TONSILLECTOMY      WRIST GANGLION EXCISION Left        Prior to Admission Medications   Prior to Admission Medications   Prescriptions Last Dose Informant Patient Reported? Taking?   Multiple Vitamins-Minerals (MULTIVITAMIN ADULT PO)   Yes No   Sig: Take 1 tablet by mouth daily   acetaminophen (TYLENOL) 325 MG tablet   No No   Sig: Take 3 tablets (975 mg) by mouth every 8 hours   Patient taking differently: Take 650 mg by mouth at bedtime   carboxymethylcellulose (CARBOXYMETHYLCELLULOSE SODIUM) 0.5 % SOLN ophthalmic solution   No No   Sig: Place 1 drop into both eyes 4 times daily as needed for dry eyes   dorzolamide-timolol PF (COSOPT PF) 2-0.5 % opthalmic solutionh   Yes No   Sig: Place 1 drop into both eyes 2 times daily   furosemide (LASIX) 20 MG tablet   No No   Sig: Take 1 tablet (20 mg) by mouth 2 times daily   gabapentin (NEURONTIN) 300 MG  "capsule   No No   Sig: TAKE 1 CAPSULE(300 MG) BY MOUTH THREE TIMES DAILY.   latanoprost (XALATAN) 0.005 % ophthalmic solution   Yes No   Sig: Place 1 drop into the right eye At Bedtime    levothyroxine (SYNTHROID/LEVOTHROID) 88 MCG tablet   No No   Sig: Take 1 tablet (88 mcg) by mouth daily   metoprolol tartrate (LOPRESSOR) 25 MG tablet   No No   Sig: TAKE 1 TABLET(25 MG) BY MOUTH TWICE DAILY   pantoprazole (PROTONIX) 40 MG EC tablet   No No   Sig: TAKE 1 TABLET(40 MG) BY MOUTH DAILY   potassium chloride ER (KLOR-CON M) 10 MEQ CR tablet   No No   Sig: TAKE 1 TABLET(10 MEQ) BY MOUTH DAILY   pravastatin (PRAVACHOL) 40 MG tablet   No No   Sig: TAKE 1 TABLET(40 MG) BY MOUTH AT BEDTIME      Facility-Administered Medications: None     Allergies   Allergies   Allergen Reactions    Seasonal Allergies     Simvastatin Other (See Comments)     \"flushed\"       Social History   I have reviewed this patient's social history and updated it with pertinent information if needed. Teri Beltran  reports that she quit smoking about 44 years ago. Her smoking use included cigarettes. She started smoking about 62 years ago. She has a 9 pack-year smoking history. She has been exposed to tobacco smoke. She has never used smokeless tobacco. She reports that she does not currently use alcohol. She reports that she does not use drugs.    Family History   I have reviewed this patient's family history and updated it with pertinent information if needed.   Family History   Problem Relation Age of Onset    Ovarian Cancer Mother     Cancer Mother         ovarian    Breast Cancer Mother     Heart Disease Father         rare; not sure what it was    Diabetes Father         old age    Cerebrovascular Disease Brother 68       Review of Systems   The 10 point Review of Systems is negative other than noted in the HPI or here. Fall, right shoulder pain    Physical Exam   Temp: 98  F (36.7  C)   BP: (!) 145/82 Pulse: 89   Resp: 18 SpO2: 93 %    "   Vital Signs with Ranges  Temp:  [98  F (36.7  C)] 98  F (36.7  C)  Pulse:  [82-89] 89  Resp:  [18] 18  BP: (145-167)/() 145/82  SpO2:  [93 %-98 %] 93 %  182 lbs 0 oz    GEN:  Alert, oriented x 3, appears comfortable, NAD.  HEENT:  Normocephalic/atraumatic, no scleral icterus, no nasal discharge, mouth moist.  CV:  Regular rate and rhythm, no murmur or JVD.  S1 + S2 noted, no S3 or S4.  LUNGS:  Clear to auscultation bilaterally without rales/rhonchi/wheezing/retractions.  Symmetric chest rise on inhalation noted.  ABD:  Active bowel sounds, soft, non-tender/non-distended.  No rebound/guarding/rigidity.  EXT:  No edema or cyanosis.  Hands/feet warm to touch with good signs of peripheral perfusion.  No joint synovitis noted.  SKIN:  Dry to touch, no exanthems noted in the visualized areas.  NEURO:  Symmetric muscle strength, sensation to touch grossly intact.  No new focal deficits appreciated.    Data   Data reviewed today:  I personally reviewed no images or EKG's today.  No lab results found in last 7 days.    Recent Results (from the past 24 hour(s))   CT Head w/o Contrast    Narrative    EXAM: CT HEAD W/O CONTRAST  LOCATION: [facility]  DATE: 8/24/2024    INDICATION: Fall, hit head.  COMPARISON: CT 4/25/2023.  TECHNIQUE: Routine CT Head without IV contrast. Multiplanar reformats. Dose reduction techniques were used.    FINDINGS:  INTRACRANIAL CONTENTS: No intracranial hemorrhage, extraaxial collection, or mass effect.  No CT evidence of acute infarct. Mild presumed chronic small vessel ischemic changes. Mild generalized volume loss. No hydrocephalus.     VISUALIZED ORBITS/SINUSES/MASTOIDS: No intraorbital abnormality. No paranasal sinus mucosal disease. No middle ear or mastoid effusion.    BONES/SOFT TISSUES: Mild soft tissue subcutaneous fat stranding and edema right periorbital soft tissues and right lateral frontal scalp. No acute intraorbital abnormality. No calvarial fracture       Impression    IMPRESSION:  1.  No acute intracranial findings.   CT Cervical Spine w/o Contrast    Narrative    EXAM: CT CERVICAL SPINE W/O CONTRAST  LOCATION: [facility]  DATE: 8/24/2024    INDICATION: Head injury.  COMPARISON: CT 4/25/2023.  TECHNIQUE: Routine CT Cervical Spine without IV contrast. Multiplanar reformats. Dose reduction techniques were used.    FINDINGS:  VERTEBRA: Normal vertebral body heights. No fracture or posttraumatic subluxation. Mild anterolisthesis C2-C3, C7-T1, T1-T2 and T2-T3; minor retrolisthesis C5-C6. Loss of lordosis.    CANAL/FORAMINA: At C3-4, severe left foraminal stenosis. At C4-5, severe bilateral foraminal stenosis. At C5-C6, severe central and severe bilateral foraminal stenosis. At C6-7, moderate central and severe bilateral foraminal stenosis. At C7-T1, severe   right foraminal stenosis.    PARASPINAL: No extraspinal abnormality.      Impression    IMPRESSION:  1.  No fracture or posttraumatic subluxation.     Chest CT w/o contrast    Addendum: 8/24/2024    CLINICAL ADDENDUM:   Clinical information in this report has been modified from the previous version as follows: The right humeral head is dislocated posteriorly which may be acute or chronic.    END ADDENDUM      Narrative    EXAM: CT CHEST W/O CONTRAST  LOCATION: [facility]  DATE: 8/24/2024    INDICATION: right rib pain after fall  COMPARISON: 2/21/2020  TECHNIQUE: CT chest without IV contrast. Multiplanar reformats were obtained. Dose reduction techniques were used.  CONTRAST: None.    FINDINGS:   LUNGS AND PLEURA: Stable bibasilar scarring. Stable mild bronchiectasis. No pleural effusion. No pneumothorax. A few stable benign pulmonary nodules measuring 0.3 cm or less.    MEDIASTINUM/AXILLAE: A stable enlarged right paratracheal node measuring 2.2 cm in short axis, image 49:3. Stable enlargement of main pulmonary artery at 3.5 cm which is consistent with pulmonary artery  hypertension.    CORONARY ARTERY CALCIFICATION: Mild.    UPPER ABDOMEN: No significant finding.    MUSCULOSKELETAL: Degenerative changes. Erosive changes of the humeral heads. Large right shoulder effusion. No acute fracture.      Impression    IMPRESSION:   1.  No acute traumatic abnormality.  2.  Stable mediastinal lymphadenopathy.  3.  Erosive changes of the humeral heads with a large right shoulder effusion.     XR Pelvis and Hip Right 1 View    Narrative    EXAM: XR PELVIS AND HIP RIGHT 1 VIEW  LOCATION: [facility]  DATE: 8/24/2024    INDICATION: right hip pain after fall  COMPARISON: None.      Impression    IMPRESSION:     There are postoperative changes from right total hip arthroplasty, in standard alignment. No periprosthetic lucency or periprosthetic fracture. No displaced pelvic fracture. Suture anchors are noted in the right greater trochanter. There are degenerative   changes in the lower lumbar spine and at the sacroiliac joints. Mild to moderate left hip degenerative arthrosis.   XR Shoulder Right G/E 3 Views    Narrative    EXAM: XR SHOULDER RIGHT G/E 3 VIEWS  LOCATION: [facility]  DATE: 8/24/2024    INDICATION: right shoulder pain after fall  COMPARISON: CT chest from the same day.      Impression    IMPRESSION:     There is advanced right shoulder arthropathy with considerable remodeling and flattening of the humeral head. This could reflect neuropathic arthropathy although other erosive arthropathies including inflammatory or infectious etiologies are difficult to   exclude. There is posterior dislocation of the humeral head at the glenohumeral joint, the chronicity of which is uncertain but could be acute. Clinical correlation is needed.    There is also a very large right glenohumeral joint effusion which is better appreciated on the CT chest. No acute, right shoulder fracture.     NOTE: ABNORMAL REPORT    THE DICTATION ABOVE DESCRIBES AN  ABNORMALITY FOR WHICH FOLLOW-UP IS NEEDED.

## 2024-08-25 NOTE — CONSULTS
Care Management Initial Consult    General Information  Assessment completed with: Patient, Pt - Ray  Type of CM/SW Visit: Initial Assessment    Primary Care Provider verified and updated as needed: Yes   Readmission within the last 30 days: no previous admission in last 30 days      Reason for Consult: discharge planning  Advance Care Planning: Advance Care Planning Reviewed: no concerns identified       Communication Assessment  Patient's communication style: spoken language (English or Bilingual)    Hearing Difficulty or Deaf: no   Wear Glasses or Blind: no    Cognitive  Cognitive/Neuro/Behavioral: WDL                      Living Environment:   People in home: child(bruce), adult     Current living Arrangements: house      Able to return to prior arrangements: yes    Family/Social Support:  Care provided by: self  Provides care for: no one  Marital Status:   Children          Description of Support System: Supportive, Involved    Support Assessment: Adequate family and caregiver support, Adequate social supports    Current Resources:   Patient receiving home care services: No     Community Resources: None  Equipment currently used at home: walker, rolling  Supplies currently used at home:      Employment/Financial:  Employment Status:          Financial Concerns: none   Referral to Financial Worker: No    Lifestyle & Psychosocial Needs:  Social Determinants of Health     Food Insecurity: Low Risk  (8/25/2024)    Food Insecurity     Within the past 12 months, did you worry that your food would run out before you got money to buy more?: No     Within the past 12 months, did the food you bought just not last and you didn t have money to get more?: No   Depression: Not at risk (1/22/2024)    PHQ-2     PHQ-2 Score: 0   Housing Stability: High Risk (8/25/2024)    Housing Stability     Do you have housing? : No     Are you worried about losing your housing?: No   Tobacco Use: Medium Risk (6/25/2024)    Patient  "History     Smoking Tobacco Use: Former     Smokeless Tobacco Use: Never     Passive Exposure: Past   Financial Resource Strain: Low Risk  (8/25/2024)    Financial Resource Strain     Within the past 12 months, have you or your family members you live with been unable to get utilities (heat, electricity) when it was really needed?: No   Alcohol Use: Not on file   Transportation Needs: Low Risk  (8/25/2024)    Transportation Needs     Within the past 12 months, has lack of transportation kept you from medical appointments, getting your medicines, non-medical meetings or appointments, work, or from getting things that you need?: No   Physical Activity: Not on file   Interpersonal Safety: Low Risk  (1/22/2024)    Interpersonal Safety     Do you feel physically and emotionally safe where you currently live?: Yes     Within the past 12 months, have you been hit, slapped, kicked or otherwise physically hurt by someone?: No     Within the past 12 months, have you been humiliated or emotionally abused in other ways by your partner or ex-partner?: No   Stress: Not on file   Social Connections: Not on file   Health Literacy: Not on file     Functional Status:  Prior to admission patient needed assistance:   Dependent ADLs:: Ambulation-walker  Dependent IADLs:: Independent, Transportation    Mental Health Status:  Mental Health Status: No Current Concerns       Chemical Dependency Status:  Chemical Dependency Status: No Current Concerns       Values/Beliefs:  Spiritual, Cultural Beliefs, Mu-ism Practices, Values that affect care: n/a              Additional Information:  Patient is 78 year old female admitted on 8/24/2024 with a chief complaint of \"Fall, right shoulder pain\" (per H&P). Chart reviewed. Updated by PT that home care PT is recommended at discharge.     Met with pt this date and introduced self and social work role. Pt resides in a [facility] level home. Dtr moved in with pt about 5 months ago. Uses a walker at " baseline. Pt has been to [facility] in the past. Discussed home care PT and pt is agreeable. Pt would prefer [facility] as she has a [facility] PCP. Pt's dtr will provide transportation at discharge. Anticipate discharge tomorrow.     Home care referral sent for home PT.     Social work will continue to follow and assist with discharge planning as needed.    AMANDA Spence, [facility]  Care Coordination  [phone]    AMANDA House

## 2024-08-25 NOTE — PHARMACY-ADMISSION MEDICATION HISTORY
Pharmacist Admission Medication History    Admission medication history is complete. The information provided in this note is only as accurate as the sources available at the time of the update.    Information Source(s): Patient via in-person    Changes made to PTA medication list:  Added: None  Deleted: None  Changed: tylenol    Allergies reviewed with patient and updates made in EHR: yes    Medication History Completed By: Mao Feliz [facility] 8/25/2024 9:26 AM    PTA Med List   Medication Sig Last Dose    acetaminophen (TYLENOL) 500 MG tablet Take 1,000 mg by mouth at bedtime. 8/23/2024    carboxymethylcellulose (CARBOXYMETHYLCELLULOSE SODIUM) 0.5 % SOLN ophthalmic solution Place 1 drop into both eyes 4 times daily as needed for dry eyes     dorzolamide-timolol PF (COSOPT PF) 2-0.5 % opthalmic solutionh Place 1 drop into both eyes 2 times daily 8/24/2024    furosemide (LASIX) 20 MG tablet Take 1 tablet (20 mg) by mouth 2 times daily 8/24/2024    gabapentin (NEURONTIN) 300 MG capsule TAKE 1 CAPSULE(300 MG) BY MOUTH THREE TIMES DAILY. 8/24/2024    latanoprost (XALATAN) 0.005 % ophthalmic solution Place 1 drop into the right eye At Bedtime  8/23/2024    levothyroxine (SYNTHROID/LEVOTHROID) 88 MCG tablet Take 1 tablet (88 mcg) by mouth daily 8/24/2024    metoprolol tartrate (LOPRESSOR) 25 MG tablet TAKE 1 TABLET(25 MG) BY MOUTH TWICE DAILY 8/24/2024 at am    Multiple Vitamins-Minerals (MULTIVITAMIN ADULT PO) Take 1 tablet by mouth daily 8/24/2024    pantoprazole (PROTONIX) 40 MG EC tablet TAKE 1 TABLET(40 MG) BY MOUTH DAILY 8/24/2024    potassium chloride ER (KLOR-CON M) 10 MEQ CR tablet TAKE 1 TABLET(10 MEQ) BY MOUTH DAILY 8/24/2024    pravastatin (PRAVACHOL) 40 MG tablet TAKE 1 TABLET(40 MG) BY MOUTH AT BEDTIME 8/23/2024

## 2024-08-25 NOTE — ED NOTES
"[facility]  ED Nurse Handoff Report    ED Chief complaint: Fall  . ED Diagnosis:   Final diagnoses:   Fall, initial encounter   Chronic dislocation of right shoulder       Allergies:   Allergies   Allergen Reactions    Seasonal Allergies     Simvastatin Other (See Comments)     \"flushed\"       Code Status: Full Code    Activity level - Baseline/Home:  walker.  Activity Level - Current:   assist of 1.   Lift room needed: No.   Bariatric: No   Needed: No   Isolation: No.   Infection: Not Applicable.     Respiratory status: Room air    Vital Signs (within 30 minutes):   Vitals:    08/24/24 1938 08/24/24 2103 08/24/24 2108 08/24/24 2218   BP: (!) 167/130 (!) 145/82     Pulse: 82 89     Resp: 18      Temp: 98  F (36.7  C)      SpO2: 97% 98% 95% 93%   Weight: 82.6 kg (182 lb)      Height: 1.727 m (5' 8\")          Cardiac Rhythm:  ,      Pain level:    Patient confused: No.   Patient Falls Risk: assistive device/personal items within reach and activity supervised.   Elimination Status: Has voided     Patient Report - Initial Complaint: per provider notes: Teri Beltran is a 78 year old female with history of total hip replacement who presents for right rib, right hip, and right side of face pain due to a fall. Patient landed on her right side after her shoe got caught on an uneven pavement. Reports pain with breathing. No LOC. No abdominal pain. Patient felt fine prior to fall.   .   Focused Assessment: pt. Has bruising to right side of face, pain in right shoulder, right hip. Pt. A/O x 4, assist of 1 with walker, uses walker at home at baseline, room air, continent at this time, limited ROM on right arm and shoulder.      Abnormal Results:   Labs Ordered and Resulted from Time of ED Arrival to Time of ED Departure - No data to display     XR Shoulder Right G/E 3 Views   Final Result   IMPRESSION:       There is advanced right shoulder arthropathy with considerable remodeling and " flattening of the humeral head. This could reflect neuropathic arthropathy although other erosive arthropathies including inflammatory or infectious etiologies are difficult to    exclude. There is posterior dislocation of the humeral head at the glenohumeral joint, the chronicity of which is uncertain but could be acute. Clinical correlation is needed.      There is also a very large right glenohumeral joint effusion which is better appreciated on the CT chest. No acute, right shoulder fracture.       NOTE: ABNORMAL REPORT      THE DICTATION ABOVE DESCRIBES AN ABNORMALITY FOR WHICH FOLLOW-UP IS NEEDED.          XR Pelvis and Hip Right 1 View   Final Result   IMPRESSION:       There are postoperative changes from right total hip arthroplasty, in standard alignment. No periprosthetic lucency or periprosthetic fracture. No displaced pelvic fracture. Suture anchors are noted in the right greater trochanter. There are degenerative    changes in the lower lumbar spine and at the sacroiliac joints. Mild to moderate left hip degenerative arthrosis.      Chest CT w/o contrast   Final Result   Addendum (preliminary) 1 of 1   CLINICAL ADDENDUM:    Clinical information in this report has been modified from the previous    version as follows: The right humeral head is dislocated posteriorly which    may be acute or chronic.      END ADDENDUM      Final   IMPRESSION:    1.  No acute traumatic abnormality.   2.  Stable mediastinal lymphadenopathy.   3.  Erosive changes of the humeral heads with a large right shoulder effusion.         CT Cervical Spine w/o Contrast   Final Result   IMPRESSION:   1.  No fracture or posttraumatic subluxation.         CT Head w/o Contrast   Final Result   IMPRESSION:   1.  No acute intracranial findings.          Treatments provided: IV, labs, multiple imaging, consults, pain control, ambulation trial pt. Can walk but very slowly and limited ROM.     Family Comments: at bedside daughter.   OBS  brochure/video discussed/provided to patient:  No  ED Medications:   Medications   acetaminophen (TYLENOL) tablet 1,000 mg (has no administration in time range)   morphine (PF) injection 2 mg (has no administration in time range)   morphine (PF) injection 2 mg (2 mg Intravenous $Given 8/24/24 2100)   acetaminophen (TYLENOL) tablet 1,000 mg (1,000 mg Oral $Given 8/24/24 2100)   ketorolac (TORADOL) injection 15 mg (15 mg Intravenous $Given 8/24/24 2059)       Drips infusing:  No  For the majority of the shift this patient was Green.   Interventions performed were as above.    Sepsis treatment initiated: No    Cares/treatment/interventions/medications to be completed following ED care: as above.     ED Nurse Name: Lily Echavarria RN  10:20 PM  RECEIVING UNIT ED HANDOFF REVIEW    Above ED Nurse Handoff Report was reviewed: Yes  Reviewed by: Margie Ashford RN on August 25, 2024 at 12:19 AM   I Gila called the ED to inform them the note was read: Yes        suggested a fibrosis level of F4. The CAP score suggests there is hepatic steatosis. 01/2023. [facility]. EkPa was 14.2. IQR/med 42%. . The results suggested a fibrosis level of F4. The high IQR% suggests that the measurement may not be reliable. The CAP score suggests there is hepatic steatosis. ENDOSCOPIC PROCEDURES:  04/2021. EGD by Dr. Columba Fermin. Normal esophagus. Pathology is negative. Repeat in 3 years. RADIOLOGY:  5/2018. Ultrasound of liver. Echogenic consistent with fatty liver. No liver mass lesions. No dilated bile ducts. No ascites. 9/2020. CT scan abdomen with IV contrast.  Changes consistent with fatty liver. No liver mass lesions. Normal appearing liver with areas of focal fat. Normal spleen. No ascites. 9/2020. Dynamic MRI of th liver. Changes consistent with fatty liver. No liver mass lesions. Normal spleen. No ascites. 04/2021. Ultrasound of the liver. Slightly coarsened echotexture. Portal vein normal caliber with antegrade flow. No focal abnormality. 10/2021. Ultrasound of the liver. Hepatic steatosis. No focal hepatic lesion. 06/2022. Ultrasound of the liver. Hepatic steatosis. No focal hepatic lesion. 01/2023. Ultrasound of the liver. Mildly increased echogenicity, without change. No focal mass. 07/2023. Ultrasound of the liver. Stable mildly echogenic liver. No focal liver mass. OTHER TESTING:  Not available or performed    FOLLOW-UP:  All of the issues listed above in the Assessment and Plan were discussed with the patient. All questions were answered. The patient expressed a clear understanding of the above. Betteyolandaew in 6 months for fibroscan and continued monitoring. She will have the ultrasound completed prior.       GAEL Tian-C  [facility]  [address], [address] [place] [address]

## 2024-08-25 NOTE — PLAN OF CARE
PRIMARY DIAGNOSIS: ACUTE PAIN  OUTPATIENT/OBSERVATION GOALS TO BE MET BEFORE DISCHARGE:  1. Pain Status: Improved-controlled with oral pain medications.    2. Return to near baseline physical activity: Yes    3. Cleared for discharge by consultants (if involved): Yes    Discharge Planner Nurse   Safe discharge environment identified: Yes  Barriers to discharge: Yes       Entered by: Aicha Ariza RN 08/25/2024    A&Ox4, VSS on RA. Pain managed with tylenol. Up Ax1 walker and GB. Tolerating diet, No IV access. Potential discharge tomorrow back home.     Please review provider order for any additional goals. Nurse to notify provider when observation goals have been met and patient is ready for discharge.    Plan of Care Reviewed With: patient  Overall Patient Progress: improving  Outcome Evaluation: Up Ax1 walker and GB. pain management with tylenol. Potential discharge tomorrow.    Problem: Adult Inpatient Plan of Care  Goal: Plan of Care Review  8/25/2024 1716 by Aicha Ariza RN  Outcome: Progressing  Flowsheets (Taken 8/25/2024 1716)  Outcome Evaluation: Up Ax1 walker and GB. pain management with tylenol. Potential discharge tomorrow.  Plan of Care Reviewed With: patient  Overall Patient Progress: improving  8/25/2024 1713 by Aicha Ariza RN  Outcome: Progressing  Flowsheets (Taken 8/25/2024 1713)  Outcome Evaluation:   Up Ax1 walker and GB, Right arm pain. Low fat diet   tolerating.  8/25/2024 1711 by Aicha Ariza RN  Outcome: Progressing  Flowsheets (Taken 8/25/2024 1711)  Outcome Evaluation: Right arm pain, Up Ax1 walker and GB.  Plan of Care Reviewed With: patient  Overall Patient Progress: improving  Goal: Patient-Specific Goal (Individualized)  8/25/2024 1716 by Aicha Ariza RN  Outcome: Progressing  8/25/2024 1713 by Aicha Ariza RN  Outcome: Progressing  8/25/2024 1711 by To  Aicha JIMENES RN  Outcome: Progressing  Goal: Absence of Hospital-Acquired Illness or Injury  8/25/2024 1716 by Aicha Ariza RN  Outcome: Progressing  8/25/2024 1713 by Aicha Ariza RN  Outcome: Progressing  8/25/2024 1711 by Aicha Ariza RN  Outcome: Progressing  Intervention: Identify and Manage Fall Risk  Recent Flowsheet Documentation  Taken 8/25/2024 0955 by Aicha Ariza RN  Safety Promotion/Fall Prevention:   nonskid shoes/slippers when out of bed   safety round/check completed  Intervention: Prevent Skin Injury  Recent Flowsheet Documentation  Taken 8/25/2024 0955 by Aicha Ariza RN  Body Position: position changed independently  Intervention: Prevent Infection  Recent Flowsheet Documentation  Taken 8/25/2024 0955 by Aicha Ariza RN  Infection Prevention:   hand hygiene promoted   personal protective equipment utilized   rest/sleep promoted  Goal: Optimal Comfort and Wellbeing  8/25/2024 1716 by Aicha Ariza RN  Outcome: Progressing  8/25/2024 1713 by Aicha Ariza RN  Outcome: Progressing  8/25/2024 1711 by Aicha Ariza RN  Outcome: Progressing  Goal: Readiness for Transition of Care  8/25/2024 1716 by Aicha Ariza RN  Outcome: Progressing  8/25/2024 1713 by Aicha Ariza RN  Outcome: Progressing  8/25/2024 1711 by Aicha Ariza RN  Outcome: Progressing     Problem: Comorbidity Management  Goal: Blood Pressure in Desired Range  8/25/2024 1716 by Aicha Ariza RN  Outcome: Progressing  8/25/2024 1713 by Aicha Ariza RN  Outcome: Progressing  8/25/2024 1711 by Aicha Ariza RN  Outcome: Progressing  Intervention: Maintain Blood Pressure Management  Recent Flowsheet Documentation  Taken 8/25/2024 6379 by Aicha Ariza  RN  Medication Review/Management: medications reviewed     Problem: Pain Acute  Goal: Optimal Pain Control and Function  8/25/2024 1716 by Aicha Ariza RN  Outcome: Progressing  8/25/2024 1713 by Aicha Ariza, RN  Outcome: Progressing  8/25/2024 1711 by Aicha Ariza, RN  Outcome: Progressing  Intervention: Prevent or Manage Pain  Recent Flowsheet Documentation  Taken 8/25/2024 0955 by Aicha Ariza, RN  Medication Review/Management: medications reviewed

## 2024-08-25 NOTE — PLAN OF CARE
"Ppt alert and orientedx4. VSS. 2L of oxygen. Denied pain. For Orthopedic surgery consult.  Problem: Adult Inpatient Plan of Care  Goal: Plan of Care Review  Description: The Plan of Care Review/Shift note should be completed every shift.  The Outcome Evaluation is a brief statement about your assessment that the patient is improving, declining, or no change.  This information will be displayed automatically on your shift  note.  Outcome: Progressing  Flowsheets (Taken 8/25/2024 0748)  Outcome Evaluation: pt reports no pain  Plan of Care Reviewed With: patient  Overall Patient Progress: improving  Goal: Patient-Specific Goal (Individualized)  Description: You can add care plan individualizations to a care plan. Examples of Individualization might be:  \"Parent requests to be called daily at 9am for status\", \"I have a hard time hearing out of my right ear\", or \"Do not touch me to wake me up as it startles  me\".  Outcome: Progressing  Goal: Absence of Hospital-Acquired Illness or Injury  Outcome: Progressing  Intervention: Identify and Manage Fall Risk  Recent Flowsheet Documentation  Taken 8/25/2024 0116 by Margie Ashford RN  Safety Promotion/Fall Prevention:   nonskid shoes/slippers when out of bed   safety round/check completed  Intervention: Prevent Skin Injury  Recent Flowsheet Documentation  Taken 8/25/2024 0116 by Margie Ashford RN  Body Position: position changed independently  Goal: Optimal Comfort and Wellbeing  Outcome: Progressing  Goal: Readiness for Transition of Care  Outcome: Progressing  Intervention: Mutually Develop Transition Plan  Recent Flowsheet Documentation  Taken 8/25/2024 0138 by Margie Ashford RN  Equipment Currently Used at Home: walker, rolling   Goal Outcome Evaluation:      Plan of Care Reviewed With: patient    Overall Patient Progress: improvingOverall Patient Progress: improving    Outcome Evaluation: pt reports no pain      "

## 2024-08-25 NOTE — ED TRIAGE NOTES
[facility] C/o fall after tripping on uneven pavement today. Pt. Denies syncope, dizziness, blood thinners. Pt. Currently c/o right head pain with wound, right hip pain (hx of total hip replacment), and right rib pain.    Triage Assessment (Adult)       Row Name 08/24/24 1934          Triage Assessment    Airway WDL WDL        Respiratory WDL    Respiratory WDL WDL        Cardiac WDL    Cardiac WDL WDL        Peripheral/Neurovascular WDL    Peripheral Neurovascular WDL WDL        Cognitive/Neuro/Behavioral WDL    Cognitive/Neuro/Behavioral WDL WDL

## 2024-08-25 NOTE — PLAN OF CARE
Goal Outcome Evaluation:      Plan of Care Reviewed With: patient  PRIMARY DIAGNOSIS: ACUTE PAIN  OUTPATIENT/OBSERVATION GOALS TO BE MET BEFORE DISCHARGE:  1. Pain Status: Improved-controlled with oral pain medications.    2. Return to near baseline physical activity: Yes    3. Cleared for discharge by consultants (if involved): Yes    Discharge Planner Nurse   Safe discharge environment identified: Yes  Barriers to discharge: Yes       Entered by: Aicha Ariza RN 08/25/2024   A&Ox4, VSS on RA. Per pt said ortho surgery said no need for sling, to follow outpatient. Tylenol for pain management. Awaiting note from consult. Up ax1 walker and GB. Able to make needs known.    Please review provider order for any additional goals. Nurse to notify provider when observation goals have been met and patient is ready for discharge.  Overall Patient Progress: improvingOverall Patient Progress: improving  Outcome Evaluation: Up Ax1 walker and GB, Right arm pain. Low fat diet; tolerating.    Problem: Adult Inpatient Plan of Care  Goal: Plan of Care Review  8/25/2024 1713 by Aicha Ariza RN  Outcome: Progressing  Flowsheets (Taken 8/25/2024 1713)  Outcome Evaluation:   Up Ax1 walker and GB, Right arm pain. Low fat diet   tolerating.  8/25/2024 1711 by Aicha Ariza RN  Outcome: Progressing  Flowsheets (Taken 8/25/2024 1711)  Outcome Evaluation: Right arm pain, Up Ax1 walker and GB.  Plan of Care Reviewed With: patient  Overall Patient Progress: improving  Goal: Patient-Specific Goal (Individualized)  8/25/2024 1713 by Aicha Ariza RN  Outcome: Progressing  8/25/2024 1711 by Aicha Ariza RN  Outcome: Progressing  Goal: Absence of Hospital-Acquired Illness or Injury  8/25/2024 1713 by Aicha Ariza RN  Outcome: Progressing  8/25/2024 1711 by Aicha Ariza RN  Outcome: Progressing  Intervention: Identify  and Manage Fall Risk  Recent Flowsheet Documentation  Taken 8/25/2024 0955 by Aicha Ariza RN  Safety Promotion/Fall Prevention:   nonskid shoes/slippers when out of bed   safety round/check completed  Intervention: Prevent Skin Injury  Recent Flowsheet Documentation  Taken 8/25/2024 0955 by Aicha Ariza RN  Body Position: position changed independently  Intervention: Prevent Infection  Recent Flowsheet Documentation  Taken 8/25/2024 0955 by Aicha Ariza RN  Infection Prevention:   hand hygiene promoted   personal protective equipment utilized   rest/sleep promoted  Goal: Optimal Comfort and Wellbeing  8/25/2024 1713 by Aicha Ariza RN  Outcome: Progressing  8/25/2024 1711 by Aicha Ariza RN  Outcome: Progressing  Goal: Readiness for Transition of Care  8/25/2024 1713 by Aicha Ariza RN  Outcome: Progressing  8/25/2024 1711 by Aicha Ariza RN  Outcome: Progressing     Problem: Pain Acute  Goal: Optimal Pain Control and Function  8/25/2024 1713 by Aicha Ariza RN  Outcome: Progressing  8/25/2024 1711 by Aicha Ariza RN  Outcome: Progressing  Intervention: Prevent or Manage Pain  Recent Flowsheet Documentation  Taken 8/25/2024 0955 by Aicha Ariza RN  Medication Review/Management: medications reviewed     Problem: Comorbidity Management  Goal: Blood Pressure in Desired Range  8/25/2024 1713 by Aicha Ariza RN  Outcome: Progressing  8/25/2024 1711 by Aicha Ariza RN  Outcome: Progressing  Intervention: Maintain Blood Pressure Management  Recent Flowsheet Documentation  Taken 8/25/2024 0955 by Aicha Ariza RN  Medication Review/Management: medications reviewed

## 2024-08-25 NOTE — ED PROVIDER NOTES
"  Emergency Department Note      History of Present Illness     Chief Complaint   Fall      HPI   Teri Beltran is a 78 year old female with history of total hip replacement who presents for right rib, right hip, and right side of face pain due to a fall. Patient landed on her right side after her shoe got caught on an uneven pavement. Reports pain with breathing. No LOC. No abdominal pain. Patient felt fine prior to fall.  Patient also has chronic right shoulder pain and swelling this been going on for over a month.    Independent Historian   None    Review of External Notes   Urgent care note from today for fall.    Past Medical History     Medical History and Problem List   First degree AV block  GERD  Hypothyroidism  Left atrial dilatation   RBBB  Hypertension  Hyperlipidemia    Medications   Neurontin  Lasix  Synthroid/Levothroid  Lopressor  Protonix   Pravachol   Lisinopril     Surgical History   Total hip replacement  Arthroplasty knee  Breast surgery  Decompression hip core  EGD  Hysterectomy    Physical Exam     Patient Vitals for the past 24 hrs:   BP Temp Pulse Resp SpO2 Height Weight   08/24/24 2218 -- -- -- -- 93 % -- --   08/24/24 2108 -- -- -- -- 95 % -- --   08/24/24 2103 (!) 145/82 -- 89 -- 98 % -- --   08/24/24 1938 (!) 167/130 98  F (36.7  C) 82 18 97 % 1.727 m (5' 8\") 82.6 kg (182 lb)     Physical Exam  GENERAL: Patient appears slightly uncomfortable but nontoxic.  HEAD: Atraumatic. No mason sign, raccoon eyes or CSF leak  Eyes: Anicteric. PERRL  NOSE: No active bleeding  MOUTH: Moist mucosa  THROAT: Patent airway. Pharynx clear.   Neck: In c-collar.  Mild spinal tenderness without crepitus or deformity.  Back: No midline spinal tenderness, crepitus or gross deformity  CV: RRR, no murmurs, rubs or gallops  PULM: CTAB with good aeration; no retractions, rales, rhonchi, or wheezing  ABD: Soft, nontender, nondistended, no guarding, no peritoneal signs, no bruising  DERM: No rash. Skin warm and " dry  EXTREMITY: Moving all extremities.  Right shoulder is swollen.  Patient can touch her left shoulder with her right hand.  She can also lift her right shoulder above parallel to the ground.  Neuro: Sensation intact to light touch throughout right upper extremity.  Right hand  strength 5 out of 5.  Right shoulder strength 4+ out of 5.  Pelvis: Stable  VASCULAR: Symmetric pulses bilaterally  CHEST: general tenderness over right lateral ribs    Diagnostics     Lab Results   Labs Ordered and Resulted from Time of ED Arrival to Time of ED Departure - No data to display    Imaging   XR Shoulder Right G/E 3 Views   Final Result   IMPRESSION:       There is advanced right shoulder arthropathy with considerable remodeling and flattening of the humeral head. This could reflect neuropathic arthropathy although other erosive arthropathies including inflammatory or infectious etiologies are difficult to    exclude. There is posterior dislocation of the humeral head at the glenohumeral joint, the chronicity of which is uncertain but could be acute. Clinical correlation is needed.      There is also a very large right glenohumeral joint effusion which is better appreciated on the CT chest. No acute, right shoulder fracture.       NOTE: ABNORMAL REPORT      THE DICTATION ABOVE DESCRIBES AN ABNORMALITY FOR WHICH FOLLOW-UP IS NEEDED.          XR Pelvis and Hip Right 1 View   Final Result   IMPRESSION:       There are postoperative changes from right total hip arthroplasty, in standard alignment. No periprosthetic lucency or periprosthetic fracture. No displaced pelvic fracture. Suture anchors are noted in the right greater trochanter. There are degenerative    changes in the lower lumbar spine and at the sacroiliac joints. Mild to moderate left hip degenerative arthrosis.      Chest CT w/o contrast   Final Result   Addendum (preliminary) 1 of 1   CLINICAL ADDENDUM:    Clinical information in this report has been modified from  the previous    version as follows: The right humeral head is dislocated posteriorly which    may be acute or chronic.      END ADDENDUM      Final   IMPRESSION:    1.  No acute traumatic abnormality.   2.  Stable mediastinal lymphadenopathy.   3.  Erosive changes of the humeral heads with a large right shoulder effusion.         CT Cervical Spine w/o Contrast   Final Result   IMPRESSION:   1.  No fracture or posttraumatic subluxation.         CT Head w/o Contrast   Final Result   IMPRESSION:   1.  No acute intracranial findings.             Independent Interpretation   CT Head: No intracranial hemorrhage.    ED Course      Medications Administered   Medications   acetaminophen (TYLENOL) tablet 1,000 mg (has no administration in time range)   morphine (PF) injection 2 mg (has no administration in time range)   morphine (PF) injection 2 mg (2 mg Intravenous $Given 8/24/24 2100)   acetaminophen (TYLENOL) tablet 1,000 mg (1,000 mg Oral $Given 8/24/24 2100)   ketorolac (TORADOL) injection 15 mg (15 mg Intravenous $Given 8/24/24 2059)       Procedures   Procedures     Discussion of Management   I discussed admission and the plan of care with the Hospitalist Dr. Wills  Discussed with Ortho Dr. Bragg.       ED Course   ED Course as of 08/24/24 2218   Sat Aug 24, 2024   1945 I obtained history and examined the patient as noted above.         Additional Documentation  None    Medical Decision Making / Diagnosis        JIMMY Beltran is a 78 year old female     Patient presenting after mechanical fall.      DDx considered, but not limited to fracture, intracranial bleed, syncope, however evaluation not consistent with these or other ominous etiologies.    CT head and C-spine unremarkable.  CT chest negative for acute process.    X-ray pelvis negative hip fracture.    Treated pain with Tylenol, morphine, and Toradol.    X-ray shoulder-humerus appears atrophic.  Posterior dislocation of right shoulder.  I suspect  this is chronic as she has been having pain and significant swelling for over a month of the shoulder.  Furthermore she can touch her left shoulder with her right hand.  She can also lift up her right shoulder above parallel.  I did try to manipulate the shoulder and it appears to sublux in and out of the joint.  Therefore, discussed with orthopedic surgery and they recommended sling and outpatient management as she likely needs a shoulder replacement but does not require further emergent manipulation.    Tried ambulating patient, but her legs feel fatigued, therefore discussed with hospitalist and patient admitted for PT/OT.  Discussed with hospitalist and patient admitted.    Disposition   The patient was admitted to the hospital.     Diagnosis     ICD-10-CM    1. Fall, initial encounter  W19.XXXA       2. Chronic dislocation of right shoulder  M24.411            Discharge Medications   New Prescriptions    No medications on file         Scribe Disclosure:  Ana Luisa CONTRERAS, am serving as a scribe at 7:42 PM on 8/24/2024 to document services personally performed by Balwinder Sheikh MD based on my observations and the provider's statements to me.        Balwinder Sheikh MD  08/24/24 1863

## 2024-08-25 NOTE — CONSULTS
ORTHOPAEDIC SURGERY CONSULTATION NOTE  CONSULTING PROVIDER:  Yevgeniy Castorena MD    HISTORY OF PRESENT ILLNESS  Teri Beltran is a 78 year old female who presents for evaluation of right shoulder pain.  Patient sustained a fall from standing yesterday resulting in significant shoulder pain.  Was seen in the emergency room and noted to have severe osteoarthritis with subluxation of the glenohumeral joint.  She has been having significant difficulty with her shoulders for a number of years.  At this juncture, would like to proceed with nonsurgical management if possible.  Denies numbness or tingling distally.  No other associated injuries    MEDICAL HISTORY  Past Medical History:   Diagnosis Date    Concussion with loss of consciousness, initial encounter 4/26/2023    First degree AV block 08/11/2015    Former smoker     Gastroesophageal reflux disease     GERD (gastroesophageal reflux disease)     Glaucoma     Heart block     2:1    Heart murmur     Hyperlipidemia LDL goal <130 06/14/2013    Hypertension goal BP (blood pressure) < 140/90 12/03/2013    Hypothyroidism     Left atrial dilatation     mild    Mild dilation of ascending aorta - borderline 08/11/2015    Palpitations     Prediabetes 06/14/2013    RBBB 08/11/2015    S/P total knee arthroplasty 07/23/2019    Tachycardia     Urinary frequency     Varicose veins     Venous insufficiency     s/p bilateral great saphenous vein radiofrequency ablation by Dr. Garcia         SURGICAL HISTORY  Past Surgical History:   Procedure Laterality Date    ARTHROPLASTY HIP Right 11/20/2020    Procedure: Right total hip arthroplasty using a Biomet Taperloc femoral stem and G7 acetabulum.;  Surgeon: Dragan Muse MD;  Location: [facility] OR    ARTHROPLASTY KNEE Right 07/23/2019    Procedure: Right total knee arthroplasty using an Arthrex iBalance knee system;  Surgeon: Dragan Muse MD;  Location: [facility] OR    BREAST SURGERY      right breast ductal surgery due to  milk production    BREAST SURGERY Right     ductal surgery due to milk production    CATARACT EXTRACTION Right 05/2022    COLONOSCOPY N/A 10/24/2014    no further screening. Procedure: COLONOSCOPY;  Surgeon: Pedro Rudd MD;  Location: [facility]    DECOMPRESSION HIP CORE Right 6/6/2022    Procedure: Open right hip abductor repair;  Surgeon: Dragan Muse MD;  Location: [facility] OR    ESOPHAGOSCOPY, GASTROSCOPY, DUODENOSCOPY (EGD), COMBINED N/A 4/9/2024    Procedure: Esophagoscopy, gastroscopy, duodenoscopy (EGD), combined biopsies using cold bx forcep;  Surgeon: Pedro Rudd MD;  Location: [facility]    EYE SURGERY      FINGER GANGLION CYST EXCISION Left     ring finger    HYSTERECTOMY      HYSTERECTOMY, PAP NO LONGER INDICATED  2009    total hysterectomy and ovaries. benign    SOFT TISSUE SURGERY      ganglion removed from left wrist and ring finger    SURGICAL HISTORY OF -   age 7    tonsillectomy    SURGICAL HISTORY OF -   01/2015    left eye cataract    SURGICAL HISTORY OF -   Fall 2016    LINQ placed.    TONSILLECTOMY      WRIST GANGLION EXCISION Left        CURRENT MEDICATIONS    Current Facility-Administered Medications:     acetaminophen (TYLENOL) tablet 650 mg, 650 mg, Oral, Q4H PRN, 650 mg at 08/25/24 1340 **OR** acetaminophen (TYLENOL) Suppository 650 mg, 650 mg, Rectal, Q4H PRN, Mahnaz Wills MD    calcium carbonate (TUMS) chewable tablet 1,000 mg, 1,000 mg, Oral, 4x Daily PRN, Mahnaz Wills MD    carboxymethylcellulose PF (REFRESH LIQUIGEL) 1 % ophthalmic gel 1 drop, 1 drop, Both Eyes, 4x Daily PRN, Mahnaz Wills MD    dorzolamide-timolol (COSOPT) ophthalmic solution 1 drop, 1 drop, Both Eyes, BID, Mahnaz Wills MD, 1 drop at 08/25/24 0956    gabapentin (NEURONTIN) capsule 300 mg, 300 mg, Oral, TID, Mahnaz Wills MD, 300 mg at 08/25/24 1340    HYDROmorphone (DILAUDID) half-tab 1 mg, 1 mg, Oral, Q4H PRN, Mahnaz Wills MD     HYDROmorphone (DILAUDID) tablet 2 mg, 2 mg, Oral, Q4H PRN, Mahnaz Wills MD    latanoprost (XALATAN) 0.005 % ophthalmic solution 1 drop, 1 drop, Right Eye, At Bedtime, Mahnaz Wills MD, 1 drop at 08/25/24 0239    levothyroxine (SYNTHROID/LEVOTHROID) tablet 88 mcg, 88 mcg, Oral, Daily, Mahnaz Wills MD, 88 mcg at 08/25/24 0955    lidocaine (LMX4) cream, , Topical, Q1H PRN, Mahnaz Wills MD    lidocaine 1 % 0.1-1 mL, 0.1-1 mL, Other, Q1H PRN, Mahnaz Wills MD    melatonin tablet 1 mg, 1 mg, Oral, At Bedtime PRN, Mahnaz Wills MD    metoprolol tartrate (LOPRESSOR) tablet 25 mg, 25 mg, Oral, BID, Mahnaz Wills MD, 25 mg at 08/25/24 0955    naloxone (NARCAN) injection 0.2 mg, 0.2 mg, Intravenous, Q2 Min PRN **OR** naloxone (NARCAN) injection 0.4 mg, 0.4 mg, Intravenous, Q2 Min PRN **OR** naloxone (NARCAN) injection 0.2 mg, 0.2 mg, Intramuscular, Q2 Min PRN **OR** naloxone (NARCAN) injection 0.4 mg, 0.4 mg, Intramuscular, Q2 Min PRN, Mahnaz Wills MD    ondansetron (ZOFRAN ODT) ODT tab 4 mg, 4 mg, Oral, Q6H PRN **OR** ondansetron (ZOFRAN) injection 4 mg, 4 mg, Intravenous, Q6H PRN, Mahnaz Wills MD    pantoprazole (PROTONIX) EC tablet 40 mg, 40 mg, Oral, Daily, Mahnaz Wills MD, 40 mg at 08/25/24 0954    pravastatin (PRAVACHOL) tablet 40 mg, 40 mg, Oral, At Bedtime, Mahnaz Wills MD, 40 mg at 08/25/24 0238    senna-docusate (SENOKOT-S/PERICOLACE) 8.6-50 MG per tablet 1 tablet, 1 tablet, Oral, BID PRN **OR** senna-docusate (SENOKOT-S/PERICOLACE) 8.6-50 MG per tablet 2 tablet, 2 tablet, Oral, BID PRN, Mahnaz Wills MD    sodium chloride (PF) 0.9% PF flush 3 mL, 3 mL, Intracatheter, Q8H, Mahnaz Wills MD, 3 mL at 08/25/24 0120    sodium chloride (PF) 0.9% PF flush 3 mL, 3 mL, Intracatheter, q1 min prn, Mahnaz Wills MD    SOCIAL HISTORY    Tobacco history is   History    Smoking Status    Former    Types: Cigarettes   Smokeless Tobacco    Never   .    REVIEW OF SYSTEMS  Constitutional: Negative for chills, fever, nausea, vomiting.  Objective   VITAL SIGNS  BP   Temp    Pulse   Resp    SpO2    Body mass index is 27.79 kg/m .    PHYSICAL EXAMINATION  Orthopedic Physical Examination_  General Appearance: Patient appears active, comfortable, no acute distress.  NEURO: The patient is alert and oriented to person, place, and time and able to follow commands.  HEENT: Head normocephalic, atraumatic.  CVS: No cyanosis or obvious jugular venous distension .  Resp: No acute respiratory distress or increased inspiratory effort   Musculoskeletal:  Right upper extremity:  Skin clean, dry and intact.  Significant crepitation with range of motion of the shoulder   Neurovascular exam:  Intact extensor pollicis longus, flexor pollicis longus, Extensor digitorum comminus, Flexor digitorum profundus, Flexor digitorum superficialis, intrinsic muscles of the hand.  Sensation intact to light touch median, radial and ulnar nerve distributions. Palpable radial pulse.    DIAGNOSTICS  IMAGING:  CT of the chest available for review.  Evidence of end-stage osteoarthritis bilateral glenohumeral joints with posterior subluxation of the right shoulder    Assessment & Plan   78-year-old female with end-stage osteoarthritis bilateral shoulders    PLAN:  I discussed with Ray treatment options for her shoulder pain.  She has end-stage osteoarthritis on plain films and CT.  In addition, there is significant subluxation of the right glenohumeral joint.  This is secondary to her end-stage osteoarthritis.  As such, would recommend treatment for end-stage osteoarthritis including but not limited to pain control, activity modification, corticosteroid injection and possibly arthroplasty.  She would like to proceed with corticosteroid injection.  I offered to do this in the clinic in the next few weeks.  May simply call the  [facility] office in [place] to schedule. All patient questions were answered to the best of my ability at this visit. Thank you for consultation of this very pleasant patient    ADMINISTRATIVE/BILLIN minutes were spent in care of this patient greater than 30 of which were spent in counseling, imaging review and coordination of care

## 2024-08-25 NOTE — PROGRESS NOTES
Hospitalist Medicine Progress Note   [facility]       Teri Beltran is a 78 year old lady with HTN, hypercholesteremia, LA dilatation, first-degree AV block, hypothyroidism, GERD, right total hip replacement came into [facility] 8/24/2024 after she tripped and fell in a parking lot landing on her right shoulder.  CT scan of the head was negative for acute intracranial abnormality, CT of the chest without contrast did not show any traumatic acute abnormality with stable mediastinal lymphadenopathy.  There was seen erosive changes of the humeral head with large right shoulder effusion.  Orthopedic surgery was consulted  Physical therapy evaluated the patient and did not think that the patient needs TCU       Date of Admission:  8/24/2024  Assessment & Plan     Mechanical fall with right shoulder pain  Posterior dislocation of right humeral head with unknown chronicity  Patient states that she tripped and fell in a parking lot this evening.  Patient complains of right shoulder pain.  X-ray of the right shoulder shows severe degenerative changes, posterior dislocation of the humeral head.  Patient continues to complain of pain despite IV Toradol and IV morphine.  -Will order IV Dilaudid as needed for pain.  Will also order Tylenol as needed.  -Will consult orthopedic surgery for evaluation in the morning.     Hypertension  Will resume metoprolol     Hyperlipidemia  -Will resume pravastatin     GERD  -Will resume pantoprazole     Hypothyroidism  -Will resume levothyroxine     DVT Prophylaxis: Pneumatic Compression Devices  Code Status: Full code     Disposition: Expected discharge in 1-2 days             Plan:   Wants to go tomorrow home       Diet: NPO for Medical/Clinical Reasons Except for: Meds, Ice Chips    DVT Prophylaxis: Low Risk/Ambulatory with no VTE prophylaxis indicated  Hess Catheter: Not present  Code Status: Full Code         Medically Ready for Discharge:  "Anticipated Tomorrow    Clinically Significant Risk Factors Present on Admission                  # Hypertension: Noted on problem list    # Anemia: based on hgb <11       # Overweight: Estimated body mass index is 27.79 kg/m  as calculated from the following:    Height as of this encounter: 1.727 m (5' 8\").    Weight as of this encounter: 82.9 kg (182 lb 12.2 oz).                          The patient's care was discussed with the Patient   .    London Cornejo MD  Hospitalist Service  [facility]    ______________________________________________________________________    Interval History     Symptoms   Pain in the right shoulder is less as compared to when she came in    Review of Systems:   Denies any other symptoms of shortness of breath or chest pain    Data reviewed today: I reviewed all medications, new labs and imaging results over the last 24 hours.     Physical Exam   Vital Signs: Temp: 97.9  F (36.6  C) Temp src: Oral BP: (!) 160/80 Pulse: 75   Resp: 16 SpO2: 98 % O2 Device: None (Room air) Oxygen Delivery: 2 LPM  Weight: 182 lbs 12.18 oz      GENERAL: Patient is not in acute distress  HEENT: EOM+,Conjunctiva is clear   NECK:  no Jugular Venous distention  HEART: S1 S2 regular Rate and Rhythm, there is  no murmur,   LUNGS: Respirations are not laboured, Lungs are  clear to auscultation without Crepitations or Wheezing   ABDOMEN: Soft , there is no tenderness , Bowel Sounds are  Positive   LOWER LIMBS: no  Pedal Edema Bilaterally   CNS:  Alert,  Oriented x 3, Moving all the Four Limbs     Data   Recent Labs   Lab 08/25/24  0535   WBC 7.4   HGB 10.9*   MCV 93         POTASSIUM 4.1   CHLORIDE 106   CO2 24   BUN 25.8*   CR 0.81   ANIONGAP 11   LILIA 9.3   *         Recent Results (from the past 24 hour(s))   CT Head w/o Contrast    Narrative    EXAM: CT HEAD W/O CONTRAST  LOCATION: [facility]  DATE: 8/24/2024    INDICATION: Fall, hit " head.  COMPARISON: CT 4/25/2023.  TECHNIQUE: Routine CT Head without IV contrast. Multiplanar reformats. Dose reduction techniques were used.    FINDINGS:  INTRACRANIAL CONTENTS: No intracranial hemorrhage, extraaxial collection, or mass effect.  No CT evidence of acute infarct. Mild presumed chronic small vessel ischemic changes. Mild generalized volume loss. No hydrocephalus.     VISUALIZED ORBITS/SINUSES/MASTOIDS: No intraorbital abnormality. No paranasal sinus mucosal disease. No middle ear or mastoid effusion.    BONES/SOFT TISSUES: Mild soft tissue subcutaneous fat stranding and edema right periorbital soft tissues and right lateral frontal scalp. No acute intraorbital abnormality. No calvarial fracture      Impression    IMPRESSION:  1.  No acute intracranial findings.   CT Cervical Spine w/o Contrast    Narrative    EXAM: CT CERVICAL SPINE W/O CONTRAST  LOCATION: [facility]  DATE: 8/24/2024    INDICATION: Head injury.  COMPARISON: CT 4/25/2023.  TECHNIQUE: Routine CT Cervical Spine without IV contrast. Multiplanar reformats. Dose reduction techniques were used.    FINDINGS:  VERTEBRA: Normal vertebral body heights. No fracture or posttraumatic subluxation. Mild anterolisthesis C2-C3, C7-T1, T1-T2 and T2-T3; minor retrolisthesis C5-C6. Loss of lordosis.    CANAL/FORAMINA: At C3-4, severe left foraminal stenosis. At C4-5, severe bilateral foraminal stenosis. At C5-C6, severe central and severe bilateral foraminal stenosis. At C6-7, moderate central and severe bilateral foraminal stenosis. At C7-T1, severe   right foraminal stenosis.    PARASPINAL: No extraspinal abnormality.      Impression    IMPRESSION:  1.  No fracture or posttraumatic subluxation.     Chest CT w/o contrast    Addendum: 8/24/2024    CLINICAL ADDENDUM:   Clinical information in this report has been modified from the previous version as follows: The right humeral head is dislocated posteriorly which may be acute or  chronic.    END ADDENDUM      Narrative    EXAM: CT CHEST W/O CONTRAST  LOCATION: [facility]  DATE: 8/24/2024    INDICATION: right rib pain after fall  COMPARISON: 2/21/2020  TECHNIQUE: CT chest without IV contrast. Multiplanar reformats were obtained. Dose reduction techniques were used.  CONTRAST: None.    FINDINGS:   LUNGS AND PLEURA: Stable bibasilar scarring. Stable mild bronchiectasis. No pleural effusion. No pneumothorax. A few stable benign pulmonary nodules measuring 0.3 cm or less.    MEDIASTINUM/AXILLAE: A stable enlarged right paratracheal node measuring 2.2 cm in short axis, image 49:3. Stable enlargement of main pulmonary artery at 3.5 cm which is consistent with pulmonary artery hypertension.    CORONARY ARTERY CALCIFICATION: Mild.    UPPER ABDOMEN: No significant finding.    MUSCULOSKELETAL: Degenerative changes. Erosive changes of the humeral heads. Large right shoulder effusion. No acute fracture.      Impression    IMPRESSION:   1.  No acute traumatic abnormality.  2.  Stable mediastinal lymphadenopathy.  3.  Erosive changes of the humeral heads with a large right shoulder effusion.     XR Pelvis and Hip Right 1 View    Narrative    EXAM: XR PELVIS AND HIP RIGHT 1 VIEW  LOCATION: [facility]  DATE: 8/24/2024    INDICATION: right hip pain after fall  COMPARISON: None.      Impression    IMPRESSION:     There are postoperative changes from right total hip arthroplasty, in standard alignment. No periprosthetic lucency or periprosthetic fracture. No displaced pelvic fracture. Suture anchors are noted in the right greater trochanter. There are degenerative   changes in the lower lumbar spine and at the sacroiliac joints. Mild to moderate left hip degenerative arthrosis.   XR Shoulder Right G/E 3 Views    Narrative    EXAM: XR SHOULDER RIGHT G/E 3 VIEWS  LOCATION: [facility]  DATE: 8/24/2024    INDICATION: right shoulder pain after  fall  COMPARISON: CT chest from the same day.      Impression    IMPRESSION:     There is advanced right shoulder arthropathy with considerable remodeling and flattening of the humeral head. This could reflect neuropathic arthropathy although other erosive arthropathies including inflammatory or infectious etiologies are difficult to   exclude. There is posterior dislocation of the humeral head at the glenohumeral joint, the chronicity of which is uncertain but could be acute. Clinical correlation is needed.    There is also a very large right glenohumeral joint effusion which is better appreciated on the CT chest. No acute, right shoulder fracture.     NOTE: ABNORMAL REPORT    THE DICTATION ABOVE DESCRIBES AN ABNORMALITY FOR WHICH FOLLOW-UP IS NEEDED.

## 2024-08-25 NOTE — ED NOTES
Semi fail, pt. Is able to ambulate but does have stairs into home and will be unable to get into house, took pt. 5 minutes to walk from bed to door 5 feet away. Pain/ROM are barriers.

## 2024-08-26 ENCOUNTER — APPOINTMENT (OUTPATIENT)
Dept: PHYSICAL THERAPY | Facility: CLINIC | Age: 79
End: 2024-08-26
Payer: MEDICARE

## 2024-08-26 VITALS
TEMPERATURE: 98.1 F | HEART RATE: 64 BPM | BODY MASS INDEX: 27.7 KG/M2 | WEIGHT: 182.76 LBS | RESPIRATION RATE: 16 BRPM | OXYGEN SATURATION: 93 % | SYSTOLIC BLOOD PRESSURE: 168 MMHG | DIASTOLIC BLOOD PRESSURE: 89 MMHG | HEIGHT: 68 IN

## 2024-08-26 PROCEDURE — 99239 HOSP IP/OBS DSCHRG MGMT >30: CPT | Performed by: INTERNAL MEDICINE

## 2024-08-26 PROCEDURE — G0378 HOSPITAL OBSERVATION PER HR: HCPCS

## 2024-08-26 PROCEDURE — 250N000013 HC RX MED GY IP 250 OP 250 PS 637: Performed by: INTERNAL MEDICINE

## 2024-08-26 PROCEDURE — 97530 THERAPEUTIC ACTIVITIES: CPT | Mod: GP | Performed by: PHYSICAL THERAPIST

## 2024-08-26 RX ORDER — HYDROMORPHONE HYDROCHLORIDE 2 MG/1
2 TABLET ORAL EVERY 4 HOURS PRN
Qty: 20 TABLET | Refills: 0 | Status: SHIPPED | OUTPATIENT
Start: 2024-08-26

## 2024-08-26 RX ORDER — AMOXICILLIN 250 MG
2 CAPSULE ORAL 2 TIMES DAILY PRN
Qty: 15 TABLET | Refills: 0 | Status: SHIPPED | OUTPATIENT
Start: 2024-08-26 | End: 2024-09-10

## 2024-08-26 RX ORDER — ACETAMINOPHEN 500 MG
500 TABLET ORAL EVERY 6 HOURS PRN
COMMUNITY
Start: 2024-08-26

## 2024-08-26 RX ORDER — ACETAMINOPHEN 500 MG
500-1000 TABLET ORAL 4 TIMES DAILY
COMMUNITY
Start: 2024-08-26

## 2024-08-26 RX ADMIN — GABAPENTIN 300 MG: 300 CAPSULE ORAL at 08:27

## 2024-08-26 RX ADMIN — ACETAMINOPHEN 650 MG: 325 TABLET, FILM COATED ORAL at 08:31

## 2024-08-26 RX ADMIN — METOPROLOL TARTRATE 25 MG: 25 TABLET, FILM COATED ORAL at 08:27

## 2024-08-26 RX ADMIN — PANTOPRAZOLE SODIUM 40 MG: 40 TABLET, DELAYED RELEASE ORAL at 08:26

## 2024-08-26 RX ADMIN — DORZOLAMIDE HYDROCHLORIDE AND TIMOLOL MALEATE 1 DROP: 20; 5 SOLUTION OPHTHALMIC at 08:27

## 2024-08-26 RX ADMIN — LEVOTHYROXINE SODIUM 88 MCG: 0.09 TABLET ORAL at 08:27

## 2024-08-26 ASSESSMENT — ACTIVITIES OF DAILY LIVING (ADL)
ADLS_ACUITY_SCORE: 25
ADLS_ACUITY_SCORE: 24
ADLS_ACUITY_SCORE: 25
ADLS_ACUITY_SCORE: 25
ADLS_ACUITY_SCORE: 24
ADLS_ACUITY_SCORE: 25
ADLS_ACUITY_SCORE: 25
ADLS_ACUITY_SCORE: 24
ADLS_ACUITY_SCORE: 25

## 2024-08-26 NOTE — PLAN OF CARE
PRIMARY DIAGNOSIS: Fall/ R shoulder Injury OUTPATIENT/OBSERVATION GOALS TO BE MET BEFORE DISCHARGE:  ADLs back to baseline: Yes    Activity and level of assistance: Up with standby assistance.    Pain status: Improved-controlled with oral pain medications.    Return to near baseline physical activity: Yes     Discharge Planner Nurse   Safe discharge environment identified: Yes  Barriers to discharge: No       Entered by: Nina Stringer RN 08/26/2024       Pt is A&Ox4. VSS.    Please review provider order for any additional goals.   Nurse to notify provider when observation goals have been met and patient is ready for discharge.

## 2024-08-26 NOTE — DISCHARGE SUMMARY
"[facility]  Hospitalist Discharge Summary      Date of Admission:  8/24/2024  Date of Discharge:  8/26/2024  Discharging Provider: London Cornejo MD  Discharge Service: Hospitalist Service    Discharge Diagnoses   Mechanical fall with right shoulder pain  Posterior dislocation of right humeral head with unknown chronicity  Hypertension   Hyperlipidemia   GERD  Hypothyroidism    Clinically Significant Risk Factors     # Overweight: Estimated body mass index is 27.79 kg/m  as calculated from the following:    Height as of this encounter: 1.727 m (5' 8\").    Weight as of this encounter: 82.9 kg (182 lb 12.2 oz).       Follow-ups Needed After Discharge   Follow-up Appointments     Follow-up and recommended labs and tests       Follow up with primary care provider, Rachel Miller,   within 7 days for hospital follow- up.  The following labs/tests are   recommended:   Monitor pain management with right clavicular fracture  Monitor range of motion in the right shoulder with clavicle fracture.      Follow up with Dr. José orthopedic surgery call [phone] - [facility] office in [place] to schedule   , at (location with clinic name or city) [facility], within 1-2   Weeks right clavicular fracture and shoulder management.            Discharge Disposition   Discharged to home  Condition at discharge: Good    Hospital Course   Teri Beltran is a 78 year old lady with HTN, hypercholesteremia, LA dilatation, first-degree AV block, hypothyroidism, GERD, right total hip replacement came into [facility] 8/24/2024 after she tripped and fell in a parking lot landing on her right shoulder.  CT scan of the head was negative for acute intracranial abnormality, CT of the chest without contrast did not show any traumatic acute abnormality with stable mediastinal lymphadenopathy.  There was seen erosive changes of the humeral head with large right " shoulder effusion.  Orthopedic surgery was consulted  Physical therapy evaluated the patient and did not think that the patient needs TCU and was sent home with home care PT OT    Consultations This Hospital Stay   PHYSICAL THERAPY ADULT IP CONSULT  CARE MANAGEMENT / SOCIAL WORK IP CONSULT  ORTHOPEDIC SURGERY IP CONSULT    Code Status   Full Code    Time Spent on this Encounter   I, London Cornejo MD, personally saw the patient today and spent greater than 30 minutes discharging this patient.       London Cornejo MD  [facility] OBSERVATION DEPT  [address] [place] [address]  Phone: [phone]  ______________________________________________________________________    Physical Exam   Vital Signs: Temp: 98.1  F (36.7  C) Temp src: Oral BP: (!) 168/89 Pulse: 64   Resp: 16 SpO2: 93 % O2 Device: None (Room air)    Weight: 182 lbs 12.18 oz  GENERAL: Patient is not in acute distress  HEENT: EOM+,Conjunctiva is clear   NECK:  no Jugular Venous distention  HEART: S1 S2 regular Rate and Rhythm, there is  no murmur,   LUNGS: Respirations are not laboured, Lungs are  clear to auscultation without Crepitations or Wheezing   ABDOMEN: Soft , there is no tenderness , Bowel Sounds are  Positive   LOWER LIMBS: no  Pedal Edema Bilaterally   CNS:  Alert,  Oriented x 3, Moving all the Four Limbs        Primary Care Physician   Rachel Miller    Discharge Orders      Home Care Referral      Reason for your hospital stay    came into [facility] 8/24/2024 after she tripped and fell in a parking lot landing on her right shoulder.     Follow-up and recommended labs and tests     Follow up with primary care provider, Rachel Miller, within 7 days for hospital follow- up.  The following labs/tests are recommended:   Monitor pain management with right clavicular fracture  Monitor range of motion in the right shoulder with clavicle fracture.      Follow up with Dr. José  orthopedic surgery call [phone] - [facility] office in [place] to schedule   , at (location with clinic name or city) [facility], within 1-2 Weeks right clavicular fracture and shoulder management.     Activity    Your activity upon discharge: activity as tolerated     Diet    Follow this diet upon discharge: Current Diet:Orders Placed This Encounter      Low Saturated Fat Na <2400 mg       Significant Results and Procedures   Most Recent 3 CBC's:  Recent Labs   Lab Test 08/25/24  0535 07/25/24  1325 06/18/24  1318   WBC 7.4 6.4 5.7   HGB 10.9* 12.1 11.7   MCV 93 87 85    344 371     Most Recent 3 BMP's:  Recent Labs   Lab Test 08/25/24  0535 01/12/24  0952 04/26/23  0745    140 142   POTASSIUM 4.1 4.4 3.9   CHLORIDE 106 105 105   CO2 24 25 26   BUN 25.8* 19.8 30.6*   CR 0.81 0.63 0.69   ANIONGAP 11 10 11   LILIA 9.3 9.5 8.9   * 98 111*     Most Recent 2 LFT's:  Recent Labs   Lab Test 01/12/24  0952 01/10/23  1023   AST 23 27   ALT 17 22   ALKPHOS 110 115*   BILITOTAL 0.3 0.5   ,   Results for orders placed or performed during the hospital encounter of 08/24/24   CT Head w/o Contrast    Narrative    EXAM: CT HEAD W/O CONTRAST  LOCATION: [facility]  DATE: 8/24/2024    INDICATION: Fall, hit head.  COMPARISON: CT 4/25/2023.  TECHNIQUE: Routine CT Head without IV contrast. Multiplanar reformats. Dose reduction techniques were used.    FINDINGS:  INTRACRANIAL CONTENTS: No intracranial hemorrhage, extraaxial collection, or mass effect.  No CT evidence of acute infarct. Mild presumed chronic small vessel ischemic changes. Mild generalized volume loss. No hydrocephalus.     VISUALIZED ORBITS/SINUSES/MASTOIDS: No intraorbital abnormality. No paranasal sinus mucosal disease. No middle ear or mastoid effusion.    BONES/SOFT TISSUES: Mild soft tissue subcutaneous fat stranding and edema right periorbital soft tissues and right lateral frontal scalp. No  acute intraorbital abnormality. No calvarial fracture      Impression    IMPRESSION:  1.  No acute intracranial findings.   CT Cervical Spine w/o Contrast    Narrative    EXAM: CT CERVICAL SPINE W/O CONTRAST  LOCATION: [facility]  DATE: 8/24/2024    INDICATION: Head injury.  COMPARISON: CT 4/25/2023.  TECHNIQUE: Routine CT Cervical Spine without IV contrast. Multiplanar reformats. Dose reduction techniques were used.    FINDINGS:  VERTEBRA: Normal vertebral body heights. No fracture or posttraumatic subluxation. Mild anterolisthesis C2-C3, C7-T1, T1-T2 and T2-T3; minor retrolisthesis C5-C6. Loss of lordosis.    CANAL/FORAMINA: At C3-4, severe left foraminal stenosis. At C4-5, severe bilateral foraminal stenosis. At C5-C6, severe central and severe bilateral foraminal stenosis. At C6-7, moderate central and severe bilateral foraminal stenosis. At C7-T1, severe   right foraminal stenosis.    PARASPINAL: No extraspinal abnormality.      Impression    IMPRESSION:  1.  No fracture or posttraumatic subluxation.     Chest CT w/o contrast    Addendum: 8/24/2024    CLINICAL ADDENDUM:   Clinical information in this report has been modified from the previous version as follows: The right humeral head is dislocated posteriorly which may be acute or chronic.    END ADDENDUM      Narrative    EXAM: CT CHEST W/O CONTRAST  LOCATION: [facility]  DATE: 8/24/2024    INDICATION: right rib pain after fall  COMPARISON: 2/21/2020  TECHNIQUE: CT chest without IV contrast. Multiplanar reformats were obtained. Dose reduction techniques were used.  CONTRAST: None.    FINDINGS:   LUNGS AND PLEURA: Stable bibasilar scarring. Stable mild bronchiectasis. No pleural effusion. No pneumothorax. A few stable benign pulmonary nodules measuring 0.3 cm or less.    MEDIASTINUM/AXILLAE: A stable enlarged right paratracheal node measuring 2.2 cm in short axis, image 49:3. Stable enlargement of main pulmonary  artery at 3.5 cm which is consistent with pulmonary artery hypertension.    CORONARY ARTERY CALCIFICATION: Mild.    UPPER ABDOMEN: No significant finding.    MUSCULOSKELETAL: Degenerative changes. Erosive changes of the humeral heads. Large right shoulder effusion. No acute fracture.      Impression    IMPRESSION:   1.  No acute traumatic abnormality.  2.  Stable mediastinal lymphadenopathy.  3.  Erosive changes of the humeral heads with a large right shoulder effusion.     XR Shoulder Right G/E 3 Views    Narrative    EXAM: XR SHOULDER RIGHT G/E 3 VIEWS  LOCATION: [facility]  DATE: 8/24/2024    INDICATION: right shoulder pain after fall  COMPARISON: CT chest from the same day.      Impression    IMPRESSION:     There is advanced right shoulder arthropathy with considerable remodeling and flattening of the humeral head. This could reflect neuropathic arthropathy although other erosive arthropathies including inflammatory or infectious etiologies are difficult to   exclude. There is posterior dislocation of the humeral head at the glenohumeral joint, the chronicity of which is uncertain but could be acute. Clinical correlation is needed.    There is also a very large right glenohumeral joint effusion which is better appreciated on the CT chest. No acute, right shoulder fracture.     NOTE: ABNORMAL REPORT    THE DICTATION ABOVE DESCRIBES AN ABNORMALITY FOR WHICH FOLLOW-UP IS NEEDED.      XR Pelvis and Hip Right 1 View    Narrative    EXAM: XR PELVIS AND HIP RIGHT 1 VIEW  LOCATION: [facility]  DATE: 8/24/2024    INDICATION: right hip pain after fall  COMPARISON: None.      Impression    IMPRESSION:     There are postoperative changes from right total hip arthroplasty, in standard alignment. No periprosthetic lucency or periprosthetic fracture. No displaced pelvic fracture. Suture anchors are noted in the right greater trochanter. There are degenerative   changes in the  lower lumbar spine and at the sacroiliac joints. Mild to moderate left hip degenerative arthrosis.       Discharge Medications   Current Discharge Medication List        START taking these medications    Details   HYDROmorphone (DILAUDID) 2 MG tablet Take 1 tablet (2 mg) by mouth every 4 hours as needed for severe pain (IF pain not managed with non-pharmacological and non-opioid interventions).  Qty: 20 tablet, Refills: 0    Associated Diagnoses: Fall, initial encounter; Chronic dislocation of right shoulder      senna-docusate (SENOKOT-S/PERICOLACE) 8.6-50 MG tablet Take 2 tablets by mouth 2 times daily as needed for constipation.  Qty: 15 tablet, Refills: 0    Associated Diagnoses: Fall, initial encounter; Chronic dislocation of right shoulder           CONTINUE these medications which have CHANGED    Details   !! acetaminophen (TYLENOL) 500 MG tablet Take 1 tablet (500 mg) by mouth every 6 hours as needed for mild pain.    Associated Diagnoses: Fall, initial encounter; Chronic dislocation of right shoulder      !! acetaminophen (TYLENOL) 500 MG tablet Take 1-2 tablets (500-1,000 mg) by mouth 4 times daily.    Associated Diagnoses: Fall, initial encounter; Chronic dislocation of right shoulder       !! - Potential duplicate medications found. Please discuss with provider.        CONTINUE these medications which have NOT CHANGED    Details   carboxymethylcellulose (CARBOXYMETHYLCELLULOSE SODIUM) 0.5 % SOLN ophthalmic solution Place 1 drop into both eyes 4 times daily as needed for dry eyes    Associated Diagnoses: Dry eyes      dorzolamide-timolol PF (COSOPT PF) 2-0.5 % opthalmic solutionh Place 1 drop into both eyes 2 times daily      furosemide (LASIX) 20 MG tablet Take 1 tablet (20 mg) by mouth 2 times daily  Qty: 180 tablet, Refills: 3    Associated Diagnoses: Edema of left lower extremity due to peripheral venous insufficiency; Fluid retention      gabapentin (NEURONTIN) 300 MG capsule TAKE 1 CAPSULE(300 MG)  "BY MOUTH THREE TIMES DAILY.  Qty: 270 capsule, Refills: 1    Associated Diagnoses: Chronic hip pain after total replacement of right hip joint      latanoprost (XALATAN) 0.005 % ophthalmic solution Place 1 drop into the right eye At Bedtime       levothyroxine (SYNTHROID/LEVOTHROID) 88 MCG tablet Take 1 tablet (88 mcg) by mouth daily  Qty: 90 tablet, Refills: 1    Associated Diagnoses: Hypothyroidism, unspecified type      metoprolol tartrate (LOPRESSOR) 25 MG tablet TAKE 1 TABLET(25 MG) BY MOUTH TWICE DAILY  Qty: 180 tablet, Refills: 1    Associated Diagnoses: Palpitations; Hypertension goal BP (blood pressure) < 140/90      Multiple Vitamins-Minerals (MULTIVITAMIN ADULT PO) Take 1 tablet by mouth daily      pantoprazole (PROTONIX) 40 MG EC tablet TAKE 1 TABLET(40 MG) BY MOUTH DAILY  Qty: 90 tablet, Refills: 1    Associated Diagnoses: Epigastric pain      potassium chloride ER (KLOR-CON M) 10 MEQ CR tablet TAKE 1 TABLET(10 MEQ) BY MOUTH DAILY  Qty: 90 tablet, Refills: 2    Associated Diagnoses: Fluid retention      pravastatin (PRAVACHOL) 40 MG tablet TAKE 1 TABLET(40 MG) BY MOUTH AT BEDTIME  Qty: 90 tablet, Refills: 1    Associated Diagnoses: Hyperlipidemia LDL goal <130           Allergies   Allergies   Allergen Reactions    Seasonal Allergies     Simvastatin Other (See Comments)     \"flushed\"     "

## 2024-08-26 NOTE — PLAN OF CARE
Expand All Collapse All    PRIMARY DIAGNOSIS: Fall/ R shoulder Injury   OUTPATIENT/OBSERVATION GOALS TO BE MET BEFORE DISCHARGE:  ADLs back to baseline: Yes     Activity and level of assistance: Up with standby assistance.     Pain status: Improved-controlled with oral pain medications.     Return to near baseline physical activity: Yes             Discharge Planner Nurse  Safe discharge environment identified: Yes  Barriers to discharge: No       Entered by: Nina Stringer RN 08/26/2024         Pt is A&Ox4. VSS. Cardiac diet. PRN tylenol given for pain. Pt up and walking in halls.      Please review provider order for any additional goals.   Nurse to notify provider when observation goals have been met and patient is ready for discharge.

## 2024-08-26 NOTE — PROGRESS NOTES
Physical Therapy Discharge Summary    Reason for therapy discharge:    All goals and outcomes met, no further needs identified.    Progress towards therapy goal(s). See goals on Care Plan in [facility] electronic health record for goal details.  Goals met    Therapy recommendation(s):    Continued therapy is recommended.  Rationale/Recommendations:  HHPT for strength, balance, and overall progression in functional mobility.

## 2024-08-26 NOTE — PROGRESS NOTES
Care Management Discharge Note    Discharge Date: 08/26/2024     Discharge Disposition: Home     Discharge Services:  [facility] - PT    Discharge DME:      Discharge Transportation: dtr    Private pay costs discussed: Not applicable    Patient/Family in Agreement with the Plan: yes    Handoff Referral Completed: Yes    Additional Information:  CODIE following for discharge planning.     [facility] has accepted pt for home care PT. Updated pt. Pt's dtr will transport.     Orders faxed to [facility].     AMANDA Spence, [facility]  Care Coordination  [phone]    AMANDA House

## 2024-08-26 NOTE — PLAN OF CARE
Expand All Collapse All    PRIMARY DIAGNOSIS: Fall/ R shoulder Injury   OUTPATIENT/OBSERVATION GOALS TO BE MET BEFORE DISCHARGE:  ADLs back to baseline: Yes     Activity and level of assistance: Up with standby assistance.     Pain status: Improved-controlled with oral pain medications.     Return to near baseline physical activity: Yes             Discharge Planner Nurse  Safe discharge environment identified: Yes  Barriers to discharge: No       Entered by: Nina Stringer RN 08/26/2024         Pt is A&Ox4. VSS. Cardiac diet.  SBA w/ GB/Walker.  No IV access. PT recommending Home w/ PT/Assist. Plan: Possibly discharge home today. Daughter to transport.      Please review provider order for any additional goals.   Nurse to notify provider when observation goals have been met and patient is ready for discharge.

## 2024-08-28 ENCOUNTER — TELEPHONE (OUTPATIENT)
Dept: INTERNAL MEDICINE | Facility: CLINIC | Age: 79
End: 2024-08-28
Payer: MEDICARE

## 2024-08-28 ENCOUNTER — MEDICAL CORRESPONDENCE (OUTPATIENT)
Dept: HEALTH INFORMATION MANAGEMENT | Facility: CLINIC | Age: 79
End: 2024-08-28

## 2024-08-28 ENCOUNTER — PATIENT OUTREACH (OUTPATIENT)
Dept: CARE COORDINATION | Facility: CLINIC | Age: 79
End: 2024-08-28
Payer: MEDICARE

## 2024-08-28 NOTE — PROGRESS NOTES
"Clinic Care Coordination Contact  Transitions of Care Outreach  Chief Complaint   Patient presents with    Clinic Care Coordination - [facility]     Patient Active Problem List   Diagnosis    Hypothyroidism    Tachycardia    Glaucoma    Prediabetes    Hyperlipidemia LDL goal <130    Palpitations    BMI 30.0-30.9,adult    Hypertension goal BP (blood pressure) < 140/90    Adjustment disorder with depressed mood    Osteoporosis    RBBB    First degree AV block    Venous insufficiency    Varicose veins    S/P total knee arthroplasty    S/P total hip arthroplasty    Anterior dislocation of right hip (H)    Fall    SVT (supraventricular tachycardia) (H24)    Rupture of tendon of hip abductor    LUQ abdominal pain    Hip pain, right    Closed head injury, initial encounter    Hematoma of scalp, initial encounter    Fall, initial encounter    Concussion with loss of consciousness, initial encounter    Acute midline low back pain without sciatica    Aortic regurgitation    Lymphedema - failure of conservative therapy of daily compression, elevation and exercise for at least 4 weeks    Anemia, iron deficiency    Chronic dislocation of right shoulder     Most Recent Admission Date: 8/24/2024   Most Recent Admission Diagnosis: Fall, initial encounter - W19.XXXA  Chronic dislocation of right shoulder - M24.411     Most Recent Discharge Date: 8/26/2024   Most Recent Discharge Diagnosis: Fall, initial encounter - W19.XXXA  Chronic dislocation of right shoulder - M24.411     Transitions of Care Assessment    Discharge Assessment  How are you doing now that you are home?: \"A little sore, but considering all that I'm doing ok\" per pt report  How are your symptoms? (Red Flag symptoms escalate to triage hotline per guidelines): Improved  Do you know how to contact your clinic care team if you have future questions or changes to your health status? : Yes  Does the patient have their discharge instructions? : Yes  Does the patient " have questions regarding their discharge instructions? : No  Were you started on any new medications or were there changes to any of your previous medications? : Yes  Does the patient have all of their medications?: Yes  Do you have questions regarding any of your medications? : No  Do you have all of your needed medical supplies or equipment (DME)?  (i.e. oxygen tank, CPAP, cane, etc.): Yes    Post-op (Clinicians Only)  Fever: No  Chills: No  Eating & Drinking: eating and drinking without complaints/concerns  Bowel Function: normal  Urinary Status: voiding without complaint/concerns    Care Management       Care Mgmt Encounter Assessment  Preventative Care  Routine Health maintenance Reviewed: Due/Overdue   Health Maintenance Due   Topic Date Due    URINE DRUG SCREEN  Never done    COVID-19 Vaccine (7 - 2023-24 season) 02/18/2024     Clinic Utilization  Difficulty keeping appointments:: No  Compliance Concerns: No  No-Show Concerns: No  No PCP office visit in Past Year: No  Transportation  Transportation means:: Regular car    Barriers in Communication  Other concerns:: None     Medication Review  Medication adherence problem (GOAL):: No  Knowledgeable about how to use meds:: Yes  Medication side effects suspected:: No  Diet/Exercise/Sleep  Diet:: Regular  Inadequate nutrition (GOAL):: No  Tube Feeding: No  Inadequate activity/exercise (GOAL):: No  Significant changes in sleep pattern (GOAL): No      Current Outpatient Medications:     acetaminophen (TYLENOL) 500 MG tablet, Take 1 tablet (500 mg) by mouth every 6 hours as needed for mild pain., Disp: , Rfl:     acetaminophen (TYLENOL) 500 MG tablet, Take 1-2 tablets (500-1,000 mg) by mouth 4 times daily., Disp: , Rfl:     carboxymethylcellulose (CARBOXYMETHYLCELLULOSE SODIUM) 0.5 % SOLN ophthalmic solution, Place 1 drop into both eyes 4 times daily as needed for dry eyes, Disp: , Rfl:     dorzolamide-timolol PF (COSOPT PF) 2-0.5 % opthalmic solutionh, Place 1 drop  into both eyes 2 times daily, Disp: , Rfl:     furosemide (LASIX) 20 MG tablet, Take 1 tablet (20 mg) by mouth 2 times daily, Disp: 180 tablet, Rfl: 3    gabapentin (NEURONTIN) 300 MG capsule, TAKE 1 CAPSULE(300 MG) BY MOUTH THREE TIMES DAILY., Disp: 270 capsule, Rfl: 1    HYDROmorphone (DILAUDID) 2 MG tablet, Take 1 tablet (2 mg) by mouth every 4 hours as needed for severe pain (IF pain not managed with non-pharmacological and non-opioid interventions)., Disp: 20 tablet, Rfl: 0    latanoprost (XALATAN) 0.005 % ophthalmic solution, Place 1 drop into the right eye At Bedtime , Disp: , Rfl:     levothyroxine (SYNTHROID/LEVOTHROID) 88 MCG tablet, Take 1 tablet (88 mcg) by mouth daily, Disp: 90 tablet, Rfl: 1    metoprolol tartrate (LOPRESSOR) 25 MG tablet, TAKE 1 TABLET(25 MG) BY MOUTH TWICE DAILY, Disp: 180 tablet, Rfl: 1    Multiple Vitamins-Minerals (MULTIVITAMIN ADULT PO), Take 1 tablet by mouth daily, Disp: , Rfl:     pantoprazole (PROTONIX) 40 MG EC tablet, TAKE 1 TABLET(40 MG) BY MOUTH DAILY, Disp: 90 tablet, Rfl: 1    potassium chloride ER (KLOR-CON M) 10 MEQ CR tablet, TAKE 1 TABLET(10 MEQ) BY MOUTH DAILY, Disp: 90 tablet, Rfl: 2    pravastatin (PRAVACHOL) 40 MG tablet, TAKE 1 TABLET(40 MG) BY MOUTH AT BEDTIME, Disp: 90 tablet, Rfl: 1    senna-docusate (SENOKOT-S/PERICOLACE) 8.6-50 MG tablet, Take 2 tablets by mouth 2 times daily as needed for constipation., Disp: 15 tablet, Rfl: 0    Spoke with pt on the phone. Call was brief but all questions answered. Pt shared that [facility] PT planning to come out to pt home within 10 minutes of the call. Pt denied concerns or needs at this time. CC offered to send a letter to pt home with more information about CC and contact information and pt gave CC permission to do so. Letter sent. Mychart not set up. No further needs identified at this time.     Follow up Plan     Discharge Follow-Up  Discharge follow up appointment scheduled in alignment with recommended follow up  timeframe or Transitions of Risk Category? (Low = within 30 days; Moderate= within 14 days; High= within 7 days): No  Patient's follow up appointment not scheduled:  (Pt acknowledged, before CC spoke about f/u apts, needing to schedule with pcp and having the information to call. Pt politely declined the information or assistance.)    Future Appointments   Date Time Provider Department Center   12/31/2024  1:00 PM Rachel Miller MD [facility]     Outpatient Plan as outlined on AVS reviewed with patient.    For any urgent concerns, please contact our 24 hour nurse triage line: [phone] ([phone])       Melany Zeng, EVELINASW

## 2024-08-28 NOTE — LETTER
[facility] CARE COORDINATION  [address]  [facility] [address]    August 28, 2024    Teri EPPERSON Devin  [address] [facility] [address]      Dear Kirit,    I am a clinic care coordinator who works with Rachel Miller MD with the [facility]. I wanted to introduce myself and provide you with my contact information for you to be able to call me with any questions or concerns. Below is a description of clinic care coordination and how I can further assist you.       The clinic care coordination team is made up of a registered nurse, , financial resource worker and community health worker who understand the health care system. The goal of clinic care coordination is to help you manage your health and improve access to the health care system. Our team works alongside your provider to assist you in determining your health and social needs. We can help you obtain health care and community resources, providing you with necessary information and education. We can work with you through any barriers and develop a care plan that helps coordinate and strengthen the communication between you and your care team.  Our services are voluntary and are offered without charge to you personally.    Please feel free to contact me with any questions or concerns regarding care coordination and what we can offer.      We are focused on providing you with the highest-quality healthcare experience possible.    Sincerely,     Melany Zeng [facility]  Primary Care Social Work Care Coordinator  [facility], [place], [facility] [place]   PH: [phone]      ----------------------------------------------------------------------------------------------------------------------  Enclosed: WHAT IS CARE COORDINATION?     [facility] Care Coordination supports patients and families dealing with chronic or complex health conditions, developmental issues, and social  service needs. This service is available to patients of all ages, from babies to seniors. When you're facing a difficult decision about caring for yourself or someone you love, we can help you understand your options. We identify and refer you to community resources that help with financial, legal, mental health, transportation, and other issues. We also help with your medical and related education needs.     IS CARE COORDINATION RIGHT FOR ME?   Discuss a referral to Care Coordination with your primary care provider or care team member.     HOW CAN I CONNECT WITH CARE COORDINATION?    Contact your clinic.  Speak with your doctor or clinic staff.  Discuss care coordination with hospital staff before discharge.     MEET YOUR CARE COORDINATION TEAM:  Registered Nurse Care Coordinator    Provides education on medications, disease management, and new diagnoses.  Addresses concerns about medical conditions and connects you to resources.  Develops patient-centered goals and communicates with patient's care team.     Social Work Care Coordinator    Provides education on emotional wellbeing.  Connects you to a variety of community-based resources.  Communicates with care team and community partners.     Community Health Worker    Identifies health and social barriers and connects you with community resources.  Develops nonclinical patient and family centered goals.  Enhances communication between patients and care teams.     Financial Resource Worker    Assists patients with applying for health insurance ([place], [place], [employer]).  Helps patients with applying for county benefits.  Connects patients with [facility].

## 2024-08-28 NOTE — TELEPHONE ENCOUNTER
Please inform the HHC that I agree with the HHC as long as they make sure the patient has the documentation for face to face and home bound status. ( If these are required by the patient's insurance).

## 2024-08-28 NOTE — TELEPHONE ENCOUNTER
PT 1x per week for 4 weeks and then every other week for 2 visits after fall with R shoulder dislocation.     Noted that patient has all medications from her discharge, but has additional supplements of Calcium 500mg- D3 400 IU one tablet twice a day and additional vitamin D3 1000 IU one time per day.    Please approve the above visits.  Also please advise of the above medication.    Thank you,  Anitra Jimenez LPN  [facility]

## 2024-08-29 NOTE — TELEPHONE ENCOUNTER
Thank you, I have updated our PT of approval.    Thank you,  Anitra Jimenez LPN  [facility]

## 2024-09-03 ENCOUNTER — OFFICE VISIT (OUTPATIENT)
Dept: INTERNAL MEDICINE | Facility: CLINIC | Age: 79
End: 2024-09-03
Payer: MEDICARE

## 2024-09-03 VITALS
OXYGEN SATURATION: 97 % | HEART RATE: 79 BPM | HEIGHT: 68 IN | TEMPERATURE: 98.6 F | SYSTOLIC BLOOD PRESSURE: 117 MMHG | WEIGHT: 184.7 LBS | BODY MASS INDEX: 27.99 KG/M2 | DIASTOLIC BLOOD PRESSURE: 74 MMHG | RESPIRATION RATE: 16 BRPM

## 2024-09-03 DIAGNOSIS — Z09 HOSPITAL DISCHARGE FOLLOW-UP: Primary | ICD-10-CM
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
## 2024-09-03 DIAGNOSIS — I10 HYPERTENSION GOAL BP (BLOOD PRESSURE) < 140/90: ICD-10-CM

## 2024-09-03 DIAGNOSIS — R10.13 EPIGASTRIC PAIN: ICD-10-CM

## 2024-09-03 PROCEDURE — 99213 OFFICE O/P EST LOW 20 MIN: CPT | Performed by: INTERNAL MEDICINE

## 2024-09-03 PROCEDURE — G2211 COMPLEX E/M VISIT ADD ON: HCPCS | Performed by: INTERNAL MEDICINE

## 2024-09-03 RX ORDER — POTASSIUM CHLORIDE 750 MG/1
TABLET, EXTENDED RELEASE ORAL
COMMUNITY
Start: 2024-08-06

## 2024-09-03 RX ORDER — PANTOPRAZOLE SODIUM 40 MG/1
40 TABLET, DELAYED RELEASE ORAL DAILY
Qty: 90 TABLET | Refills: 1 | Status: SHIPPED | OUTPATIENT
Start: 2024-09-03

## 2024-09-03 RX ORDER — BRIMONIDINE TARTRATE 2 MG/ML
SOLUTION/ DROPS OPHTHALMIC
COMMUNITY
Start: 2024-07-08

## 2024-09-03 ASSESSMENT — PAIN SCALES - GENERAL: PAINLEVEL: MODERATE PAIN (5)

## 2024-09-03 NOTE — PROGRESS NOTES
"  Assessment & Plan     Hospital discharge follow-up  Fall, subsequent encounter  Hospital admission from 8/24/24 to 8/26/202.presented to emergency room after patient tripped and fell in a parking lot.  There is a changes of the humeral head with a large right shoulder effusion noted on CT chest.  Patient will be following up with orthopedic team with [facility].    Overall, pain symptoms are improving.  Patient uses Tylenol as needed for pain control.        Hypertension goal BP (blood pressure) < 140/90  Blood pressure reviewed, within target.  Continue current antihypertensive regimen.    Epigastric pain  Requesting for refill of pantoprazole.  Overall, symptoms stable.  - pantoprazole (PROTONIX) 40 MG EC tablet; Take 1 tablet (40 mg) by mouth daily.        MED REC REQUIRED  Post Medication Reconciliation Status: Completed  BMI  Estimated body mass index is 28.08 kg/m  as calculated from the following:    Height as of this encounter: 1.727 m (5' 8\").    Weight as of this encounter: 83.8 kg (184 lb 11.2 oz).             Shalonda Beth is a 78 year old, presenting for the following health issues:  Hospital F/U (For a fall, chronic dislocation of right shoulder.)        9/3/2024     1:05 PM   Additional Questions   Roomed by Vitaly Carrillo MA   Accompanied by Self         9/3/2024     1:05 PM   Patient Reported Additional Medications   Patient reports taking the following new medications No     HPI        Hospital Follow-up Visit:    Hospital/Nursing Home/IP Rehab Facility: [facility]  Date of Admission: 8/24/24   Date of Discharge: 8/26/24   Reason(s) for Admission: Fall  Was the patient in the ICU or did the patient experience delirium during hospitalization?  No  Do you have any other stressors you would like to discuss with your provider? No    Problems taking medications regularly:  None  Medication changes since discharge: None  Problems adhering to non-medication therapy:  " "None    Summary of hospitalization:  [facility] discharge summary reviewed  Diagnostic Tests/Treatments reviewed.  Follow up needed: none  Other Healthcare Providers Involved in Patient s Care:         None  Update since discharge: improved.       Hospital admission from 8/24/24 to 8/26/202.presented to emergency room after patient tripped and fell in a parking lot.  There is a changes of the humeral head with a large right shoulder effusion noted on CT chest.  Patient will be following up with orthopedic team with [facility].    Overall, pain symptoms are improving.  Patient uses Tylenol as needed for pain control.    Plan of care communicated with patient                 Review of Systems  Constitutional, HEENT, cardiovascular, pulmonary, gi and gu systems are negative, except as otherwise noted.      Objective    /74 (BP Location: Left arm, Patient Position: Sitting, Cuff Size: Adult Regular)   Pulse 79   Temp 98.6  F (37  C) (Oral)   Resp 16   Ht 1.727 m (5' 8\")   Wt 83.8 kg (184 lb 11.2 oz)   LMP  (LMP Unknown)   SpO2 97%   BMI 28.08 kg/m    Body mass index is 28.08 kg/m .  Physical Exam   GENERAL: alert and no distress  RESP: lungs clear to auscultation - no rales, rhonchi or wheezes  CV: regular rate and rhythm, normal S1 S2  MS: no gross musculoskeletal defects noted, no edema  NEURO: Normal strength and tone, mentation intact and speech normal  PSYCH: mentation appears normal, affect normal.            Signed Electronically by: Grace Ballesteros MD    "

## 2024-09-06 ENCOUNTER — TRANSFERRED RECORDS (OUTPATIENT)
Dept: HEALTH INFORMATION MANAGEMENT | Facility: CLINIC | Age: 79
End: 2024-09-06
Payer: MEDICARE

## 2024-09-10 ENCOUNTER — TELEPHONE (OUTPATIENT)
Dept: INTERNAL MEDICINE | Facility: CLINIC | Age: 79
End: 2024-09-10
Payer: MEDICARE

## 2024-09-11 ENCOUNTER — TELEPHONE (OUTPATIENT)
Dept: INTERNAL MEDICINE | Facility: CLINIC | Age: 79
End: 2024-09-11
Payer: MEDICARE

## 2024-09-11 DIAGNOSIS — Z53.9 DIAGNOSIS NOT YET DEFINED: Primary | ICD-10-CM

## 2024-09-11 PROCEDURE — G0180 MD CERTIFICATION HHA PATIENT: HCPCS | Performed by: INTERNAL MEDICINE

## 2024-09-16 ENCOUNTER — MEDICAL CORRESPONDENCE (OUTPATIENT)
Dept: HEALTH INFORMATION MANAGEMENT | Facility: CLINIC | Age: 79
End: 2024-09-16

## 2024-09-27 ENCOUNTER — NURSE TRIAGE (OUTPATIENT)
Dept: INTERNAL MEDICINE | Facility: CLINIC | Age: 79
End: 2024-09-27

## 2024-09-27 ENCOUNTER — TELEPHONE (OUTPATIENT)
Dept: INTERNAL MEDICINE | Facility: CLINIC | Age: 79
End: 2024-09-27

## 2024-09-27 ENCOUNTER — HOSPITAL ENCOUNTER (EMERGENCY)
Facility: CLINIC | Age: 79
Discharge: HOME | End: 2024-09-27
Attending: EMERGENCY MEDICINE | Admitting: EMERGENCY MEDICINE
Payer: MEDICARE

## 2024-09-27 VITALS
HEART RATE: 65 BPM | TEMPERATURE: 97.4 F | RESPIRATION RATE: 16 BRPM | SYSTOLIC BLOOD PRESSURE: 163 MMHG | OXYGEN SATURATION: 99 % | DIASTOLIC BLOOD PRESSURE: 99 MMHG

## 2024-09-27 DIAGNOSIS — N30.01 ACUTE CYSTITIS WITH HEMATURIA: ICD-10-CM

## 2024-09-27 LAB
ALBUMIN UR-MCNC: 30 MG/DL
ANION GAP SERPL CALCULATED.3IONS-SCNC: 13 MMOL/L (ref 7–15)
APPEARANCE UR: CLEAR
BACTERIA #/AREA URNS HPF: ABNORMAL /HPF
BILIRUB UR QL STRIP: NEGATIVE
BUN SERPL-MCNC: 24.8 MG/DL (ref 8–23)
CALCIUM SERPL-MCNC: 9.3 MG/DL (ref 8.8–10.4)
CHLORIDE SERPL-SCNC: 102 MMOL/L (ref 98–107)
COLOR UR AUTO: ABNORMAL
CREAT SERPL-MCNC: 0.63 MG/DL (ref 0.51–0.95)
EGFRCR SERPLBLD CKD-EPI 2021: 90 ML/MIN/1.73M2
GLUCOSE SERPL-MCNC: 106 MG/DL (ref 70–99)
GLUCOSE UR STRIP-MCNC: NEGATIVE MG/DL
HCO3 SERPL-SCNC: 22 MMOL/L (ref 22–29)
HGB UR QL STRIP: ABNORMAL
KETONES UR STRIP-MCNC: NEGATIVE MG/DL
LEUKOCYTE ESTERASE UR QL STRIP: ABNORMAL
MUCOUS THREADS #/AREA URNS LPF: PRESENT /LPF
NITRATE UR QL: NEGATIVE
PH UR STRIP: 6.5 [PH] (ref 5–7)
POTASSIUM SERPL-SCNC: 4 MMOL/L (ref 3.4–5.3)
RBC URINE: 17 /HPF
SODIUM SERPL-SCNC: 137 MMOL/L (ref 135–145)
SP GR UR STRIP: 1.01 (ref 1–1.03)
SQUAMOUS EPITHELIAL: <1 /HPF
UROBILINOGEN UR STRIP-MCNC: NORMAL MG/DL
WBC URINE: 62 /HPF

## 2024-09-27 PROCEDURE — 80048 BASIC METABOLIC PNL TOTAL CA: CPT | Performed by: STUDENT IN AN ORGANIZED HEALTH CARE EDUCATION/TRAINING PROGRAM

## 2024-09-27 PROCEDURE — 81001 URINALYSIS AUTO W/SCOPE: CPT | Performed by: EMERGENCY MEDICINE

## 2024-09-27 PROCEDURE — 87086 URINE CULTURE/COLONY COUNT: CPT | Performed by: EMERGENCY MEDICINE

## 2024-09-27 PROCEDURE — 36415 COLL VENOUS BLD VENIPUNCTURE: CPT | Performed by: STUDENT IN AN ORGANIZED HEALTH CARE EDUCATION/TRAINING PROGRAM

## 2024-09-27 PROCEDURE — 99283 EMERGENCY DEPT VISIT LOW MDM: CPT

## 2024-09-27 PROCEDURE — 250N000013 HC RX MED GY IP 250 OP 250 PS 637: Performed by: EMERGENCY MEDICINE

## 2024-09-27 PROCEDURE — 81003 URINALYSIS AUTO W/O SCOPE: CPT | Performed by: PHYSICIAN ASSISTANT

## 2024-09-27 RX ORDER — CEPHALEXIN 500 MG/1
500 CAPSULE ORAL ONCE
Status: COMPLETED | OUTPATIENT
Start: 2024-09-27 | End: 2024-09-27

## 2024-09-27 RX ORDER — CEPHALEXIN 500 MG/1
500 CAPSULE ORAL 2 TIMES DAILY
Qty: 14 CAPSULE | Refills: 0 | Status: SHIPPED | OUTPATIENT
Start: 2024-09-27 | End: 2024-10-04

## 2024-09-27 RX ADMIN — CEPHALEXIN 500 MG: 500 CAPSULE ORAL at 13:39

## 2024-09-27 ASSESSMENT — ACTIVITIES OF DAILY LIVING (ADL): ADLS_ACUITY_SCORE: 38

## 2024-09-27 ASSESSMENT — COLUMBIA-SUICIDE SEVERITY RATING SCALE - C-SSRS
6. HAVE YOU EVER DONE ANYTHING, STARTED TO DO ANYTHING, OR PREPARED TO DO ANYTHING TO END YOUR LIFE?: NO
1. IN THE PAST MONTH, HAVE YOU WISHED YOU WERE DEAD OR WISHED YOU COULD GO TO SLEEP AND NOT WAKE UP?: NO
2. HAVE YOU ACTUALLY HAD ANY THOUGHTS OF KILLING YOURSELF IN THE PAST MONTH?: NO

## 2024-09-27 NOTE — TELEPHONE ENCOUNTER
Patient calls, returning call from clinic earlier, was wondering what the message was. Informed her it was the clinic was going to recommend that she come to [facility] and they would see her, but they cancelled as she was in the ER. Patient thanked writer.

## 2024-09-27 NOTE — ED TRIAGE NOTES
Blood in urine. First noted this morning. Patient reports that she called her primary MD and he instructed her to come to ER for eval. Patient denies abdominal pain or pain with urination. No thinners.

## 2024-09-27 NOTE — TELEPHONE ENCOUNTER
Discussed with Dr. Wong. She recommends [facility]. Call to [facility] and spoke to Yanet. They are able to see patient at 1:30. Attempted to reach patient. Left message requesting patient call back as soon as possible.

## 2024-09-27 NOTE — TELEPHONE ENCOUNTER
"Nurse Triage SBAR    Is this a 2nd Level Triage? NO    Situation: Patient called to report that she is peeing blood twice since this morning and would like to ask for Dr. Wong's advise.     Background: Patient reports that she has shoulder pain and took 3 tablets of Aspirin and 1 Tylenol last night. States that she's been taking this for a while now. (Aspirin is not on patient's current medlist.) Patient was hospitalized in August due to mechanical fall with right shoulder pain.     Assessment: c/o red urine with pea-sized blood clots, twice since this morning. No pain with urination. No abdominal pain but \"stomach feels funny\". Denies dizziness, shortness of breath, back pain, fever or any other symptoms. Patient lives with kids.     Protocol Recommended Disposition:   Go to ED Now    Recommendation: Strongly advised to go to ED now but patient would like to get this message sent to PCP first. Encouraged patient to drink a lot of water in the meantime. Emphasized the need to go to ED if condition worsens and they don't hear back from us. Patient verbalized understanding.     Routed to provider    Does the patient meet one of the following criteria for [facility] visit consideration? 16+ years old, with an [facility] PCP     TIP  Providers, please consider if this condition is appropriate for management at one of our Acute and Diagnostic Services sites.     If patient is a good candidate, please use dotphrase <dot>triageresponse and select Refer to [facility] to document.    Reason for Disposition   Passing pure blood or large blood clots (i.e., larger than a dime or grape)    Additional Information   Negative: Shock suspected (e.g., cold/pale/clammy skin, too weak to stand, low BP, rapid pulse)   Negative: Sounds like a life-threatening emergency to the triager   Negative: Urinary catheter, questions about    Answer Assessment - Initial Assessment Questions  1. COLOR of URINE: \"Describe the color of the urine.\"  (e.g., " "tea-colored, pink, red, bloody) \"Do you have blood clots in your urine?\" (e.g., none, pea, grape, small coin)      Red. Pea size blood clots.   2. ONSET: \"When did the bleeding start?\"       This morning.   3. EPISODES: \"How many times has there been blood in the urine?\" or \"How many times today?\"      2  4. PAIN with URINATION: \"Is there any pain with passing your urine?\" If Yes, ask: \"How bad is the pain?\"  (Scale 1-10; or mild, moderate, severe)     - MILD: Complains slightly about urination hurting.     - MODERATE: Interferes with normal activities.       - SEVERE: Excruciating, unwilling or unable to urinate because of the pain.       No pain But stomach feels funny.  5. FEVER: \"Do you have a fever?\" If Yes, ask: \"What is your temperature, how was it measured, and when did it start?\"      No.   7. OTHER SYMPTOMS: \"Do you have any other symptoms?\" (e.g., back/flank pain, abdomen pain, vomiting)      No.    Protocols used: Urine - Blood In-A-OH    "

## 2024-09-27 NOTE — TELEPHONE ENCOUNTER
Pt is checked into ER, will cancel [facility] appointment.    Emerson Durham , [facility] [place]  Acute & Diagnostic Services - [place]  09/27/24 11:11 AM

## 2024-09-27 NOTE — ED PROVIDER NOTES
Emergency Department Note      Code Status: Prior    History of Present Illness     Chief Complaint:  Hematuria       HPI   Teri Beltran is a 78 year old female with history of hypertension, hyperlipidemia, and hypothyroidism who presents for hematuria since this morning. Patient's primary MD instructed her to present to ER for evaluation. Patient notes she has never experienced hematuria before. She reports she overall feels fine and is not experiencing any other symptoms. No dysuria but she does note incontinence, which is typical for her. No abdominal pain or back pain. No fever, headache, or vision changes. No feeling of masses in abdomen or back. No history of kidney stones. No history of malignancy. Patient is on metoprolol for blood pressure and gabapentin for hip pain.     Independent Historian:    None    Review of External Notes  None    Past Medical History   Medical History, Surgical History, Problem List, and Medications  Reviewed in Epic    Physical Exam   Patient Vitals for the past 24 hrs:   BP Temp Temp src Pulse Resp SpO2   09/27/24 1222 (!) 163/99 -- -- 65 -- 99 %   09/27/24 1107 (!) 182/83 -- -- -- -- --   09/27/24 1106 -- 97.4  F (36.3  C) Temporal 84 16 97 %       Physical Exam  Constitutional:       General: She is not in acute distress.     Appearance: Normal appearance. She is not diaphoretic.   HENT:      Head: Atraumatic.      Mouth/Throat:      Mouth: Mucous membranes are moist.   Eyes:      General: No scleral icterus.     Conjunctiva/sclera: Conjunctivae normal.   Cardiovascular:      Rate and Rhythm: Normal rate.      Heart sounds: Normal heart sounds.   Pulmonary:      Effort: No respiratory distress.      Breath sounds: Normal breath sounds.   Abdominal:      General: Abdomen is flat. There is no distension.      Palpations: Abdomen is soft. There is no mass.      Tenderness: There is no abdominal tenderness. There is no right CVA tenderness, left CVA tenderness or guarding.    Musculoskeletal:      Cervical back: Neck supple.      Right lower leg: Edema present.      Left lower leg: Edema present.   Skin:     General: Skin is warm.      Findings: No rash.   Neurological:      Mental Status: She is alert and oriented to person, place, and time.   Psychiatric:         Mood and Affect: Mood normal.         Behavior: Behavior normal.         Thought Content: Thought content normal.           Diagnostics     Laboratory: Imaging:   Labs Ordered and Resulted from Time of ED Arrival to Time of ED Departure   ROUTINE UA WITH MICROSCOPIC REFLEX TO CULTURE - Abnormal       Result Value    Color Urine Straw      Appearance Urine Clear      Glucose Urine Negative      Bilirubin Urine Negative      Ketones Urine Negative      Specific Gravity Urine 1.008      Blood Urine Large (*)     pH Urine 6.5      Protein Albumin Urine 30 (*)     Urobilinogen Urine Normal      Nitrite Urine Negative      Leukocyte Esterase Urine Moderate (*)     Bacteria Urine Few (*)     Mucus Urine Present (*)     RBC Urine 17 (*)     WBC Urine 62 (*)     Squamous Epithelials Urine <1     URINE CULTURE     No orders to display         EKG   None    Independent Interpretation  See ED course    ED Course    Medications Administered  Medications   cephALEXin (KEFLEX) capsule 500 mg (500 mg Oral $Given 24 1331)       Procedures  Procedures     Discussion of Management  See ED Course    ED Course  ED Course as of 24 1457   Fri Sep 27, 2024   1240 I obtained history and examined the patient as noted above.     1314 Dr. Stevenson discussed the case with Alejandro Whitten MD, PGY2. Discussed presentation, exam, ddx, plan.     1317 Dr. Stevenson' evaluation.       Optional/Additional Documentation: None    Medical Decision Making / Diagnosis     MIPS     None    Medical Decision Makin yo female with history of HTN, HLD and BL LE edema presenting with hematuria and proteinuria since this morning most concerning for renal  pathology vs malignancy given no symptoms but elevated BP. Does not meet criteria. Does not completely fit picture of UTI or pyelonephritis given no urinary symptoms or flank pain; similarly, low concern for stone due to absence of pain. However, given pyuria and moderate LE, concern enough for UTI that reasonable to treat with Keflex. PCP may consider nephrology referral for further workup. Patient would like to leave and she is clinically stable enough that she may discharge with PCP follow up and return if significantly compromised renal function.    Critical Care:  None.    Disposition:  See ED Course and MDM    ICD-10 Codes:    ICD-10-CM    1. Acute cystitis with hematuria  N30.01            Discharge Medications:  Discharge Medication List as of 9/27/2024  1:40 PM        START taking these medications    Details   cephALEXin (KEFLEX) 500 MG capsule Take 1 capsule (500 mg) by mouth 2 times daily for 7 days., Disp-14 capsule, R-0, E-Prescribe              9/27/2024   Oniel Stevenson DO     [employer]     Scribe Disclosure:  Ana Luisa CONTRERAS, am serving as a scribe at 1:05 PM on 9/27/2024 to document services personally performed by Oniel Stevenson DO based on my observations and the provider's statements to me.         Daniel Whitten MD  Resident  09/27/24 6199       Oniel Stevenson DO  09/27/24 7284

## 2024-09-27 NOTE — TELEPHONE ENCOUNTER
Please advise if patient can be double booked somewhere to be seen by PCP. ER visit for hematuria.     Summer RN 3:32 PM September 27, 2024   [facility]

## 2024-09-27 NOTE — ED PROVIDER NOTES
Emergency Department Attending Supervision Note  9/27/2024  1:14 PM    I evaluated this patient in conjunction with Alejandro Whitten MD (PGY2)  I have participated in the care of the patient and personally performed key elements of the history, exam, and medical decision making.      HPI:   Teri Beltran is a 78 year old female who presents with gross hematuria. She called her PCP and was hold to com ehere for the hematuria. No fever. No dysuria, but some incontinence. No flank or back pain.   She does have a history of leg swelling for which she takes lasix, gabapentin for hip pain, and metoprolol for HTN.    Independent Historian:   None    Review of External Notes:   None      EXAM:    Eyes: PEERL, EOMI B/L  Neck: No JVD noted. FROM   Cardiovascular: normal rate, Regular rhythm and normal heart sounds.  No murmur heard. Equal B/L peripheral pulses.  Pulmonary/Chest: Effort normal and breath sounds normal. No respiratory distress. Patient has no wheezes. Patient has no rales.   Gastrointestinal: Soft. There is no tenderness.       Assessments, Consultations/Discussion of Management or Tests, Independent Image interpretation     ED Course as of 09/27/24 1456   Fri Sep 27, 2024   1240 I obtained history and examined the patient as noted above.     1314 Dr. Stevenson discussed the case with Alejandro Whitten MD, PGY2. Discussed presentation, exam, ddx, plan.     1317 Dr. Stevenson' evaluation.         Optional/Additional Documentation: None     MEDICAL DECISION MAKING/ASSESSMENT AND PLAN:   Hematuria, pyuria, likely UTI.  Proteinuria as well.  Basic metabolic panel ordered.  Patient did not want to stay for results but we will call once those are available.     Impression:    ICD-10-CM    1. Acute cystitis with hematuria  N30.01              DISPOSITION:  HASEEB Stevenson, DO  9/27/2024  [facility] EMERGENCY DEPT         Oniel Stevenson, DO  09/27/24 1455

## 2024-09-27 NOTE — TELEPHONE ENCOUNTER
Patient calls, was seen in the ER today, is calling to make a follow up appointment with PCP.   No appointments with PCP available next week for hospital follow up, patient is wanting to see if PCP would over book to see her, as she prefers to only work with Dr. Wong.  Will forward request to PCP and team.

## 2024-09-28 LAB — BACTERIA UR CULT: NORMAL

## 2024-09-30 ENCOUNTER — PATIENT OUTREACH (OUTPATIENT)
Dept: CARE COORDINATION | Facility: CLINIC | Age: 79
End: 2024-09-30
Payer: MEDICARE

## 2024-09-30 NOTE — PROGRESS NOTES
Clinic Care Coordination Contact  Community Health Worker Initial Outreach    CHW Initial Information Gathering:  Referral Source: ED Follow-Up  Current living arrangement:: Not Assessed  CHW Additional Questions  If ED/Hospital discharge, follow-up appointment scheduled as recommended?: No  Patient agreeable to assistance with scheduling?: No  Patient declined (specify): Pt expressed that she was directed to contact the PCP/Care Team before scheduling ED Follow Up. Pt expressed that she would firgue out scheduling on her own. Further indicating that no additional support is needed from CHW.  Medication changes made following ED/Hospital discharge?: No  MyChart active?: No  Patient agreeable to assistance with activating MyChart?: No    Patient accepts CC: No, Patient expressed no additional support is needed at this time. Patient will be sent Care Coordination introduction letter for future reference.     Mago Ivan  Community Health Worker    Connected Care Resources   [facility]     *Connected Care Resources Team does NOT   follow patient ongoing. Referrals are identified   based on internal discharge report and the outreach is to ensure   Patient has understanding of their discharge instructions.

## 2024-10-03 DIAGNOSIS — I10 HYPERTENSION GOAL BP (BLOOD PRESSURE) < 140/90: ICD-10-CM

## 2024-10-03 DIAGNOSIS — R00.2 PALPITATIONS: ICD-10-CM

## 2024-10-03 RX ORDER — METOPROLOL TARTRATE 25 MG/1
TABLET, FILM COATED ORAL
Qty: 180 TABLET | Refills: 1 | Status: SHIPPED | OUTPATIENT
Start: 2024-10-03

## 2024-10-07 ENCOUNTER — OFFICE VISIT (OUTPATIENT)
Dept: INTERNAL MEDICINE | Facility: CLINIC | Age: 79
End: 2024-10-07
Payer: MEDICARE

## 2024-10-07 VITALS
OXYGEN SATURATION: 99 % | RESPIRATION RATE: 22 BRPM | HEIGHT: 68 IN | DIASTOLIC BLOOD PRESSURE: 68 MMHG | BODY MASS INDEX: 27.74 KG/M2 | HEART RATE: 58 BPM | SYSTOLIC BLOOD PRESSURE: 134 MMHG | TEMPERATURE: 98.5 F | WEIGHT: 183 LBS

## 2024-10-07 DIAGNOSIS — R31.0 GROSS HEMATURIA: Primary | ICD-10-CM

## 2024-10-07 DIAGNOSIS — R30.0 DYSURIA: ICD-10-CM

## 2024-10-07 LAB
ALBUMIN UR-MCNC: NEGATIVE MG/DL
APPEARANCE UR: CLEAR
BACTERIA #/AREA URNS HPF: ABNORMAL /HPF
BILIRUB UR QL STRIP: NEGATIVE
COLOR UR AUTO: YELLOW
GLUCOSE UR STRIP-MCNC: NEGATIVE MG/DL
HGB UR QL STRIP: NEGATIVE
KETONES UR STRIP-MCNC: NEGATIVE MG/DL
LEUKOCYTE ESTERASE UR QL STRIP: ABNORMAL
NITRATE UR QL: NEGATIVE
PH UR STRIP: 6 [PH] (ref 5–7)
RBC #/AREA URNS AUTO: ABNORMAL /HPF
SP GR UR STRIP: 1.02 (ref 1–1.03)
SQUAMOUS #/AREA URNS AUTO: ABNORMAL /LPF
UROBILINOGEN UR STRIP-ACNC: 0.2 E.U./DL
WBC #/AREA URNS AUTO: ABNORMAL /HPF

## 2024-10-07 PROCEDURE — 99214 OFFICE O/P EST MOD 30 MIN: CPT | Performed by: INTERNAL MEDICINE

## 2024-10-07 PROCEDURE — 81001 URINALYSIS AUTO W/SCOPE: CPT | Performed by: INTERNAL MEDICINE

## 2024-10-07 RX ORDER — CALCIUM CARBONATE 500(1250)
1 TABLET ORAL 2 TIMES DAILY
COMMUNITY

## 2024-10-07 NOTE — PATIENT INSTRUCTIONS
Plan:  Urine test today  -- [address]  2. If the urine shows blood   -- I will order kidney ultrasound   -- I will make referral to see urology  -- depending on the other results I might send another antibiotic

## 2024-10-07 NOTE — NURSING NOTE
"Chief Complaint   Patient presents with    ER F/U     initial /68   Pulse 58   Temp 98.5  F (36.9  C) (Oral)   Resp 22   Ht 1.727 m (5' 8\")   Wt 83 kg (183 lb)   LMP  (LMP Unknown)   SpO2 99%   BMI 27.83 kg/m   Estimated body mass index is 27.83 kg/m  as calculated from the following:    Height as of this encounter: 1.727 m (5' 8\").    Weight as of this encounter: 83 kg (183 lb)..  bp completed using cuff size large  BARBIE CARDENAS LPN  "

## 2024-10-07 NOTE — PROGRESS NOTES
"Dr Wong's note      Patient's instructions / PLAN:                                                        Plan:  Urine test today  2. If the urine shows blood   -- I will order kidney ultrasound   -- I will make referral to see urology  -- depending on the other results I might send another antibiotic       ASSESSMENT & PLAN:                                                      (R31.0) Gross hematuria  (primary encounter diagnosis)  Comment:   Plan: UA with Microscopic reflex to Culture, UA         Microscopic with Reflex to Culture            (R30.0) Dysuria  Comment:   Plan: UA with Microscopic reflex to Culture, UA         Microscopic with Reflex to Culture                 Chief complaint:                                                      F/unit(s) gross hematuria    SUBJECTIVE:                                                    History of present illness:      ED/UC Followup:    Facility:  [facility]  Date of visit: 9-27-24  Reason for visit: acute cystitis with hematuria  Current Status: some burning at times but no other symptoms    UC:  <10,000 CFU/mL Mixture of Urogenital Marla    She feels better, no more blood, but still some mild burning sometimes.   Cephalexin was prescribed , finished Oct 4    Review of Systems:                                                      ROS: negative for fever, chills, cough, wheezes, chest pain, shortness of breath, vomiting, abdominal pain, leg swelling       OBJECTIVE:             Physical exam:  Blood pressure 134/68, pulse 58, temperature 98.5  F (36.9  C), temperature source Oral, resp. rate 22, height 1.727 m (5' 8\"), weight 83 kg (183 lb), SpO2 99%, not currently breastfeeding.     NAD, appears comfortable  Skin: no rashes   Neck: supple, no JVD,  No thyroidmegaly. Lymph nodes nonpalpable cervical and supraclavicular.  Chest: clear to auscultation bilaterally, good respiratory effort  Heart: S1 S2, RRR, no mgr appreciated  Abdomen: soft, not tender,  Extremities: no " edema,   Neurologic: A, Ox3, no focal signs appreciated    PMHx: reviewed  Past Medical History:   Diagnosis Date    Concussion with loss of consciousness, initial encounter 4/26/2023    First degree AV block 08/11/2015    Former smoker     Gastroesophageal reflux disease     GERD (gastroesophageal reflux disease)     Glaucoma     Heart block     2:1    Heart murmur     Hyperlipidemia LDL goal <130 06/14/2013    Hypertension goal BP (blood pressure) < 140/90 12/03/2013    Hypothyroidism     Left atrial dilatation     mild    Mild dilation of ascending aorta - borderline 08/11/2015    Palpitations     Prediabetes 06/14/2013    RBBB 08/11/2015    S/P total knee arthroplasty 07/23/2019    Tachycardia     Urinary frequency     Varicose veins     Venous insufficiency     s/p bilateral great saphenous vein radiofrequency ablation by Dr. Garcia      PSHx: reviewed  Past Surgical History:   Procedure Laterality Date    ARTHROPLASTY HIP Right 11/20/2020    Procedure: Right total hip arthroplasty using a Biomet Taperloc femoral stem and G7 acetabulum.;  Surgeon: Dragan Muse MD;  Location: [facility] OR    ARTHROPLASTY KNEE Right 07/23/2019    Procedure: Right total knee arthroplasty using an Ar[employer]rex MindQuiltlance knee system;  Surgeon: Dragan Muse MD;  Location: [facility] OR    BREAST SURGERY      right breast ductal surgery due to milk production    BREAST SURGERY Right     ductal surgery due to milk production    CATARACT EXTRACTION Right 05/2022    COLONOSCOPY N/A 10/24/2014    no further screening. Procedure: COLONOSCOPY;  Surgeon: ePdro Rudd MD;  Location: [facility]    DECOMPRESSION HIP CORE Right 6/6/2022    Procedure: Open right hip abductor repair;  Surgeon: Dragan Muse MD;  Location: [facility] OR    ESOPHAGOSCOPY, GASTROSCOPY, DUODENOSCOPY (EGD), COMBINED N/A 4/9/2024    Procedure: Esophagoscopy, gastroscopy, duodenoscopy (EGD), combined biopsies using cold bx forcep;  Surgeon: Pedro Rudd  MD JEANNE;  Location: [facility]    EYE SURGERY      FINGER GANGLION CYST EXCISION Left     ring finger    HYSTERECTOMY      HYSTERECTOMY, PAP NO LONGER INDICATED  2009    total hysterectomy and ovaries. benign    SOFT TISSUE SURGERY      ganglion removed from left wrist and ring finger    SURGICAL HISTORY OF -   age 7    tonsillectomy    SURGICAL HISTORY OF -   01/2015    left eye cataract    SURGICAL HISTORY OF -   Fall 2016    LINQ placed.    TONSILLECTOMY      WRIST GANGLION EXCISION Left         Meds: reviewed  Current Outpatient Medications   Medication Sig Dispense Refill    acetaminophen (TYLENOL) 500 MG tablet Take 1-2 tablets (500-1,000 mg) by mouth 4 times daily.      brimonidine (ALPHAGAN) 0.2 % ophthalmic solution       calcium carbonate (OS-LILIA) 500 MG tablet Take 1 tablet by mouth 2 times daily.      carboxymethylcellulose (CARBOXYMETHYLCELLULOSE SODIUM) 0.5 % SOLN ophthalmic solution Place 1 drop into both eyes 4 times daily as needed for dry eyes      dorzolamide-timolol PF (COSOPT PF) 2-0.5 % opthalmic solutionh Place 1 drop into both eyes 2 times daily      furosemide (LASIX) 20 MG tablet Take 1 tablet (20 mg) by mouth 2 times daily (Patient taking differently: Take 20 mg by mouth 2 times daily. Every other day bid and once a day when not taking twice a day.) 180 tablet 3    gabapentin (NEURONTIN) 300 MG capsule TAKE 1 CAPSULE(300 MG) BY MOUTH THREE TIMES DAILY. 270 capsule 1    latanoprost (XALATAN) 0.005 % ophthalmic solution Place 1 drop into the right eye At Bedtime       levothyroxine (SYNTHROID/LEVOTHROID) 88 MCG tablet Take 1 tablet (88 mcg) by mouth daily 90 tablet 1    metoprolol tartrate (LOPRESSOR) 25 MG tablet TAKE 1 TABLET(25 MG) BY MOUTH TWICE DAILY 180 tablet 1    Multiple Vitamins-Minerals (MULTIVITAMIN ADULT PO) Take 1 tablet by mouth daily      pantoprazole (PROTONIX) 40 MG EC tablet Take 1 tablet (40 mg) by mouth daily. 90 tablet 1    potassium chloride ER (KLOR-CON M) 10 MEQ CR tablet  TAKE 1 TABLET(10 MEQ) BY MOUTH DAILY 90 tablet 2    pravastatin (PRAVACHOL) 40 MG tablet TAKE 1 TABLET(40 MG) BY MOUTH AT BEDTIME 90 tablet 1    acetaminophen (TYLENOL) 500 MG tablet Take 1 tablet (500 mg) by mouth every 6 hours as needed for mild pain.      HYDROmorphone (DILAUDID) 2 MG tablet Take 1 tablet (2 mg) by mouth every 4 hours as needed for severe pain (IF pain not managed with non-pharmacological and non-opioid interventions). 20 tablet 0    potassium chloride ER (K-TAB/KLOR-CON) 10 MEQ CR tablet          Soc Hx: reviewed  Fam Hx: reviewed      Chart documentation was completed, in part, with [employer] voice-recognition software. Even though reviewed, some grammatical, spelling, and word errors may remain.      Rachel Wong MD  Internal Medicine       Signed Electronically by: Rachel Miller MD

## 2024-10-16 DIAGNOSIS — E03.9 HYPOTHYROIDISM, UNSPECIFIED TYPE: ICD-10-CM

## 2024-10-16 RX ORDER — LEVOTHYROXINE SODIUM 88 UG/1
88 TABLET ORAL DAILY
Qty: 90 TABLET | Refills: 0 | Status: SHIPPED | OUTPATIENT
Start: 2024-10-16

## 2024-11-06 DIAGNOSIS — R60.9 FLUID RETENTION: ICD-10-CM

## 2024-11-07 RX ORDER — POTASSIUM CHLORIDE 750 MG/1
TABLET, EXTENDED RELEASE ORAL
Qty: 90 TABLET | Refills: 2 | Status: SHIPPED | OUTPATIENT
Start: 2024-11-07

## 2024-11-14 ENCOUNTER — HOSPITAL ENCOUNTER (OUTPATIENT)
Dept: MAMMOGRAPHY | Facility: CLINIC | Age: 79
Discharge: HOME | End: 2024-11-14
Attending: INTERNAL MEDICINE
Payer: MEDICARE

## 2024-11-14 DIAGNOSIS — Z12.31 BREAST CANCER SCREENING BY MAMMOGRAM: ICD-10-CM

## 2024-11-14 PROCEDURE — 77063 BREAST TOMOSYNTHESIS BI: CPT

## 2024-11-25 ENCOUNTER — TELEPHONE (OUTPATIENT)
Dept: CARDIOLOGY | Facility: CLINIC | Age: 79
End: 2024-11-25
Payer: MEDICARE

## 2024-11-25 ENCOUNTER — HOSPITAL ENCOUNTER (OUTPATIENT)
Dept: CARDIOLOGY | Facility: CLINIC | Age: 79
Discharge: HOME | End: 2024-11-25
Attending: NURSE PRACTITIONER | Admitting: NURSE PRACTITIONER
Payer: MEDICARE

## 2024-11-25 DIAGNOSIS — R01.1 HEART MURMUR: ICD-10-CM

## 2024-11-25 LAB — LVEF ECHO: NORMAL

## 2024-11-25 PROCEDURE — 93306 TTE W/DOPPLER COMPLETE: CPT

## 2024-11-25 NOTE — TELEPHONE ENCOUNTER
Patient called to confirm she should still have echocardiogram done today where her follow up with Dr. Anglin is not until March due to provider availability. Patient is due for echocardiogram and one year cardiology follow up in November 2024, however is not able to get in to see Dr Anglin until March due to provider availability. Patient encouraged to keep appointment and informed Dr. Anglin will review the results following the test and in more detail at upcoming apt. Patient agreeable to plan and verbalizes understanding.

## 2024-11-26 ENCOUNTER — DOCUMENTATION ONLY (OUTPATIENT)
Dept: CARDIOLOGY | Facility: CLINIC | Age: 79
End: 2024-11-26
Payer: MEDICARE

## 2024-12-20 ENCOUNTER — TRANSFERRED RECORDS (OUTPATIENT)
Dept: HEALTH INFORMATION MANAGEMENT | Facility: CLINIC | Age: 79
End: 2024-12-20
Payer: MEDICARE

## 2025-01-08 NOTE — TELEPHONE ENCOUNTER
Patient Quality Outreach    Patient is due for the following:   Hypertension -  BP check    Next Steps:   Schedule a nurse only visit for blood pressure check.    Type of outreach:    Phone, left message for patient/parent to call back.    Next Steps:  Reach out within 90 days via [employer].    Max number of attempts reached: No. Will try again in 90 days if patient still on fail list.    Questions for provider review:    None           Tori Lawson        
no

## 2025-01-14 DIAGNOSIS — E03.9 HYPOTHYROIDISM, UNSPECIFIED TYPE: ICD-10-CM

## 2025-01-14 RX ORDER — LEVOTHYROXINE SODIUM 88 UG/1
88 TABLET ORAL DAILY
Qty: 90 TABLET | Refills: 0 | Status: SHIPPED | OUTPATIENT
Start: 2025-01-14

## 2025-01-22 ENCOUNTER — OFFICE VISIT (OUTPATIENT)
Dept: INTERNAL MEDICINE | Facility: CLINIC | Age: 80
End: 2025-01-22
Payer: MEDICARE

## 2025-01-22 VITALS
HEART RATE: 83 BPM | BODY MASS INDEX: 27.89 KG/M2 | HEIGHT: 68 IN | WEIGHT: 184 LBS | SYSTOLIC BLOOD PRESSURE: 132 MMHG | RESPIRATION RATE: 20 BRPM | OXYGEN SATURATION: 97 % | TEMPERATURE: 97.4 F | DIASTOLIC BLOOD PRESSURE: 68 MMHG

## 2025-01-22 DIAGNOSIS — R22.42 LUMP OF LEFT THIGH: Primary | ICD-10-CM

## 2025-01-22 PROCEDURE — 99213 OFFICE O/P EST LOW 20 MIN: CPT | Performed by: INTERNAL MEDICINE

## 2025-01-22 NOTE — PROGRESS NOTES
"  Assessment & Plan     Lump of left thigh  From symptomatology and examination, suspect lipoma.  Patient will proceed with completion of ultrasound for further characterization of the mass.  - US Lower Extremity Non Vascular Left; Future          BMI  Estimated body mass index is 27.98 kg/m  as calculated from the following:    Height as of this encounter: 1.727 m (5' 8\").    Weight as of this encounter: 83.5 kg (184 lb).             Subjective   Ray is a 79 year old, presenting for the following health issues:  Mass    Mass       Has noted a lump at the left thigh area present for around 2 months.  Initially, patient felt that it was not so noticeable but has been noticing much more.  Denies any tenderness or overlying skin changes.        Review of Systems  Constitutional, HEENT, cardiovascular, pulmonary, gi and gu systems are negative, except as otherwise noted.      Objective    /68 (BP Location: Right arm, Patient Position: Sitting, Cuff Size: Adult Large)   Pulse 83   Temp 97.4  F (36.3  C) (Tympanic)   Resp 20   Ht 1.727 m (5' 8\")   Wt 83.5 kg (184 lb)   LMP  (LMP Unknown)   SpO2 97%   BMI 27.98 kg/m    Body mass index is 27.98 kg/m .  Physical Exam   GENERAL: alert and no distress  RESP: lungs clear to auscultation - no rales, rhonchi or wheezes  CV: regular rate and rhythm, normal S1 S2  MS: no gross musculoskeletal defects noted, no edema  SKIN: Nontender subcutaneous lump noted at the left inner thigh area around 5 cm in diameter.  No overlying skin changes or redness or erythema.  NEURO: Normal strength and tone, mentation intact and speech normal  PSYCH: mentation appears normal, affect normal            Signed Electronically by: Grace Ballesteros MD    " [address]  [place] [address]  Phone: [phone]  Primary Provider: No Ref-Primary, Physician  Pre-op Performing Provider: BOO TORRES    {Provider  Link to PREOP SmartSet  Use this to apply standard patient instructions to AVS; includes medication directions, common orders, guidelines for anemia, warfarin, additional testing   :252774}  PREOPERATIVE EVALUATION:  Today's date: 8/16/2021    Mary Torres is a 29 year old female who presents for a preoperative evaluation.    Surgical Information:  Surgery/Procedure: ***  Surgery Location: ***  Surgeon: ***  Surgery Date: ***  Time of Surgery: ***  Where patient plans to recover: {Preop post recovery plans :730365}  Fax number for surgical facility: {SURGERY FAX NUMBER:[id number]}    Type of Anesthesia Anticipated: {ANESTHESIA:861690}    {2021 Provider Charting Preference for Preop :177369}    Subjective     HPI related to upcoming procedure: ***    Preop Questions 8/16/2021   1. Have you ever had a heart attack or stroke? No   2. Have you ever had surgery on your heart or blood vessels, such as a stent placement, a coronary artery bypass, or surgery on an artery in your head, neck, heart, or legs? No   3. Do you have chest pain with activity? No   4. Do you have a history of  heart failure? No   5. Do you currently have a cold, bronchitis or symptoms of other infection? No   6. Do you have a cough, shortness of breath, or wheezing? No   7. Do you or anyone in your family have previous history of blood clots? No   8. Do you or does anyone in your family have a serious bleeding problem such as prolonged bleeding following surgeries or cuts? No   9. Have you ever had problems with anemia or been told to take iron pills? YES - ***   10. Have you had any abnormal blood loss such as black, tarry or bloody stools, or abnormal vaginal bleeding? No   11. Have you ever had a blood transfusion? No   12. Are  you willing to have a blood transfusion if it is medically needed before, during, or after your surgery? Yes   13. Have you or any of your relatives ever had problems with anesthesia? No   14. Do you have sleep apnea, excessive snoring or daytime drowsiness? No   15. Do you have any artifical heart valves or other implanted medical devices like a pacemaker, defibrillator, or continuous glucose monitor? No   16. Do you have artificial joints? No   17. Are you allergic to latex? No   18. Is there any chance that you may be pregnant? 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     Priority: Medium     Intermittent asthma 10/09/2012     Priority: Medium     CARDIOVASCULAR SCREENING; LDL GOAL LESS THAN 160 04/10/2012     Priority: Medium      Past Medical History:   Diagnosis Date     Asthma, intermittent      Past Surgical History:   Procedure Laterality Date      SECTION  13    Failure to descend     ENT SURGERY      wisdom teeth     Current Outpatient Medications   Medication Sig Dispense Refill     albuterol (PROAIR HFA/PROVENTIL HFA/VENTOLIN HFA) 108 (90 Base) MCG/ACT inhaler Inhale 2 puffs into the lungs every 6 hours as needed for shortness of breath / dyspnea 1 Inhaler 3     fluticasone (FLOVENT HFA) 110 MCG/ACT inhaler Inhale 2 puffs into the lungs 2 times daily 1 Inhaler 1       No Known Allergies     Social History     Tobacco Use     Smoking status: Never Smoker     Smokeless  "tobacco: Never Used   Substance Use Topics     Alcohol use: Yes     Comment: occ     {FAMILY HISTORY (Optional):433389590}  History   Drug Use No         Objective     There were no vitals taken for this visit.    Physical Exam  {EXAM Preop Choices:199063}    Recent Labs   Lab Test 21  1052 20  0836 19  1842 10/14/19  0932   HGB 12.4 12.0 11.7 12.9   PLT  --  209 230 270   NA  --  138  --  136   POTASSIUM  --  3.9  --  3.8   CR  --  0.48*  --  0.55        Diagnostics:  {LABS:142395}   {EK}    Revised Cardiac Risk Index (RCRI):  The patient has the following serious cardiovascular risks for perioperative complications:  {PREOP REVISED CARDIAC RISK INDEX (RCRI) :379223::\" - No serious cardiac risks = 0 points\"}     RCRI Interpretation: {REVISED CARDIAC RISK INTERPRETATION :485858}         Signed Electronically by: ISELA Shankar CNP  Copy of this evaluation report is provided to requesting physician.    {Provider Resources  Preop [facility] Preop Guidelines  Revised Cardiac Risk Index :771144}  "

## 2025-02-10 DIAGNOSIS — M25.551 CHRONIC HIP PAIN AFTER TOTAL REPLACEMENT OF RIGHT HIP JOINT: ICD-10-CM

## 2025-02-10 DIAGNOSIS — G89.29 CHRONIC HIP PAIN AFTER TOTAL REPLACEMENT OF RIGHT HIP JOINT: ICD-10-CM

## 2025-02-10 DIAGNOSIS — Z96.641 CHRONIC HIP PAIN AFTER TOTAL REPLACEMENT OF RIGHT HIP JOINT: ICD-10-CM

## 2025-02-11 ENCOUNTER — ANCILLARY PROCEDURE (OUTPATIENT)
Dept: GENERAL RADIOLOGY | Facility: CLINIC | Age: 80
End: 2025-02-11
Attending: INTERNAL MEDICINE
Payer: COMMERCIAL

## 2025-02-11 ENCOUNTER — OFFICE VISIT (OUTPATIENT)
Dept: INTERNAL MEDICINE | Facility: CLINIC | Age: 80
End: 2025-02-11
Payer: MEDICARE

## 2025-02-11 VITALS
DIASTOLIC BLOOD PRESSURE: 66 MMHG | TEMPERATURE: 97.7 F | RESPIRATION RATE: 14 BRPM | OXYGEN SATURATION: 96 % | HEART RATE: 84 BPM | BODY MASS INDEX: 28.04 KG/M2 | WEIGHT: 185 LBS | SYSTOLIC BLOOD PRESSURE: 110 MMHG | HEIGHT: 68 IN

## 2025-02-11 DIAGNOSIS — E78.5 HYPERLIPIDEMIA LDL GOAL <130: ICD-10-CM

## 2025-02-11 DIAGNOSIS — E03.9 HYPOTHYROIDISM, UNSPECIFIED TYPE: ICD-10-CM

## 2025-02-11 DIAGNOSIS — D49.89: ICD-10-CM

## 2025-02-11 DIAGNOSIS — Z01.818 PRE-OP EXAM: Primary | ICD-10-CM

## 2025-02-11 PROCEDURE — 99215 OFFICE O/P EST HI 40 MIN: CPT | Performed by: INTERNAL MEDICINE

## 2025-02-11 PROCEDURE — 93000 ELECTROCARDIOGRAM COMPLETE: CPT | Performed by: INTERNAL MEDICINE

## 2025-02-11 PROCEDURE — 73552 X-RAY EXAM OF FEMUR 2/>: CPT | Mod: TC | Performed by: RADIOLOGY

## 2025-02-11 RX ORDER — GABAPENTIN 300 MG/1
300 CAPSULE ORAL 3 TIMES DAILY
Qty: 270 CAPSULE | Refills: 1 | Status: SHIPPED | OUTPATIENT
Start: 2025-02-11

## 2025-02-11 NOTE — NURSING NOTE
"Chief Complaint   Patient presents with    RECHECK     initial /66   Pulse 84   Temp 97.7  F (36.5  C) (Tympanic)   Resp 14   Ht 1.727 m (5' 8\")   Wt 83.9 kg (185 lb)   LMP  (LMP Unknown)   SpO2 96%   BMI 28.13 kg/m   Estimated body mass index is 28.13 kg/m  as calculated from the following:    Height as of this encounter: 1.727 m (5' 8\").    Weight as of this encounter: 83.9 kg (185 lb)..  bp completed using cuff size large  BARBIE CARDENAS LPN  "

## 2025-02-11 NOTE — PROGRESS NOTES
Preoperative Evaluation  [place] [address]  [address]  [facility] [address]  Phone: [phone]  Primary Provider: Rachel Miller MD  Pre-op Performing Provider: Rachel Miller MD  Feb 11, 2025 2/11/2025     1:42 PM   Additional Questions   Roomed by Rosangela EWING           2/11/2025   Pre-Op Questionnaire   Have you ever had a heart attack or stroke? No    Have you ever had surgery on your heart or blood vessels, such as a stent placement, a coronary artery bypass, or surgery on an artery in your head, neck, heart, or legs? No    Do you have chest pain with activity? No    Do you have a history of heart failure? No    Do you currently have a cold, bronchitis or symptoms of other infection? No    Do you have a cough, shortness of breath, or wheezing? No    Do you or anyone in your family have previous history of blood clots? No    Do you or does anyone in your family have a serious bleeding problem such as prolonged bleeding following surgeries or cuts? No    Have you ever had problems with anemia or been told to take iron pills? (!) YES     Have you had any abnormal blood loss such as black, tarry or bloody stools, or abnormal vaginal bleeding? No    Have you ever had a blood transfusion? No    Are you willing to have a blood transfusion if it is medically needed before, during, or after your surgery? Yes    Have you or any of your relatives ever had problems with anesthesia? No    Do you have sleep apnea, excessive snoring or daytime drowsiness? No    Do you have any artifical heart valves or other implanted medical devices like a pacemaker, defibrillator, or continuous glucose monitor? No    Do you have artificial joints? (!) YES    Are you allergic to latex? No        Proxy-reported       CC:  Preop for multiple medical problems.    HPI:    The patient came today because the thigh tumor is getting bigger and it is more  painful.  She was evaluated by dr Ballesteros, who thought it is a lipoma and ordered US which is scheduled for Feb 21.  The tumor is about 10 cm in diam. And tender.   Considering the pain and rapid growing I believe she will have some sort of intervention in the near future and I added a preop to today appointment to facilitate the care.   No other acute complaints.    Assessment:  1. V72.83H Preop general physical exam _ I do not see any major contraindications for the patient to go through the scheduled surgery.    The proposed surgical procedure is considered  INTERMEDIATE,   surgery risk.    For above listed surgery and anesthesia, Patient is at  MODERATE, risk for surgery/procedure and perioperative/procedure complications.    Cardiovascular risk  Assessment  -- low risk   --  No further cardiac work up is needed before this surgery.   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  Comment: I called the schedulers and I scheduled her for CT tomorrow  I looked at the XRays ( no report yet) and I see no bone lesions  I touched basis with dr Bob and made ref to surgeon   Plan: CT Femur Thigh Left w/o & w Contrast, Adult Gen        Surg  Referral, XR Pelvis and Hip Left        1 View, XR Femur Left 2 Views,     (E78.5) Hyperlipidemia LDL goal <130  Comment:   Plan: CBC with platelets, Comprehensive metabolic         panel, Lipid panel reflex to direct LDL         Fasting, TSH with free T4 reflex         "    (E03.9) Hypothyroidism, unspecified type  Comment:   Plan: CBC with platelets, Comprehensive metabolic         panel, Lipid panel reflex to direct LDL         Fasting, TSH with free T4 reflex                 Plan:  CT thigh for rapid growing tumor - about 4 inch  -- To schedule this test you may call Scheduling center at [phone]   It is the patient responsibility to check with insurance for coverage before you go for the test.   2. Surgical referral to dr Tobar  3. Hip -  XRay today - [address]  4. In the morning of the surgery/procedure day take with a sip of water only Metoprolol. The rest of the meds you will resume after surgery/procedure   5.  Next ANNUAL EXAM  -- in May 2025    Physical exam:    Blood pressure 110/66, pulse 84, temperature 97.7  F (36.5  C), temperature source Tympanic, resp. rate 14, height 1.727 m (5' 8\"), weight 83.9 kg (185 lb), SpO2 96%, not currently breastfeeding.   NAD, appears comfortable  Skin clear, no rashes  HEENT: PERRLA, EOMI, anicteric sclera, pink conjunctiva, external ears appear normal, bilateral tympanic membranes clinically normal, oropharynx normal color.  Neck: supple, no JVD,  no thyroidmegaly  Lymph nodes non palpable in the cervical, supraclavicular   Chest: clear to auscultation with good respiratory effort  Cardiac: S1S2, RRR, no mgr appreciated  Abdomen: soft, not tender, not distended, audible bowel sound, no hepatosplenomegaly, no palpable masses, no abdominal bruits  Extremities: no cyanosis, clubbing or edema.   On the inner part of the left upper thigh there is a round tumor, elastic-hard in consistency, tender, mobile  The skin is normal   Neuro: A, Ox3, no focal signs.        ROS:   as above     Patient Active Problem List   Diagnosis    Hypothyroidism    Tachycardia    Glaucoma    Prediabetes    Hyperlipidemia LDL goal <130    Palpitations    BMI 30.0-30.9,adult    Hypertension goal BP (blood pressure) < 140/90    Adjustment disorder with " depressed mood    Osteoporosis    RBBB    First degree AV block    Venous insufficiency    Varicose veins    S/P total knee arthroplasty    S/P total hip arthroplasty    Anterior dislocation of right hip (H)    Fall    SVT (supraventricular tachycardia)    Rupture of tendon of hip abductor    LUQ abdominal pain    Hip pain, right    Closed head injury, initial encounter    Hematoma of scalp, initial encounter    Fall, initial encounter    Concussion with loss of consciousness, initial encounter    Acute midline low back pain without sciatica    Aortic regurgitation    Lymphedema - failure of conservative therapy of daily compression, elevation and exercise for at least 4 weeks    Anemia, iron deficiency    Chronic dislocation of right shoulder        Past Medical History:   Diagnosis Date    Concussion with loss of consciousness, initial encounter 4/26/2023    First degree AV block 08/11/2015    Former smoker     Gastroesophageal reflux disease     GERD (gastroesophageal reflux disease)     Glaucoma     Heart block     2:1    Heart murmur     Hyperlipidemia LDL goal <130 06/14/2013    Hypertension goal BP (blood pressure) < 140/90 12/03/2013    Hypothyroidism     Left atrial dilatation     mild    Mild dilation of ascending aorta - borderline 08/11/2015    Palpitations     Prediabetes 06/14/2013    RBBB 08/11/2015    S/P total knee arthroplasty 07/23/2019    Tachycardia     Urinary frequency     Varicose veins     Venous insufficiency     s/p bilateral great saphenous vein radiofrequency ablation by Dr. Garcia      Past Surgical History:   Procedure Laterality Date    ARTHROPLASTY HIP Right 11/20/2020    Procedure: Right total hip arthroplasty using a Biomet Taperloc femoral stem and G7 acetabulum.;  Surgeon: Dragan Muse MD;  Location: [facility] OR    ARTHROPLASTY KNEE Right 07/23/2019    Procedure: Right total knee arthroplasty using an Arthrex iBalance knee system;  Surgeon: Dragan Muse MD;   Location: [facility] OR    BREAST SURGERY      right breast ductal surgery due to milk production    BREAST SURGERY Right     ductal surgery due to milk production    CATARACT EXTRACTION Right 05/2022    COLONOSCOPY N/A 10/24/2014    no further screening. Procedure: COLONOSCOPY;  Surgeon: Pedro Rudd MD;  Location: [facility] GI    DECOMPRESSION HIP CORE Right 6/6/2022    Procedure: Open right hip abductor repair;  Surgeon: Dragan Muse MD;  Location: [facility] OR    ESOPHAGOSCOPY, GASTROSCOPY, DUODENOSCOPY (EGD), COMBINED N/A 4/9/2024    Procedure: Esophagoscopy, gastroscopy, duodenoscopy (EGD), combined biopsies using cold bx forcep;  Surgeon: Pedro Rudd MD;  Location: [facility]    EYE SURGERY      FINGER GANGLION CYST EXCISION Left     ring finger    HYSTERECTOMY      HYSTERECTOMY, PAP NO LONGER INDICATED  2009    total hysterectomy and ovaries. benign    SOFT TISSUE SURGERY      ganglion removed from left wrist and ring finger    SURGICAL HISTORY OF -   age 7    tonsillectomy    SURGICAL HISTORY OF -   01/2015    left eye cataract    SURGICAL HISTORY OF -   Fall 2016    LINQ placed.    TONSILLECTOMY      WRIST GANGLION EXCISION Left         PSHx: No complications with prior surgeries or anesthesia     Soc Hx: No daily alcohol, no smoking     Fam Hx: reviewed  Mother, age   Father, age  Brothers  Sisters  Children    All: reviewed    Meds: reviewed  Current Outpatient Medications   Medication Sig Dispense Refill    acetaminophen (TYLENOL) 500 MG tablet Take 1 tablet (500 mg) by mouth every 6 hours as needed for mild pain.      brimonidine (ALPHAGAN) 0.2 % ophthalmic solution       calcium carbonate (OS-LILIA) 500 MG tablet Take 1 tablet by mouth 2 times daily.      carboxymethylcellulose (CARBOXYMETHYLCELLULOSE SODIUM) 0.5 % SOLN ophthalmic solution Place 1 drop into both eyes 4 times daily as needed for dry eyes      dorzolamide-timolol PF (COSOPT PF) 2-0.5 % opthalmic solutionh Place 1 drop into both eyes 2  times daily      furosemide (LASIX) 20 MG tablet Take 1 tablet (20 mg) by mouth 2 times daily (Patient taking differently: Take 20 mg by mouth 2 times daily. Every other day bid and once a day when not taking twice a day.) 180 tablet 3    gabapentin (NEURONTIN) 300 MG capsule TAKE 1 CAPSULE(300 MG) BY MOUTH THREE TIMES DAILY. 270 capsule 1    latanoprost (XALATAN) 0.005 % ophthalmic solution Place 1 drop into the right eye At Bedtime       levothyroxine (SYNTHROID/LEVOTHROID) 88 MCG tablet TAKE 1 TABLET(88 MCG) BY MOUTH DAILY 90 tablet 0    metoprolol tartrate (LOPRESSOR) 25 MG tablet TAKE 1 TABLET(25 MG) BY MOUTH TWICE DAILY 180 tablet 1    Multiple Vitamins-Minerals (MULTIVITAMIN ADULT PO) Take 1 tablet by mouth daily      pantoprazole (PROTONIX) 40 MG EC tablet Take 1 tablet (40 mg) by mouth daily. 90 tablet 1    potassium chloride ER (K-TAB/KLOR-CON) 10 MEQ CR tablet TAKE 1 TABLET(10 MEQ) BY MOUTH DAILY 90 tablet 2    pravastatin (PRAVACHOL) 40 MG tablet TAKE 1 TABLET(40 MG) BY MOUTH AT BEDTIME 90 tablet 1    acetaminophen (TYLENOL) 500 MG tablet Take 1-2 tablets (500-1,000 mg) by mouth 4 times daily.      HYDROmorphone (DILAUDID) 2 MG tablet Take 1 tablet (2 mg) by mouth every 4 hours as needed for severe pain (IF pain not managed with non-pharmacological and non-opioid interventions). 20 tablet 0    potassium chloride ER (K-TAB/KLOR-CON) 10 MEQ CR tablet               Rachel Wong MD  Internal Medicine       Patient Active Problem List    Diagnosis Date Noted    Chronic dislocation of right shoulder 08/24/2024     Priority: Medium    Anemia, iron deficiency 04/23/2024     Priority: Medium    Lymphedema - failure of conservative therapy of daily compression, elevation and exercise for at least 4 weeks 02/13/2024     Priority: Medium    Aortic regurgitation 11/20/2023     Priority: Medium    LUQ abdominal pain 04/26/2023     Priority: Medium    Hip pain, right 04/26/2023     Priority: Medium    Closed head  injury, initial encounter 04/26/2023     Priority: Medium    Hematoma of scalp, initial encounter 04/26/2023     Priority: Medium    Fall, initial encounter 04/26/2023     Priority: Medium    Concussion with loss of consciousness, initial encounter 04/26/2023     Priority: Medium    Acute midline low back pain without sciatica 04/26/2023     Priority: Medium    Rupture of tendon of hip abductor 06/06/2022     Priority: Medium    SVT (supraventricular tachycardia) 04/15/2022     Priority: Medium    Anterior dislocation of right hip (H) 12/16/2020     Priority: Medium    Fall 12/16/2020     Priority: Medium    S/P total hip arthroplasty 11/20/2020     Priority: Medium    S/P total knee arthroplasty 07/23/2019     Priority: Medium    Venous insufficiency      Priority: Medium    Varicose veins      Priority: Medium    Osteoporosis 08/11/2015     Priority: Medium    RBBB 08/11/2015     Priority: Medium    First degree AV block 08/11/2015     Priority: Medium    Adjustment disorder with depressed mood 08/05/2014     Priority: Medium    Hypertension goal BP (blood pressure) < 140/90 12/03/2013     Priority: Medium    BMI 30.0-30.9,adult 07/30/2013     Priority: Medium    Palpitations 07/03/2013     Priority: Medium    Prediabetes 06/14/2013     Priority: Medium    Hyperlipidemia LDL goal <130 06/14/2013     Priority: Medium    Hypothyroidism      Priority: Medium    Tachycardia      Priority: Medium    Glaucoma      Priority: Medium     Problem list name updated by automated process. Provider to review and confirm  [employer] Update utility        Past Medical History:   Diagnosis Date    Concussion with loss of consciousness, initial encounter 4/26/2023    First degree AV block 08/11/2015    Former smoker     Gastroesophageal reflux disease     GERD (gastroesophageal reflux disease)     Glaucoma     Heart block     2:1    Heart murmur     Hyperlipidemia LDL goal <130 06/14/2013    Hypertension goal BP (blood pressure) < 140/90  12/03/2013    Hypothyroidism     Left atrial dilatation     mild    Mild dilation of ascending aorta - borderline 08/11/2015    Palpitations     Prediabetes 06/14/2013    RBBB 08/11/2015    S/P total knee arthroplasty 07/23/2019    Tachycardia     Urinary frequency     Varicose veins     Venous insufficiency     s/p bilateral great saphenous vein radiofrequency ablation by Dr. Garcia     Past Surgical History:   Procedure Laterality Date    ARTHROPLASTY HIP Right 11/20/2020    Procedure: Right total hip arthroplasty using a Biomet Taperloc femoral stem and G7 acetabulum.;  Surgeon: Dragan Muse MD;  Location: [facility]    ARTHROPLASTY KNEE Right 07/23/2019    Procedure: Right total knee arthroplasty using an Arthrex iBalance knee system;  Surgeon: Dragan Muse MD;  Location: [facility]    BREAST SURGERY      right breast ductal surgery due to milk production    BREAST SURGERY Right     ductal surgery due to milk production    CATARACT EXTRACTION Right 05/2022    COLONOSCOPY N/A 10/24/2014    no further screening. Procedure: COLONOSCOPY;  Surgeon: Pedro Rudd MD;  Location:  GI    DECOMPRESSION HIP CORE Right 6/6/2022    Procedure: Open right hip abductor repair;  Surgeon: Dragan Muse MD;  Location:  OR    ESOPHAGOSCOPY, GASTROSCOPY, DUODENOSCOPY (EGD), COMBINED N/A 4/9/2024    Procedure: Esophagoscopy, gastroscopy, duodenoscopy (EGD), combined biopsies using cold bx forcep;  Surgeon: Pedro Rudd MD;  Location:  GI    EYE SURGERY      FINGER GANGLION CYST EXCISION Left     ring finger    HYSTERECTOMY      HYSTERECTOMY, PAP NO LONGER INDICATED  2009    total hysterectomy and ovaries. benign    SOFT TISSUE SURGERY      ganglion removed from left wrist and ring finger    SURGICAL HISTORY OF -   age 7    tonsillectomy    SURGICAL HISTORY OF -   01/2015    left eye cataract    SURGICAL HISTORY OF -   Fall 2016    LINQ placed.    TONSILLECTOMY      WRIST GANGLION  EXCISION Left      Current Outpatient Medications   Medication Sig Dispense Refill    acetaminophen (TYLENOL) 500 MG tablet Take 1 tablet (500 mg) by mouth every 6 hours as needed for mild pain.      brimonidine (ALPHAGAN) 0.2 % ophthalmic solution       calcium carbonate (OS-LILIA) 500 MG tablet Take 1 tablet by mouth 2 times daily.      carboxymethylcellulose (CARBOXYMETHYLCELLULOSE SODIUM) 0.5 % SOLN ophthalmic solution Place 1 drop into both eyes 4 times daily as needed for dry eyes      dorzolamide-timolol PF (COSOPT PF) 2-0.5 % opthalmic solutionh Place 1 drop into both eyes 2 times daily      furosemide (LASIX) 20 MG tablet Take 1 tablet (20 mg) by mouth 2 times daily (Patient taking differently: Take 20 mg by mouth 2 times daily. Every other day bid and once a day when not taking twice a day.) 180 tablet 3    gabapentin (NEURONTIN) 300 MG capsule TAKE 1 CAPSULE(300 MG) BY MOUTH THREE TIMES DAILY. 270 capsule 1    latanoprost (XALATAN) 0.005 % ophthalmic solution Place 1 drop into the right eye At Bedtime       levothyroxine (SYNTHROID/LEVOTHROID) 88 MCG tablet TAKE 1 TABLET(88 MCG) BY MOUTH DAILY 90 tablet 0    metoprolol tartrate (LOPRESSOR) 25 MG tablet TAKE 1 TABLET(25 MG) BY MOUTH TWICE DAILY 180 tablet 1    Multiple Vitamins-Minerals (MULTIVITAMIN ADULT PO) Take 1 tablet by mouth daily      pantoprazole (PROTONIX) 40 MG EC tablet Take 1 tablet (40 mg) by mouth daily. 90 tablet 1    potassium chloride ER (K-TAB/KLOR-CON) 10 MEQ CR tablet TAKE 1 TABLET(10 MEQ) BY MOUTH DAILY 90 tablet 2    pravastatin (PRAVACHOL) 40 MG tablet TAKE 1 TABLET(40 MG) BY MOUTH AT BEDTIME 90 tablet 1    acetaminophen (TYLENOL) 500 MG tablet Take 1-2 tablets (500-1,000 mg) by mouth 4 times daily.      HYDROmorphone (DILAUDID) 2 MG tablet Take 1 tablet (2 mg) by mouth every 4 hours as needed for severe pain (IF pain not managed with non-pharmacological and non-opioid interventions). 20 tablet 0    potassium chloride ER  "(K-TAB/KLOR-CON) 10 MEQ CR tablet          Allergies   Allergen Reactions    Seasonal Allergies     Simvastatin Other (See Comments)     \"flushed\"        Social History     Tobacco Use    Smoking status: Former     Current packs/day: 0.00     Average packs/day: 0.5 packs/day for 18.0 years (9.0 ttl pk-yrs)     Types: Cigarettes     Start date:      Quit date:      Years since quittin.1     Passive exposure: Past    Smokeless tobacco: Never   Substance Use Topics    Alcohol use: Not Currently       History   Drug Use No               Objective    /66   Pulse 84   Temp 97.7  F (36.5  C) (Tympanic)   Resp 14   Ht 1.727 m (5' 8\")   Wt 83.9 kg (185 lb)   LMP  (LMP Unknown)   SpO2 96%   BMI 28.13 kg/m     Estimated body mass index is 28.13 kg/m  as calculated from the following:    Height as of this encounter: 1.727 m (5' 8\").    Weight as of this encounter: 83.9 kg (185 lb).  Physical Exam      Recent Labs   Lab Test 24  1336 24  0535 24  1325   HGB  --  10.9* 12.1   PLT  --  347 344    141  --    POTASSIUM 4.0 4.1  --    CR 0.63 0.81  --         Diagnostics         Revised Cardiac Risk Index (RCRI)  The patient has the following serious cardiovascular risks for perioperative complications:       RCRI Interpretation:        45 minutes spent on the date of the encounter doing   chart review,   review of outside records,   review of test results,   interpretation of tests,   patient visit,   documentation,   discussion with other provider(s),       Signed Electronically by: Rachel Miller MD  A copy of this evaluation report is provided to the requesting physician.        "

## 2025-02-11 NOTE — PATIENT INSTRUCTIONS
Plan:  CT thigh for rapid growing tumor - about 4 inch  -- To schedule this test you may call Scheduling center at [phone]   It is the patient responsibility to check with insurance for coverage before you go for the test.   2. Surgical referral to dr Tobar  3. Hip -  XRay today - [address]  4. In the morning of the surgery/procedure day take with a sip of water only Metoprolol. The rest of the meds you will resume after surgery/procedure   5.  Next ANNUAL EXAM  -- in May 2025

## 2025-02-12 ENCOUNTER — HOSPITAL ENCOUNTER (OUTPATIENT)
Dept: CT IMAGING | Facility: CLINIC | Age: 80
Discharge: HOME | End: 2025-02-12
Attending: INTERNAL MEDICINE
Payer: MEDICARE

## 2025-02-12 DIAGNOSIS — D49.89: ICD-10-CM

## 2025-02-12 LAB
CREAT BLD-MCNC: 0.8 MG/DL (ref 0.5–1)
EGFRCR SERPLBLD CKD-EPI 2021: >60 ML/MIN/1.73M2

## 2025-02-12 PROCEDURE — 73701 CT LOWER EXTREMITY W/DYE: CPT | Mod: LT

## 2025-02-12 PROCEDURE — 250N000011 HC RX IP 250 OP 636: Performed by: INTERNAL MEDICINE

## 2025-02-12 PROCEDURE — 250N000009 HC RX 250: Performed by: INTERNAL MEDICINE

## 2025-02-12 PROCEDURE — 82565 ASSAY OF CREATININE: CPT

## 2025-02-12 RX ORDER — IOPAMIDOL 755 MG/ML
500 INJECTION, SOLUTION INTRAVASCULAR ONCE
Status: COMPLETED | OUTPATIENT
Start: 2025-02-12 | End: 2025-02-12

## 2025-02-12 RX ADMIN — SODIUM CHLORIDE 100 ML: 9 INJECTION, SOLUTION INTRAVENOUS at 14:00

## 2025-02-12 RX ADMIN — IOPAMIDOL 90 ML: 755 INJECTION, SOLUTION INTRAVENOUS at 14:00

## 2025-02-14 DIAGNOSIS — E78.5 HYPERLIPIDEMIA LDL GOAL <130: ICD-10-CM

## 2025-02-18 DIAGNOSIS — R60.9 FLUID RETENTION: ICD-10-CM

## 2025-02-18 DIAGNOSIS — I87.2 EDEMA OF LEFT LOWER EXTREMITY DUE TO PERIPHERAL VENOUS INSUFFICIENCY: ICD-10-CM

## 2025-02-18 RX ORDER — PRAVASTATIN SODIUM 40 MG
40 TABLET ORAL AT BEDTIME
Qty: 90 TABLET | Refills: 1 | Status: SHIPPED | OUTPATIENT
Start: 2025-02-18

## 2025-02-18 RX ORDER — FUROSEMIDE 20 MG/1
20 TABLET ORAL 2 TIMES DAILY
Qty: 180 TABLET | Refills: 0 | Status: SHIPPED | OUTPATIENT
Start: 2025-02-18

## 2025-02-19 ENCOUNTER — TELEPHONE (OUTPATIENT)
Dept: INTERNAL MEDICINE | Facility: CLINIC | Age: 80
End: 2025-02-19

## 2025-02-19 ENCOUNTER — LAB (OUTPATIENT)
Dept: LAB | Facility: CLINIC | Age: 80
End: 2025-02-19
Payer: MEDICARE

## 2025-02-19 DIAGNOSIS — D49.89: Primary | ICD-10-CM

## 2025-02-19 DIAGNOSIS — Z01.818 PRE-OP EXAM: ICD-10-CM

## 2025-02-19 DIAGNOSIS — E03.9 HYPOTHYROIDISM, UNSPECIFIED TYPE: ICD-10-CM

## 2025-02-19 DIAGNOSIS — E78.5 HYPERLIPIDEMIA LDL GOAL <130: ICD-10-CM

## 2025-02-19 LAB
ALBUMIN SERPL BCG-MCNC: 5 G/DL (ref 3.5–5.2)
ALP SERPL-CCNC: 113 U/L (ref 40–150)
ALT SERPL W P-5'-P-CCNC: 21 U/L (ref 0–50)
ANION GAP SERPL CALCULATED.3IONS-SCNC: 14 MMOL/L (ref 7–15)
AST SERPL W P-5'-P-CCNC: 26 U/L (ref 0–45)
BILIRUB SERPL-MCNC: 0.7 MG/DL
BUN SERPL-MCNC: 16.2 MG/DL (ref 8–23)
CALCIUM SERPL-MCNC: 10.2 MG/DL (ref 8.8–10.4)
CHLORIDE SERPL-SCNC: 102 MMOL/L (ref 98–107)
CHOLEST SERPL-MCNC: 183 MG/DL
CREAT SERPL-MCNC: 0.65 MG/DL (ref 0.51–0.95)
EGFRCR SERPLBLD CKD-EPI 2021: 89 ML/MIN/1.73M2
ERYTHROCYTE [DISTWIDTH] IN BLOOD BY AUTOMATED COUNT: 14.9 % (ref 10–15)
FASTING STATUS PATIENT QL REPORTED: YES
FASTING STATUS PATIENT QL REPORTED: YES
GLUCOSE SERPL-MCNC: 110 MG/DL (ref 70–99)
HCO3 SERPL-SCNC: 25 MMOL/L (ref 22–29)
HCT VFR BLD AUTO: 41.1 % (ref 35–47)
HDLC SERPL-MCNC: 81 MG/DL
HGB BLD-MCNC: 13.4 G/DL (ref 11.7–15.7)
LDLC SERPL CALC-MCNC: 69 MG/DL
MCH RBC QN AUTO: 28.5 PG (ref 26.5–33)
MCHC RBC AUTO-ENTMCNC: 32.6 G/DL (ref 31.5–36.5)
MCV RBC AUTO: 87 FL (ref 78–100)
NONHDLC SERPL-MCNC: 102 MG/DL
PLATELET # BLD AUTO: 377 10E3/UL (ref 150–450)
POTASSIUM SERPL-SCNC: 4 MMOL/L (ref 3.4–5.3)
PROT SERPL-MCNC: 8.3 G/DL (ref 6.4–8.3)
RBC # BLD AUTO: 4.7 10E6/UL (ref 3.8–5.2)
SODIUM SERPL-SCNC: 141 MMOL/L (ref 135–145)
TRIGL SERPL-MCNC: 167 MG/DL
TSH SERPL DL<=0.005 MIU/L-ACNC: 2.45 UIU/ML (ref 0.3–4.2)
WBC # BLD AUTO: 7.1 10E3/UL (ref 4–11)

## 2025-02-19 PROCEDURE — 36415 COLL VENOUS BLD VENIPUNCTURE: CPT

## 2025-02-19 PROCEDURE — 85027 COMPLETE CBC AUTOMATED: CPT

## 2025-02-19 PROCEDURE — 80053 COMPREHEN METABOLIC PANEL: CPT

## 2025-02-19 PROCEDURE — 84443 ASSAY THYROID STIM HORMONE: CPT

## 2025-02-19 PROCEDURE — 80061 LIPID PANEL: CPT

## 2025-02-19 NOTE — TELEPHONE ENCOUNTER
I discussed the CT results with the patient   She agrees for MRI  MRI ordered.  I asked if the MRI can be done before February 24 when she has the appointment with dr Nunez.

## 2025-02-23 ENCOUNTER — HOSPITAL ENCOUNTER (OUTPATIENT)
Dept: MRI IMAGING | Facility: CLINIC | Age: 80
Discharge: HOME | End: 2025-02-23
Attending: INTERNAL MEDICINE | Admitting: INTERNAL MEDICINE
Payer: MEDICARE

## 2025-02-23 DIAGNOSIS — D49.89: ICD-10-CM

## 2025-02-23 PROCEDURE — 73720 MRI LWR EXTREMITY W/O&W/DYE: CPT | Mod: LT

## 2025-02-23 PROCEDURE — 255N000002 HC RX 255 OP 636: Performed by: INTERNAL MEDICINE

## 2025-02-23 PROCEDURE — A9585 GADOBUTROL INJECTION: HCPCS | Performed by: INTERNAL MEDICINE

## 2025-02-23 RX ORDER — GADOBUTROL 604.72 MG/ML
9 INJECTION INTRAVENOUS ONCE
Status: COMPLETED | OUTPATIENT
Start: 2025-02-23 | End: 2025-02-23

## 2025-02-23 RX ADMIN — GADOBUTROL 9 ML: 604.72 INJECTION INTRAVENOUS at 08:13

## 2025-02-24 ENCOUNTER — OFFICE VISIT (OUTPATIENT)
Dept: SURGERY | Facility: CLINIC | Age: 80
End: 2025-02-24
Attending: INTERNAL MEDICINE
Payer: MEDICARE

## 2025-02-24 ENCOUNTER — TELEPHONE (OUTPATIENT)
Dept: ORTHOPEDICS | Facility: CLINIC | Age: 80
End: 2025-02-24

## 2025-02-24 VITALS
HEIGHT: 68 IN | OXYGEN SATURATION: 98 % | DIASTOLIC BLOOD PRESSURE: 78 MMHG | SYSTOLIC BLOOD PRESSURE: 126 MMHG | WEIGHT: 185 LBS | HEART RATE: 83 BPM | BODY MASS INDEX: 28.04 KG/M2

## 2025-02-24 DIAGNOSIS — D49.89: ICD-10-CM

## 2025-02-24 PROCEDURE — 99212 OFFICE O/P EST SF 10 MIN: CPT | Performed by: SURGERY

## 2025-02-24 NOTE — LETTER
February 24, 2025          Rachel Miller MD  [address] [place] [address]      RE:   Teri Beltran 1945      Dear Colleague,    Thank you for referring your patient, Teri Beltran, to [facility]. Please see a copy of my visit note below.    HPI:  Teri presents today for a subcutaneous mass on her medial left thigh for the past several months. She states that she noted it serendipitously and states that initially it seemed to go away on its own. It then reappeared and has been getting larger in the interim. It is not painful. She has not noted any lumps elsewhere.  She underwent an MRI yesterday which showed a mass measuring upwards of 9.9cm which could be a myxoma and with associated adenopathy. Orthopedic oncology consultation was recommended.    PE:  General appearance: well-nourished, no apparent distress  Skin: There is a 10 cm subcutaneous mass on the medial left thigh. It is mobile. There is adenopathy in the left inguinal region.  It is not tender. I did not appreciate adenopathy in the right groin.         Plan:  Owing to the ambiguous diagnosis and potential for malignancy, I've referred her to Dr Rigo Quesada a the [facility].        Again, thank you for allowing me to participate in the care of your patient.      Sincerely,      Mao Bob MD

## 2025-02-24 NOTE — PROGRESS NOTES
Problem: Prexisting or High Potential for Compromised Skin Integrity  Goal: Skin integrity is maintained or improved  Description: INTERVENTIONS:  - Identify patients at risk for skin breakdown  - Assess and monitor skin integrity  - Assess and monitor nutrition and hydration status  - Monitor labs   - Assess for incontinence   - Turn and reposition patient  - Assist with mobility/ambulation  - Relieve pressure over bony prominences  - Avoid friction and shearing  - Provide appropriate hygiene as needed including keeping skin clean and dry  - Evaluate need for skin moisturizer/barrier cream  - Collaborate with interdisciplinary team   - Patient/family teaching  - Consider wound care consult   Outcome: Progressing     Problem: SKIN/TISSUE INTEGRITY - ADULT  Goal: Incision(s), wounds(s) or drain site(s) healing without S/S of infection  Description: INTERVENTIONS  - Assess and document dressing, incision, wound bed, drain sites and surrounding tissue  - Provide patient and family education  - Perform skin care/dressing changes every shift  Outcome: Progressing     Problem: INFECTION - ADULT  Goal: Absence or prevention of progression during hospitalization  Description: INTERVENTIONS:  - Assess and monitor for signs and symptoms of infection  - Monitor lab/diagnostic results  - Monitor all insertion sites, i.e. indwelling lines, tubes, and drains  - Monitor endotracheal if appropriate and nasal secretions for changes in amount and color  - [place] appropriate cooling/warming therapies per order  - Administer medications as ordered  - Instruct and encourage patient and family to use good hand hygiene technique  - Identify and instruct in appropriate isolation precautions for identified infection/condition  Outcome: Progressing      HPI:  Teri presents today for a subcutaneous mass on her medial left thigh for the past several months. She states that she noted it serendipitously and states that initially it seemed to go away on its own. It then reappeared and has been getting larger in the interim. It is not painful. She has not noted any lumps elsewhere.  She underwent an MRI yesterday which showed a mass measuring upwards of 9.9cm which could be a myxoma and with associated adenopathy. Orthopedic oncology consultation was recommended.    PE:  General appearance: well-nourished, no apparent distress  Skin: There is a 10 cm subcutaneous mass on the medial left thigh. It is mobile. There is adenopathy in the left inguinal region.  It is not tender. I did not appreciate adenopathy in the right groin.         Plan:  Owing to the ambiguous diagnosis and potential for malignancy, I've referred her to Dr Rigo Quesada a the [facility].    Mao Bob MD    Please route or send letter to:  Primary Care Provider (PCP)

## 2025-02-24 NOTE — TELEPHONE ENCOUNTER
Ortho referral  Received: Today  Marti Reza, Abigail Dunham, GILDARDO; Marti Reza RN Hey Beth!  I received a [facility] referral for this pt requesting consult with Dr. Quesada. It looks like they also placed an Ortho referral so likely already on your radar but just want to confirm?    I'll keep an eye on the plan and place a tentative hold with Dr. Toribio if needed.    Thank you for confirming!  CHRIS Kidd (covering for Claudia)

## 2025-02-25 NOTE — TELEPHONE ENCOUNTER
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

## 2025-02-26 NOTE — PROGRESS NOTES
[facility], Orthopaedic Oncology Surgery Consultation  by Rigo Quesada M.D.    Teri Beltran MRN# [id number]    YOB: 1945     Requesting physician: Rachel Wong-*  Rachel Miller MD Gen Surgery  Dwayne Elise MD Radiology                Assessment and Plan:   Assessment:  Left proximal medial thigh soft tissue mass, 10 cm, myxoid appearing, non enhancing so difficult to exclude fluid lesion.  Differential diagnosis includes fluid lesion such as cyst or lymphocele or myxoid type tumors including benign myxomas as well as myxoid type soft tissue sarcomas.    Bilateral lymphadenopathy, inguinal.    L hip and L knee effusion , likely secondary to DJD    Plan:  Advise proceeding with core needle biopsy procedure for diagnostic purposes followed by possible surgical excision, pending pathology evaluation.    Based upon fluid aspirated, likely represents lymphocele or seroma of uncertain etiology.  I advised the patient that this may recur and if so, surgical resection may be required.    Follow-up with our nurse coordinator with respect to reviewing pathology report.  If mass recurs or refills with fluid, then follow-up visit for repeated aspiration and possible surgical excision may be required.      Procedure note:  Under sterile precautions with 1% Xylocaine anesthesia, #11 blade used to make an incision in the left medial thigh.  A 14-gauge biopsy needle was then inserted into the mass.  Fluid evacuated from the mass.  150 cc of serous clear yellow fluid was obtained.  Therefore, no tissue sampling was performed.  Fluid be sent off for cytology, cell count and differential, as well as panculture.      Rigo Quesada MD  [facility] Family Professor  Oncology and Adult Reconstructive Surgery  Dept Orthopaedic Surgery, formerly [facility]  [phone] office, [phone] pager  [email or web address]    This note was created using dictation software and  may contain errors.  Please contact the creator for any clarifications that are needed.            History of Present Illness:   79 year old female  chief complaint    This patient is seen at the request of Dr. Mao Bob, general surgeon who saw the patient for a 10 cm subcutaneous mass of the left medial thigh of several months duration.  Patient reported that it developed spontaneously and at one point appeared to go away but now has regrown.  She denies pain or discomfort or any history of prior cancers.    Current symptoms:  Problem: Lump on Thigh   Onset and duration: 2 months ago   Awakens from sleep due to sx's:  No  Precipitating Injury:  No    Other joints or sites painful:  Yes  Fever: No  Appetite change or weight loss: No  History of prior or existing cancer: No    Background history:  DX:  Left medial thigh mass    TREATMENTS:  7/23/2019, Right total knee arthroplasty using an Arthrex iBalance knee system, (DEEPTI Muse), [facility]   11/20/2020, Right total hip arthroplasty using a Biomet Taperloc femoral stem and G7 acetabulum, (DEEPTI Muse), [facility]   6/6/2022, Open right hip abductor repair, (DEEPTI Muse), [facility]    date, treatment, (surgeon), hospital             Physical Exam:     EXAMINATION pertinent findings:   PSYCH: Pleasant, healthy-appearing, alert, oriented x3, cooperative. Normal mood and affect.  VITAL SIGNS: not currently breastfeeding..  Reviewed nursing intake notes.   There is no height or weight on file to calculate BMI.  RESP: non labored breathing   ABD: benign, soft, non-tender, no acute peritoneal findings  SKIN: grossly normal   LYMPHATIC: grossly normal, no adenopathy, no extremity edema  NEURO: grossly normal , no motor deficits  VASCULAR: satisfactory perfusion of all extremities   MUSCULOSKELETAL:   Palpable mass left medial thigh, minimally tender.  No erythema.  No fluctuance.  Negative Tinel sign.             Data:   All laboratory data reviewed  All imaging  studies reviewed by me    MRI examination demonstrates soft tissue mass of the subcutaneous tissue of the medial thigh, 10 cm size, overlying the gracilis muscle.          DATA for DOCUMENTATION:         Past Medical History:     Patient Active Problem List   Diagnosis    Hypothyroidism    Tachycardia    Glaucoma    Prediabetes    Hyperlipidemia LDL goal <130    Palpitations    BMI 30.0-30.9,adult    Hypertension goal BP (blood pressure) < 140/90    Adjustment disorder with depressed mood    Osteoporosis    RBBB    First degree AV block    Venous insufficiency    Varicose veins    S/P total knee arthroplasty    S/P total hip arthroplasty    Anterior dislocation of right hip (H)    Fall    SVT (supraventricular tachycardia)    Rupture of tendon of hip abductor    LUQ abdominal pain    Hip pain, right    Closed head injury, initial encounter    Hematoma of scalp, initial encounter    Fall, initial encounter    Concussion with loss of consciousness, initial encounter    Acute midline low back pain without sciatica    Aortic regurgitation    Lymphedema - failure of conservative therapy of daily compression, elevation and exercise for at least 4 weeks    Anemia, iron deficiency    Chronic dislocation of right shoulder     Past Medical History:   Diagnosis Date    Concussion with loss of consciousness, initial encounter 4/26/2023    First degree AV block 08/11/2015    Former smoker     Gastroesophageal reflux disease     GERD (gastroesophageal reflux disease)     Glaucoma     Heart block     2:1    Heart murmur     Hyperlipidemia LDL goal <130 06/14/2013    Hypertension goal BP (blood pressure) < 140/90 12/03/2013    Hypothyroidism     Left atrial dilatation     mild    Mild dilation of ascending aorta - borderline 08/11/2015    Palpitations     Prediabetes 06/14/2013    RBBB 08/11/2015    S/P total knee arthroplasty 07/23/2019    Tachycardia     Urinary frequency     Varicose veins     Venous insufficiency     s/p  bilateral great saphenous vein radiofrequency ablation by Dr. Garcia       Also see scanned health assessment forms.       Past Surgical History:     Past Surgical History:   Procedure Laterality Date    ARTHROPLASTY HIP Right 11/20/2020    Procedure: Right total hip arthroplasty using a Biomet Taperloc femoral stem and G7 acetabulum.;  Surgeon: Dragan Muse MD;  Location: [facility] OR    ARTHROPLASTY KNEE Right 07/23/2019    Procedure: Right total knee arthroplasty using an Arthrex iBalance knee system;  Surgeon: Dragan Muse MD;  Location: [facility] OR    BREAST SURGERY      right breast ductal surgery due to milk production    BREAST SURGERY Right     ductal surgery due to milk production    CATARACT EXTRACTION Right 05/2022    COLONOSCOPY N/A 10/24/2014    no further screening. Procedure: COLONOSCOPY;  Surgeon: Pedro Rudd MD;  Location:  GI    DECOMPRESSION HIP CORE Right 6/6/2022    Procedure: Open right hip abductor repair;  Surgeon: Dragan Muse MD;  Location:  OR    ESOPHAGOSCOPY, GASTROSCOPY, DUODENOSCOPY (EGD), COMBINED N/A 4/9/2024    Procedure: Esophagoscopy, gastroscopy, duodenoscopy (EGD), combined biopsies using cold bx forcep;  Surgeon: Pedro Rudd MD;  Location:  GI    EYE SURGERY      FINGER GANGLION CYST EXCISION Left     ring finger    HYSTERECTOMY      HYSTERECTOMY, PAP NO LONGER INDICATED  2009    total hysterectomy and ovaries. benign    SOFT TISSUE SURGERY      ganglion removed from left wrist and ring finger    SURGICAL HISTORY OF -   age 7    tonsillectomy    SURGICAL HISTORY OF -   01/2015    left eye cataract    SURGICAL HISTORY OF -   Fall 2016    LINQ placed.    TONSILLECTOMY      WRIST GANGLION EXCISION Left             Social History:     Social History     Socioeconomic History    Marital status:      Spouse name: Not on file    Number of children: Not on file    Years of education: Not on file    Highest education level: Not on  file   Occupational History    Not on file   Tobacco Use    Smoking status: Former     Current packs/day: 0.00     Average packs/day: 0.5 packs/day for 18.0 years (9.0 ttl pk-yrs)     Types: Cigarettes     Start date:      Quit date:      Years since quittin.1     Passive exposure: Past    Smokeless tobacco: Never   Vaping Use    Vaping status: Never Used   Substance and Sexual Activity    Alcohol use: Not Currently    Drug use: No    Sexual activity: Yes     Partners: Male   Other Topics Concern    Parent/sibling w/ CABG, MI or angioplasty before 65F 55M? Not Asked     Service Not Asked    Blood Transfusions Not Asked    Caffeine Concern No     Comment: 4-5 cups of coffee daily    Occupational Exposure Not Asked    Hobby Hazards Not Asked    Sleep Concern Not Asked    Stress Concern Not Asked    Weight Concern Not Asked    Special Diet No     Comment: no added salt    Back Care Not Asked    Exercise No     Comment: 3 days per week - exercise class     Bike Helmet Not Asked    Seat Belt Not Asked    Self-Exams Not Asked   Social History Narrative    Not on file     Social Drivers of Health     Financial Resource Strain: Low Risk  (2024)    Financial Resource Strain     Within the past 12 months, have you or your family members you live with been unable to get utilities (heat, electricity) when it was really needed?: No   Food Insecurity: Low Risk  (2024)    Food Insecurity     Within the past 12 months, did you worry that your food would run out before you got money to buy more?: No     Within the past 12 months, did the food you bought just not last and you didn t have money to get more?: No   Transportation Needs: Low Risk  (2024)    Transportation Needs     Within the past 12 months, has lack of transportation kept you from medical appointments, getting your medicines, non-medical meetings or appointments, work, or from getting things that you need?: No   Physical Activity: Not  on file   Stress: Not on file   Social Connections: Not on file   Interpersonal Safety: Low Risk  (9/3/2024)    Interpersonal Safety     Do you feel physically and emotionally safe where you currently live?: Yes     Within the past 12 months, have you been hit, slapped, kicked or otherwise physically hurt by someone?: No     Within the past 12 months, have you been humiliated or emotionally abused in other ways by your partner or ex-partner?: No   Housing Stability: High Risk (8/25/2024)    Housing Stability     Do you have housing? : No     Are you worried about losing your housing?: No            Family History:       Family History   Problem Relation Age of Onset    Ovarian Cancer Mother     Cancer Mother         ovarian    Breast Cancer Mother     Heart Disease Father         rare; not sure what it was    Diabetes Father         old age    Cerebrovascular Disease Brother 68            Medications:     Current Outpatient Medications   Medication Sig Dispense Refill    acetaminophen (TYLENOL) 500 MG tablet Take 1 tablet (500 mg) by mouth every 6 hours as needed for mild pain.      acetaminophen (TYLENOL) 500 MG tablet Take 1-2 tablets (500-1,000 mg) by mouth 4 times daily.      brimonidine (ALPHAGAN) 0.2 % ophthalmic solution       calcium carbonate (OS-LILIA) 500 MG tablet Take 1 tablet by mouth 2 times daily.      carboxymethylcellulose (CARBOXYMETHYLCELLULOSE SODIUM) 0.5 % SOLN ophthalmic solution Place 1 drop into both eyes 4 times daily as needed for dry eyes      dorzolamide-timolol PF (COSOPT PF) 2-0.5 % opthalmic solutionh Place 1 drop into both eyes 2 times daily      furosemide (LASIX) 20 MG tablet Take 1 tablet (20 mg) by mouth 2 times daily. 180 tablet 0    gabapentin (NEURONTIN) 300 MG capsule TAKE 1 CAPSULE(300 MG) BY MOUTH THREE TIMES DAILY. 270 capsule 1    latanoprost (XALATAN) 0.005 % ophthalmic solution Place 1 drop into the right eye At Bedtime       levothyroxine (SYNTHROID/LEVOTHROID) 88 MCG  tablet TAKE 1 TABLET(88 MCG) BY MOUTH DAILY 90 tablet 0    metoprolol tartrate (LOPRESSOR) 25 MG tablet TAKE 1 TABLET(25 MG) BY MOUTH TWICE DAILY 180 tablet 1    Multiple Vitamins-Minerals (MULTIVITAMIN ADULT PO) Take 1 tablet by mouth daily      pantoprazole (PROTONIX) 40 MG EC tablet Take 1 tablet (40 mg) by mouth daily. 90 tablet 1    potassium chloride ER (K-TAB/KLOR-CON) 10 MEQ CR tablet TAKE 1 TABLET(10 MEQ) BY MOUTH DAILY 90 tablet 2    pravastatin (PRAVACHOL) 40 MG tablet TAKE 1 TABLET(40 MG) BY MOUTH AT BEDTIME 90 tablet 1     No current facility-administered medications for this visit.              Review of Systems:   A comprehensive 10 point review of systems (constitutional, ENT, cardiac, peripheral vascular, lymphatic, respiratory, GI, , Musculoskeletal, skin, Neurological) was performed and found to be negative except as described in this note.     See intake form completed by patient

## 2025-02-27 ENCOUNTER — OFFICE VISIT (OUTPATIENT)
Dept: ORTHOPEDICS | Facility: CLINIC | Age: 80
End: 2025-02-27
Payer: MEDICARE

## 2025-02-27 ENCOUNTER — PRE VISIT (OUTPATIENT)
Dept: ORTHOPEDICS | Facility: CLINIC | Age: 80
End: 2025-02-27

## 2025-02-27 VITALS — BODY MASS INDEX: 30.49 KG/M2 | HEIGHT: 65 IN | WEIGHT: 183 LBS

## 2025-02-27 DIAGNOSIS — D49.89: ICD-10-CM

## 2025-02-27 LAB
APPEARANCE FLD: CLEAR
COLOR FLD: YELLOW
LYMPHOCYTES NFR FLD MANUAL: 92 %
MONOS+MACROS NFR FLD MANUAL: 8 %
NEUTS BAND NFR FLD MANUAL: 0 %
SPECIMEN SOURCE FLD: NORMAL
WBC # FLD AUTO: 54 /UL

## 2025-02-27 PROCEDURE — 88305 TISSUE EXAM BY PATHOLOGIST: CPT | Mod: 26 | Performed by: PATHOLOGY

## 2025-02-27 PROCEDURE — 87102 FUNGUS ISOLATION CULTURE: CPT | Performed by: ORTHOPAEDIC SURGERY

## 2025-02-27 PROCEDURE — 87075 CULTR BACTERIA EXCEPT BLOOD: CPT | Performed by: ORTHOPAEDIC SURGERY

## 2025-02-27 PROCEDURE — 89051 BODY FLUID CELL COUNT: CPT | Performed by: ORTHOPAEDIC SURGERY

## 2025-02-27 PROCEDURE — 87070 CULTURE OTHR SPECIMN AEROBIC: CPT | Performed by: ORTHOPAEDIC SURGERY

## 2025-02-27 PROCEDURE — 88305 TISSUE EXAM BY PATHOLOGIST: CPT | Mod: TC | Performed by: ORTHOPAEDIC SURGERY

## 2025-02-27 PROCEDURE — 99000 SPECIMEN HANDLING OFFICE-LAB: CPT | Performed by: PATHOLOGY

## 2025-02-27 NOTE — LETTER
2/27/2025       RE: Teri Beltran  [address]  [facility] [address]     Dear Colleague,    Thank you for referring your patient, Teri Beltran, to the [facility] [place] [facility]. Please see a copy of my visit note below.        [facility], Orthopaedic Oncology Surgery Consultation  by Rigo Quesada M.D.    Teri Beltran MRN# [id number]    YOB: 1945     Requesting physician: Rachel Wong-Rachel Kelly MD Gen Surgery  Dwayne Elise MD Radiology                Assessment and Plan:   Assessment:  Left proximal medial thigh soft tissue mass, 10 cm, myxoid appearing, non enhancing so difficult to exclude fluid lesion.  Differential diagnosis includes fluid lesion such as cyst or lymphocele or myxoid type tumors including benign myxomas as well as myxoid type soft tissue sarcomas.    Bilateral lymphadenopathy, inguinal.    L hip and L knee effusion , likely secondary to DJD    Plan:  Advise proceeding with core needle biopsy procedure for diagnostic purposes followed by possible surgical excision, pending pathology evaluation.    Based upon fluid aspirated, likely represents lymphocele or seroma of uncertain etiology.  I advised the patient that this may recur and if so, surgical resection may be required.    Follow-up with our nurse coordinator with respect to reviewing pathology report.  If mass recurs or refills with fluid, then follow-up visit for repeated aspiration and possible surgical excision may be required.      Procedure note:  Under sterile precautions with 1% Xylocaine anesthesia, #11 blade used to make an incision in the left medial thigh.  A 14-gauge biopsy needle was then inserted into the mass.  Fluid evacuated from the mass.  150 cc of serous clear yellow fluid was obtained.  Therefore, no tissue sampling was performed.  Fluid be sent  off for cytology, cell count and differential, as well as panculture.      Rigo Quesada MD [facility] Family Professor  Oncology and Adult Reconstructive Surgery  Dept Orthopaedic Surgery, [facility]  [phone] office, [phone] pager  [email or web address]    This note was created using dictation software and may contain errors.  Please contact the creator for any clarifications that are needed.            History of Present Illness:   79 year old female  chief complaint    This patient is seen at the request of Dr. Mao Bob, general surgeon who saw the patient for a 10 cm subcutaneous mass of the left medial thigh of several months duration.  Patient reported that it developed spontaneously and at one point appeared to go away but now has regrown.  She denies pain or discomfort or any history of prior cancers.    Current symptoms:  Problem: Lump on Thigh   Onset and duration: 2 months ago   Awakens from sleep due to sx's:  No  Precipitating Injury:  No    Other joints or sites painful:  Yes  Fever: No  Appetite change or weight loss: No  History of prior or existing cancer: No    Background history:  DX:  Left medial thigh mass    TREATMENTS:  7/23/2019, Right total knee arthroplasty using an Arthrex iBalance knee system, (DEEPTI Muse), [facility]   11/20/2020, Right total hip arthroplasty using a Biomet Taperloc femoral stem and G7 acetabulum, (DEEPTI Muse), [facility]   6/6/2022, Open right hip abductor repair, (DEEPTI Muse), [facility]    date, treatment, (surgeon), hospital             Physical Exam:     EXAMINATION pertinent findings:   PSYCH: Pleasant, healthy-appearing, alert, oriented x3, cooperative. Normal mood and affect.  VITAL SIGNS: not currently breastfeeding..  Reviewed nursing intake notes.   There is no height or weight on file to calculate BMI.  RESP: non labored breathing   ABD: benign, soft, non-tender, no acute peritoneal findings  SKIN: grossly normal   LYMPHATIC: grossly  normal, no adenopathy, no extremity edema  NEURO: grossly normal , no motor deficits  VASCULAR: satisfactory perfusion of all extremities   MUSCULOSKELETAL:   Palpable mass left medial thigh, minimally tender.  No erythema.  No fluctuance.  Negative Tinel sign.             Data:   All laboratory data reviewed  All imaging studies reviewed by me    MRI examination demonstrates soft tissue mass of the subcutaneous tissue of the medial thigh, 10 cm size, overlying the gracilis muscle.          DATA for DOCUMENTATION:         Past Medical History:     Patient Active Problem List   Diagnosis     Hypothyroidism     Tachycardia     Glaucoma     Prediabetes     Hyperlipidemia LDL goal <130     Palpitations     BMI 30.0-30.9,adult     Hypertension goal BP (blood pressure) < 140/90     Adjustment disorder with depressed mood     Osteoporosis     RBBB     First degree AV block     Venous insufficiency     Varicose veins     S/P total knee arthroplasty     S/P total hip arthroplasty     Anterior dislocation of right hip (H)     Fall     SVT (supraventricular tachycardia)     Rupture of tendon of hip abductor     LUQ abdominal pain     Hip pain, right     Closed head injury, initial encounter     Hematoma of scalp, initial encounter     Fall, initial encounter     Concussion with loss of consciousness, initial encounter     Acute midline low back pain without sciatica     Aortic regurgitation     Lymphedema - failure of conservative therapy of daily compression, elevation and exercise for at least 4 weeks     Anemia, iron deficiency     Chronic dislocation of right shoulder     Past Medical History:   Diagnosis Date     Concussion with loss of consciousness, initial encounter 4/26/2023     First degree AV block 08/11/2015     Former smoker      Gastroesophageal reflux disease      GERD (gastroesophageal reflux disease)      Glaucoma      Heart block     2:1     Heart murmur      Hyperlipidemia LDL goal <130 06/14/2013      Hypertension goal BP (blood pressure) < 140/90 12/03/2013     Hypothyroidism      Left atrial dilatation     mild     Mild dilation of ascending aorta - borderline 08/11/2015     Palpitations      Prediabetes 06/14/2013     RBBB 08/11/2015     S/P total knee arthroplasty 07/23/2019     Tachycardia      Urinary frequency      Varicose veins      Venous insufficiency     s/p bilateral great saphenous vein radiofrequency ablation by Dr. Garcia       Also see scanned health assessment forms.       Past Surgical History:     Past Surgical History:   Procedure Laterality Date     ARTHROPLASTY HIP Right 11/20/2020    Procedure: Right total hip arthroplasty using a Biomet Taperloc femoral stem and G7 acetabulum.;  Surgeon: Dragan Muse MD;  Location: [facility] OR     ARTHROPLASTY KNEE Right 07/23/2019    Procedure: Right total knee arthroplasty using an Arthrex ImpactGameslance knee system;  Surgeon: Dragan Muse MD;  Location: [facility] OR     BREAST SURGERY      right breast ductal surgery due to milk production     BREAST SURGERY Right     ductal surgery due to milk production     CATARACT EXTRACTION Right 05/2022     COLONOSCOPY N/A 10/24/2014    no further screening. Procedure: COLONOSCOPY;  Surgeon: Pedro Rudd MD;  Location:  GI     DECOMPRESSION HIP CORE Right 6/6/2022    Procedure: Open right hip abductor repair;  Surgeon: Dragan Muse MD;  Location:  OR     ESOPHAGOSCOPY, GASTROSCOPY, DUODENOSCOPY (EGD), COMBINED N/A 4/9/2024    Procedure: Esophagoscopy, gastroscopy, duodenoscopy (EGD), combined biopsies using cold bx forcep;  Surgeon: Pedro Rudd MD;  Location:  GI     EYE SURGERY       FINGER GANGLION CYST EXCISION Left     ring finger     HYSTERECTOMY       HYSTERECTOMY, PAP NO LONGER INDICATED  2009    total hysterectomy and ovaries. benign     SOFT TISSUE SURGERY      ganglion removed from left wrist and ring finger     SURGICAL HISTORY OF -   age 7    tonsillectomy      SURGICAL HISTORY OF -   2015    left eye cataract     SURGICAL HISTORY OF -   2016    LINQ placed.     TONSILLECTOMY       WRIST GANGLION EXCISION Left             Social History:     Social History     Socioeconomic History     Marital status:      Spouse name: Not on file     Number of children: Not on file     Years of education: Not on file     Highest education level: Not on file   Occupational History     Not on file   Tobacco Use     Smoking status: Former     Current packs/day: 0.00     Average packs/day: 0.5 packs/day for 18.0 years (9.0 ttl pk-yrs)     Types: Cigarettes     Start date:      Quit date:      Years since quittin.1     Passive exposure: Past     Smokeless tobacco: Never   Vaping Use     Vaping status: Never Used   Substance and Sexual Activity     Alcohol use: Not Currently     Drug use: No     Sexual activity: Yes     Partners: Male   Other Topics Concern     Parent/sibling w/ CABG, MI or angioplasty before 65F 55M? Not Asked      Service Not Asked     Blood Transfusions Not Asked     Caffeine Concern No     Comment: 4-5 cups of coffee daily     Occupational Exposure Not Asked     Hobby Hazards Not Asked     Sleep Concern Not Asked     Stress Concern Not Asked     Weight Concern Not Asked     Special Diet No     Comment: no added salt     Back Care Not Asked     Exercise No     Comment: 3 days per week - exercise class      Bike Helmet Not Asked     Seat Belt Not Asked     Self-Exams Not Asked   Social History Narrative     Not on file     Social Drivers of Health     Financial Resource Strain: Low Risk  (2024)    Financial Resource Strain      Within the past 12 months, have you or your family members you live with been unable to get utilities (heat, electricity) when it was really needed?: No   Food Insecurity: Low Risk  (2024)    Food Insecurity      Within the past 12 months, did you worry that your food would run out before you got money to  buy more?: No      Within the past 12 months, did the food you bought just not last and you didn t have money to get more?: No   Transportation Needs: Low Risk  (8/25/2024)    Transportation Needs      Within the past 12 months, has lack of transportation kept you from medical appointments, getting your medicines, non-medical meetings or appointments, work, or from getting things that you need?: No   Physical Activity: Not on file   Stress: Not on file   Social Connections: Not on file   Interpersonal Safety: Low Risk  (9/3/2024)    Interpersonal Safety      Do you feel physically and emotionally safe where you currently live?: Yes      Within the past 12 months, have you been hit, slapped, kicked or otherwise physically hurt by someone?: No      Within the past 12 months, have you been humiliated or emotionally abused in other ways by your partner or ex-partner?: No   Housing Stability: High Risk (8/25/2024)    Housing Stability      Do you have housing? : No      Are you worried about losing your housing?: No            Family History:       Family History   Problem Relation Age of Onset     Ovarian Cancer Mother      Cancer Mother         ovarian     Breast Cancer Mother      Heart Disease Father         rare; not sure what it was     Diabetes Father         old age     Cerebrovascular Disease Brother 68            Medications:     Current Outpatient Medications   Medication Sig Dispense Refill     acetaminophen (TYLENOL) 500 MG tablet Take 1 tablet (500 mg) by mouth every 6 hours as needed for mild pain.       acetaminophen (TYLENOL) 500 MG tablet Take 1-2 tablets (500-1,000 mg) by mouth 4 times daily.       brimonidine (ALPHAGAN) 0.2 % ophthalmic solution        calcium carbonate (OS-LILIA) 500 MG tablet Take 1 tablet by mouth 2 times daily.       carboxymethylcellulose (CARBOXYMETHYLCELLULOSE SODIUM) 0.5 % SOLN ophthalmic solution Place 1 drop into both eyes 4 times daily as needed for dry eyes        dorzolamide-timolol PF (COSOPT PF) 2-0.5 % opthalmic solutionh Place 1 drop into both eyes 2 times daily       furosemide (LASIX) 20 MG tablet Take 1 tablet (20 mg) by mouth 2 times daily. 180 tablet 0     gabapentin (NEURONTIN) 300 MG capsule TAKE 1 CAPSULE(300 MG) BY MOUTH THREE TIMES DAILY. 270 capsule 1     latanoprost (XALATAN) 0.005 % ophthalmic solution Place 1 drop into the right eye At Bedtime        levothyroxine (SYNTHROID/LEVOTHROID) 88 MCG tablet TAKE 1 TABLET(88 MCG) BY MOUTH DAILY 90 tablet 0     metoprolol tartrate (LOPRESSOR) 25 MG tablet TAKE 1 TABLET(25 MG) BY MOUTH TWICE DAILY 180 tablet 1     Multiple Vitamins-Minerals (MULTIVITAMIN ADULT PO) Take 1 tablet by mouth daily       pantoprazole (PROTONIX) 40 MG EC tablet Take 1 tablet (40 mg) by mouth daily. 90 tablet 1     potassium chloride ER (K-TAB/KLOR-CON) 10 MEQ CR tablet TAKE 1 TABLET(10 MEQ) BY MOUTH DAILY 90 tablet 2     pravastatin (PRAVACHOL) 40 MG tablet TAKE 1 TABLET(40 MG) BY MOUTH AT BEDTIME 90 tablet 1     No current facility-administered medications for this visit.              Review of Systems:   A comprehensive 10 point review of systems (constitutional, ENT, cardiac, peripheral vascular, lymphatic, respiratory, GI, , Musculoskeletal, skin, Neurological) was performed and found to be negative except as described in this note.     See intake form completed by patient      Again, thank you for allowing me to participate in the care of your patient.      Sincerely,    Rigo Quesada MD

## 2025-02-27 NOTE — LETTER
2/27/2025      Teri Beltran  [address]  [facility] [address]      Dear Colleague,    Thank you for referring your patient, Teri Beltran, to the [facility] [place]. Please see a copy of my visit note below.        [facility], Orthopaedic Oncology Surgery Consultation  by Rigo Quesada M.D.    Teri Beltran MRN# [id number]    YOB: 1945     Requesting physician: Rachel Wong-*  Rachel Miller MD Gen Surgery  Dwayne Elise MD Radiology                Assessment and Plan:   Assessment:  Left proximal medial thigh soft tissue mass, 10 cm, myxoid appearing, non enhancing so difficult to exclude fluid lesion.  Differential diagnosis includes fluid lesion such as cyst or lymphocele or myxoid type tumors including benign myxomas as well as myxoid type soft tissue sarcomas.    Bilateral lymphadenopathy, inguinal.    L hip and L knee effusion , likely secondary to DJD    Plan:  Advise proceeding with core needle biopsy procedure for diagnostic purposes followed by possible surgical excision, pending pathology evaluation.    Based upon fluid aspirated, likely represents lymphocele or seroma of uncertain etiology.  I advised the patient that this may recur and if so, surgical resection may be required.    Follow-up with our nurse coordinator with respect to reviewing pathology report.  If mass recurs or refills with fluid, then follow-up visit for repeated aspiration and possible surgical excision may be required.      Procedure note:  Under sterile precautions with 1% Xylocaine anesthesia, #11 blade used to make an incision in the left medial thigh.  A 14-gauge biopsy needle was then inserted into the mass.  Fluid evacuated from the mass.  150 cc of serous clear yellow fluid was obtained.  Therefore, no tissue sampling was performed.  Fluid be sent off for cytology, cell count and differential, as well as  niranjan.      MD Laquita Arellano Family Professor  Oncology and Adult Reconstructive Surgery  Dept Orthopaedic Surgery, [facility]  [phone] office, [phone] pager  [email or web address]    This note was created using dictation software and may contain errors.  Please contact the creator for any clarifications that are needed.            History of Present Illness:   79 year old female  chief complaint    This patient is seen at the request of Dr. Mao Bob, general surgeon who saw the patient for a 10 cm subcutaneous mass of the left medial thigh of several months duration.  Patient reported that it developed spontaneously and at one point appeared to go away but now has regrown.  She denies pain or discomfort or any history of prior cancers.    Current symptoms:  Problem: Lump on Thigh   Onset and duration: 2 months ago   Awakens from sleep due to sx's:  No  Precipitating Injury:  No    Other joints or sites painful:  Yes  Fever: No  Appetite change or weight loss: No  History of prior or existing cancer: No    Background history:  DX:  Left medial thigh mass    TREATMENTS:  7/23/2019, Right total knee arthroplasty using an Arthrex iBalance knee system, (DEEPTI Muse), [facility]   11/20/2020, Right total hip arthroplasty using a Biomet Taperloc femoral stem and G7 acetabulum, (DEEPTI Muse), [facility]   6/6/2022, Open right hip abductor repair, (DEEPTI Muse), [facility]    date, treatment, (surgeon), hospital             Physical Exam:     EXAMINATION pertinent findings:   PSYCH: Pleasant, healthy-appearing, alert, oriented x3, cooperative. Normal mood and affect.  VITAL SIGNS: not currently breastfeeding..  Reviewed nursing intake notes.   There is no height or weight on file to calculate BMI.  RESP: non labored breathing   ABD: benign, soft, non-tender, no acute peritoneal findings  SKIN: grossly normal   LYMPHATIC: grossly normal, no adenopathy, no extremity edema  NEURO: grossly normal  , no motor deficits  VASCULAR: satisfactory perfusion of all extremities   MUSCULOSKELETAL:   Palpable mass left medial thigh, minimally tender.  No erythema.  No fluctuance.  Negative Tinel sign.             Data:   All laboratory data reviewed  All imaging studies reviewed by me    MRI examination demonstrates soft tissue mass of the subcutaneous tissue of the medial thigh, 10 cm size, overlying the gracilis muscle.          DATA for DOCUMENTATION:         Past Medical History:     Patient Active Problem List   Diagnosis     Hypothyroidism     Tachycardia     Glaucoma     Prediabetes     Hyperlipidemia LDL goal <130     Palpitations     BMI 30.0-30.9,adult     Hypertension goal BP (blood pressure) < 140/90     Adjustment disorder with depressed mood     Osteoporosis     RBBB     First degree AV block     Venous insufficiency     Varicose veins     S/P total knee arthroplasty     S/P total hip arthroplasty     Anterior dislocation of right hip (H)     Fall     SVT (supraventricular tachycardia)     Rupture of tendon of hip abductor     LUQ abdominal pain     Hip pain, right     Closed head injury, initial encounter     Hematoma of scalp, initial encounter     Fall, initial encounter     Concussion with loss of consciousness, initial encounter     Acute midline low back pain without sciatica     Aortic regurgitation     Lymphedema - failure of conservative therapy of daily compression, elevation and exercise for at least 4 weeks     Anemia, iron deficiency     Chronic dislocation of right shoulder     Past Medical History:   Diagnosis Date     Concussion with loss of consciousness, initial encounter 4/26/2023     First degree AV block 08/11/2015     Former smoker      Gastroesophageal reflux disease      GERD (gastroesophageal reflux disease)      Glaucoma      Heart block     2:1     Heart murmur      Hyperlipidemia LDL goal <130 06/14/2013     Hypertension goal BP (blood pressure) < 140/90 12/03/2013      Hypothyroidism      Left atrial dilatation     mild     Mild dilation of ascending aorta - borderline 08/11/2015     Palpitations      Prediabetes 06/14/2013     RBBB 08/11/2015     S/P total knee arthroplasty 07/23/2019     Tachycardia      Urinary frequency      Varicose veins      Venous insufficiency     s/p bilateral great saphenous vein radiofrequency ablation by Dr. Garcia       Also see scanned health assessment forms.       Past Surgical History:     Past Surgical History:   Procedure Laterality Date     ARTHROPLASTY HIP Right 11/20/2020    Procedure: Right total hip arthroplasty using a Biomet Taperloc femoral stem and G7 acetabulum.;  Surgeon: Dragan Muse MD;  Location: [facility] OR     ARTHROPLASTY KNEE Right 07/23/2019    Procedure: Right total knee arthroplasty using an Arthrex KidzVuzlance knee system;  Surgeon: Dragan Muse MD;  Location: [facility] OR     BREAST SURGERY      right breast ductal surgery due to milk production     BREAST SURGERY Right     ductal surgery due to milk production     CATARACT EXTRACTION Right 05/2022     COLONOSCOPY N/A 10/24/2014    no further screening. Procedure: COLONOSCOPY;  Surgeon: Pedro Rudd MD;  Location:  GI     DECOMPRESSION HIP CORE Right 6/6/2022    Procedure: Open right hip abductor repair;  Surgeon: Dragan Muse MD;  Location:  OR     ESOPHAGOSCOPY, GASTROSCOPY, DUODENOSCOPY (EGD), COMBINED N/A 4/9/2024    Procedure: Esophagoscopy, gastroscopy, duodenoscopy (EGD), combined biopsies using cold bx forcep;  Surgeon: Pedro Rudd MD;  Location:  GI     EYE SURGERY       FINGER GANGLION CYST EXCISION Left     ring finger     HYSTERECTOMY       HYSTERECTOMY, PAP NO LONGER INDICATED  2009    total hysterectomy and ovaries. benign     SOFT TISSUE SURGERY      ganglion removed from left wrist and ring finger     SURGICAL HISTORY OF -   age 7    tonsillectomy     SURGICAL HISTORY OF -   01/2015    left eye cataract      SURGICAL HISTORY OF -   2016    LINQ placed.     TONSILLECTOMY       WRIST GANGLION EXCISION Left             Social History:     Social History     Socioeconomic History     Marital status:      Spouse name: Not on file     Number of children: Not on file     Years of education: Not on file     Highest education level: Not on file   Occupational History     Not on file   Tobacco Use     Smoking status: Former     Current packs/day: 0.00     Average packs/day: 0.5 packs/day for 18.0 years (9.0 ttl pk-yrs)     Types: Cigarettes     Start date:      Quit date:      Years since quittin.1     Passive exposure: Past     Smokeless tobacco: Never   Vaping Use     Vaping status: Never Used   Substance and Sexual Activity     Alcohol use: Not Currently     Drug use: No     Sexual activity: Yes     Partners: Male   Other Topics Concern     Parent/sibling w/ CABG, MI or angioplasty before 65F 55M? Not Asked      Service Not Asked     Blood Transfusions Not Asked     Caffeine Concern No     Comment: 4-5 cups of coffee daily     Occupational Exposure Not Asked     Hobby Hazards Not Asked     Sleep Concern Not Asked     Stress Concern Not Asked     Weight Concern Not Asked     Special Diet No     Comment: no added salt     Back Care Not Asked     Exercise No     Comment: 3 days per week - exercise class      Bike Helmet Not Asked     Seat Belt Not Asked     Self-Exams Not Asked   Social History Narrative     Not on file     Social Drivers of Health     Financial Resource Strain: Low Risk  (2024)    Financial Resource Strain      Within the past 12 months, have you or your family members you live with been unable to get utilities (heat, electricity) when it was really needed?: No   Food Insecurity: Low Risk  (2024)    Food Insecurity      Within the past 12 months, did you worry that your food would run out before you got money to buy more?: No      Within the past 12 months, did the food  you bought just not last and you didn t have money to get more?: No   Transportation Needs: Low Risk  (8/25/2024)    Transportation Needs      Within the past 12 months, has lack of transportation kept you from medical appointments, getting your medicines, non-medical meetings or appointments, work, or from getting things that you need?: No   Physical Activity: Not on file   Stress: Not on file   Social Connections: Not on file   Interpersonal Safety: Low Risk  (9/3/2024)    Interpersonal Safety      Do you feel physically and emotionally safe where you currently live?: Yes      Within the past 12 months, have you been hit, slapped, kicked or otherwise physically hurt by someone?: No      Within the past 12 months, have you been humiliated or emotionally abused in other ways by your partner or ex-partner?: No   Housing Stability: High Risk (8/25/2024)    Housing Stability      Do you have housing? : No      Are you worried about losing your housing?: No            Family History:       Family History   Problem Relation Age of Onset     Ovarian Cancer Mother      Cancer Mother         ovarian     Breast Cancer Mother      Heart Disease Father         rare; not sure what it was     Diabetes Father         old age     Cerebrovascular Disease Brother 68            Medications:     Current Outpatient Medications   Medication Sig Dispense Refill     acetaminophen (TYLENOL) 500 MG tablet Take 1 tablet (500 mg) by mouth every 6 hours as needed for mild pain.       acetaminophen (TYLENOL) 500 MG tablet Take 1-2 tablets (500-1,000 mg) by mouth 4 times daily.       brimonidine (ALPHAGAN) 0.2 % ophthalmic solution        calcium carbonate (OS-LILIA) 500 MG tablet Take 1 tablet by mouth 2 times daily.       carboxymethylcellulose (CARBOXYMETHYLCELLULOSE SODIUM) 0.5 % SOLN ophthalmic solution Place 1 drop into both eyes 4 times daily as needed for dry eyes       dorzolamide-timolol PF (COSOPT PF) 2-0.5 % opthalmic solutionh Place 1  drop into both eyes 2 times daily       furosemide (LASIX) 20 MG tablet Take 1 tablet (20 mg) by mouth 2 times daily. 180 tablet 0     gabapentin (NEURONTIN) 300 MG capsule TAKE 1 CAPSULE(300 MG) BY MOUTH THREE TIMES DAILY. 270 capsule 1     latanoprost (XALATAN) 0.005 % ophthalmic solution Place 1 drop into the right eye At Bedtime        levothyroxine (SYNTHROID/LEVOTHROID) 88 MCG tablet TAKE 1 TABLET(88 MCG) BY MOUTH DAILY 90 tablet 0     metoprolol tartrate (LOPRESSOR) 25 MG tablet TAKE 1 TABLET(25 MG) BY MOUTH TWICE DAILY 180 tablet 1     Multiple Vitamins-Minerals (MULTIVITAMIN ADULT PO) Take 1 tablet by mouth daily       pantoprazole (PROTONIX) 40 MG EC tablet Take 1 tablet (40 mg) by mouth daily. 90 tablet 1     potassium chloride ER (K-TAB/KLOR-CON) 10 MEQ CR tablet TAKE 1 TABLET(10 MEQ) BY MOUTH DAILY 90 tablet 2     pravastatin (PRAVACHOL) 40 MG tablet TAKE 1 TABLET(40 MG) BY MOUTH AT BEDTIME 90 tablet 1     No current facility-administered medications for this visit.              Review of Systems:   A comprehensive 10 point review of systems (constitutional, ENT, cardiac, peripheral vascular, lymphatic, respiratory, GI, , Musculoskeletal, skin, Neurological) was performed and found to be negative except as described in this note.     See intake form completed by patient      Again, thank you for allowing me to participate in the care of your patient.        Sincerely,        Rigo Quesada MD    Electronically signed

## 2025-02-27 NOTE — NURSING NOTE
[facility] [address] [address]  [phone]  Dept: [phone]  ______________________________________________________________________________    Patient: Teri Beltran   : 1945   MRN: [id number]   2025    INVASIVE PROCEDURE SAFETY CHECKLIST    Date: 2025   Procedure:Left thigh mass aspiration  Patient Name: Teri Beltran  MRN: [id number]  YOB: 1945    Action: Complete sections as appropriate. Any discrepancy results in a HARD COPY until resolved.     PRE PROCEDURE:  Patient ID verified with 2 identifiers (name and  or MRN): Yes  Procedure and site verified with patient/designee (when able): Yes  Accurate consent documentation in medical record: Yes  H&P (or appropriate assessment) documented in medical record: NA  H&P must be up to 20 days prior to procedure and updates within 24 hours of procedure as applicable: NA  Relevant diagnostic and radiology test results appropriately labeled and displayed as applicable: Yes  Procedure site(s) marked with provider initials: NA    TIMEOUT:  Time-Out performed immediately prior to starting procedure, including verbal and active participation of all team members addressing the following:Yes  * Correct patient identify  * Confirmed that the correct side and site are marked  * An accurate procedure consent form  * Agreement on the procedure to be done  * Correct patient position  * Relevant images and results are properly labeled and appropriately displayed  * The need to administer antibiotics or fluids for irrigation purposes during the procedure as applicable   * Safety precautions based on patient history or medication use    DURING PROCEDURE: Verification of correct person, site, and procedures any time the responsibility for care of the patient is transferred to another member of the care team.       The following medications were given:          Prior to injection, verified patient identity using patient's name and date of birth.  Due to injection administration, patient instructed to remain in clinic for 15 minutes  afterwards, and to report any adverse reaction to me immediately.    Mass aspiration was preformed   Medication Name: Lidocaine NDC [id number]  Drug Amount Wasted:  Yes: 17 mg/ml   Vial/Syringe: Single dose vial  Expiration Date:  08/27      Scribed by Chika Conde ATC for Dr. Quesada on February 27, 2025 at 12:19p based on the provider's statements to me.     Chika Conde ATC

## 2025-03-03 ENCOUNTER — OFFICE VISIT (OUTPATIENT)
Dept: CARDIOLOGY | Facility: CLINIC | Age: 80
End: 2025-03-03
Payer: MEDICARE

## 2025-03-03 VITALS
HEIGHT: 65 IN | BODY MASS INDEX: 30.24 KG/M2 | SYSTOLIC BLOOD PRESSURE: 134 MMHG | HEART RATE: 80 BPM | WEIGHT: 181.5 LBS | DIASTOLIC BLOOD PRESSURE: 72 MMHG | OXYGEN SATURATION: 99 %

## 2025-03-03 DIAGNOSIS — I50.30 HEART FAILURE WITH PRESERVED EJECTION FRACTION, NYHA CLASS I (H): ICD-10-CM

## 2025-03-03 DIAGNOSIS — R01.1 SYSTOLIC MURMUR: ICD-10-CM

## 2025-03-03 DIAGNOSIS — R01.1 HEART MURMUR: Primary | ICD-10-CM

## 2025-03-03 DIAGNOSIS — I87.2 EDEMA OF LEFT LOWER EXTREMITY DUE TO PERIPHERAL VENOUS INSUFFICIENCY: ICD-10-CM

## 2025-03-03 DIAGNOSIS — R60.9 FLUID RETENTION: ICD-10-CM

## 2025-03-03 LAB
PATH REPORT.COMMENTS IMP SPEC: NORMAL
PATH REPORT.COMMENTS IMP SPEC: NORMAL
PATH REPORT.FINAL DX SPEC: NORMAL
PATH REPORT.GROSS SPEC: NORMAL
PATH REPORT.MICROSCOPIC SPEC OTHER STN: NORMAL
PATH REPORT.RELEVANT HX SPEC: NORMAL

## 2025-03-03 PROCEDURE — 3075F SYST BP GE 130 - 139MM HG: CPT | Performed by: INTERNAL MEDICINE

## 2025-03-03 PROCEDURE — 99213 OFFICE O/P EST LOW 20 MIN: CPT | Performed by: INTERNAL MEDICINE

## 2025-03-03 PROCEDURE — 3078F DIAST BP <80 MM HG: CPT | Performed by: INTERNAL MEDICINE

## 2025-03-03 RX ORDER — FUROSEMIDE 20 MG/1
20 TABLET ORAL 2 TIMES DAILY
Qty: 180 TABLET | Refills: 3 | Status: SHIPPED | OUTPATIENT
Start: 2025-03-03

## 2025-03-03 NOTE — LETTER
3/3/2025    Rachel Miller MD  [address] [facility] [address]    RE: Teri Beltran       Dear Colleague,     I had the pleasure of seeing Teri Beltran in the [facility].      Cardiology Clinic Progress Note:    March 3, 2025   Patient Name: Teri Beltran  Patient MRN: [id number]     Consult indication: paroxysmal SVT    HPI:    I had the opportunity to see patient Teri Beltran in cardiology clinic for a follow up visit. Patient is followed by our colleague Rachel Miller MD with Primary Care.     As you know, patient is a pleasant 79-year-old female with a past medical history significant for first-degree AV block, right bundle branch block, paroxysmal SVT, GERD, hypertension, hyperlipidemia, chronic venous insufficiency (status post venous ablation), aortic sclerosis, who presents for follow-up.    Since last clinic visit patient reports that overall she has been doing well.  Prior cardiac history notable for atypical chest discomfort, assessed with a dobutamine stress echocardiogram in 2022 that was negative for evidence of inducible ischemia.  Since then she has had persistent discomfort described as a acid reflux type sensation, no recent change.  She denies any exertional chest pain/chest pressure.  Overall she feels quite well.  /72 mmHg.  Last echocardiogram 11/2024 personally reviewed, LVEF 55 to 60%, aortic valve sclerosis without stenosis.       Assessment and Plan/Recommendations:    # Aortic valve sclerosis without stenosis  # Conduction abnormalities, known first-degree AV block, right bundle branch block, paroxysmal SVT, stable.  # Hypertension.  Well controlled.   # Chronic lower extremity swelling due to venous insufficiency, history of bilateral great saphenous vein radiofrequency ablation.      -Continue current cardiac regimen of furosemide, potassium supplement, metoprolol, pravastatin  - TTE in 1 year with  follow-up at that time, or sooner as needed    Thank you for allowing our team to participate in the care of Teri Beltran.  Please do not hesitate to call or page me with any questions or concerns.    Sincerely,     Quincy Anglin MD, [facility]  Cardiology  Text Page   March 3, 2025    cc  Silvana Swenson, RUBY CNP  [address] [place] [address]    Voice recognition software utilized.     Total time spent on this encounter today: Greater than 20 minutes, providing care in this encounter including, but not limited to, reviewing prior medical records, laboratory data, imaging studies, diagnostic studies, procedure notes, formulating an assessment and plan, recommendations, discussion and counseling with patient face to face, dictation.    Past Medical History:   The ASCVD Risk score (Cecille DK, et al., 2019) failed to calculate for the following reasons:    The 2019 ASCVD risk score is only valid for ages 40 to 79  Past Medical History:   Diagnosis Date     Concussion with loss of consciousness, initial encounter 4/26/2023     First degree AV block 08/11/2015     Former smoker      Gastroesophageal reflux disease      GERD (gastroesophageal reflux disease)      Glaucoma      Heart block     2:1     Heart murmur      Hyperlipidemia LDL goal <130 06/14/2013     Hypertension goal BP (blood pressure) < 140/90 12/03/2013     Hypothyroidism      Left atrial dilatation     mild     Mild dilation of ascending aorta - borderline 08/11/2015     Palpitations      Prediabetes 06/14/2013     RBBB 08/11/2015     S/P total knee arthroplasty 07/23/2019     Tachycardia      Urinary frequency      Varicose veins      Venous insufficiency     s/p bilateral great saphenous vein radiofrequency ablation by Dr. Garcia        Past Surgical History:   Past Surgical History:   Procedure Laterality Date     ARTHROPLASTY HIP Right 11/20/2020    Procedure: Right total hip arthroplasty using a Biomet Taperloc femoral stem  and G7 acetabulum.;  Surgeon: Dragan Muse MD;  Location: [facility] OR     ARTHROPLASTY KNEE Right 07/23/2019    Procedure: Right total knee arthroplasty using an Arthrex iBalance knee system;  Surgeon: Dragan Muse MD;  Location: [facility] OR     BREAST SURGERY      right breast ductal surgery due to milk production     BREAST SURGERY Right     ductal surgery due to milk production     CATARACT EXTRACTION Right 05/2022     COLONOSCOPY N/A 10/24/2014    no further screening. Procedure: COLONOSCOPY;  Surgeon: Pedro Rudd MD;  Location: [facility] GI     DECOMPRESSION HIP CORE Right 6/6/2022    Procedure: Open right hip abductor repair;  Surgeon: Dragan Muse MD;  Location: [facility] OR     ESOPHAGOSCOPY, GASTROSCOPY, DUODENOSCOPY (EGD), COMBINED N/A 4/9/2024    Procedure: Esophagoscopy, gastroscopy, duodenoscopy (EGD), combined biopsies using cold bx forcep;  Surgeon: Pedro Rudd MD;  Location: [facility]     EYE SURGERY       FINGER GANGLION CYST EXCISION Left     ring finger     HYSTERECTOMY       HYSTERECTOMY, PAP NO LONGER INDICATED  2009    total hysterectomy and ovaries. benign     SOFT TISSUE SURGERY      ganglion removed from left wrist and ring finger     SURGICAL HISTORY OF -   age 7    tonsillectomy     SURGICAL HISTORY OF -   01/2015    left eye cataract     SURGICAL HISTORY OF -   Fall 2016    LINQ placed.     TONSILLECTOMY       WRIST GANGLION EXCISION Left        Medications (outpatient):  Current Outpatient Medications   Medication Sig Dispense Refill     acetaminophen (TYLENOL) 500 MG tablet Take 1 tablet (500 mg) by mouth every 6 hours as needed for mild pain.       acetaminophen (TYLENOL) 500 MG tablet Take 1-2 tablets (500-1,000 mg) by mouth 4 times daily.       brimonidine (ALPHAGAN) 0.2 % ophthalmic solution        calcium carbonate (OS-LILIA) 500 MG tablet Take 1 tablet by mouth 2 times daily.       carboxymethylcellulose (CARBOXYMETHYLCELLULOSE SODIUM) 0.5 % SOLN ophthalmic  "solution Place 1 drop into both eyes 4 times daily as needed for dry eyes       dorzolamide-timolol PF (COSOPT PF) 2-0.5 % opthalmic solutionh Place 1 drop into both eyes 2 times daily       furosemide (LASIX) 20 MG tablet Take 1 tablet (20 mg) by mouth 2 times daily. 180 tablet 3     gabapentin (NEURONTIN) 300 MG capsule TAKE 1 CAPSULE(300 MG) BY MOUTH THREE TIMES DAILY. 270 capsule 1     latanoprost (XALATAN) 0.005 % ophthalmic solution Place 1 drop into the right eye At Bedtime        levothyroxine (SYNTHROID/LEVOTHROID) 88 MCG tablet TAKE 1 TABLET(88 MCG) BY MOUTH DAILY 90 tablet 0     metoprolol tartrate (LOPRESSOR) 25 MG tablet TAKE 1 TABLET(25 MG) BY MOUTH TWICE DAILY 180 tablet 1     Multiple Vitamins-Minerals (MULTIVITAMIN ADULT PO) Take 1 tablet by mouth daily       pantoprazole (PROTONIX) 40 MG EC tablet Take 1 tablet (40 mg) by mouth daily. 90 tablet 1     potassium chloride ER (K-TAB/KLOR-CON) 10 MEQ CR tablet TAKE 1 TABLET(10 MEQ) BY MOUTH DAILY 90 tablet 2     pravastatin (PRAVACHOL) 40 MG tablet TAKE 1 TABLET(40 MG) BY MOUTH AT BEDTIME 90 tablet 1       Allergies:  Allergies   Allergen Reactions     Seasonal Allergies      Simvastatin Other (See Comments)     \"flushed\"       Social History:   History   Drug Use No      History   Smoking Status     Former     Types: Cigarettes   Smokeless Tobacco     Never     Social History    Substance and Sexual Activity      Alcohol use: Not Currently       Family History:  Family History   Problem Relation Age of Onset     Ovarian Cancer Mother      Cancer Mother         ovarian     Breast Cancer Mother      Heart Disease Father         rare; not sure what it was     Diabetes Father         old age     Cerebrovascular Disease Brother 68       Review of Systems:   A complete review of systems was negative except as mentioned in the History of Present Illness.     Objective & Physical Exam:  /72 (BP Location: Right arm, Patient Position: Sitting, Cuff Size: " "Adult Regular)   Pulse 80   Ht 1.651 m (5' 5\")   Wt 82.3 kg (181 lb 8 oz)   LMP  (LMP Unknown)   SpO2 99%   BMI 30.20 kg/m    Wt Readings from Last 2 Encounters:   03/03/25 82.3 kg (181 lb 8 oz)   02/27/25 83 kg (183 lb)     Body mass index is 30.2 kg/m .   Body surface area is 1.94 meters squared.    Constitutional: appears stated age, in no apparent distress, appears to be well nourished  Pulmonary: clear to auscultation bilaterally, no wheezes, no rales, no increased work of breathing  Cardiovascular: regular rate, regular rhythm, 2/6 KATTY at the RUSB, no lower extremity edema    Data reviewed:  Lab Results   Component Value Date    WBC 7.1 02/19/2025    WBC 6.4 12/17/2020    RBC 4.70 02/19/2025    RBC 2.91 (L) 12/17/2020    HGB 13.4 02/19/2025    HGB 9.2 (L) 12/18/2020    HCT 41.1 02/19/2025    HCT 27.5 (L) 12/17/2020    MCV 87 02/19/2025    MCV 95 12/17/2020    MCH 28.5 02/19/2025    MCH 29.6 12/17/2020    MCHC 32.6 02/19/2025    MCHC 31.3 (L) 12/17/2020    RDW 14.9 02/19/2025    RDW 13.1 12/17/2020     02/19/2025     12/17/2020     Sodium   Date Value Ref Range Status   02/19/2025 141 135 - 145 mmol/L Final   12/17/2020 138 133 - 144 mmol/L Final     Potassium   Date Value Ref Range Status   02/19/2025 4.0 3.4 - 5.3 mmol/L Final   10/10/2022 4.8 3.4 - 5.3 mmol/L Final     Comment:     Specimen slightly hemolyzed, potassium may be falsely elevated.   12/18/2020 3.5 3.4 - 5.3 mmol/L Final     Chloride   Date Value Ref Range Status   02/19/2025 102 98 - 107 mmol/L Final   10/10/2022 108 94 - 109 mmol/L Final   12/17/2020 105 94 - 109 mmol/L Final     Carbon Dioxide   Date Value Ref Range Status   12/17/2020 30 20 - 32 mmol/L Final     Carbon Dioxide (CO2)   Date Value Ref Range Status   02/19/2025 25 22 - 29 mmol/L Final   10/10/2022 28 20 - 32 mmol/L Final     Anion Gap   Date Value Ref Range Status   02/19/2025 14 7 - 15 mmol/L Final   10/10/2022 4 3 - 14 mmol/L Final   12/17/2020 3 3 - 14 " mmol/L Final     Glucose   Date Value Ref Range Status   02/19/2025 110 (H) 70 - 99 mg/dL Final   10/10/2022 110 (H) 70 - 99 mg/dL Final   12/17/2020 99 70 - 99 mg/dL Final     Urea Nitrogen   Date Value Ref Range Status   02/19/2025 16.2 8.0 - 23.0 mg/dL Final   10/10/2022 23 7 - 30 mg/dL Final   12/17/2020 16 7 - 30 mg/dL Final     Creatinine   Date Value Ref Range Status   02/19/2025 0.65 0.51 - 0.95 mg/dL Final   12/17/2020 0.57 0.52 - 1.04 mg/dL Final     GFR Estimate   Date Value Ref Range Status   02/19/2025 89 >60 mL/min/1.73m2 Final     Comment:     eGFR calculated using 2021 CKD-EPI equation.   12/17/2020 90 >60 mL/min/[1.73_m2] Final     Comment:     Non  GFR Calc  Starting 12/18/2018, serum creatinine based estimated GFR (eGFR) will be   calculated using the Chronic Kidney Disease Epidemiology Collaboration   (CKD-EPI) equation.       GFR, ESTIMATED POCT   Date Value Ref Range Status   02/12/2025 >60 >60 mL/min/1.73m2 Final     Calcium   Date Value Ref Range Status   02/19/2025 10.2 8.8 - 10.4 mg/dL Final   12/17/2020 8.2 (L) 8.5 - 10.1 mg/dL Final     Bilirubin Total   Date Value Ref Range Status   02/19/2025 0.7 <=1.2 mg/dL Final   08/28/2020 0.5 0.2 - 1.3 mg/dL Final     Alkaline Phosphatase   Date Value Ref Range Status   02/19/2025 113 40 - 150 U/L Final   08/28/2020 79 40 - 150 U/L Final     ALT   Date Value Ref Range Status   02/19/2025 21 0 - 50 U/L Final   08/28/2020 19 0 - 50 U/L Final     AST   Date Value Ref Range Status   02/19/2025 26 0 - 45 U/L Final   08/28/2020 15 0 - 45 U/L Final     Recent Labs   Lab Test 02/19/25  1026 01/12/24  0952   CHOL 183 159   HDL 81 73   LDL 69 61   TRIG 167* 123      Lab Results   Component Value Date    A1C 6.0 01/12/2024    A1C 6.0 01/10/2023    A1C 5.9 05/26/2022    A1C 6.1 08/28/2020    A1C 5.7 10/28/2019    A1C 5.8 09/28/2018    A1C 5.9 09/28/2017    A1C 6.1 11/14/2014        Recent Results (from the past 4320 hours)   Echocardiogram  Complete   Result Value    LVEF  55-60%    [facility]    [id number]  [id number]  [id number]  [id number]^[email or web address]^ALVARO     [facility]  Echocardiography Laboratory  [address] [place] [address]     Name: JAVON MARTÍNEZ  MRN: [id number]  : 1945  Study Date: 2024 12:30 PM  Age: 78 yrs  Gender: Female  Patient Location: [facility]  Reason For Study: Heart murmur  Ordering Physician: ALVARO SONI  Referring Physician: ALVARO SONI  Performed By: Tarik Back RDCS     BSA: 2.0 m2  Height: 68 in  Weight: 183 lb  HR: 79  BP: 146/87 mmHg  ______________________________________________________________________________  Procedure  Complete Echo Adult.  ______________________________________________________________________________  Interpretation Summary     Left ventricular systolic function is normal.  The visual ejection fraction is 55-60%.  No regional wall motion abnormalities noted.  The aortic valve is not well visualized.  Valve is moderately calcified.  No hemodynamically significant valvular aortic stenosis.  The mean AoV pressure gradient is 13mmHg.  Prior echo from  revealed waterman gradient across aortic valve is 15mm.  The study was technically difficult.  ______________________________________________________________________________  Left Ventricle  The left ventricle is normal in size. There is normal left ventricular wall  thickness. Left ventricular systolic function is normal. The visual ejection  fraction is 55-60%. Left ventricular diastolic function is indeterminate. No  regional wall motion abnormalities noted.     Right Ventricle  The right ventricle is normal size. The right ventricular systolic function is  normal.     Atria  Normal left atrial size. Right atrial size is normal.     Mitral Valve  There is trace to mild mitral regurgitation.     Tricuspid Valve  There is trace tricuspid regurgitation. Right ventricular systolic pressure  could not be  approximated due to inadequate tricuspid regurgitation.     Aortic Valve  The aortic valve is not well visualized. Valve is moderately calcified. The  mean AoV pressure gradient is 13mmHg. No hemodynamically significant valvular  aortic stenosis.     Pulmonic Valve  The pulmonic valve is not well visualized.     Vessels  The aortic root is normal size.     Pericardium  Trivial pericardial effusion.     Rhythm  Sinus rhythm was noted.  ______________________________________________________________________________  MMode/2D Measurements & Calculations  IVSd: 0.93 cm     LVIDd: 5.3 cm  LVIDs: 3.5 cm  LVPWd: 0.99 cm  IVC diam: 1.6 cm  FS: 33.8 %  LV mass(C)d: 188.6 grams  LV mass(C)dI: 95.8 grams/m2  Ao root diam: 3.1 cm  asc Aorta Diam: 3.8 cm  LVOT diam: 2.3 cm  LVOT area: 4.3 cm2  Ao root diam index Ht(cm/m): 1.8  Ao root diam index BSA (cm/m2): 1.6  Asc Ao diam index BSA (cm/m2): 1.9  Asc Ao diam index Ht(cm/m): 2.2  LA Volume (BP): 52.9 ml     LA Volume Index (BP): 26.9 ml/m2  RWT: 0.37  TAPSE: 1.6 cm     Time Measurements  Aortic HR: 73.0 BPM     Doppler Measurements & Calculations  MV E max slick: 89.1 cm/sec  MV A max slick: 86.5 cm/sec  MV E/A: 1.0  MV max P.7 mmHg  MV mean P.9 mmHg  MV V2 VTI: 31.2 cm  MVA(VTI): 3.9 cm2  MV dec time: 0.20 sec  Ao V2 max: 244.4 cm/sec  Ao max P.0 mmHg  Ao V2 mean: 166.7 cm/sec  Ao mean P.0 mmHg  Ao V2 VTI: 51.6 cm  ABRIL(I,D): 2.4 cm2  ABRIL(V,D): 2.3 cm2  LV V1 max P.1 mmHg  LV V1 max: 133.1 cm/sec  LV V1 VTI: 28.5 cm  CO(LVOT): 8.9 l/min  CI(LVOT): 4.5 l/min/m2  SV(LVOT): 121.8 ml  SI(LVOT): 61.9 ml/m2  PA acc time: 0.15 sec     AV Slick Ratio (DI): 0.54  ABRIL Index (cm2/m2): 1.2  E/E' avg: 10.1  Lateral E/e': 9.5  Medial E/e': 10.6  RV S Slick: 13.5 cm/sec     ______________________________________________________________________________  Report approved by: Cecile Juan 2024 01:23 PM              Thank you for allowing me to participate in the care of  your patient.      Sincerely,     Quincy Anglin MD     [facility]  cc:   Silvana Swenson, RUBY CNP  [address] [place] [address]

## 2025-03-03 NOTE — PATIENT INSTRUCTIONS
March 3, 2025    Thank you for allowing our Cardiology team to participate in your care.     Please note the following changes to your heart treatment plan:     Medication changes:   - none    Tests to be done:  - TTE in 1 year    Follow up:  - Follow up in 1 year, or sooner as needed      For scheduling, please call [phone]      Please contact our team through Nomi or our Nurse Team Voicemail service [phone] for questions or concerns.     General Clinic [phone]     If you are having a medical emergency, please call 911.     Sincerely,    Quincy Anglin MD, FACC  Cardiology    [facility] - [facility] [place]

## 2025-03-03 NOTE — PROGRESS NOTES
Cardiology Clinic Progress Note:    March 3, 2025   Patient Name: Teri Beltran  Patient MRN: [id number]     Consult indication: paroxysmal SVT    HPI:    I had the opportunity to see patient Teri Beltran in cardiology clinic for a follow up visit. Patient is followed by our colleague Rachel Miller MD with Primary Care.     As you know, patient is a pleasant 79-year-old female with a past medical history significant for first-degree AV block, right bundle branch block, paroxysmal SVT, GERD, hypertension, hyperlipidemia, chronic venous insufficiency (status post venous ablation), aortic sclerosis, who presents for follow-up.    Since last clinic visit patient reports that overall she has been doing well.  Prior cardiac history notable for atypical chest discomfort, assessed with a dobutamine stress echocardiogram in 2022 that was negative for evidence of inducible ischemia.  Since then she has had persistent discomfort described as a acid reflux type sensation, no recent change.  She denies any exertional chest pain/chest pressure.  Overall she feels quite well.  /72 mmHg.  Last echocardiogram 11/2024 personally reviewed, LVEF 55 to 60%, aortic valve sclerosis without stenosis.       Assessment and Plan/Recommendations:    # Aortic valve sclerosis without stenosis  # Conduction abnormalities, known first-degree AV block, right bundle branch block, paroxysmal SVT, stable.  # Hypertension.  Well controlled.   # Chronic lower extremity swelling due to venous insufficiency, history of bilateral great saphenous vein radiofrequency ablation.      -Continue current cardiac regimen of furosemide, potassium supplement, metoprolol, pravastatin  - TTE in 1 year with follow-up at that time, or sooner as needed    Thank you for allowing our team to participate in the care of Teri Beltran.  Please do not hesitate to call or page me with any questions or concerns.    Sincerely,     [place]  MD Derrek, [facility]  Cardiology  Text Page   March 3, 2025    RUBY Merino CNP  [address] [place] [address]    Voice recognition software utilized.     Total time spent on this encounter today: Greater than 20 minutes, providing care in this encounter including, but not limited to, reviewing prior medical records, laboratory data, imaging studies, diagnostic studies, procedure notes, formulating an assessment and plan, recommendations, discussion and counseling with patient face to face, dictation.    Past Medical History:   The ASCVD Risk score (Cecille DK, et al., 2019) failed to calculate for the following reasons:    The 2019 ASCVD risk score is only valid for ages 40 to 79  Past Medical History:   Diagnosis Date    Concussion with loss of consciousness, initial encounter 4/26/2023    First degree AV block 08/11/2015    Former smoker     Gastroesophageal reflux disease     GERD (gastroesophageal reflux disease)     Glaucoma     Heart block     2:1    Heart murmur     Hyperlipidemia LDL goal <130 06/14/2013    Hypertension goal BP (blood pressure) < 140/90 12/03/2013    Hypothyroidism     Left atrial dilatation     mild    Mild dilation of ascending aorta - borderline 08/11/2015    Palpitations     Prediabetes 06/14/2013    RBBB 08/11/2015    S/P total knee arthroplasty 07/23/2019    Tachycardia     Urinary frequency     Varicose veins     Venous insufficiency     s/p bilateral great saphenous vein radiofrequency ablation by Dr. Garcia        Past Surgical History:   Past Surgical History:   Procedure Laterality Date    ARTHROPLASTY HIP Right 11/20/2020    Procedure: Right total hip arthroplasty using a Biomet Taperloc femoral stem and G7 acetabulum.;  Surgeon: Dragan Muse MD;  Location:  OR    ARTHROPLASTY KNEE Right 07/23/2019    Procedure: Right total knee arthroplasty using an Arthrex iBalance knee system;  Surgeon: Dragan Muse MD;  Location:  [facility] OR    BREAST SURGERY      right breast ductal surgery due to milk production    BREAST SURGERY Right     ductal surgery due to milk production    CATARACT EXTRACTION Right 05/2022    COLONOSCOPY N/A 10/24/2014    no further screening. Procedure: COLONOSCOPY;  Surgeon: Pedro Rudd MD;  Location: [facility] GI    DECOMPRESSION HIP CORE Right 6/6/2022    Procedure: Open right hip abductor repair;  Surgeon: Dragan Muse MD;  Location: [facility] OR    ESOPHAGOSCOPY, GASTROSCOPY, DUODENOSCOPY (EGD), COMBINED N/A 4/9/2024    Procedure: Esophagoscopy, gastroscopy, duodenoscopy (EGD), combined biopsies using cold bx forcep;  Surgeon: Pedro Rudd MD;  Location: [facility] GI    EYE SURGERY      FINGER GANGLION CYST EXCISION Left     ring finger    HYSTERECTOMY      HYSTERECTOMY, PAP NO LONGER INDICATED  2009    total hysterectomy and ovaries. benign    SOFT TISSUE SURGERY      ganglion removed from left wrist and ring finger    SURGICAL HISTORY OF -   age 7    tonsillectomy    SURGICAL HISTORY OF -   01/2015    left eye cataract    SURGICAL HISTORY OF -   Fall 2016    LINQ placed.    TONSILLECTOMY      WRIST GANGLION EXCISION Left        Medications (outpatient):  Current Outpatient Medications   Medication Sig Dispense Refill    acetaminophen (TYLENOL) 500 MG tablet Take 1 tablet (500 mg) by mouth every 6 hours as needed for mild pain.      acetaminophen (TYLENOL) 500 MG tablet Take 1-2 tablets (500-1,000 mg) by mouth 4 times daily.      brimonidine (ALPHAGAN) 0.2 % ophthalmic solution       calcium carbonate (OS-LILIA) 500 MG tablet Take 1 tablet by mouth 2 times daily.      carboxymethylcellulose (CARBOXYMETHYLCELLULOSE SODIUM) 0.5 % SOLN ophthalmic solution Place 1 drop into both eyes 4 times daily as needed for dry eyes      dorzolamide-timolol PF (COSOPT PF) 2-0.5 % opthalmic solutionh Place 1 drop into both eyes 2 times daily      furosemide (LASIX) 20 MG tablet Take 1 tablet (20 mg) by mouth 2 times daily. 180  "tablet 3    gabapentin (NEURONTIN) 300 MG capsule TAKE 1 CAPSULE(300 MG) BY MOUTH THREE TIMES DAILY. 270 capsule 1    latanoprost (XALATAN) 0.005 % ophthalmic solution Place 1 drop into the right eye At Bedtime       levothyroxine (SYNTHROID/LEVOTHROID) 88 MCG tablet TAKE 1 TABLET(88 MCG) BY MOUTH DAILY 90 tablet 0    metoprolol tartrate (LOPRESSOR) 25 MG tablet TAKE 1 TABLET(25 MG) BY MOUTH TWICE DAILY 180 tablet 1    Multiple Vitamins-Minerals (MULTIVITAMIN ADULT PO) Take 1 tablet by mouth daily      pantoprazole (PROTONIX) 40 MG EC tablet Take 1 tablet (40 mg) by mouth daily. 90 tablet 1    potassium chloride ER (K-TAB/KLOR-CON) 10 MEQ CR tablet TAKE 1 TABLET(10 MEQ) BY MOUTH DAILY 90 tablet 2    pravastatin (PRAVACHOL) 40 MG tablet TAKE 1 TABLET(40 MG) BY MOUTH AT BEDTIME 90 tablet 1       Allergies:  Allergies   Allergen Reactions    Seasonal Allergies     Simvastatin Other (See Comments)     \"flushed\"       Social History:   History   Drug Use No      History   Smoking Status    Former    Types: Cigarettes   Smokeless Tobacco    Never     Social History    Substance and Sexual Activity      Alcohol use: Not Currently       Family History:  Family History   Problem Relation Age of Onset    Ovarian Cancer Mother     Cancer Mother         ovarian    Breast Cancer Mother     Heart Disease Father         rare; not sure what it was    Diabetes Father         old age    Cerebrovascular Disease Brother 68       Review of Systems:   A complete review of systems was negative except as mentioned in the History of Present Illness.     Objective & Physical Exam:  /72 (BP Location: Right arm, Patient Position: Sitting, Cuff Size: Adult Regular)   Pulse 80   Ht 1.651 m (5' 5\")   Wt 82.3 kg (181 lb 8 oz)   LMP  (LMP Unknown)   SpO2 99%   BMI 30.20 kg/m    Wt Readings from Last 2 Encounters:   03/03/25 82.3 kg (181 lb 8 oz)   02/27/25 83 kg (183 lb)     Body mass index is 30.2 kg/m .   Body surface area is 1.94 " meters squared.    Constitutional: appears stated age, in no apparent distress, appears to be well nourished  Pulmonary: clear to auscultation bilaterally, no wheezes, no rales, no increased work of breathing  Cardiovascular: regular rate, regular rhythm, 2/6 KATTY at the RUSB, no lower extremity edema    Data reviewed:  Lab Results   Component Value Date    WBC 7.1 02/19/2025    WBC 6.4 12/17/2020    RBC 4.70 02/19/2025    RBC 2.91 (L) 12/17/2020    HGB 13.4 02/19/2025    HGB 9.2 (L) 12/18/2020    HCT 41.1 02/19/2025    HCT 27.5 (L) 12/17/2020    MCV 87 02/19/2025    MCV 95 12/17/2020    MCH 28.5 02/19/2025    MCH 29.6 12/17/2020    MCHC 32.6 02/19/2025    MCHC 31.3 (L) 12/17/2020    RDW 14.9 02/19/2025    RDW 13.1 12/17/2020     02/19/2025     12/17/2020     Sodium   Date Value Ref Range Status   02/19/2025 141 135 - 145 mmol/L Final   12/17/2020 138 133 - 144 mmol/L Final     Potassium   Date Value Ref Range Status   02/19/2025 4.0 3.4 - 5.3 mmol/L Final   10/10/2022 4.8 3.4 - 5.3 mmol/L Final     Comment:     Specimen slightly hemolyzed, potassium may be falsely elevated.   12/18/2020 3.5 3.4 - 5.3 mmol/L Final     Chloride   Date Value Ref Range Status   02/19/2025 102 98 - 107 mmol/L Final   10/10/2022 108 94 - 109 mmol/L Final   12/17/2020 105 94 - 109 mmol/L Final     Carbon Dioxide   Date Value Ref Range Status   12/17/2020 30 20 - 32 mmol/L Final     Carbon Dioxide (CO2)   Date Value Ref Range Status   02/19/2025 25 22 - 29 mmol/L Final   10/10/2022 28 20 - 32 mmol/L Final     Anion Gap   Date Value Ref Range Status   02/19/2025 14 7 - 15 mmol/L Final   10/10/2022 4 3 - 14 mmol/L Final   12/17/2020 3 3 - 14 mmol/L Final     Glucose   Date Value Ref Range Status   02/19/2025 110 (H) 70 - 99 mg/dL Final   10/10/2022 110 (H) 70 - 99 mg/dL Final   12/17/2020 99 70 - 99 mg/dL Final     Urea Nitrogen   Date Value Ref Range Status   02/19/2025 16.2 8.0 - 23.0 mg/dL Final   10/10/2022 23 7 - 30 mg/dL  Final   2020 16 7 - 30 mg/dL Final     Creatinine   Date Value Ref Range Status   2025 0.65 0.51 - 0.95 mg/dL Final   2020 0.57 0.52 - 1.04 mg/dL Final     GFR Estimate   Date Value Ref Range Status   2025 89 >60 mL/min/1.73m2 Final     Comment:     eGFR calculated using  CKD-EPI equation.   2020 90 >60 mL/min/[1.73_m2] Final     Comment:     Non  GFR Calc  Starting 2018, serum creatinine based estimated GFR (eGFR) will be   calculated using the Chronic Kidney Disease Epidemiology Collaboration   (CKD-EPI) equation.       GFR, ESTIMATED POCT   Date Value Ref Range Status   2025 >60 >60 mL/min/1.73m2 Final     Calcium   Date Value Ref Range Status   2025 10.2 8.8 - 10.4 mg/dL Final   2020 8.2 (L) 8.5 - 10.1 mg/dL Final     Bilirubin Total   Date Value Ref Range Status   2025 0.7 <=1.2 mg/dL Final   2020 0.5 0.2 - 1.3 mg/dL Final     Alkaline Phosphatase   Date Value Ref Range Status   2025 113 40 - 150 U/L Final   2020 79 40 - 150 U/L Final     ALT   Date Value Ref Range Status   2025 21 0 - 50 U/L Final   2020 19 0 - 50 U/L Final     AST   Date Value Ref Range Status   2025 26 0 - 45 U/L Final   2020 15 0 - 45 U/L Final     Recent Labs   Lab Test 25  1026 24  0952   CHOL 183 159   HDL 81 73   LDL 69 61   TRIG 167* 123      Lab Results   Component Value Date    A1C 6.0 2024    A1C 6.0 01/10/2023    A1C 5.9 2022    A1C 6.1 2020    A1C 5.7 10/28/2019    A1C 5.8 2018    A1C 5.9 2017    A1C 6.1 2014        Recent Results (from the past 4320 hours)   Echocardiogram Complete   Result Value    LVEF  55-60%    Narrative    [id number]  [id number]  [id number]  [id number]^ZACHARIAH^ALVARO     [facility]  Echocardiography Laboratory  [address] [place] [address]     Name: JAVON MARTÍNEZ  MRN: [id number]  : 1945  Study Date:  11/25/2024 12:30 PM  Age: 78 yrs  Gender: Female  Patient [facility]: [facility]  Reason For Study: Heart murmur  Ordering Physician: ALVARO SONI  Referring Physician: ALVARO SONI  Performed By: Tarik Back [facility]     BSA: 2.0 m2  Height: 68 in  Weight: 183 lb  HR: 79  BP: 146/87 mmHg  ______________________________________________________________________________  Procedure  Complete Echo Adult.  ______________________________________________________________________________  Interpretation Summary     Left ventricular systolic function is normal.  The visual ejection fraction is 55-60%.  No regional wall motion abnormalities noted.  The aortic valve is not well visualized.  Valve is moderately calcified.  No hemodynamically significant valvular aortic stenosis.  The mean AoV pressure gradient is 13mmHg.  Prior echo from 2/24 revealed waterman gradient across aortic valve is 15mm.  The study was technically difficult.  ______________________________________________________________________________  Left Ventricle  The left ventricle is normal in size. There is normal left ventricular wall  thickness. Left ventricular systolic function is normal. The visual ejection  fraction is 55-60%. Left ventricular diastolic function is indeterminate. No  regional wall motion abnormalities noted.     Right Ventricle  The right ventricle is normal size. The right ventricular systolic function is  normal.     Atria  Normal left atrial size. Right atrial size is normal.     Mitral Valve  There is trace to mild mitral regurgitation.     Tricuspid Valve  There is trace tricuspid regurgitation. Right ventricular systolic pressure  could not be approximated due to inadequate tricuspid regurgitation.     Aortic Valve  The aortic valve is not well visualized. Valve is moderately calcified. The  mean AoV pressure gradient is 13mmHg. No hemodynamically significant valvular  aortic stenosis.     Pulmonic Valve  The pulmonic valve is not well  visualized.     Vessels  The aortic root is normal size.     Pericardium  Trivial pericardial effusion.     Rhythm  Sinus rhythm was noted.  ______________________________________________________________________________  MMode/2D Measurements & Calculations  IVSd: 0.93 cm     LVIDd: 5.3 cm  LVIDs: 3.5 cm  LVPWd: 0.99 cm  IVC diam: 1.6 cm  FS: 33.8 %  LV mass(C)d: 188.6 grams  LV mass(C)dI: 95.8 grams/m2  Ao root diam: 3.1 cm  asc Aorta Diam: 3.8 cm  LVOT diam: 2.3 cm  LVOT area: 4.3 cm2  Ao root diam index Ht(cm/m): 1.8  Ao root diam index BSA (cm/m2): 1.6  Asc Ao diam index BSA (cm/m2): 1.9  Asc Ao diam index Ht(cm/m): 2.2  LA Volume (BP): 52.9 ml     LA Volume Index (BP): 26.9 ml/m2  RWT: 0.37  TAPSE: 1.6 cm     Time Measurements  Aortic HR: 73.0 BPM     Doppler Measurements & Calculations  MV E max slick: 89.1 cm/sec  MV A max slick: 86.5 cm/sec  MV E/A: 1.0  MV max P.7 mmHg  MV mean P.9 mmHg  MV V2 VTI: 31.2 cm  MVA(VTI): 3.9 cm2  MV dec time: 0.20 sec  Ao V2 max: 244.4 cm/sec  Ao max P.0 mmHg  Ao V2 mean: 166.7 cm/sec  Ao mean P.0 mmHg  Ao V2 VTI: 51.6 cm  ABRIL(I,D): 2.4 cm2  ABRIL(V,D): 2.3 cm2  LV V1 max P.1 mmHg  LV V1 max: 133.1 cm/sec  LV V1 VTI: 28.5 cm  CO(LVOT): 8.9 l/min  CI(LVOT): 4.5 l/min/m2  SV(LVOT): 121.8 ml  SI(LVOT): 61.9 ml/m2  PA acc time: 0.15 sec     AV Slick Ratio (DI): 0.54  ABRIL Index (cm2/m2): 1.2  E/E' avg: 10.1  Lateral E/e': 9.5  Medial E/e': 10.6  RV S Slick: 13.5 cm/sec     ______________________________________________________________________________  Report approved by: Cecile Juan 2024 01:23 PM

## 2025-03-04 LAB — BACTERIA ASPIRATE CULT: NO GROWTH

## 2025-03-05 DIAGNOSIS — R10.13 EPIGASTRIC PAIN: ICD-10-CM

## 2025-03-05 RX ORDER — PANTOPRAZOLE SODIUM 40 MG/1
40 TABLET, DELAYED RELEASE ORAL DAILY
Qty: 90 TABLET | Refills: 1 | Status: SHIPPED | OUTPATIENT
Start: 2025-03-05

## 2025-03-06 LAB
BACTERIA ASPIRATE CULT: NORMAL
BACTERIA ASPIRATE CULT: NORMAL

## 2025-03-13 ENCOUNTER — TELEPHONE (OUTPATIENT)
Dept: ORTHOPEDICS | Facility: CLINIC | Age: 80
End: 2025-03-13
Payer: MEDICARE

## 2025-03-13 LAB — BACTERIA ASPIRATE CULT: NORMAL

## 2025-03-13 NOTE — TELEPHONE ENCOUNTER
- A call was placed to the patient.     - I let patient know culture from aspiration came back negative and pathology results are as follows:     Specimen A                 Interpretation:                  Nondiagnostic                 Adequacy:                 Unsatisfactory for evaluation    - So unfortunately, there was not adequate information to determine pathology.     - Patient stated she feel as though the area is growing back. She already has an appointment scheduled for 4/7.     - Patient verbalized understanding of plan and all questions were answered. Call back number to clinic was given and patient was told to call if they had an further questions.

## 2025-03-20 LAB — BACTERIA ASPIRATE CULT: NORMAL

## 2025-03-25 NOTE — PROGRESS NOTES
[facility], Orthopaedic Oncology Surgery Consultation  by Rigo Quesada M.D.    Teri Beltran MRN# [id number]    YOB: 1945     Requesting physician: Rachel Wong-*  Paul, Rachel Bob MD Gen Surgery  Dwayne Elise MD Radiology       Background history:  DX:  Left medial thigh mass    TREATMENTS:  7/23/2019, Right total knee arthroplasty using an Arthrex iBalance knee system, (DEEPTI Muse), [facility]   11/20/2020, Right total hip arthroplasty using a Biomet Taperloc femoral stem and G7 acetabulum, (DEEPTI Muse), [facility]   6/6/2022, Open right hip abductor repair, (DEEPTI Muse), [facility]    2/27/25, In clinic left proximal thigh mass aspiration, 150 ml of serous clear yellow fluid was obtained (Dipak).  Cytology non diagnostic.  Cultures negative          Mrs. Beltran returns to see me after undergoing an aspiration of her right thigh.  The cytology evaluation was nondiagnostic and there is no evidence of any infectious process.  The mass has recurred within the medial thigh on the left side.    Therefore, I have recommended that we proceed with an open biopsy procedure to try and sample the wall of the fluid-filled cystic type mass.  If there is no evidence of any malignancy at the time of surgery, then drainage and debridement of the area could be undertaken.  If there is evidence of a malignancy present, then we will defer any additional surgery and instead await permanent pathology results before deciding on whether or not it return to the operating room on later date is necessary.    I explained the rationale for proceeding with open biopsy, possible drainage and debridement.  Patient and her daughter for good understanding and we will plan on proceeding in the near future.    MD Laquita Arellano Family Professor  Oncology and Adult Reconstructive Surgery  Dept Orthopaedic Surgery, [facility]  [phone] office, [phone]  pager  [email or web address]          Total combined visit time and work time before and after clinic visit, independent of trainee, on encounter date = 30 min

## 2025-03-27 LAB — BACTERIA ASPIRATE CULT: NO GROWTH

## 2025-03-28 NOTE — TELEPHONE ENCOUNTER
Patient call to check on status of refill request, she is almost out of medication and pharmacy has not heard back from the provider. Informed patient refill request was not sent to the clinic until yesterday afternoon after provider had left for the day, but prescription was approved this morning by Dr. Wong.     Allie Bear RN  [facility]    oral

## 2025-04-02 ENCOUNTER — TELEPHONE (OUTPATIENT)
Dept: ORTHOPEDICS | Facility: CLINIC | Age: 80
End: 2025-04-02
Payer: MEDICARE

## 2025-04-02 NOTE — TELEPHONE ENCOUNTER
Called waitlist patient and offered an appointment with Dr. Quesada on this date 4/3/25 & time 12:00 or 3:40pm and  at this location [facility].     Please note there is no guarantee this appointment will be available as it is first come first serve.    Patient declined and will stick with 4/7/25 appointment at 2:40pm. Appointment date and time were verified with patient.

## 2025-04-03 DIAGNOSIS — R00.2 PALPITATIONS: ICD-10-CM

## 2025-04-03 DIAGNOSIS — I10 HYPERTENSION GOAL BP (BLOOD PRESSURE) < 140/90: ICD-10-CM

## 2025-04-03 RX ORDER — METOPROLOL TARTRATE 25 MG/1
25 TABLET, FILM COATED ORAL
Qty: 180 TABLET | Refills: 0 | Status: SHIPPED | OUTPATIENT
Start: 2025-04-03

## 2025-04-07 ENCOUNTER — OFFICE VISIT (OUTPATIENT)
Dept: ORTHOPEDICS | Facility: CLINIC | Age: 80
End: 2025-04-07
Payer: MEDICARE

## 2025-04-07 DIAGNOSIS — M79.89 SOFT TISSUE MASS: Primary | ICD-10-CM

## 2025-04-07 PROCEDURE — 99214 OFFICE O/P EST MOD 30 MIN: CPT | Performed by: ORTHOPAEDIC SURGERY

## 2025-04-07 NOTE — LETTER
4/7/2025      Teri Beltran  [address]  [facility] [place] [facility] [address]      Dear Colleague,    Thank you for referring your patient, Teri Beltran, to the [facility] [place]. Please see a copy of my visit note below.        [facility], Orthopaedic Oncology Surgery Consultation  by Rigo Quesada M.D.    Teri Beltran MRN# [id number]    YOB: 1945     Requesting physician: Rachel Wong-Zabrina Miller, Rachel Bob MD Gen Surgery  Dwayne Elise MD Radiology       Background history:  DX:  Left medial thigh mass    TREATMENTS:  7/23/2019, Right total knee arthroplasty using an Arthrex iBalance knee system, (DEEPTI Micha), [facility]   11/20/2020, Right total hip arthroplasty using a Biomet Taperloc femoral stem and G7 acetabulum, (DEEPTI Muse), [facility]   6/6/2022, Open right hip abductor repair, (DEEPTI Muse), [facility]    2/27/25, In clinic left proximal thigh mass aspiration, 150 ml of serous clear yellow fluid was obtained (Dipak).  Cytology non diagnostic.  Cultures negative          Mrs. Beltran returns to see me after undergoing an aspiration of her right thigh.  The cytology evaluation was nondiagnostic and there is no evidence of any infectious process.  The mass has recurred within the medial thigh on the left side.    Therefore, I have recommended that we proceed with an open biopsy procedure to try and sample the wall of the fluid-filled cystic type mass.  If there is no evidence of any malignancy at the time of surgery, then drainage and debridement of the area could be undertaken.  If there is evidence of a malignancy present, then we will defer any additional surgery and instead await permanent pathology results before deciding on whether or not it return to the operating room on later date is necessary.    I explained the rationale for proceeding with open biopsy, possible drainage and debridement.  Patient and  her daughter for good understanding and we will plan on proceeding in the near future.    MD Laquita Arellano Family Professor  Oncology and Adult Reconstructive Surgery  Dept Orthopaedic Surgery, [facility]  [phone] office, [phone] pager  [email or web address]          Total combined visit time and work time before and after clinic visit, independent of trainee, on encounter date = 30 min      Again, thank you for allowing me to participate in the care of your patient.        Sincerely,        Rigo Quesada MD    Electronically signed

## 2025-04-07 NOTE — NURSING NOTE
Teaching Flowsheet     Visit Type: In Clinic    Time Start: 2:35pm  Time End: 2:50pm  Total Time Spent: 15 minutes    Surgeon: Dr. Quesada  Location of Surgery (known or anticipated): [facility]  Type of Anesthesia: General    Pertinent Medical History: reviewed on the up to date problem list in the chart  Were medical conditions reviewed and appropriate for location? Yes  BMI: Obesity Grade I BMI 30-34.9    Relevant Diagnosis: Soft tissue mass      Teaching Topic: BIOPSY, SOFT TISSUE, left adductor thigh compartment, IRRIGATION AND DEBRIDEMENT, LOWER EXTREMITY, left adductor thigh     Person(s) involved in teaching:   Patient  : No.     Caregiver//  Name: Lay Weaver  Phone Number: [phone]   Relationship: Daughter  Consent to communicate on file Yes       Motivation Level:  Asks Questions: Yes  Eager to Learn: Yes  Cooperative: Yes  Receptive (willing/able to accept information): Yes  Any cultural factors/Alevism beliefs that may influence understanding or compliance? No     Patient demonstrates understanding of the following:  Reason for the appointment, diagnosis and treatment plan: Yes  Knowledge of proper use of medications and conditions for which they are ordered (with special attention to potential side effects or drug interactions): Yes  Which situations necessitate calling provider and whom to contact: Yes     Teaching Concerns Addressed: all concerns addressed     Proper use and care of medical equip, care aids, etc.: Yes  Nutritional needs and diet plan: Yes  Pain management techniques: Yes  Wound Care: Yes  How and/when to access community resources: Yes  Need for pre-op with in 30 days: YES     Does patient have difficulty getting a ride to appointments (post-ops, PT/OT): No     Instructional Materials Used/Given:  two bottles of chlorhexidine soap and a surgery packet given to patient in clinic.     - Important contact info/ phone numbers: emphasizing clinic  number and after hours number  - Map/ location of surgery  - Showering instructions  - Stop light tool    Additionally the following was discussed with patient:  - Caregiver// listed above will be driving the patient to surgery and staying with them for 24 hours.       -Next step: schedule a surgery date and schedule a pre-op with PAC or PCP.

## 2025-04-09 ENCOUNTER — TELEPHONE (OUTPATIENT)
Dept: ORTHOPEDICS | Facility: CLINIC | Age: 80
End: 2025-04-09
Payer: MEDICARE

## 2025-04-09 NOTE — TELEPHONE ENCOUNTER
Patient is scheduled for surgery with Dr. Quesada    Spoke with: patient and patient's daughterDina    Date of Surgery: 4/29/25 - patient prefers that they are not scheduled as the first case due to transportation    [facility]: [facility]    Post op: 5/12/25    H&P: 4/16/25 virtual PAC    Additional imaging/appointments: N/A    Surgery packet: received     Additional comments:     Patient stated that their daughter does not want to drive to [place]. Patient's daughter requested a virtual PAC appointment.     Patient and patient's daughter were informed that video capability is required to complete a virtual visit. Dina requested that the link is sent via text to [phone], declined Yorn activation.    [facility]  Perioperative   [phone]

## 2025-04-10 NOTE — TELEPHONE ENCOUNTER
Patient left voicemail asking for  to give her a c/b to go over a questions she has regarding scheduling.     Patient asked to be called at her home number of [phone].    No further questions or concerns expressed.

## 2025-04-10 NOTE — TELEPHONE ENCOUNTER
FUTURE VISIT INFORMATION      SURGERY INFORMATION:  Date: 25  Location: [facility] OR  Surgeon:  Rigo Quesada MD   Anesthesia Type:  Choice  Procedure: BIOPSY, SOFT TISSUE, left adductor thigh compartment, IRRIGATION AND DEBRIDEMENT, LOWER EXTREMITY, left adductor thigh  Consult: 25    RECORDS REQUESTED FROM:       Primary Care Provider: Rachel Miller MD - [facility]     Pertinent Medical History:Hypothyroidism; Hyperlipidemia; Tachycardia; Hypertension; RBBB; First degree AV block; Venous insufficiency; SVT; Aortic regurgitation; Iron deficiency anemia; Left atrial dilation; Heat murmur     Most recent EKG+ Tracin25    Most recent ECHO: 3/3/25    Most recent Cardiac Stress Test: 22    Most recent PFT's: 19

## 2025-04-14 DIAGNOSIS — E03.9 HYPOTHYROIDISM, UNSPECIFIED TYPE: ICD-10-CM

## 2025-04-14 RX ORDER — LEVOTHYROXINE SODIUM 88 UG/1
88 TABLET ORAL DAILY
Qty: 90 TABLET | Refills: 2 | Status: SHIPPED | OUTPATIENT
Start: 2025-04-14

## 2025-04-16 ENCOUNTER — VIRTUAL VISIT (OUTPATIENT)
Dept: SURGERY | Facility: CLINIC | Age: 80
End: 2025-04-16
Payer: MEDICARE

## 2025-04-16 ENCOUNTER — PRE VISIT (OUTPATIENT)
Dept: SURGERY | Facility: CLINIC | Age: 80
End: 2025-04-16

## 2025-04-16 ENCOUNTER — ANESTHESIA EVENT (OUTPATIENT)
Dept: SURGERY | Facility: CLINIC | Age: 80
End: 2025-04-16
Payer: MEDICARE

## 2025-04-16 DIAGNOSIS — Z01.818 PRE-OP EVALUATION: Primary | ICD-10-CM

## 2025-04-16 DIAGNOSIS — M79.89 SOFT TISSUE MASS: ICD-10-CM

## 2025-04-16 ASSESSMENT — PAIN SCALES - GENERAL: PAINLEVEL_OUTOF10: MILD PAIN (2)

## 2025-04-16 ASSESSMENT — ENCOUNTER SYMPTOMS: DYSRHYTHMIAS: 1

## 2025-04-16 NOTE — PROGRESS NOTES
Kirit is a 79 year old who is being evaluated via a billable video visit.    How would you like to obtain your AVS? Mail a copy  If the video visit is dropped, the invitation should be resent by: Text to cell phone:       Subjective   Kirit is a 79 year old, presenting for the following health issues:  Pre-Op Exam      HPI          Physical Exam

## 2025-04-16 NOTE — H&P
Pre-Operative H & P     CC:  Preoperative exam to assess for increased cardiopulmonary risk while undergoing surgery and anesthesia.    Date of Encounter: 4/16/2025  Primary Care Physician:  Rachel Miller     Reason for visit:   Encounter Diagnoses   Name Primary?    Pre-op evaluation Yes    Soft tissue mass        HPI  Teri Beltran is a 79 year old female who presents for pre-operative H & P in preparation for  Procedure Information       Case: [id number] Date/Time: 04/29/25 1100    Procedures:       BIOPSY, SOFT TISSUE, Left Adductor Thigh Compartment (Left: Shoulder)      IRRIGATION AND DEBRIDEMENT, LOWER EXTREMITY, Left Adductor Thigh (Left: Leg)    Anesthesia type: Choice    Diagnosis: Soft tissue mass [M79.89]    Pre-op diagnosis: Soft tissue mass [M79.89]    [facility]: [facility]    Providers: Rigo Quesada MD            Patient is being evaluated for comorbid conditions of RBBB, 1st degree AV block, HTN, venous insufficiency, SVT, aortic regurgitation, HLD, GERD, hypothyroidism, prediabetes, osteoporosis, anemia, depression, obesity.    She was recently evaluated by Dr. Quesada for a proximal left thigh mass. An aspiration was completed in the clinic in February but the mass recurred and cytology was nondiagnostic. She completed a follow up visit with Dr. Quesada on 4/7/25 and the above surgery was recommended for further evaluation.     History is obtained from the patient and chart review    Hx of abnormal bleeding or anti-platelet use: Denies    Menstrual history: No LMP recorded (lmp unknown). Patient has had a hysterectomy.     Past Medical History  Past Medical History:   Diagnosis Date    Concussion with loss of consciousness, initial encounter 4/26/2023    First degree AV block 08/11/2015    Former smoker     Gastroesophageal reflux disease     GERD (gastroesophageal reflux disease)     Glaucoma     Heart block     2:1    Heart murmur     Hyperlipidemia LDL goal <130  06/14/2013    Hypertension goal BP (blood pressure) < 140/90 12/03/2013    Hypothyroidism     Left atrial dilatation     mild    Mild dilation of ascending aorta - borderline 08/11/2015    Palpitations     Prediabetes 06/14/2013    RBBB 08/11/2015    S/P total knee arthroplasty 07/23/2019    Tachycardia     Urinary frequency     Varicose veins     Venous insufficiency     s/p bilateral great saphenous vein radiofrequency ablation by Dr. Garcia       Past Surgical History  Past Surgical History:   Procedure Laterality Date    ARTHROPLASTY HIP Right 11/20/2020    Procedure: Right total hip arthroplasty using a Biomet Taperloc femoral stem and G7 acetabulum.;  Surgeon: Dragan Muse MD;  Location: [facility]    ARTHROPLASTY KNEE Right 07/23/2019    Procedure: Right total knee arthroplasty using an Arthrex Tabtorlance knee system;  Surgeon: Dragan Muse MD;  Location: [facility] OR    BREAST SURGERY      right breast ductal surgery due to milk production    BREAST SURGERY Right     ductal surgery due to milk production    CATARACT EXTRACTION Right 05/2022    COLONOSCOPY N/A 10/24/2014    no further screening. Procedure: COLONOSCOPY;  Surgeon: Pedro Rudd MD;  Location:  GI    DECOMPRESSION HIP CORE Right 6/6/2022    Procedure: Open right hip abductor repair;  Surgeon: Dragan Muse MD;  Location:  OR    ESOPHAGOSCOPY, GASTROSCOPY, DUODENOSCOPY (EGD), COMBINED N/A 4/9/2024    Procedure: Esophagoscopy, gastroscopy, duodenoscopy (EGD), combined biopsies using cold bx forcep;  Surgeon: Pedro Rudd MD;  Location:  GI    EYE SURGERY      FINGER GANGLION CYST EXCISION Left     ring finger    HYSTERECTOMY      HYSTERECTOMY, PAP NO LONGER INDICATED  2009    total hysterectomy and ovaries. benign    SOFT TISSUE SURGERY      ganglion removed from left wrist and ring finger    SURGICAL HISTORY OF -   age 7    tonsillectomy    SURGICAL HISTORY OF -   01/2015    left eye cataract     SURGICAL HISTORY OF -   Fall 2016    LINQ placed.    TONSILLECTOMY      WRIST GANGLION EXCISION Left        Prior to Admission Medications  Current Outpatient Medications   Medication Sig Dispense Refill    acetaminophen (TYLENOL) 500 MG tablet Take 1 tablet (500 mg) by mouth every 6 hours as needed for mild pain.      calcium carbonate (OS-LILIA) 500 MG tablet Take 1 tablet by mouth 2 times daily.      dorzolamide-timolol PF (COSOPT PF) 2-0.5 % opthalmic solutionh Place 1 drop into both eyes 2 times daily      furosemide (LASIX) 20 MG tablet Take 1 tablet (20 mg) by mouth 2 times daily. (Patient taking differently: Take 20 mg by mouth every morning. Every other day bid) 180 tablet 3    gabapentin (NEURONTIN) 300 MG capsule TAKE 1 CAPSULE(300 MG) BY MOUTH THREE TIMES DAILY. 270 capsule 1    latanoprost (XALATAN) 0.005 % ophthalmic solution Place 1 drop into the right eye At Bedtime       levothyroxine (SYNTHROID/LEVOTHROID) 88 MCG tablet Take 1 tablet (88 mcg) by mouth daily. (Patient taking differently: Take 88 mcg by mouth every morning (before breakfast).) 90 tablet 2    metoprolol tartrate (LOPRESSOR) 25 MG tablet TAKE 1 TABLET(25 MG) BY MOUTH TWICE DAILY 180 tablet 0    Multiple Vitamins-Minerals (MULTIVITAMIN ADULT PO) Take 1 tablet by mouth every morning.      pantoprazole (PROTONIX) 40 MG EC tablet TAKE 1 TABLET(40 MG) BY MOUTH DAILY (Patient taking differently: Take 40 mg by mouth every morning.) 90 tablet 1    potassium chloride ER (K-TAB/KLOR-CON) 10 MEQ CR tablet TAKE 1 TABLET(10 MEQ) BY MOUTH DAILY (Patient taking differently: Take 10 mEq by mouth every morning. TAKE 1 TABLET(10 MEQ) BY MOUTH DAILY) 90 tablet 2    pravastatin (PRAVACHOL) 40 MG tablet TAKE 1 TABLET(40 MG) BY MOUTH AT BEDTIME 90 tablet 1    brimonidine (ALPHAGAN) 0.2 % ophthalmic solution  (Patient not taking: Reported on 4/16/2025)      carboxymethylcellulose (CARBOXYMETHYLCELLULOSE SODIUM) 0.5 % SOLN ophthalmic solution Place 1 drop into  "both eyes 4 times daily as needed for dry eyes (Patient not taking: Reported on 2025)         Allergies  Allergies   Allergen Reactions    Seasonal Allergies     Simvastatin Other (See Comments)     \"flushed\"       Social History  Social History     Socioeconomic History    Marital status:      Spouse name: Not on file    Number of children: Not on file    Years of education: Not on file    Highest education level: Not on file   Occupational History    Not on file   Tobacco Use    Smoking status: Former     Current packs/day: 0.00     Average packs/day: 0.5 packs/day for 18.0 years (9.0 ttl pk-yrs)     Types: Cigarettes     Start date:      Quit date:      Years since quittin.3     Passive exposure: Past    Smokeless tobacco: Never   Vaping Use    Vaping status: Never Used   Substance and Sexual Activity    Alcohol use: Not Currently    Drug use: No    Sexual activity: Yes     Partners: Male   Other Topics Concern    Parent/sibling w/ CABG, MI or angioplasty before 65F 55M? Not Asked     Service Not Asked    Blood Transfusions Not Asked    Caffeine Concern No     Comment: 4-5 cups of coffee daily    Occupational Exposure Not Asked    Hobby Hazards Not Asked    Sleep Concern Not Asked    Stress Concern Not Asked    Weight Concern Not Asked    Special Diet No     Comment: no added salt    Back Care Not Asked    Exercise No     Comment: 3 days per week - exercise class     Bike Helmet Not Asked    Seat Belt Not Asked    Self-Exams Not Asked   Social History Narrative    Not on file     Social Drivers of Health     Financial Resource Strain: Low Risk  (2024)    Financial Resource Strain     Within the past 12 months, have you or your family members you live with been unable to get utilities (heat, electricity) when it was really needed?: No   Food Insecurity: Low Risk  (2024)    Food Insecurity     Within the past 12 months, did you worry that your food would run out before you " got money to buy more?: No     Within the past 12 months, did the food you bought just not last and you didn t have money to get more?: No   Transportation Needs: Low Risk  (8/25/2024)    Transportation Needs     Within the past 12 months, has lack of transportation kept you from medical appointments, getting your medicines, non-medical meetings or appointments, work, or from getting things that you need?: No   Physical Activity: Not on file   Stress: Not on file   Social Connections: Not on file   Interpersonal Safety: Low Risk  (9/3/2024)    Interpersonal Safety     Do you feel physically and emotionally safe where you currently live?: Yes     Within the past 12 months, have you been hit, slapped, kicked or otherwise physically hurt by someone?: No     Within the past 12 months, have you been humiliated or emotionally abused in other ways by your partner or ex-partner?: No   Housing Stability: High Risk (8/25/2024)    Housing Stability     Do you have housing? : No     Are you worried about losing your housing?: No       Family History  Family History   Problem Relation Age of Onset    Ovarian Cancer Mother     Cancer Mother         ovarian    Breast Cancer Mother     Heart Disease Father         rare; not sure what it was    Diabetes Father         old age    Cerebrovascular Disease Brother 68    Anesthesia Reaction No family hx of     Deep Vein Thrombosis (DVT) No family hx of        Review of Systems  The complete review of systems is negative other than noted in the HPI or here.   Anesthesia Evaluation   Pt has had prior anesthetic.     No history of anesthetic complications       ROS/MED HX  ENT/Pulmonary:  - neg pulmonary ROS     Neurologic:  - neg neurologic ROS     Cardiovascular: Comment: Venous insufficiency s/p bilateral vein ablation    (+) Dyslipidemia hypertension- -   -  - -                        dysrhythmias (RBBB, SVT), 1st Deg Heart Block and Other,  valvular problems/murmurs  aortic  regurgitation.    Previous cardiac testing   Echo: Date: 11/25/24 Results:  See H&P  Stress Test:  Date: 7/26/22 Results:  Interpretation Summary  No hemodynamically significant valvular abnormalities on 2D or color flow  imaging. This test indicates a low probability of severe occlusive coronary  artery disease.    ECG Reviewed:  Date: 2/11/25 Results:  Sinus Rhythm -First degree A-V block   Meliza = 246  -Incomplete right bundle branch block.  -Left atrial enlargement.  Voltage criteria for LVH (R(I)+S(III) exceeds 2.50 mV) -Voltage criteria w/o ST/T abnormality may be normal.  Cath:  Date: Results:      METS/Exercise Tolerance: 4 - Raking leaves, gardening Comment: Able to complete shopping and able to ascend stairs in her home without exertional symptoms   Hematologic:     (+)      anemia,       (-) history of blood clots and history of blood transfusion   Musculoskeletal: Comment: S/p Right TKA 2019  S/p Right TRACIE 2020  S/p Open hip abductor repair 2022    Left proximal thigh mass      GI/Hepatic:     (+) GERD, Asymptomatic on medication,               (-) liver disease   Renal/Genitourinary:  - neg Renal ROS     Endo: Comment: Prediabetes    Osteoporosis    (+)          thyroid problem, hypothyroidism,    Obesity,       Psychiatric/Substance Use:     (+) psychiatric history depression       Infectious Disease:  - neg infectious disease ROS     Malignancy:  - neg malignancy ROS     Other:            Virtual visit -  No vitals were obtained    Physical Exam  Constitutional: Awake, alert, cooperative, no apparent distress, and appears stated age.  Eyes: Pupils equal  HENT: Normocephalic  Respiratory: non labored breathing   Neurologic: Awake, alert, oriented to name, place and time.   Neuropsychiatric: Calm, cooperative. Normal affect.      Prior Labs/Diagnostic Studies   All labs and imaging personally reviewed     EKG/ stress test - if available please see in ROS above   Echo result w/o MOPS: Interpretation  Summary Left ventricular systolic function is normal.The visual ejection fraction is 55-60%.No regional wall motion abnormalities noted.The aortic valve is not well visualized.Valve is moderately calcified.No hemodynamically significant valvular aortic stenosis.The mean AoV pressure gradient is 13mmHg.Prior echo from 2/24 revealed waterman gradient across aortic valve is 15mm.The study was technically difficult.           No data to display                  The patient's records and results personally reviewed by this provider.     Outside records reviewed from: Care Everywhere      Assessment  Teri Beltran is a 79 year old female seen as a PAC referral for risk assessment and optimization for anesthesia.    Plan/Recommendations  Pt will be optimized for the proposed procedure.  See below for details on the assessment, risk, and preoperative recommendations    NEUROLOGY  - No history of TIA, CVA or seizure    -Post Op delirium risk factors:  Age    ENT  - No current airway concerns.  Will need to be reassessed day of surgery.  Mallampati: Unable to assess  TM: Unable to assess    CARDIAC  - No history of CAD and Afib  - 1st degree AV block, RBBB, paroxysmal SVT, HTN, HLD, chronic venous insufficiency (s/p bilateral venous ablation), and aortic sclerosis/regurgitation  She follows with cardiology, last visit 3/3/25 where she was noted to be doing well. She was maintained on GDT and encouraged to follow up in 1 year.  Today she reports that she is feeling well, swelling in her legs is stable and managed with compression stockings. She denies other cardiac symptoms today.   - Most recent cardiac testing as above    - METS (Metabolic Equivalents)  Patient performs 4 or more METS exercise without symptoms             Total Score: 0      RCRI-Very low risk: Class 1 0.4% complication rate             Total Score: 0        PULMONARY    OMAR Low Risk             Total Score: 2    OMAR: Hypertension    OMAR: Over 50 ys old     "  - Denies asthma or inhaler use  - Tobacco History    History   Smoking Status    Former    Types: Cigarettes   Smokeless Tobacco    Never       GI  - GERD  Controlled on medications: Proton Pump Inhibitor  PONV Medium Risk  Total Score: 2           1 AN PONV: Pt is Female    1 AN PONV: Patient is not a current smoker        /RENAL  - Baseline Creatinine  0.65    ENDOCRINE    - BMI: Estimated body mass index is 30.2 kg/m  as calculated from the following:    Height as of 3/3/25: 1.651 m (5' 5\").    Weight as of 3/3/25: 82.3 kg (181 lb 8 oz).  Obesity (BMI >30)  - Thyroid disorder  Continue home replacement while hospitalized.  - Prediabetes  A1c 6.0 in January 2024  HEME  VTE Low Risk 0.26%             Total Score: 1    VTE: Greater than 59 yrs old      - No history of abnormal bleeding or antiplatelet use.  - CBC 2/19/25 WNL    MSK  Patient is NOT Frail             Total Score: 0      - S/p Right TKA 2019  - S/p Right TRACIE 2020  - S/p Open hip abductor repair 2022  - Recommend consideration for careful positioning to limit patient discomfort.    - Left proximal thigh mass  Above surgery planned for further management    PSYCH  - Depression  Not currently on medications    Different anesthesia methods/types have been discussed with the patient, but they are aware that the final plan will be decided by the assigned anesthesia provider on the date of service.      The patient is optimized for their procedure. AVS with information on surgery time/arrival time, meds and NPO status given by nursing staff. No further diagnostic testing indicated.    Please refer to the physical examination documented by the anesthesiologist in the anesthesia record on the day of surgery.    Video-Visit Details    Type of service:  Video Visit    Provider received verbal consent for a Video Visit from the patient? Yes   Video Start Time: 12:10 PM  Video End Time: 12:23 PM    Originating Location (pt. Location): Home    Distant Location " (provider location):  Off-site  Mode of Communication:  Video Conference via TuTanda  On the day of service:     Prep time: 10 minutes  Visit time: 13 minutes  Documentation time: 3 minutes  ------------------------------------------  Total time: 26 minutes      Connie Wood PA-C  Preoperative Assessment Center  [facility]  [facility]  Phone: [phone]  Fax: [phone]

## 2025-04-16 NOTE — PATIENT INSTRUCTIONS
Preparing for Your Surgery      Name:  Teri Beltran   MRN:  [id number]   :  1945   Today's Date:  2025     The [place] [employer] I-[address] Construction Project                                Timeline 2025 -2025    This project will affect travel to the [facility] and [facility], as well as the [facility].      Please check the [facility] [address] project website for the most up to date information and give yourself additional time to reach your destination.        Arriving for surgery:  Surgery date:  2025  Arrival time:  8:30 am    Please come to:        [facility] [address]   [address] [place]  [address]     The Green Ramp for patients and visitors is beneath the [facility]. The parking facility entrance is at the intersection of [address] [place] [address] and [address]. Patients and visitors who self-park will receive the reduced hospital parking rate (no ticket validation needed).     [employer] parking, located at the [facility] main entrance on [address] [place] [address], is available Monday - Friday from 7 am to 3:30 pm.     Discounted parking pass options can be purchased from  attendants during business hours.     -Check in at the security desk in the [facility] ([facility]   Lobby. They will direct you to the correct elevators.   -Proceed to the 3rd floor, Adult Surgery Waiting Lounge. [phone]     If you need directions, a wheelchair or escort please stop at the Information Desk in the lobby.  Inform the information person you are here for surgery; a wheelchair and escort to [address] will be provided.   An escort to the Adult Surgery Waiting Lounge will be provided. .    What can I eat or drink?  -  You may eat and drink normally up to 8 hours prior to arrival time. (Until  Midnight)  -  You may have clear liquids until 2 hours prior to arrival time. (Until 6:30 am)    Examples of clear liquids:  Water  Clear broth  Juices (apple, white grape, white cranberry  and cider) without pulp  Noncarbonated, powder based beverages  (lemonade and Felipe-Aid)  Sodas (Sprite, 7-Up, ginger ale and seltzer)  Coffee or tea (without milk or cream)  Gatorade    -  No Alcohol or cannabis products for at least 24 hours before surgery.     Which medicines can I take?    Hold Aspirin for 7 days before surgery.   Hold Multivitamins for 7 days before surgery.  Hold Supplements for 7 days before surgery.  Hold Ibuprofen (Advil, Motrin) for 3 day(s) before surgery--unless otherwise directed by surgeon.  Hold Naproxen (Aleve) for 4 days before surgery.    -  DO NOT take these medications the day of surgery:  Oscal  Furosemide  Potassium       -  PLEASE TAKE these medications the day of surgery:  Tylenol (acetaminophen) if needed  Eye drops per your routine  Gabapentin  Levothyroxine   Metoprolol   Pantoprazole     How do I prepare myself?  - Please take 2 showers (one the night prior to surgery and one the morning of surgery) using Scrubcare or Hibiclens soap.    Use this soap only from the neck to your toes. Avoid genital area      Leave the soap on your skin for one minute--then rinse thoroughly.      You may use your own shampoo and conditioner. No other hair products.   - Please remove all jewelry and body piercings.  - No lotions, deodorants or fragrance.  - No makeup or fingernail polish.   - Bring your ID and insurance card.    -For patients being admitted to the [facility]  Family members are to take the patient belongings with them and place them in the lockers provided in the Family Lounge.  Please limit the items you bring to 1 bag as the lockers are small.      -If you use a CPAP machine, please bring the CPAP machine, tubing, and mask to hospital.    -If you have a Deep Brain Stimulator,  Spinal Cord Stimulator, or any Neuro Stimulator device---you must bring the remote control to the hospital.      ALL PATIENTS GOING HOME THE SAME DAY OF SURGERY ARE REQUIRED TO HAVE A RESPONSIBLE ADULT TO DRIVE AND BE IN ATTENDANCE WITH THEM FOR 24 HOURS FOLLOWING SURGERY.    Covid testing policy as of 12/06/2022  Your surgeon will notify and schedule you for a COVID test if one is needed before surgery--please direct any questions or COVID symptoms to your surgeon      Questions or Concerns:    - For any questions regarding the day of surgery or your hospital stay, please contact the Pre Admission Nursing Office at [phone].       - If you have health changes between today and your surgery, please call your surgeon.       - For questions after surgery, please call your surgeons office.           Current Visitor Guidelines    2 adult visitors for adult patients in the pre op area    If additional visitors come (beyond a patient care attendant or a group home caregiver), the additional visitors will be asked to wait in the main lobby of the hospital    Visiting hours: 8 a.m. to 8:30 p.m.    Patients confirmed or suspected to have symptoms of COVID 19 or flu:     No visitors allowed for adult patients.   Children (under age 18) can have 1 named visitor.     People who are sick or showing symptoms of COVID 19 or flu:    Are not allowed to visit patients--we can only make exceptions in special situations.       Please follow these guidelines for your visit:          Please maintain social distance          Masking is optional--however at times you may be asked to wear a mask for the safety of yourself and others     Clean your hands with alcohol hand . Do this when you arrive at and leave the building and patient room,    And again after you touch your mask or anything in the room.     Go directly to and from the room you are visiting.     Stay in the patient s room during your visit. Limit going to other  places in the hospital as much as possible     Leave bags and jackets at home or in the car.     For everyone s health, please don t come and go during your visit. That includes for smoking   during your visit.

## 2025-04-21 ENCOUNTER — TRANSFERRED RECORDS (OUTPATIENT)
Dept: HEALTH INFORMATION MANAGEMENT | Facility: CLINIC | Age: 80
End: 2025-04-21
Payer: MEDICARE

## 2025-04-23 ENCOUNTER — PREP FOR PROCEDURE (OUTPATIENT)
Dept: OTHER | Facility: CLINIC | Age: 80
End: 2025-04-23
Payer: MEDICARE

## 2025-04-23 NOTE — PROGRESS NOTES
SURGERY PLAN (PRE-OP PLAN)    Patient Position (indicated by x):  X  Supine     Supine with torso rolled up on a bump     Floppy lateral on torso length bean bag     Lateral decubitus, bean bag, full length     Lateral decubitus, Wixson hip positioner     Safety paddle side supports x 2 clamped to side rail     Lithotomy, both legs in yellow padded leg yusuf     Lithotomy, single leg in yellow padded leg yusuf     Prone on blanket rolls/round gel pad     Prone on Javi (arched) frame on Lonnie table     Single thigh in orange arthroscopy clamp     Beach chair semi recumbent     Spider limb positioner     Arm out on radiolucent arm table   X  Split drapes     Revision TRACIE drape with plastic side bags for leg     Extremity drape     Shoulder pack drape     Laparotomy drape     Hess catheter   X  Prep full extremity in, impervious and polyester stockinette     General Equipment Requests (indicated by x):      C-Arm with C-Armor drape     C-Arm (video capable, [employer]00 model)     O-Arm with Stealth imaging     Fracture Table   X  Ortho large soft tissue tray     SurgiGraphic 6000 (diving board) fluoro table     Cell saver     Quesada Biopsy trephine set w/ K-wire & pituitary rongeurs     Small pituitary rongeur     Dipak's angled curettes, narrow shaft     Bone graft, kapner gouges     Midas Burton Medtronic radha, electric motor     Phenol 5%     Ruth BMAC stem cell     Vancomycin 1 gram powder     Zometa 4 mg vials     Depo Medrol steroid     Blunt Pelvic Retractor (.55, Blunt Hohmann with  slight bend)     (1) Portable hand held radiation detector machine for sentinel node biopsy and (2) Lymphazurin     Lambotte Osteotomes       Specimens and cultures (indicated by x):   X  Tissue cultures, aerobic and anaerobic without gram stain   X  Frozen section     pathology specimens - fresh   X  pathology specimens - formalin     Plan:    Excision of left adductor comparment fluid collection and I&D, will be sending  frozen specimen of pseudocapsule.    Misael Rose MD  Adult Joint Reconstruction Fellow  Dept Orthopaedic Surgery, [facility]

## 2025-04-28 ASSESSMENT — ENCOUNTER SYMPTOMS: DYSRHYTHMIAS: 1

## 2025-04-28 NOTE — ANESTHESIA PREPROCEDURE EVALUATION
Anesthesia Pre-Procedure Evaluation    Patient: Teri Beltran   MRN: [id number] : 1945        Procedure : Procedure(s):  BIOPSY, SOFT TISSUE, Left Adductor Thigh Compartment  IRRIGATION AND DEBRIDEMENT, LOWER EXTREMITY, Left Adductor Thigh          Past Medical History:   Diagnosis Date    Concussion with loss of consciousness, initial encounter 2023    First degree AV block 2015    Former smoker     Gastroesophageal reflux disease     GERD (gastroesophageal reflux disease)     Glaucoma     Heart block     2:1    Heart murmur     Hyperlipidemia LDL goal <130 2013    Hypertension goal BP (blood pressure) < 140/90 2013    Hypothyroidism     Left atrial dilatation     mild    Mild dilation of ascending aorta - borderline 2015    Palpitations     Prediabetes 2013    RBBB 2015    S/P total knee arthroplasty 2019    Tachycardia     Urinary frequency     Varicose veins     Venous insufficiency     s/p bilateral great saphenous vein radiofrequency ablation by Dr. Garcia      Past Surgical History:   Procedure Laterality Date    ARTHROPLASTY HIP Right 2020    Procedure: Right total hip arthroplasty using a Biomet Taperloc femoral stem and G7 acetabulum.;  Surgeon: Dragan Muse MD;  Location:  OR    ARTHROPLASTY KNEE Right 2019    Procedure: Right total knee arthroplasty using an Arthrex iBalance knee system;  Surgeon: Dragan Muse MD;  Location:  OR    BREAST SURGERY      right breast ductal surgery due to milk production    BREAST SURGERY Right     ductal surgery due to milk production    CATARACT EXTRACTION Right 2022    COLONOSCOPY N/A 10/24/2014    no further screening. Procedure: COLONOSCOPY;  Surgeon: Pedro Rudd MD;  Location:  GI    DECOMPRESSION HIP CORE Right 2022    Procedure: Open right hip abductor repair;  Surgeon: Dragan Muse MD;  Location:  OR    ESOPHAGOSCOPY, GASTROSCOPY,  "DUODENOSCOPY (EGD), COMBINED N/A 2024    Procedure: Esophagoscopy, gastroscopy, duodenoscopy (EGD), combined biopsies using cold bx forcep;  Surgeon: Pedro Rudd MD;  Location:  GI    EYE SURGERY      FINGER GANGLION CYST EXCISION Left     ring finger    HYSTERECTOMY      HYSTERECTOMY, PAP NO LONGER INDICATED      total hysterectomy and ovaries. benign    SOFT TISSUE SURGERY      ganglion removed from left wrist and ring finger    SURGICAL HISTORY OF -   age 7    tonsillectomy    SURGICAL HISTORY OF -   2015    left eye cataract    SURGICAL HISTORY OF -   2016    LINQ placed.    TONSILLECTOMY      WRIST GANGLION EXCISION Left       Allergies   Allergen Reactions    Seasonal Allergies     Simvastatin Other (See Comments)     \"flushed\"      Social History     Tobacco Use    Smoking status: Former     Current packs/day: 0.00     Average packs/day: 0.5 packs/day for 18.0 years (9.0 ttl pk-yrs)     Types: Cigarettes     Start date:      Quit date:      Years since quittin.3     Passive exposure: Past    Smokeless tobacco: Never   Substance Use Topics    Alcohol use: Not Currently      Wt Readings from Last 1 Encounters:   25 82.3 kg (181 lb 8 oz)        Anesthesia Evaluation   Pt has had prior anesthetic.     No history of anesthetic complications       ROS/MED HX  ENT/Pulmonary:  - neg pulmonary ROS     Neurologic:  - neg neurologic ROS     Cardiovascular: Comment: Venous insufficiency s/p bilateral vein ablation    (+) Dyslipidemia hypertension- -   -  - -                        dysrhythmias (RBBB, SVT), 1st Deg Heart Block and Other,  valvular problems/murmurs  aortic regurgitation.    Previous cardiac testing   Echo: Date: 24 Results:  See H&P  Stress Test:  Date: 22 Results:  Interpretation Summary  No hemodynamically significant valvular abnormalities on 2D or color flow  imaging. This test indicates a low probability of severe occlusive coronary  artery " disease.    ECG Reviewed:  Date: 2/11/25 Results:  Sinus Rhythm -First degree A-V block   Meliza = 246  -Incomplete right bundle branch block.  -Left atrial enlargement.  Voltage criteria for LVH (R(I)+S(III) exceeds 2.50 mV) -Voltage criteria w/o ST/T abnormality may be normal.  Cath:  Date: Results:      METS/Exercise Tolerance: 4 - Raking leaves, gardening Comment: Able to complete shopping and able to ascend stairs in her home without exertional symptoms   Hematologic:     (+)      anemia,       (-) history of blood clots and history of blood transfusion   Musculoskeletal: Comment: S/p Right TKA 2019  S/p Right TRACIE 2020  S/p Open hip abductor repair 2022    Left proximal thigh mass      GI/Hepatic:     (+) GERD, Asymptomatic on medication,               (-) liver disease   Renal/Genitourinary:  - neg Renal ROS     Endo: Comment: Prediabetes    Osteoporosis    (+)          thyroid problem, hypothyroidism,    Obesity,       Psychiatric/Substance Use:     (+) psychiatric history depression       Infectious Disease:  - neg infectious disease ROS     Malignancy:  - neg malignancy ROS     Other:            Physical Exam    Airway        Mallampati: II   TM distance: > 3 FB   Neck ROM: full   Mouth opening: > 3 cm    Respiratory Devices and Support         Dental       (+) Modest Abnormalities - crowns, retainers, 1 or 2 missing teeth      Cardiovascular   cardiovascular exam normal          Pulmonary   pulmonary exam normal            Other findings: Placement Date: 11/20/20; Placement Time: 0728 (created via procedure documentation); Induction Type: Intravenous; Ease of Intubation: Easy; Technique: Direct laryngoscopy; ETT Type: Single, Oral; Tube Size: 7 mm; DL Blade Size: Li 2; Grade View: 1; Adjucts: Stylet; Placement Person: CRNA; Attempts: 1; Depth: 22 cm    OUTSIDE LABS:  CBC:   Lab Results   Component Value Date    WBC 7.1 02/19/2025    WBC 7.4 08/25/2024    HGB 13.4 02/19/2025    HGB 10.9 (L) 08/25/2024     "HCT 41.1 02/19/2025    HCT 36.2 08/25/2024     02/19/2025     08/25/2024     BMP:   Lab Results   Component Value Date     02/19/2025     09/27/2024    POTASSIUM 4.0 02/19/2025    POTASSIUM 4.0 09/27/2024    CHLORIDE 102 02/19/2025    CHLORIDE 102 09/27/2024    CO2 25 02/19/2025    CO2 22 09/27/2024    BUN 16.2 02/19/2025    BUN 24.8 (H) 09/27/2024    CR 0.65 02/19/2025    CR 0.8 02/12/2025     (H) 02/19/2025     (H) 09/27/2024     COAGS: No results found for: \"PTT\", \"INR\", \"FIBR\"  POC: No results found for: \"BGM\", \"HCG\", \"HCGS\"  HEPATIC:   Lab Results   Component Value Date    ALBUMIN 5.0 02/19/2025    PROTTOTAL 8.3 02/19/2025    ALT 21 02/19/2025    AST 26 02/19/2025    ALKPHOS 113 02/19/2025    BILITOTAL 0.7 02/19/2025     OTHER:   Lab Results   Component Value Date    A1C 6.0 (H) 01/12/2024    LILIA 10.2 02/19/2025    MAG 2.1 04/25/2023    LIPASE 16 10/10/2022    AMYLASE 22 (L) 10/10/2022    TSH 2.45 02/19/2025    T4 1.23 04/12/2017    CRP 2.9 06/17/2015    CRP 2.9 06/17/2015    SED 16 06/17/2015       Anesthesia Plan    ASA Status:  2    NPO Status:  NPO Appropriate    Anesthesia Type: General.     - Airway: LMA   Induction: Intravenous, Propofol.   Maintenance: Balanced.        Consents    Anesthesia Plan(s) and associated risks, benefits, and realistic alternatives discussed. Questions answered and patient/representative(s) expressed understanding.     - Discussed: Risks, Benefits and Alternatives for BOTH SEDATION and the PROCEDURE were discussed     - Discussed with:  Patient, Other (See Comment) (daughter)      - Extended Intubation/Ventilatory Support Discussed: No.      - Patient is DNR/DNI Status: No     Use of blood products discussed: No .     Postoperative Care    Pain management: IV analgesics, Oral pain medications, Multi-modal analgesia.   PONV prophylaxis: Dexamethasone or Solumedrol, Ondansetron (or other 5HT-3), Background Propofol Infusion "     Comments:               Shawn Richardson MD    Clinically Significant Risk Factors Present on Admission                   # Hypertension: Noted on problem list               # Financial/Environmental Concerns:

## 2025-04-29 ENCOUNTER — ANESTHESIA (OUTPATIENT)
Dept: SURGERY | Facility: CLINIC | Age: 80
End: 2025-04-29
Payer: MEDICARE

## 2025-04-29 ENCOUNTER — HOSPITAL ENCOUNTER (OUTPATIENT)
Facility: CLINIC | Age: 80
Discharge: HOME | End: 2025-04-29
Attending: ORTHOPAEDIC SURGERY | Admitting: ORTHOPAEDIC SURGERY
Payer: MEDICARE

## 2025-04-29 VITALS
OXYGEN SATURATION: 98 % | SYSTOLIC BLOOD PRESSURE: 130 MMHG | BODY MASS INDEX: 25.36 KG/M2 | RESPIRATION RATE: 16 BRPM | WEIGHT: 167.33 LBS | HEART RATE: 72 BPM | HEIGHT: 68 IN | DIASTOLIC BLOOD PRESSURE: 86 MMHG | TEMPERATURE: 97.9 F

## 2025-04-29 DIAGNOSIS — R22.42 MASS OF LEFT THIGH: Primary | ICD-10-CM

## 2025-04-29 LAB
PATH REPORT.COMMENTS IMP SPEC: NORMAL
PATH REPORT.INTRAOP OBS SPEC DOC: NORMAL

## 2025-04-29 PROCEDURE — 999N000141 HC STATISTIC PRE-PROCEDURE NURSING ASSESSMENT: Performed by: ORTHOPAEDIC SURGERY

## 2025-04-29 PROCEDURE — 710N000010 HC RECOVERY PHASE 1, LEVEL 2, PER MIN: Performed by: ORTHOPAEDIC SURGERY

## 2025-04-29 PROCEDURE — 360N000075 HC SURGERY LEVEL 2, PER MIN: Performed by: ORTHOPAEDIC SURGERY

## 2025-04-29 PROCEDURE — 250N000011 HC RX IP 250 OP 636

## 2025-04-29 PROCEDURE — 250N000009 HC RX 250

## 2025-04-29 PROCEDURE — 88331 PATH CONSLTJ SURG 1 BLK 1SPC: CPT | Mod: TC | Performed by: ORTHOPAEDIC SURGERY

## 2025-04-29 PROCEDURE — 250N000013 HC RX MED GY IP 250 OP 250 PS 637: Performed by: STUDENT IN AN ORGANIZED HEALTH CARE EDUCATION/TRAINING PROGRAM

## 2025-04-29 PROCEDURE — 370N000017 HC ANESTHESIA TECHNICAL FEE, PER MIN: Performed by: ORTHOPAEDIC SURGERY

## 2025-04-29 PROCEDURE — 88342 IMHCHEM/IMCYTCHM 1ST ANTB: CPT | Mod: 26 | Performed by: PATHOLOGY

## 2025-04-29 PROCEDURE — 258N000003 HC RX IP 258 OP 636: Performed by: STUDENT IN AN ORGANIZED HEALTH CARE EDUCATION/TRAINING PROGRAM

## 2025-04-29 PROCEDURE — 27327 EXC THIGH/KNEE LES SC < 3 CM: CPT | Mod: LT | Performed by: ORTHOPAEDIC SURGERY

## 2025-04-29 PROCEDURE — 250N000025 HC SEVOFLURANE, PER MIN: Performed by: ORTHOPAEDIC SURGERY

## 2025-04-29 PROCEDURE — 88331 PATH CONSLTJ SURG 1 BLK 1SPC: CPT | Mod: 26 | Performed by: PATHOLOGY

## 2025-04-29 PROCEDURE — 88341 IMHCHEM/IMCYTCHM EA ADD ANTB: CPT | Mod: 26 | Performed by: PATHOLOGY

## 2025-04-29 PROCEDURE — 87070 CULTURE OTHR SPECIMN AEROBIC: CPT | Performed by: ORTHOPAEDIC SURGERY

## 2025-04-29 PROCEDURE — 272N000001 HC OR GENERAL SUPPLY STERILE: Performed by: ORTHOPAEDIC SURGERY

## 2025-04-29 PROCEDURE — 87176 TISSUE HOMOGENIZATION CULTR: CPT | Performed by: ORTHOPAEDIC SURGERY

## 2025-04-29 PROCEDURE — 710N000012 HC RECOVERY PHASE 2, PER MINUTE: Performed by: ORTHOPAEDIC SURGERY

## 2025-04-29 PROCEDURE — 88307 TISSUE EXAM BY PATHOLOGIST: CPT | Mod: 26 | Performed by: PATHOLOGY

## 2025-04-29 RX ORDER — OXYCODONE HYDROCHLORIDE 5 MG/1
5 TABLET ORAL
Status: DISCONTINUED | OUTPATIENT
Start: 2025-04-29 | End: 2025-04-29 | Stop reason: HOSPADM

## 2025-04-29 RX ORDER — FENTANYL CITRATE 50 UG/ML
25 INJECTION, SOLUTION INTRAMUSCULAR; INTRAVENOUS
Status: DISCONTINUED | OUTPATIENT
Start: 2025-04-29 | End: 2025-04-29 | Stop reason: HOSPADM

## 2025-04-29 RX ORDER — ONDANSETRON 2 MG/ML
INJECTION INTRAMUSCULAR; INTRAVENOUS PRN
Status: DISCONTINUED | OUTPATIENT
Start: 2025-04-29 | End: 2025-04-29

## 2025-04-29 RX ORDER — ONDANSETRON 4 MG/1
4 TABLET, ORALLY DISINTEGRATING ORAL EVERY 8 HOURS PRN
Qty: 4 TABLET | Refills: 0 | Status: SHIPPED | OUTPATIENT
Start: 2025-04-29

## 2025-04-29 RX ORDER — SODIUM CHLORIDE, SODIUM LACTATE, POTASSIUM CHLORIDE, CALCIUM CHLORIDE 600; 310; 30; 20 MG/100ML; MG/100ML; MG/100ML; MG/100ML
INJECTION, SOLUTION INTRAVENOUS CONTINUOUS
Status: DISCONTINUED | OUTPATIENT
Start: 2025-04-29 | End: 2025-04-29 | Stop reason: HOSPADM

## 2025-04-29 RX ORDER — FENTANYL CITRATE 50 UG/ML
50 INJECTION, SOLUTION INTRAMUSCULAR; INTRAVENOUS EVERY 5 MIN PRN
Status: DISCONTINUED | OUTPATIENT
Start: 2025-04-29 | End: 2025-04-29 | Stop reason: HOSPADM

## 2025-04-29 RX ORDER — HYDROMORPHONE HYDROCHLORIDE 1 MG/ML
0.2 INJECTION, SOLUTION INTRAMUSCULAR; INTRAVENOUS; SUBCUTANEOUS EVERY 5 MIN PRN
Status: DISCONTINUED | OUTPATIENT
Start: 2025-04-29 | End: 2025-04-29 | Stop reason: HOSPADM

## 2025-04-29 RX ORDER — DIPHENHYDRAMINE HCL 12.5MG/5ML
12.5 LIQUID (ML) ORAL EVERY 6 HOURS PRN
Status: DISCONTINUED | OUTPATIENT
Start: 2025-04-29 | End: 2025-04-29 | Stop reason: HOSPADM

## 2025-04-29 RX ORDER — OXYCODONE HYDROCHLORIDE 5 MG/1
5-10 TABLET ORAL EVERY 4 HOURS PRN
Qty: 10 TABLET | Refills: 0 | Status: CANCELLED | OUTPATIENT
Start: 2025-04-29

## 2025-04-29 RX ORDER — CEPHALEXIN 500 MG/1
500 CAPSULE ORAL 2 TIMES DAILY
Qty: 28 CAPSULE | Refills: 0 | Status: SHIPPED | OUTPATIENT
Start: 2025-04-29

## 2025-04-29 RX ORDER — NALOXONE HYDROCHLORIDE 0.4 MG/ML
0.1 INJECTION, SOLUTION INTRAMUSCULAR; INTRAVENOUS; SUBCUTANEOUS
Status: DISCONTINUED | OUTPATIENT
Start: 2025-04-29 | End: 2025-04-29 | Stop reason: HOSPADM

## 2025-04-29 RX ORDER — DEXAMETHASONE SODIUM PHOSPHATE 4 MG/ML
4 INJECTION, SOLUTION INTRA-ARTICULAR; INTRALESIONAL; INTRAMUSCULAR; INTRAVENOUS; SOFT TISSUE
Status: DISCONTINUED | OUTPATIENT
Start: 2025-04-29 | End: 2025-04-29 | Stop reason: HOSPADM

## 2025-04-29 RX ORDER — HYDROMORPHONE HYDROCHLORIDE 1 MG/ML
0.4 INJECTION, SOLUTION INTRAMUSCULAR; INTRAVENOUS; SUBCUTANEOUS EVERY 5 MIN PRN
Status: DISCONTINUED | OUTPATIENT
Start: 2025-04-29 | End: 2025-04-29 | Stop reason: HOSPADM

## 2025-04-29 RX ORDER — ACETAMINOPHEN 325 MG/1
650 TABLET ORAL
Status: DISCONTINUED | OUTPATIENT
Start: 2025-04-29 | End: 2025-04-29 | Stop reason: HOSPADM

## 2025-04-29 RX ORDER — LIDOCAINE HYDROCHLORIDE 20 MG/ML
INJECTION, SOLUTION INFILTRATION; PERINEURAL PRN
Status: DISCONTINUED | OUTPATIENT
Start: 2025-04-29 | End: 2025-04-29

## 2025-04-29 RX ORDER — ONDANSETRON 2 MG/ML
4 INJECTION INTRAMUSCULAR; INTRAVENOUS EVERY 30 MIN PRN
Status: DISCONTINUED | OUTPATIENT
Start: 2025-04-29 | End: 2025-04-29 | Stop reason: HOSPADM

## 2025-04-29 RX ORDER — ALBUTEROL SULFATE 0.83 MG/ML
2.5 SOLUTION RESPIRATORY (INHALATION) EVERY 4 HOURS PRN
Status: DISCONTINUED | OUTPATIENT
Start: 2025-04-29 | End: 2025-04-29 | Stop reason: HOSPADM

## 2025-04-29 RX ORDER — NAPROXEN 250 MG/1
250 TABLET ORAL 2 TIMES DAILY WITH MEALS
Qty: 30 TABLET | Refills: 1 | Status: SHIPPED | OUTPATIENT
Start: 2025-04-29

## 2025-04-29 RX ORDER — MEPERIDINE HYDROCHLORIDE 25 MG/ML
12.5 INJECTION INTRAMUSCULAR; INTRAVENOUS; SUBCUTANEOUS EVERY 5 MIN PRN
Status: DISCONTINUED | OUTPATIENT
Start: 2025-04-29 | End: 2025-04-29 | Stop reason: HOSPADM

## 2025-04-29 RX ORDER — OXYCODONE HYDROCHLORIDE 10 MG/1
10 TABLET ORAL
Status: DISCONTINUED | OUTPATIENT
Start: 2025-04-29 | End: 2025-04-29 | Stop reason: HOSPADM

## 2025-04-29 RX ORDER — LABETALOL HYDROCHLORIDE 5 MG/ML
10 INJECTION, SOLUTION INTRAVENOUS
Status: DISCONTINUED | OUTPATIENT
Start: 2025-04-29 | End: 2025-04-29 | Stop reason: HOSPADM

## 2025-04-29 RX ORDER — FENTANYL CITRATE 50 UG/ML
INJECTION, SOLUTION INTRAMUSCULAR; INTRAVENOUS PRN
Status: DISCONTINUED | OUTPATIENT
Start: 2025-04-29 | End: 2025-04-29

## 2025-04-29 RX ORDER — PHENYLEPHRINE HYDROCHLORIDE 10 MG/ML
INJECTION INTRAVENOUS PRN
Status: DISCONTINUED | OUTPATIENT
Start: 2025-04-29 | End: 2025-04-29

## 2025-04-29 RX ORDER — OXYCODONE HYDROCHLORIDE 5 MG/1
5-10 TABLET ORAL EVERY 4 HOURS PRN
Qty: 6 TABLET | Refills: 0 | Status: CANCELLED | OUTPATIENT
Start: 2025-04-29

## 2025-04-29 RX ORDER — AMOXICILLIN 250 MG
1-2 CAPSULE ORAL 2 TIMES DAILY
Qty: 30 TABLET | Refills: 0 | Status: SHIPPED | OUTPATIENT
Start: 2025-04-29

## 2025-04-29 RX ORDER — ACETAMINOPHEN 325 MG/1
325-650 TABLET ORAL EVERY 6 HOURS PRN
Qty: 40 TABLET | Refills: 1 | Status: SHIPPED | OUTPATIENT
Start: 2025-04-29

## 2025-04-29 RX ORDER — ACETAMINOPHEN 325 MG/1
975 TABLET ORAL ONCE
Status: DISCONTINUED | OUTPATIENT
Start: 2025-04-29 | End: 2025-04-29 | Stop reason: HOSPADM

## 2025-04-29 RX ORDER — KETOROLAC TROMETHAMINE 30 MG/ML
15 INJECTION, SOLUTION INTRAMUSCULAR; INTRAVENOUS
Status: DISCONTINUED | OUTPATIENT
Start: 2025-04-29 | End: 2025-04-29 | Stop reason: HOSPADM

## 2025-04-29 RX ORDER — ONDANSETRON 4 MG/1
4 TABLET, ORALLY DISINTEGRATING ORAL EVERY 30 MIN PRN
Status: DISCONTINUED | OUTPATIENT
Start: 2025-04-29 | End: 2025-04-29 | Stop reason: HOSPADM

## 2025-04-29 RX ORDER — FENTANYL CITRATE 50 UG/ML
25 INJECTION, SOLUTION INTRAMUSCULAR; INTRAVENOUS EVERY 5 MIN PRN
Status: DISCONTINUED | OUTPATIENT
Start: 2025-04-29 | End: 2025-04-29 | Stop reason: HOSPADM

## 2025-04-29 RX ORDER — ACETAMINOPHEN 325 MG/1
975 TABLET ORAL ONCE
Status: COMPLETED | OUTPATIENT
Start: 2025-04-29 | End: 2025-04-29

## 2025-04-29 RX ORDER — PROPOFOL 10 MG/ML
INJECTION, EMULSION INTRAVENOUS PRN
Status: DISCONTINUED | OUTPATIENT
Start: 2025-04-29 | End: 2025-04-29

## 2025-04-29 RX ORDER — DIPHENHYDRAMINE HYDROCHLORIDE 50 MG/ML
12.5 INJECTION, SOLUTION INTRAMUSCULAR; INTRAVENOUS EVERY 6 HOURS PRN
Status: DISCONTINUED | OUTPATIENT
Start: 2025-04-29 | End: 2025-04-29 | Stop reason: HOSPADM

## 2025-04-29 RX ORDER — ONDANSETRON 4 MG/1
4 TABLET, ORALLY DISINTEGRATING ORAL
Status: DISCONTINUED | OUTPATIENT
Start: 2025-04-29 | End: 2025-04-29 | Stop reason: HOSPADM

## 2025-04-29 RX ORDER — DEXAMETHASONE SODIUM PHOSPHATE 4 MG/ML
INJECTION, SOLUTION INTRA-ARTICULAR; INTRALESIONAL; INTRAMUSCULAR; INTRAVENOUS; SOFT TISSUE PRN
Status: DISCONTINUED | OUTPATIENT
Start: 2025-04-29 | End: 2025-04-29

## 2025-04-29 RX ORDER — LIDOCAINE 40 MG/G
CREAM TOPICAL
Status: DISCONTINUED | OUTPATIENT
Start: 2025-04-29 | End: 2025-04-29 | Stop reason: HOSPADM

## 2025-04-29 RX ORDER — HYDRALAZINE HYDROCHLORIDE 20 MG/ML
2.5-5 INJECTION INTRAMUSCULAR; INTRAVENOUS EVERY 10 MIN PRN
Status: DISCONTINUED | OUTPATIENT
Start: 2025-04-29 | End: 2025-04-29 | Stop reason: HOSPADM

## 2025-04-29 RX ADMIN — FENTANYL CITRATE 50 MCG: 50 INJECTION INTRAMUSCULAR; INTRAVENOUS at 07:57

## 2025-04-29 RX ADMIN — SODIUM CHLORIDE, SODIUM LACTATE, POTASSIUM CHLORIDE, AND CALCIUM CHLORIDE: .6; .31; .03; .02 INJECTION, SOLUTION INTRAVENOUS at 07:52

## 2025-04-29 RX ADMIN — PHENYLEPHRINE HYDROCHLORIDE 100 MCG: 10 INJECTION INTRAVENOUS at 07:58

## 2025-04-29 RX ADMIN — PHENYLEPHRINE HYDROCHLORIDE 100 MCG: 10 INJECTION INTRAVENOUS at 08:19

## 2025-04-29 RX ADMIN — PHENYLEPHRINE HYDROCHLORIDE 100 MCG: 10 INJECTION INTRAVENOUS at 08:12

## 2025-04-29 RX ADMIN — ACETAMINOPHEN 975 MG: 325 TABLET ORAL at 07:04

## 2025-04-29 RX ADMIN — ONDANSETRON 4 MG: 2 INJECTION INTRAMUSCULAR; INTRAVENOUS at 08:24

## 2025-04-29 RX ADMIN — DEXAMETHASONE SODIUM PHOSPHATE 4 MG: 4 INJECTION, SOLUTION INTRAMUSCULAR; INTRAVENOUS at 08:08

## 2025-04-29 RX ADMIN — FENTANYL CITRATE 25 MCG: 50 INJECTION INTRAMUSCULAR; INTRAVENOUS at 08:29

## 2025-04-29 RX ADMIN — PHENYLEPHRINE HYDROCHLORIDE 100 MCG: 10 INJECTION INTRAVENOUS at 08:04

## 2025-04-29 RX ADMIN — PROPOFOL 50 MG: 10 INJECTION, EMULSION INTRAVENOUS at 08:27

## 2025-04-29 RX ADMIN — PROPOFOL 200 MG: 10 INJECTION, EMULSION INTRAVENOUS at 07:57

## 2025-04-29 RX ADMIN — LIDOCAINE HYDROCHLORIDE 80 MG: 20 INJECTION, SOLUTION INFILTRATION; PERINEURAL at 07:57

## 2025-04-29 ASSESSMENT — ACTIVITIES OF DAILY LIVING (ADL)
ADLS_ACUITY_SCORE: 48
ADLS_ACUITY_SCORE: 53
ADLS_ACUITY_SCORE: 49

## 2025-04-29 NOTE — BRIEF OP NOTE
Orthopaedic Surgery Brief Op-Note      Patient: Teri Beltran  : 1945  Date of Service: 2025 8:50 AM    Pre-operative Diagnosis: Soft tissue mass [M79.89]  Post-operative Diagnosis: same    Procedure(s) Performed: Procedure(s):  BIOPSY, SOFT TISSUE, Left Adductor Thigh Compartment  IRRIGATION AND DEBRIDEMENT, LOWER EXTREMITY, Left Adductor Thigh    Staff: Dr. Quesada  Assistants:   MD Juan Escobar PA  Anesthesia: General  EBL: 5 cc  UOP: see anesthesia record  Tourniquet Time: n/a    Implants:   * No implants in log *  Drains: 15f NEGRITO left thigh  Intra-op Labs/Cxs/Specimens:   ID Type Source Tests Collected by Time Destination   1 : Left adductor thigh Tissue Thigh, Left SURGICAL PATHOLOGY EXAM Rigo Quesada MD 2025  8:20 AM    A : left thigh adductor Tissue Thigh, Left ANAEROBIC BACTERIAL CULTURE ROUTINE, AEROBIC BACTERIAL CULTURE ROUTINE Rigo Quesada MD 2025  8:31 AM      Complications: No apparent complications during procedure  Findings: Please see dictated operative note for details    Disposition: Stable to PACU, then admit to Orthopaedics.     Post-Op Plan:  Assessment/Plan: Teri Beltran is a 79 year old female s/p Procedure(s):  BIOPSY, SOFT TISSUE, Left Adductor Thigh Compartment  IRRIGATION AND DEBRIDEMENT, LOWER EXTREMITY, Left Adductor Thigh on 2025 with Dr. Quesada.    Activity: Up with assist and assistive devices as needed until independent.   Weight bearing status: WBAT   Antibiotics:  discontinue home on keflex x 2 weeks    Diet: Begin with clear fluids and progress diet as tolerated. Bowel regimen. Anti-emetics PRN.    DVT prophylaxis:  None  Elevation: Elevate extremity as needed    Wound Care: Alginate, tegaderm to be chenged by ortho PRN  Drains: drain to remain in place 1-2 weeks based on output, plan to potentially remove drain on 1 week post op f/unit(s) visit  Hess: none  Pain management: Orals PRN, IV for breakthrough  only  X-rays: None  Cultures: follow up intra-op cultures    Future Appointments   Date Time Provider Department Center   5/12/2025  1:00 PM Enrique Avelar, MAKENZIE [facility]   5/13/2025  2:00 PM Rachel Miller MD [facility]       Disposition: discharge home from PACU once patient meets criteria  Follow up: Plan for follow up with Dr. Quesada 1 week    SWATI Wolf assisted with positioning, prepping and draping, and closure.      Misael Rose MD  Adult Reconstruction Fellow

## 2025-04-29 NOTE — ANESTHESIA POSTPROCEDURE EVALUATION
Patient: Teri Beltran    Procedure: Procedure(s):  BIOPSY, SOFT TISSUE, Left Adductor Thigh Compartment  IRRIGATION AND DEBRIDEMENT, LOWER EXTREMITY, Left Adductor Thigh       Anesthesia Type:  General    Note:  Disposition: Outpatient   Postop Pain Control: Uneventful            Sign Out: Well controlled pain   PONV: No   Neuro/Psych: Uneventful            Sign Out: Acceptable/Baseline neuro status   Airway/Respiratory: Uneventful            Sign Out: Acceptable/Baseline resp. status   CV/Hemodynamics: Uneventful            Sign Out: Acceptable CV status; No obvious hypovolemia; No obvious fluid overload   Other NRE:    DID A NON-ROUTINE EVENT OCCUR?            Last vitals:  Vitals Value Taken Time   /83 04/29/25 0900   Temp 36.4  C (97.5  F) 04/29/25 0854   Pulse 56 04/29/25 0910   Resp 16 04/29/25 0910   SpO2 100 % 04/29/25 0910   Vitals shown include unfiled device data.    Electronically Signed By: Shawn Richardson MD  April 29, 2025  9:11 AM

## 2025-04-29 NOTE — ANESTHESIA PROCEDURE NOTES
Airway       Patient location during procedure: OR  Staff -        Anesthesiologist:  Shawn Richardson MD       Resident/Fellow: Mariposa Fernandez MD       Performed By: residentIndications and Patient Condition       Indications for airway management: hiral-procedural       Induction type:intravenous       Mask difficulty assessment: 0 - not attempted    Final Airway Details       Final airway type: supraglottic airway    Supraglottic Airway Details        Type: LMA       Brand: Air-Q       LMA size: 5    Post intubation assessment        Placement verified by: capnometry        Number of attempts at approach: 1       Number of other approaches attempted: 0       Secured with: tape       Ease of procedure: easy       Dentition: Unchanged

## 2025-04-29 NOTE — BRIEF OP NOTE
Orthopaedic Surgery Brief Op-Note      Patient: Teri Beltran  : 1945  Date of Service: 2025 8:40 AM    Pre-operative Diagnosis: Soft tissue mass [M79.89]  Post-operative Diagnosis: same    Procedure(s) Performed: Procedure(s):  BIOPSY, SOFT TISSUE, Left Adductor Thigh Compartment  IRRIGATION AND DEBRIDEMENT, LOWER EXTREMITY, Left Adductor Thigh    Staff: Dr. Quesada  Assistants:   Juan Smallwood PA-C    Anesthesia: General  EBL: <5 cc  UOP: see anesthesia record  Tourniquet Time: none    Implants:   * No implants in log *  Drains: 15 NEGRITO drain - plan to remove on 25    Intra-op Labs/Cxs/Specimens:   ID Type Source Tests Collected by Time Destination   1 : Left adductor thigh Tissue Thigh, Left SURGICAL PATHOLOGY EXAM Rigo Quesada MD 2025  8:20 AM    A : left thigh adductor Tissue Thigh, Left ANAEROBIC BACTERIAL CULTURE ROUTINE, AEROBIC BACTERIAL CULTURE ROUTINE Rigo Quesada MD 2025  8:31 AM      Complications: No apparent complications during procedure  Findings: Please see dictated operative note for details    Disposition: Stable to PACU, then discharge home today.     Post-Op Plan:  Assessment/Plan: Teri Beltran is a 79 year old female s/p Procedure(s):  BIOPSY, SOFT TISSUE, Left Adductor Thigh Compartment  IRRIGATION AND DEBRIDEMENT, LOWER EXTREMITY, Left Adductor Thigh on 2025 with Dr. Quesada.    Activity: Up with assist and assistive devices as needed until independent.   Weight bearing status: WBAT   Antibiotics:  cefadroxil 500mg BID until drain removal   Diet: Begin with clear fluids and progress diet as tolerated. Bowel regimen. Anti-emetics PRN.    DVT prophylaxis:  na  Elevation: OK    Wound Care: Alginate, tegaderm to be removed 7 days post op  Drains: 15 NEGRITO drain - plan to remove on 25    Hess: na  Pain management: Orals PRN, IV for breakthrough only  X-rays: na  Physical Therapy: na   Occupational Therapy: na   Labs: na   Cultures:  PENDING  Consults: na     Future Appointments   Date Time Provider Department Center   5/12/2025  1:00 PM Enrique Avelar PA-C [facility]   5/13/2025  2:00 PM Rachel Miller MD [facility]       Disposition: Pending progress with pain control on orals, and medical stability, anticipate discharge to Home today.  Follow up: Plan for follow up on 5/5/25 for drain removal     I assisted with positioning, prepping and draping, and closure.      Juan Smallwood PA-C  4/29/2025 8:40 AM  Physician Assistant   Oncology and Adult Reconstructive Surgery  Dept Orthopaedic Surgery, [facility]     Thank you for allowing me to participate in this patient's care. Please page me directly any questions/concerns.   Securely message with the Vocera Web Console (learn more here)  Text page via [facility] Paging/Directory    If there is no response, if it is a weekend, or if it is during evening hours, please page the orthopaedic surgery resident on call via [facility] Paging/Directory

## 2025-04-29 NOTE — ANESTHESIA CARE TRANSFER NOTE
Patient: Teri Beltran    Procedure: Procedure(s):  BIOPSY, SOFT TISSUE, Left Adductor Thigh Compartment  IRRIGATION AND DEBRIDEMENT, LOWER EXTREMITY, Left Adductor Thigh       Diagnosis: Soft tissue mass [M79.89]  Diagnosis Additional Information: No value filed.    Anesthesia Type:   General     Note:    Oropharynx: oropharynx clear of all foreign objects  Level of Consciousness: awake  Oxygen Supplementation: face mask  Level of Supplemental Oxygen (L/min / FiO2): 6  Independent Airway: airway patency satisfactory and stable  Dentition: dentition unchanged  Vital Signs Stable: post-procedure vital signs reviewed and stable    Patient transferred to: PACU  Comments: Regular respirations and patent airway. VSS. IV patent and infusing. Pt resting comfortably. Report given to RN    Handoff Report: Identifed the Patient, Identified the Reponsible Provider, Reviewed the pertinent medical history, Discussed the surgical course, Reviewed Intra-OP anesthesia mangement and issues during anesthesia, Set expectations for post-procedure period and Allowed opportunity for questions and acknowledgement of understanding      Vitals:  Vitals Value Taken Time   /73    Temp 97.5    Pulse 70 04/29/25 0855   Resp 12 04/29/25 0855   SpO2 99 % 04/29/25 0855   Vitals shown include unfiled device data.    Electronically Signed By: RUBY Villalta CRNA  April 29, 2025  8:55 AM

## 2025-04-30 NOTE — PROGRESS NOTES
[facility] Physicians, Orthopaedic Oncology Surgery Consultation  by Rigo Quesada M.D.    Teri Beltran MRN# [id number]    YOB: 1945     Requesting physician: Rachel Wong-*  Paul, Rachel Bob MD Gen Surgery  Dwayne Elise MD Radiology       Background history:  DX:  Left medial thigh mass    TREATMENTS:  7/23/2019, Right total knee arthroplasty using an Arthrex iBalance knee system, (DEEPTI Muse), [facility]   11/20/2020, Right total hip arthroplasty using a Biomet Taperloc femoral stem and G7 acetabulum, (DEEPTI Muse), [facility]   6/6/2022, Open right hip abductor repair, (DEEPTI Muse), [facility]    2/27/25, In clinic left proximal thigh mass aspiration, 150 ml of serous clear yellow fluid was obtained (Dipak).  Cytology non diagnostic.  Cultures negative  4/29/25, Left adductor thigh compartment soft tissue biopsy and I&D, (Dipak), [facility]       Rate returns for postop check.  Pathology report is still pending.  No worrisome intraoperative findings were visualized.  Serous fluid was obtained and drained.    On examination today, her wounds healing appropriately and the drain is removed.  Sterile dressing applied.    I informed patient of the pathology worse pending.  The likely recurrence is small.  If she should develop recurrent swelling of the left thigh she should return to see me and we we will assess whether or not to perform an aspiration.      Pathology results will be forwarded via [employer].  Follow-up as needed.    MD Laquita Arellano Family Professor  Oncology and Adult Reconstructive Surgery  Dept Orthopaedic Surgery, [facility]  [phone] office, [phone] pager  [email or web address] [email or web address]

## 2025-05-01 LAB
BACTERIA TISS BX CULT: NORMAL
BACTERIA TISS BX CULT: NORMAL

## 2025-05-04 LAB — BACTERIA TISS BX CULT: NO GROWTH

## 2025-05-04 NOTE — OP NOTE
OPERATIVE NOTE    Date of Service: 4/29/2025 8:40 AM     Pre-operative Diagnosis: Soft tissue mass [M79.89], 10 cm, superficial, left adductor thigh  Post-operative Diagnosis: same     Procedure(s) Performed: Procedure(s):  BIOPSY, SOFT TISSUE, Left Adductor Thigh Compartment  IRRIGATION AND DEBRIDEMENT, LOWER EXTREMITY, Left Adductor Thigh     Staff: Dr. Quesada  Assistants:   Juan Smallwood PA-C     Anesthesia: General  EBL: <5 cc  UOP: see anesthesia record  Tourniquet Time: none     Implants:   * No implants in log *  Drains: 15 NEGRITO drain - plan to remove on 5/5/25    Intra-op Labs/Cxs/Specimens:   ID Type Source Tests Collected by Time Destination   1 : Left adductor thigh Tissue Thigh, Left SURGICAL PATHOLOGY EXAM Rigo Quesada MD 4/29/2025  8:20 AM     A : left thigh adductor Tissue Thigh, Left ANAEROBIC BACTERIAL CULTURE ROUTINE, AEROBIC BACTERIAL CULTURE ROUTINE Rigo Quesada MD 4/29/2025  8:31 AM        Complications: No apparent complications during procedure    Procedure details:  This patient was placed on the operative table supine position.  After induction of general anesthesia the left lower extremities prepped and draped in sterile manner.  The adductor region was now approached.  A biopsy procedure was performed by making a small incision at the distal end of the thigh where the mass is located.  This was palpated and the skin was in subcutaneous tissue with subsequent opened up.  The wall of the mass was now punctured in order to gain access to the wall since a previous biopsy was inconclusive.  The wall of the mass was now excised as serous type fluid drained from the wound.  The frozen section was obtained and did not reveal any evidence of neoplasm.  Based upon these findings I elected proceed with complete drainage and marsupialization of the presumed cyst.    The incision was extended now through the subcutaneous tissues and the skin.  I then dissected around the mass.  A rongeur and  pituitary instrument were then used to debride and remove the wall of the cyst from the surrounding subcutaneous tissue.  A Whittaker elevator was used to assist as well.  The entire area was thoroughly curetted and drained.  Additional tissue was obtained of the cyst and sent for pathologic examination.    The cyst was now collapsed and the walls of the cyst were oversewn together with 2-0 PDS suture.  A 15 Lonnie-Vasquez drain was placed in the deep portion of the wound brought out through distal stab wound incision to minimize the risk of recurrent fluid cyst formation.    The remainder the wound was closed with PDS sutures in layered fashion.  Sterile dressings were applied.         Disposition: Stable to PACU, then discharge home today.      Post-Op Plan:  Assessment/Plan: Teri Beltran is a 79 year old female s/p Procedure(s):  BIOPSY, SOFT TISSUE, Left Adductor Thigh Compartment  IRRIGATION AND DEBRIDEMENT, LOWER EXTREMITY, Left Adductor Thigh on 4/29/2025 with Dr. Quesada.     Activity: Up with assist and assistive devices as needed until independent.   Weight bearing status: WBAT   Antibiotics:  cefadroxil 500mg BID until drain removal   Diet: Begin with clear fluids and progress diet as tolerated. Bowel regimen. Anti-emetics PRN.    DVT prophylaxis:  na  Elevation: OK    Wound Care: Alginate, tegaderm to be removed 7 days post op  Drains: 15 NEGRITO drain - plan to remove on 5/5/25    Hess: na  Pain management: Orals PRN, IV for breakthrough only  X-rays: na  Physical Therapy: na   Occupational Therapy: na   Labs: na   Cultures: PENDING  Consults: seymour Quesada MD  [employer] Family Professor  Oncology and Adult Reconstructive Surgery  Dept Orthopaedic Surgery, [facility]  [phone] office, [phone] pager  [email or web address]

## 2025-05-05 ENCOUNTER — OFFICE VISIT (OUTPATIENT)
Dept: ORTHOPEDICS | Facility: CLINIC | Age: 80
End: 2025-05-05
Payer: MEDICARE

## 2025-05-05 DIAGNOSIS — M79.89 SOFT TISSUE MASS: Primary | ICD-10-CM

## 2025-05-05 LAB
PATH REPORT.COMMENTS IMP SPEC: NORMAL
PATH REPORT.FINAL DX SPEC: NORMAL
PATH REPORT.GROSS SPEC: NORMAL
PATH REPORT.INTRAOP OBS SPEC DOC: NORMAL
PATH REPORT.MICROSCOPIC SPEC OTHER STN: NORMAL
PATH REPORT.RELEVANT HX SPEC: NORMAL
PHOTO IMAGE: NORMAL

## 2025-05-05 PROCEDURE — 99024 POSTOP FOLLOW-UP VISIT: CPT | Performed by: ORTHOPAEDIC SURGERY

## 2025-05-05 NOTE — LETTER
5/5/2025      Teri Beltran  [address]  [place] [address]      Dear Colleague,    Thank you for referring your patient, Teri Beltran, to the [facility]OP[facility] [place]. Please see a copy of my visit note below.        [facility] Physicians, Orthopaedic Oncology Surgery Consultation  by Rigo Quesada M.D.    Teri Beltran MRN# [id number]    YOB: 1945     Requesting physician: Rachel Wong-*  Paul, Rachel Bob MD Gen Surgery  Dwayne Elise MD Radiology       Background history:  DX:  Left medial thigh mass    TREATMENTS:  7/23/2019, Right total knee arthroplasty using an Arthrex iBalance knee system, (DEEPTI Muse), [facility]   11/20/2020, Right total hip arthroplasty using a Biomet Taperloc femoral stem and G7 acetabulum, (DEEPTI Muse), [facility]   6/6/2022, Open right hip abductor repair, (DEEPTI Muse), [facility]    2/27/25, In clinic left proximal thigh mass aspiration, 150 ml of serous clear yellow fluid was obtained (Dipak).  Cytology non diagnostic.  Cultures negative  4/29/25, Left adductor thigh compartment soft tissue biopsy and I&D, (Dipak), [facility]       Rate returns for postop check.  Pathology report is still pending.  No worrisome intraoperative findings were visualized.  Serous fluid was obtained and drained.    On examination today, her wounds healing appropriately and the drain is removed.  Sterile dressing applied.    I informed patient of the pathology worse pending.  The likely recurrence is small.  If she should develop recurrent swelling of the left thigh she should return to see me and we we will assess whether or not to perform an aspiration.      Pathology results will be forwarded via [employer].  Follow-up as needed.    MD Laquita Arellano Family Professor  Oncology and Adult Reconstructive Surgery  Dept Orthopaedic Surgery, [facility]  [phone] office, [phone]  pager  [email or web address]      Again, thank you for allowing me to participate in the care of your patient.        Sincerely,        Rigo Quesada MD    Electronically signed

## 2025-05-06 LAB — BACTERIA TISS BX CULT: NORMAL

## 2025-05-12 ENCOUNTER — TELEPHONE (OUTPATIENT)
Dept: ORTHOPEDICS | Facility: CLINIC | Age: 80
End: 2025-05-12

## 2025-05-12 NOTE — TELEPHONE ENCOUNTER
- A call was placed to the patient.     - Incision has  a little bit, just superficially. There is no drainage. Open area is covered.     - Area is not constantly warm to touch. Denied any fever or chills.     - The area feels harder than previous. I let her know it may be a fluid accumulation and she can try to use compression shorts.     - She will notify us if they incision starts to drain. The hardness spread, or if she develops fever or chills.     - Patient verbalized understanding of plan and all questions were answered. Call back number to clinic was given and patient was told to call if they had an further questions.

## 2025-05-12 NOTE — TELEPHONE ENCOUNTER
What is the Concern:  Post op incision concern  Date the concern started: 5/11  Injury related? NO  Is this related to recent surgery? YES-4/29/25  Laceration?  No  Body part affected:  Left inner thigh  Has Patient been evaluated for condition?   Not for this newest post op issue  Location the patient was evaluated and treated for the condition?   NA  Who is referring provider, [facility]  What images have been done?       Kirit is calling due to requesting a call back regarding her incisioin site from surgery that she had on 4/29/25    Incision site feels hard, but most recently is noticing what looks to be a separation starting in the incision site.  No fever or excess drainage at this point.    Could we send this information to you in [place] or would you prefer to receive a phone call?:   Patient would prefer a phone call   Okay to leave a detailed message?: Yes at home number on file:    [phone]

## 2025-05-13 ENCOUNTER — OFFICE VISIT (OUTPATIENT)
Dept: INTERNAL MEDICINE | Facility: CLINIC | Age: 80
End: 2025-05-13
Payer: MEDICARE

## 2025-05-13 VITALS
RESPIRATION RATE: 16 BRPM | WEIGHT: 183 LBS | SYSTOLIC BLOOD PRESSURE: 112 MMHG | HEART RATE: 89 BPM | BODY MASS INDEX: 27.74 KG/M2 | HEIGHT: 68 IN | OXYGEN SATURATION: 95 % | DIASTOLIC BLOOD PRESSURE: 60 MMHG | TEMPERATURE: 98.7 F

## 2025-05-13 DIAGNOSIS — I87.2 VENOUS INSUFFICIENCY: ICD-10-CM

## 2025-05-13 DIAGNOSIS — I89.8 LYMPHOCELE: Primary | ICD-10-CM

## 2025-05-13 DIAGNOSIS — I10 HYPERTENSION GOAL BP (BLOOD PRESSURE) < 140/90: ICD-10-CM

## 2025-05-13 PROCEDURE — 99214 OFFICE O/P EST MOD 30 MIN: CPT | Performed by: INTERNAL MEDICINE

## 2025-05-13 PROCEDURE — 3074F SYST BP LT 130 MM HG: CPT | Performed by: INTERNAL MEDICINE

## 2025-05-13 PROCEDURE — G2211 COMPLEX E/M VISIT ADD ON: HCPCS | Performed by: INTERNAL MEDICINE

## 2025-05-13 PROCEDURE — 3078F DIAST BP <80 MM HG: CPT | Performed by: INTERNAL MEDICINE

## 2025-05-13 NOTE — PROGRESS NOTES
"    Patient's instructions / PLAN:                                                        Plan:  Continue same meds, same doses for now   2.  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 No Ca  She admits it was a very stressful time     Recent labs reviewed. Meds reviewed     ROS:                                                      ROS: negative for fever, chills, cough, wheezes, chest pain, shortness of breath, vomiting, abdominal pain, leg swelling       OBJECTIVE:                                                    Physical Exam :    Blood pressure 112/60, pulse 89, temperature 98.7  F (37.1  C), temperature source Tympanic, resp. rate 16, height 1.727 m (5' 8\"), weight 83 kg (183 lb), SpO2 95%, not currently breastfeeding.   NAD, appears comfortable  Skin: no rashes   Neck: supple, no JVD, No thyroidmegaly. Lymph nodes nonpalpable cervical and supraclavicular.  Chest: clear to auscultation bilaterally, good respiratory effort  Heart: S1 S2, RRR, 3/6 murmur  Abdomen: soft, not tender,   Extremities: + 1 edema ( didn't put compression socks on)  Neurologic: A, Ox3, no focal signs appreciated    PMHx: reviewed  Past Medical History:   Diagnosis Date    Concussion with loss of consciousness, initial encounter 4/26/2023    First degree AV block 08/11/2015    Former smoker     Gastroesophageal reflux disease     GERD " (gastroesophageal reflux disease)     Glaucoma     Heart block     2:1    Heart murmur     Hyperlipidemia LDL goal <130 06/14/2013    Hypertension goal BP (blood pressure) < 140/90 12/03/2013    Hypothyroidism     Left atrial dilatation     mild    Mild dilation of ascending aorta - borderline 08/11/2015    Palpitations     Prediabetes 06/14/2013    RBBB 08/11/2015    S/P total knee arthroplasty 07/23/2019    Tachycardia     Urinary frequency     Varicose veins     Venous insufficiency     s/p bilateral great saphenous vein radiofrequency ablation by Dr. Garcia      PSHx: reviewed  Past Surgical History:   Procedure Laterality Date    ARTHROPLASTY HIP Right 11/20/2020    Procedure: Right total hip arthroplasty using a Biomet Taperloc femoral stem and G7 acetabulum.;  Surgeon: Dragan Muse MD;  Location: [facility] OR    ARTHROPLASTY KNEE Right 07/23/2019    Procedure: Right total knee arthroplasty using an Arthrex [place] Softwarelance knee system;  Surgeon: Dragan Muse MD;  Location: [facility] OR    BIOPSY TISSUE LOWER EXTREMITY Left 4/29/2025    Procedure: BIOPSY, SOFT TISSUE, Left Adductor Thigh Compartment;  Surgeon: Rigo Quesada MD;  Location: [facility] OR    BREAST SURGERY      right breast ductal surgery due to milk production    BREAST SURGERY Right     ductal surgery due to milk production    CATARACT EXTRACTION Right 05/2022    COLONOSCOPY N/A 10/24/2014    no further screening. Procedure: COLONOSCOPY;  Surgeon: Pedro Rudd MD;  Location:  GI    DECOMPRESSION HIP CORE Right 6/6/2022    Procedure: Open right hip abductor repair;  Surgeon: Dragan Muse MD;  Location:  OR    ESOPHAGOSCOPY, GASTROSCOPY, DUODENOSCOPY (EGD), COMBINED N/A 4/9/2024    Procedure: Esophagoscopy, gastroscopy, duodenoscopy (EGD), combined biopsies using cold bx forcep;  Surgeon: Pedro Rudd MD;  Location:  GI    EYE SURGERY      FINGER GANGLION CYST EXCISION Left     ring finger    HYSTERECTOMY       HYSTERECTOMY, PAP NO LONGER INDICATED  2009    total hysterectomy and ovaries. benign    IRRIGATION AND DEBRIDEMENT LOWER EXTREMITY, COMBINED Left 4/29/2025    Procedure: IRRIGATION AND DEBRIDEMENT, LOWER EXTREMITY, Left Adductor Thigh;  Surgeon: Rigo Quesada MD;  Location: [facility] OR    SOFT TISSUE SURGERY      ganglion removed from left wrist and ring finger    SURGICAL HISTORY OF -   age 7    tonsillectomy    SURGICAL HISTORY OF -   01/2015    left eye cataract    SURGICAL HISTORY OF -   Fall 2016    LINQ placed.    TONSILLECTOMY      WRIST GANGLION EXCISION Left         Meds: reviewed  Current Outpatient Medications   Medication Sig Dispense Refill    acetaminophen (TYLENOL) 325 MG tablet Take 1-2 tablets (325-650 mg) by mouth every 6 hours as needed for mild pain. 40 tablet 1    calcium carbonate (OS-LILIA) 500 MG tablet Take 1 tablet by mouth 2 times daily.      carboxymethylcellulose (CARBOXYMETHYLCELLULOSE SODIUM) 0.5 % SOLN ophthalmic solution Place 1 drop into both eyes 4 times daily as needed for dry eyes      dorzolamide-timolol PF (COSOPT PF) 2-0.5 % opthalmic solutionh Place 1 drop into both eyes 2 times daily      furosemide (LASIX) 20 MG tablet Take 1 tablet (20 mg) by mouth 2 times daily. (Patient taking differently: Take 20 mg by mouth every morning. Every other day bid) 180 tablet 3    gabapentin (NEURONTIN) 300 MG capsule TAKE 1 CAPSULE(300 MG) BY MOUTH THREE TIMES DAILY. 270 capsule 1    latanoprost (XALATAN) 0.005 % ophthalmic solution Place 1 drop into the right eye At Bedtime       levothyroxine (SYNTHROID/LEVOTHROID) 88 MCG tablet Take 1 tablet (88 mcg) by mouth daily. 90 tablet 2    metoprolol tartrate (LOPRESSOR) 25 MG tablet TAKE 1 TABLET(25 MG) BY MOUTH TWICE DAILY 180 tablet 0    Multiple Vitamins-Minerals (MULTIVITAMIN ADULT PO) Take 1 tablet by mouth every morning.      pantoprazole (PROTONIX) 40 MG EC tablet TAKE 1 TABLET(40 MG) BY MOUTH DAILY 90 tablet 1    potassium chloride ER  (K-TAB/KLOR-CON) 10 MEQ CR tablet TAKE 1 TABLET(10 MEQ) BY MOUTH DAILY (Patient taking differently: Take 10 mEq by mouth every morning. TAKE 1 TABLET(10 MEQ) BY MOUTH DAILY) 90 tablet 2    pravastatin (PRAVACHOL) 40 MG tablet TAKE 1 TABLET(40 MG) BY MOUTH AT BEDTIME 90 tablet 1    brimonidine (ALPHAGAN) 0.2 % ophthalmic solution  (Patient not taking: Reported on 4/16/2025)      cephALEXin (KEFLEX) 500 MG capsule Take 1 capsule (500 mg) by mouth 2 times daily. 28 capsule 0    naproxen (NAPROSYN) 250 MG tablet Take 1 tablet (250 mg) by mouth 2 times daily (with meals). 30 tablet 1    ondansetron (ZOFRAN ODT) 4 MG ODT tab Take 1 tablet (4 mg) by mouth every 8 hours as needed for nausea. 4 tablet 0    senna-docusate (SENOKOT-S/PERICOLACE) 8.6-50 MG tablet Take 1-2 tablets by mouth 2 times daily. 30 tablet 0       Soc Hx: reviewed  Fam Hx: reviewed      Chart documentation was completed, in part, with theAudience voice-recognition software. Even though reviewed, some grammatical, spelling, and word errors may remain.      Rachel Wong MD  Internal Medicine

## 2025-05-22 ENCOUNTER — TELEPHONE (OUTPATIENT)
Dept: ORTHOPEDICS | Facility: CLINIC | Age: 80
End: 2025-05-22

## 2025-05-22 ENCOUNTER — OFFICE VISIT (OUTPATIENT)
Dept: ORTHOPEDICS | Facility: CLINIC | Age: 80
End: 2025-05-22
Payer: MEDICARE

## 2025-05-22 DIAGNOSIS — I89.8 LYMPHOCELE: Primary | ICD-10-CM

## 2025-05-22 NOTE — TELEPHONE ENCOUNTER
Part of incision opened up a little bit and top part is healing slowing, would like a call back regarding this    Could we send this information to you in [employer] or would you prefer to receive a phone call?:   Patient would prefer a phone call   Okay to leave a detailed message?: Yes at Home number on file [phone] (home)

## 2025-05-22 NOTE — LETTER
5/22/2025      Teri Beltran  [address]  [place] [address]      Dear Colleague,    Thank you for referring your patient, Teri Beltran, to the [facility] ORTHOPEDIC CLIN[facility] [place]. Please see a copy of my visit note below.        [facility] Physicians, Orthopaedic Oncology Surgery Consultation  by Rigo Quesada M.D.    Teri Beltran MRN# [id number]    YOB: 1945     Requesting physician: Rachel Wong-*  Paul, Rachel Bob MD Gen Surgery  Dwayne Elise MD Radiology       Background history:  DX:  Left medial thigh mass-lymphocele    TREATMENTS:  7/23/2019, Right total knee arthroplasty using an Arthrex iBalance knee system, (DEEPTI Muse), [facility]   11/20/2020, Right total hip arthroplasty using a Biomet Taperloc femoral stem and G7 acetabulum, (DEEPTI Muse), [facility]   6/6/2022, Open right hip abductor repair, (DEEPTI Muse), [facility]    2/27/25, In clinic left proximal thigh mass aspiration, 150 ml of serous clear yellow fluid was obtained (Dipak).  Cytology non diagnostic.  Cultures negative  4/29/25, Left adductor thigh compartment soft tissue biopsy and I&D, (Dipak), [facility]         Ray returns for postop check.  There was concern about her wound.  Pathology revealed evidence of lymphocele.  No neoplasm identified.    Wound is healing satisfactorily.  Drain site shows no evidence of opening up and has sealed over.  No evidence of recurrent lymphocele, fluid accumulation or infection.    Impression and plan:  Doing well satisfactory recovery after excision of fluid collection, likely lymphocele involving left adductor thigh subcutaneous tissue.    Return for follow-up on as needed basis.  MD Laquita Arellano Professor  Oncology and Adult Reconstructive Surgery  Dept Orthopaedic Surgery, [facility]  [phone] office, [phone] pager  [email or web address]              MD Laquita Arellano  Professor  Oncology and Adult Reconstructive Surgery  Dept Orthopaedic Surgery, [facility]  [phone] office, [phone] pager  [email or web address]    Again, thank you for allowing me to participate in the care of your patient.        Sincerely,        Rigo Quesada MD    Electronically signed

## 2025-05-22 NOTE — PROGRESS NOTES
[facility] Physicians, Orthopaedic Oncology Surgery Consultation  by Rigo Quesada M.D.    Teri Beltran MRN# [id number]    YOB: 1945     Requesting physician: Rachel Wong-*  Paul, Rachel Bob MD Gen Surgery  Dwayne Elise MD Radiology       Background history:  DX:  Left medial thigh mass-lymphocele    TREATMENTS:  7/23/2019, Right total knee arthroplasty using an Arthrex iBalance knee system, (DEEPTI Muse), [facility]   11/20/2020, Right total hip arthroplasty using a Biomet Taperloc femoral stem and G7 acetabulum, (DEEPTI Muse), [facility]   6/6/2022, Open right hip abductor repair, (DEEPTI Muse), [facility]    2/27/25, In clinic left proximal thigh mass aspiration, 150 ml of serous clear yellow fluid was obtained (Dipak).  Cytology non diagnostic.  Cultures negative  4/29/25, Left adductor thigh compartment soft tissue biopsy and I&D, (Dipak), [facility]         Ray returns for postop check.  There was concern about her wound.  Pathology revealed evidence of lymphocele.  No neoplasm identified.    Wound is healing satisfactorily.  Drain site shows no evidence of opening up and has sealed over.  No evidence of recurrent lymphocele, fluid accumulation or infection.    Impression and plan:  Doing well satisfactory recovery after excision of fluid collection, likely lymphocele involving left adductor thigh subcutaneous tissue.    Return for follow-up on as needed basis.  MD Laquita Arellano Family Professor  Oncology and Adult Reconstructive Surgery  Dept Orthopaedic Surgery, [facility]  [phone] office, [phone] pager  [email or web address]              MD Laquita Arellano Family Professor  Oncology and Adult Reconstructive Surgery  Dept Orthopaedic Surgery, [facility]  [phone] office, [phone] pager  [email or web address]

## 2025-05-22 NOTE — TELEPHONE ENCOUNTER
- A call was placed to the patient.     - Noticed this morning when she got up.     - Opening is small and was closed previously. About this side of the top of her finger.     - Denies any fever, or chills. Leg is very sore to touch. A little warm but not red.     - Appointment scheduled for today at 1:00pm. I asked her to get her as soon as able and we will see her to examine the incision.     - Patient verbalized understanding of plan and all questions were answered. Call back number to clinic was given and patient was told to call if they had an further questions.      General

## 2025-07-01 DIAGNOSIS — R00.2 PALPITATIONS: ICD-10-CM

## 2025-07-01 DIAGNOSIS — I10 HYPERTENSION GOAL BP (BLOOD PRESSURE) < 140/90: ICD-10-CM

## 2025-07-01 RX ORDER — METOPROLOL TARTRATE 25 MG/1
25 TABLET, FILM COATED ORAL
Qty: 180 TABLET | Refills: 1 | Status: SHIPPED | OUTPATIENT
Start: 2025-07-01

## 2025-08-08 DIAGNOSIS — G89.29 CHRONIC HIP PAIN AFTER TOTAL REPLACEMENT OF RIGHT HIP JOINT: ICD-10-CM

## 2025-08-08 DIAGNOSIS — M25.551 CHRONIC HIP PAIN AFTER TOTAL REPLACEMENT OF RIGHT HIP JOINT: ICD-10-CM

## 2025-08-08 DIAGNOSIS — Z96.641 CHRONIC HIP PAIN AFTER TOTAL REPLACEMENT OF RIGHT HIP JOINT: ICD-10-CM

## 2025-08-08 DIAGNOSIS — R60.9 FLUID RETENTION: ICD-10-CM

## 2025-08-08 RX ORDER — POTASSIUM CHLORIDE 750 MG/1
10 TABLET, EXTENDED RELEASE ORAL DAILY
Qty: 90 TABLET | Refills: 1 | Status: SHIPPED | OUTPATIENT
Start: 2025-08-08

## 2025-08-09 RX ORDER — GABAPENTIN 300 MG/1
300 CAPSULE ORAL 3 TIMES DAILY
Qty: 270 CAPSULE | Refills: 1 | Status: SHIPPED | OUTPATIENT
Start: 2025-08-09

## 2025-08-18 DIAGNOSIS — E78.5 HYPERLIPIDEMIA LDL GOAL <130: ICD-10-CM

## 2025-08-18 RX ORDER — PRAVASTATIN SODIUM 40 MG
40 TABLET ORAL AT BEDTIME
Qty: 90 TABLET | Refills: 1 | Status: SHIPPED | OUTPATIENT
Start: 2025-08-18

## 2025-08-19 ENCOUNTER — OFFICE VISIT (OUTPATIENT)
Dept: INTERNAL MEDICINE | Facility: CLINIC | Age: 80
End: 2025-08-19
Payer: MEDICARE

## 2025-08-19 VITALS
SYSTOLIC BLOOD PRESSURE: 118 MMHG | OXYGEN SATURATION: 96 % | HEART RATE: 75 BPM | WEIGHT: 179 LBS | HEIGHT: 68 IN | BODY MASS INDEX: 27.13 KG/M2 | TEMPERATURE: 98.2 F | DIASTOLIC BLOOD PRESSURE: 64 MMHG | RESPIRATION RATE: 14 BRPM

## 2025-08-19 DIAGNOSIS — K21.00 GASTROESOPHAGEAL REFLUX DISEASE WITH ESOPHAGITIS, UNSPECIFIED WHETHER HEMORRHAGE: ICD-10-CM

## 2025-08-19 DIAGNOSIS — I89.0 LYMPHEDEMA: ICD-10-CM

## 2025-08-19 DIAGNOSIS — R10.13 EPIGASTRIC PAIN: ICD-10-CM

## 2025-08-19 DIAGNOSIS — I10 HYPERTENSION GOAL BP (BLOOD PRESSURE) < 140/90: ICD-10-CM

## 2025-08-19 DIAGNOSIS — E03.9 HYPOTHYROIDISM, UNSPECIFIED TYPE: ICD-10-CM

## 2025-08-19 DIAGNOSIS — R73.9 BLOOD SUGAR INCREASED: ICD-10-CM

## 2025-08-19 DIAGNOSIS — E78.5 HYPERLIPIDEMIA LDL GOAL <130: ICD-10-CM

## 2025-08-19 DIAGNOSIS — I87.2 VENOUS INSUFFICIENCY: ICD-10-CM

## 2025-08-19 DIAGNOSIS — Z00.00 ROUTINE GENERAL MEDICAL EXAMINATION AT A HEALTH CARE FACILITY: Primary | ICD-10-CM

## 2025-08-19 RX ORDER — PANTOPRAZOLE SODIUM 40 MG/1
40 TABLET, DELAYED RELEASE ORAL 2 TIMES DAILY
Qty: 180 TABLET | Refills: 1 | Status: SHIPPED | OUTPATIENT
Start: 2025-08-19

## 2025-08-19 SDOH — HEALTH STABILITY: PHYSICAL HEALTH: ON AVERAGE, HOW MANY DAYS PER WEEK DO YOU ENGAGE IN MODERATE TO STRENUOUS EXERCISE (LIKE A BRISK WALK)?: 7 DAYS

## 2025-08-19 ASSESSMENT — SOCIAL DETERMINANTS OF HEALTH (SDOH): HOW OFTEN DO YOU GET TOGETHER WITH FRIENDS OR RELATIVES?: PATIENT DECLINED

## 2025-09-01 ENCOUNTER — HOSPITAL ENCOUNTER (OUTPATIENT)
Facility: CLINIC | Age: 80
Setting detail: OBSERVATION
End: 2025-09-01
Attending: EMERGENCY MEDICINE | Admitting: INTERNAL MEDICINE
Payer: MEDICARE

## 2025-09-01 DIAGNOSIS — R53.1 GENERALIZED WEAKNESS: ICD-10-CM

## 2025-09-01 DIAGNOSIS — R07.9 CHEST PAIN, UNSPECIFIED TYPE: ICD-10-CM

## 2025-09-01 DIAGNOSIS — R50.9 FEVER, UNSPECIFIED FEVER CAUSE: Primary | ICD-10-CM

## 2025-09-01 LAB
ALBUMIN SERPL BCG-MCNC: 4.2 G/DL (ref 3.5–5.2)
ALBUMIN UR-MCNC: NEGATIVE MG/DL
ALP SERPL-CCNC: 90 U/L (ref 40–150)
ALT SERPL W P-5'-P-CCNC: 19 U/L (ref 0–50)
ANION GAP SERPL CALCULATED.3IONS-SCNC: 13 MMOL/L (ref 7–15)
APPEARANCE UR: CLEAR
AST SERPL W P-5'-P-CCNC: 27 U/L (ref 0–45)
BASOPHILS # BLD AUTO: <0.03 10E3/UL (ref 0–0.2)
BASOPHILS NFR BLD AUTO: 0.5 %
BILIRUB SERPL-MCNC: 0.4 MG/DL
BILIRUB UR QL STRIP: NEGATIVE
BUN SERPL-MCNC: 14.6 MG/DL (ref 8–23)
CALCIUM SERPL-MCNC: 8.9 MG/DL (ref 8.8–10.4)
CHLORIDE SERPL-SCNC: 102 MMOL/L (ref 98–107)
COLOR UR AUTO: NORMAL
CREAT SERPL-MCNC: 0.61 MG/DL (ref 0.51–0.95)
EGFRCR SERPLBLD CKD-EPI 2021: 90 ML/MIN/1.73M2
EOSINOPHIL # BLD AUTO: <0.03 10E3/UL (ref 0–0.7)
EOSINOPHIL NFR BLD AUTO: 0.2 %
ERYTHROCYTE [DISTWIDTH] IN BLOOD BY AUTOMATED COUNT: 14.3 % (ref 10–15)
FLUAV RNA SPEC QL NAA+PROBE: NEGATIVE
FLUBV RNA RESP QL NAA+PROBE: NEGATIVE
GLUCOSE SERPL-MCNC: 114 MG/DL (ref 70–99)
GLUCOSE UR STRIP-MCNC: NEGATIVE MG/DL
HCO3 BLDV-SCNC: 22 MMOL/L (ref 21–28)
HCO3 SERPL-SCNC: 21 MMOL/L (ref 22–29)
HCT VFR BLD AUTO: 37.3 % (ref 35–47)
HGB BLD-MCNC: 12.3 G/DL (ref 11.7–15.7)
HGB UR QL STRIP: NEGATIVE
HOLD SPECIMEN: NORMAL
IMM GRANULOCYTES # BLD: <0.03 10E3/UL
IMM GRANULOCYTES NFR BLD: 0.2 %
KETONES UR STRIP-MCNC: NEGATIVE MG/DL
LACTATE BLD-SCNC: 0.4 MMOL/L (ref 0.7–2)
LEUKOCYTE ESTERASE UR QL STRIP: NEGATIVE
LIPASE SERPL-CCNC: 12 U/L (ref 13–60)
LYMPHOCYTES # BLD AUTO: 1.39 10E3/UL (ref 0.8–5.3)
LYMPHOCYTES NFR BLD AUTO: 31.7 %
MCH RBC QN AUTO: 27.6 PG (ref 26.5–33)
MCHC RBC AUTO-ENTMCNC: 33 G/DL (ref 31.5–36.5)
MCV RBC AUTO: 83.8 FL (ref 78–100)
MCV RBC AUTO: 85.4 FL (ref 78–100)
MONOCYTES # BLD AUTO: 0.51 10E3/UL (ref 0–1.3)
MONOCYTES NFR BLD AUTO: 11.6 %
NEUTROPHILS # BLD AUTO: 2.44 10E3/UL (ref 1.6–8.3)
NEUTROPHILS NFR BLD AUTO: 55.8 %
NITRATE UR QL: NEGATIVE
NRBC # BLD AUTO: <0.03 10E3/UL
NRBC BLD AUTO-RTO: 0 /100
PCO2 BLDV: 35 MM HG (ref 40–50)
PH BLDV: 7.42 [PH] (ref 7.32–7.43)
PH UR STRIP: 7 [PH] (ref 5–7)
PLAT MORPH BLD: ABNORMAL
PLATELET # BLD AUTO: 221 10E3/UL (ref 150–450)
PO2 BLDV: 57 MM HG (ref 25–47)
POTASSIUM SERPL-SCNC: 3.7 MMOL/L (ref 3.4–5.3)
PROT SERPL-MCNC: 6.7 G/DL (ref 6.4–8.3)
RBC # BLD AUTO: 4.45 10E6/UL (ref 3.8–5.2)
RBC MORPH BLD: ABNORMAL
RBC URINE: 0 /HPF
RSV RNA SPEC NAA+PROBE: NEGATIVE
SAO2 % BLDV: 90 % (ref 70–75)
SARS-COV-2 RNA RESP QL NAA+PROBE: NEGATIVE
SODIUM SERPL-SCNC: 136 MMOL/L (ref 135–145)
SP GR UR STRIP: 1.01 (ref 1–1.03)
SQUAMOUS EPITHELIAL: <1 /HPF
TROPONIN T SERPL HS-MCNC: 33 NG/L
TROPONIN T SERPL HS-MCNC: 35 NG/L
TROPONIN T SERPL HS-MCNC: 38 NG/L
TROPONIN T SERPL HS-MCNC: 42 NG/L
TSH SERPL DL<=0.005 MIU/L-ACNC: 2.31 UIU/ML (ref 0.3–4.2)
UROBILINOGEN UR STRIP-MCNC: NORMAL MG/DL
VARIANT LYMPHS BLD QL SMEAR: PRESENT
WBC # BLD AUTO: 4.38 10E3/UL (ref 4–11)
WBC # BLD AUTO: 4.72 10E3/UL (ref 4–11)
WBC URINE: <1 /HPF

## 2025-09-01 PROCEDURE — 83690 ASSAY OF LIPASE: CPT | Performed by: EMERGENCY MEDICINE

## 2025-09-01 PROCEDURE — 255N000002 HC RX 255 OP 636: Performed by: EMERGENCY MEDICINE

## 2025-09-01 PROCEDURE — 250N000011 HC RX IP 250 OP 636: Performed by: NURSE PRACTITIONER

## 2025-09-01 PROCEDURE — G0378 HOSPITAL OBSERVATION PER HR: HCPCS

## 2025-09-01 PROCEDURE — 87040 BLOOD CULTURE FOR BACTERIA: CPT | Performed by: STUDENT IN AN ORGANIZED HEALTH CARE EDUCATION/TRAINING PROGRAM

## 2025-09-01 PROCEDURE — 81001 URINALYSIS AUTO W/SCOPE: CPT | Performed by: EMERGENCY MEDICINE

## 2025-09-01 PROCEDURE — 82247 BILIRUBIN TOTAL: CPT | Performed by: EMERGENCY MEDICINE

## 2025-09-01 PROCEDURE — 84484 ASSAY OF TROPONIN QUANT: CPT | Performed by: EMERGENCY MEDICINE

## 2025-09-01 PROCEDURE — 258N000003 HC RX IP 258 OP 636: Performed by: NURSE PRACTITIONER

## 2025-09-01 PROCEDURE — 36415 COLL VENOUS BLD VENIPUNCTURE: CPT | Performed by: EMERGENCY MEDICINE

## 2025-09-01 PROCEDURE — 99285 EMERGENCY DEPT VISIT HI MDM: CPT | Mod: 25 | Performed by: EMERGENCY MEDICINE

## 2025-09-01 PROCEDURE — 82803 BLOOD GASES ANY COMBINATION: CPT

## 2025-09-01 PROCEDURE — 250N000013 HC RX MED GY IP 250 OP 250 PS 637: Performed by: NURSE PRACTITIONER

## 2025-09-01 PROCEDURE — 36415 COLL VENOUS BLD VENIPUNCTURE: CPT | Performed by: NURSE PRACTITIONER

## 2025-09-01 PROCEDURE — 99223 1ST HOSP IP/OBS HIGH 75: CPT | Performed by: NURSE PRACTITIONER

## 2025-09-01 PROCEDURE — 84443 ASSAY THYROID STIM HORMONE: CPT | Performed by: NURSE PRACTITIONER

## 2025-09-01 PROCEDURE — 99418 PROLNG IP/OBS E/M EA 15 MIN: CPT | Performed by: NURSE PRACTITIONER

## 2025-09-01 PROCEDURE — 87637 SARSCOV2&INF A&B&RSV AMP PRB: CPT | Performed by: EMERGENCY MEDICINE

## 2025-09-01 PROCEDURE — 84484 ASSAY OF TROPONIN QUANT: CPT | Performed by: NURSE PRACTITIONER

## 2025-09-01 PROCEDURE — 85025 COMPLETE CBC W/AUTO DIFF WBC: CPT | Performed by: EMERGENCY MEDICINE

## 2025-09-01 RX ORDER — AMOXICILLIN 250 MG
1 CAPSULE ORAL 2 TIMES DAILY
Status: DISPENSED | OUTPATIENT
Start: 2025-09-01

## 2025-09-01 RX ORDER — GABAPENTIN 300 MG/1
300 CAPSULE ORAL 3 TIMES DAILY
Status: DISPENSED | OUTPATIENT
Start: 2025-09-01

## 2025-09-01 RX ORDER — HYDRALAZINE HYDROCHLORIDE 10 MG/1
10 TABLET, FILM COATED ORAL EVERY 4 HOURS PRN
Status: ACTIVE | OUTPATIENT
Start: 2025-09-01

## 2025-09-01 RX ORDER — FUROSEMIDE 20 MG/1
20 TABLET ORAL EVERY OTHER DAY
Status: ACTIVE | OUTPATIENT
Start: 2025-09-02

## 2025-09-01 RX ORDER — FUROSEMIDE 20 MG/1
20 TABLET ORAL EVERY OTHER DAY
Status: ON HOLD | COMMUNITY

## 2025-09-01 RX ORDER — HYDROMORPHONE HYDROCHLORIDE 2 MG/1
2 TABLET ORAL EVERY 4 HOURS PRN
Status: ACTIVE | OUTPATIENT
Start: 2025-09-01

## 2025-09-01 RX ORDER — AMOXICILLIN 250 MG
2 CAPSULE ORAL 2 TIMES DAILY
Status: ACTIVE | OUTPATIENT
Start: 2025-09-01

## 2025-09-01 RX ORDER — HYDROMORPHONE HCL IN WATER/PF 6 MG/30 ML
0.2 PATIENT CONTROLLED ANALGESIA SYRINGE INTRAVENOUS
Status: ACTIVE | OUTPATIENT
Start: 2025-09-01

## 2025-09-01 RX ORDER — POTASSIUM CHLORIDE 750 MG/1
10 TABLET, EXTENDED RELEASE ORAL DAILY
Status: DISPENSED | OUTPATIENT
Start: 2025-09-01

## 2025-09-01 RX ORDER — DORZOLAMIDE HYDROCHLORIDE AND TIMOLOL MALEATE 20; 5 MG/ML; MG/ML
1 SOLUTION/ DROPS OPHTHALMIC 2 TIMES DAILY
Status: DISPENSED | OUTPATIENT
Start: 2025-09-01

## 2025-09-01 RX ORDER — POLYETHYLENE GLYCOL 3350 17 G/17G
17 POWDER, FOR SOLUTION ORAL 2 TIMES DAILY PRN
Status: ACTIVE | OUTPATIENT
Start: 2025-09-01

## 2025-09-01 RX ORDER — ACETAMINOPHEN 650 MG/1
650 SUPPOSITORY RECTAL EVERY 4 HOURS PRN
Status: ACTIVE | OUTPATIENT
Start: 2025-09-01

## 2025-09-01 RX ORDER — ACETAMINOPHEN 325 MG/1
650 TABLET ORAL EVERY 4 HOURS PRN
Status: DISPENSED | OUTPATIENT
Start: 2025-09-01

## 2025-09-01 RX ORDER — NALOXONE HYDROCHLORIDE 0.4 MG/ML
0.4 INJECTION, SOLUTION INTRAMUSCULAR; INTRAVENOUS; SUBCUTANEOUS
Status: ACTIVE | OUTPATIENT
Start: 2025-09-01

## 2025-09-01 RX ORDER — HYDRALAZINE HYDROCHLORIDE 20 MG/ML
10 INJECTION INTRAMUSCULAR; INTRAVENOUS EVERY 4 HOURS PRN
Status: ACTIVE | OUTPATIENT
Start: 2025-09-01

## 2025-09-01 RX ORDER — PROCHLORPERAZINE MALEATE 5 MG/1
5 TABLET ORAL EVERY 6 HOURS PRN
Status: ACTIVE | OUTPATIENT
Start: 2025-09-01

## 2025-09-01 RX ORDER — FUROSEMIDE 20 MG/1
20 TABLET ORAL EVERY MORNING
Status: ACTIVE | OUTPATIENT
Start: 2025-09-02

## 2025-09-01 RX ORDER — PANTOPRAZOLE SODIUM 40 MG/1
40 TABLET, DELAYED RELEASE ORAL 2 TIMES DAILY
Status: DISPENSED | OUTPATIENT
Start: 2025-09-01

## 2025-09-01 RX ORDER — ONDANSETRON 2 MG/ML
4 INJECTION INTRAMUSCULAR; INTRAVENOUS EVERY 6 HOURS PRN
Status: DISPENSED | OUTPATIENT
Start: 2025-09-01

## 2025-09-01 RX ORDER — SODIUM CHLORIDE 9 MG/ML
INJECTION, SOLUTION INTRAVENOUS CONTINUOUS
Status: DISCONTINUED | OUTPATIENT
Start: 2025-09-01 | End: 2025-09-02

## 2025-09-01 RX ORDER — ONDANSETRON 4 MG/1
4 TABLET, ORALLY DISINTEGRATING ORAL EVERY 6 HOURS PRN
Status: DISPENSED | OUTPATIENT
Start: 2025-09-01

## 2025-09-01 RX ORDER — NALOXONE HYDROCHLORIDE 0.4 MG/ML
0.2 INJECTION, SOLUTION INTRAMUSCULAR; INTRAVENOUS; SUBCUTANEOUS
Status: ACTIVE | OUTPATIENT
Start: 2025-09-01

## 2025-09-01 RX ORDER — METOPROLOL TARTRATE 25 MG/1
25 TABLET, FILM COATED ORAL
Status: DISPENSED | OUTPATIENT
Start: 2025-09-01

## 2025-09-01 RX ORDER — HYDROMORPHONE HCL IN WATER/PF 6 MG/30 ML
0.4 PATIENT CONTROLLED ANALGESIA SYRINGE INTRAVENOUS
Status: ACTIVE | OUTPATIENT
Start: 2025-09-01

## 2025-09-01 RX ORDER — IBUPROFEN 400 MG/1
400 TABLET, FILM COATED ORAL EVERY 8 HOURS PRN
Status: DISPENSED | OUTPATIENT
Start: 2025-09-01

## 2025-09-01 RX ORDER — LATANOPROST 50 UG/ML
1 SOLUTION/ DROPS OPHTHALMIC AT BEDTIME
Status: DISPENSED | OUTPATIENT
Start: 2025-09-01

## 2025-09-01 RX ORDER — FUROSEMIDE 20 MG/1
20 TABLET ORAL EVERY MORNING
Status: ON HOLD | COMMUNITY

## 2025-09-01 RX ADMIN — IOHEXOL 95 ML: 350 INJECTION, SOLUTION INTRAVENOUS at 09:48

## 2025-09-01 RX ADMIN — METOPROLOL TARTRATE 25 MG: 25 TABLET, FILM COATED ORAL at 16:29

## 2025-09-01 RX ADMIN — SODIUM CHLORIDE: 0.9 INJECTION, SOLUTION INTRAVENOUS at 13:33

## 2025-09-01 RX ADMIN — ONDANSETRON 4 MG: 4 TABLET, ORALLY DISINTEGRATING ORAL at 23:43

## 2025-09-01 RX ADMIN — SODIUM CHLORIDE: 0.9 INJECTION, SOLUTION INTRAVENOUS at 22:22

## 2025-09-01 RX ADMIN — POTASSIUM CHLORIDE 10 MEQ: 750 TABLET, FILM COATED, EXTENDED RELEASE ORAL at 16:55

## 2025-09-01 RX ADMIN — GABAPENTIN 300 MG: 300 CAPSULE ORAL at 19:57

## 2025-09-01 RX ADMIN — DORZOLAMIDE HYDROCHLORIDE AND TIMOLOL MALEATE 1 DROP: 20; 5 SOLUTION/ DROPS OPHTHALMIC at 19:58

## 2025-09-01 RX ADMIN — GABAPENTIN 300 MG: 300 CAPSULE ORAL at 16:29

## 2025-09-01 RX ADMIN — ACETAMINOPHEN 650 MG: 325 TABLET ORAL at 22:30

## 2025-09-01 RX ADMIN — LATANOPROST 1 DROP: 50 SOLUTION/ DROPS OPHTHALMIC at 22:21

## 2025-09-01 RX ADMIN — ONDANSETRON 4 MG: 4 TABLET, ORALLY DISINTEGRATING ORAL at 17:40

## 2025-09-01 RX ADMIN — PANTOPRAZOLE SODIUM 40 MG: 40 TABLET, DELAYED RELEASE ORAL at 19:57

## 2025-09-01 RX ADMIN — ACETAMINOPHEN 650 MG: 325 TABLET ORAL at 16:39

## 2025-09-01 ASSESSMENT — ACTIVITIES OF DAILY LIVING (ADL)
ADLS_ACUITY_SCORE: 63
ADLS_ACUITY_SCORE: 63
ADLS_ACUITY_SCORE: 45
ADLS_ACUITY_SCORE: 63
ADLS_ACUITY_SCORE: 63
ADLS_ACUITY_SCORE: 57
ADLS_ACUITY_SCORE: 63
ADLS_ACUITY_SCORE: 65
ADLS_ACUITY_SCORE: 63
ADLS_ACUITY_SCORE: 43
ADLS_ACUITY_SCORE: 45
ADLS_ACUITY_SCORE: 63
ADLS_ACUITY_SCORE: 43
ADLS_ACUITY_SCORE: 45
ADLS_ACUITY_SCORE: 43
ADLS_ACUITY_SCORE: 63

## 2025-09-01 ASSESSMENT — COLUMBIA-SUICIDE SEVERITY RATING SCALE - C-SSRS
2. HAVE YOU ACTUALLY HAD ANY THOUGHTS OF KILLING YOURSELF IN THE PAST MONTH?: NO
1. IN THE PAST MONTH, HAVE YOU WISHED YOU WERE DEAD OR WISHED YOU COULD GO TO SLEEP AND NOT WAKE UP?: NO
6. HAVE YOU EVER DONE ANYTHING, STARTED TO DO ANYTHING, OR PREPARED TO DO ANYTHING TO END YOUR LIFE?: NO

## 2025-09-02 LAB
ALBUMIN SERPL BCG-MCNC: 4 G/DL (ref 3.5–5.2)
ALP SERPL-CCNC: 77 U/L (ref 40–150)
ALT SERPL W P-5'-P-CCNC: 15 U/L (ref 0–50)
ANION GAP SERPL CALCULATED.3IONS-SCNC: 9 MMOL/L (ref 7–15)
AST SERPL W P-5'-P-CCNC: 26 U/L (ref 0–45)
ATRIAL RATE - MUSE: 77 BPM
BILIRUB SERPL-MCNC: 0.4 MG/DL
BUN SERPL-MCNC: 9.1 MG/DL (ref 8–23)
CALCIUM SERPL-MCNC: 8.3 MG/DL (ref 8.8–10.4)
CHLORIDE SERPL-SCNC: 103 MMOL/L (ref 98–107)
CREAT SERPL-MCNC: 0.59 MG/DL (ref 0.51–0.95)
DIASTOLIC BLOOD PRESSURE - MUSE: NORMAL MMHG
EGFRCR SERPLBLD CKD-EPI 2021: >90 ML/MIN/1.73M2
ERYTHROCYTE [DISTWIDTH] IN BLOOD BY AUTOMATED COUNT: 14.3 % (ref 10–15)
GLUCOSE SERPL-MCNC: 113 MG/DL (ref 70–99)
HCO3 SERPL-SCNC: 25 MMOL/L (ref 22–29)
HCT VFR BLD AUTO: 34.3 % (ref 35–47)
HGB BLD-MCNC: 11.5 G/DL (ref 11.7–15.7)
INTERPRETATION ECG - MUSE: NORMAL
MCH RBC QN AUTO: 28.3 PG (ref 26.5–33)
MCHC RBC AUTO-ENTMCNC: 33.5 G/DL (ref 31.5–36.5)
MCV RBC AUTO: 84.5 FL (ref 78–100)
P AXIS - MUSE: 87 DEGREES
PLATELET # BLD AUTO: 202 10E3/UL (ref 150–450)
POTASSIUM SERPL-SCNC: 4.2 MMOL/L (ref 3.4–5.3)
PR INTERVAL - MUSE: 226 MS
PROT SERPL-MCNC: 6.4 G/DL (ref 6.4–8.3)
QRS DURATION - MUSE: 110 MS
QT - MUSE: 416 MS
QTC - MUSE: 470 MS
R AXIS - MUSE: -29 DEGREES
RBC # BLD AUTO: 4.06 10E6/UL (ref 3.8–5.2)
SODIUM SERPL-SCNC: 137 MMOL/L (ref 135–145)
SYSTOLIC BLOOD PRESSURE - MUSE: NORMAL MMHG
T AXIS - MUSE: -4 DEGREES
VENTRICULAR RATE- MUSE: 77 BPM
WBC # BLD AUTO: 7.35 10E3/UL (ref 4–11)

## 2025-09-02 PROCEDURE — 85014 HEMATOCRIT: CPT | Performed by: NURSE PRACTITIONER

## 2025-09-02 PROCEDURE — 80053 COMPREHEN METABOLIC PANEL: CPT | Performed by: NURSE PRACTITIONER

## 2025-09-02 PROCEDURE — G0378 HOSPITAL OBSERVATION PER HR: HCPCS

## 2025-09-02 PROCEDURE — 97530 THERAPEUTIC ACTIVITIES: CPT | Mod: GP | Performed by: PHYSICAL THERAPIST

## 2025-09-02 PROCEDURE — 97161 PT EVAL LOW COMPLEX 20 MIN: CPT | Mod: GP | Performed by: PHYSICAL THERAPIST

## 2025-09-02 PROCEDURE — 250N000011 HC RX IP 250 OP 636: Performed by: NURSE PRACTITIONER

## 2025-09-02 PROCEDURE — 250N000013 HC RX MED GY IP 250 OP 250 PS 637: Performed by: NURSE PRACTITIONER

## 2025-09-02 PROCEDURE — 258N000003 HC RX IP 258 OP 636: Performed by: NURSE PRACTITIONER

## 2025-09-02 PROCEDURE — 250N000013 HC RX MED GY IP 250 OP 250 PS 637: Performed by: INTERNAL MEDICINE

## 2025-09-02 PROCEDURE — 36415 COLL VENOUS BLD VENIPUNCTURE: CPT | Performed by: NURSE PRACTITIONER

## 2025-09-02 PROCEDURE — 250N000013 HC RX MED GY IP 250 OP 250 PS 637: Performed by: STUDENT IN AN ORGANIZED HEALTH CARE EDUCATION/TRAINING PROGRAM

## 2025-09-02 RX ORDER — LEVOTHYROXINE SODIUM 88 UG/1
88 TABLET ORAL DAILY
Status: DISPENSED | OUTPATIENT
Start: 2025-09-02

## 2025-09-02 RX ORDER — ONDANSETRON 4 MG/1
4 TABLET, ORALLY DISINTEGRATING ORAL EVERY 6 HOURS PRN
Qty: 15 TABLET | Refills: 0 | Status: CANCELLED | OUTPATIENT
Start: 2025-09-02

## 2025-09-02 RX ADMIN — SODIUM CHLORIDE: 0.9 INJECTION, SOLUTION INTRAVENOUS at 05:39

## 2025-09-02 RX ADMIN — DORZOLAMIDE HYDROCHLORIDE AND TIMOLOL MALEATE 1 DROP: 20; 5 SOLUTION/ DROPS OPHTHALMIC at 20:14

## 2025-09-02 RX ADMIN — IBUPROFEN 400 MG: 400 TABLET, FILM COATED ORAL at 13:59

## 2025-09-02 RX ADMIN — SODIUM CHLORIDE: 0.9 INJECTION, SOLUTION INTRAVENOUS at 15:19

## 2025-09-02 RX ADMIN — ONDANSETRON 4 MG: 2 INJECTION, SOLUTION INTRAMUSCULAR; INTRAVENOUS at 08:34

## 2025-09-02 RX ADMIN — SENNOSIDES, DOCUSATE SODIUM 1 TABLET: 50; 8.6 TABLET, FILM COATED ORAL at 08:25

## 2025-09-02 RX ADMIN — LEVOTHYROXINE SODIUM 88 MCG: 0.09 TABLET ORAL at 09:58

## 2025-09-02 RX ADMIN — LATANOPROST 1 DROP: 50 SOLUTION/ DROPS OPHTHALMIC at 20:15

## 2025-09-02 RX ADMIN — POTASSIUM CHLORIDE 10 MEQ: 750 TABLET, FILM COATED, EXTENDED RELEASE ORAL at 08:26

## 2025-09-02 RX ADMIN — DORZOLAMIDE HYDROCHLORIDE AND TIMOLOL MALEATE 1 DROP: 20; 5 SOLUTION/ DROPS OPHTHALMIC at 08:26

## 2025-09-02 RX ADMIN — PROCHLORPERAZINE EDISYLATE 5 MG: 5 INJECTION INTRAMUSCULAR; INTRAVENOUS at 00:11

## 2025-09-02 RX ADMIN — METOPROLOL TARTRATE 25 MG: 25 TABLET, FILM COATED ORAL at 08:25

## 2025-09-02 RX ADMIN — GABAPENTIN 300 MG: 300 CAPSULE ORAL at 20:13

## 2025-09-02 RX ADMIN — PANTOPRAZOLE SODIUM 40 MG: 40 TABLET, DELAYED RELEASE ORAL at 08:25

## 2025-09-02 RX ADMIN — GABAPENTIN 300 MG: 300 CAPSULE ORAL at 14:00

## 2025-09-02 RX ADMIN — PANTOPRAZOLE SODIUM 40 MG: 40 TABLET, DELAYED RELEASE ORAL at 20:14

## 2025-09-02 RX ADMIN — METOPROLOL TARTRATE 25 MG: 25 TABLET, FILM COATED ORAL at 16:17

## 2025-09-02 RX ADMIN — GABAPENTIN 300 MG: 300 CAPSULE ORAL at 08:25

## 2025-09-02 RX ADMIN — ACETAMINOPHEN 650 MG: 325 TABLET ORAL at 08:25

## 2025-09-02 RX ADMIN — ACETAMINOPHEN 650 MG: 325 TABLET ORAL at 13:59

## 2025-09-02 ASSESSMENT — ACTIVITIES OF DAILY LIVING (ADL)
ADLS_ACUITY_SCORE: 43
DEPENDENT_IADLS:: INDEPENDENT;CLEANING;LAUNDRY
ADLS_ACUITY_SCORE: 43
ADLS_ACUITY_SCORE: 43

## 2025-09-03 LAB
ANION GAP SERPL CALCULATED.3IONS-SCNC: 9 MMOL/L (ref 7–15)
BUN SERPL-MCNC: 10.9 MG/DL (ref 8–23)
CALCIUM SERPL-MCNC: 8.4 MG/DL (ref 8.8–10.4)
CHLORIDE SERPL-SCNC: 102 MMOL/L (ref 98–107)
CREAT SERPL-MCNC: 0.59 MG/DL (ref 0.51–0.95)
EGFRCR SERPLBLD CKD-EPI 2021: >90 ML/MIN/1.73M2
ERYTHROCYTE [DISTWIDTH] IN BLOOD BY AUTOMATED COUNT: 14.4 % (ref 10–15)
GLUCOSE SERPL-MCNC: 105 MG/DL (ref 70–99)
HCO3 SERPL-SCNC: 22 MMOL/L (ref 22–29)
HCT VFR BLD AUTO: 32.6 % (ref 35–47)
HGB BLD-MCNC: 10.6 G/DL (ref 11.7–15.7)
MCH RBC QN AUTO: 27.8 PG (ref 26.5–33)
MCHC RBC AUTO-ENTMCNC: 32.5 G/DL (ref 31.5–36.5)
MCV RBC AUTO: 85.6 FL (ref 78–100)
PLATELET # BLD AUTO: 216 10E3/UL (ref 150–450)
POTASSIUM SERPL-SCNC: 3.5 MMOL/L (ref 3.4–5.3)
RBC # BLD AUTO: 3.81 10E6/UL (ref 3.8–5.2)
SODIUM SERPL-SCNC: 133 MMOL/L (ref 135–145)
WBC # BLD AUTO: 9.28 10E3/UL (ref 4–11)

## 2025-09-03 PROCEDURE — G0378 HOSPITAL OBSERVATION PER HR: HCPCS

## 2025-09-03 PROCEDURE — 250N000013 HC RX MED GY IP 250 OP 250 PS 637: Performed by: INTERNAL MEDICINE

## 2025-09-03 PROCEDURE — 250N000011 HC RX IP 250 OP 636: Performed by: INTERNAL MEDICINE

## 2025-09-03 PROCEDURE — 97116 GAIT TRAINING THERAPY: CPT | Mod: GP | Performed by: PHYSICAL THERAPIST

## 2025-09-03 PROCEDURE — 82310 ASSAY OF CALCIUM: CPT | Performed by: STUDENT IN AN ORGANIZED HEALTH CARE EDUCATION/TRAINING PROGRAM

## 2025-09-03 PROCEDURE — 85014 HEMATOCRIT: CPT | Performed by: STUDENT IN AN ORGANIZED HEALTH CARE EDUCATION/TRAINING PROGRAM

## 2025-09-03 PROCEDURE — 250N000013 HC RX MED GY IP 250 OP 250 PS 637: Performed by: STUDENT IN AN ORGANIZED HEALTH CARE EDUCATION/TRAINING PROGRAM

## 2025-09-03 PROCEDURE — 36415 COLL VENOUS BLD VENIPUNCTURE: CPT | Performed by: STUDENT IN AN ORGANIZED HEALTH CARE EDUCATION/TRAINING PROGRAM

## 2025-09-03 PROCEDURE — 97530 THERAPEUTIC ACTIVITIES: CPT | Mod: GP | Performed by: PHYSICAL THERAPIST

## 2025-09-03 PROCEDURE — 250N000013 HC RX MED GY IP 250 OP 250 PS 637: Performed by: NURSE PRACTITIONER

## 2025-09-03 RX ORDER — HALOPERIDOL 5 MG/ML
2 INJECTION INTRAMUSCULAR EVERY 6 HOURS PRN
Status: DISPENSED | OUTPATIENT
Start: 2025-09-03

## 2025-09-03 RX ADMIN — HALOPERIDOL LACTATE 2 MG: 5 INJECTION, SOLUTION INTRAMUSCULAR at 20:19

## 2025-09-03 RX ADMIN — ACETAMINOPHEN 650 MG: 325 TABLET ORAL at 12:25

## 2025-09-03 RX ADMIN — IBUPROFEN 400 MG: 400 TABLET, FILM COATED ORAL at 05:48

## 2025-09-03 RX ADMIN — POTASSIUM CHLORIDE 10 MEQ: 750 TABLET, FILM COATED, EXTENDED RELEASE ORAL at 09:52

## 2025-09-03 RX ADMIN — GABAPENTIN 300 MG: 300 CAPSULE ORAL at 08:13

## 2025-09-03 RX ADMIN — PANTOPRAZOLE SODIUM 40 MG: 40 TABLET, DELAYED RELEASE ORAL at 08:13

## 2025-09-03 RX ADMIN — GABAPENTIN 300 MG: 300 CAPSULE ORAL at 16:29

## 2025-09-03 RX ADMIN — METOPROLOL TARTRATE 25 MG: 25 TABLET, FILM COATED ORAL at 16:29

## 2025-09-03 RX ADMIN — DORZOLAMIDE HYDROCHLORIDE AND TIMOLOL MALEATE 1 DROP: 20; 5 SOLUTION/ DROPS OPHTHALMIC at 19:36

## 2025-09-03 RX ADMIN — LEVOTHYROXINE SODIUM 88 MCG: 0.09 TABLET ORAL at 06:54

## 2025-09-03 RX ADMIN — METOPROLOL TARTRATE 25 MG: 25 TABLET, FILM COATED ORAL at 08:13

## 2025-09-03 RX ADMIN — DORZOLAMIDE HYDROCHLORIDE AND TIMOLOL MALEATE 1 DROP: 20; 5 SOLUTION/ DROPS OPHTHALMIC at 08:13

## 2025-09-03 RX ADMIN — GABAPENTIN 300 MG: 300 CAPSULE ORAL at 19:35

## 2025-09-03 RX ADMIN — PANTOPRAZOLE SODIUM 40 MG: 40 TABLET, DELAYED RELEASE ORAL at 19:35

## 2025-09-03 RX ADMIN — ACETAMINOPHEN 650 MG: 325 TABLET ORAL at 02:23

## 2025-09-03 ASSESSMENT — ACTIVITIES OF DAILY LIVING (ADL)
ADLS_ACUITY_SCORE: 43
ADLS_ACUITY_SCORE: 43
ADLS_ACUITY_SCORE: 45
ADLS_ACUITY_SCORE: 43
ADLS_ACUITY_SCORE: 45
ADLS_ACUITY_SCORE: 43
ADLS_ACUITY_SCORE: 55
ADLS_ACUITY_SCORE: 45
ADLS_ACUITY_SCORE: 43
ADLS_ACUITY_SCORE: 45
ADLS_ACUITY_SCORE: 43
ADLS_ACUITY_SCORE: 45
ADLS_ACUITY_SCORE: 43
ADLS_ACUITY_SCORE: 45
ADLS_ACUITY_SCORE: 43
ADLS_ACUITY_SCORE: 45
ADLS_ACUITY_SCORE: 43

## 2025-09-04 VITALS
SYSTOLIC BLOOD PRESSURE: 158 MMHG | RESPIRATION RATE: 18 BRPM | WEIGHT: 167.77 LBS | HEIGHT: 68 IN | HEART RATE: 78 BPM | TEMPERATURE: 99.8 F | DIASTOLIC BLOOD PRESSURE: 79 MMHG | OXYGEN SATURATION: 96 % | BODY MASS INDEX: 25.43 KG/M2

## 2025-09-04 LAB
BACTERIA SPEC CULT: NORMAL
BACTERIA SPEC CULT: NORMAL

## 2025-09-04 PROCEDURE — G0378 HOSPITAL OBSERVATION PER HR: HCPCS

## 2025-09-04 PROCEDURE — 250N000013 HC RX MED GY IP 250 OP 250 PS 637: Performed by: STUDENT IN AN ORGANIZED HEALTH CARE EDUCATION/TRAINING PROGRAM

## 2025-09-04 PROCEDURE — 250N000011 HC RX IP 250 OP 636: Performed by: INTERNAL MEDICINE

## 2025-09-04 PROCEDURE — 250N000013 HC RX MED GY IP 250 OP 250 PS 637: Performed by: NURSE PRACTITIONER

## 2025-09-04 PROCEDURE — 97530 THERAPEUTIC ACTIVITIES: CPT | Mod: GP

## 2025-09-04 PROCEDURE — 250N000013 HC RX MED GY IP 250 OP 250 PS 637: Performed by: INTERNAL MEDICINE

## 2025-09-04 RX ADMIN — Medication 1 MG: at 01:17

## 2025-09-04 RX ADMIN — POTASSIUM CHLORIDE 10 MEQ: 750 TABLET, FILM COATED, EXTENDED RELEASE ORAL at 08:29

## 2025-09-04 RX ADMIN — METOPROLOL TARTRATE 25 MG: 25 TABLET, FILM COATED ORAL at 16:20

## 2025-09-04 RX ADMIN — GABAPENTIN 300 MG: 300 CAPSULE ORAL at 13:30

## 2025-09-04 RX ADMIN — IBUPROFEN 400 MG: 400 TABLET, FILM COATED ORAL at 01:16

## 2025-09-04 RX ADMIN — ACETAMINOPHEN 650 MG: 325 TABLET ORAL at 08:29

## 2025-09-04 RX ADMIN — HYDROMORPHONE HYDROCHLORIDE 1 MG: 2 TABLET ORAL at 23:56

## 2025-09-04 RX ADMIN — GABAPENTIN 300 MG: 300 CAPSULE ORAL at 08:30

## 2025-09-04 RX ADMIN — LEVOTHYROXINE SODIUM 88 MCG: 0.09 TABLET ORAL at 08:30

## 2025-09-04 RX ADMIN — METOPROLOL TARTRATE 25 MG: 25 TABLET, FILM COATED ORAL at 08:30

## 2025-09-04 RX ADMIN — DORZOLAMIDE HYDROCHLORIDE AND TIMOLOL MALEATE 1 DROP: 20; 5 SOLUTION/ DROPS OPHTHALMIC at 08:30

## 2025-09-04 RX ADMIN — DORZOLAMIDE HYDROCHLORIDE AND TIMOLOL MALEATE 1 DROP: 20; 5 SOLUTION/ DROPS OPHTHALMIC at 19:28

## 2025-09-04 RX ADMIN — ACETAMINOPHEN 650 MG: 325 TABLET ORAL at 00:22

## 2025-09-04 RX ADMIN — LATANOPROST 1 DROP: 50 SOLUTION/ DROPS OPHTHALMIC at 22:18

## 2025-09-04 RX ADMIN — PANTOPRAZOLE SODIUM 40 MG: 40 TABLET, DELAYED RELEASE ORAL at 08:30

## 2025-09-04 RX ADMIN — GABAPENTIN 300 MG: 300 CAPSULE ORAL at 19:28

## 2025-09-04 RX ADMIN — HALOPERIDOL LACTATE 2 MG: 5 INJECTION, SOLUTION INTRAMUSCULAR at 01:16

## 2025-09-04 RX ADMIN — PANTOPRAZOLE SODIUM 40 MG: 40 TABLET, DELAYED RELEASE ORAL at 19:28

## 2025-09-04 ASSESSMENT — ACTIVITIES OF DAILY LIVING (ADL)
ADLS_ACUITY_SCORE: 51
ADLS_ACUITY_SCORE: 51
ADLS_ACUITY_SCORE: 54
ADLS_ACUITY_SCORE: 51
ADLS_ACUITY_SCORE: 54
ADLS_ACUITY_SCORE: 53
ADLS_ACUITY_SCORE: 51
ADLS_ACUITY_SCORE: 51
ADLS_ACUITY_SCORE: 52
ADLS_ACUITY_SCORE: 53
ADLS_ACUITY_SCORE: 51
ADLS_ACUITY_SCORE: 53
ADLS_ACUITY_SCORE: 51
ADLS_ACUITY_SCORE: 53
ADLS_ACUITY_SCORE: 51

## (undated) DEVICE — SU ETHILON 3-0 PS-1 18" 1663H

## (undated) DEVICE — SUCTION MANIFOLD NEPTUNE 2 SYS 4 PORT 0702-020-000

## (undated) DEVICE — DRSG GAUZE 4X4" 8044

## (undated) DEVICE — PACK TOTAL KNEE BOXED LATEX FREE PO15TKFCT

## (undated) DEVICE — LINEN BACK PACK 5440

## (undated) DEVICE — HOOD FLYTE W/PEELAWAY 408-800-100

## (undated) DEVICE — GLOVE PROTEXIS BLUE W/NEU-THERA 8.0  2D73EB80

## (undated) DEVICE — GLOVE PROTEXIS POWDER FREE SMT 8.0  2D72PT80X

## (undated) DEVICE — SU VICRYL 0 CT-1 27" J340H

## (undated) DEVICE — SOL NACL 0.9% INJ 1000ML BAG 2B1324X

## (undated) DEVICE — Device

## (undated) DEVICE — DRSG STERI STRIP 1/2X4" R1547

## (undated) DEVICE — PACK TOTAL HIP RIDGES LATEX PO15HIFSG

## (undated) DEVICE — GLOVE PROTEXIS POWDER FREE 7.5 ORTHOPEDIC 2D73ET75

## (undated) DEVICE — GLOVE PROTEXIS POWDER FREE 7.0 ORTHOPEDIC 2D73ET70

## (undated) DEVICE — BNDG ESMARK 6" STERILE LF 820-3612

## (undated) DEVICE — GLOVE PROTEXIS BLUE W/NEU-THERA 7.0  2D73EB70

## (undated) DEVICE — SOL WATER IRRIG 1000ML BOTTLE 2F7114

## (undated) DEVICE — LINEN FULL SHEET 5511

## (undated) DEVICE — DRAPE STOCKINETTE 8" 8586

## (undated) DEVICE — SPONGE RAY-TEC 4X8" 7318

## (undated) DEVICE — SET HANDPIECE INTERPULSE W/COAXIAL FAN SPRAY TIP 0210118000

## (undated) DEVICE — DRSG AQUACEL AG 3.5X9.75" HYDROFIBER 412011

## (undated) DEVICE — CLEANSER JET LAVAGE IRRISEPT 0.05% CHG IRRISEPT45USA

## (undated) DEVICE — PREP CHLORAPREP 26ML TINTED HI-LITE ORANGE 930815

## (undated) DEVICE — BLADE SAW SAGITTAL STRK 19.5X90X1.27MM 2108-109-000S11

## (undated) DEVICE — BLADE SAW SAGITTAL STRK 25X75X0.89MM SYS 6 6125-089-075

## (undated) DEVICE — SOLUTION IRRIGATION 0.9% NACL 1000ML R5200-01

## (undated) DEVICE — SOL NACL 0.9% IRRIG 3000ML BAG 2B7477

## (undated) DEVICE — KIT ENDO TURNOVER/PROCEDURE W/CLEAN A SCOPE LINERS 103888

## (undated) DEVICE — LINEN ORTHO ACL PACK 5447

## (undated) DEVICE — ENDO BITE BLOCK ADULT OLYMPUS LATEX FREE MAJ-1632

## (undated) DEVICE — SUTURE MONOCRYL+ 2-0 CT-1 36" UNDYED MCP945H

## (undated) DEVICE — DRAIN JACKSON PRATT 15FR ROUND SU130-1323

## (undated) DEVICE — GLOVE BIOGEL PI ULTRATOUCH SZ 7.0 41170

## (undated) DEVICE — DRAPE LEGGINGS 8421

## (undated) DEVICE — DRAPE IOBAN INCISE 23X17" 6650EZ

## (undated) DEVICE — CATH TRAY FOLEY SURESTEP 16FR DRAIN BAG STATOCK A899916

## (undated) DEVICE — LINEN HALF SHEET 5512

## (undated) DEVICE — SU NDL MAYO 1824-4

## (undated) DEVICE — DRAPE STERI TOWEL LG 1010

## (undated) DEVICE — BONE CEMENT MIXEVAC III HI VAC KIT  0206-015-000

## (undated) DEVICE — SUTURE VICRYL+ 2-0 CT-2 27" UND VCP269H

## (undated) DEVICE — SU ETHIBOND 5 V-37 4X30" MB66G

## (undated) DEVICE — DRSG AQUACEL AG HYDROFIBER  3.5X10" 422605

## (undated) DEVICE — BAG CLEAR TRASH 1.3M 39X33" P4040C

## (undated) DEVICE — SU SILK 2-0 SH 30" K833H

## (undated) DEVICE — STRAP KNEE/BODY 31143004

## (undated) DEVICE — SU VICRYL+ 1 MO-4 18" DYED VCP702D

## (undated) DEVICE — DRSG TELFA 3X8" 1238

## (undated) DEVICE — SUTURE VICRYL+ 0 CT-1 18" DYED VIO VCP740D

## (undated) DEVICE — TRAY PREP DRY SKIN SCRUB 067

## (undated) DEVICE — CAST PADDING 6" STERILE 9046S

## (undated) DEVICE — SOLUTION WATER 1000ML R5000-01

## (undated) DEVICE — BLADE SAW SAGITTAL STRK 25X90X1.27MM HD SYS 6 6125-127-090

## (undated) DEVICE — ENDO FORCEP ENDOJAW BIOPSY 2.8MMX160CM FB-220K

## (undated) DEVICE — PREP DURAPREP 26ML APL 8630

## (undated) DEVICE — DRSG TEGADERM 4X4 3/4" 1626W

## (undated) DEVICE — TOURNIQUET CUFF 30" REPRO BLUE 60-7070-105

## (undated) DEVICE — LINEN DRAPE 54X72" 5467

## (undated) DEVICE — ESU GROUND PAD UNIVERSAL W/O CORD

## (undated) DEVICE — ESU ELEC BLADE 6" COATED E1450-6

## (undated) DEVICE — DRAPE CONVERTORS U-DRAPE 60X72" 8476

## (undated) DEVICE — DRAIN JACKSON PRATT RESERVOIR 100ML SU130-1305

## (undated) DEVICE — SU SILK 2-0 TIE 12X30" A305H

## (undated) DEVICE — SU STRATAFIX PDS PLUS 1 CT-1 12" SXPP1A443

## (undated) DEVICE — NDL 18GA 1.5" 305196

## (undated) DEVICE — CAST PADDING 6IN COTTON WEBRIL STERILE 2554

## (undated) DEVICE — PREP POVIDONE-IODINE 7.5% SCRUB 4OZ BOTTLE MDS093945

## (undated) DEVICE — APPLICATORS COTTON TIP 6" X2

## (undated) DEVICE — PACK LOWER EXTREMITY RIVERSIDE SOP32LEFSX

## (undated) DEVICE — LINEN TOWEL PACK X5 5464

## (undated) DEVICE — LINEN GOWN OVERSIZE 5408

## (undated) DEVICE — DRAIN HEMOVAC RESERVOIR KIT 10FR 1/8" MED 00-2550-002-10

## (undated) DEVICE — GLOVE BIOGEL PI MICRO INDICATOR UNDERGLOVE SZ 7.5 48975

## (undated) DEVICE — SPECIMEN CONTAINER 5OZ STERILE 2600SA

## (undated) DEVICE — BNDG ELASTIC 6" DBL LENGTH UNSTERILE 6611-16

## (undated) DEVICE — PREP CHLORAPREP 26ML TINTED ORANGE  260815

## (undated) DEVICE — SOL NACL 0.9% IRRIG 1000ML BOTTLE 2F7124

## (undated) DEVICE — GOWN IMPERVIOUS SPECIALTY XLG/XLONG 32474

## (undated) DEVICE — FASTENER CATH STAYFIX 685ME

## (undated) DEVICE — SYR 10ML LL W/O NDL 302995

## (undated) DEVICE — PREP DURAPREP REMOVER 4OZ 8611

## (undated) DEVICE — SU PDS II 3-0 PS-1 18" Z683G

## (undated) DEVICE — SU SILK 3-0 SH 30" K832H

## (undated) DEVICE — DRAPE STERI U 1015

## (undated) DEVICE — DRSG AQUACEL AG 3.5X12" HYDROFIBER 420670

## (undated) RX ORDER — CELECOXIB 200 MG/1
CAPSULE ORAL
Status: DISPENSED
Start: 2022-06-06

## (undated) RX ORDER — ACETAMINOPHEN 325 MG/1
TABLET ORAL
Status: DISPENSED
Start: 2025-04-29

## (undated) RX ORDER — BUPIVACAINE HYDROCHLORIDE 2.5 MG/ML
INJECTION, SOLUTION EPIDURAL; INFILTRATION; INTRACAUDAL; PERINEURAL
Status: DISPENSED
Start: 2025-04-29

## (undated) RX ORDER — FENTANYL CITRATE 50 UG/ML
INJECTION, SOLUTION INTRAMUSCULAR; INTRAVENOUS
Status: DISPENSED
Start: 2020-11-20

## (undated) RX ORDER — OXYCODONE HYDROCHLORIDE 5 MG/1
TABLET ORAL
Status: DISPENSED
Start: 2022-06-06

## (undated) RX ORDER — CEFAZOLIN SODIUM 2 G/100ML
INJECTION, SOLUTION INTRAVENOUS
Status: DISPENSED
Start: 2020-11-20

## (undated) RX ORDER — EPHEDRINE SULFATE 50 MG/ML
INJECTION, SOLUTION INTRAMUSCULAR; INTRAVENOUS; SUBCUTANEOUS
Status: DISPENSED
Start: 2020-11-20

## (undated) RX ORDER — LIDOCAINE HYDROCHLORIDE 10 MG/ML
INJECTION, SOLUTION EPIDURAL; INFILTRATION; INTRACAUDAL; PERINEURAL
Status: DISPENSED
Start: 2020-11-20

## (undated) RX ORDER — CEFAZOLIN SODIUM 2 G/100ML
INJECTION, SOLUTION INTRAVENOUS
Status: DISPENSED
Start: 2020-09-16

## (undated) RX ORDER — FENTANYL CITRATE 50 UG/ML
INJECTION, SOLUTION INTRAMUSCULAR; INTRAVENOUS
Status: DISPENSED
Start: 2025-04-29

## (undated) RX ORDER — TRANEXAMIC ACID 650 MG/1
TABLET ORAL
Status: DISPENSED
Start: 2022-06-06

## (undated) RX ORDER — VANCOMYCIN HYDROCHLORIDE 1 G/20ML
INJECTION, POWDER, LYOPHILIZED, FOR SOLUTION INTRAVENOUS
Status: DISPENSED
Start: 2022-06-06

## (undated) RX ORDER — PROPOFOL 10 MG/ML
INJECTION, EMULSION INTRAVENOUS
Status: DISPENSED
Start: 2022-06-06

## (undated) RX ORDER — PROPOFOL 10 MG/ML
INJECTION, EMULSION INTRAVENOUS
Status: DISPENSED
Start: 2019-07-23

## (undated) RX ORDER — LIDOCAINE HYDROCHLORIDE 10 MG/ML
INJECTION, SOLUTION INFILTRATION; PERINEURAL
Status: DISPENSED
Start: 2025-02-27

## (undated) RX ORDER — GLYCOPYRROLATE 0.2 MG/ML
INJECTION INTRAMUSCULAR; INTRAVENOUS
Status: DISPENSED
Start: 2020-11-20

## (undated) RX ORDER — EPHEDRINE SULFATE 50 MG/ML
INJECTION, SOLUTION INTRAMUSCULAR; INTRAVENOUS; SUBCUTANEOUS
Status: DISPENSED
Start: 2019-07-23

## (undated) RX ORDER — ACETAMINOPHEN 325 MG/1
TABLET ORAL
Status: DISPENSED
Start: 2020-11-20

## (undated) RX ORDER — CEFAZOLIN SODIUM/WATER 2 G/20 ML
SYRINGE (ML) INTRAVENOUS
Status: DISPENSED
Start: 2022-06-06

## (undated) RX ORDER — TRANEXAMIC ACID 650 MG/1
TABLET ORAL
Status: DISPENSED
Start: 2020-09-16

## (undated) RX ORDER — CELECOXIB 200 MG/1
CAPSULE ORAL
Status: DISPENSED
Start: 2020-09-16

## (undated) RX ORDER — PANTOPRAZOLE SODIUM 40 MG/1
TABLET, DELAYED RELEASE ORAL
Status: DISPENSED
Start: 2019-07-23

## (undated) RX ORDER — NEOSTIGMINE METHYLSULFATE 1 MG/ML
VIAL (ML) INJECTION
Status: DISPENSED
Start: 2020-11-20

## (undated) RX ORDER — FENTANYL CITRATE 50 UG/ML
INJECTION, SOLUTION INTRAMUSCULAR; INTRAVENOUS
Status: DISPENSED
Start: 2019-07-23

## (undated) RX ORDER — FENTANYL CITRATE 50 UG/ML
INJECTION, SOLUTION INTRAMUSCULAR; INTRAVENOUS
Status: DISPENSED
Start: 2024-04-09

## (undated) RX ORDER — ONDANSETRON 2 MG/ML
INJECTION INTRAMUSCULAR; INTRAVENOUS
Status: DISPENSED
Start: 2020-11-20

## (undated) RX ORDER — DOBUTAMINE HYDROCHLORIDE 200 MG/100ML
INJECTION INTRAVENOUS
Status: DISPENSED
Start: 2020-01-06

## (undated) RX ORDER — DEXAMETHASONE SODIUM PHOSPHATE 4 MG/ML
INJECTION, SOLUTION INTRA-ARTICULAR; INTRALESIONAL; INTRAMUSCULAR; INTRAVENOUS; SOFT TISSUE
Status: DISPENSED
Start: 2020-11-20

## (undated) RX ORDER — CELECOXIB 200 MG/1
CAPSULE ORAL
Status: DISPENSED
Start: 2020-11-20

## (undated) RX ORDER — OXYCODONE HYDROCHLORIDE 5 MG/1
TABLET ORAL
Status: DISPENSED
Start: 2020-11-20

## (undated) RX ORDER — FENTANYL CITRATE 50 UG/ML
INJECTION, SOLUTION INTRAMUSCULAR; INTRAVENOUS
Status: DISPENSED
Start: 2022-06-06

## (undated) RX ORDER — TRANEXAMIC ACID 650 MG/1
TABLET ORAL
Status: DISPENSED
Start: 2020-11-20

## (undated) RX ORDER — CELECOXIB 200 MG/1
CAPSULE ORAL
Status: DISPENSED
Start: 2019-07-23

## (undated) RX ORDER — METOPROLOL TARTRATE 1 MG/ML
INJECTION, SOLUTION INTRAVENOUS
Status: DISPENSED
Start: 2019-07-23

## (undated) RX ORDER — CEFAZOLIN SODIUM 2 G/100ML
INJECTION, SOLUTION INTRAVENOUS
Status: DISPENSED
Start: 2019-07-23

## (undated) RX ORDER — PROPOFOL 10 MG/ML
INJECTION, EMULSION INTRAVENOUS
Status: DISPENSED
Start: 2020-11-20